# Patient Record
Sex: FEMALE | Race: WHITE
[De-identification: names, ages, dates, MRNs, and addresses within clinical notes are randomized per-mention and may not be internally consistent; named-entity substitution may affect disease eponyms.]

---

## 2017-12-27 ENCOUNTER — HOSPITAL ENCOUNTER (INPATIENT)
Dept: HOSPITAL 45 - C.2T | Age: 57
LOS: 10 days | Discharge: HOME | DRG: 312 | End: 2018-01-06
Attending: FAMILY MEDICINE | Admitting: HOSPITALIST
Payer: COMMERCIAL

## 2017-12-27 VITALS
WEIGHT: 209.22 LBS | WEIGHT: 209.22 LBS | BODY MASS INDEX: 35.72 KG/M2 | HEIGHT: 64 IN | BODY MASS INDEX: 35.72 KG/M2 | BODY MASS INDEX: 35.72 KG/M2 | HEIGHT: 64 IN

## 2017-12-27 VITALS
TEMPERATURE: 98.6 F | OXYGEN SATURATION: 100 % | HEART RATE: 91 BPM | SYSTOLIC BLOOD PRESSURE: 143 MMHG | DIASTOLIC BLOOD PRESSURE: 73 MMHG

## 2017-12-27 VITALS
OXYGEN SATURATION: 90 % | SYSTOLIC BLOOD PRESSURE: 94 MMHG | TEMPERATURE: 97.88 F | DIASTOLIC BLOOD PRESSURE: 64 MMHG | HEART RATE: 90 BPM

## 2017-12-27 VITALS — OXYGEN SATURATION: 91 % | HEART RATE: 88 BPM

## 2017-12-27 DIAGNOSIS — F32.9: ICD-10-CM

## 2017-12-27 DIAGNOSIS — J45.909: ICD-10-CM

## 2017-12-27 DIAGNOSIS — E11.9: ICD-10-CM

## 2017-12-27 DIAGNOSIS — I12.0: ICD-10-CM

## 2017-12-27 DIAGNOSIS — J45.901: ICD-10-CM

## 2017-12-27 DIAGNOSIS — N18.6: ICD-10-CM

## 2017-12-27 DIAGNOSIS — D63.1: ICD-10-CM

## 2017-12-27 DIAGNOSIS — N25.0: ICD-10-CM

## 2017-12-27 DIAGNOSIS — I05.0: ICD-10-CM

## 2017-12-27 DIAGNOSIS — E87.2: ICD-10-CM

## 2017-12-27 DIAGNOSIS — Z95.2: ICD-10-CM

## 2017-12-27 DIAGNOSIS — E78.5: ICD-10-CM

## 2017-12-27 DIAGNOSIS — J06.9: ICD-10-CM

## 2017-12-27 DIAGNOSIS — N39.0: ICD-10-CM

## 2017-12-27 DIAGNOSIS — Z79.82: ICD-10-CM

## 2017-12-27 DIAGNOSIS — E66.2: ICD-10-CM

## 2017-12-27 DIAGNOSIS — J96.22: ICD-10-CM

## 2017-12-27 DIAGNOSIS — Z99.2: ICD-10-CM

## 2017-12-27 DIAGNOSIS — R55: Primary | ICD-10-CM

## 2017-12-27 LAB
ALBUMIN SERPL-MCNC: 2.8 GM/DL (ref 3.4–5)
ALP SERPL-CCNC: 93 U/L (ref 45–117)
ALT SERPL-CCNC: 12 U/L (ref 12–78)
AST SERPL-CCNC: 12 U/L (ref 15–37)
BUN SERPL-MCNC: 73 MG/DL (ref 7–18)
CALCIUM SERPL-MCNC: 9.3 MG/DL (ref 8.5–10.1)
CO2 SERPL-SCNC: 20 MMOL/L (ref 21–32)
CREAT SERPL-MCNC: 6.12 MG/DL (ref 0.6–1.2)
EOSINOPHIL NFR BLD AUTO: 231 K/UL (ref 130–400)
GLUCOSE SERPL-MCNC: 302 MG/DL (ref 70–99)
HCT VFR BLD CALC: 35.6 % (ref 37–47)
HEP C IGG  13 YRS+OLDER_RFLX: (no result)
HGB BLD-MCNC: 10.9 G/DL (ref 12–16)
INR PPP: 1 (ref 0.9–1.1)
MCH RBC QN AUTO: 31.7 PG (ref 25–34)
MCHC RBC AUTO-ENTMCNC: 30.6 G/DL (ref 32–36)
MCV RBC AUTO: 103.5 FL (ref 80–100)
PHOSPHATE SERPL-MCNC: 7.1 MG/DL (ref 2.5–4.9)
PMV BLD AUTO: 9.7 FL (ref 7.4–10.4)
POTASSIUM SERPL-SCNC: 4.6 MMOL/L (ref 3.5–5.1)
PROT SERPL-MCNC: 7.5 GM/DL (ref 6.4–8.2)
PTT PATIENT: 28.5 SECONDS (ref 21–31)
RED CELL DISTRIBUTION WIDTH CV: 16.6 % (ref 11.5–14.5)
RED CELL DISTRIBUTION WIDTH SD: 60.7 FL (ref 36.4–46.3)
SODIUM SERPL-SCNC: 133 MMOL/L (ref 136–145)
WBC # BLD AUTO: 11.88 K/UL (ref 4.8–10.8)

## 2017-12-27 RX ADMIN — ALBUTEROL SULFATE SCH MG: 2.5 SOLUTION RESPIRATORY (INHALATION) at 18:01

## 2017-12-27 RX ADMIN — IPRATROPIUM BROMIDE AND ALBUTEROL SCH PUFFS: 20; 100 SPRAY, METERED RESPIRATORY (INHALATION) at 20:03

## 2017-12-27 RX ADMIN — DOCUSATE SODIUM SCH MG: 100 CAPSULE, LIQUID FILLED ORAL at 20:40

## 2017-12-27 RX ADMIN — METHYLPREDNISOLONE SODIUM SUCCINATE SCH MLS/MIN: 1 INJECTION, POWDER, FOR SOLUTION INTRAMUSCULAR; INTRAVENOUS at 20:40

## 2017-12-27 RX ADMIN — PRAMIPEXOLE DIHYDROCHLORIDE SCH MG: 0.25 TABLET ORAL at 20:42

## 2017-12-27 RX ADMIN — CEFTRIAXONE SCH MLS/HR: 1 INJECTION, POWDER, FOR SOLUTION INTRAMUSCULAR; INTRAVENOUS at 18:37

## 2017-12-27 RX ADMIN — HEPARIN SODIUM SCH UNIT: 10000 INJECTION, SOLUTION INTRAVENOUS; SUBCUTANEOUS at 21:28

## 2017-12-27 RX ADMIN — INSULIN ASPART SCH UNITS: 100 INJECTION, SOLUTION INTRAVENOUS; SUBCUTANEOUS at 20:48

## 2017-12-27 RX ADMIN — ALLOPURINOL SCH MG: 100 TABLET ORAL at 20:41

## 2017-12-27 NOTE — HISTORY & PHYSICAL EXAMINATION
DATE OF ADMISSION:  12/27/2017

 

PRIMARY CARE PHYSICIAN:  From [facility].  The name is not yet known.

 

NEPHROLOGIST:  From [facility] as well, we do not know the name yet.

 

CHIEF COMPLAINT:  The patient was transferred from [facility]

with acute respiratory failure, exacerbation of asthma and UTI.

 

HISTORY OF PRESENT COMPLAINT:  She is a 57-year-old female, obese with

significant past medical history of type 2 diabetes on diet controlled,

hypertension, chronic kidney disease on hemodialysis 3 times a week, asthma

and also history of probable aortic or mitral valve replacement and

apparently was in dialysis at [facility] today.  She missed her dialysis last

Sunday, so did not dialyze.  She was found to be collapsed.  She complained

to have some dizziness, some cough and shortness of breath.  Immediately

after that, she was noted to have blue lips and she was taken to the

Emergency Room at [facility].  At the ER, initially, she was unresponsive but

later on she was talking almost normally.  Her initial ABG did show a pH of

7.14, pCO2 of 60 and pO2 of 72 and her white count was 16.2 and her UA did

show possible infection.  Her lactic acid was 1.3 and chest x-ray reported as

seems to be fairly unremarkable, but given her history of respiratory failure

and requiring dialysis, she was transferred to [facility]

for continuation of care.

 

On asking questions, she admits to have a cough recently with some shortness

of breath and wheezing.  She did have chills and she felt dizzy during

dialysis.  She does have urinary frequency but no dysuria.  She did not have

any abdominal pain, any nausea and/or vomiting, and she did not have any

numbness or tingling involving any of the extremities and she was feeling

reasonably good in the room at [facility] and she is saturating

100% on room air.

 

PAST MEDICAL HISTORY:  Significant for diabetes, diet controlled;

hypertension, chronic kidney disease on hemodialysis 3 times a week, asthma

and history of mitral and/or aortic valve repair, not on any anticoagulation.

Also has Hyperlipidemia

 

PAST SURGICAL HISTORY:  Significant for heart valve replacement.

 

FAMILY HISTORY:  No significant family history of heart disease and/or

diabetes or chronic kidney disease.

 

SOCIAL HISTORY:  She is .  She has 2 children.  She does not smoke

and does not drink.  She lives alone and she has been reasonably ambulant.

 

ALLERGIES:  SHE IS ALLERGIC TO HYDROCODONE, MORPHINE.

 

MEDICATIONS:  The list she gave him includes oxygen 2 liters per minute,

ferric citrate 1 tablet daily, Renvela as directed, Colace 100 mg twice

daily, MiraLax as directed, vitamin B complex 1 tablet daily, Breo Ellipta

100/25 mcg 2 times a day; Aciphex, that is rabeprazole sodium, daily;

allopurinol 100 mg 2 times a day, Lipitor 40 mg daily, Zoloft 75 mg daily,

Plavix 75 mg daily, aspirin 81 mg daily, Lantus 5 units every day, Combivent

2 puffs 4 times daily as needed, Protonix 40 mg daily, ProAir 2 puffs q. 4

times daily as needed, Nephrocaps 1 tablet daily and tramadol 50 mg 1 tablet

q. 4 hourly as needed.

 

REVIEW OF SYSTEMS:  Other systems reviewed are unremarkable except those

mentioned in history of present complaint.

 

PHYSICAL EXAMINATION:

GENERAL:  On examination on the medical floor, she was not having any acute

distress.

VITAL SIGNS:  Temperature 37.0, pulse of 91, blood pressure 143/73,

saturation 100% on room air.

HEENT:  Unremarkable.

NECK:  Supple.  No JVD, no bruit.

CHEST:  Decreased breath sounds all over with wheezing anteriorly and minimal

crackles at the bases.

HEART:  S1, S2 with prosthetic valve sounds, no definite murmur appreciated.

ABDOMEN:  Soft, benign, mildly tender in the hypogastrium, renal angles were

not tender.  Bowel sounds present.

EXTREMITIES:  1+ edema bilaterally.

MUSCULOSKELETAL SYSTEM:  Did not show any acute arthritis involving any

joint.

CENTRAL NERVOUS SYSTEM:  She was alert, awake, oriented x3 and no focal

sensory and/or motor deficit appreciated.

 

LABORATORY AND IMAGING DATA:  Noted from [facility].  Blood gas -

pH 7.14, pCO2 of 60, pO2 72, bicarbonate 18, glucose was 207.  White count

16.2, H&H 11.8/38.1, and platelets 293.  PTT 31.  Lactic acid 1.3.  INR is

0.99.  UA - many bacterias, leukocyte esterase 2+, urine glucose 2+ and

protein 2+.  Glucose 202, BUN 68, creatinine 5.9, sodium 137, potassium 4.4,

chloride 104, carbon dioxide 21, total bilirubin 0.5.  LFTs normal.  Troponin

1.07.  Amylase 49, lipase 201.  TSH 2.52, magnesium 2.8.  Chest x-ray:  No

gross consolidation and/or pleural effusion.  Acetone negative.  EKG was in

sinus tachycardia, rate of 109 per minute, normal axis and nonspecific ST-T

wave changes.

 

IMPRESSION AND PLAN:

1.  Unresponsive episode during dialysis.  The cause of this could be

hypotensive episode during Dialysis.ABG in ER at [facility] showed Hypercarbic 
respiratory 

Acidosis.Responded well to BIPAP and on transfer she was feeling much better .

Patient remains hemodynamically stable and saturating 100% on RA.

2.  Respiratory failure that could be a combination of an attack of asthma

and also fluid overload secondary to missed dialysis.  We will check her

x-ray over here and we will consult nephrologist for possible dialysis

starting tomorrow.  She still produces urine, we may try IV Lasix while in

the hospital, depending on the x-ray findings.

3.  End-stage renal disease on hemodialysis.  Nephrologic consultation will

be taken and she will get dialysis.  She has a dialysis catheter in the left

upper chest.

4.  Exacerbation of asthma.  She will get her usual nebulized treatment and

usual bronchodilators and we will put him on Solu-Medrol as well.

5.  Diabetes.  She takes only a small amount of insulin, Lantus, will

continue with that and will put her on sliding scale coverage while in the

hospital.  Check hemoglobin A1c.

6.  Status post heart valve replacement.  She does not have any issues at

this time.  We will continue her medications right now.

7.UTI-started on Ceftriaxone

8.  Hyperlipidemia.  Continue with statin.

9.  Gastrointestinal prophylaxis with Protonix.

10.  Deep venous thrombosis prophylaxis with subcutaneous heparin.

11.  Code status that was discussed with the patient, she will be a full

code.

 

In my clinical judgment, the beneficiary meets criteria as per CMS for

2-midnight stay in the hospital.

Discussed with the Daughter-updated current medical condition.She does not know 
the name of the PCP and or Nephrologist

Phone # [phone].Name-Marie PITTS

## 2017-12-27 NOTE — DIAGNOSTIC IMAGING REPORT
SINGLE VIEW CHEST



CLINICAL HISTORY:  Congestive heart failure.



FINDINGS: An AP, portable, upright chest radiograph is obtained. No prior

studies are available for comparison at the time of dictation. The examination

is degraded by portable technique and patient rotation. The patient is status

post midline sternotomy. A left subclavian central venous catheter is in place.

The tip projects over the right atrium. The heart is enlarged and there is

atherosclerotic calcification of the thoracic aorta. There is pulmonary vascular

congestion with evidence of interstitial edema. Trace pleural effusions are

suspected. No pneumothorax is seen. The skeletal structures appear osteopenic.

The bony thorax is grossly intact.



IMPRESSION:



1. Cardiomegaly with evidence of congestive failure and interstitial edema.



2. Suspect trace pleural effusions. 







Electronically signed by:  Ramírez Hernandez M.D.

12/27/2017 7:21 PM



Dictated Date/Time:  12/27/2017 7:19 PM

## 2017-12-28 VITALS — HEART RATE: 96 BPM | DIASTOLIC BLOOD PRESSURE: 79 MMHG | SYSTOLIC BLOOD PRESSURE: 115 MMHG

## 2017-12-28 VITALS — DIASTOLIC BLOOD PRESSURE: 70 MMHG | TEMPERATURE: 97.88 F | SYSTOLIC BLOOD PRESSURE: 103 MMHG | HEART RATE: 97 BPM

## 2017-12-28 VITALS
HEART RATE: 99 BPM | SYSTOLIC BLOOD PRESSURE: 109 MMHG | DIASTOLIC BLOOD PRESSURE: 71 MMHG | TEMPERATURE: 97.7 F | OXYGEN SATURATION: 97 %

## 2017-12-28 VITALS — HEART RATE: 99 BPM | DIASTOLIC BLOOD PRESSURE: 69 MMHG | SYSTOLIC BLOOD PRESSURE: 94 MMHG

## 2017-12-28 VITALS — OXYGEN SATURATION: 97 % | SYSTOLIC BLOOD PRESSURE: 105 MMHG | HEART RATE: 85 BPM | DIASTOLIC BLOOD PRESSURE: 71 MMHG

## 2017-12-28 VITALS — HEART RATE: 85 BPM | OXYGEN SATURATION: 97 %

## 2017-12-28 VITALS
OXYGEN SATURATION: 99 % | HEART RATE: 96 BPM | TEMPERATURE: 97.52 F | SYSTOLIC BLOOD PRESSURE: 143 MMHG | DIASTOLIC BLOOD PRESSURE: 84 MMHG

## 2017-12-28 VITALS — HEART RATE: 62 BPM | DIASTOLIC BLOOD PRESSURE: 59 MMHG | SYSTOLIC BLOOD PRESSURE: 86 MMHG

## 2017-12-28 VITALS — DIASTOLIC BLOOD PRESSURE: 66 MMHG | HEART RATE: 100 BPM | SYSTOLIC BLOOD PRESSURE: 109 MMHG

## 2017-12-28 VITALS — HEART RATE: 87 BPM | OXYGEN SATURATION: 96 %

## 2017-12-28 VITALS
DIASTOLIC BLOOD PRESSURE: 73 MMHG | TEMPERATURE: 99.32 F | SYSTOLIC BLOOD PRESSURE: 111 MMHG | HEART RATE: 84 BPM | OXYGEN SATURATION: 100 %

## 2017-12-28 VITALS — TEMPERATURE: 97.88 F | DIASTOLIC BLOOD PRESSURE: 69 MMHG | SYSTOLIC BLOOD PRESSURE: 117 MMHG | HEART RATE: 94 BPM

## 2017-12-28 VITALS — HEART RATE: 65 BPM | DIASTOLIC BLOOD PRESSURE: 62 MMHG | SYSTOLIC BLOOD PRESSURE: 115 MMHG

## 2017-12-28 VITALS — HEART RATE: 96 BPM | SYSTOLIC BLOOD PRESSURE: 102 MMHG | DIASTOLIC BLOOD PRESSURE: 76 MMHG

## 2017-12-28 VITALS — OXYGEN SATURATION: 92 % | HEART RATE: 86 BPM

## 2017-12-28 VITALS — HEART RATE: 93 BPM | SYSTOLIC BLOOD PRESSURE: 120 MMHG | DIASTOLIC BLOOD PRESSURE: 70 MMHG

## 2017-12-28 VITALS — DIASTOLIC BLOOD PRESSURE: 59 MMHG | HEART RATE: 66 BPM | SYSTOLIC BLOOD PRESSURE: 101 MMHG

## 2017-12-28 VITALS
HEART RATE: 86 BPM | OXYGEN SATURATION: 94 % | DIASTOLIC BLOOD PRESSURE: 68 MMHG | TEMPERATURE: 98.06 F | SYSTOLIC BLOOD PRESSURE: 117 MMHG

## 2017-12-28 VITALS — OXYGEN SATURATION: 97 % | HEART RATE: 99 BPM

## 2017-12-28 VITALS — HEART RATE: 96 BPM | SYSTOLIC BLOOD PRESSURE: 124 MMHG | DIASTOLIC BLOOD PRESSURE: 96 MMHG

## 2017-12-28 VITALS
TEMPERATURE: 97.88 F | OXYGEN SATURATION: 97 % | SYSTOLIC BLOOD PRESSURE: 122 MMHG | DIASTOLIC BLOOD PRESSURE: 78 MMHG | HEART RATE: 97 BPM

## 2017-12-28 VITALS — HEART RATE: 100 BPM | SYSTOLIC BLOOD PRESSURE: 91 MMHG | DIASTOLIC BLOOD PRESSURE: 61 MMHG

## 2017-12-28 VITALS
TEMPERATURE: 97.88 F | DIASTOLIC BLOOD PRESSURE: 70 MMHG | OXYGEN SATURATION: 96 % | HEART RATE: 95 BPM | SYSTOLIC BLOOD PRESSURE: 107 MMHG

## 2017-12-28 VITALS — DIASTOLIC BLOOD PRESSURE: 74 MMHG | HEART RATE: 92 BPM | SYSTOLIC BLOOD PRESSURE: 128 MMHG

## 2017-12-28 VITALS
DIASTOLIC BLOOD PRESSURE: 62 MMHG | TEMPERATURE: 97.7 F | OXYGEN SATURATION: 94 % | HEART RATE: 94 BPM | SYSTOLIC BLOOD PRESSURE: 101 MMHG

## 2017-12-28 RX ADMIN — IPRATROPIUM BROMIDE AND ALBUTEROL SCH PUFFS: 20; 100 SPRAY, METERED RESPIRATORY (INHALATION) at 20:54

## 2017-12-28 RX ADMIN — Medication SCH MG: at 08:21

## 2017-12-28 RX ADMIN — ALUMINUM ZIRCONIUM TRICHLOROHYDREX GLY SCH EA: 0.2 STICK TOPICAL at 16:00

## 2017-12-28 RX ADMIN — IPRATROPIUM BROMIDE AND ALBUTEROL SCH PUFFS: 20; 100 SPRAY, METERED RESPIRATORY (INHALATION) at 08:20

## 2017-12-28 RX ADMIN — PRAMIPEXOLE DIHYDROCHLORIDE SCH MG: 0.25 TABLET ORAL at 20:55

## 2017-12-28 RX ADMIN — INSULIN ASPART SCH UNITS: 100 INJECTION, SOLUTION INTRAVENOUS; SUBCUTANEOUS at 11:52

## 2017-12-28 RX ADMIN — METHYLPREDNISOLONE SODIUM SUCCINATE SCH MLS/MIN: 1 INJECTION, POWDER, FOR SOLUTION INTRAMUSCULAR; INTRAVENOUS at 08:22

## 2017-12-28 RX ADMIN — ACETAMINOPHEN PRN MG: 325 TABLET ORAL at 16:58

## 2017-12-28 RX ADMIN — HEPARIN SODIUM SCH UNIT: 10000 INJECTION, SOLUTION INTRAVENOUS; SUBCUTANEOUS at 06:01

## 2017-12-28 RX ADMIN — ALLOPURINOL SCH MG: 100 TABLET ORAL at 08:21

## 2017-12-28 RX ADMIN — ALBUTEROL SULFATE SCH MG: 2.5 SOLUTION RESPIRATORY (INHALATION) at 01:31

## 2017-12-28 RX ADMIN — IPRATROPIUM BROMIDE AND ALBUTEROL SCH PUFFS: 20; 100 SPRAY, METERED RESPIRATORY (INHALATION) at 11:52

## 2017-12-28 RX ADMIN — PANTOPRAZOLE SCH MG: 40 TABLET, DELAYED RELEASE ORAL at 08:22

## 2017-12-28 RX ADMIN — DOCUSATE SODIUM SCH MG: 100 CAPSULE, LIQUID FILLED ORAL at 20:54

## 2017-12-28 RX ADMIN — INSULIN ASPART SCH UNITS: 100 INJECTION, SOLUTION INTRAVENOUS; SUBCUTANEOUS at 08:28

## 2017-12-28 RX ADMIN — ACETAMINOPHEN PRN MG: 325 TABLET ORAL at 08:24

## 2017-12-28 RX ADMIN — ALBUTEROL SULFATE SCH MG: 2.5 SOLUTION RESPIRATORY (INHALATION) at 14:02

## 2017-12-28 RX ADMIN — ALUMINUM ZIRCONIUM TRICHLOROHYDREX GLY SCH EA: 0.2 STICK TOPICAL at 00:10

## 2017-12-28 RX ADMIN — DOCUSATE SODIUM SCH MG: 100 CAPSULE, LIQUID FILLED ORAL at 08:21

## 2017-12-28 RX ADMIN — ALBUTEROL SULFATE SCH MG: 2.5 SOLUTION RESPIRATORY (INHALATION) at 06:59

## 2017-12-28 RX ADMIN — HEPARIN SODIUM SCH UNIT: 10000 INJECTION, SOLUTION INTRAVENOUS; SUBCUTANEOUS at 21:03

## 2017-12-28 RX ADMIN — ALUMINUM ZIRCONIUM TRICHLOROHYDREX GLY SCH EA: 0.2 STICK TOPICAL at 00:11

## 2017-12-28 RX ADMIN — ALBUTEROL SULFATE SCH MG: 2.5 SOLUTION RESPIRATORY (INHALATION) at 19:21

## 2017-12-28 RX ADMIN — CLOPIDOGREL BISULFATE SCH MG: 75 TABLET, FILM COATED ORAL at 08:21

## 2017-12-28 RX ADMIN — ASCORBIC ACID, THIAMINE MONONITRATE,RIBOFLAVIN, NIACINAMIDE, PYRIDOXINE HYDROCHLORIDE, FOLIC ACID, CYANOCOBALAMIN, BIOTIN, CALCIUM PANTOTHENATE, SCH CAP: 100; 1.5; 1.7; 20; 10; 1; 6000; 150000; 5 CAPSULE, LIQUID FILLED ORAL at 08:22

## 2017-12-28 RX ADMIN — ATORVASTATIN CALCIUM SCH MG: 40 TABLET, FILM COATED ORAL at 09:48

## 2017-12-28 RX ADMIN — HEPARIN SODIUM SCH UNIT: 10000 INJECTION, SOLUTION INTRAVENOUS; SUBCUTANEOUS at 14:48

## 2017-12-28 RX ADMIN — INSULIN ASPART SCH UNITS: 100 INJECTION, SOLUTION INTRAVENOUS; SUBCUTANEOUS at 17:05

## 2017-12-28 RX ADMIN — SERTRALINE HYDROCHLORIDE SCH MG: 50 TABLET, FILM COATED ORAL at 08:22

## 2017-12-28 RX ADMIN — IPRATROPIUM BROMIDE AND ALBUTEROL SCH PUFFS: 20; 100 SPRAY, METERED RESPIRATORY (INHALATION) at 16:58

## 2017-12-28 RX ADMIN — CEFTRIAXONE SCH MLS/HR: 1 INJECTION, POWDER, FOR SOLUTION INTRAMUSCULAR; INTRAVENOUS at 16:59

## 2017-12-28 RX ADMIN — INSULIN ASPART SCH UNITS: 100 INJECTION, SOLUTION INTRAVENOUS; SUBCUTANEOUS at 21:00

## 2017-12-28 RX ADMIN — ALLOPURINOL SCH MG: 100 TABLET ORAL at 20:56

## 2017-12-28 RX ADMIN — ALUMINUM ZIRCONIUM TRICHLOROHYDREX GLY SCH EA: 0.2 STICK TOPICAL at 08:00

## 2017-12-28 NOTE — PHARMACY PROGRESS NOTE
Glycemic Control Intl Consult


Date of Service


Dec 28, 2017.





Scope


Glycemic Pharmacist consulted by Dr Escobar on 12/28/17 for glycemic control and 

to write orders per [facility] inpatient glycemic control protocol





Objective


Weight (Kilograms):  100.900


Accuchecks BSG (last 24hrs):











Test


  12/27/17


17:07 12/27/17


19:53 12/27/17


20:16 12/28/17


07:11


 


Bedside Glucose


  249 mg/dl


(70-90) 301 mg/dl


(70-90) 


  281 mg/dl


(70-90)


 


Random Glucose


  


  


  302 mg/dl


(70-99) 


 


 


Test


  12/28/17


11:23 


  


  


 


 


Bedside Glucose


  366 mg/dl


(70-90) 


  


  


 








Laboratory Data (last 24hrs)











Test


  12/27/17


20:16


 


Anion Gap 12.0 mmol/L 


 


BUN/Creatinine Ratio 11.9 


 


Blood Urea Nitrogen 73 mg/dl 


 


Creatinine 6.12 mg/dl 


 


Potassium Level 4.6 mmol/L 


 


Sodium Level 133 mmol/L 


 


White Blood Count 11.88 K/uL 











Recent Pertinent Medications


Outpatient Anti-diabetic Regimen: 


* Lantus 5 units SQ daily


* A1c pending





The patient is currently receiving:


* Basal insulin:              Lantus 5 units every 24 hours





* Correctional Insulin:     Novolog Correction per scale ACHS


                            Goal Range: Low 110 mg/dL - High 140 mg/dL


                            Correction Factor: 25 mg/dL/unit





* Prandial insulin:           Per carb ratio of 1 unit per 15 grams CHO consumed





Risk Factors for Insulin Resistance:


* Steroids: Solu-medrol 60 mg IV q8h


* Infection: UTI -> empiric ceftriaxone 


* Diet: T2DM, low Na





Assessment & Plan


ASSESSMENT:


* Ms Bro is a 57 yr old T2DM female admitted for respiratory failure, asthma 

exacerbation, and UTI. Prior to transfer to [facility], she was taken to [place] 

ER after collapsing during hemodialysis. h/o ESRD on HD M/W/F, mitral and 

aortic valve repair, HTN and asthma.


* Patient is on low dose of Lantus as outpatient - glycemic control unknown. 

A1c ordered for 12/29 am.


* Initiate IV insulin infusion due to sustained severe hyperglycemia and 

patient ordered high dose IV steroids. 


 * SM 60 mg IV q8h --> SM 40 mg q12h starting at 2100


* Will overlap insulin infusion with Lantus to aid in early transition off IV 

infusion. Will utilize a fixed carb ratio since patient is eating and on IV 

steroids. 





PLAN FOR INPATIENT GLYCEMIC CONTROL:


* Starting IV insulin infusion per moderate stress protocol


 * Goal Range 110 - 180 mg/dl





* Basal insulin


 * Lantus 20 units SQ BID





* Bolus Insulin - will utilize a fixed carb ratio rather than carb ratio 

calculated by insulin infusion


 *  NOVOLOG per scale ACHS 


 * Goal Range:  Low 110 mg/dL - High 140 mg/dL


 * Correction Factor: -- mg/dL/unit (insulin drip will correct)


 * Nutritional / Prandial insulin per carb ratio of 1 unit per 5 grams CHO 

consumed








* Please note that the plan above was derived based on current level of insulin 

resistance and hospital stress. These recommendations are appropriate for 

inpatient admission only. Plan of care upon discharge will need to be 

reassessed to avoid potential outpatient hypo/hyperglycemia. 





Thank you.

## 2017-12-28 NOTE — PROGRESS NOTE
Medicine Progress Note


Date & Time of Visit:


Dec 28, 2017 at 12:23.


Subjective


58 yo F with ESRD on HD presented as a transfer from OSH after an episode of 

unresponsiveness preceeded by some lightheadedness during her dialysis session 

yesterday.  She is doing well today and is awake and alert.  She is wearing 

supplemental oxygen but denies any SOB, chest pain or other symptoms at this 

time.  Telemetry review was unremarkable overnight.  She reports coughing that 

is nonproductive and some shortness of breath.  She wore BIPAP overnight. She 

reports feeling better today.  She is tolerating PO.





Objective





Last 8 Hrs








  Date Time  Temp Pulse Resp B/P (MAP) Pulse Ox O2 Delivery O2 Flow Rate FiO2


 


12/28/17 12:00      Nasal Cannula 4.0 


 


12/28/17 11:48 36.4 96 20 143/84 (103) 99 Nasal Cannula 4.0 


 


12/28/17 08:00 36.6 97 22 122/78 (93) 97 BiPAP  


 


12/28/17 08:00      Nasal Cannula 4.0 


 


12/28/17 05:01  86   92   21








Physical Exam:


GEN: obese, in no acute distress, alert and appropriate, NC in place, no 

conversational dyspnea. 


HEENT: NC/AT, pupils are round and equal bilaterally, normal sclerae, MMM


CARDIO: reg rate, S1/2 heard without m/g/r


LUNGS: mild wheezing at bases, otherwise CTA bilaterally, without crackles or 

rales.  good diaphragmatic excursion


ABD: soft, non-tender, non-distended, no rebound or guarding +BS


EXTREMITY: RP and DP palpable 2+ bilat, no LE swelling or edema, extremities 

are warm and well-perfused


NEURO: CN 2-12 grossly intact


MUSC: moves all extremities equally


SKIN:  warm and dry


Laboratory Results:


12/27/17 20:16








12/27/17 20:16

















Test


  12/27/17


20:16 12/28/17


08:48 12/28/17


19:49


 


Red Blood Count


  3.44 M/uL


(4.2-5.4) 


  


 


 


Mean Corpuscular Volume


  103.5 fL


() 


  


 


 


Mean Corpuscular Hemoglobin


  31.7 pg


(25-34) 


  


 


 


Mean Corpuscular Hemoglobin


Concent 30.6 g/dl


(32-36) 


  


 


 


RDW Standard Deviation


  60.7 fL


(36.4-46.3) 


  


 


 


RDW Coefficient of Variation


  16.6 %


(11.5-14.5) 


  


 


 


Mean Platelet Volume


  9.7 fL


(7.4-10.4) 


  


 


 


Prothrombin Time


  10.0 SECONDS


(9.0-12.0) 


  


 


 


Prothromb Time International


Ratio 1.0 (0.9-1.1) 


  


  


 


 


Activated Partial


Thromboplast Time 28.5 SECONDS


(21.0-31.0) 


  


 


 


Partial Thromboplastin Ratio 1.1   


 


Anion Gap


  12.0 mmol/L


(3-11) 


  


 


 


Est Creatinine Clear Calc


Drug Dose 11.3 ml/min 


  


  


 


 


Estimated GFR (


American) 8.0 


  


  


 


 


Estimated GFR (Non-


American 6.9 


  


  


 


 


BUN/Creatinine Ratio 11.9 (10-20)   


 


Calcium Level


  9.3 mg/dl


(8.5-10.1) 


  


 


 


Phosphorus Level


  7.1 mg/dl


(2.5-4.9) 


  


 


 


Magnesium Level


  2.8 mg/dl


(1.8-2.4) 


  


 


 


Total Bilirubin


  0.3 mg/dl


(0.2-1) 


  


 


 


Aspartate Amino Transf


(AST/SGOT) 12 U/L (15-37) 


  


  


 


 


Alanine Aminotransferase


(ALT/SGPT) 12 U/L (12-78) 


  


  


 


 


Alkaline Phosphatase


  93 U/L


() 


  


 


 


Total Protein


  7.5 gm/dl


(6.4-8.2) 


  


 


 


Albumin


  2.8 gm/dl


(3.4-5.0) 


  


 


 


Globulin


  4.7 gm/dl


(2.5-4.0) 


  


 


 


Albumin/Globulin Ratio 0.6 (0.9-2)   


 


Beta-Hydroxybutyric Acid


  3.60 mg/dL


(0.2-2.81) 


  


 


 


Thyroid Stimulating Hormone


(TSH) 2.160 uIu/ml


(0.300-4.500) 


  


 


 


Hepatitis C Antibody Screen NEG (NEG)   


 


Hepatitis C Antibody NEG (NEG)   


 


Arterial Blood pH


  


  7.25


(7.35-7.45) 


 


 


Arterial Blood Partial


Pressure CO2 


  43 mmHg


(35-46) 


 


 


Arterial Blood Partial


Pressure O2 


  153 mm/Hg


(80-95) 


 


 


Arterial Blood HCO3


  


  18 mmol/L


(19-24) 


 


 


Arterial Blood Oxygen


Saturation 


  98.4 % (90-95) 


  


 


 


Arterial Blood Base Excess


  


  -8.7 mEq/L


(-9-1.8) 


 


 


Arterial Blood Gas Delivery  4 L  


 


Kiko Test  POS (POS)  


 


Bedside Glucose


  


  


  366 mg/dl


(70-90)








Last 24 Hours








Test


  12/27/17


17:07 12/27/17


19:53 12/27/17


20:16 12/27/17


23:55


 


Bedside Glucose 249 mg/dl  301 mg/dl   


 


White Blood Count   11.88 K/uL  


 


Red Blood Count   3.44 M/uL  


 


Hemoglobin   10.9 g/dL  


 


Hematocrit   35.6 %  


 


Mean Corpuscular Volume   103.5 fL  


 


Mean Corpuscular Hemoglobin   31.7 pg  


 


Mean Corpuscular Hemoglobin


Concent 


  


  30.6 g/dl 


  


 


 


RDW Standard Deviation   60.7 fL  


 


RDW Coefficient of Variation   16.6 %  


 


Platelet Count   231 K/uL  


 


Mean Platelet Volume   9.7 fL  


 


Prothrombin Time   10.0 SECONDS  


 


Prothromb Time International


Ratio 


  


  1.0 


  


 


 


Activated Partial


Thromboplast Time 


  


  28.5 SECONDS 


  


 


 


Partial Thromboplastin Ratio   1.1  


 


Sodium Level   133 mmol/L  


 


Potassium Level   4.6 mmol/L  


 


Chloride Level   101 mmol/L  


 


Carbon Dioxide Level   20 mmol/L  


 


Anion Gap   12.0 mmol/L  


 


Blood Urea Nitrogen   73 mg/dl  


 


Creatinine   6.12 mg/dl  


 


Est Creatinine Clear Calc


Drug Dose 


  


  11.3 ml/min 


  


 


 


Estimated GFR (


American) 


  


  8.0 


  


 


 


Estimated GFR (Non-


American 


  


  6.9 


  


 


 


BUN/Creatinine Ratio   11.9  


 


Random Glucose   302 mg/dl  


 


Calcium Level   9.3 mg/dl  


 


Phosphorus Level   7.1 mg/dl  


 


Magnesium Level   2.8 mg/dl  


 


Total Bilirubin   0.3 mg/dl  


 


Aspartate Amino Transf


(AST/SGOT) 


  


  12 U/L 


  


 


 


Alanine Aminotransferase


(ALT/SGPT) 


  


  12 U/L 


  


 


 


Alkaline Phosphatase   93 U/L  


 


Total Protein   7.5 gm/dl  


 


Albumin   2.8 gm/dl  


 


Globulin   4.7 gm/dl  


 


Albumin/Globulin Ratio   0.6  


 


Beta-Hydroxybutyric Acid   3.60 mg/dL  


 


Thyroid Stimulating Hormone


(TSH) 


  


  2.160 uIu/ml 


  


 


 


Hepatitis C Antibody Screen   NEG  


 


Hepatitis C Antibody   NEG  


 


Arterial Blood pH    7.22 


 


Arterial Blood Partial


Pressure CO2 


  


  


  50 mmHg 


 


 


Arterial Blood Partial


Pressure O2 


  


  


  97 mm/Hg 


 


 


Arterial Blood HCO3    20 mmol/L 


 


Arterial Blood Oxygen


Saturation 


  


  


  95.1 % 


 


 


Arterial Blood Base Excess    -7.8 mEq/L 


 


Arterial Blood Gas Delivery    2 L 


 


Kiko Test    POS 


 


Test


  12/28/17


02:30 12/28/17


07:11 12/28/17


08:48 12/28/17


11:23


 


Arterial Blood pH 7.22   7.25  


 


Arterial Blood Partial


Pressure CO2 50 mmHg 


  


  43 mmHg 


  


 


 


Arterial Blood Partial


Pressure O2 105 mm/Hg 


  


  153 mm/Hg 


  


 


 


Arterial Blood HCO3 20 mmol/L   18 mmol/L  


 


Arterial Blood Oxygen


Saturation 96.2 % 


  


  98.4 % 


  


 


 


Arterial Blood Base Excess -7.8 mEq/L   -8.7 mEq/L  


 


Arterial Blood Gas Delivery 30%   4 L  


 


Kiko Test POS   POS  


 


Bedside Glucose  281 mg/dl   366 mg/dl 











Assessment & Plan


58 yo F with ESRD on HD presented as a transfer from OSH after an episode of 

unresponsiveness preceeded by some lightheadedness during her dialysis session 

yesterday.  She is doing well today and is awake and alert.  She is wearing 

supplemental oxygen but denies any SOB, chest pain or other symptoms at this 

time.  Telemetry review was unremarkable overnight.  She reports coughing that 

is nonproductive and some shortness of breath.  She wore BIPAP overnight. She 

reports feeling better today.  She is tolerating PO. 





1.  Syncope-poss etiologies include but not limited to hypotension, 

hypoglycemia or other during dialysis.  No events on telemetry overnight.  She 

appears alert and oriented today.  Echo pending. 


2.  Hypoxia poss 2/2 asthma exacerbation vs hypercarbia from presumed GUILLERMO/

obesity hypoventilation syndrome vs fluid overloaded state from missed dialysis 

session.  HD today.  Will cont to monitor volume status daily.  Cont steroids 

and bronchodilators.  Apprec pulm input.  BIPAP qHS


3.  UTI-UA dirty at prior facility, no culture results available.  Cont 

ceftriaxone. 


4.  ESRD on HD-apprec Nephro recs. 


5.  DMII-ISS/Lantus and carb coverage.  Pharm consult. 


6.  Status post heart valve replacement.  She is unsure which valve but it is a 

bioprosthetic valve. No murmur on exam.  





DVT proph-heparin


Full Code


Dispo-cont telemetry





[facility]


Consultants:


Nephro, Pulm


Current Inpatient Medications:





Current Inpatient Medications








 Medications


  (Trade)  Dose


 Ordered  Sig/Daniel


 Route  Start Time


 Stop Time Status Last Admin


Dose Admin


 


 Heparin Sodium


  (Porcine)


  (Heparin Sq 5000


 Unit/0.5ml)  5,000 unit  Q8


 SQ  12/27/17 22:00


 1/26/18 21:59  12/28/17 06:01


5,000 UNIT


 


 Acetaminophen


  (Tylenol Tab)  650 mg  Q4H  PRN


 PO  12/27/17 17:45


 1/26/18 17:44  12/28/17 08:24


650 MG


 


 Ondansetron HCl


  (Zofran Inj)  4 mg  Q6H  PRN


 IV  12/27/17 17:45


 1/26/18 17:44   


 


 


 Albuterol Sulfate


  (Ventolin 0.083%


 2.5MG/3ML Neb)  2.5 mg  Q6R


 INH  12/27/17 21:00


 1/26/18 20:59  12/28/17 01:31


2.5 MG


 


 Methylprednisolone


 Sodium Succinate


 60 mg/Syringe  0.96 ml @ 


 1.5 mls/min  TID


 IV  12/27/17 21:00


 1/26/18 20:59  12/28/17 08:22


1.5 MLS/MIN


 


 Ceftriaxone


 Sodium 1000 mg/


 Dextrose  60 ml @ 


 100 mls/hr  DAILY@1800


 IV  12/27/17 18:00


 1/1/18 17:59  12/27/17 18:37


100 MLS/HR


 


 Allopurinol


  (Zyloprim Tab)  100 mg  BID


 PO  12/27/17 21:00


 1/26/18 20:59  12/28/17 08:21


100 MG


 


 Aspirin


  (Ecotrin Tab)  81 mg  DAILY


 PO  12/28/17 09:00


 1/27/18 08:59  12/28/17 08:21


81 MG


 


 Atorvastatin


 Calcium


  (Lipitor Tab)  40 mg  DAILY


 PO  12/28/17 09:00


 1/27/18 08:59  12/28/17 09:48


40 MG


 


 Clopidogrel


 Bisulfate


  (plAVix TAB)  75 mg  DAILY


 PO  12/28/17 09:00


 1/27/18 08:59  12/28/17 08:21


75 MG


 


 Docusate Sodium


  (coLACE CAP)  100 mg  BID


 PO  12/27/17 21:00


 1/26/18 20:59  12/28/17 08:21


100 MG


 


 Insulin Glargine


  (Lantus Solostar


 Pen)  5 units  QAM


 SC  12/28/17 09:00


 1/27/18 08:59  12/28/17 08:29


5 UNITS


 


 Albuterol/


 Ipratropium


  (Combivent


 Respimat Inh)  1 puffs  QIDR


 INH  12/27/17 20:00


 1/26/18 19:59  12/28/17 11:52


1 PUFFS


 


 Pantoprazole


 Sodium


  (Protonix Tab)  40 mg  DAILY


 PO  12/28/17 09:00


 1/27/18 08:59  12/28/17 08:22


40 MG


 


 Pramipexole


 Dihydrochloride


  (miraPEX TAB)  0.25 mg  HS


 PO  12/27/17 21:00


 1/26/18 20:59  12/27/17 20:42


0.25 MG


 


 Sertraline HCl


  (Zoloft Tab)  75 mg  DAILY


 PO  12/28/17 09:00


 1/27/18 08:59  12/28/17 08:22


75 MG


 


 Thiamine HCl


  (Vitamin B-1 Tab)  50 mg  DAILY


 PO  12/28/17 09:00


 1/27/18 08:59  12/28/17 08:21


50 MG


 


 Tramadol HCl


  (Ultram Tab)  50 mg  Q4H  PRN


 PO  12/27/17 19:15


 1/26/18 19:14   


 


 


 Vitamin B Complex/


 Vit C/Folic Acid


  (Nephrocaps)  1 cap  DAILY


 PO  12/28/17 09:00


 1/27/18 08:59  12/28/17 08:22


1 CAP


 


 Miscellaneous


 Information


  (Order Awaiting


 Action)  1 ea  QS


 N/A  12/28/17 00:00


 1/27/18 00:00   


 


 


 Miscellaneous


 Information


  (Order Awaiting


 Action)  1 ea  QS


 N/A  12/28/17 00:00


 1/27/18 00:00   


 


 


 Miscellaneous


 Information


  (Order Awaiting


 Action)  1 ea  QS


 N/A  12/28/17 00:00


 1/27/18 00:00   


 


 


 Miscellaneous


 Information


  (Order Awaiting


 Action)  1 ea  QS


 N/A  12/28/17 00:00


 1/27/18 00:00   


 


 


 Insulin Aspart


  (novoLOG ASPART)  **SLIDING


 SCALE**


 **G...  ACHS


 SC  12/27/17 21:00


 1/26/18 20:59  12/28/17 11:52


12 UNITS


 


 Glucose


  (Glucose 40% Gel)  15-30


 GRAMS 15


 GRAMS...  UD  PRN


 PO  12/27/17 19:30


 1/26/18 19:29   


 


 


 Glucose


  (Glucose Chew


 Tab)  4-8


 Tablets 4


 Tabl...  UD  PRN


 PO  12/27/17 19:30


 1/26/18 19:29   


 


 


 Dextrose


  (Dextrose 50%


 50ML Syringe)  25-50ML OF


 50% DW IV


 FOR...  UD  PRN


 IV  12/27/17 19:30


 1/26/18 19:29   


 


 


 Glucagon


  (Glucagon Inj)  1 mg  UD  PRN


 SQ  12/27/17 19:30


 1/26/18 19:29   


 


 


 Miscellaneous


 Information


  (Consult


 Glycemic


 Management


 Pharmacy)  1 ea  UD  PRN


 N/A  12/28/17 12:14


 1/27/18 12:13

## 2017-12-28 NOTE — NEPHROLOGY CONSULTATION
DATE OF CONSULTATION:  12/28/2017

 

ATTENDING OF RECORD:  Nisha Escobar DO

 

REASON FOR CONSULTATION:  ESRD.

 

HISTORY OF PRESENT ILLNESS:  This is a 57-year-old female who dialyzes in

[place] with a nephrologist in the [place] area who normally dialyzes

Mondays, Wednesdays, Fridays; however, secondary to the holidays was

dialyzing Friday, Sunday, Wednesday.  The patient missed Sunday's treatment. 

Patient went from Friday to Wednesday without dialysis.  The patient was short 
of

breath and fluid overloaded.  They attempted dialysis on her on Wednesday;

however, patient became unresponsive and notes that she had blue lips.  Her

ABG showed a pH of 7.14 with an elevated white count of 16,000 and a UA

suggestive of infection, transferred to [facility].  The patient overall

for the past week has not felt well with cough, shortness of breath,

wheezing.  The patient was admitted.  ABG showed a pH of 7.22.  BiPAP was

started and repeat ABG was not much better.  When trying to arouse her this

morning, it was very difficult to arouse her, was quite lethargic; however,

she finally woke up and was talking to me normally.

 

PAST MEDICAL HISTORY:  End-stage renal disease, mitral and aortic valve

repairs, hyperlipidemia, hypertension, diabetes, asthma.

 

PAST SURGICAL HISTORY:  Heart valve surgery, tunnel dialysis catheter

placement.

 

FAMILY HISTORY:  No history of end-stage renal disease in the family.

 

SOCIAL HISTORY:  No smoking, no alcohol, no drugs.  Lives alone.

 

REVIEW OF SYSTEMS:  Positive shortness of breath.  Positive fatigue.  Good

appetite.  No nausea or vomiting.  No chest pain.  Positive cough.  No

dysuria.  Positive chills.  No fevers.  All other review of systems otherwise

negative.

 

CURRENT MEDICATIONS:  Aspirin 81 mg daily, Lipitor 40 mg daily, Plavix 75 mg

daily, Lantus 5 units daily, Protonix 40 mg daily, Zoloft 75 mg daily,

thiamine 50 mg daily, Nephrocaps daily, heparin 5000 subQ q. 8, Solu-Medrol

60 mg IV t.i.d., allopurinol 100 mg p.o. b.i.d., Colace 100 mg p.o. b.i.d.,

Mirapex 0.25 mg p.o. at night, sliding scale insulin, ceftriaxone 1 gram IV

daily.

 

PHYSICAL EXAMINATION:

VITAL SIGNS:  Temperature is 37.4, pulse 80s, respiratory rate is 20, blood

pressure is 111/73, satting 92% on BiPAP.

GENERAL:  Awake, alert, oriented x3.  Difficult to arouse, but once aroused,

answers questions normally, obese.

EYES:  No scleral icterus.

ENT:  Moist mucous membranes.

NECK:  Supple.

PULMONARY:  Decreased air movement.

CARDIAC:  Regular rate and rhythm.

ABDOMEN:  Bowel sounds positive, soft, nontender.

EXTREMITIES:  No clubbing, cyanosis or edema.

NEUROLOGICALLY:  Nonfocal.

 

LABORATORIES:  White count is 11, H&H 10 and 35, platelet count is 231 on

admission last night, sodium level is 133, potassium 4.6, chloride is 101,

bicarb is 20, BUN 73, creatinine 6.12, glucose is 302, calcium is 9.3,

phosphorus 7.1, mag is 2.8.  TSH 2.16.  Albumin is 2.8.  INR is 1.  Hep C

negative.  Last ABG showed a pH of 7.22, pCO2 of 50, pO2 of 105, bicarbonate

of 20.  Chest x-ray shows cardiomegaly with evidence of congestive heart

failure and interstitial edema, suspect trace pleural effusions.

 

IMPRESSION AND PLAN:

1.  End-stage renal disease.  The patient appears to have a urinary tract

infection, but also with signs of fluid overload on chest x-ray.  We will

attempt dialysis again today in hopes of trying to remove fluid.  The patient

did become unresponsive on dialysis as an outpatient, so we will monitor 
closely. 

Hemoglobin levels are in the 10s, so we will hold on Procrit today.

2.  Renal osteodystrophy.  We will clarify phosphate binders, phosphorus

levels are elevated and does not appear to be on any phosphate binders.  We

will question if she is able to tolerate them or not.  We will plan on daily

dialysis while here to try to help optimize lungs while the patient continues

on antibiotics for urinary tract infection as well as steroids for her

underlying asthma.  The patient is requiring BiPAP and hopefully with

appropriate fluid removal and steroids, will be able to be weaned off of

them.

 

I appreciate consultation.

 

 

 

GISSELLE

## 2017-12-28 NOTE — PULMONARY CONSULTATION
DATE OF CONSULTATION:  12/28/2017

 

TIME:  2:00 p.m.

 

HISTORY OF PRESENT ILLNESS:  The patient was seen in room 220, bed 1.  She is

a 57-year-old female who was transferred here from [facility].  The history

is that she has renal failure for the past 1 year and is on dialysis.  She

was at dialysis as an outpatient yesterday when she had an episode of

unresponsiveness.  She cannot recall the events from that time.  She does not

remember feeling sick.  She did have symptoms of a cold for about 4 days. 

She had an increased cough and she was bringing up some green phlegm.  She

does not recall being told that her blood pressure was low during dialysis. 

It is unclear how long she was unresponsive for.  They moved her to the

Emergency Room at [facility].  She had a blood gas done there that showed a

pH of 7.14 with a pCO2 of 60 and a pO2 of 72.  This would imply respiratory

acidosis likely combined with coexistent metabolic acidosis.  She did have

some chills and dizziness apparently.  She was subsequently transferred here.

 She has had a reasonably good day thus far.  She is currently getting

dialyzed.  She has not had any episodes of passing out.  Apparently, BiPAP

was applied at [facility] and she did well with it.  The patient believes

that she can recall having this other mask on her face and it felt good.

 

The patient carries a history of asthma.  This diagnosis was made at about

age 23.  She has been on inhalers for a long time.  She states she does

wheeze.  She has been coughing more recently as noted.  The patient describes

being very short of breath every day.  She lives in a small house and she is

short of breath going from room to room.  The patient is not the best

historian.

 

She is not a good sleeper.  She states she goes to bed at about 9:00 p.m.  It

takes an hour or so to fall asleep.  She will sleep until about 2:00 a.m. and

then she gets up.  She feels like she cannot sleep any more.  She has been

doing this for quite some time.  She has never had a sleep test.  She denies

excessive sleepiness during the day, although she says she is tired.  She

lives alone.  She does not know if she snores.  There have been no observed

apneas.  She does have a history of hypertension, diabetes and obesity, which

would predispose her to sleep apnea.

 

PAST SURGICAL HISTORY:  Aortic valve replacement done in [place].  She

states she has a porcine valve.  She also has had a hysterectomy.

 

PAST MEDICAL HISTORY:

1.  Hypertension.

2.  Diabetes.

3.  Obesity.

4.  Chronic kidney disease.

5.  Asthma as noted.

6.  Valvular heart disease.

 

SOCIAL HISTORY:  Tobacco:  None except for a brief time at age 12.  ETOH --

denied.

 

ALLERGIES:  HYDROCODONE AND MORPHINE.

 

OCCUPATIONAL HISTORY:  The patient states she has been disabled for many

years.

 

REVIEW OF SYSTEMS:  Negative except for the above-mentioned complaints.

 

PHYSICAL EXAMINATION:

GENERAL:  The patient is a 57-year-old female who was cooperative, alert and

oriented.  She did not appear in distress.

VITAL SIGNS:  Weight is 100.9 kilograms.  BMI is 38.2.

HEENT:  Pupils were reactive to light and equal in size.  Nares were

unremarkable.  Mouth exam showed an absence of teeth.  There was moderate

crowding with a grade 2 Mallampati in the pharynx.

CHEST:  Inspection of the chest reveals a scar from prior surgery.  There is

a systolic murmur grade 2/6 heard.

HEART:  Heart rate was 100 per minute.  The rhythm was regular.  Blood

pressure 109/66.

LUNGS:  Auscultation of the lung fields revealed decreased breath sounds.  No

wheezes, rales, or rhonchi were heard.  The exam was limited as the patient

was receiving dialysis and could not sit up for me.  Her respiratory rate was

20.  Oxygen saturation was 99% on 4 liters.

ABDOMEN:  Obese.  She has a scar in the midline lower abdominal region from

prior surgery.  Bowel sounds were present.  They were normal.  There was no

tenderness to palpation.

EXTREMITIES:  Showed no cyanosis, clubbing or edema.

 

IMAGING:  The patient's chest x-ray suggested some increased interstitial

edema and cardiomegaly, which could suggest congestive heart failure.

 

LABORATORY DATA:  Blood gas done late last evening showed a pH of 7.22 with a

pCO2 of 50 and a pO2 of 97.  This was done with 2 liters.  Gas at 2:30 a.m.,

which may have been done on BiPAP showed a pH of 7.22 with a pCO2 of 50, and

a pO2 of 105.  At 8:48 a.m., the blood gas was 7.25, pCO2 of 43, pO2 153,

reportedly done on 4 liters of oxygen.  It is unknown if the BiPAP was on. 

Blood gas would show improvement in the pCO2 from a high of 60 when taken at

[facility] down to now 43.  She is still acidotic suggesting a metabolic

component.  Electrolytes show sodium 133, potassium 4.6, chloride 101, and

bicarbonate 20.  BUN was 73 with a creatinine of 6.12.  Blood sugars have

been as high as 366.  Phosphorus was 7.1.  Magnesium 2.8.  Liver functions

were acceptable.  TSH was 2.16.

 

IMPRESSION:

1.  Status post syncopal episode.

2.  Acute bronchitis.

3.  Asthma by history.

4.  Respiratory failure -- acute on chronic with hypercarbia.

5.  Obesity hypoventilation syndrome.

6.  Chronic renal failure requiring dialysis.

7.  Possible obstructive sleep apnea.

 

COMMENTS AND RECOMMENDATIONS:  The patient seems alert and in no distress at

present.  It is not clear what happened to her yesterday.  There was no

reported history of a seizure.  It could be she was having some degree of

CHF, which compromised her respiratory status.  I suspect she has underlying

sleep apnea.  I think it would be worth trying BiPAP at night here and seeing

how she does.  She ideally should have a sleep study.  We will decrease her

Solu-Medrol as she does not sound tight and her blood sugars are quite high. 

She is on antibiotics, which would cover her bronchitis with the green sputum

that she reported.

 

Thank you very much for asking me to assist in her care.

## 2017-12-29 VITALS — HEART RATE: 90 BPM | DIASTOLIC BLOOD PRESSURE: 73 MMHG | SYSTOLIC BLOOD PRESSURE: 123 MMHG

## 2017-12-29 VITALS
DIASTOLIC BLOOD PRESSURE: 77 MMHG | TEMPERATURE: 98.24 F | HEART RATE: 93 BPM | OXYGEN SATURATION: 96 % | SYSTOLIC BLOOD PRESSURE: 119 MMHG

## 2017-12-29 VITALS — HEART RATE: 90 BPM | OXYGEN SATURATION: 98 %

## 2017-12-29 VITALS — HEART RATE: 87 BPM | DIASTOLIC BLOOD PRESSURE: 70 MMHG | SYSTOLIC BLOOD PRESSURE: 121 MMHG

## 2017-12-29 VITALS — DIASTOLIC BLOOD PRESSURE: 81 MMHG | HEART RATE: 94 BPM | SYSTOLIC BLOOD PRESSURE: 141 MMHG

## 2017-12-29 VITALS — HEART RATE: 90 BPM | DIASTOLIC BLOOD PRESSURE: 67 MMHG | SYSTOLIC BLOOD PRESSURE: 110 MMHG

## 2017-12-29 VITALS — DIASTOLIC BLOOD PRESSURE: 72 MMHG | SYSTOLIC BLOOD PRESSURE: 117 MMHG | HEART RATE: 88 BPM

## 2017-12-29 VITALS — DIASTOLIC BLOOD PRESSURE: 72 MMHG | SYSTOLIC BLOOD PRESSURE: 123 MMHG | HEART RATE: 86 BPM

## 2017-12-29 VITALS — DIASTOLIC BLOOD PRESSURE: 70 MMHG | HEART RATE: 88 BPM | SYSTOLIC BLOOD PRESSURE: 131 MMHG

## 2017-12-29 VITALS — TEMPERATURE: 97.7 F | SYSTOLIC BLOOD PRESSURE: 134 MMHG | DIASTOLIC BLOOD PRESSURE: 83 MMHG | HEART RATE: 97 BPM

## 2017-12-29 VITALS — SYSTOLIC BLOOD PRESSURE: 121 MMHG | TEMPERATURE: 97.88 F | DIASTOLIC BLOOD PRESSURE: 68 MMHG | HEART RATE: 90 BPM

## 2017-12-29 VITALS — HEART RATE: 83 BPM | SYSTOLIC BLOOD PRESSURE: 121 MMHG | DIASTOLIC BLOOD PRESSURE: 99 MMHG

## 2017-12-29 VITALS — DIASTOLIC BLOOD PRESSURE: 77 MMHG | SYSTOLIC BLOOD PRESSURE: 117 MMHG | HEART RATE: 88 BPM

## 2017-12-29 VITALS — SYSTOLIC BLOOD PRESSURE: 118 MMHG | DIASTOLIC BLOOD PRESSURE: 74 MMHG | HEART RATE: 86 BPM

## 2017-12-29 VITALS
SYSTOLIC BLOOD PRESSURE: 107 MMHG | HEART RATE: 96 BPM | TEMPERATURE: 97.52 F | DIASTOLIC BLOOD PRESSURE: 67 MMHG | OXYGEN SATURATION: 94 %

## 2017-12-29 VITALS
TEMPERATURE: 97.7 F | HEART RATE: 96 BPM | SYSTOLIC BLOOD PRESSURE: 150 MMHG | DIASTOLIC BLOOD PRESSURE: 84 MMHG | OXYGEN SATURATION: 100 %

## 2017-12-29 VITALS
TEMPERATURE: 97.88 F | SYSTOLIC BLOOD PRESSURE: 121 MMHG | OXYGEN SATURATION: 100 % | HEART RATE: 87 BPM | DIASTOLIC BLOOD PRESSURE: 70 MMHG

## 2017-12-29 VITALS
SYSTOLIC BLOOD PRESSURE: 123 MMHG | OXYGEN SATURATION: 97 % | HEART RATE: 95 BPM | TEMPERATURE: 98.24 F | DIASTOLIC BLOOD PRESSURE: 69 MMHG

## 2017-12-29 VITALS — HEART RATE: 92 BPM | SYSTOLIC BLOOD PRESSURE: 129 MMHG | DIASTOLIC BLOOD PRESSURE: 87 MMHG

## 2017-12-29 VITALS — SYSTOLIC BLOOD PRESSURE: 121 MMHG | DIASTOLIC BLOOD PRESSURE: 73 MMHG | HEART RATE: 91 BPM

## 2017-12-29 VITALS — OXYGEN SATURATION: 98 % | HEART RATE: 90 BPM

## 2017-12-29 VITALS — HEART RATE: 93 BPM | OXYGEN SATURATION: 96 %

## 2017-12-29 LAB
BUN SERPL-MCNC: 84 MG/DL (ref 7–18)
CALCIUM SERPL-MCNC: 9.2 MG/DL (ref 8.5–10.1)
CO2 SERPL-SCNC: 24 MMOL/L (ref 21–32)
CREAT SERPL-MCNC: 5.46 MG/DL (ref 0.6–1.2)
GLUCOSE SERPL-MCNC: 142 MG/DL (ref 70–99)
HBA1C MFR BLD: 6.3 % (ref 4.5–5.6)
POTASSIUM SERPL-SCNC: 3.9 MMOL/L (ref 3.5–5.1)
SODIUM SERPL-SCNC: 136 MMOL/L (ref 136–145)

## 2017-12-29 RX ADMIN — CEFTRIAXONE SCH MLS/HR: 1 INJECTION, POWDER, FOR SOLUTION INTRAMUSCULAR; INTRAVENOUS at 17:17

## 2017-12-29 RX ADMIN — ALUMINUM ZIRCONIUM TRICHLOROHYDREX GLY SCH EA: 0.2 STICK TOPICAL at 23:43

## 2017-12-29 RX ADMIN — Medication SCH MG: at 09:29

## 2017-12-29 RX ADMIN — DOCUSATE SODIUM SCH MG: 100 CAPSULE, LIQUID FILLED ORAL at 09:29

## 2017-12-29 RX ADMIN — ALUMINUM ZIRCONIUM TRICHLOROHYDREX GLY SCH EA: 0.2 STICK TOPICAL at 23:44

## 2017-12-29 RX ADMIN — IPRATROPIUM BROMIDE AND ALBUTEROL SCH PUFFS: 20; 100 SPRAY, METERED RESPIRATORY (INHALATION) at 15:33

## 2017-12-29 RX ADMIN — INSULIN ASPART SCH UNITS: 100 INJECTION, SOLUTION INTRAVENOUS; SUBCUTANEOUS at 12:00

## 2017-12-29 RX ADMIN — HEPARIN SODIUM SCH UNIT: 10000 INJECTION, SOLUTION INTRAVENOUS; SUBCUTANEOUS at 13:15

## 2017-12-29 RX ADMIN — ASCORBIC ACID, THIAMINE MONONITRATE,RIBOFLAVIN, NIACINAMIDE, PYRIDOXINE HYDROCHLORIDE, FOLIC ACID, CYANOCOBALAMIN, BIOTIN, CALCIUM PANTOTHENATE, SCH CAP: 100; 1.5; 1.7; 20; 10; 1; 6000; 150000; 5 CAPSULE, LIQUID FILLED ORAL at 09:29

## 2017-12-29 RX ADMIN — Medication SCH MG: at 09:30

## 2017-12-29 RX ADMIN — ALUMINUM ZIRCONIUM TRICHLOROHYDREX GLY SCH EA: 0.2 STICK TOPICAL at 00:32

## 2017-12-29 RX ADMIN — ACETAMINOPHEN PRN MG: 325 TABLET ORAL at 19:17

## 2017-12-29 RX ADMIN — CLOPIDOGREL BISULFATE SCH MG: 75 TABLET, FILM COATED ORAL at 09:29

## 2017-12-29 RX ADMIN — ACETAMINOPHEN PRN MG: 325 TABLET ORAL at 09:37

## 2017-12-29 RX ADMIN — ATORVASTATIN CALCIUM SCH MG: 40 TABLET, FILM COATED ORAL at 09:29

## 2017-12-29 RX ADMIN — ALUMINUM ZIRCONIUM TRICHLOROHYDREX GLY SCH EA: 0.2 STICK TOPICAL at 23:42

## 2017-12-29 RX ADMIN — ALBUTEROL SULFATE SCH MG: 2.5 SOLUTION RESPIRATORY (INHALATION) at 02:28

## 2017-12-29 RX ADMIN — ALUMINUM ZIRCONIUM TRICHLOROHYDREX GLY SCH EA: 0.2 STICK TOPICAL at 00:33

## 2017-12-29 RX ADMIN — DOCUSATE SODIUM SCH MG: 100 CAPSULE, LIQUID FILLED ORAL at 20:27

## 2017-12-29 RX ADMIN — ALLOPURINOL SCH MG: 100 TABLET ORAL at 09:29

## 2017-12-29 RX ADMIN — ACETAMINOPHEN PRN MG: 325 TABLET ORAL at 00:34

## 2017-12-29 RX ADMIN — INSULIN ASPART SCH UNITS: 100 INJECTION, SOLUTION INTRAVENOUS; SUBCUTANEOUS at 09:16

## 2017-12-29 RX ADMIN — IPRATROPIUM BROMIDE AND ALBUTEROL SCH PUFFS: 20; 100 SPRAY, METERED RESPIRATORY (INHALATION) at 13:14

## 2017-12-29 RX ADMIN — ALBUTEROL SULFATE SCH MG: 2.5 SOLUTION RESPIRATORY (INHALATION) at 14:17

## 2017-12-29 RX ADMIN — ALLOPURINOL SCH MG: 100 TABLET ORAL at 20:28

## 2017-12-29 RX ADMIN — INSULIN ASPART SCH UNITS: 100 INJECTION, SOLUTION INTRAVENOUS; SUBCUTANEOUS at 14:19

## 2017-12-29 RX ADMIN — ALBUTEROL SULFATE SCH MG: 2.5 SOLUTION RESPIRATORY (INHALATION) at 19:26

## 2017-12-29 RX ADMIN — PRAMIPEXOLE DIHYDROCHLORIDE SCH MG: 0.25 TABLET ORAL at 20:28

## 2017-12-29 RX ADMIN — ALUMINUM ZIRCONIUM TRICHLOROHYDREX GLY SCH EA: 0.2 STICK TOPICAL at 08:00

## 2017-12-29 RX ADMIN — IPRATROPIUM BROMIDE AND ALBUTEROL SCH PUFFS: 20; 100 SPRAY, METERED RESPIRATORY (INHALATION) at 19:12

## 2017-12-29 RX ADMIN — PANTOPRAZOLE SCH MG: 40 TABLET, DELAYED RELEASE ORAL at 09:29

## 2017-12-29 RX ADMIN — INSULIN ASPART SCH UNITS: 100 INJECTION, SOLUTION INTRAVENOUS; SUBCUTANEOUS at 17:15

## 2017-12-29 RX ADMIN — IPRATROPIUM BROMIDE AND ALBUTEROL SCH PUFFS: 20; 100 SPRAY, METERED RESPIRATORY (INHALATION) at 09:28

## 2017-12-29 RX ADMIN — HEPARIN SODIUM SCH UNIT: 10000 INJECTION, SOLUTION INTRAVENOUS; SUBCUTANEOUS at 06:21

## 2017-12-29 RX ADMIN — INSULIN ASPART SCH UNITS: 100 INJECTION, SOLUTION INTRAVENOUS; SUBCUTANEOUS at 20:34

## 2017-12-29 RX ADMIN — SERTRALINE HYDROCHLORIDE SCH MG: 50 TABLET, FILM COATED ORAL at 09:30

## 2017-12-29 RX ADMIN — HEPARIN SODIUM SCH UNIT: 10000 INJECTION, SOLUTION INTRAVENOUS; SUBCUTANEOUS at 20:35

## 2017-12-29 RX ADMIN — ALBUTEROL SULFATE SCH MG: 2.5 SOLUTION RESPIRATORY (INHALATION) at 07:12

## 2017-12-29 NOTE — ECHOCARDIOGRAM REPORT
*NOTICE TO RECEIVING PARTY AGENCY**  This information is strictly Confidential and 
protected under [place] law.  [place] law prohibits you from making any further 
disclosure of this information unless further disclosure is expressly permitted by the 
written consent of the person to whom it pertains or is authorized by law.  A general 
authorization for the release of medical or other information is not sufficient for this 
purpose.  Hospital accepts no responsibility if the information is made available to any 
other person, INCLUDING THE PATIENT.



Interpretation Summary

  *  Name: JORDON RAMOS  Study Date: 2017 08:26 AM  BP: 150/84 mmHg

  *  MRN: [id number]  Patient Location: C.2T\S\E220\S\1  HR: 98

  *  : 1960 (M/d/yyyy)  Gender: Female  Height: 64 in

  *  Age: 57 yrs  Ethnicity: [place]  Weight: 222 lb

  *  Ordering Physician: Nisha Escobar

  *  Referring Physician: UNKNOWN

  *  Performed By: Miladys Duffy RCS

  *  Accession# [id number]  Account# [id number]

  *  Reason For Study: ESRD / history of cardiac valve surgery

  *  BSA: 2.0 m2

  *  -- Conclusions --

  *  Patient states she had a mitral valve repair surgery in [place] approximately 2 
years ago.

  *  There are no prior studies available for comparison.

  *  There is mild concentric left ventricular hypertrophy.

  *  The left ventricular wall motion is normal.

  *  The LV Ejection Fraction = 60-65%.

  *  The left atrium is moderately dilated.

  *  The aortic valve is not well visualized.

  *  There is no significant aortic regurgitation.

  *  Aortic stenosis is absent.

  *  There is evidence of a prior mitral valve repair.

  *  There is mild mitral regurgitation.

  *  There is severe mitral stenosis with a calculated mean diastolic gradient of 12 mm 
Hg.

Procedure Details

  *  A complete two-dimensional transthoracic echocardiogram was performed (2D, M-mode, 
Doppler and color flow Doppler).

  *  The study was technically difficult.

  *  The study was technically difficult, but visualization was adequate with the 
administration of Definity ultrasound contrast.

  *  There were technical limitations due to patient'sbody habitus

  *  A contrast injection of Definity was performed to improve assessment of LV function.

  *  Contrast was injected into an intravenous site in the right arm.

  *  One vial of Definity ultrasound contrast was diluted in normal saline to a total 
volume of 10 ml.  A total of '2' ml of solution was administered during imaging.

  *  Lot # [id number] of Definity utilized for procedure.

  *  Expiration date .

  *  The attending nurse who injected the contrast agent was JUSTIN ROSAS RN.

Left Ventricle

  *  The left ventricle is normal in size.

  *  There is mild concentric left ventricular hypertrophy.

  *  Left ventricular systolic function is normal.

  *  Ejection Fraction = 60-65%.

  *  The left ventricular wall motion is normal.

Right Ventricle

  *  The right ventricle is normal size.

  *  The right ventricular systolic function is normal as assessed by tricuspid annular 
plane systolic excursion (TAPSE) (normal >1.5 cm).

Atria

  *  The left atrium is moderately dilated.

  *  Right atrial size is normal.

  *  There is no evidence of atrial septal defect, but resolution does not allow 
assessment for a patent foramen ovale.

Mitral Valve

  *  There is evidence of a prior mitral valve repair.

  *  There is severe mitral stenosis.

  *  There is mild mitral regurgitation.

Tricuspid Valve

  *  The tricuspid valve is normal.

  *  There is no tricuspid stenosis.

  *  There is mild tricuspid regurgitation.

Aortic Valve

  *  The aortic valve is not well visualized.

  *  Aortic stenosis is absent.

  *  There is no significant aortic regurgitation.

Pulmonic Valve

  *  The pulmonary valve is not well seen, but the Doppler examination is normal without 
significant regurgitation or stenosis.

Great Vessels

  *  The aortic root and proximal ascending aorta are normal sized.

  *  The mitral valve leaflets are thickened with decreased diastolic excursion.

Pericardium/Pleural

  *  There is no pericardial effusion.

Great Vessels

  *  Normal inferior vena cava diameter and respiratory variation suggests normal central 
venous pressure.

  *  Normal inferior vena cava size and collapsability with sniff indicates a normal right 
atrial pressure of 3 mmHg



MMode 2D Measurements and Calculations

IVSd 1.6 cm

IVSs 1.5 cm



LVIDd 3.9 cm

LVIDs 2.7 cm

LVPWd 1.4 cm

LVPWs 1.3 cm



IVS/LVPW 1.1 

FS 29.6 %

EDV(Teich) 64.3 ml

ESV(Teich) 27.4 ml

EF(Teich) 57.4 %



EDV(cubed) 57.5 ml

ESV(cubed) 20.0 ml

EF(cubed) 65.2 %

% IVS thick -7.25 %

% LVPW thick -8.54 %





LV mass(C)d 232.1 grams

LV mass(C)dI 113.5 grams/m\S\2

LV mass(C)s 127.2 grams

LV mass(C)sI 62.2 grams/m\S\2



SV(Teich) 36.9 ml

SI(Teich) 18.0 ml/m\S\2

SV(cubed) 37.4 ml

SI(cubed) 18.3 ml/m\S\2



Ao root diam 2.5 cm

Ao root area 5.1 cm\S\2

LA dimension 4.8 cm



LA/Ao 1.9 

LVOT diam 2.0 cm

LVOT area 3.0 cm\S\2





LVAd ap4 33.9 cm\S\2

LVLd ap4 8.3 cm

EDV(MOD-sp4) 111.1 ml

EDV(sp4-el) 117.4 ml

LVAs ap4 21.5 cm\S\2

LVLs ap4 7.3 cm

ESV(MOD-sp4) 50.9 ml

ESV(sp4-el) 54.0 ml

EF(MOD-sp4) 54.1 %

EF(sp4-el) 54.0 %



LVAd ap2 32.7 cm\S\2

LVLd ap2 8.1 cm

EDV(MOD-sp2) 106.8 ml

EDV(sp2-el) 112.4 ml

LVAs ap2 20.8 cm\S\2

LVLs ap2 7.2 cm

ESV(MOD-sp2) 48.0 ml

ESV(sp2-el) 50.8 ml

EF(MOD-sp2) 55.1 %

EF(sp2-el) 54.8 %



LVLd %diff -3.02 %

EDV(MOD-bp) 111.1 ml

LVLs %diff -0.65 %

ESV(MOD-bp) 49.6 ml

EF(MOD-bp) 55.3 %



SV(MOD-sp4) 60.1 ml

SI(MOD-sp4) 29.4 ml/m\S\2





SV(MOD-sp2) 58.8 ml

SI(MOD-sp2) 28.8 ml/m\S\2



SV(MOD-bp) 61.5 ml

SI(MOD-bp) 30.1 ml/m\S\2



SV(sp4-el) 63.4 ml

SI(sp4-el) 31.0 ml/m\S\2



SV(sp2-el) 61.6 ml

SI(sp2-el) 30.1 ml/m\S\2







Doppler Measurements and Calculations

Ao V2 max 243.1 cm/sec

Ao max PG 23.6 mmHg



MR max clovis 512.5 cm/sec

MR max .1 mmHg



PA V2 max 126.3 cm/sec

PA max PG 6.4 mmHg



PI max clovis 206.5 cm/sec

PI max PG 17.1 mmHg

PI dec slope 248.1 cm/sec\S\2

PI P1/2t 243.8 msec





TR max clovis 323.6 cm/sec

## 2017-12-29 NOTE — PULMONARY PROGRESS NOTE
DATE: 12/29/2017

 

TIME:  8:25 a.m.

 

SUBJECTIVE:  The patient feels generally better.  She is not short of breath.

 She is still coughing and she states she is bringing up some green sputum. 

She did not sleep well last night.  She blamed this on the fact they were

checking her blood sugar so frequently.  She did wear BiPAP and did not find

it very uncomfortable.

 

OBJECTIVE:

GENERAL:  The patient appears comfortable at rest.  Temperature is 36.5.

EARS, NOSE, THROAT:  Exam is unremarkable.

HEART:  Heart rate is 96 per minute.

VITAL SIGNS:  Blood pressure is 150/84.

CARDIOVASCULAR:  The cardiac rhythm was regular.

LUNGS:  Lung fields revealed decreased breath sounds.  No active wheezing was

heard.  Her respiratory rate is 22.  Oxygen saturation was 100% on 2-liter

nasal cannula.

EXTREMITIES:  Showed no cyanosis, clubbing or edema.

 

LABORATORY DATA:  Electrolytes show sodium 136, potassium 3.9, chloride 98,

bicarbonate 24.  The BUN is 84 with a creatinine of 5.46.  Blood sugar this

morning 142.

 

IMPRESSIONS:

1.  Acute bronchitis -- improved.

2.  Asthma by history.

3.  Respiratory failure -- acute on chronic with hypercarbia.

4.  Obesity hypoventilation syndrome.

5.  Possible obstructive sleep apnea.

6.  Renal failure.

 

COMMENTS AND RECOMMENDATIONS:  The patient seems overall clinically stable

from a respiratory perspective.  I am going to discontinue the

methylprednisolone and place her on prednisone.  This should be able to be

tapered fairly quickly.  This should improve her blood sugar situation.  I

have no objection from my end to changing to oral antibiotics.  She has been

on ceftriaxone.  I would have no problem changing that to Ceftin.  We are

using the BiPAP at night just to see how she tolerates it and to help improve

her pCO2 levels, which had been elevated upon initial presentation.  We will

likely check another ABG tomorrow.

## 2017-12-29 NOTE — PROGRESS NOTE
Medicine Progress Note


Date & Time of Visit:


Dec 29, 2017 at 18:24.


Subjective


56 yo F with ESRD on HD presented as a transfer from OSH after an episode of 

unresponsiveness preceded by some lightheadedness during her dialysis session.  

She is doing well today and is awake and alert.  She is wearing supplemental 

oxygen but denies any SOB, chest pain or other symptoms at this time.  

Telemetry review was unremarkable overnight.  She reports coughing that is 

nonproductive and some shortness of breath and this is improved.  She wore 

BIPAP overnight and Pulm recommends outpatient sleep study. She reports feeling 

better today.  She is tolerating PO.  She just underwent HD.





Objective





Last 8 Hrs








  Date Time  Temp Pulse Resp B/P (MAP) Pulse Ox O2 Delivery O2 Flow Rate FiO2


 


12/29/17 16:00      Nasal Cannula 2.0 


 


12/29/17 15:20 36.4 96 26 107/67 (80) 94 Room Air  


 


12/29/17 12:50 36.6 90  121/68 (85)    


 


12/29/17 12:45  90  110/67    


 


12/29/17 12:30  88  117/72    


 


12/29/17 12:15  91  121/73    


 


12/29/17 12:00  86  123/72    


 


12/29/17 12:00      Nasal Cannula 2.0 


 


12/29/17 11:45  83  121/99    


 


12/29/17 11:30  86  118/74    


 


12/29/17 11:17 36.6 87 22 121/70 (87) 100 Nasal Cannula 2.0 


 


12/29/17 11:15  90  123/73    


 


12/29/17 11:00  87  121/70    


 


12/29/17 10:45  88  117/77    


 


12/29/17 10:30  88  131/70    








Physical Exam:


GEN: obese, in no acute distress, alert and appropriate, NC in place, no 

conversational dyspnea. 


HEENT: NC/AT, pupils are round and equal bilaterally, normal sclerae, MMM


CARDIO: reg rate, S1/2 heard without m/g/r


LUNGS: CTAB, no crackles, wheezes or rales.  good diaphragmatic excursion


ABD: soft, non-tender, non-distended, no rebound or guarding +BS


EXTREMITY: RP and DP palpable 2+ bilat, no LE swelling or edema, extremities 

are warm and well-perfused


NEURO: CN 2-12 grossly intact


MUSC: moves all extremities equally


SKIN:  warm and dry


Laboratory Results:


12/27/17 20:16








12/29/17 07:02

















Test


  12/27/17


20:16 12/28/17


08:48 12/29/17


07:02 12/29/17


15:51


 


Red Blood Count


  3.44 M/uL


(4.2-5.4) 


  


  


 


 


Mean Corpuscular Volume


  103.5 fL


() 


  


  


 


 


Mean Corpuscular Hemoglobin


  31.7 pg


(25-34) 


  


  


 


 


Mean Corpuscular Hemoglobin


Concent 30.6 g/dl


(32-36) 


  


  


 


 


RDW Standard Deviation


  60.7 fL


(36.4-46.3) 


  


  


 


 


RDW Coefficient of Variation


  16.6 %


(11.5-14.5) 


  


  


 


 


Mean Platelet Volume


  9.7 fL


(7.4-10.4) 


  


  


 


 


Prothrombin Time


  10.0 SECONDS


(9.0-12.0) 


  


  


 


 


Prothromb Time International


Ratio 1.0 (0.9-1.1) 


  


  


  


 


 


Activated Partial


Thromboplast Time 28.5 SECONDS


(21.0-31.0) 


  


  


 


 


Partial Thromboplastin Ratio 1.1    


 


Phosphorus Level


  7.1 mg/dl


(2.5-4.9) 


  


  


 


 


Magnesium Level


  2.8 mg/dl


(1.8-2.4) 


  


  


 


 


Total Bilirubin


  0.3 mg/dl


(0.2-1) 


  


  


 


 


Aspartate Amino Transf


(AST/SGOT) 12 U/L (15-37) 


  


  


  


 


 


Alanine Aminotransferase


(ALT/SGPT) 12 U/L (12-78) 


  


  


  


 


 


Alkaline Phosphatase


  93 U/L


() 


  


  


 


 


Total Protein


  7.5 gm/dl


(6.4-8.2) 


  


  


 


 


Albumin


  2.8 gm/dl


(3.4-5.0) 


  


  


 


 


Globulin


  4.7 gm/dl


(2.5-4.0) 


  


  


 


 


Albumin/Globulin Ratio 0.6 (0.9-2)    


 


Beta-Hydroxybutyric Acid


  3.60 mg/dL


(0.2-2.81) 


  


  


 


 


Thyroid Stimulating Hormone


(TSH) 2.160 uIu/ml


(0.300-4.500) 


  


  


 


 


Hepatitis C Antibody Screen NEG (NEG)    


 


Hepatitis C Antibody NEG (NEG)    


 


Arterial Blood pH


  


  7.25


(7.35-7.45) 


  


 


 


Arterial Blood Partial


Pressure CO2 


  43 mmHg


(35-46) 


  


 


 


Arterial Blood Partial


Pressure O2 


  153 mm/Hg


(80-95) 


  


 


 


Arterial Blood HCO3


  


  18 mmol/L


(19-24) 


  


 


 


Arterial Blood Oxygen


Saturation 


  98.4 % (90-95) 


  


  


 


 


Arterial Blood Base Excess


  


  -8.7 mEq/L


(-9-1.8) 


  


 


 


Arterial Blood Gas Delivery  4 L   


 


Kiko Test  POS (POS)   


 


Anion Gap


  


  


  14.0 mmol/L


(3-11) 


 


 


Est Creatinine Clear Calc


Drug Dose 


  


  12.6 ml/min 


  


 


 


Estimated GFR (


American) 


  


  9.2 


  


 


 


Estimated GFR (Non-


American 


  


  7.9 


  


 


 


BUN/Creatinine Ratio   15.3 (10-20)  


 


Estimated Average Glucose   134 mg/dl  


 


Hemoglobin A1c


  


  


  6.3 %


(4.5-5.6) 


 


 


Calcium Level


  


  


  9.2 mg/dl


(8.5-10.1) 


 


 


Bedside Glucose


  


  


  


  243 mg/dl


(70-90)








Last 24 Hours








Test


  12/28/17


19:49 12/28/17


22:16 12/28/17


23:01 12/29/17


00:01


 


Bedside Glucose 366 mg/dl  192 mg/dl  209 mg/dl  232 mg/dl 


 


Test


  12/29/17


00:55 12/29/17


01:59 12/29/17


03:04 12/29/17


04:18


 


Bedside Glucose 216 mg/dl  231 mg/dl  221 mg/dl  191 mg/dl 


 


Test


  12/29/17


04:58 12/29/17


05:57 12/29/17


06:57 12/29/17


07:02


 


Bedside Glucose 189 mg/dl  149 mg/dl  138 mg/dl  


 


Sodium Level    136 mmol/L 


 


Potassium Level    3.9 mmol/L 


 


Chloride Level    98 mmol/L 


 


Carbon Dioxide Level    24 mmol/L 


 


Anion Gap    14.0 mmol/L 


 


Blood Urea Nitrogen    84 mg/dl 


 


Creatinine    5.46 mg/dl 


 


Est Creatinine Clear Calc


Drug Dose 


  


  


  12.6 ml/min 


 


 


Estimated GFR (


American) 


  


  


  9.2 


 


 


Estimated GFR (Non-


American 


  


  


  7.9 


 


 


BUN/Creatinine Ratio    15.3 


 


Random Glucose    142 mg/dl 


 


Estimated Average Glucose    134 mg/dl 


 


Hemoglobin A1c    6.3 % 


 


Calcium Level    9.2 mg/dl 


 


Test


  12/29/17


08:32 12/29/17


09:28 12/29/17


10:28 12/29/17


11:21


 


Bedside Glucose 185 mg/dl  179 mg/dl  121 mg/dl  89 mg/dl 


 


Test


  12/29/17


11:52 12/29/17


13:55 12/29/17


15:51 


 


 


Bedside Glucose 88 mg/dl  124 mg/dl  243 mg/dl  











Assessment & Plan


56 yo F with ESRD on HD presented as a transfer from OSH after an episode of 

unresponsiveness preceded by some lightheadedness during her dialysis session.  

She is doing well today and is awake and alert.  She is wearing supplemental 

oxygen but denies any SOB, chest pain or other symptoms at this time.  

Telemetry review was unremarkable overnight.  She reports coughing that is 

nonproductive and some shortness of breath and this is improved.  She wore 

BIPAP overnight and Pulm recommends outpatient sleep study. She reports feeling 

better today.  She is tolerating PO.  She just underwent HD.  





1.  Syncope-poss etiologies include but not limited to hypotension, 

hypoglycemia or other during dialysis.  No events on telemetry overnight.  She 

appears alert and oriented today.  Echo also reveals new severe mitral 

stenosis.  She has a known aortic bioprosthetic valve which was placed at [facility] 

in March 2016 for severe aortic stenosis.  Cardiology consult to assess this 

further.  Prior TTE was faxed to floor and placed in her chart. 


2.  Hypoxia poss 2/2 asthma exacerbation vs hypercarbia from presumed GUILLERMO/

obesity hypoventilation syndrome vs fluid overloaded state from missed dialysis 

session.  HD today with another 2 L fluid removed. Will cont to monitor volume 

status daily.  Cont steroids and bronchodilators.  Apprec pulm input.  BIPAP qHS


3.  UTI-UA dirty at prior facility, no culture results available.  Cont 

ceftriaxone for now in setting of dysuria and UA results. 


4.  ESRD on HD-apprec Nephro recs. 


5.  DMII-controlled, cont ISS/Lantus and carb coverage.  Pharm consult. 


6.  Status post bioprosthetic aortic valve replacement in March 2016 for severe 

aortic stenosis.  Repeat echo this admission was unable to fully visualize the 

aortic valve. 





DVT proph-heparin


Full Code


Dispo-cont telemetry





DO Luis [facility]Le[facility] Hospitalist


Consultants:


Nephro Pulm


Current Inpatient Medications:





Current Inpatient Medications








 Medications


  (Trade)  Dose


 Ordered  Sig/Daniel


 Route  Start Time


 Stop Time Status Last Admin


Dose Admin


 


 Heparin Sodium


  (Porcine)


  (Heparin Sq 5000


 Unit/0.5ml)  5,000 unit  Q8


 SQ  12/27/17 22:00


 1/26/18 21:59  12/29/17 13:15


5,000 UNIT


 


 Acetaminophen


  (Tylenol Tab)  650 mg  Q4H  PRN


 PO  12/27/17 17:45


 1/26/18 17:44  12/29/17 09:37


650 MG


 


 Ondansetron HCl


  (Zofran Inj)  4 mg  Q6H  PRN


 IV  12/27/17 17:45


 1/26/18 17:44   


 


 


 Albuterol Sulfate


  (Ventolin 0.083%


 2.5MG/3ML Neb)  2.5 mg  Q6R


 INH  12/27/17 21:00


 1/26/18 20:59  12/29/17 02:28


2.5 MG


 


 Ceftriaxone


 Sodium 1000 mg/


 Dextrose  60 ml @ 


 100 mls/hr  DAILY@1800


 IV  12/27/17 18:00


 1/1/18 17:59  12/29/17 17:17


100 MLS/HR


 


 Allopurinol


  (Zyloprim Tab)  100 mg  BID


 PO  12/27/17 21:00


 1/26/18 20:59  12/29/17 09:29


100 MG


 


 Aspirin


  (Ecotrin Tab)  81 mg  DAILY


 PO  12/28/17 09:00


 1/27/18 08:59  12/29/17 09:29


81 MG


 


 Atorvastatin


 Calcium


  (Lipitor Tab)  40 mg  DAILY


 PO  12/28/17 09:00


 1/27/18 08:59  12/29/17 09:29


40 MG


 


 Clopidogrel


 Bisulfate


  (plAVix TAB)  75 mg  DAILY


 PO  12/28/17 09:00


 1/27/18 08:59  12/29/17 09:29


75 MG


 


 Docusate Sodium


  (coLACE CAP)  100 mg  BID


 PO  12/27/17 21:00


 1/26/18 20:59  12/29/17 09:29


100 MG


 


 Albuterol/


 Ipratropium


  (Combivent


 Respimat Inh)  1 puffs  QIDR


 INH  12/27/17 20:00


 1/26/18 19:59  12/29/17 15:33


1 PUFFS


 


 Pantoprazole


 Sodium


  (Protonix Tab)  40 mg  DAILY


 PO  12/28/17 09:00


 1/27/18 08:59  12/29/17 09:29


40 MG


 


 Pramipexole


 Dihydrochloride


  (miraPEX TAB)  0.25 mg  HS


 PO  12/27/17 21:00


 1/26/18 20:59  12/28/17 20:55


0.25 MG


 


 Sertraline HCl


  (Zoloft Tab)  75 mg  DAILY


 PO  12/28/17 09:00


 1/27/18 08:59  12/29/17 09:30


75 MG


 


 Thiamine HCl


  (Vitamin B-1 Tab)  50 mg  DAILY


 PO  12/28/17 09:00


 1/27/18 08:59  12/29/17 09:30


50 MG


 


 Tramadol HCl


  (Ultram Tab)  50 mg  Q4H  PRN


 PO  12/27/17 19:15


 1/26/18 19:14   


 


 


 Vitamin B Complex/


 Vit C/Folic Acid


  (Nephrocaps)  1 cap  DAILY


 PO  12/28/17 09:00


 1/27/18 08:59  12/29/17 09:29


1 CAP


 


 Miscellaneous


 Information


  (Order Awaiting


 Action)  1 ea  QS


 N/A  12/28/17 00:00


 1/27/18 00:00   


 


 


 Miscellaneous


 Information


  (Order Awaiting


 Action)  1 ea  QS


 N/A  12/28/17 00:00


 1/27/18 00:00   


 


 


 Miscellaneous


 Information


  (Order Awaiting


 Action)  1 ea  QS


 N/A  12/28/17 00:00


 1/27/18 00:00   


 


 


 Miscellaneous


 Information


  (Order Awaiting


 Action)  1 ea  QS


 N/A  12/28/17 00:00


 1/27/18 00:00   


 


 


 Glucose


  (Glucose 40% Gel)  15-30


 GRAMS 15


 GRAMS...  UD  PRN


 PO  12/27/17 19:30


 1/26/18 19:29   


 


 


 Glucose


  (Glucose Chew


 Tab)  4-8


 Tablets 4


 Tabl...  UD  PRN


 PO  12/27/17 19:30


 1/26/18 19:29   


 


 


 Dextrose


  (Dextrose 50%


 50ML Syringe)  25-50ML OF


 50% DW IV


 FOR...  UD  PRN


 IV  12/27/17 19:30


 1/26/18 19:29   


 


 


 Glucagon


  (Glucagon Inj)  1 mg  UD  PRN


 SQ  12/27/17 19:30


 1/26/18 19:29   


 


 


 Miscellaneous


 Information


  (Consult


 Glycemic


 Management


 Pharmacy)  1 ea  UD  PRN


 N/A  12/28/17 12:14


 1/27/18 12:13   


 


 


 Epoetin Geovany


  (Procrit Inj)  10,000 units  TODAY@0830


 IV.  12/29/17 08:30


 12/29/17 23:59  12/29/17 11:26


10,000 UNITS


 


 Prednisone


  (PredniSONE TAB)  40 mg  DAILY


 PO  12/29/17 09:00


 1/28/18 08:59  12/29/17 10:25


40 MG


 


 Insulin Aspart


  (novoLOG ASPART)  SLIDING


 SCALE  ACHS


 SC  12/29/17 14:00


 1/28/18 13:59  12/29/17 17:15


9 UNITS


 


 Insulin Aspart


  (novoLOG ASPART)  SLIDING


 SCALE  0000


 SC  12/30/17 00:00


 12/30/17 00:01

## 2017-12-29 NOTE — PHARMACY PROGRESS NOTE
Pharmacy Glycemic Short Note 2


Date of Service


Dec 29, 2017.





OUTPATIENT ANTIDIABETIC REGIMEN: 


* Lantus 5 u daily








ASSESSMENT:


* 57 yr old T2DM female admitted for respiratory failure, asthma exacerbation, 

and UTI


* Pt on low dose Lantus only at home - and BSGs on admission due to high dose 

steroids in the 300's


* Original plan yesterday was to initiate an insulin drip and overlap with SQ 

basal insulin to stabilize BSGs


* Nurse called @1630 to report drip on hold after less then an hour and BSG 

normal at 141 mg/dL


* At that point we decided to stop drip and trial SQ regimen post HD - this did 

not work and BSGs back to 366 mg/dL


* Insulin drip has remained steady at 2-3 units/hr in addition to 10 units of 

Lantus last night


* Steroids changed to PO starting this AM


* HD scheduled today which should further improve volume status and SQ 

absorption








PLAN FOR INPATIENT GLYCEMIC CONTROL:


* D/C insulin infusion





* Basal insulin


 * Lantus 10 units SQ X 1 this AM


 * Re-evaluate need for Lantus tomorrow AM


 


* Novolog ACHS


 * Correction factor 25


 * Carb ratio 8


 * Goal range 110-140








* Please note that the plan above was derived based on current level of insulin 

resistance and hospital stress. These recommendations are appropriate for 

inpatient admission only. Plan of care upon discharge will need to be 

reassessed to avoid potential outpatient hypo/hyperglycemia. 





Thank you.

## 2017-12-30 VITALS
SYSTOLIC BLOOD PRESSURE: 99 MMHG | TEMPERATURE: 97.52 F | DIASTOLIC BLOOD PRESSURE: 63 MMHG | HEART RATE: 84 BPM | OXYGEN SATURATION: 98 %

## 2017-12-30 VITALS
HEART RATE: 94 BPM | OXYGEN SATURATION: 98 % | TEMPERATURE: 97.7 F | SYSTOLIC BLOOD PRESSURE: 118 MMHG | DIASTOLIC BLOOD PRESSURE: 71 MMHG

## 2017-12-30 VITALS
SYSTOLIC BLOOD PRESSURE: 113 MMHG | OXYGEN SATURATION: 95 % | HEART RATE: 100 BPM | DIASTOLIC BLOOD PRESSURE: 71 MMHG | TEMPERATURE: 97.88 F

## 2017-12-30 VITALS
DIASTOLIC BLOOD PRESSURE: 70 MMHG | HEART RATE: 84 BPM | SYSTOLIC BLOOD PRESSURE: 112 MMHG | TEMPERATURE: 97.7 F | OXYGEN SATURATION: 99 %

## 2017-12-30 VITALS
TEMPERATURE: 97.34 F | DIASTOLIC BLOOD PRESSURE: 84 MMHG | SYSTOLIC BLOOD PRESSURE: 154 MMHG | HEART RATE: 98 BPM | OXYGEN SATURATION: 94 %

## 2017-12-30 VITALS
OXYGEN SATURATION: 95 % | SYSTOLIC BLOOD PRESSURE: 118 MMHG | TEMPERATURE: 97.88 F | HEART RATE: 107 BPM | DIASTOLIC BLOOD PRESSURE: 65 MMHG

## 2017-12-30 VITALS
OXYGEN SATURATION: 90 % | DIASTOLIC BLOOD PRESSURE: 73 MMHG | TEMPERATURE: 98.24 F | SYSTOLIC BLOOD PRESSURE: 117 MMHG | HEART RATE: 115 BPM

## 2017-12-30 VITALS — OXYGEN SATURATION: 98 %

## 2017-12-30 LAB
BUN SERPL-MCNC: 61 MG/DL (ref 7–18)
CALCIUM SERPL-MCNC: 9.2 MG/DL (ref 8.5–10.1)
CO2 SERPL-SCNC: 27 MMOL/L (ref 21–32)
CREAT SERPL-MCNC: 4.68 MG/DL (ref 0.6–1.2)
EOSINOPHIL NFR BLD AUTO: 270 K/UL (ref 130–400)
GLUCOSE SERPL-MCNC: 150 MG/DL (ref 70–99)
HCT VFR BLD CALC: 34.5 % (ref 37–47)
HGB BLD-MCNC: 10.8 G/DL (ref 12–16)
MCH RBC QN AUTO: 31.6 PG (ref 25–34)
MCHC RBC AUTO-ENTMCNC: 31.3 G/DL (ref 32–36)
MCV RBC AUTO: 100.9 FL (ref 80–100)
PHOSPHATE SERPL-MCNC: 7 MG/DL (ref 2.5–4.9)
PMV BLD AUTO: 9.2 FL (ref 7.4–10.4)
POTASSIUM SERPL-SCNC: 3.7 MMOL/L (ref 3.5–5.1)
RED CELL DISTRIBUTION WIDTH CV: 16.5 % (ref 11.5–14.5)
RED CELL DISTRIBUTION WIDTH SD: 60.5 FL (ref 36.4–46.3)
SODIUM SERPL-SCNC: 135 MMOL/L (ref 136–145)
WBC # BLD AUTO: 10.98 K/UL (ref 4.8–10.8)

## 2017-12-30 RX ADMIN — ALLOPURINOL SCH MG: 100 TABLET ORAL at 21:21

## 2017-12-30 RX ADMIN — ALUMINUM ZIRCONIUM TRICHLOROHYDREX GLY SCH EA: 0.2 STICK TOPICAL at 14:14

## 2017-12-30 RX ADMIN — ALBUTEROL SULFATE SCH MG: 2.5 SOLUTION RESPIRATORY (INHALATION) at 02:05

## 2017-12-30 RX ADMIN — CLOPIDOGREL BISULFATE SCH MG: 75 TABLET, FILM COATED ORAL at 08:38

## 2017-12-30 RX ADMIN — TRAMADOL HYDROCHLORIDE PRN MG: 50 TABLET, COATED ORAL at 13:07

## 2017-12-30 RX ADMIN — PANTOPRAZOLE SCH MG: 40 TABLET, DELAYED RELEASE ORAL at 08:37

## 2017-12-30 RX ADMIN — MICONAZOLE NITRATE PRN APPLN: 20 POWDER TOPICAL at 22:02

## 2017-12-30 RX ADMIN — CEFTRIAXONE SCH MLS/HR: 1 INJECTION, POWDER, FOR SOLUTION INTRAMUSCULAR; INTRAVENOUS at 17:21

## 2017-12-30 RX ADMIN — HEPARIN SODIUM SCH UNIT: 10000 INJECTION, SOLUTION INTRAVENOUS; SUBCUTANEOUS at 13:03

## 2017-12-30 RX ADMIN — INSULIN ASPART SCH UNITS: 100 INJECTION, SOLUTION INTRAVENOUS; SUBCUTANEOUS at 21:27

## 2017-12-30 RX ADMIN — ALUMINUM ZIRCONIUM TRICHLOROHYDREX GLY SCH EA: 0.2 STICK TOPICAL at 14:15

## 2017-12-30 RX ADMIN — Medication SCH MG: at 08:39

## 2017-12-30 RX ADMIN — ALUMINUM ZIRCONIUM TRICHLOROHYDREX GLY SCH EA: 0.2 STICK TOPICAL at 08:00

## 2017-12-30 RX ADMIN — Medication SCH MG: at 08:38

## 2017-12-30 RX ADMIN — ALUMINUM ZIRCONIUM TRICHLOROHYDREX GLY SCH EA: 0.2 STICK TOPICAL at 23:39

## 2017-12-30 RX ADMIN — INSULIN ASPART SCH UNITS: 100 INJECTION, SOLUTION INTRAVENOUS; SUBCUTANEOUS at 16:15

## 2017-12-30 RX ADMIN — ALBUTEROL SULFATE SCH MG: 2.5 SOLUTION RESPIRATORY (INHALATION) at 07:42

## 2017-12-30 RX ADMIN — INSULIN ASPART SCH UNITS: 100 INJECTION, SOLUTION INTRAVENOUS; SUBCUTANEOUS at 11:00

## 2017-12-30 RX ADMIN — SERTRALINE HYDROCHLORIDE SCH MG: 50 TABLET, FILM COATED ORAL at 08:39

## 2017-12-30 RX ADMIN — RENAGEL SCH MG: 800 TABLET ORAL at 17:21

## 2017-12-30 RX ADMIN — INSULIN ASPART SCH UNITS: 100 INJECTION, SOLUTION INTRAVENOUS; SUBCUTANEOUS at 18:16

## 2017-12-30 RX ADMIN — IPRATROPIUM BROMIDE AND ALBUTEROL SCH PUFFS: 20; 100 SPRAY, METERED RESPIRATORY (INHALATION) at 17:21

## 2017-12-30 RX ADMIN — ALLOPURINOL SCH MG: 100 TABLET ORAL at 08:38

## 2017-12-30 RX ADMIN — PRAMIPEXOLE DIHYDROCHLORIDE SCH MG: 0.25 TABLET ORAL at 21:21

## 2017-12-30 RX ADMIN — ASCORBIC ACID, THIAMINE MONONITRATE,RIBOFLAVIN, NIACINAMIDE, PYRIDOXINE HYDROCHLORIDE, FOLIC ACID, CYANOCOBALAMIN, BIOTIN, CALCIUM PANTOTHENATE, SCH CAP: 100; 1.5; 1.7; 20; 10; 1; 6000; 150000; 5 CAPSULE, LIQUID FILLED ORAL at 08:37

## 2017-12-30 RX ADMIN — IPRATROPIUM BROMIDE AND ALBUTEROL SCH PUFFS: 20; 100 SPRAY, METERED RESPIRATORY (INHALATION) at 13:04

## 2017-12-30 RX ADMIN — IPRATROPIUM BROMIDE AND ALBUTEROL SCH PUFFS: 20; 100 SPRAY, METERED RESPIRATORY (INHALATION) at 08:37

## 2017-12-30 RX ADMIN — HEPARIN SODIUM SCH UNIT: 10000 INJECTION, SOLUTION INTRAVENOUS; SUBCUTANEOUS at 21:28

## 2017-12-30 RX ADMIN — INSULIN GLARGINE SCH UNITS: 100 INJECTION, SOLUTION SUBCUTANEOUS at 08:44

## 2017-12-30 RX ADMIN — HEPARIN SODIUM SCH UNIT: 10000 INJECTION, SOLUTION INTRAVENOUS; SUBCUTANEOUS at 05:41

## 2017-12-30 RX ADMIN — INSULIN ASPART SCH UNITS: 100 INJECTION, SOLUTION INTRAVENOUS; SUBCUTANEOUS at 08:42

## 2017-12-30 RX ADMIN — ACETAMINOPHEN SCH MG: 500 TABLET, COATED ORAL at 21:21

## 2017-12-30 RX ADMIN — ATORVASTATIN CALCIUM SCH MG: 40 TABLET, FILM COATED ORAL at 08:38

## 2017-12-30 RX ADMIN — ACETAMINOPHEN PRN MG: 325 TABLET ORAL at 05:38

## 2017-12-30 RX ADMIN — DOCUSATE SODIUM SCH MG: 100 CAPSULE, LIQUID FILLED ORAL at 21:21

## 2017-12-30 RX ADMIN — DOCUSATE SODIUM SCH MG: 100 CAPSULE, LIQUID FILLED ORAL at 08:37

## 2017-12-30 RX ADMIN — IPRATROPIUM BROMIDE AND ALBUTEROL SCH PUFFS: 20; 100 SPRAY, METERED RESPIRATORY (INHALATION) at 21:21

## 2017-12-30 NOTE — PULMONARY PROGRESS NOTE
DATE: 12/30/2017

 

TIME:  08:10 a.m.

 

SUBJECTIVE:  The patient was sleeping when I came into the room.  She

awakened with some difficulty.  She stated she had a poor night sleep.  She

complains of cough and shortness of breath.  She states she just does not

feel good.

 

OBJECTIVE:

GENERAL:  She did not appear in any distress.  She did have a loose cough.

VITAL SIGNS:  Temperature is 36.5.  Heart rate was 94 per minute.  It was

noted that during the night time, her heart rates were up to 115.  She has a

gallop, which is heard.  I had not noticed this previously.

LUNGS:  Lung fields revealed diminished breath sounds.  There was faint end

expiratory wheezing heard on expiration.  Her respiratory rate was 20 breaths

per minute.  Oxygen saturation was 98% on 2 liters.

ABDOMEN:  Remains obese.

EXTREMITIES:  Showed no edema.

 

LABORATORY DATA:  Echocardiogram was done and revealed evidence of severe

mitral stenosis.  The patient previously had a mitral valve surgery.  The

echo showed the right ventricle to be normal size and function.

 

Electrolytes today show sodium 135, potassium 3.7, chloride 96, and

bicarbonate 27.  BUN was 61 with a creatinine of 4.68.

 

IMPRESSIONS:

1.  Acute bronchitis.

2.  Asthma by history.

3.  Respiratory failure -- acute on chronic with hypercarbia.

4.  Obesity hypoventilation syndrome.

5.  Renal failure.

6.  Possible obstructive sleep apnea.

 

COMMENTS AND RECOMMENDATIONS:  The patient has an arterial blood gas schedule

for 09:00 a.m. today.  This will better help us to determine if her blood

gases have returned more to normalcy.  She now tells me that she will soon be

moving to [place], [place] in the near future.  In light of that, I

would suggest that a sleep study be done through the [facility] sleep lab

rather than our sleep lab.  It will make much more sense for her for

convenience and for followup.  I would continue with her other respiratory

medicines.  I believe the ceftriaxone could be changed to an oral

cephalosporin.  Apparently, the patient refused the BiPAP last night.  If her

pCO2 levels are still elevated, we will strongly encourage that she wear it

nightly while she is here.

## 2017-12-30 NOTE — NEPHROLOGY PROGRESS NOTE
Nephrology Progress Note


Date of Service:


Dec 30, 2017.


Subjective


58 yo female with esrd who presented with volume overload and syncopal episode 

while on dialysis.  on oxygen. pt with productive cough. still with wheeze.  

woke up with muscle aches.  dialysis went well yesterday.





Objective











  Date Time  Temp Pulse Resp B/P (MAP) Pulse Ox O2 Delivery O2 Flow Rate FiO2


 


12/30/17 11:38 36.5 84 18 112/70 (84) 99 Nasal Cannula 2.0 


 


12/30/17 08:00     98 Nasal Cannula 2.0 


 


12/30/17 07:49 36.5 94 19 118/71 (87) 98 Nasal Cannula 2.0 


 


12/30/17 04:00      Nasal Cannula 2.0 


 


12/30/17 03:16 36.8 115 20 117/73 (88) 90 Room Air  


 


12/30/17 00:13 36.6 107 20 118/65 (82) 95 Room Air  


 


12/30/17 00:00      Room Air  


 


12/29/17 20:00      Room Air  


 


12/29/17 19:29  93 18  96 Room Air  


 


12/29/17 19:19 36.8 93 24 119/77 (91) 96 Room Air  


 


12/29/17 16:00      Nasal Cannula 2.0 


 


12/29/17 15:20 36.4 96 26 107/67 (80) 94 Room Air  


 


12/29/17 12:50 36.6 90  121/68 (85)    


 


12/29/17 12:45  90  110/67    








Physical Exam:


General-[id number]


Eyes-no scleral icterus


ENT-mmm


Neck-supple


Lungs-+wheeze


Heart-rrr


Abdomen-bs+ s/nt/nd


Extremities-no c/c/e


Neuro-nonfocal





Current Inpatient Medications








 Medications


  (Trade)  Dose


 Ordered  Sig/Daniel


 Route  Start Time


 Stop Time Status Last Admin


Dose Admin


 


 Heparin Sodium


  (Porcine)


  (Heparin Sq 5000


 Unit/0.5ml)  5,000 unit  Q8


 SQ  12/27/17 22:00


 1/26/18 21:59  12/30/17 05:41


5,000 UNIT


 


 Acetaminophen


  (Tylenol Tab)  650 mg  Q4H  PRN


 PO  12/27/17 17:45


 1/26/18 17:44  12/30/17 05:38


650 MG


 


 Ondansetron HCl


  (Zofran Inj)  4 mg  Q6H  PRN


 IV  12/27/17 17:45


 1/26/18 17:44   


 


 


 Ceftriaxone


 Sodium 1000 mg/


 Dextrose  60 ml @ 


 100 mls/hr  DAILY@1800


 IV  12/27/17 18:00


 1/1/18 17:59  12/29/17 17:17


100 MLS/HR


 


 Allopurinol


  (Zyloprim Tab)  100 mg  BID


 PO  12/27/17 21:00


 1/26/18 20:59  12/30/17 08:38


100 MG


 


 Aspirin


  (Ecotrin Tab)  81 mg  DAILY


 PO  12/28/17 09:00


 1/27/18 08:59  12/30/17 08:38


81 MG


 


 Atorvastatin


 Calcium


  (Lipitor Tab)  40 mg  DAILY


 PO  12/28/17 09:00


 1/27/18 08:59  12/30/17 08:38


40 MG


 


 Clopidogrel


 Bisulfate


  (plAVix TAB)  75 mg  DAILY


 PO  12/28/17 09:00


 1/27/18 08:59  12/30/17 08:38


75 MG


 


 Docusate Sodium


  (coLACE CAP)  100 mg  BID


 PO  12/27/17 21:00


 1/26/18 20:59  12/30/17 08:37


100 MG


 


 Albuterol/


 Ipratropium


  (Combivent


 Respimat Inh)  1 puffs  QIDR


 INH  12/27/17 20:00


 1/26/18 19:59  12/30/17 08:37


1 PUFFS


 


 Pantoprazole


 Sodium


  (Protonix Tab)  40 mg  DAILY


 PO  12/28/17 09:00


 1/27/18 08:59  12/30/17 08:37


40 MG


 


 Pramipexole


 Dihydrochloride


  (miraPEX TAB)  0.25 mg  HS


 PO  12/27/17 21:00


 1/26/18 20:59  12/29/17 20:28


0.25 MG


 


 Sertraline HCl


  (Zoloft Tab)  75 mg  DAILY


 PO  12/28/17 09:00


 1/27/18 08:59  12/30/17 08:39


75 MG


 


 Thiamine HCl


  (Vitamin B-1 Tab)  50 mg  DAILY


 PO  12/28/17 09:00


 1/27/18 08:59  12/30/17 08:39


50 MG


 


 Tramadol HCl


  (Ultram Tab)  50 mg  Q4H  PRN


 PO  12/27/17 19:15


 1/26/18 19:14   


 


 


 Vitamin B Complex/


 Vit C/Folic Acid


  (Nephrocaps)  1 cap  DAILY


 PO  12/28/17 09:00


 1/27/18 08:59  12/30/17 08:37


1 CAP


 


 Miscellaneous


 Information


  (Order Awaiting


 Action)  1 ea  QS


 N/A  12/28/17 00:00


 1/27/18 00:00   


 


 


 Miscellaneous


 Information


  (Order Awaiting


 Action)  1 ea  QS


 N/A  12/28/17 00:00


 1/27/18 00:00   


 


 


 Miscellaneous


 Information


  (Order Awaiting


 Action)  1 ea  QS


 N/A  12/28/17 00:00


 1/27/18 00:00   


 


 


 Glucose


  (Glucose 40% Gel)  15-30


 GRAMS 15


 GRAMS...  UD  PRN


 PO  12/27/17 19:30


 1/26/18 19:29   


 


 


 Glucose


  (Glucose Chew


 Tab)  4-8


 Tablets 4


 Tabl...  UD  PRN


 PO  12/27/17 19:30


 1/26/18 19:29   


 


 


 Dextrose


  (Dextrose 50%


 50ML Syringe)  25-50ML OF


 50% DW IV


 FOR...  UD  PRN


 IV  12/27/17 19:30


 1/26/18 19:29   


 


 


 Glucagon


  (Glucagon Inj)  1 mg  UD  PRN


 SQ  12/27/17 19:30


 1/26/18 19:29   


 


 


 Miscellaneous


 Information


  (Consult


 Glycemic


 Management


 Pharmacy)  1 ea  UD  PRN


 N/A  12/28/17 12:14


 1/27/18 12:13   


 


 


 Prednisone


  (PredniSONE TAB)  40 mg  DAILY


 PO  12/29/17 09:00


 1/28/18 08:59  12/30/17 08:39


40 MG


 


 Insulin Aspart


  (novoLOG ASPART)  SLIDING


 SCALE  ACHS


 SC  12/29/17 14:00


 1/28/18 13:59  12/30/17 08:42


9 UNITS


 


 Insulin Glargine


  (Lantus Solostar


 Pen)  5 units  DAILY


 SC  12/30/17 09:00


 1/29/18 08:59  12/30/17 08:44


5 UNITS


 


 Albuterol Sulfate


  (Ventolin 0.083%


 2.5MG/3ML Neb)  2.5 mg  Q6R  PRN


 INH  12/30/17 09:00


 1/26/18 20:59   


 


 


 Sevelamer HCl


  (Renagel Tab)  800 mg  TIDM


 PO  12/30/17 11:30


 1/29/18 11:29   


 











Last 24 Hours








Test


  12/29/17


13:55 12/29/17


15:51 12/29/17


20:08 12/29/17


23:41


 


Bedside Glucose 124 mg/dl  243 mg/dl  267 mg/dl  227 mg/dl 


 


Test


  12/30/17


06:20 12/30/17


06:37 12/30/17


09:27 12/30/17


11:14


 


White Blood Count 10.98 K/uL    


 


Red Blood Count 3.42 M/uL    


 


Hemoglobin 10.8 g/dL    


 


Hematocrit 34.5 %    


 


Mean Corpuscular Volume 100.9 fL    


 


Mean Corpuscular Hemoglobin 31.6 pg    


 


Mean Corpuscular Hemoglobin


Concent 31.3 g/dl 


  


  


  


 


 


RDW Standard Deviation 60.5 fL    


 


RDW Coefficient of Variation 16.5 %    


 


Platelet Count 270 K/uL    


 


Mean Platelet Volume 9.2 fL    


 


Sodium Level 135 mmol/L    


 


Potassium Level 3.7 mmol/L    


 


Chloride Level 96 mmol/L    


 


Carbon Dioxide Level 27 mmol/L    


 


Anion Gap 12.0 mmol/L    


 


Blood Urea Nitrogen 61 mg/dl    


 


Creatinine 4.68 mg/dl    


 


Est Creatinine Clear Calc


Drug Dose 14.7 ml/min 


  


  


  


 


 


Estimated GFR (


American) 11.1 


  


  


  


 


 


Estimated GFR (Non-


American 9.5 


  


  


  


 


 


BUN/Creatinine Ratio 13.0    


 


Random Glucose 150 mg/dl    


 


Calcium Level 9.2 mg/dl    


 


Phosphorus Level 7.0 mg/dl    


 


Magnesium Level 2.2 mg/dl    


 


Bedside Glucose  135 mg/dl   81 mg/dl 


 


Arterial Blood pH   7.33  


 


Arterial Blood Partial


Pressure CO2 


  


  48 mmHg 


  


 


 


Arterial Blood Partial


Pressure O2 


  


  71 mm/Hg 


  


 


 


Arterial Blood HCO3   24 mmol/L  


 


Arterial Blood Oxygen


Saturation 


  


  90.0 % 


  


 


 


Arterial Blood Base Excess   -1.9 mEq/L  


 


Arterial Blood Gas Delivery   2 LITERS  


 


Kiko Test   POS  











Assessment & Plan


ESRD-for dialysis tomorrow.  volume status is acceptable. tolerating dialysis 

well. 





Anemia of Renal Failure-hg of 10.8 which is at goal.  will continue procrit on 

dialysis. 





UBALDO-phos levels were elevated.  to restart phosphate binders. usually takes 2 

with meals. will increase to three with meals with elevated phos levels.

## 2017-12-30 NOTE — CARDIOLOGY CONSULTATION
Cardiology Consultation


Date of Consultation:  Dec 30, 2017


History of Present Illness


Ivy Bro is a 57 year old female seen in cardiology consultation per the 

request of Dr. Escobar regarding abnormal echocardiogram findings suggestive of 

severe mitral valve stenosis.  The patient has a history of end-stage renal 

disease and is treated with hemodialysis 3 days per week most recently in the 

[facility].  On the day of admission she had a debra unresponsive episode 

that occurred during dialysis.  She did initially been assessed in the 

emergency Department at [facility] she was transferred to the [facility] for 

ongoing care area in the interim time, she is been 100 on telemetry since her 

presentation on 12/27/70 without any significant arrhythmia.  She's been 

tolerating hemodialysis well.  She has a left-sided tunneled dialysis catheter 

that has been her dialysis access for the last year since her dialysis was 

apparently initially started.  She tolerated the dialysis treatment at our 

institution yesterday she is due for repeat treatment tomorrow.  Thus far 

during this hospital stay she has been followed by the pulmonary service and 

treated for an acute bronchitis.  She has acute on chronic hypercarbia and 

underlying obesity hypoventilation syndrome.





From a cardiac perspective she underwent transthoracic echocardiogram performed 

yesterday 12/29/17.  At the time that the echocardiogram was performed, her 

past surgical history was not known.  The patient specifically told me that she 

had a history of mitral valve surgery, however in the interim, Dr. Escobar was 

able to obtain records from the patient's primary care provider documenting 

that she had aortic valve replacement with a bioprosthetic aortic valve was 

performed at [facility] in March 2016 for treatment of moderate to severe 

aortic valve regurgitation and severe aortic valve stenosis.  Preoperative 

transesophageal echocardiogram performed there dated 3/14/16 describes mild 

mitral regurgitation and mitral annular calcification as well as mild tricuspid 

regurgitation.  Per the report, there were no concerns about mitral valve 

stenosis at that time.





The patient however did have a transthoracic echocardiogram performed at our 

institution on 12/29/17.  The gradient across the prosthetic aortic valve was 

within acceptable limits.  The mitral valve apparatus however was densely 

calcified concerns of decreased systolic and diastolic excursion and Doppler 

findings suggestive of severe mitral stenosis with a mean mitral valve gradient 

of 12 mmHg.





Past Medical/Surgical History


Problem List:


Medical Problems:


(1) CHF (congestive heart failure)


(2) ESRD on dialysis


(3) Respiratory failure





History


Past Medical History: 


1.  End-stage renal disease for which she is on hemodialysis


2.  History of aortic valve regurgitation, severe aortic stenosis for which she 

underwent bioprosthetic aortic valve replacement March 2016 [facility]


3.  Most recent postoperative echocardiogram report prior to this admission 

expected 7/22/60 with reported normal LVEF in the range of 6065% appropriately 

functioning aortic valve bioprosthesis, moderate tricuspid regurgitation, 

moderate mitral regurgitation, and moderate pulmonary hypertension.


3.  Obesity hypoventilation syndrome


4.  A 2 diabetes mellitus


5.  Presumed underlying hypertension


6.  Dyslipidemia


Past Surgical History: 


1.  Aortic valve replacement as noted above


2.  Presumed preoperative cardiac catheterization although I do not have access 

to the report of present


3.  Tunneled dialysis catheter





Social History:  


She is a nonsmoker  





Family History: 


Noncontributory





Review Of Systems


See above for pertinent positives & negatives. A total of 10 systems reviewed 

and were otherwise negative.





Allergies


Coded Allergies:  


     Hydrocodone (Verified  Allergy, Unknown, unspecified, 12/27/17)


     Morphine (Verified  Allergy, Unknown, unspecified , 12/27/17)





Medications





Reported Home Medications








 Medications  Dose


 Route/Sig


 Max Daily Dose Days Date Category


 


 Ultram (Tramadol


 HCl) 50 Mg Tab  50 Mg


 PO Q4H PRN


    12/27/17 Reported


 


 Nephrocaps


  (Vitamin B


 Complex/Vit


 C/Folic Acid)  Cap  1 Cap


 PO DAILY


    12/27/17 Reported


 


 Proair Respiclick


  (Albuterol


 Sulfate) 108


 Mcg/Act Aer  2 Puffs


 INH  PRN


    12/27/17 Reported


 


 Protonix


  (Pantoprazole


 Sodium) 40 Mg Tab  40 Mg


 PO DAILY


    12/27/17 Reported


 


 Combivent


 Respimat


  (Ipratropium-Albuterol)


 1 Aer Aer  1 Puffs


 INH QID


    12/27/17 Reported


 


 Lantus Solostar


  (Insulin


 Glargine) 100


 Unit/Ml Inj  5 Units


 SC QAM


    12/27/17 Reported


 


 Aspirin Chewable


  (Aspirin) 81 Mg


 Chew  81 Mg


 PO DAILY


    12/27/17 Reported


 


 Plavix


  (Clopidogrel


 Bisulfate) 75 Mg


 Tab  75 Mg


 PO DAILY


    12/27/17 Reported


 


 Zoloft


  (Sertraline HCl)


 50 Mg Tab  75 Mg


 PO DAILY


    12/27/17 Reported


 


 Lipitor


  (Atorvastatin


 Calcium) 40 Mg Tab  40 Mg


 PO DAILY


    12/27/17 Reported


 


 Zyloprim


  (Allopurinol) 100


 Mg Tab  100 Mg


 PO BID


    12/27/17 Reported


 


 Aciphex


  (Rabeprazole


 Sodium) 20 Mg Tab  20 Mg


 PO DAILY


    12/27/17 Reported


 


 Breo Ellipta


  (Fluticasone


 Furoate-Vilanterol)


 1 Inh Inh  1 Puff


 INH BID


    12/27/17 Reported


 


 Vitamin B-1


  (Thiamine HCl) 50


 Mg Tab  50 Mg


 PO DAILY


    12/27/17 Reported


 


 Mirapex


  (Pramipexole


 Dihydrochloride)


 0.125 Mg Tab  0.25 Mg


 PO HS


    12/27/17 Reported


 


 Colace (Docusate


 Sodium) 100 Mg Cap  1 Cap


 PO BID


    12/27/17 Reported


 


 Renvela


  (Sevelamer


 Carbonate) 800 Mg


 Tab  1 Tab


 PO TID


    12/27/17 Reported


 


 Auryxia (Ferric


 Citrate) 210 Mg


 Tab  1 Tab


 PO DAILY


    12/27/17 Reported


 


 Mircera (Methoxy


 Polyethylene


 Glycol-Ep) 150


 Mcg/0.3 Ml Sol  1 Tab


 PO TID


    12/27/17 Reported


 


 Venofer (Iron


 Sucrose) 100 Mg/5


 Ml Inj  100 Mg


 IV


    12/27/17 Reported


 


 Venofer (Iron


 Sucrose) 20 Mg/Ml


 Inj  50 Mg


 IV


    12/27/17 Reported











Physical Exam


Vital Signs (Last 8hrs):





Last 8 Hrs








  Date Time  Temp Pulse Resp B/P (MAP) Pulse Ox O2 Delivery O2 Flow Rate FiO2


 


12/30/17 12:00      Nasal Cannula 2.0 


 


12/30/17 11:38 36.5 84 18 112/70 (84) 99 Nasal Cannula 2.0 


 


12/30/17 08:00     98 Nasal Cannula 2.0 








General Appearance: Alert and Oriented x3. NAD. 


Head: Normocephalic Atraumatic. 


Eyes: PERRLA, EOMI, conjunctiva and sclera clear


Neck:  Supple. No carotid bruits noted. No JVD. No HJD. 


Respiratory: Breath sounds clear to auscultation bilaterally. No w/r/r. 


Cardiovascular: Reg rate and rhythm. S1 and S2 noted. No murmurs, rubs, 

gallops. PMI non displace. 


Abdomen: Normal bowel sounds, soft nontender. no abdominal bruits. 


Extremities: No edema, no clubbing or cyanosis. distal pulses 2/4 bilaterally. 


Neuro:  No focal deficits. 


Psychiatric: Normal affect.





Data


Last Resulted


12/30/17 06:20








Last Resulted


12/30/17 06:20








EKG performed this admission 12/27/17 reveals normal sinus rhythm at 88 bpm 

significant ST changes





Telemetry reveals sinus rhythm.





Assessment & Plan


Impression:


57-year-old female


1.  History of bioprosthetic aortic valve replacement, echocardiogram findings 

concerning for possible calcific mitral valve stenosis.


2.  Underlying ESRD on hemodialysis





Discussion/recommendations:


I'm not certain if the patient's echocardiogram findings relevant to her 

transient unresponsive episode during hemodialysis or not.


Based on her preoperative transesophageal echocardiogram report from March 2016 

and her postoperative transthoracic echocardiogram report from July 2016 we 

have discordant data as her current transthoracic echocardiogram here suggested 

possible mitral valve stenosis.  Her echocardiogram was however technically 

limited due to poor acoustic windows.





The patient's care has been somewhat fragmented.  She is not followed routinely 

with cardiology.  It sounds as though she is transitioning to living with her 

daughter and perhaps transitioning much for care.  This is her first 

hospitalization at this institution.





At present, I think it is reasonable to transfer her off of telemetry as she 

has had no arrhythmia.  Continue her current treatment for bronchitis.  I would 

recommend that we try to pursue resolving the discordant echo data [facility] 

proceeding with a repeat transfer esophageal echocardiogram during this 

hospital stay to assess her bioprosthetic aortic valve function as well as 

mitral valve function.





Agree with outpatient workup for suspected sleep apnea, obesity hypoventilation 

syndrome.  Pulmonary hypertension noted on past echocardiograms may be related 

to mitral stenosis with pulmonary venous and pulmonary arterial hypertension or 

perhaps related to obesity hypoventilation syndrome.

## 2017-12-30 NOTE — PROGRESS NOTE
Medicine Progress Note


Date & Time of Visit:


Dec 30, 2017 at 15:10.


Subjective


58 yo F with ESRD on HD presented as a transfer from OSH after an episode of 

unresponsiveness preceded by some lightheadedness during her dialysis session.  

She is doing well today and is awake and alert.  She is wearing supplemental 

oxygen but denies any SOB, chest pain or other symptoms at this time.  Does 

admit to some dyspnea on exertion when walking to and from the bathroom and 

feels she needs the oxygen for this reason.  Telemetry review was unremarkable 

overnight.





Objective





Last 8 Hrs








  Date Time  Temp Pulse Resp B/P (MAP) Pulse Ox O2 Delivery O2 Flow Rate FiO2


 


12/30/17 12:00      Nasal Cannula 2.0 


 


12/30/17 11:38 36.5 84 18 112/70 (84) 99 Nasal Cannula 2.0 


 


12/30/17 08:00     98 Nasal Cannula 2.0 


 


12/30/17 07:49 36.5 94 19 118/71 (87) 98 Nasal Cannula 2.0 








Physical Exam:


GEN: obese, in no acute distress, alert and appropriate, NC in place, no 

conversational dyspnea. 


HEENT: NC/AT, pupils are round and equal bilaterally, normal sclerae, MMM


CARDIO: reg rate, S1/2 heard without m/g/r


LUNGS: CTAB, no crackles, wheezes or rales.  good diaphragmatic excursion


ABD: soft, non-tender, non-distended, no rebound or guarding +BS


EXTREMITY: RP and DP palpable 2+ bilat, no LE swelling or edema, extremities 

are warm and well-perfused


NEURO: CN 2-12 grossly intact


MUSC: moves all extremities equally


SKIN:  warm and dry


Laboratory Results:


12/30/17 06:20








12/30/17 06:20

















Test


  12/27/17


20:16 12/29/17


07:02 12/30/17


06:20 12/30/17


09:27


 


Prothrombin Time


  10.0 SECONDS


(9.0-12.0) 


  


  


 


 


Prothromb Time International


Ratio 1.0 (0.9-1.1) 


  


  


  


 


 


Activated Partial


Thromboplast Time 28.5 SECONDS


(21.0-31.0) 


  


  


 


 


Partial Thromboplastin Ratio 1.1    


 


Total Bilirubin


  0.3 mg/dl


(0.2-1) 


  


  


 


 


Aspartate Amino Transf


(AST/SGOT) 12 U/L (15-37) 


  


  


  


 


 


Alanine Aminotransferase


(ALT/SGPT) 12 U/L (12-78) 


  


  


  


 


 


Alkaline Phosphatase


  93 U/L


() 


  


  


 


 


Total Protein


  7.5 gm/dl


(6.4-8.2) 


  


  


 


 


Albumin


  2.8 gm/dl


(3.4-5.0) 


  


  


 


 


Globulin


  4.7 gm/dl


(2.5-4.0) 


  


  


 


 


Albumin/Globulin Ratio 0.6 (0.9-2)    


 


Beta-Hydroxybutyric Acid


  3.60 mg/dL


(0.2-2.81) 


  


  


 


 


Thyroid Stimulating Hormone


(TSH) 2.160 uIu/ml


(0.300-4.500) 


  


  


 


 


Hepatitis C Antibody Screen NEG (NEG)    


 


Hepatitis C Antibody NEG (NEG)    


 


Estimated Average Glucose  134 mg/dl   


 


Hemoglobin A1c


  


  6.3 %


(4.5-5.6) 


  


 


 


Red Blood Count


  


  


  3.42 M/uL


(4.2-5.4) 


 


 


Mean Corpuscular Volume


  


  


  100.9 fL


() 


 


 


Mean Corpuscular Hemoglobin


  


  


  31.6 pg


(25-34) 


 


 


Mean Corpuscular Hemoglobin


Concent 


  


  31.3 g/dl


(32-36) 


 


 


RDW Standard Deviation


  


  


  60.5 fL


(36.4-46.3) 


 


 


RDW Coefficient of Variation


  


  


  16.5 %


(11.5-14.5) 


 


 


Mean Platelet Volume


  


  


  9.2 fL


(7.4-10.4) 


 


 


Anion Gap


  


  


  12.0 mmol/L


(3-11) 


 


 


Est Creatinine Clear Calc


Drug Dose 


  


  14.7 ml/min 


  


 


 


Estimated GFR (


American) 


  


  11.1 


  


 


 


Estimated GFR (Non-


American 


  


  9.5 


  


 


 


BUN/Creatinine Ratio   13.0 (10-20)  


 


Calcium Level


  


  


  9.2 mg/dl


(8.5-10.1) 


 


 


Phosphorus Level


  


  


  7.0 mg/dl


(2.5-4.9) 


 


 


Magnesium Level


  


  


  2.2 mg/dl


(1.8-2.4) 


 


 


Arterial Blood pH


  


  


  


  7.33


(7.35-7.45)


 


Arterial Blood Partial


Pressure CO2 


  


  


  48 mmHg


(35-46)


 


Arterial Blood Partial


Pressure O2 


  


  


  71 mm/Hg


(80-95)


 


Arterial Blood HCO3


  


  


  


  24 mmol/L


(19-24)


 


Arterial Blood Oxygen


Saturation 


  


  


  90.0 % (90-95) 


 


 


Arterial Blood Base Excess


  


  


  


  -1.9 mEq/L


(-9-1.8)


 


Arterial Blood Gas Delivery    2 LITERS 


 


Kiko Test    POS (POS) 


 


Test


  12/30/17


16:30 


  


  


 


 


Bedside Glucose


  308 mg/dl


(70-90) 


  


  


 








Last 24 Hours








Test


  12/29/17


15:51 12/29/17


20:08 12/29/17


23:41 12/30/17


06:20


 


Bedside Glucose 243 mg/dl  267 mg/dl  227 mg/dl  


 


White Blood Count    10.98 K/uL 


 


Red Blood Count    3.42 M/uL 


 


Hemoglobin    10.8 g/dL 


 


Hematocrit    34.5 % 


 


Mean Corpuscular Volume    100.9 fL 


 


Mean Corpuscular Hemoglobin    31.6 pg 


 


Mean Corpuscular Hemoglobin


Concent 


  


  


  31.3 g/dl 


 


 


RDW Standard Deviation    60.5 fL 


 


RDW Coefficient of Variation    16.5 % 


 


Platelet Count    270 K/uL 


 


Mean Platelet Volume    9.2 fL 


 


Sodium Level    135 mmol/L 


 


Potassium Level    3.7 mmol/L 


 


Chloride Level    96 mmol/L 


 


Carbon Dioxide Level    27 mmol/L 


 


Anion Gap    12.0 mmol/L 


 


Blood Urea Nitrogen    61 mg/dl 


 


Creatinine    4.68 mg/dl 


 


Est Creatinine Clear Calc


Drug Dose 


  


  


  14.7 ml/min 


 


 


Estimated GFR (


American) 


  


  


  11.1 


 


 


Estimated GFR (Non-


American 


  


  


  9.5 


 


 


BUN/Creatinine Ratio    13.0 


 


Random Glucose    150 mg/dl 


 


Calcium Level    9.2 mg/dl 


 


Phosphorus Level    7.0 mg/dl 


 


Magnesium Level    2.2 mg/dl 


 


Test


  12/30/17


06:37 12/30/17


09:27 12/30/17


11:14 


 


 


Bedside Glucose 135 mg/dl   81 mg/dl  


 


Arterial Blood pH  7.33   


 


Arterial Blood Partial


Pressure CO2 


  48 mmHg 


  


  


 


 


Arterial Blood Partial


Pressure O2 


  71 mm/Hg 


  


  


 


 


Arterial Blood HCO3  24 mmol/L   


 


Arterial Blood Oxygen


Saturation 


  90.0 % 


  


  


 


 


Arterial Blood Base Excess  -1.9 mEq/L   


 


Arterial Blood Gas Delivery  2 LITERS   


 


Kiko Test  POS   











Assessment & Plan


58 yo F with ESRD on HD presented as a transfer from OS after an episode of 

unresponsiveness preceded by some lightheadedness during her dialysis session.  

She is doing well today and is awake and alert.  She is wearing supplemental 

oxygen but denies any SOB, chest pain or other symptoms at this time.  Does 

admit to some dyspnea on exertion when walking to and from the bathroom and 

feels she needs the oxygen for this reason.  Telemetry review was unremarkable 

overnight.  





1.  Syncope-likely 2/2 URI in setting of hemodialysis with no food on her 

stomach.  No underlying arrhythmia noted.  She does have some apparent worsened 

mitral stenosis that will need further evaluation with a WANDY which will be 

performed on Tues during this hospitalization.  Transferred to med/surg.


2.  Hypoxia poss 2/2 asthma exacerbation vs hypercarbia from presumed GUILLERMO/

obesity hypoventilation syndrome vs fluid overloaded state from missed dialysis 

session.  Will cont to monitor volume status daily.  Cont steroids and 

bronchodilators.  Apprec pulm input.  BIPAP qHS although not documented last 

night.  Cont to wean oxygen as tolerated. 


3.  UTI-UA dirty at prior facility, no culture results available.  Cont 

ceftriaxone for now in setting of dysuria and UA results for 7 days.


4.  ESRD on HD-apprec Nephro recs and management.  Sevelamer was restarted 

today. 


5.  DMII-up and down in setting of steroids, cont ISS/Lantus and carb coverage.

  Pharm consult. 


6.  Status post bioprosthetic aortic valve replacement in March 2016 for severe 

aortic stenosis.  Repeat echo this admission was unable to fully visualize the 

aortic valve. Per Cards AWNDY on Tues.





DVT proph-heparin


Full Code


Dispo-cont telemetry





[facility]


Consultants:


Nephro, Pulm


Current Inpatient Medications:





Current Inpatient Medications








 Medications


  (Trade)  Dose


 Ordered  Sig/Daniel


 Route  Start Time


 Stop Time Status Last Admin


Dose Admin


 


 Heparin Sodium


  (Porcine)


  (Heparin Sq 5000


 Unit/0.5ml)  5,000 unit  Q8


 SQ  12/27/17 22:00


 1/26/18 21:59  12/30/17 05:41


5,000 UNIT


 


 Acetaminophen


  (Tylenol Tab)  650 mg  Q4H  PRN


 PO  12/27/17 17:45


 1/26/18 17:44  12/30/17 05:38


650 MG


 


 Ondansetron HCl


  (Zofran Inj)  4 mg  Q6H  PRN


 IV  12/27/17 17:45


 1/26/18 17:44   


 


 


 Ceftriaxone


 Sodium 1000 mg/


 Dextrose  60 ml @ 


 100 mls/hr  DAILY@1800


 IV  12/27/17 18:00


 1/1/18 17:59  12/29/17 17:17


100 MLS/HR


 


 Allopurinol


  (Zyloprim Tab)  100 mg  BID


 PO  12/27/17 21:00


 1/26/18 20:59  12/30/17 08:38


100 MG


 


 Aspirin


  (Ecotrin Tab)  81 mg  DAILY


 PO  12/28/17 09:00


 1/27/18 08:59  12/30/17 08:38


81 MG


 


 Atorvastatin


 Calcium


  (Lipitor Tab)  40 mg  DAILY


 PO  12/28/17 09:00


 1/27/18 08:59  12/30/17 08:38


40 MG


 


 Clopidogrel


 Bisulfate


  (plAVix TAB)  75 mg  DAILY


 PO  12/28/17 09:00


 1/27/18 08:59  12/30/17 08:38


75 MG


 


 Docusate Sodium


  (coLACE CAP)  100 mg  BID


 PO  12/27/17 21:00


 1/26/18 20:59  12/30/17 08:37


100 MG


 


 Albuterol/


 Ipratropium


  (Combivent


 Respimat Inh)  1 puffs  QIDR


 INH  12/27/17 20:00


 1/26/18 19:59  12/30/17 13:04


1 PUFFS


 


 Pantoprazole


 Sodium


  (Protonix Tab)  40 mg  DAILY


 PO  12/28/17 09:00


 1/27/18 08:59  12/30/17 08:37


40 MG


 


 Pramipexole


 Dihydrochloride


  (miraPEX TAB)  0.25 mg  HS


 PO  12/27/17 21:00


 1/26/18 20:59  12/29/17 20:28


0.25 MG


 


 Sertraline HCl


  (Zoloft Tab)  75 mg  DAILY


 PO  12/28/17 09:00


 1/27/18 08:59  12/30/17 08:39


75 MG


 


 Thiamine HCl


  (Vitamin B-1 Tab)  50 mg  DAILY


 PO  12/28/17 09:00


 1/27/18 08:59  12/30/17 08:39


50 MG


 


 Tramadol HCl


  (Ultram Tab)  50 mg  Q4H  PRN


 PO  12/27/17 19:15


 1/26/18 19:14  12/30/17 13:07


50 MG


 


 Vitamin B Complex/


 Vit C/Folic Acid


  (Nephrocaps)  1 cap  DAILY


 PO  12/28/17 09:00


 1/27/18 08:59  12/30/17 08:37


1 CAP


 


 Miscellaneous


 Information


  (Order Awaiting


 Action)  1 ea  QS


 N/A  12/28/17 00:00


 1/27/18 00:00   


 


 


 Miscellaneous


 Information


  (Order Awaiting


 Action)  1 ea  QS


 N/A  12/28/17 00:00


 1/27/18 00:00   


 


 


 Miscellaneous


 Information


  (Order Awaiting


 Action)  1 ea  QS


 N/A  12/28/17 00:00


 1/27/18 00:00   


 


 


 Glucose


  (Glucose 40% Gel)  15-30


 GRAMS 15


 GRAMS...  UD  PRN


 PO  12/27/17 19:30


 1/26/18 19:29   


 


 


 Glucose


  (Glucose Chew


 Tab)  4-8


 Tablets 4


 Tabl...  UD  PRN


 PO  12/27/17 19:30


 1/26/18 19:29   


 


 


 Dextrose


  (Dextrose 50%


 50ML Syringe)  25-50ML OF


 50% DW IV


 FOR...  UD  PRN


 IV  12/27/17 19:30


 1/26/18 19:29   


 


 


 Glucagon


  (Glucagon Inj)  1 mg  UD  PRN


 SQ  12/27/17 19:30


 1/26/18 19:29   


 


 


 Miscellaneous


 Information


  (Consult


 Glycemic


 Management


 Pharmacy)  1 ea  UD  PRN


 N/A  12/28/17 12:14


 1/27/18 12:13   


 


 


 Prednisone


  (PredniSONE TAB)  40 mg  DAILY


 PO  12/29/17 09:00


 1/28/18 08:59  12/30/17 08:39


40 MG


 


 Insulin Aspart


  (novoLOG ASPART)  SLIDING


 SCALE  ACHS


 SC  12/29/17 14:00


 1/28/18 13:59  12/30/17 08:42


9 UNITS


 


 Insulin Glargine


  (Lantus Solostar


 Pen)  5 units  DAILY


 SC  12/30/17 09:00


 1/29/18 08:59  12/30/17 08:44


5 UNITS


 


 Albuterol Sulfate


  (Ventolin 0.083%


 2.5MG/3ML Neb)  2.5 mg  Q6R  PRN


 INH  12/30/17 09:00


 1/26/18 20:59   


 


 


 Sevelamer HCl


  (Renagel Tab)  2,400 mg  TIDM


 PO  12/30/17 16:45


 1/29/18 11:29

## 2017-12-30 NOTE — PHARMACY PROGRESS NOTE
Pharmacy Glycemic Short Note 2


Date of Service


Dec 30, 2017.





OUTPATIENT ANTIDIABETIC REGIMEN: 


* Lantus 5 u daily








ASSESSMENT:


12/29/17


* 57 yr old T2DM female admitted for respiratory failure, asthma exacerbation, 

and UTI


* Pt on low dose Lantus only at home - and BSGs on admission due to high dose 

steroids in the 300's


* Original plan yesterday was to initiate an insulin drip and overlap with SQ 

basal insulin to stabilize BSGs


* Nurse called @1630 to report drip on hold after less then an hour and BSG 

normal at 141 mg/dL


* At that point we decided to stop drip and trial SQ regimen post HD - this did 

not work and BSGs back to 366 mg/dL


* Insulin drip has remained steady at 2-3 units/hr in addition to 10 units of 

Lantus last night


* Steroids changed to PO starting this AM


* HD scheduled today which should further improve volume status and SQ 

absorption





12/30/17


* BSGs climbed overnight as prednisone peaked


* To combat prednisone- give NPH X 1 in the AM and see how patient tolerates


* Loosen carb coverage this evening incase NPH dose was too aggressive


* Per ADA recommendations: 0.4 u/kg for 40 mg of prednisone --> I used 0.3 u/kg 

to be on the safe side








PLAN FOR INPATIENT GLYCEMIC CONTROL:





* Basal insulin


 * Lantus 5 units SQ AM


 * NPH 30 units X 1 with Prednisone 40 mg (NPH mimics peak of steroid)


 


* Novolog ACHS


 * Correction factor 20


 * Carb ratio 15


 * Goal range 110-140








* Please note that the plan above was derived based on current level of insulin 

resistance and hospital stress. These recommendations are appropriate for 

inpatient admission only. Plan of care upon discharge will need to be 

reassessed to avoid potential outpatient hypo/hyperglycemia. 





Thank you.

## 2017-12-31 VITALS — HEART RATE: 91 BPM | DIASTOLIC BLOOD PRESSURE: 60 MMHG | SYSTOLIC BLOOD PRESSURE: 109 MMHG

## 2017-12-31 VITALS — HEART RATE: 94 BPM | SYSTOLIC BLOOD PRESSURE: 111 MMHG | DIASTOLIC BLOOD PRESSURE: 67 MMHG

## 2017-12-31 VITALS — HEART RATE: 93 BPM | SYSTOLIC BLOOD PRESSURE: 115 MMHG | DIASTOLIC BLOOD PRESSURE: 62 MMHG

## 2017-12-31 VITALS
SYSTOLIC BLOOD PRESSURE: 105 MMHG | TEMPERATURE: 97.52 F | OXYGEN SATURATION: 94 % | HEART RATE: 101 BPM | DIASTOLIC BLOOD PRESSURE: 69 MMHG

## 2017-12-31 VITALS — HEART RATE: 93 BPM | SYSTOLIC BLOOD PRESSURE: 127 MMHG | DIASTOLIC BLOOD PRESSURE: 62 MMHG

## 2017-12-31 VITALS
OXYGEN SATURATION: 97 % | TEMPERATURE: 97.7 F | SYSTOLIC BLOOD PRESSURE: 109 MMHG | DIASTOLIC BLOOD PRESSURE: 72 MMHG | HEART RATE: 94 BPM

## 2017-12-31 VITALS — HEART RATE: 91 BPM | SYSTOLIC BLOOD PRESSURE: 118 MMHG | TEMPERATURE: 97.88 F | DIASTOLIC BLOOD PRESSURE: 65 MMHG

## 2017-12-31 VITALS — SYSTOLIC BLOOD PRESSURE: 110 MMHG | HEART RATE: 93 BPM | DIASTOLIC BLOOD PRESSURE: 59 MMHG

## 2017-12-31 VITALS
SYSTOLIC BLOOD PRESSURE: 97 MMHG | OXYGEN SATURATION: 95 % | TEMPERATURE: 97.88 F | DIASTOLIC BLOOD PRESSURE: 54 MMHG | HEART RATE: 96 BPM

## 2017-12-31 VITALS — HEART RATE: 94 BPM | SYSTOLIC BLOOD PRESSURE: 102 MMHG | DIASTOLIC BLOOD PRESSURE: 53 MMHG

## 2017-12-31 VITALS — SYSTOLIC BLOOD PRESSURE: 117 MMHG | HEART RATE: 93 BPM | DIASTOLIC BLOOD PRESSURE: 64 MMHG

## 2017-12-31 VITALS — DIASTOLIC BLOOD PRESSURE: 69 MMHG | SYSTOLIC BLOOD PRESSURE: 121 MMHG | HEART RATE: 93 BPM

## 2017-12-31 VITALS — OXYGEN SATURATION: 93 %

## 2017-12-31 VITALS
HEART RATE: 79 BPM | SYSTOLIC BLOOD PRESSURE: 128 MMHG | DIASTOLIC BLOOD PRESSURE: 79 MMHG | TEMPERATURE: 97.88 F | OXYGEN SATURATION: 90 %

## 2017-12-31 VITALS — HEART RATE: 95 BPM | DIASTOLIC BLOOD PRESSURE: 69 MMHG | SYSTOLIC BLOOD PRESSURE: 118 MMHG

## 2017-12-31 VITALS — DIASTOLIC BLOOD PRESSURE: 74 MMHG | SYSTOLIC BLOOD PRESSURE: 121 MMHG | HEART RATE: 97 BPM

## 2017-12-31 VITALS — HEART RATE: 94 BPM | SYSTOLIC BLOOD PRESSURE: 118 MMHG | DIASTOLIC BLOOD PRESSURE: 65 MMHG | TEMPERATURE: 98.24 F

## 2017-12-31 VITALS — HEART RATE: 86 BPM | SYSTOLIC BLOOD PRESSURE: 124 MMHG | DIASTOLIC BLOOD PRESSURE: 72 MMHG

## 2017-12-31 VITALS — DIASTOLIC BLOOD PRESSURE: 67 MMHG | HEART RATE: 93 BPM | SYSTOLIC BLOOD PRESSURE: 117 MMHG

## 2017-12-31 RX ADMIN — ALLOPURINOL SCH MG: 100 TABLET ORAL at 21:16

## 2017-12-31 RX ADMIN — RENAGEL SCH MG: 800 TABLET ORAL at 17:44

## 2017-12-31 RX ADMIN — Medication SCH MG: at 11:05

## 2017-12-31 RX ADMIN — DOCUSATE SODIUM SCH MG: 100 CAPSULE, LIQUID FILLED ORAL at 09:00

## 2017-12-31 RX ADMIN — ASCORBIC ACID, THIAMINE MONONITRATE,RIBOFLAVIN, NIACINAMIDE, PYRIDOXINE HYDROCHLORIDE, FOLIC ACID, CYANOCOBALAMIN, BIOTIN, CALCIUM PANTOTHENATE, SCH CAP: 100; 1.5; 1.7; 20; 10; 1; 6000; 150000; 5 CAPSULE, LIQUID FILLED ORAL at 11:01

## 2017-12-31 RX ADMIN — INSULIN ASPART SCH UNITS: 100 INJECTION, SOLUTION INTRAVENOUS; SUBCUTANEOUS at 06:30

## 2017-12-31 RX ADMIN — PRAMIPEXOLE DIHYDROCHLORIDE SCH MG: 0.25 TABLET ORAL at 21:16

## 2017-12-31 RX ADMIN — ALLOPURINOL SCH MG: 100 TABLET ORAL at 11:02

## 2017-12-31 RX ADMIN — HEPARIN SODIUM SCH UNIT: 10000 INJECTION, SOLUTION INTRAVENOUS; SUBCUTANEOUS at 14:16

## 2017-12-31 RX ADMIN — SERTRALINE HYDROCHLORIDE SCH MG: 50 TABLET, FILM COATED ORAL at 11:06

## 2017-12-31 RX ADMIN — RENAGEL SCH MG: 800 TABLET ORAL at 08:00

## 2017-12-31 RX ADMIN — ALUMINUM ZIRCONIUM TRICHLOROHYDREX GLY SCH EA: 0.2 STICK TOPICAL at 23:07

## 2017-12-31 RX ADMIN — RENAGEL SCH MG: 800 TABLET ORAL at 11:03

## 2017-12-31 RX ADMIN — PANTOPRAZOLE SCH MG: 40 TABLET, DELAYED RELEASE ORAL at 11:01

## 2017-12-31 RX ADMIN — INSULIN ASPART SCH UNITS: 100 INJECTION, SOLUTION INTRAVENOUS; SUBCUTANEOUS at 00:00

## 2017-12-31 RX ADMIN — TRAMADOL HYDROCHLORIDE PRN MG: 50 TABLET, COATED ORAL at 11:01

## 2017-12-31 RX ADMIN — ALUMINUM ZIRCONIUM TRICHLOROHYDREX GLY SCH EA: 0.2 STICK TOPICAL at 15:26

## 2017-12-31 RX ADMIN — ATORVASTATIN CALCIUM SCH MG: 40 TABLET, FILM COATED ORAL at 11:05

## 2017-12-31 RX ADMIN — INSULIN ASPART SCH UNITS: 100 INJECTION, SOLUTION INTRAVENOUS; SUBCUTANEOUS at 04:00

## 2017-12-31 RX ADMIN — INSULIN ASPART SCH UNITS: 100 INJECTION, SOLUTION INTRAVENOUS; SUBCUTANEOUS at 17:47

## 2017-12-31 RX ADMIN — IPRATROPIUM BROMIDE AND ALBUTEROL SCH PUFFS: 20; 100 SPRAY, METERED RESPIRATORY (INHALATION) at 11:05

## 2017-12-31 RX ADMIN — IPRATROPIUM BROMIDE AND ALBUTEROL SCH PUFFS: 20; 100 SPRAY, METERED RESPIRATORY (INHALATION) at 11:08

## 2017-12-31 RX ADMIN — ACETAMINOPHEN SCH MG: 500 TABLET, COATED ORAL at 11:05

## 2017-12-31 RX ADMIN — HEPARIN SODIUM SCH UNIT: 10000 INJECTION, SOLUTION INTRAVENOUS; SUBCUTANEOUS at 21:20

## 2017-12-31 RX ADMIN — Medication SCH MG: at 11:04

## 2017-12-31 RX ADMIN — CEFTRIAXONE SCH MLS/HR: 1 INJECTION, POWDER, FOR SOLUTION INTRAMUSCULAR; INTRAVENOUS at 17:43

## 2017-12-31 RX ADMIN — ACETAMINOPHEN SCH MG: 500 TABLET, COATED ORAL at 21:17

## 2017-12-31 RX ADMIN — DOCUSATE SODIUM SCH MG: 100 CAPSULE, LIQUID FILLED ORAL at 21:15

## 2017-12-31 RX ADMIN — IPRATROPIUM BROMIDE AND ALBUTEROL SCH PUFFS: 20; 100 SPRAY, METERED RESPIRATORY (INHALATION) at 17:44

## 2017-12-31 RX ADMIN — INSULIN ASPART SCH UNITS: 100 INJECTION, SOLUTION INTRAVENOUS; SUBCUTANEOUS at 11:00

## 2017-12-31 RX ADMIN — ALUMINUM ZIRCONIUM TRICHLOROHYDREX GLY SCH EA: 0.2 STICK TOPICAL at 08:00

## 2017-12-31 RX ADMIN — CLOPIDOGREL BISULFATE SCH MG: 75 TABLET, FILM COATED ORAL at 11:04

## 2017-12-31 RX ADMIN — HEPARIN SODIUM SCH UNIT: 10000 INJECTION, SOLUTION INTRAVENOUS; SUBCUTANEOUS at 05:32

## 2017-12-31 RX ADMIN — INSULIN ASPART SCH UNITS: 100 INJECTION, SOLUTION INTRAVENOUS; SUBCUTANEOUS at 21:19

## 2017-12-31 RX ADMIN — INSULIN GLARGINE SCH UNITS: 100 INJECTION, SOLUTION SUBCUTANEOUS at 08:19

## 2017-12-31 RX ADMIN — ACETAMINOPHEN SCH MG: 500 TABLET, COATED ORAL at 14:00

## 2017-12-31 RX ADMIN — IPRATROPIUM BROMIDE AND ALBUTEROL SCH PUFFS: 20; 100 SPRAY, METERED RESPIRATORY (INHALATION) at 21:14

## 2017-12-31 NOTE — PHARMACY PROGRESS NOTE
Pharmacy Glycemic Short Note 2


Date of Service


Dec 31, 2017.





OUTPATIENT ANTIDIABETIC REGIMEN: 


* Dr. Escobar spoke with patient - she does not take anything as an outpatient








ASSESSMENT:





* 57 yr old T2DM female admitted for respiratory failure, asthma exacerbation, 

and UTI


* Pt with steroid induced hyperglycemia since admission


* An insulin drip was initiated and then stopped on 12/29 


* Plan from that point was going to be NPH to combat spike of Prednisone in 

addition to Novolog


* Last night BSGs climbed into the 300's then dropped dramatically into the 40's


* Had a good conversation with Dr. Escobar today - plan moving forward is to 

stick with the NPH with a higher dose as we think this was not the cause of the 

low - but instead the overly aggressive Novolog at bedtime


* Loosen Novolog to remove carb coverage all together - increase goal range - 

and hopefully this increase in NPH is appropriate and keeps BSGs minimally less 

than 250 without causing a low


* Will re-evaluate tomorrow AM








PLAN FOR INPATIENT GLYCEMIC CONTROL:





* Basal insulin


 * Lantus 5 units SQ AM given this AM


 * D/C Lantus moving forward as patient notes she takes nothing at home??


 * Continue with NPH but increase to 40 units with prednisone 40 mg today (

doses given late together)


 


* Novolog ACHS


 * Correction factor 30


 * REMOVE carb ratio


 * Goal range 140 -180








* Please note that the plan above was derived based on current level of insulin 

resistance and hospital stress. These recommendations are appropriate for 

inpatient admission only. Plan of care upon discharge will need to be 

reassessed to avoid potential outpatient hypo/hyperglycemia. 





Thank you.

## 2017-12-31 NOTE — DIALYSIS PROGRESS NOTE
Nephrology Dialysis Note


Date of Service:


Dec 31, 2017.


Subjective


56 yo female with esrd seen on dialysis.  pt had a low blood sugar event last 

night.  continues to have a productive cough.





Objective











  Date Time  Temp Pulse Resp B/P (MAP) Pulse Ox O2 Delivery O2 Flow Rate FiO2


 


12/31/17 00:00      Nasal Cannula 2.0 


 


12/30/17 23:50 36.4 84 18 99/63 (75) 98 Nasal Cannula 2.0 


 


12/30/17 21:47     98  2.0 


 


12/30/17 16:30      Nasal Cannula 2.0 


 


12/30/17 16:25 36.3 98 18 154/84 (107) 94 Nasal Cannula 3.0 


 


12/30/17 16:03 36.6 100 20 113/71 (85) 95  3.0 


 


12/30/17 12:00      Nasal Cannula 2.0 


 


12/30/17 11:38 36.5 84 18 112/70 (84) 99 Nasal Cannula 2.0 








Physical Exam:


General-aaox3


Eyes-no scleral icterus


ENT-mmm


Neck-supple


Lungs-+wheeze, decreased breath sounds at bases


Heart-regular


Abdomen-bs+ s/nt/nd


Extremities-no c/c/e


Neuro-nonfocal





Current Inpatient Medications








 Medications


  (Trade)  Dose


 Ordered  Sig/Daniel


 Route  Start Time


 Stop Time Status Last Admin


Dose Admin


 


 Heparin Sodium


  (Porcine)


  (Heparin Sq 5000


 Unit/0.5ml)  5,000 unit  Q8


 SQ  12/27/17 22:00


 1/26/18 21:59  12/31/17 05:32


5,000 UNIT


 


 Ondansetron HCl


  (Zofran Inj)  4 mg  Q6H  PRN


 IV  12/27/17 17:45


 1/26/18 17:44   


 


 


 Ceftriaxone


 Sodium 1000 mg/


 Dextrose  60 ml @ 


 100 mls/hr  DAILY@1800


 IV  12/27/17 18:00


 1/1/18 17:59  12/30/17 17:21


100 MLS/HR


 


 Allopurinol


  (Zyloprim Tab)  100 mg  BID


 PO  12/27/17 21:00


 1/26/18 20:59  12/30/17 21:21


100 MG


 


 Aspirin


  (Ecotrin Tab)  81 mg  DAILY


 PO  12/28/17 09:00


 1/27/18 08:59  12/30/17 08:38


81 MG


 


 Atorvastatin


 Calcium


  (Lipitor Tab)  40 mg  DAILY


 PO  12/28/17 09:00


 1/27/18 08:59  12/30/17 08:38


40 MG


 


 Clopidogrel


 Bisulfate


  (plAVix TAB)  75 mg  DAILY


 PO  12/28/17 09:00


 1/27/18 08:59  12/30/17 08:38


75 MG


 


 Docusate Sodium


  (coLACE CAP)  100 mg  BID


 PO  12/27/17 21:00


 1/26/18 20:59  12/30/17 21:21


100 MG


 


 Albuterol/


 Ipratropium


  (Combivent


 Respimat Inh)  1 puffs  QIDR


 INH  12/27/17 20:00


 1/26/18 19:59  12/30/17 21:21


1 PUFFS


 


 Pantoprazole


 Sodium


  (Protonix Tab)  40 mg  DAILY


 PO  12/28/17 09:00


 1/27/18 08:59  12/30/17 08:37


40 MG


 


 Pramipexole


 Dihydrochloride


  (miraPEX TAB)  0.25 mg  HS


 PO  12/27/17 21:00


 1/26/18 20:59  12/30/17 21:21


0.25 MG


 


 Sertraline HCl


  (Zoloft Tab)  75 mg  DAILY


 PO  12/28/17 09:00


 1/27/18 08:59  12/30/17 08:39


75 MG


 


 Thiamine HCl


  (Vitamin B-1 Tab)  50 mg  DAILY


 PO  12/28/17 09:00


 1/27/18 08:59  12/30/17 08:39


50 MG


 


 Tramadol HCl


  (Ultram Tab)  50 mg  Q4H  PRN


 PO  12/27/17 19:15


 1/26/18 19:14  12/30/17 13:07


50 MG


 


 Vitamin B Complex/


 Vit C/Folic Acid


  (Nephrocaps)  1 cap  DAILY


 PO  12/28/17 09:00


 1/27/18 08:59  12/30/17 08:37


1 CAP


 


 Miscellaneous


 Information


  (Order Awaiting


 Action)  1 ea  QS


 N/A  12/28/17 00:00


 1/27/18 00:00   


 


 


 Miscellaneous


 Information


  (Order Awaiting


 Action)  1 ea  QS


 N/A  12/28/17 00:00


 1/27/18 00:00   


 


 


 Miscellaneous


 Information


  (Order Awaiting


 Action)  1 ea  QS


 N/A  12/28/17 00:00


 1/27/18 00:00   


 


 


 Glucose


  (Glucose 40% Gel)  15-30


 GRAMS 15


 GRAMS...  UD  PRN


 PO  12/27/17 19:30


 1/26/18 19:29   


 


 


 Glucose


  (Glucose Chew


 Tab)  4-8


 Tablets 4


 Tabl...  UD  PRN


 PO  12/27/17 19:30


 1/26/18 19:29   


 


 


 Dextrose


  (Dextrose 50%


 50ML Syringe)  25-50ML OF


 50% DW IV


 FOR...  UD  PRN


 IV  12/27/17 19:30


 1/26/18 19:29   


 


 


 Glucagon


  (Glucagon Inj)  1 mg  UD  PRN


 SQ  12/27/17 19:30


 1/26/18 19:29   


 


 


 Miscellaneous


 Information


  (Consult


 Glycemic


 Management


 Pharmacy)  1 ea  UD  PRN


 N/A  12/28/17 12:14


 1/27/18 12:13   


 


 


 Prednisone


  (PredniSONE TAB)  40 mg  DAILY


 PO  12/29/17 09:00


 1/28/18 08:59  12/30/17 08:39


40 MG


 


 Insulin Aspart


  (novoLOG ASPART)  SLIDING


 SCALE  ACHS


 SC  12/29/17 14:00


 1/28/18 13:59  12/30/17 21:27


14 UNITS


 


 Insulin Glargine


  (Lantus Solostar


 Pen)  5 units  DAILY


 SC  12/30/17 09:00


 1/29/18 08:59  12/31/17 08:19


5 UNITS


 


 Albuterol Sulfate


  (Ventolin 0.083%


 2.5MG/3ML Neb)  2.5 mg  Q6R  PRN


 INH  12/30/17 09:00


 1/26/18 20:59   


 


 


 Sevelamer HCl


  (Renagel Tab)  2,400 mg  TIDM


 PO  12/30/17 16:45


 1/29/18 11:29  12/30/17 17:21


2,400 MG


 


 Acetaminophen


  (Tylenol Tab)  1,000 mg  TID


 PO  12/30/17 21:00


 1/29/18 20:59  12/30/17 21:21


1,000 MG


 


 Miconazole Nitrate


  (Desenex Powder)  1 appln  PRN  PRN


 EXT  12/30/17 21:15


 1/29/18 21:14  12/30/17 22:02


1 APPLN


 


 Epoetin Geovany


  (Procrit Inj)  10,000 units  1100


 IV.  12/31/17 11:00


 12/31/17 11:01   


 


 


 Insulin Human NPH


  (novoLIN-N U-100


 NPH PER UNIT)  40 units  TODAY@0900


 SQ  12/31/17 09:00


 12/31/17 09:01   


 











Last 24 Hours








Test


  12/30/17


09:27 12/30/17


11:14 12/30/17


15:51 12/30/17


16:30


 


Arterial Blood pH 7.33    


 


Arterial Blood Partial


Pressure CO2 48 mmHg 


  


  


  


 


 


Arterial Blood Partial


Pressure O2 71 mm/Hg 


  


  


  


 


 


Arterial Blood HCO3 24 mmol/L    


 


Arterial Blood Oxygen


Saturation 90.0 % 


  


  


  


 


 


Arterial Blood Base Excess -1.9 mEq/L    


 


Arterial Blood Gas Delivery 2 LITERS    


 


Kiko Test POS    


 


Bedside Glucose  81 mg/dl  287 mg/dl  308 mg/dl 


 


Test


  12/30/17


20:19 12/31/17


00:09 12/31/17


00:35 12/31/17


00:58


 


Bedside Glucose 306 mg/dl  44 mg/dl  49 mg/dl  67 mg/dl 


 


Test


  12/31/17


01:58 12/31/17


03:53 12/31/17


07:32 


 


 


Bedside Glucose 93 mg/dl  109 mg/dl  93 mg/dl  











Assessment & Plan


ESRD-seen on dialysis today. 3k bath. one liter off as tolerated. bp was low 

prior to dialysis however on dialysis, bp has improved. 





Anemia of Renal Failure-will continue procrit on dialysis. goal of 10 to 11. 





UBALDO-on 3 binders with meals to try to control phos levels better.

## 2017-12-31 NOTE — PULMONARY PROGRESS NOTE
DATE: 12/31/2017

 

TIME:  03:40 p.m.

 

SUBJECTIVE:  The patient states she feels about the same.  She at first told

me she was still coughing up green mucus.  With multiple questioning, I

ultimately found out that she actually never has looked at it.  Thus, she has

no idea if it is really green or not.  I did ask her to pay attention to see

if it was getting lighter with the therapy we are giving her.  The patient is

not the best historian overall.  She tried BiPAP last night, but only wore it

for about 45 minutes.  She actually was not trying to sleep.  She was

watching TV at that time.

 

OBJECTIVE:

GENERAL:  The patient was sleeping when I came in.  She awakened readily.

VITAL SIGNS:  Temperature is 36.5.  Heart rate is 94 per minute.  Blood

pressure 109/72.

LUNGS:  She is wheezing bilaterally.  This has persisted.  It would be mild

to moderate.  Saturation was good at 97%.

 

The patient did have an ABG repeated yesterday.  This showed that the pH was

improved up to 7.33, but the pCO2 was now 48.  In the prior blood gas 2 days

ago, it had been 43, although prior to that had been 50.  Her pO2 was 71 on 2

liters.

 

The patient had an echo done that showed severe mitral stenosis.  In light of

this, they are planning for a transesophageal echo on Tuesday, which is 2

days from now.

 

IMPRESSIONS:

1.  Acute bronchitis.

2.  Asthma by history.

3.  Respiratory failure -- acute on chronic with hypercarbia.

4.  Obesity hypoventilation.

5.  Possible sleep apnea.

6.  Renal failure, on dialysis,

 

COMMENTS:  The patient is still wheezing more than I would have expected. 

She is still on prednisone at 40 mg daily.  She is still on ceftriaxone.  She

is getting Combivent Respimat.  She does have albuterol by nebulizer ordered

p.r.n.  She has not received any treatment, I do not believe.  If she is

still wheezing tomorrow, would consider changing to neb treatments with

albuterol/ipratropium and holding off on the Combivent Respimat.  Perhaps the

nebulizer will work better for her.  Dr. Meyer will be on for pulmonary this

coming week.

## 2017-12-31 NOTE — ANESTHESIOLOGY PROGRESS NOTE
Anesthesia Progress Note


Date of Service


Dec 31, 2017.





Progress Notes


The patient is a 56 y/o female scheduled for a WANDY on 1/2/18.  She was admitted 

on 12/27/17 after for acute respiratory failure that was noted during dialysis 

at an outside hospital.  The patient has a history of aortic valve replacement 

from two years ago and was found to have severe mitral stenosis on a 12/29/17 

TTE.  PMH includes asthma, COPD, acute bronchitis, likely sleep apnea from 

obesity hypoventilation syndrome, HTN, CHF, aortic valve repair, severe mitral 

stenosis, mild mitral regurg, pulmonary HTN, GERD, restless legs, type 2 DM, 

ESRD on dialysis, anemia, obesity, current UTI, and depression.  The patient 

has been placed on ceftriaxone for a UTI and steroids for her lungs. She was 

dialyzed today. Her pulmonary function has improved over the past several days.

  Her ABG from 12/30/17 showed ph 7.33 pCO2 48, PO2 71, bicarb 24, and sat 90.  

CXR showed cardiomegaly with evidence of CHF.  EKG showed NSR with L atrial 

enlargement.  Her echo showed EF 60-65% with L atrium dilation, severe MS, mild 

MR, and mod pulm HTN.  Labs are significant for WBC 11, hgb 10.8, BUN 61, Cr 

4.7.  Her BSGs have been in the 100s to 200s.  On exam the patient was 

pleasantly lying in bed.  She has a MP 2 airway and is edentulous.  She had 

upper airway wheezes but lower lungs were clear.  Heart was RRR with 2/6 

systolic murmur.  Carotids were negative for bruits.  The patient is an ASA 4.  

She was consented to MAC sedation with GA as backup.  The patient was counseled 

to remain NPO except for sips of water after 2300 on 1/1/18.

## 2018-01-01 VITALS
SYSTOLIC BLOOD PRESSURE: 130 MMHG | DIASTOLIC BLOOD PRESSURE: 72 MMHG | TEMPERATURE: 97.88 F | HEART RATE: 107 BPM | OXYGEN SATURATION: 94 %

## 2018-01-01 VITALS — HEART RATE: 100 BPM | OXYGEN SATURATION: 98 %

## 2018-01-01 VITALS
HEART RATE: 94 BPM | OXYGEN SATURATION: 93 % | TEMPERATURE: 98.06 F | DIASTOLIC BLOOD PRESSURE: 59 MMHG | SYSTOLIC BLOOD PRESSURE: 98 MMHG

## 2018-01-01 VITALS
HEART RATE: 96 BPM | DIASTOLIC BLOOD PRESSURE: 67 MMHG | SYSTOLIC BLOOD PRESSURE: 110 MMHG | TEMPERATURE: 98.42 F | OXYGEN SATURATION: 93 %

## 2018-01-01 VITALS
SYSTOLIC BLOOD PRESSURE: 120 MMHG | HEART RATE: 103 BPM | OXYGEN SATURATION: 95 % | TEMPERATURE: 97.52 F | DIASTOLIC BLOOD PRESSURE: 76 MMHG

## 2018-01-01 VITALS — OXYGEN SATURATION: 97 % | HEART RATE: 102 BPM

## 2018-01-01 VITALS — OXYGEN SATURATION: 96 % | HEART RATE: 92 BPM

## 2018-01-01 VITALS — OXYGEN SATURATION: 93 %

## 2018-01-01 RX ADMIN — RENAGEL SCH MG: 800 TABLET ORAL at 08:15

## 2018-01-01 RX ADMIN — IPRATROPIUM BROMIDE AND ALBUTEROL SCH PUFFS: 20; 100 SPRAY, METERED RESPIRATORY (INHALATION) at 08:13

## 2018-01-01 RX ADMIN — HEPARIN SODIUM SCH UNIT: 10000 INJECTION, SOLUTION INTRAVENOUS; SUBCUTANEOUS at 06:06

## 2018-01-01 RX ADMIN — ALUMINUM ZIRCONIUM TRICHLOROHYDREX GLY SCH EA: 0.2 STICK TOPICAL at 08:00

## 2018-01-01 RX ADMIN — PRAMIPEXOLE DIHYDROCHLORIDE SCH MG: 0.25 TABLET ORAL at 21:09

## 2018-01-01 RX ADMIN — INSULIN ASPART SCH UNITS: 100 INJECTION, SOLUTION INTRAVENOUS; SUBCUTANEOUS at 13:10

## 2018-01-01 RX ADMIN — Medication SCH MG: at 08:16

## 2018-01-01 RX ADMIN — HEPARIN SODIUM SCH UNIT: 10000 INJECTION, SOLUTION INTRAVENOUS; SUBCUTANEOUS at 14:30

## 2018-01-01 RX ADMIN — SERTRALINE HYDROCHLORIDE SCH MG: 50 TABLET, FILM COATED ORAL at 08:15

## 2018-01-01 RX ADMIN — ACETAMINOPHEN SCH MG: 500 TABLET, COATED ORAL at 14:31

## 2018-01-01 RX ADMIN — ACETAMINOPHEN SCH MG: 500 TABLET, COATED ORAL at 08:17

## 2018-01-01 RX ADMIN — ALLOPURINOL SCH MG: 100 TABLET ORAL at 20:46

## 2018-01-01 RX ADMIN — INSULIN ASPART SCH UNITS: 100 INJECTION, SOLUTION INTRAVENOUS; SUBCUTANEOUS at 21:15

## 2018-01-01 RX ADMIN — ATORVASTATIN CALCIUM SCH MG: 40 TABLET, FILM COATED ORAL at 08:16

## 2018-01-01 RX ADMIN — INSULIN ASPART SCH UNITS: 100 INJECTION, SOLUTION INTRAVENOUS; SUBCUTANEOUS at 17:40

## 2018-01-01 RX ADMIN — CLOPIDOGREL BISULFATE SCH MG: 75 TABLET, FILM COATED ORAL at 08:14

## 2018-01-01 RX ADMIN — IPRATROPIUM BROMIDE AND ALBUTEROL SULFATE SCH ML: .5; 3 SOLUTION RESPIRATORY (INHALATION) at 20:00

## 2018-01-01 RX ADMIN — TRAMADOL HYDROCHLORIDE PRN MG: 50 TABLET, COATED ORAL at 13:13

## 2018-01-01 RX ADMIN — HEPARIN SODIUM SCH UNIT: 10000 INJECTION, SOLUTION INTRAVENOUS; SUBCUTANEOUS at 21:16

## 2018-01-01 RX ADMIN — ALUMINUM ZIRCONIUM TRICHLOROHYDREX GLY SCH EA: 0.2 STICK TOPICAL at 15:48

## 2018-01-01 RX ADMIN — IPRATROPIUM BROMIDE AND ALBUTEROL SULFATE SCH ML: .5; 3 SOLUTION RESPIRATORY (INHALATION) at 15:15

## 2018-01-01 RX ADMIN — RENAGEL SCH MG: 800 TABLET ORAL at 17:35

## 2018-01-01 RX ADMIN — ALUMINUM ZIRCONIUM TRICHLOROHYDREX GLY SCH EA: 0.2 STICK TOPICAL at 15:49

## 2018-01-01 RX ADMIN — ASCORBIC ACID, THIAMINE MONONITRATE,RIBOFLAVIN, NIACINAMIDE, PYRIDOXINE HYDROCHLORIDE, FOLIC ACID, CYANOCOBALAMIN, BIOTIN, CALCIUM PANTOTHENATE, SCH CAP: 100; 1.5; 1.7; 20; 10; 1; 6000; 150000; 5 CAPSULE, LIQUID FILLED ORAL at 08:15

## 2018-01-01 RX ADMIN — INSULIN ASPART SCH UNITS: 100 INJECTION, SOLUTION INTRAVENOUS; SUBCUTANEOUS at 08:18

## 2018-01-01 RX ADMIN — Medication SCH MG: at 08:14

## 2018-01-01 RX ADMIN — RENAGEL SCH MG: 800 TABLET ORAL at 13:08

## 2018-01-01 RX ADMIN — IPRATROPIUM BROMIDE AND ALBUTEROL SULFATE SCH ML: .5; 3 SOLUTION RESPIRATORY (INHALATION) at 11:50

## 2018-01-01 RX ADMIN — DOCUSATE SODIUM SCH MG: 100 CAPSULE, LIQUID FILLED ORAL at 20:46

## 2018-01-01 RX ADMIN — PANTOPRAZOLE SCH MG: 40 TABLET, DELAYED RELEASE ORAL at 08:15

## 2018-01-01 RX ADMIN — DOCUSATE SODIUM SCH MG: 100 CAPSULE, LIQUID FILLED ORAL at 08:14

## 2018-01-01 RX ADMIN — ALLOPURINOL SCH MG: 100 TABLET ORAL at 08:14

## 2018-01-01 RX ADMIN — ACETAMINOPHEN SCH MG: 500 TABLET, COATED ORAL at 20:47

## 2018-01-01 RX ADMIN — MICONAZOLE NITRATE PRN APPLN: 20 POWDER TOPICAL at 09:29

## 2018-01-01 NOTE — PROGRESS NOTE
Medicine Progress Note


Date & Time of Visit:


Dec 31, 2017 at 1130.


Subjective


still admits to some coughing and SOB


otherwise no symptoms





Objective





Last 8 Hrs








  Date Time  Temp Pulse Resp B/P (MAP) Pulse Ox O2 Delivery O2 Flow Rate FiO2


 


12/31/17 20:00      Room Air  


 


12/31/17 18:56 36.4 101 18 105/69 (81) 94   


 


12/31/17 16:00      Room Air  


 


12/31/17 14:47 36.5 94 18 109/72 (84) 97 Room Air  








Physical Exam:


GEN: obese, in no acute distress, alert and appropriate, NC in place, no 

conversational dyspnea. 


HEENT: NC/AT, pupils are round and equal bilaterally, normal sclerae, MMM


CARDIO: reg rate, S1/2 heard without m/g/r


LUNGS: CTAB, no crackles, wheezes or rales.  good diaphragmatic excursion


ABD: soft, non-tender, non-distended, no rebound or guarding +BS


EXTREMITY: RP and DP palpable 2+ bilat, no LE swelling or edema, extremities 

are warm and well-perfused


NEURO: CN 2-12 grossly intact


MUSC: moves all extremities equally


SKIN:  warm and dry


Laboratory Results:


12/30/17 06:20








12/30/17 06:20

















Test


  12/27/17


20:16 12/29/17


07:02 12/30/17


06:20 12/30/17


09:27


 


Prothrombin Time


  10.0 SECONDS


(9.0-12.0) 


  


  


 


 


Prothromb Time International


Ratio 1.0 (0.9-1.1) 


  


  


  


 


 


Activated Partial


Thromboplast Time 28.5 SECONDS


(21.0-31.0) 


  


  


 


 


Partial Thromboplastin Ratio 1.1    


 


Total Bilirubin


  0.3 mg/dl


(0.2-1) 


  


  


 


 


Aspartate Amino Transf


(AST/SGOT) 12 U/L (15-37) 


  


  


  


 


 


Alanine Aminotransferase


(ALT/SGPT) 12 U/L (12-78) 


  


  


  


 


 


Alkaline Phosphatase


  93 U/L


() 


  


  


 


 


Total Protein


  7.5 gm/dl


(6.4-8.2) 


  


  


 


 


Albumin


  2.8 gm/dl


(3.4-5.0) 


  


  


 


 


Globulin


  4.7 gm/dl


(2.5-4.0) 


  


  


 


 


Albumin/Globulin Ratio 0.6 (0.9-2)    


 


Beta-Hydroxybutyric Acid


  3.60 mg/dL


(0.2-2.81) 


  


  


 


 


Thyroid Stimulating Hormone


(TSH) 2.160 uIu/ml


(0.300-4.500) 


  


  


 


 


Hepatitis C Antibody Screen NEG (NEG)    


 


Hepatitis C Antibody NEG (NEG)    


 


Estimated Average Glucose  134 mg/dl   


 


Hemoglobin A1c


  


  6.3 %


(4.5-5.6) 


  


 


 


Red Blood Count


  


  


  3.42 M/uL


(4.2-5.4) 


 


 


Mean Corpuscular Volume


  


  


  100.9 fL


() 


 


 


Mean Corpuscular Hemoglobin


  


  


  31.6 pg


(25-34) 


 


 


Mean Corpuscular Hemoglobin


Concent 


  


  31.3 g/dl


(32-36) 


 


 


RDW Standard Deviation


  


  


  60.5 fL


(36.4-46.3) 


 


 


RDW Coefficient of Variation


  


  


  16.5 %


(11.5-14.5) 


 


 


Mean Platelet Volume


  


  


  9.2 fL


(7.4-10.4) 


 


 


Anion Gap


  


  


  12.0 mmol/L


(3-11) 


 


 


Est Creatinine Clear Calc


Drug Dose 


  


  14.7 ml/min 


  


 


 


Estimated GFR (


American) 


  


  11.1 


  


 


 


Estimated GFR (Non-


American 


  


  9.5 


  


 


 


BUN/Creatinine Ratio   13.0 (10-20)  


 


Calcium Level


  


  


  9.2 mg/dl


(8.5-10.1) 


 


 


Phosphorus Level


  


  


  7.0 mg/dl


(2.5-4.9) 


 


 


Magnesium Level


  


  


  2.2 mg/dl


(1.8-2.4) 


 


 


Arterial Blood pH


  


  


  


  7.33


(7.35-7.45)


 


Arterial Blood Partial


Pressure CO2 


  


  


  48 mmHg


(35-46)


 


Arterial Blood Partial


Pressure O2 


  


  


  71 mm/Hg


(80-95)


 


Arterial Blood HCO3


  


  


  


  24 mmol/L


(19-24)


 


Arterial Blood Oxygen


Saturation 


  


  


  90.0 % (90-95) 


 


 


Arterial Blood Base Excess


  


  


  


  -1.9 mEq/L


(-9-1.8)


 


Arterial Blood Gas Delivery    2 LITERS 


 


Kiko Test    POS (POS) 


 


Test


  12/31/17


20:13 


  


  


 


 


Bedside Glucose


  358 mg/dl


(70-90) 


  


  


 








Last 24 Hours








Test


  12/31/17


00:09 12/31/17


00:35 12/31/17


00:58 12/31/17


01:58


 


Bedside Glucose 44 mg/dl  49 mg/dl  67 mg/dl  93 mg/dl 


 


Test


  12/31/17


03:53 12/31/17


07:32 12/31/17


10:51 12/31/17


11:57


 


Bedside Glucose 109 mg/dl  93 mg/dl  117 mg/dl  112 mg/dl 


 


Test


  12/31/17


16:34 12/31/17


20:13 


  


 


 


Bedside Glucose 212 mg/dl  358 mg/dl   











Assessment & Plan





1.  Syncope-likely 2/2 URI in setting of hemodialysis with no food on her 

stomach.  No underlying arrhythmia noted.  She does have some apparent worsened 

mitral stenosis that will need further evaluation with a WANDY which will be 

performed on Tues during this hospitalization.  Transferred to med/surg.


2.  Hypoxia-resolving, poss 2/2 asthma exacerbation vs hypercarbia from 

presumed GUILLERMO/obesity hypoventilation syndrome vs fluid overloaded state from 

missed dialysis session.  Will cont to monitor volume status daily.  Cont 

steroids and bronchodilators.  Apprec pulm input.  BIPAP qHS although not 

documented last night.  Cont to wean oxygen as tolerated. 


3.  UTI-UA dirty at prior facility, no culture results available.  Cont 

ceftriaxone for now in setting of dysuria and UA results for 7 days.


4.  ESRD on HD-apprec Nephro recs and management.  Sevelamer was restarted 

today. 


5.  DMII-up and down in setting of steroids, cont ISS/Lantus and carb coverage.

  Pharm consult. 


6.  Status post bioprosthetic aortic valve replacement in March 2016 for severe 

aortic stenosis.  Repeat echo this admission was unable to fully visualize the 

aortic valve. Per Cards WANDY on Tues.





DVT proph-heparin


Full Code


Dispo-WANDY on tues





[facility]


Consultants:


Nephro, Pulm


Current Inpatient Medications:





Current Inpatient Medications








 Medications


  (Trade)  Dose


 Ordered  Sig/Daniel


 Route  Start Time


 Stop Time Status Last Admin


Dose Admin


 


 Heparin Sodium


  (Porcine)


  (Heparin Sq 5000


 Unit/0.5ml)  5,000 unit  Q8


 SQ  12/27/17 22:00


 1/26/18 21:59  12/31/17 21:20


5,000 UNIT


 


 Ondansetron HCl


  (Zofran Inj)  4 mg  Q6H  PRN


 IV  12/27/17 17:45


 1/26/18 17:44   


 


 


 Ceftriaxone


 Sodium 1000 mg/


 Dextrose  60 ml @ 


 100 mls/hr  DAILY@1800


 IV  12/27/17 18:00


 1/1/18 17:59  12/31/17 17:43


100 MLS/HR


 


 Allopurinol


  (Zyloprim Tab)  100 mg  BID


 PO  12/27/17 21:00


 1/26/18 20:59  12/31/17 21:16


100 MG


 


 Aspirin


  (Ecotrin Tab)  81 mg  DAILY


 PO  12/28/17 09:00


 1/27/18 08:59  12/31/17 11:05


81 MG


 


 Atorvastatin


 Calcium


  (Lipitor Tab)  40 mg  DAILY


 PO  12/28/17 09:00


 1/27/18 08:59  12/31/17 11:05


40 MG


 


 Clopidogrel


 Bisulfate


  (plAVix TAB)  75 mg  DAILY


 PO  12/28/17 09:00


 1/27/18 08:59  12/31/17 11:04


75 MG


 


 Docusate Sodium


  (coLACE CAP)  100 mg  BID


 PO  12/27/17 21:00


 1/26/18 20:59  12/31/17 21:15


100 MG


 


 Albuterol/


 Ipratropium


  (Combivent


 Respimat Inh)  1 puffs  QIDR


 INH  12/27/17 20:00


 1/26/18 19:59  12/31/17 21:14


1 PUFFS


 


 Pantoprazole


 Sodium


  (Protonix Tab)  40 mg  DAILY


 PO  12/28/17 09:00


 1/27/18 08:59  12/31/17 11:01


40 MG


 


 Pramipexole


 Dihydrochloride


  (miraPEX TAB)  0.25 mg  HS


 PO  12/27/17 21:00


 1/26/18 20:59  12/31/17 21:16


0.25 MG


 


 Sertraline HCl


  (Zoloft Tab)  75 mg  DAILY


 PO  12/28/17 09:00


 1/27/18 08:59  12/31/17 11:06


75 MG


 


 Thiamine HCl


  (Vitamin B-1 Tab)  50 mg  DAILY


 PO  12/28/17 09:00


 1/27/18 08:59  12/31/17 11:04


50 MG


 


 Tramadol HCl


  (Ultram Tab)  50 mg  Q4H  PRN


 PO  12/27/17 19:15


 1/26/18 19:14  12/31/17 11:01


50 MG


 


 Vitamin B Complex/


 Vit C/Folic Acid


  (Nephrocaps)  1 cap  DAILY


 PO  12/28/17 09:00


 1/27/18 08:59  12/31/17 11:01


1 CAP


 


 Miscellaneous


 Information


  (Order Awaiting


 Action)  1 ea  QS


 N/A  12/28/17 00:00


 1/27/18 00:00   


 


 


 Miscellaneous


 Information


  (Order Awaiting


 Action)  1 ea  QS


 N/A  12/28/17 00:00


 1/27/18 00:00   


 


 


 Miscellaneous


 Information


  (Order Awaiting


 Action)  1 ea  QS


 N/A  12/28/17 00:00


 1/27/18 00:00   


 


 


 Glucose


  (Glucose 40% Gel)  15-30


 GRAMS 15


 GRAMS...  UD  PRN


 PO  12/27/17 19:30


 1/26/18 19:29   


 


 


 Glucose


  (Glucose Chew


 Tab)  4-8


 Tablets 4


 Tabl...  UD  PRN


 PO  12/27/17 19:30


 1/26/18 19:29   


 


 


 Dextrose


  (Dextrose 50%


 50ML Syringe)  25-50ML OF


 50% DW IV


 FOR...  UD  PRN


 IV  12/27/17 19:30


 1/26/18 19:29   


 


 


 Glucagon


  (Glucagon Inj)  1 mg  UD  PRN


 SQ  12/27/17 19:30


 1/26/18 19:29   


 


 


 Miscellaneous


 Information


  (Consult


 Glycemic


 Management


 Pharmacy)  1 ea  UD  PRN


 N/A  12/28/17 12:14


 1/27/18 12:13   


 


 


 Prednisone


  (PredniSONE TAB)  40 mg  DAILY


 PO  12/29/17 09:00


 1/28/18 08:59  12/31/17 11:04


40 MG


 


 Insulin Aspart


  (novoLOG ASPART)  SLIDING


 SCALE  ACHS


 SC  12/29/17 14:00


 1/28/18 13:59  12/31/17 21:19


6 UNITS


 


 Albuterol Sulfate


  (Ventolin 0.083%


 2.5MG/3ML Neb)  2.5 mg  Q6R  PRN


 INH  12/30/17 09:00


 1/26/18 20:59   


 


 


 Sevelamer HCl


  (Renagel Tab)  2,400 mg  TIDM


 PO  12/30/17 16:45


 1/29/18 11:29  12/31/17 17:44


2,400 MG


 


 Acetaminophen


  (Tylenol Tab)  1,000 mg  TID


 PO  12/30/17 21:00


 1/29/18 20:59  12/31/17 21:17


1,000 MG


 


 Miconazole Nitrate


  (Desenex Powder)  1 appln  PRN  PRN


 EXT  12/30/17 21:15


 1/29/18 21:14  12/30/17 22:02


1 APPLN


 


 Insulin Aspart


  (novoLOG ASPART)  SLIDING


 SCALE  0000


 SC  1/1/18 00:00


 1/1/18 00:01

## 2018-01-01 NOTE — PHARMACY PROGRESS NOTE
Glycemic Control Progress Note


Date of Service


Jan 1, 2018.





Scope


Glycemic Pharmacist consulted for glycemic control to write orders per [facility] 

inpatient glycemic control protocol.





Objective


Accuchecks BSG (last 24hrs):











Test


  12/31/17


10:51 12/31/17


11:57 12/31/17


16:34 12/31/17


20:13


 


Bedside Glucose


  117 mg/dl


(70-90) 112 mg/dl


(70-90) 212 mg/dl


(70-90) 358 mg/dl


(70-90)


 


Test


  1/1/18


00:11 1/1/18


05:56 1/1/18


07:40 


 


 


Bedside Glucose


  128 mg/dl


(70-90) 84 mg/dl


(70-90) 90 mg/dl


(70-90) 


 








HbA1c:











Test


  12/29/17


07:02


 


Hemoglobin A1c


  6.3 %


(4.5-5.6)  H











Recent Pertinent Medications








The patient is currently receiving:


* Basal insulin:             NPH 40 units SQ in the morning with prednisone





* Correctional Insulin:   Novolog Correction per scale ACHS


                          Goal Range: Low 140 mg/dL - High 180 mg/dL


                          Correction Factor: 30 mg/dL/unit





* Prandial insulin:         Per carb ratio of 1 unit per -- grams CHO consumed





Outpatient Anti-Diabetic Meds


no outpatient regimen





Assessment & Plan


ASSESSMENT:


* See progress note from 12/31/17 for more background info, in short:


 


* Pt receiving SQ basal bolus insulin regimen for hyperglycemia secondary to 

infection (UTI) and prednisone 40 mg PO daily 





* Patient is currently receiving an average of ~50 units of insulin per day


 * 45 units of basal insulin (40 units of NPH and 5 units of Lantus)


 


 * 8 units of prandial/correctional insulin


 


 * BSGs ranging 93 -  358 mg/dl over the past 24hrs





* Changes needed to insulin regimen: 


 * AM Fasting BSG = 84 mg/dl. Again, as similar to the previous night, the 

patient self-corrected overnight significantly (from 328 mg/dL to 84 mg/dL ... 

only 6 units of correctional given overnight). Continue 40 units of NPH today - 

did not schedule as uncertain what dose of prednisone patient would received 

tomorrow.


 


 * Post-prandial BSGs are elevated and BSGs rise throughout the day. Yesterday, 

the patient remained low throughout breakfast and lunch HOWEVER, significant 

hyperglycemia was seen at dinner and bedtime (212 and 358 mg/dL leading to much 

correctional insulin). In attempts to avoid this, added back carbohydrate ratio 

for meals. NO carbohydrate coverage at bedtime. Hopefully this will reduce 

correctional given at bedtime. 


 


 * Total daily dose = ~50 units.  











PLAN FOR INPATIENT GLYCEMIC CONTROL:


* Continuing NPH to 40 units SQ in the morning


* Continuing correction factor of 30 mg/dl/unit


* Starting carb ratio of 1 unit per 10 grams CHO consumed with meals, no 

carbohydrate ratio at bedtime


* Continuing goal range to Low 110 mg/dL - High 140 mg/dL








RECOMMENDATIONS FOR DISCHARGE:


* Patient has slightly elevated HbA1C but undergoes hemodialysis so uncertain 

how reliable this is. Consider checking blood sugars at home to determine if 

lifestyle modifications or drug-therapy warranted.











Thank you.

## 2018-01-01 NOTE — PULMONOLOGY PROGRESS NOTE
Pulmonary Progress Note


Date of Service


Jan 1, 2018.





Attending


Dr. Meyer





Subjective


Patient seen and examined.  She states she's feeling better.  


She still has intermittent cough with yellowish productive sputum.  She still 

has occasional chest tightness and wheezing.


She denies any chest pain, palpitations.





Objective


Vital signs reviewed.  MAXIMUM TEMPERATURE 36.9, blood pressure 98//67, 

pulse 92-96, respiratory rate 17-20, pulse oximetry 93-96% on room air.  


Gen.: Awake alert oriented 3, no acute respiratory distress, speaking in full 

sentences without use of accessory muscles of respiration.


CVS: S1-S2, regular rate and rhythm


Lungs: Good air entry bilaterally, still with sporadic wheezes


Abdomen: Obese, nontender, nondistended


Extremities: No cyanosis, no clubbing, no bilateral lower extremity edema








Laboratory data reviewed.  Images reviewed.  Medications reviewed


Chest x-ray on 01/20/2018-consistent with cardiomegaly, evidence of congestive 

heart failure and interstitial edema; trace pleural effusions.





Assessment & Plan


1.  Acute bronchitis.


2.  Asthma by history.


3.  Respiratory failure -- acute on chronic with hypercarbia.


4.  Obesity hypoventilation.


5.  Possible sleep apnea.


6.  Renal failure, on dialysis





Ms. Bro feels better from a respiratory standpoint.  She still has 

intermittent wheezing and chest tightness.  Otherwise feeling better.  

Transthoracic echocardiogram showed mitral stenosis.  She is scheduled for WANDY 

tomorrow for further evaluation.





Recommendations


Continue with prednisone daily


Continue with antibiotics


Continue with nebulizers every 4-6 hours standing and when necessary if needed


Recommend BiPAP as needed


She should have polysomnography done as an outpatient


Continue to  with hemodialysis per nephrology.


I will signoff case today.  Please contact me if you've any further questions 

or concerns.





Data


Medications:





Current Inpatient Medications








 Medications


  (Trade)  Dose


 Ordered  Sig/Daniel


 Route  Start Time


 Stop Time Status Last Admin


Dose Admin


 


 Heparin Sodium


  (Porcine)


  (Heparin Sq 5000


 Unit/0.5ml)  5,000 unit  Q8


 SQ  12/27/17 22:00


 1/26/18 21:59  1/1/18 06:06


5,000 UNIT


 


 Ondansetron HCl


  (Zofran Inj)  4 mg  Q6H  PRN


 IV  12/27/17 17:45


 1/26/18 17:44   


 


 


 Ceftriaxone


 Sodium 1000 mg/


 Dextrose  60 ml @ 


 100 mls/hr  DAILY@1800


 IV  12/27/17 18:00


 1/1/18 17:59  12/31/17 17:43


100 MLS/HR


 


 Allopurinol


  (Zyloprim Tab)  100 mg  BID


 PO  12/27/17 21:00


 1/26/18 20:59  1/1/18 08:14


100 MG


 


 Aspirin


  (Ecotrin Tab)  81 mg  DAILY


 PO  12/28/17 09:00


 1/27/18 08:59  1/1/18 08:14


81 MG


 


 Atorvastatin


 Calcium


  (Lipitor Tab)  40 mg  DAILY


 PO  12/28/17 09:00


 1/27/18 08:59  1/1/18 08:16


40 MG


 


 Clopidogrel


 Bisulfate


  (plAVix TAB)  75 mg  DAILY


 PO  12/28/17 09:00


 1/27/18 08:59  1/1/18 08:14


75 MG


 


 Docusate Sodium


  (coLACE CAP)  100 mg  BID


 PO  12/27/17 21:00


 1/26/18 20:59  12/31/17 21:15


100 MG


 


 Pantoprazole


 Sodium


  (Protonix Tab)  40 mg  DAILY


 PO  12/28/17 09:00


 1/27/18 08:59  1/1/18 08:15


40 MG


 


 Pramipexole


 Dihydrochloride


  (miraPEX TAB)  0.25 mg  HS


 PO  12/27/17 21:00


 1/26/18 20:59  12/31/17 21:16


0.25 MG


 


 Sertraline HCl


  (Zoloft Tab)  75 mg  DAILY


 PO  12/28/17 09:00


 1/27/18 08:59  1/1/18 08:15


75 MG


 


 Thiamine HCl


  (Vitamin B-1 Tab)  50 mg  DAILY


 PO  12/28/17 09:00


 1/27/18 08:59  1/1/18 08:16


50 MG


 


 Tramadol HCl


  (Ultram Tab)  50 mg  Q4H  PRN


 PO  12/27/17 19:15


 1/26/18 19:14  12/31/17 11:01


50 MG


 


 Vitamin B Complex/


 Vit C/Folic Acid


  (Nephrocaps)  1 cap  DAILY


 PO  12/28/17 09:00


 1/27/18 08:59  1/1/18 08:15


1 CAP


 


 Miscellaneous


 Information


  (Order Awaiting


 Action)  1 ea  QS


 N/A  12/28/17 00:00


 1/27/18 00:00   


 


 


 Miscellaneous


 Information


  (Order Awaiting


 Action)  1 ea  QS


 N/A  12/28/17 00:00


 1/27/18 00:00   


 


 


 Miscellaneous


 Information


  (Order Awaiting


 Action)  1 ea  QS


 N/A  12/28/17 00:00


 1/27/18 00:00   


 


 


 Glucose


  (Glucose 40% Gel)  15-30


 GRAMS 15


 GRAMS...  UD  PRN


 PO  12/27/17 19:30


 1/26/18 19:29   


 


 


 Glucose


  (Glucose Chew


 Tab)  4-8


 Tablets 4


 Tabl...  UD  PRN


 PO  12/27/17 19:30


 1/26/18 19:29   


 


 


 Dextrose


  (Dextrose 50%


 50ML Syringe)  25-50ML OF


 50% DW IV


 FOR...  UD  PRN


 IV  12/27/17 19:30


 1/26/18 19:29   


 


 


 Glucagon


  (Glucagon Inj)  1 mg  UD  PRN


 SQ  12/27/17 19:30


 1/26/18 19:29   


 


 


 Miscellaneous


 Information


  (Consult


 Glycemic


 Management


 Pharmacy)  1 ea  UD  PRN


 N/A  12/28/17 12:14


 1/27/18 12:13   


 


 


 Prednisone


  (PredniSONE TAB)  40 mg  DAILY


 PO  12/29/17 09:00


 1/28/18 08:59  1/1/18 08:16


40 MG


 


 Sevelamer HCl


  (Renagel Tab)  2,400 mg  TIDM


 PO  12/30/17 16:45


 1/29/18 11:29  1/1/18 08:15


2,400 MG


 


 Acetaminophen


  (Tylenol Tab)  1,000 mg  TID


 PO  12/30/17 21:00


 1/29/18 20:59  1/1/18 08:17


1,000 MG


 


 Miconazole Nitrate


  (Desenex Powder)  1 appln  PRN  PRN


 EXT  12/30/17 21:15


 1/29/18 21:14  1/1/18 09:29


1 APPLN


 


 Insulin Aspart


  (novoLOG ASPART)  SLIDING


 SCALE  AC


 SC  1/1/18 11:00


 1/31/18 10:59   


 


 


 Insulin Aspart


  (novoLOG ASPART)  SLIDING


 SCALE  HS


 SC  1/1/18 21:00


 1/31/18 20:59   


 


 


 Albuterol/


 Ipratropium


  (Duoneb)  3 ml  QIDR


 INH  1/1/18 12:00


 1/31/18 11:59  1/1/18 11:50


3 ML








Vital Signs:











  Date Time  Temp Pulse Resp B/P (MAP) Pulse Ox O2 Delivery O2 Flow Rate FiO2


 


1/1/18 11:50  92 18  96 Room Air  


 


1/1/18 08:00     93 Room Air  


 


1/1/18 07:38 36.9 96 20 110/67 (81) 93 Room Air  


 


1/1/18 04:58 36.7 94 17 98/59 (72) 93 Room Air  


 


1/1/18 00:00      Room Air  


 


12/31/17 23:22 36.6 96 20 97/54 (68) 95 Room Air  


 


12/31/17 20:00      Room Air  


 


12/31/17 18:56 36.4 101 18 105/69 (81) 94   


 


12/31/17 16:00      Room Air  


 


12/31/17 14:47 36.5 94 18 109/72 (84) 97 Room Air  


 


12/31/17 13:00     93 Room Air  








Laboratory Results:





Last 24 Hours








Test


  12/31/17


16:34 12/31/17


20:13 1/1/18


00:11 1/1/18


05:56


 


Bedside Glucose 212 mg/dl  358 mg/dl  128 mg/dl  84 mg/dl 


 


Test


  1/1/18


07:40 1/1/18


11:43 


  


 


 


Bedside Glucose 90 mg/dl  143 mg/dl

## 2018-01-01 NOTE — DIAGNOSTIC IMAGING REPORT
SINGLE VIEW CHEST



CLINICAL HISTORY:  Respiratory failure.



FINDINGS: An AP, portable, upright chest radiograph is compared to study dated

12/27/2017. The examination is degraded by portable technique and patient

rotation. The patient is status post midline sternotomy. A left subclavian

central venous catheter is unchanged in position. The heart is enlarged and

there is atherosclerotic calcification of the thoracic aorta. There is pulmonary

vascular congestion with evidence of interstitial edema. Airspace opacities are

present in the lower lobes. Trace pleural effusions are suspected. No

pneumothorax is seen. The skeletal structures appear osteopenic. The bony thorax

is grossly intact.



IMPRESSION:



1. Cardiomegaly with evidence of congestive failure and interstitial edema.



2. Suspect trace pleural effusions.



3. More focal airspace opacities in the lower lobes are likely related to

interstitial edema. Correlate clinically for evidence of superimposed pneumonia.







Electronically signed by:  Ramírez Hernandez M.D.

1/1/2018 11:46 AM



Dictated Date/Time:  1/1/2018 11:45 AM

## 2018-01-02 VITALS
SYSTOLIC BLOOD PRESSURE: 123 MMHG | HEART RATE: 88 BPM | OXYGEN SATURATION: 96 % | TEMPERATURE: 97.7 F | DIASTOLIC BLOOD PRESSURE: 75 MMHG

## 2018-01-02 VITALS
SYSTOLIC BLOOD PRESSURE: 120 MMHG | TEMPERATURE: 97.7 F | HEART RATE: 89 BPM | DIASTOLIC BLOOD PRESSURE: 77 MMHG | OXYGEN SATURATION: 93 %

## 2018-01-02 VITALS
TEMPERATURE: 97.52 F | DIASTOLIC BLOOD PRESSURE: 74 MMHG | HEART RATE: 92 BPM | SYSTOLIC BLOOD PRESSURE: 119 MMHG | OXYGEN SATURATION: 94 %

## 2018-01-02 VITALS
SYSTOLIC BLOOD PRESSURE: 122 MMHG | HEART RATE: 86 BPM | DIASTOLIC BLOOD PRESSURE: 78 MMHG | TEMPERATURE: 97.7 F | OXYGEN SATURATION: 96 %

## 2018-01-02 VITALS
DIASTOLIC BLOOD PRESSURE: 75 MMHG | TEMPERATURE: 97.88 F | HEART RATE: 86 BPM | SYSTOLIC BLOOD PRESSURE: 121 MMHG | OXYGEN SATURATION: 95 %

## 2018-01-02 VITALS — HEART RATE: 90 BPM | OXYGEN SATURATION: 95 %

## 2018-01-02 VITALS
TEMPERATURE: 97.52 F | SYSTOLIC BLOOD PRESSURE: 111 MMHG | HEART RATE: 89 BPM | DIASTOLIC BLOOD PRESSURE: 61 MMHG | OXYGEN SATURATION: 93 %

## 2018-01-02 VITALS
SYSTOLIC BLOOD PRESSURE: 110 MMHG | TEMPERATURE: 97.52 F | HEART RATE: 92 BPM | OXYGEN SATURATION: 94 % | DIASTOLIC BLOOD PRESSURE: 72 MMHG

## 2018-01-02 VITALS — HEART RATE: 82 BPM | DIASTOLIC BLOOD PRESSURE: 53 MMHG | SYSTOLIC BLOOD PRESSURE: 85 MMHG | OXYGEN SATURATION: 94 %

## 2018-01-02 VITALS — SYSTOLIC BLOOD PRESSURE: 128 MMHG | DIASTOLIC BLOOD PRESSURE: 80 MMHG | HEART RATE: 90 BPM | OXYGEN SATURATION: 94 %

## 2018-01-02 VITALS
TEMPERATURE: 97.7 F | SYSTOLIC BLOOD PRESSURE: 102 MMHG | OXYGEN SATURATION: 100 % | DIASTOLIC BLOOD PRESSURE: 68 MMHG | HEART RATE: 90 BPM

## 2018-01-02 VITALS — DIASTOLIC BLOOD PRESSURE: 58 MMHG | HEART RATE: 78 BPM | OXYGEN SATURATION: 97 % | SYSTOLIC BLOOD PRESSURE: 107 MMHG

## 2018-01-02 VITALS
SYSTOLIC BLOOD PRESSURE: 100 MMHG | OXYGEN SATURATION: 94 % | TEMPERATURE: 97.52 F | HEART RATE: 89 BPM | DIASTOLIC BLOOD PRESSURE: 63 MMHG

## 2018-01-02 VITALS — SYSTOLIC BLOOD PRESSURE: 142 MMHG | HEART RATE: 95 BPM | OXYGEN SATURATION: 97 % | DIASTOLIC BLOOD PRESSURE: 97 MMHG

## 2018-01-02 VITALS
HEART RATE: 88 BPM | DIASTOLIC BLOOD PRESSURE: 61 MMHG | SYSTOLIC BLOOD PRESSURE: 109 MMHG | TEMPERATURE: 97.88 F | OXYGEN SATURATION: 95 %

## 2018-01-02 VITALS — OXYGEN SATURATION: 96 % | HEART RATE: 93 BPM

## 2018-01-02 VITALS
TEMPERATURE: 97.52 F | SYSTOLIC BLOOD PRESSURE: 112 MMHG | OXYGEN SATURATION: 94 % | DIASTOLIC BLOOD PRESSURE: 69 MMHG | HEART RATE: 91 BPM

## 2018-01-02 VITALS — OXYGEN SATURATION: 95 %

## 2018-01-02 VITALS
SYSTOLIC BLOOD PRESSURE: 152 MMHG | TEMPERATURE: 97.52 F | HEART RATE: 95 BPM | OXYGEN SATURATION: 93 % | DIASTOLIC BLOOD PRESSURE: 88 MMHG

## 2018-01-02 VITALS — OXYGEN SATURATION: 95 % | HEART RATE: 98 BPM

## 2018-01-02 VITALS — HEART RATE: 92 BPM | OXYGEN SATURATION: 94 %

## 2018-01-02 VITALS — HEART RATE: 80 BPM | OXYGEN SATURATION: 97 % | SYSTOLIC BLOOD PRESSURE: 99 MMHG | DIASTOLIC BLOOD PRESSURE: 66 MMHG

## 2018-01-02 VITALS — OXYGEN SATURATION: 93 %

## 2018-01-02 LAB
BUN SERPL-MCNC: 65 MG/DL (ref 7–18)
CALCIUM SERPL-MCNC: 9.4 MG/DL (ref 8.5–10.1)
CO2 SERPL-SCNC: 25 MMOL/L (ref 21–32)
CREAT SERPL-MCNC: 5.45 MG/DL (ref 0.6–1.2)
EOSINOPHIL NFR BLD AUTO: 206 K/UL (ref 130–400)
GLUCOSE SERPL-MCNC: 104 MG/DL (ref 70–99)
HCT VFR BLD CALC: 37.1 % (ref 37–47)
HGB BLD-MCNC: 11.4 G/DL (ref 12–16)
MCH RBC QN AUTO: 32.2 PG (ref 25–34)
MCHC RBC AUTO-ENTMCNC: 30.7 G/DL (ref 32–36)
MCV RBC AUTO: 104.8 FL (ref 80–100)
PHOSPHATE SERPL-MCNC: 6.7 MG/DL (ref 2.5–4.9)
PMV BLD AUTO: 9.4 FL (ref 7.4–10.4)
POTASSIUM SERPL-SCNC: 4.1 MMOL/L (ref 3.5–5.1)
RED CELL DISTRIBUTION WIDTH CV: 16.4 % (ref 11.5–14.5)
RED CELL DISTRIBUTION WIDTH SD: 61.8 FL (ref 36.4–46.3)
SODIUM SERPL-SCNC: 142 MMOL/L (ref 136–145)
WBC # BLD AUTO: 8.44 K/UL (ref 4.8–10.8)

## 2018-01-02 RX ADMIN — ALUMINUM ZIRCONIUM TRICHLOROHYDREX GLY SCH EA: 0.2 STICK TOPICAL at 16:00

## 2018-01-02 RX ADMIN — PANTOPRAZOLE SCH MG: 40 TABLET, DELAYED RELEASE ORAL at 10:25

## 2018-01-02 RX ADMIN — IPRATROPIUM BROMIDE AND ALBUTEROL SULFATE SCH ML: .5; 3 SOLUTION RESPIRATORY (INHALATION) at 19:39

## 2018-01-02 RX ADMIN — IPRATROPIUM BROMIDE AND ALBUTEROL SULFATE SCH ML: .5; 3 SOLUTION RESPIRATORY (INHALATION) at 15:36

## 2018-01-02 RX ADMIN — ALLOPURINOL SCH MG: 100 TABLET ORAL at 20:26

## 2018-01-02 RX ADMIN — ALLOPURINOL SCH MG: 100 TABLET ORAL at 10:27

## 2018-01-02 RX ADMIN — ALUMINUM ZIRCONIUM TRICHLOROHYDREX GLY SCH EA: 0.2 STICK TOPICAL at 00:00

## 2018-01-02 RX ADMIN — SERTRALINE HYDROCHLORIDE SCH MG: 50 TABLET, FILM COATED ORAL at 10:28

## 2018-01-02 RX ADMIN — INSULIN ASPART SCH UNITS: 100 INJECTION, SOLUTION INTRAVENOUS; SUBCUTANEOUS at 11:00

## 2018-01-02 RX ADMIN — CLOPIDOGREL BISULFATE SCH MG: 75 TABLET, FILM COATED ORAL at 10:26

## 2018-01-02 RX ADMIN — ACETAMINOPHEN SCH MG: 500 TABLET, COATED ORAL at 14:00

## 2018-01-02 RX ADMIN — Medication SCH MG: at 10:27

## 2018-01-02 RX ADMIN — ACETAMINOPHEN SCH MG: 500 TABLET, COATED ORAL at 10:28

## 2018-01-02 RX ADMIN — INSULIN ASPART SCH UNITS: 100 INJECTION, SOLUTION INTRAVENOUS; SUBCUTANEOUS at 20:23

## 2018-01-02 RX ADMIN — TRAMADOL HYDROCHLORIDE PRN MG: 50 TABLET, COATED ORAL at 20:27

## 2018-01-02 RX ADMIN — Medication SCH MG: at 10:24

## 2018-01-02 RX ADMIN — DOCUSATE SODIUM SCH MG: 100 CAPSULE, LIQUID FILLED ORAL at 10:29

## 2018-01-02 RX ADMIN — INSULIN ASPART SCH UNITS: 100 INJECTION, SOLUTION INTRAVENOUS; SUBCUTANEOUS at 07:15

## 2018-01-02 RX ADMIN — PRAMIPEXOLE DIHYDROCHLORIDE SCH MG: 0.25 TABLET ORAL at 20:26

## 2018-01-02 RX ADMIN — ASCORBIC ACID, THIAMINE MONONITRATE,RIBOFLAVIN, NIACINAMIDE, PYRIDOXINE HYDROCHLORIDE, FOLIC ACID, CYANOCOBALAMIN, BIOTIN, CALCIUM PANTOTHENATE, SCH CAP: 100; 1.5; 1.7; 20; 10; 1; 6000; 150000; 5 CAPSULE, LIQUID FILLED ORAL at 10:26

## 2018-01-02 RX ADMIN — RENAGEL SCH MG: 800 TABLET ORAL at 07:44

## 2018-01-02 RX ADMIN — IPRATROPIUM BROMIDE AND ALBUTEROL SULFATE SCH ML: .5; 3 SOLUTION RESPIRATORY (INHALATION) at 07:37

## 2018-01-02 RX ADMIN — ALUMINUM ZIRCONIUM TRICHLOROHYDREX GLY SCH EA: 0.2 STICK TOPICAL at 07:44

## 2018-01-02 RX ADMIN — DOCUSATE SODIUM SCH MG: 100 CAPSULE, LIQUID FILLED ORAL at 20:23

## 2018-01-02 RX ADMIN — INSULIN ASPART SCH UNITS: 100 INJECTION, SOLUTION INTRAVENOUS; SUBCUTANEOUS at 17:37

## 2018-01-02 RX ADMIN — RENAGEL SCH MG: 800 TABLET ORAL at 11:03

## 2018-01-02 RX ADMIN — IPRATROPIUM BROMIDE AND ALBUTEROL SULFATE SCH ML: .5; 3 SOLUTION RESPIRATORY (INHALATION) at 11:29

## 2018-01-02 RX ADMIN — ACETAMINOPHEN SCH MG: 500 TABLET, COATED ORAL at 20:27

## 2018-01-02 RX ADMIN — RENAGEL SCH MG: 800 TABLET ORAL at 17:33

## 2018-01-02 RX ADMIN — MICONAZOLE NITRATE PRN APPLN: 20 POWDER TOPICAL at 10:28

## 2018-01-02 RX ADMIN — ATORVASTATIN CALCIUM SCH MG: 40 TABLET, FILM COATED ORAL at 10:26

## 2018-01-02 NOTE — CARDIOLOGY PROCEDURE BRIEF NT
Preliminary Cardiology Note


Procedure Date


Jan 2, 2018.





Pre-Procedure Diagnosis


Mitral stenosis





Post-Procedure Diagnosis


moderate  MS





Procedure(s) Performed





WANDY





Cardiologist


WILLARD Holbrook DO





Assistant(s)


TULIO Chung





Estimated Blood Loss


none





Medication(s)





Propofol 110 mg IV, Fentanyl 100 mcg IV, lidocaine 40 mg IV, Phenylephrine


200 mcg IV





Preliminary Findings


MAC, restriction of posterior MV leaflet. 


Moderate mitral stenosis





Recommendations


Ongoing medical therapy.





Specimens





none





Anesthesia


Dr WILLARD Guido, Anesthesia Department





Complication(s)


None





Disposition


transfer to 2nd floor medical post procedure.

## 2018-01-02 NOTE — PHARMACY PROGRESS NOTE
Pharmacy Glycemic Short Note 2


Date of Service


Jan 2, 2018.





OUTPATIENT ANTIDIABETIC REGIMEN: 


* Dr. Escobar spoke with patient - she does not take anything as an outpatient (

compared to Lantus 5 units on med rec)








ASSESSMENT:





* Ms. Bro received 60 units of insulin yesterday with BSGs ranging from 89-

301 mg/dL in the past 24 hours


* She was NPO this AM for a WANDY but I spoke with the nurse ~1030 and gag reflex 

has returned so diet will be resumed


* She continues to be very sensitive to the prednisone, with BSGs as high as 

300 by HS last night


* Since she received 60 units of insulin yesterday, I feel comfortable 

increasing her NPH to 50 units.  This will be given with the prednisone again 

today, to match the pharmacokinetics of the steroid.


* I feel she could continue with the current CR but since I'm increasing the NPH

, will loosen the CR slightly


* Current HS Novolog is appropriate and has not caused any further lows since 

adjusted a few days ago








PLAN FOR INPATIENT GLYCEMIC CONTROL:





* NPH 50 units today w/ prednisone dose


* Novolog ACHS


 * Goal 110-140 (140-180 at HS)


 * CF 30 mg/dL/unit


 * LOOSEN CR to 15 AC (none at HS)








* Please note that the plan above was derived based on current level of insulin 

resistance and hospital stress. These recommendations are appropriate for 

inpatient admission only. Plan of care upon discharge will need to be 

reassessed to avoid potential outpatient hypo/hyperglycemia. 





Thank you.

## 2018-01-02 NOTE — PROGRESS NOTE
Subjective


Date of Service:


Jan 2, 2018.


Subjective


Pt evaluation today including:  conversation w/ patient, physical exam, lab 

review, review of studies, review of inpatient medication list


Saw/examined the patient in room 276


She states she's feeling better


Denies chest pain or palpitations


+SOB, worse with exertion


+dry cough





Review of Systems


Constitutional:  No fever, No chills, No weakness


Respiratory:  + cough, + wheezing, + shortness of breath, No sputum, No dyspnea 

on exertion, No dyspnea at rest, No hemoptysis


Cardiac:  No chest pain, No edema, No palpitations





Medications





Current Inpatient Medications








 Medications


  (Trade)  Dose


 Ordered  Sig/Daniel


 Route  Start Time


 Stop Time Status Last Admin


Dose Admin


 


 Heparin Sodium


  (Porcine)


  (Heparin Sq 5000


 Unit/0.5ml)  5,000 unit  Q8


 SQ  12/27/17 22:00


 1/26/18 21:59 Future Hold 1/1/18 21:16


5,000 UNIT


 


 Ondansetron HCl


  (Zofran Inj)  4 mg  Q6H  PRN


 IV  12/27/17 17:45


 1/26/18 17:44   


 


 


 Allopurinol


  (Zyloprim Tab)  100 mg  BID


 PO  12/27/17 21:00


 1/26/18 20:59  1/2/18 10:27


100 MG


 


 Aspirin


  (Ecotrin Tab)  81 mg  DAILY


 PO  12/28/17 09:00


 1/27/18 08:59  1/2/18 10:24


81 MG


 


 Atorvastatin


 Calcium


  (Lipitor Tab)  40 mg  DAILY


 PO  12/28/17 09:00


 1/27/18 08:59  1/2/18 10:26


40 MG


 


 Clopidogrel


 Bisulfate


  (plAVix TAB)  75 mg  DAILY


 PO  12/28/17 09:00


 1/27/18 08:59  1/2/18 10:26


75 MG


 


 Docusate Sodium


  (coLACE CAP)  100 mg  BID


 PO  12/27/17 21:00


 1/26/18 20:59  1/2/18 10:29


100 MG


 


 Pantoprazole


 Sodium


  (Protonix Tab)  40 mg  DAILY


 PO  12/28/17 09:00


 1/27/18 08:59  1/2/18 10:25


40 MG


 


 Pramipexole


 Dihydrochloride


  (miraPEX TAB)  0.25 mg  HS


 PO  12/27/17 21:00


 1/26/18 20:59  1/1/18 21:09


0.25 MG


 


 Sertraline HCl


  (Zoloft Tab)  75 mg  DAILY


 PO  12/28/17 09:00


 1/27/18 08:59  1/2/18 10:28


75 MG


 


 Thiamine HCl


  (Vitamin B-1 Tab)  50 mg  DAILY


 PO  12/28/17 09:00


 1/27/18 08:59  1/2/18 10:27


50 MG


 


 Tramadol HCl


  (Ultram Tab)  50 mg  Q4H  PRN


 PO  12/27/17 19:15


 1/26/18 19:14  1/1/18 13:13


50 MG


 


 Vitamin B Complex/


 Vit C/Folic Acid


  (Nephrocaps)  1 cap  DAILY


 PO  12/28/17 09:00


 1/27/18 08:59  1/2/18 10:26


1 CAP


 


 Miscellaneous


 Information


  (Order Awaiting


 Action)  1 ea  QS


 N/A  12/28/17 00:00


 1/27/18 00:00   


 


 


 Miscellaneous


 Information


  (Order Awaiting


 Action)  1 ea  QS


 N/A  12/28/17 00:00


 1/27/18 00:00   


 


 


 Miscellaneous


 Information


  (Order Awaiting


 Action)  1 ea  QS


 N/A  12/28/17 00:00


 1/27/18 00:00   


 


 


 Glucose


  (Glucose 40% Gel)  15-30


 GRAMS 15


 GRAMS...  UD  PRN


 PO  12/27/17 19:30


 1/26/18 19:29   


 


 


 Glucose


  (Glucose Chew


 Tab)  4-8


 Tablets 4


 Tabl...  UD  PRN


 PO  12/27/17 19:30


 1/26/18 19:29   


 


 


 Dextrose


  (Dextrose 50%


 50ML Syringe)  25-50ML OF


 50% DW IV


 FOR...  UD  PRN


 IV  12/27/17 19:30


 1/26/18 19:29   


 


 


 Glucagon


  (Glucagon Inj)  1 mg  UD  PRN


 SQ  12/27/17 19:30


 1/26/18 19:29   


 


 


 Miscellaneous


 Information


  (Consult


 Glycemic


 Management


 Pharmacy)  1 ea  UD  PRN


 N/A  12/28/17 12:14


 1/27/18 12:13   


 


 


 Prednisone


  (PredniSONE TAB)  40 mg  DAILY


 PO  12/29/17 09:00


 1/28/18 08:59  1/2/18 10:25


40 MG


 


 Sevelamer HCl


  (Renagel Tab)  2,400 mg  TIDM


 PO  12/30/17 16:45


 1/29/18 11:29  1/2/18 11:03


2,400 MG


 


 Acetaminophen


  (Tylenol Tab)  1,000 mg  TID


 PO  12/30/17 21:00


 1/29/18 20:59  1/2/18 10:28


1,000 MG


 


 Miconazole Nitrate


  (Desenex Powder)  1 appln  PRN  PRN


 EXT  12/30/17 21:15


 1/29/18 21:14  1/2/18 10:28


1 APPLN


 


 Insulin Aspart


  (novoLOG ASPART)  SLIDING


 SCALE  AC


 SC  1/1/18 11:00


 1/31/18 10:59  1/1/18 17:40


11 UNITS


 


 Insulin Aspart


  (novoLOG ASPART)  SLIDING


 SCALE  HS


 SC  1/1/18 21:00


 1/31/18 20:59  1/1/18 21:15


5 UNITS


 


 Albuterol/


 Ipratropium


  (Duoneb)  3 ml  QIDR


 INH  1/1/18 12:00


 1/31/18 11:59  1/2/18 11:29


3 ML











Objective


Vital Signs











  Date Time  Temp Pulse Resp B/P (MAP) Pulse Ox O2 Delivery O2 Flow Rate FiO2


 


1/2/18 13:30 36.4 91 18 112/69 (83) 94 Room Air  


 


1/2/18 11:29  92 18  94 Room Air  


 


1/2/18 11:05 36.4 92 18 110/72 (85) 94 Room Air 6.0 21


 


1/2/18 10:40 36.4 92 18 119/74 (89) 94 Room Air  


 


1/2/18 10:10 36.4 89 16 100/63 (75) 94 Room Air  


 


1/2/18 09:40 36.4 89 16 111/61 (78) 93 Room Air  


 


1/2/18 09:10 36.5 90 16 102/68 (79) 100 Room Air  


 


1/2/18 08:55 36.5 89 18 120/77 (91) 93 Room Air  


 


1/2/18 08:40 36.5 86 16 122/78 (93) 96 Room Air  


 


1/2/18 08:25 36.5 88 16 123/75 (91) 96 Room Air  


 


1/2/18 08:20  88 18 124/68 (86) 94 Room Air  


 


1/2/18 08:10  88 18 131/68 (89) 94 Room Air  


 


1/2/18 08:00  89 18 128/88 (101) 94 Room Air  


 


1/2/18 08:00     93 Room Air  


 


1/2/18 07:55  80 18 99/66 97 Room Air 6 


 


1/2/18 07:50  78 18 107/58 97 Room Air 6 


 


1/2/18 07:45  82 18 85/53 94 Room Air 6 


 


1/2/18 07:40  90 20 128/80 94 Room Air 6 


 


1/2/18 07:37  90 18  95 Room Air  


 


1/2/18 07:35  95 20 142/97 97 Room Air 6 


 


1/2/18 04:00 36.4 95 18 152/88 (109) 93 Room Air  


 


1/2/18 00:00 36.6 88 20 109/61 (77) 95 Room Air  


 


1/2/18 00:00      Room Air  


 


1/1/18 20:37  100 18  98 Room Air  


 


1/1/18 20:00      Room Air  


 


1/1/18 19:52 36.6 107 20 130/72 (91) 94 Room Air  


 


1/1/18 16:00      Room Air  


 


1/1/18 15:37 36.4 103 18 120/76 (91) 95 Room Air  


 


1/1/18 15:15  102 18  97 Room Air  











Physical Exam


General Appearance:  no apparent distress


Respiratory/Chest:  no respiratory distress, no accessory muscle use, + wheezing

 (mild end expiratory wheezing)


Cardiovascular:  regular rate, rhythm, no edema, no murmur


Abdomen:  normal bowel sounds, non tender, soft


Extremities:  normal inspection, no pedal edema


Neurologic/Psychiatric:  no motor/sensory deficits, alert, normal mood/affect





Laboratory Results





Last 24 Hours








Test


  1/1/18


16:26 1/1/18


20:08 1/2/18


03:22 1/2/18


05:38


 


Bedside Glucose 263 mg/dl  301 mg/dl  122 mg/dl  


 


White Blood Count    8.44 K/uL 


 


Red Blood Count    3.54 M/uL 


 


Hemoglobin    11.4 g/dL 


 


Hematocrit    37.1 % 


 


Mean Corpuscular Volume    104.8 fL 


 


Mean Corpuscular Hemoglobin    32.2 pg 


 


Mean Corpuscular Hemoglobin


Concent 


  


  


  30.7 g/dl 


 


 


RDW Standard Deviation    61.8 fL 


 


RDW Coefficient of Variation    16.4 % 


 


Platelet Count    206 K/uL 


 


Mean Platelet Volume    9.4 fL 


 


Sodium Level    142 mmol/L 


 


Potassium Level    4.1 mmol/L 


 


Chloride Level    108 mmol/L 


 


Carbon Dioxide Level    25 mmol/L 


 


Anion Gap    9.0 mmol/L 


 


Blood Urea Nitrogen    65 mg/dl 


 


Creatinine    5.45 mg/dl 


 


Est Creatinine Clear Calc


Drug Dose 


  


  


  12.6 ml/min 


 


 


Estimated GFR (


American) 


  


  


  9.2 


 


 


Estimated GFR (Non-


American 


  


  


  7.9 


 


 


BUN/Creatinine Ratio    12.0 


 


Random Glucose    104 mg/dl 


 


Calcium Level    9.4 mg/dl 


 


Phosphorus Level    6.7 mg/dl 


 


Magnesium Level    2.4 mg/dl 


 


Chemistry Specimen Hemolysis     


 


Test


  1/2/18


06:06 1/2/18


10:21 


  


 


 


Bedside Glucose 101 mg/dl  89 mg/dl   











Assessment and Plan


This is a 57 year old female with a PMH of ESRD on HD, DM2, HTN, asthma, hx. of 

aortic valve replacement - presents with syncopal episode





Syncope


secondary to missed dialysis and URI


no arrhythmias noted on tele monitoring


No other apparent cause of syncopal episode - likely due to missed dialysis


monitor BP, monitor labs and sugars


TTE noted to have mitral stenosis





Hx. of Aortic Valve Replacement


r/o Mitral Valve Stenosis


WANDY performed today (1/2)


noted to have some stenosis, but likely due to high output as per cardiology


for now, no change in medications; ideally b-blocker dose should be titrated up 

- will monitor breathing status


cont. ASA and Plavix - outpatient cardiology f/u





Acute Asthma Exacerbation


secondary to URI vs. Viral Bronchitis


patient presented with wheezing/congestion


was initially on IV solu-medrol, but due to uncontrolled BSGs, now on oral 

prednisone


will likely taper in 1-2 days





ESRD on HD


dialysis on M-W-F


consulted nephrology for further management


continue current renal medications





Uncontrolled BSGs


in the setting of DM2


Ha1c = 6.3%


diet controlled at baseline


due to IV and PO steroids - BSGs elevated


glycemic control consult for further input


will try to taper steroids in AM





Depression


continue current medications





DVT ppx


subq heparin





FULL CODE

## 2018-01-02 NOTE — TEE
*NOTICE TO RECEIVING PARTY AGENCY**  This information is strictly Confidential and 
protected under [place] law.  [place] law prohibits you from making any further 
disclosure of this information unless further disclosure is expressly permitted by the 
written consent of the person to whom it pertains or is authorized by law.  A general 
authorization for the release of medical or other information is not sufficient for this 
purpose.  Hospital accepts no responsibility if the information is made available to any 
other person, INCLUDING THE PATIENT.



Interpretation Summary

  *  Name: JORDON RAMOS  Study Date: 2018 06:57 AM  BP: 142/97 mmHg

  *  MRN: [id number]  Patient Location: [facility]\S\N276\S\2  HR: 88

  *  : 1960 (M/d/yyyy)  Gender: Female  Height: 64 in

  *  Age: 57 yrs  Ethnicity: [place]  Weight: 212 lb

  *  Ordering Physician: Long Holbrook

  *  Referring Physician: UNKNOWN

  *  Performed By: Angelica Thayer RDCS

  *  Accession# [id number]  Account# [id number]

  *  Reason For Study: ASSESS MITRAL VALVE, MITRAL STENOSIS

  *  BSA: 2.0 m2

  *  START TIME 7:40AM

  *  END TIME 7:57AM

  *  -- Conclusions --

  *  Moderate mitral annular calcification was noted posteriorly with restriction of the 
posterior mitral valve leaflet.

  *  By Doppler assessment , there is a significant Diastolic transmitral valve gradient 
of 13 mm Hg.

  *  Only mild to moderate mitral stenosis however, is present by 2D evaluation.

  *  The high gradient is likely due to high cardiac output state , and relatively high 
heart rates of 80-90 bpm.

  *  There is mild to moderate mitral regurgitation.

  *  There is a bioprosthetic aortic valve.

  *  There is no significant prosthetic stenosis.

  *  The leaflets were not well visualized, but appeared to have normal mobility on 
limited visual assessment.

  *  The aortic valve gradients were normal as assess by recent transthoracic 
echocardiogram.

  *  There is mild concentric left ventricular hypertrophy.

  *  The LV Ejection Fraction = 65-70%.

Procedure Details

  *  WANDY Probe #1 utilized for procedure.

  *  The study was performed in Cardiac Catheterization Lab.

  *  Time out was conducted by the physician, nurse, and echo tech with positive 
identification of patient and procedure.

  *  Informed consent for Transesophageal Echocardiogram was obtained prior to the 
procedure.

  *  An intravenous line was placed. A topical anesthetic agent was used for oropharangeal 
anesthesia. A bite block was inserted.

  *  Sedation performed by the anesthesia department.

  *  The patient's vital signs, including blood pressure, heart rate, pulse oximetry and 
cardiac rhythm were monitored throughout the procedure .

  *  The posterior oropharynx was anesthetized using a topical anesthetic spray. A bite 
guard was inserted.

  *  A multifrequency, multiplane transesopheageal echocardiographic endoscope was 
inserted and manipulated in the standard fashion to achieve multiplane views.

  *  The transesophageal probe was passed without difficulty.

  *  The usual views were obtained; basal, mid-esophageal, transgastric and aortic views.

  *  The patient tolerated the procedure well without evidence of orophangeal or 
esophageal trauma.

  *  A 2D transesophageal echocardiogram with spectral and color flow Doppler was 
performed.

  *  Contrast injection with agitated saline was performed.

  *  Propofol 110 mg IV, Fentanyl 100 mcg IV, lidocaine 40 mg IV, Phenylephrine 200 mcg IV 
administered by anesthesia provider.

  *  A 2D transesophageal echocardiogram with Doppler and color flow Doppler was 
performed.

Left Ventricle

  *  The left ventricle is normal in size.

  *  There is mild concentric left ventricular hypertrophy.

  *  Left ventricular systolic function is normal.

  *  Ejection Fraction = 65-70%.

  *  The left ventricular wall motion is normal.

Right Ventricle

  *  The right ventricle is normal in size and function.

Atria

  *  The left atrial size is normal.

  *  The left atrial appendage was not assessed.

  *  Right atrial size is normal.

  *  The interatrial septum is intact with no evidence for an atrial septal defect.

Mitral Valve

  *  Moderate mitral annular calcification was noted posteriorly with restriction of the 
posterior mitral valve leaflet. By Doppler assessment, there is a significant Diastolic 
transmitral valve gradient of 13 mm Hg. Only mild to moderate mitral stenosis however, is 
present by 2D evaluation.  The high gradient is likely due to high cardiac output state , 
and relatively high heart rates of 80-90 bpm.

  *  There is mild to moderate mitral regurgitation.

Tricuspid Valve

  *  The tricuspid valve is normal.

  *  There is no tricuspid stenosis.

  *  There is mild tricuspid regurgitation.

Aortic Valve

  *  There is a bioprosthetic aortic valve.

  *  There is no significant prosthetic stenosis. The leaflets were not well visualized, 
but appeared to have normal mobility on limited visual assessment. The aortic valve 
gradients were normal as assess by recent transthoracic echocardiogram.

Pulmonic Valve

  *  The pulmonic valve is not well seen, but is grossly normal.

Great Vessels

  *  The aortic root is normal size.

  *  Moderate nonmobile atheromatous plaque is present in the visualized portion of the 
descending thoracic aorta.

Pericardium

  *  There is no pericardial effusion.



MMode 2D Measurements and Calculations

asc Aorta Diam 2.8 cm

## 2018-01-02 NOTE — PROGRESS NOTE
DATE: 01/02/2018

 

DATE:  01/02/2018  

 

SUBJECTIVE:  The patient is a 57-year-old female with end-stage renal disease

on chronic hemodialysis Monday, Wednesday, Friday, who was admitted with COPD

flareup.  She had dialysis on Sunday.  At this time she feels her breathing

is better by about 50%.  Denies any chest pain or any new complaints.  Mucous

membrane is moist.

 

OBJECTIVE:  

NECK:  Supple.  No jugular venous distention.

VITAL SIGNS: 94% on room air, pulse rate 92 per minute, respiratory rate 18

per minute, blood pressure 119/74.

CHEST:  Bilateral diminished breath sound with a lot of wheezing.

CARDIOVASCULAR:  S1 and S2 are regular, distant heart sounds.

ABDOMEN:  Soft, nontender, obese.

EXTREMITIES:  Shows trace edema.

 

ASSESSMENT AND PLAN:  

1.  End-stage renal disease.  She is scheduled for dialysis tomorrow.  Will

try to take about 2-3 kilo of fluid as tolerated.

2.  Anemia of renal failure.  We will continue to do Procrit on dialysis.  

3.  Respiratory distress status.  Her breathing status seems to be better,

although not quite back to baseline.  Continue treatment as is being done.

 

 

 

 

MTDD

## 2018-01-02 NOTE — ANESTHESIOLOGY PROGRESS NOTE
Anesthesia Post Op Note


Date & Time


Jan 2, 2018 at 08:23





Vital Signs


Pain Intensity:  0





Vital Signs Past 12 Hours








  Date Time  Temp Pulse Resp B/P (MAP) Pulse Ox O2 Delivery O2 Flow Rate FiO2


 


1/2/18 08:10  88 18 131/68 (89) 94 Room Air  


 


1/2/18 08:00  89 18 128/88 (101) 94 Room Air  


 


1/2/18 07:55  80 18 99/66 97 Room Air 6 1/2/18 07:50  78 18 107/58 97 Room Air 6 


 


1/2/18 07:45  82 18 85/53 94 Room Air 6 


 


1/2/18 07:40  90 20 128/80 94 Room Air 6 


 


1/2/18 07:37  90 18  95 Room Air  


 


1/2/18 07:35  95 20 142/97 97 Room Air 6 1/2/18 04:00 36.4 95 18 152/88 (109) 93 Room Air  


 


1/2/18 00:00 36.6 88 20 109/61 (77) 95 Room Air  


 


1/2/18 00:00      Room Air  


 


1/1/18 20:37  100 18  98 Room Air  











Notes


Mental Status:  alert / awake / arousable, participated in evaluation


Pt Amnestic to Procedure:  Yes


Nausea / Vomiting:  adequately controlled


Pain:  adequately controlled


Airway Patency, RR, SpO2:  stable & adequate


BP & HR:  stable & adequate


Hydration State:  stable & adequate


Anesthetic Complications:  no major complications apparent

## 2018-01-03 VITALS
DIASTOLIC BLOOD PRESSURE: 69 MMHG | OXYGEN SATURATION: 95 % | SYSTOLIC BLOOD PRESSURE: 135 MMHG | HEART RATE: 93 BPM | TEMPERATURE: 97.34 F

## 2018-01-03 VITALS — HEART RATE: 86 BPM | SYSTOLIC BLOOD PRESSURE: 115 MMHG | DIASTOLIC BLOOD PRESSURE: 67 MMHG

## 2018-01-03 VITALS — SYSTOLIC BLOOD PRESSURE: 116 MMHG | HEART RATE: 91 BPM | DIASTOLIC BLOOD PRESSURE: 69 MMHG

## 2018-01-03 VITALS — HEART RATE: 90 BPM | OXYGEN SATURATION: 96 %

## 2018-01-03 VITALS — SYSTOLIC BLOOD PRESSURE: 116 MMHG | HEART RATE: 75 BPM | DIASTOLIC BLOOD PRESSURE: 64 MMHG

## 2018-01-03 VITALS — SYSTOLIC BLOOD PRESSURE: 118 MMHG | DIASTOLIC BLOOD PRESSURE: 76 MMHG | HEART RATE: 92 BPM

## 2018-01-03 VITALS — DIASTOLIC BLOOD PRESSURE: 59 MMHG | HEART RATE: 91 BPM | SYSTOLIC BLOOD PRESSURE: 123 MMHG

## 2018-01-03 VITALS — HEART RATE: 86 BPM | DIASTOLIC BLOOD PRESSURE: 68 MMHG | SYSTOLIC BLOOD PRESSURE: 120 MMHG

## 2018-01-03 VITALS
SYSTOLIC BLOOD PRESSURE: 121 MMHG | DIASTOLIC BLOOD PRESSURE: 72 MMHG | TEMPERATURE: 97.7 F | HEART RATE: 94 BPM | OXYGEN SATURATION: 98 %

## 2018-01-03 VITALS — DIASTOLIC BLOOD PRESSURE: 65 MMHG | HEART RATE: 87 BPM | SYSTOLIC BLOOD PRESSURE: 128 MMHG | TEMPERATURE: 98.06 F

## 2018-01-03 VITALS — SYSTOLIC BLOOD PRESSURE: 125 MMHG | DIASTOLIC BLOOD PRESSURE: 72 MMHG | HEART RATE: 80 BPM

## 2018-01-03 VITALS — HEART RATE: 77 BPM | TEMPERATURE: 96.98 F | DIASTOLIC BLOOD PRESSURE: 68 MMHG | SYSTOLIC BLOOD PRESSURE: 125 MMHG

## 2018-01-03 VITALS — SYSTOLIC BLOOD PRESSURE: 116 MMHG | DIASTOLIC BLOOD PRESSURE: 64 MMHG | HEART RATE: 92 BPM

## 2018-01-03 VITALS — DIASTOLIC BLOOD PRESSURE: 73 MMHG | HEART RATE: 93 BPM | SYSTOLIC BLOOD PRESSURE: 130 MMHG

## 2018-01-03 VITALS — DIASTOLIC BLOOD PRESSURE: 65 MMHG | HEART RATE: 84 BPM | SYSTOLIC BLOOD PRESSURE: 121 MMHG

## 2018-01-03 VITALS
OXYGEN SATURATION: 94 % | DIASTOLIC BLOOD PRESSURE: 73 MMHG | HEART RATE: 94 BPM | SYSTOLIC BLOOD PRESSURE: 115 MMHG | TEMPERATURE: 97.7 F

## 2018-01-03 VITALS — HEART RATE: 88 BPM | DIASTOLIC BLOOD PRESSURE: 71 MMHG | SYSTOLIC BLOOD PRESSURE: 121 MMHG

## 2018-01-03 VITALS — HEART RATE: 87 BPM | SYSTOLIC BLOOD PRESSURE: 124 MMHG | DIASTOLIC BLOOD PRESSURE: 63 MMHG

## 2018-01-03 VITALS
HEART RATE: 95 BPM | TEMPERATURE: 97.88 F | OXYGEN SATURATION: 95 % | SYSTOLIC BLOOD PRESSURE: 89 MMHG | DIASTOLIC BLOOD PRESSURE: 50 MMHG

## 2018-01-03 VITALS — HEART RATE: 88 BPM | DIASTOLIC BLOOD PRESSURE: 59 MMHG | SYSTOLIC BLOOD PRESSURE: 120 MMHG

## 2018-01-03 VITALS — OXYGEN SATURATION: 94 %

## 2018-01-03 VITALS — OXYGEN SATURATION: 97 % | HEART RATE: 92 BPM

## 2018-01-03 LAB
BUN SERPL-MCNC: 72 MG/DL (ref 7–18)
CALCIUM SERPL-MCNC: 9.3 MG/DL (ref 8.5–10.1)
CO2 SERPL-SCNC: 24 MMOL/L (ref 21–32)
CREAT SERPL-MCNC: 6.21 MG/DL (ref 0.6–1.2)
EOSINOPHIL NFR BLD AUTO: 211 K/UL (ref 130–400)
GLUCOSE SERPL-MCNC: 61 MG/DL (ref 70–99)
HCT VFR BLD CALC: 37.6 % (ref 37–47)
HGB BLD-MCNC: 11.5 G/DL (ref 12–16)
MCH RBC QN AUTO: 31.9 PG (ref 25–34)
MCHC RBC AUTO-ENTMCNC: 30.6 G/DL (ref 32–36)
MCV RBC AUTO: 104.4 FL (ref 80–100)
PMV BLD AUTO: 9.6 FL (ref 7.4–10.4)
POTASSIUM SERPL-SCNC: 4.2 MMOL/L (ref 3.5–5.1)
RED CELL DISTRIBUTION WIDTH CV: 16.3 % (ref 11.5–14.5)
RED CELL DISTRIBUTION WIDTH SD: 61.6 FL (ref 36.4–46.3)
SODIUM SERPL-SCNC: 140 MMOL/L (ref 136–145)
WBC # BLD AUTO: 9.22 K/UL (ref 4.8–10.8)

## 2018-01-03 RX ADMIN — HUMAN INSULIN SCH UNITS: 100 INJECTION, SUSPENSION SUBCUTANEOUS at 08:38

## 2018-01-03 RX ADMIN — ACETAMINOPHEN SCH MG: 500 TABLET, COATED ORAL at 20:47

## 2018-01-03 RX ADMIN — Medication SCH MG: at 07:38

## 2018-01-03 RX ADMIN — IPRATROPIUM BROMIDE AND ALBUTEROL SULFATE SCH ML: .5; 3 SOLUTION RESPIRATORY (INHALATION) at 07:50

## 2018-01-03 RX ADMIN — PRAMIPEXOLE DIHYDROCHLORIDE SCH MG: 0.25 TABLET ORAL at 20:47

## 2018-01-03 RX ADMIN — ALLOPURINOL SCH MG: 100 TABLET ORAL at 07:39

## 2018-01-03 RX ADMIN — SERTRALINE HYDROCHLORIDE SCH MG: 50 TABLET, FILM COATED ORAL at 07:38

## 2018-01-03 RX ADMIN — RENAGEL SCH MG: 800 TABLET ORAL at 07:35

## 2018-01-03 RX ADMIN — ATORVASTATIN CALCIUM SCH MG: 40 TABLET, FILM COATED ORAL at 07:36

## 2018-01-03 RX ADMIN — RENAGEL SCH MG: 800 TABLET ORAL at 18:12

## 2018-01-03 RX ADMIN — Medication SCH MG: at 07:35

## 2018-01-03 RX ADMIN — INSULIN ASPART SCH UNITS: 100 INJECTION, SOLUTION INTRAVENOUS; SUBCUTANEOUS at 20:49

## 2018-01-03 RX ADMIN — ALLOPURINOL SCH MG: 100 TABLET ORAL at 20:46

## 2018-01-03 RX ADMIN — INSULIN ASPART SCH UNITS: 100 INJECTION, SOLUTION INTRAVENOUS; SUBCUTANEOUS at 06:30

## 2018-01-03 RX ADMIN — DOCUSATE SODIUM SCH MG: 100 CAPSULE, LIQUID FILLED ORAL at 07:35

## 2018-01-03 RX ADMIN — IPRATROPIUM BROMIDE AND ALBUTEROL SULFATE SCH ML: .5; 3 SOLUTION RESPIRATORY (INHALATION) at 11:14

## 2018-01-03 RX ADMIN — TRAMADOL HYDROCHLORIDE PRN MG: 50 TABLET, COATED ORAL at 06:15

## 2018-01-03 RX ADMIN — INSULIN ASPART SCH UNITS: 100 INJECTION, SOLUTION INTRAVENOUS; SUBCUTANEOUS at 12:31

## 2018-01-03 RX ADMIN — RENAGEL SCH MG: 800 TABLET ORAL at 12:41

## 2018-01-03 RX ADMIN — CLOPIDOGREL BISULFATE SCH MG: 75 TABLET, FILM COATED ORAL at 07:36

## 2018-01-03 RX ADMIN — ACETAMINOPHEN SCH MG: 500 TABLET, COATED ORAL at 14:00

## 2018-01-03 RX ADMIN — ALUMINUM ZIRCONIUM TRICHLOROHYDREX GLY SCH EA: 0.2 STICK TOPICAL at 23:15

## 2018-01-03 RX ADMIN — ALUMINUM ZIRCONIUM TRICHLOROHYDREX GLY SCH EA: 0.2 STICK TOPICAL at 16:00

## 2018-01-03 RX ADMIN — DOCUSATE SODIUM SCH MG: 100 CAPSULE, LIQUID FILLED ORAL at 20:46

## 2018-01-03 RX ADMIN — ALUMINUM ZIRCONIUM TRICHLOROHYDREX GLY SCH EA: 0.2 STICK TOPICAL at 00:00

## 2018-01-03 RX ADMIN — IPRATROPIUM BROMIDE AND ALBUTEROL SULFATE SCH ML: .5; 3 SOLUTION RESPIRATORY (INHALATION) at 14:59

## 2018-01-03 RX ADMIN — ACETAMINOPHEN SCH MG: 500 TABLET, COATED ORAL at 07:38

## 2018-01-03 RX ADMIN — ASCORBIC ACID, THIAMINE MONONITRATE,RIBOFLAVIN, NIACINAMIDE, PYRIDOXINE HYDROCHLORIDE, FOLIC ACID, CYANOCOBALAMIN, BIOTIN, CALCIUM PANTOTHENATE, SCH CAP: 100; 1.5; 1.7; 20; 10; 1; 6000; 150000; 5 CAPSULE, LIQUID FILLED ORAL at 07:36

## 2018-01-03 RX ADMIN — IPRATROPIUM BROMIDE AND ALBUTEROL SULFATE SCH ML: .5; 3 SOLUTION RESPIRATORY (INHALATION) at 19:23

## 2018-01-03 RX ADMIN — PANTOPRAZOLE SCH MG: 40 TABLET, DELAYED RELEASE ORAL at 07:36

## 2018-01-03 RX ADMIN — ALUMINUM ZIRCONIUM TRICHLOROHYDREX GLY SCH EA: 0.2 STICK TOPICAL at 07:16

## 2018-01-03 RX ADMIN — INSULIN ASPART SCH UNITS: 100 INJECTION, SOLUTION INTRAVENOUS; SUBCUTANEOUS at 18:15

## 2018-01-03 NOTE — PROGRESS NOTE
Subjective


Date of Service:


Hunter 3, 2018.


Subjective


Pt evaluation today including:  conversation w/ patient, physical exam, lab 

review, review of studies, review of inpatient medication list


Saw/examined the patient in room 253


She is doing okay


Denies shortness of breath/chest pain/palpitations


No other issues to note





Review of Systems


Constitutional:  No fever, No chills, No weakness


Respiratory:  No cough, No sputum, No shortness of breath


Cardiac:  No chest pain, No edema, No palpitations


Abdomen:  No pain, No nausea, No vomiting, No diarrhea





Medications





Current Inpatient Medications








 Medications


  (Trade)  Dose


 Ordered  Sig/Daniel


 Route  Start Time


 Stop Time Status Last Admin


Dose Admin


 


 Heparin Sodium


  (Porcine)


  (Heparin Sq 5000


 Unit/0.5ml)  5,000 unit  Q8


 SQ  12/27/17 22:00


 1/26/18 21:59 Future Hold 1/1/18 21:16


5,000 UNIT


 


 Ondansetron HCl


  (Zofran Inj)  4 mg  Q6H  PRN


 IV  12/27/17 17:45


 1/26/18 17:44   


 


 


 Allopurinol


  (Zyloprim Tab)  100 mg  BID


 PO  12/27/17 21:00


 1/26/18 20:59  1/3/18 07:39


100 MG


 


 Aspirin


  (Ecotrin Tab)  81 mg  DAILY


 PO  12/28/17 09:00


 1/27/18 08:59  1/3/18 07:35


81 MG


 


 Atorvastatin


 Calcium


  (Lipitor Tab)  40 mg  DAILY


 PO  12/28/17 09:00


 1/27/18 08:59  1/3/18 07:36


40 MG


 


 Clopidogrel


 Bisulfate


  (plAVix TAB)  75 mg  DAILY


 PO  12/28/17 09:00


 1/27/18 08:59  1/3/18 07:36


75 MG


 


 Docusate Sodium


  (coLACE CAP)  100 mg  BID


 PO  12/27/17 21:00


 1/26/18 20:59  1/3/18 07:35


100 MG


 


 Pantoprazole


 Sodium


  (Protonix Tab)  40 mg  DAILY


 PO  12/28/17 09:00


 1/27/18 08:59  1/3/18 07:36


40 MG


 


 Pramipexole


 Dihydrochloride


  (miraPEX TAB)  0.25 mg  HS


 PO  12/27/17 21:00


 1/26/18 20:59  1/2/18 20:26


0.25 MG


 


 Sertraline HCl


  (Zoloft Tab)  75 mg  DAILY


 PO  12/28/17 09:00


 1/27/18 08:59  1/3/18 07:38


75 MG


 


 Thiamine HCl


  (Vitamin B-1 Tab)  50 mg  DAILY


 PO  12/28/17 09:00


 1/27/18 08:59  1/3/18 07:38


50 MG


 


 Tramadol HCl


  (Ultram Tab)  50 mg  Q4H  PRN


 PO  12/27/17 19:15


 1/26/18 19:14  1/3/18 06:15


50 MG


 


 Vitamin B Complex/


 Vit C/Folic Acid


  (Nephrocaps)  1 cap  DAILY


 PO  12/28/17 09:00


 1/27/18 08:59  1/3/18 07:36


1 CAP


 


 Miscellaneous


 Information


  (Order Awaiting


 Action)  1 ea  QS


 N/A  12/28/17 00:00


 1/27/18 00:00   


 


 


 Miscellaneous


 Information


  (Order Awaiting


 Action)  1 ea  QS


 N/A  12/28/17 00:00


 1/27/18 00:00   


 


 


 Miscellaneous


 Information


  (Order Awaiting


 Action)  1 ea  QS


 N/A  12/28/17 00:00


 1/27/18 00:00   


 


 


 Glucose


  (Glucose 40% Gel)  15-30


 GRAMS 15


 GRAMS...  UD  PRN


 PO  12/27/17 19:30


 1/26/18 19:29   


 


 


 Glucose


  (Glucose Chew


 Tab)  4-8


 Tablets 4


 Tabl...  UD  PRN


 PO  12/27/17 19:30


 1/26/18 19:29   


 


 


 Dextrose


  (Dextrose 50%


 50ML Syringe)  25-50ML OF


 50% DW IV


 FOR...  UD  PRN


 IV  12/27/17 19:30


 1/26/18 19:29   


 


 


 Glucagon


  (Glucagon Inj)  1 mg  UD  PRN


 SQ  12/27/17 19:30


 1/26/18 19:29   


 


 


 Miscellaneous


 Information


  (Consult


 Glycemic


 Management


 Pharmacy)  1 ea  UD  PRN


 N/A  12/28/17 12:14


 1/27/18 12:13   


 


 


 Prednisone


  (PredniSONE TAB)  40 mg  DAILY


 PO  12/29/17 09:00


 1/28/18 08:59  1/3/18 07:36


40 MG


 


 Sevelamer HCl


  (Renagel Tab)  2,400 mg  TIDM


 PO  12/30/17 16:45


 1/29/18 11:29  1/3/18 12:41


2,400 MG


 


 Acetaminophen


  (Tylenol Tab)  1,000 mg  TID


 PO  12/30/17 21:00


 1/29/18 20:59  1/3/18 07:38


1,000 MG


 


 Miconazole Nitrate


  (Desenex Powder)  1 appln  PRN  PRN


 EXT  12/30/17 21:15


 1/29/18 21:14  1/2/18 10:28


1 APPLN


 


 Insulin Aspart


  (novoLOG ASPART)  SLIDING


 SCALE  AC


 SC  1/1/18 11:00


 1/31/18 10:59  1/2/18 17:37


2 UNITS


 


 Insulin Aspart


  (novoLOG ASPART)  SLIDING


 SCALE  HS


 SC  1/1/18 21:00


 1/31/18 20:59  1/1/18 21:15


5 UNITS


 


 Albuterol/


 Ipratropium


  (Duoneb)  3 ml  QIDR


 INH  1/1/18 12:00


 1/31/18 11:59  1/3/18 07:50


3 ML


 


 Insulin Human NPH


  (novoLIN-N NPH)  see


 protocol


 text  QAM


 SC  1/3/18 09:00


 2/2/18 08:59  1/3/18 08:38


45 UNITS











Objective


Vital Signs











  Date Time  Temp Pulse Resp B/P (MAP) Pulse Ox O2 Delivery O2 Flow Rate FiO2


 


1/3/18 08:24 36.5 94 16 121/72 (88) 98 Room Air  


 


1/3/18 08:00      Room Air  


 


1/3/18 07:50  90 18  96 Room Air  


 


1/3/18 00:00      Room Air  


 


1/3/18 00:00 36.3 93 16 135/69 (91) 95 Room Air  


 


1/2/18 19:39  93 18  96 Room Air  


 


1/2/18 16:00     95 Room Air  


 


1/2/18 15:36  98 18  95 Room Air  


 


1/2/18 15:01 36.6 86 18 121/75 (90) 95 Room Air  











Physical Exam


General Appearance:  no apparent distress


Respiratory/Chest:  no respiratory distress, no accessory muscle use, + 

decreased breath sounds, + wheezing (diminished breath sounds and mild end 

expiratory wheezing)


Cardiovascular:  regular rate, rhythm, no edema, no murmur


Extremities:  normal inspection, no pedal edema





Laboratory Results





Last 24 Hours








Test


  1/2/18


16:29 1/2/18


19:34 1/3/18


05:36 1/3/18


07:16


 


Bedside Glucose 137 mg/dl  178 mg/dl   67 mg/dl 


 


White Blood Count   9.22 K/uL  


 


Red Blood Count   3.60 M/uL  


 


Hemoglobin   11.5 g/dL  


 


Hematocrit   37.6 %  


 


Mean Corpuscular Volume   104.4 fL  


 


Mean Corpuscular Hemoglobin   31.9 pg  


 


Mean Corpuscular Hemoglobin


Concent 


  


  30.6 g/dl 


  


 


 


RDW Standard Deviation   61.6 fL  


 


RDW Coefficient of Variation   16.3 %  


 


Platelet Count   211 K/uL  


 


Mean Platelet Volume   9.6 fL  


 


Sodium Level   140 mmol/L  


 


Potassium Level   4.2 mmol/L  


 


Chloride Level   108 mmol/L  


 


Carbon Dioxide Level   24 mmol/L  


 


Anion Gap   8.0 mmol/L  


 


Blood Urea Nitrogen   72 mg/dl  


 


Creatinine   6.21 mg/dl  


 


Est Creatinine Clear Calc


Drug Dose 


  


  11.1 ml/min 


  


 


 


Estimated GFR (


American) 


  


  7.9 


  


 


 


Estimated GFR (Non-


American 


  


  6.8 


  


 


 


BUN/Creatinine Ratio   11.6  


 


Random Glucose   61 mg/dl  


 


Calcium Level   9.3 mg/dl  


 


Test


  1/3/18


07:55 1/3/18


11:10 


  


 


 


Bedside Glucose 96 mg/dl  90 mg/dl   











Assessment and Plan


This is a 57 year old female with a PMH of ESRD on HD, DM2, HTN, asthma, hx. of 

aortic valve replacement - presents with syncopal episode





Syncope


secondary to missed dialysis and URI


1/3/18


no arrhythmias noted, no further syncope/dizziness noted





1/2/18


no arrhythmias noted on tele monitoring


No other apparent cause of syncopal episode - likely due to missed dialysis


monitor BP, monitor labs and sugars


TTE noted to have mitral stenosis





Hx. of Aortic Valve Replacement


r/o Mitral Valve Stenosis


WANDY performed today (1/2)


noted to have some stenosis, but likely due to high output as per cardiology


for now, no change in medications; ideally b-blocker dose should be titrated up 

- will monitor breathing status


cont. ASA and Plavix - outpatient cardiology f/u





Acute Asthma Exacerbation


secondary to URI vs. Viral Bronchitis


patient presented with wheezing/congestion


was initially on IV solu-medrol, but due to uncontrolled BSGs, now on oral 

prednisone


will likely taper in 1-2 days





ESRD on HD


dialysis on M-W-F


consulted nephrology for further management


continue current renal medications





Uncontrolled BSGs


in the setting of DM2


Ha1c = 6.3%


diet controlled at baseline


due to IV and PO steroids - BSGs elevated


glycemic control consult for further input


will try to taper steroids in AM





Depression


continue current medications





DVT ppx


subq heparin





FULL CODE

## 2018-01-03 NOTE — PHARMACY PROGRESS NOTE
Pharmacy Glycemic Short Note 2


Date of Service


Hunter 3, 2018.





OUTPATIENT ANTIDIABETIC REGIMEN: 


* Dr. Escobar spoke with patient - she does not take anything as an outpatient (

compared to Lantus 5 units on med rec)








ASSESSMENT:





1/3/18


* Ms. Bro received 52 units of insulin yesterday with BSGs ranging from 61-

178 mg/dL


* For the first time in days, she did not have a BSG above 300 in the 

evening..however, her fasting was slightly low this AM


* Her PO intake was fairly low yesterday because of being NPO for most of the AM


* The 50 units of NPH may have been a little too much with less PO intake


* For today, will plan to reduce the NPH to 45 units to be given with the 

prednisone.  She refused AM Novolog carb coverage and remains at 90 mg/dL prior 

to lunch, so I will remove the carb ratio altogether.








1/2/18


* Ms. Bro received 60 units of insulin yesterday with BSGs ranging from 89-

301 mg/dL in the past 24 hours


* She was NPO this AM for a WANDY but I spoke with the nurse ~1030 and gag reflex 

has returned so diet will be resumed


* She continues to be very sensitive to the prednisone, with BSGs as high as 

300 by HS last night


* Since she received 60 units of insulin yesterday, I feel comfortable 

increasing her NPH to 50 units.  This will be given with the prednisone again 

today, to match the pharmacokinetics of the steroid.


* I feel she could continue with the current CR but since I'm increasing the NPH

, will loosen the CR slightly


* Current HS Novolog is appropriate and has not caused any further lows since 

adjusted a few days ago








PLAN FOR INPATIENT GLYCEMIC CONTROL:





* Reduce NPH 45 units today w/ prednisone dose


 * I have added a scale for the NPH depending on the prednisone dose as notes 

indicated this would be tapered in the near future


* Novolog ACHS


 * Goal 110-140 (140-180 at HS)


 * CF 30 mg/dL/unit


 * REMOVE CR








* Please note that the plan above was derived based on current level of insulin 

resistance and hospital stress. These recommendations are appropriate for 

inpatient admission only. Plan of care upon discharge will need to be 

reassessed to avoid potential outpatient hypo/hyperglycemia. 





Thank you.

## 2018-01-04 VITALS
SYSTOLIC BLOOD PRESSURE: 110 MMHG | OXYGEN SATURATION: 94 % | DIASTOLIC BLOOD PRESSURE: 65 MMHG | TEMPERATURE: 98.24 F | HEART RATE: 105 BPM

## 2018-01-04 VITALS — HEART RATE: 96 BPM | OXYGEN SATURATION: 95 %

## 2018-01-04 VITALS
TEMPERATURE: 98.06 F | DIASTOLIC BLOOD PRESSURE: 69 MMHG | OXYGEN SATURATION: 96 % | HEART RATE: 95 BPM | SYSTOLIC BLOOD PRESSURE: 113 MMHG

## 2018-01-04 VITALS
DIASTOLIC BLOOD PRESSURE: 74 MMHG | TEMPERATURE: 98.78 F | SYSTOLIC BLOOD PRESSURE: 122 MMHG | OXYGEN SATURATION: 96 % | HEART RATE: 92 BPM

## 2018-01-04 VITALS — HEART RATE: 87 BPM | OXYGEN SATURATION: 98 %

## 2018-01-04 VITALS — OXYGEN SATURATION: 94 %

## 2018-01-04 LAB
BUN SERPL-MCNC: 47 MG/DL (ref 7–18)
CALCIUM SERPL-MCNC: 9.1 MG/DL (ref 8.5–10.1)
CO2 SERPL-SCNC: 24 MMOL/L (ref 21–32)
CREAT SERPL-MCNC: 4.75 MG/DL (ref 0.6–1.2)
EOSINOPHIL NFR BLD AUTO: 187 K/UL (ref 130–400)
GLUCOSE SERPL-MCNC: 79 MG/DL (ref 70–99)
HCT VFR BLD CALC: 40 % (ref 37–47)
HGB BLD-MCNC: 12.1 G/DL (ref 12–16)
MCH RBC QN AUTO: 31.9 PG (ref 25–34)
MCHC RBC AUTO-ENTMCNC: 30.3 G/DL (ref 32–36)
MCV RBC AUTO: 105.5 FL (ref 80–100)
PMV BLD AUTO: 9.4 FL (ref 7.4–10.4)
POTASSIUM SERPL-SCNC: 3.9 MMOL/L (ref 3.5–5.1)
RED CELL DISTRIBUTION WIDTH CV: 16.3 % (ref 11.5–14.5)
RED CELL DISTRIBUTION WIDTH SD: 63 FL (ref 36.4–46.3)
SODIUM SERPL-SCNC: 141 MMOL/L (ref 136–145)
WBC # BLD AUTO: 9.57 K/UL (ref 4.8–10.8)

## 2018-01-04 RX ADMIN — ACETAMINOPHEN SCH MG: 500 TABLET, COATED ORAL at 08:38

## 2018-01-04 RX ADMIN — DOCUSATE SODIUM SCH MG: 100 CAPSULE, LIQUID FILLED ORAL at 08:37

## 2018-01-04 RX ADMIN — RENAGEL SCH MG: 800 TABLET ORAL at 12:38

## 2018-01-04 RX ADMIN — ALLOPURINOL SCH MG: 100 TABLET ORAL at 20:42

## 2018-01-04 RX ADMIN — IPRATROPIUM BROMIDE AND ALBUTEROL SULFATE SCH ML: .5; 3 SOLUTION RESPIRATORY (INHALATION) at 07:10

## 2018-01-04 RX ADMIN — ALUMINUM ZIRCONIUM TRICHLOROHYDREX GLY SCH EA: 0.2 STICK TOPICAL at 15:17

## 2018-01-04 RX ADMIN — Medication SCH MG: at 08:39

## 2018-01-04 RX ADMIN — ACETAMINOPHEN SCH MG: 500 TABLET, COATED ORAL at 14:00

## 2018-01-04 RX ADMIN — PANTOPRAZOLE SCH MG: 40 TABLET, DELAYED RELEASE ORAL at 08:37

## 2018-01-04 RX ADMIN — ASCORBIC ACID, THIAMINE MONONITRATE,RIBOFLAVIN, NIACINAMIDE, PYRIDOXINE HYDROCHLORIDE, FOLIC ACID, CYANOCOBALAMIN, BIOTIN, CALCIUM PANTOTHENATE, SCH CAP: 100; 1.5; 1.7; 20; 10; 1; 6000; 150000; 5 CAPSULE, LIQUID FILLED ORAL at 08:37

## 2018-01-04 RX ADMIN — ATORVASTATIN CALCIUM SCH MG: 40 TABLET, FILM COATED ORAL at 08:37

## 2018-01-04 RX ADMIN — IPRATROPIUM BROMIDE AND ALBUTEROL SULFATE SCH ML: .5; 3 SOLUTION RESPIRATORY (INHALATION) at 20:11

## 2018-01-04 RX ADMIN — RENAGEL SCH MG: 800 TABLET ORAL at 17:48

## 2018-01-04 RX ADMIN — RENAGEL SCH MG: 800 TABLET ORAL at 08:37

## 2018-01-04 RX ADMIN — INSULIN ASPART SCH UNITS: 100 INJECTION, SOLUTION INTRAVENOUS; SUBCUTANEOUS at 08:40

## 2018-01-04 RX ADMIN — ALLOPURINOL SCH MG: 100 TABLET ORAL at 08:39

## 2018-01-04 RX ADMIN — IPRATROPIUM BROMIDE AND ALBUTEROL SULFATE SCH ML: .5; 3 SOLUTION RESPIRATORY (INHALATION) at 11:15

## 2018-01-04 RX ADMIN — DOCUSATE SODIUM SCH MG: 100 CAPSULE, LIQUID FILLED ORAL at 20:42

## 2018-01-04 RX ADMIN — INSULIN ASPART SCH UNITS: 100 INJECTION, SOLUTION INTRAVENOUS; SUBCUTANEOUS at 20:47

## 2018-01-04 RX ADMIN — SERTRALINE HYDROCHLORIDE SCH MG: 50 TABLET, FILM COATED ORAL at 08:39

## 2018-01-04 RX ADMIN — TRAMADOL HYDROCHLORIDE PRN MG: 50 TABLET, COATED ORAL at 10:20

## 2018-01-04 RX ADMIN — HUMAN INSULIN SCH UNITS: 100 INJECTION, SUSPENSION SUBCUTANEOUS at 08:35

## 2018-01-04 RX ADMIN — Medication SCH MG: at 08:37

## 2018-01-04 RX ADMIN — ALUMINUM ZIRCONIUM TRICHLOROHYDREX GLY SCH EA: 0.2 STICK TOPICAL at 23:18

## 2018-01-04 RX ADMIN — IPRATROPIUM BROMIDE AND ALBUTEROL SULFATE SCH ML: .5; 3 SOLUTION RESPIRATORY (INHALATION) at 18:02

## 2018-01-04 RX ADMIN — ACETAMINOPHEN SCH MG: 500 TABLET, COATED ORAL at 20:42

## 2018-01-04 RX ADMIN — CLOPIDOGREL BISULFATE SCH MG: 75 TABLET, FILM COATED ORAL at 08:37

## 2018-01-04 RX ADMIN — ALUMINUM ZIRCONIUM TRICHLOROHYDREX GLY SCH EA: 0.2 STICK TOPICAL at 08:00

## 2018-01-04 RX ADMIN — INSULIN ASPART SCH UNITS: 100 INJECTION, SOLUTION INTRAVENOUS; SUBCUTANEOUS at 12:30

## 2018-01-04 RX ADMIN — ALUMINUM ZIRCONIUM TRICHLOROHYDREX GLY SCH EA: 0.2 STICK TOPICAL at 05:51

## 2018-01-04 RX ADMIN — PRAMIPEXOLE DIHYDROCHLORIDE SCH MG: 0.25 TABLET ORAL at 20:43

## 2018-01-04 NOTE — PROGRESS NOTE
DATE: 01/04/2018

 

SUBJECTIVE:  The patient is a 57-year-old female with end-stage renal disease

on chronic hemodialysis Monday, Wednesday, Friday, who was admitted with COPD

flare up.  She had dialysis on Wednesday.  At this time she feels her

breathing is better by about 75%.  Denies any chest pain or any new

complaints.

 

PHYSICAL EXAMINATION:

VITAL SIGNS:  Blood pressure is 122/74, 96% on room air, temperature 37.1,

pulse rate 92 per minute.

HEENT:  Mucous membrane is moist.

NECK:  Supple.  No jugular venous distention.

CHEST:  Bilateral diminished breath sound with lot of expiratory wheezing

bilaterally.

CARDIOVASCULAR:  S1 and S2 are regular, distant heart sounds.

ABDOMEN:  Soft, nontender, obese.

EXTREMITIES:  Shows trace edema.

 

ASSESSMENT AND PLAN:

1.  End-stage renal disease.  She is scheduled for dialysis tomorrow.  We

will try to take about 2-3 kilo of fluid as tolerated.  She will be done as

per her prescriptions otherwise.

2.  Anemia of renal failure.  We will continue to do Procrit on dialysis.

3.  Respiratory distress status.  Her breathing seems to be lot better,

although on exam she still has a very extensive wheezing this might actually

be chronic.

## 2018-01-04 NOTE — PROGRESS NOTE
Subjective


Date of Service:


Jan 4, 2018.


Subjective


Pt evaluation today including:  conversation w/ patient, physical exam, lab 

review, review of studies, review of inpatient medication list


Saw/examined the patient in room 253


No problems/issues, states her breathing is much better


Tells me she is having trouble finding someone to pick her up


States she would like to have dialysis in the morning (Friday) and then d/c 

after that


Denies any other symptoms at this time





Review of Systems


Constitutional:  + weakness, No fever, No chills


Respiratory:  + cough, + sputum, No wheezing, No shortness of breath, No 

dyspnea on exertion


Cardiac:  No chest pain


Abdomen:  No pain, No nausea, No vomiting, No diarrhea





Medications





Current Inpatient Medications








 Medications


  (Trade)  Dose


 Ordered  Sig/Daniel


 Route  Start Time


 Stop Time Status Last Admin


Dose Admin


 


 Heparin Sodium


  (Porcine)


  (Heparin Sq 5000


 Unit/0.5ml)  5,000 unit  Q8


 SQ  12/27/17 22:00


 1/26/18 21:59 Future Hold 1/1/18 21:16


5,000 UNIT


 


 Ondansetron HCl


  (Zofran Inj)  4 mg  Q6H  PRN


 IV  12/27/17 17:45


 1/26/18 17:44   


 


 


 Allopurinol


  (Zyloprim Tab)  100 mg  BID


 PO  12/27/17 21:00


 1/26/18 20:59  1/4/18 08:39


100 MG


 


 Aspirin


  (Ecotrin Tab)  81 mg  DAILY


 PO  12/28/17 09:00


 1/27/18 08:59  1/4/18 08:37


81 MG


 


 Atorvastatin


 Calcium


  (Lipitor Tab)  40 mg  DAILY


 PO  12/28/17 09:00


 1/27/18 08:59  1/4/18 08:37


40 MG


 


 Clopidogrel


 Bisulfate


  (plAVix TAB)  75 mg  DAILY


 PO  12/28/17 09:00


 1/27/18 08:59  1/4/18 08:37


75 MG


 


 Docusate Sodium


  (coLACE CAP)  100 mg  BID


 PO  12/27/17 21:00


 1/26/18 20:59  1/4/18 08:37


100 MG


 


 Pantoprazole


 Sodium


  (Protonix Tab)  40 mg  DAILY


 PO  12/28/17 09:00


 1/27/18 08:59  1/4/18 08:37


40 MG


 


 Pramipexole


 Dihydrochloride


  (miraPEX TAB)  0.25 mg  HS


 PO  12/27/17 21:00


 1/26/18 20:59  1/3/18 20:47


0.25 MG


 


 Sertraline HCl


  (Zoloft Tab)  75 mg  DAILY


 PO  12/28/17 09:00


 1/27/18 08:59  1/4/18 08:39


75 MG


 


 Thiamine HCl


  (Vitamin B-1 Tab)  50 mg  DAILY


 PO  12/28/17 09:00


 1/27/18 08:59  1/4/18 08:39


50 MG


 


 Tramadol HCl


  (Ultram Tab)  50 mg  Q4H  PRN


 PO  12/27/17 19:15


 1/26/18 19:14  1/4/18 10:20


50 MG


 


 Vitamin B Complex/


 Vit C/Folic Acid


  (Nephrocaps)  1 cap  DAILY


 PO  12/28/17 09:00


 1/27/18 08:59  1/4/18 08:37


1 CAP


 


 Miscellaneous


 Information


  (Order Awaiting


 Action)  1 ea  QS


 N/A  12/28/17 00:00


 1/27/18 00:00   


 


 


 Miscellaneous


 Information


  (Order Awaiting


 Action)  1 ea  QS


 N/A  12/28/17 00:00


 1/27/18 00:00   


 


 


 Miscellaneous


 Information


  (Order Awaiting


 Action)  1 ea  QS


 N/A  12/28/17 00:00


 1/27/18 00:00   


 


 


 Glucose


  (Glucose 40% Gel)  15-30


 GRAMS 15


 GRAMS...  UD  PRN


 PO  12/27/17 19:30


 1/26/18 19:29   


 


 


 Glucose


  (Glucose Chew


 Tab)  4-8


 Tablets 4


 Tabl...  UD  PRN


 PO  12/27/17 19:30


 1/26/18 19:29   


 


 


 Dextrose


  (Dextrose 50%


 50ML Syringe)  25-50ML OF


 50% DW IV


 FOR...  UD  PRN


 IV  12/27/17 19:30


 1/26/18 19:29   


 


 


 Glucagon


  (Glucagon Inj)  1 mg  UD  PRN


 SQ  12/27/17 19:30


 1/26/18 19:29   


 


 


 Miscellaneous


 Information


  (Consult


 Glycemic


 Management


 Pharmacy)  1 ea  UD  PRN


 N/A  12/28/17 12:14


 1/27/18 12:13   


 


 


 Prednisone


  (PredniSONE TAB)  40 mg  DAILY


 PO  12/29/17 09:00


 1/28/18 08:59  1/4/18 08:37


40 MG


 


 Sevelamer HCl


  (Renagel Tab)  2,400 mg  TIDM


 PO  12/30/17 16:45


 1/29/18 11:29  1/4/18 17:48


2,400 MG


 


 Acetaminophen


  (Tylenol Tab)  1,000 mg  TID


 PO  12/30/17 21:00


 1/29/18 20:59  1/4/18 08:38


1,000 MG


 


 Miconazole Nitrate


  (Desenex Powder)  1 appln  PRN  PRN


 EXT  12/30/17 21:15


 1/29/18 21:14  1/2/18 10:28


1 APPLN


 


 Insulin Aspart


  (novoLOG ASPART)  SLIDING


 SCALE  HS


 SC  1/1/18 21:00


 1/31/18 20:59  1/3/18 20:49


2 UNITS


 


 Albuterol/


 Ipratropium


  (Duoneb)  3 ml  QIDR


 INH  1/1/18 12:00


 1/31/18 11:59  1/4/18 07:10


3 ML


 


 Insulin Human NPH


  (novoLIN-N NPH)  see


 protocol


 text  QAM


 SC  1/3/18 09:00


 2/2/18 08:59  1/4/18 08:35


40 UNITS


 


 Insulin Aspart


  (novoLOG ASPART)  SLIDING


 SCALE  0630,1100


 SC  1/4/18 11:00


 2/3/18 10:59   


 


 


 Insulin Aspart


  (novoLOG ASPART)  SLIDING


 SCALE  1630


 SC  1/4/18 16:30


 2/3/18 16:29  1/4/18 17:48


9 UNITS


 


 Albuterol/


 Ipratropium


  (Duoneb)  3 ml  QID  PRN


 INH  1/4/18 16:00


 2/3/18 15:59   


 











Objective


Vital Signs











  Date Time  Temp Pulse Resp B/P (MAP) Pulse Ox O2 Delivery O2 Flow Rate FiO2


 


1/4/18 15:50     94 Room Air  


 


1/4/18 15:40 36.8 105 18 110/65 (80) 94 Room Air  


 


1/4/18 08:00      Room Air  


 


1/4/18 07:49 37.1 92 16 122/74 (90) 96 Room Air  


 


1/4/18 07:10  96 18  95 Room Air  


 


1/4/18 00:00      Room Air  


 


1/3/18 23:46 36.6 95 18 89/50 (63) 95 Room Air  


 


1/3/18 19:24  92 16  97 Room Air  











Physical Exam


General Appearance:  no apparent distress


Respiratory/Chest:  no respiratory distress, no accessory muscle use, + 

decreased breath sounds, + wheezing


Cardiovascular:  regular rate, rhythm, no edema, no murmur


Extremities:  normal inspection, no pedal edema


Neurologic/Psychiatric:  no motor/sensory deficits, alert, normal mood/affect





Laboratory Results





Last 24 Hours








Test


  1/3/18


20:33 1/4/18


07:35 1/4/18


07:50 1/4/18


08:01


 


Bedside Glucose 236 mg/dl  64 mg/dl   98 mg/dl 


 


White Blood Count   9.57 K/uL  


 


Red Blood Count   3.79 M/uL  


 


Hemoglobin   12.1 g/dL  


 


Hematocrit   40.0 %  


 


Mean Corpuscular Volume   105.5 fL  


 


Mean Corpuscular Hemoglobin   31.9 pg  


 


Mean Corpuscular Hemoglobin


Concent 


  


  30.3 g/dl 


  


 


 


RDW Standard Deviation   63.0 fL  


 


RDW Coefficient of Variation   16.3 %  


 


Platelet Count   187 K/uL  


 


Mean Platelet Volume   9.4 fL  


 


Sodium Level   141 mmol/L  


 


Potassium Level   3.9 mmol/L  


 


Chloride Level   108 mmol/L  


 


Carbon Dioxide Level   24 mmol/L  


 


Anion Gap   9.0 mmol/L  


 


Blood Urea Nitrogen   47 mg/dl  


 


Creatinine   4.75 mg/dl  


 


Est Creatinine Clear Calc


Drug Dose 


  


  14.5 ml/min 


  


 


 


Estimated GFR (


American) 


  


  10.9 


  


 


 


Estimated GFR (Non-


American 


  


  9.4 


  


 


 


BUN/Creatinine Ratio   9.9  


 


Random Glucose   79 mg/dl  


 


Calcium Level   9.1 mg/dl  


 


Test


  1/4/18


11:03 1/4/18


16:25 


  


 


 


Bedside Glucose 118 mg/dl  233 mg/dl   











Assessment and Plan


This is a 57 year old female with a PMH of ESRD on HD, DM2, HTN, asthma, hx. of 

aortic valve replacement - presents with syncopal episode





Syncope


secondary to missed dialysis and URI


1/4


clinically doing well


will d/c in AM after dialysis


will d/c with prednisone taper





1/3/18


no arrhythmias noted, no further syncope/dizziness noted





1/2/18


no arrhythmias noted on tele monitoring


No other apparent cause of syncopal episode - likely due to missed dialysis


monitor BP, monitor labs and sugars


TTE noted to have mitral stenosis





Hx. of Aortic Valve Replacement


r/o Mitral Valve Stenosis


WANDY performed today (1/2)


noted to have some stenosis, but likely due to high output as per cardiology


for now, no change in medications; ideally b-blocker dose should be titrated up 

- will monitor breathing status


cont. ASA and Plavix - outpatient cardiology f/u





Acute Asthma Exacerbation


secondary to URI vs. Viral Bronchitis


patient presented with wheezing/congestion


was initially on IV solu-medrol, but due to uncontrolled BSGs, now on oral 

prednisone


will likely taper in 1-2 days





ESRD on HD


dialysis on M-W-F


consulted nephrology for further management


continue current renal medications





Uncontrolled BSGs


in the setting of DM2


Ha1c = 6.3%


diet controlled at baseline


due to IV and PO steroids - BSGs elevated


glycemic control consult for further input


will try to taper steroids in AM





Depression


continue current medications





DVT ppx


subq heparin





FULL CODE

## 2018-01-04 NOTE — PHARMACY PROGRESS NOTE
Pharmacy Glycemic Short Note 2


Date of Service


Jan 4, 2018.





OUTPATIENT ANTIDIABETIC REGIMEN: 


* Dr. Escobar spoke with patient - she does not take anything as an outpatient (

compared to Lantus 5 units on med rec)








ASSESSMENT:





1/4/18


* Ms. Bro received 48 units of insulin yesterday with BSGs ranging from 64-

236 mg/dL in the past 24 hours


* No changes to causes of insulin resistance


* Her fasting continues to be low again today.  She did receive some 

correctional insulin last night but I don't think it caused the low fasting.


* She did have some postprandial elevation yesterday but not as high as it had 

previously been, especially without carb coverage.  Of note, it was documented 

that she consumed 100% of breakfast and lunch yesterday and did not receive any 

additional Novolog coverage.


* At this point, I don't have a choice except to reduce the NPH back to 40 

units.  This provides less postprandial coverage but should prevent AM 

hypoglycemia.


* Since her HS BSG tends to be the highest, I will add back a conservative carb 

ratio with dinner ONLY








1/3/18


* Ms. Bro received 52 units of insulin yesterday with BSGs ranging from 61-

178 mg/dL


* For the first time in days, she did not have a BSG above 300 in the 

evening..however, her fasting was slightly low this AM


* Her PO intake was fairly low yesterday because of being NPO for most of the AM


* The 50 units of NPH may have been a little too much with less PO intake


* For today, will plan to reduce the NPH to 45 units to be given with the 

prednisone.  She refused AM Novolog carb coverage and remains at 90 mg/dL prior 

to lunch, so I will remove the carb ratio altogether.








1/2/18


* Ms. Bro received 60 units of insulin yesterday with BSGs ranging from 89-

301 mg/dL in the past 24 hours


* She was NPO this AM for a WANDY but I spoke with the nurse ~1030 and gag reflex 

has returned so diet will be resumed


* She continues to be very sensitive to the prednisone, with BSGs as high as 

300 by HS last night


* Since she received 60 units of insulin yesterday, I feel comfortable 

increasing her NPH to 50 units.  This will be given with the prednisone again 

today, to match the pharmacokinetics of the steroid.


* I feel she could continue with the current CR but since I'm increasing the NPH

, will loosen the CR slightly


* Current HS Novolog is appropriate and has not caused any further lows since 

adjusted a few days ago








PLAN FOR INPATIENT GLYCEMIC CONTROL:





* Reduce NPH to 40 units today w/ prednisone dose


 * 30 units for prednisone 30 mg, 20 units for prednisone 20 mg, 10 units for 

prednisone 10 mg


* Novolog ACHS


 * Goal 110-140 (140-180 at HS)


 * CF 30 mg/dL/unit


 * Add carb ratio of 15 for dinner only








* Please note that the plan above was derived based on current level of insulin 

resistance and hospital stress. These recommendations are appropriate for 

inpatient admission only. Plan of care upon discharge will need to be 

reassessed to avoid potential outpatient hypo/hyperglycemia. 





Thank you.

## 2018-01-05 VITALS — HEART RATE: 100 BPM | DIASTOLIC BLOOD PRESSURE: 65 MMHG | SYSTOLIC BLOOD PRESSURE: 119 MMHG

## 2018-01-05 VITALS — HEART RATE: 100 BPM | DIASTOLIC BLOOD PRESSURE: 64 MMHG | SYSTOLIC BLOOD PRESSURE: 116 MMHG

## 2018-01-05 VITALS
TEMPERATURE: 98.06 F | SYSTOLIC BLOOD PRESSURE: 108 MMHG | OXYGEN SATURATION: 96 % | DIASTOLIC BLOOD PRESSURE: 63 MMHG | HEART RATE: 95 BPM

## 2018-01-05 VITALS — DIASTOLIC BLOOD PRESSURE: 65 MMHG | HEART RATE: 99 BPM | SYSTOLIC BLOOD PRESSURE: 120 MMHG

## 2018-01-05 VITALS — DIASTOLIC BLOOD PRESSURE: 61 MMHG | SYSTOLIC BLOOD PRESSURE: 118 MMHG | HEART RATE: 100 BPM

## 2018-01-05 VITALS — HEART RATE: 87 BPM | SYSTOLIC BLOOD PRESSURE: 109 MMHG | TEMPERATURE: 97.52 F | DIASTOLIC BLOOD PRESSURE: 59 MMHG

## 2018-01-05 VITALS — DIASTOLIC BLOOD PRESSURE: 71 MMHG | HEART RATE: 77 BPM | SYSTOLIC BLOOD PRESSURE: 115 MMHG | TEMPERATURE: 97.52 F

## 2018-01-05 VITALS — DIASTOLIC BLOOD PRESSURE: 63 MMHG | HEART RATE: 98 BPM | SYSTOLIC BLOOD PRESSURE: 109 MMHG

## 2018-01-05 VITALS — OXYGEN SATURATION: 98 %

## 2018-01-05 VITALS — HEART RATE: 99 BPM | SYSTOLIC BLOOD PRESSURE: 119 MMHG | DIASTOLIC BLOOD PRESSURE: 71 MMHG

## 2018-01-05 VITALS — HEART RATE: 106 BPM | OXYGEN SATURATION: 96 %

## 2018-01-05 VITALS — SYSTOLIC BLOOD PRESSURE: 132 MMHG | HEART RATE: 98 BPM | DIASTOLIC BLOOD PRESSURE: 72 MMHG

## 2018-01-05 VITALS — HEART RATE: 100 BPM | SYSTOLIC BLOOD PRESSURE: 107 MMHG | DIASTOLIC BLOOD PRESSURE: 52 MMHG

## 2018-01-05 VITALS — HEART RATE: 100 BPM | DIASTOLIC BLOOD PRESSURE: 66 MMHG | SYSTOLIC BLOOD PRESSURE: 115 MMHG

## 2018-01-05 VITALS — DIASTOLIC BLOOD PRESSURE: 69 MMHG | HEART RATE: 102 BPM | SYSTOLIC BLOOD PRESSURE: 124 MMHG | OXYGEN SATURATION: 96 %

## 2018-01-05 VITALS — HEART RATE: 102 BPM | OXYGEN SATURATION: 95 %

## 2018-01-05 VITALS — DIASTOLIC BLOOD PRESSURE: 71 MMHG | SYSTOLIC BLOOD PRESSURE: 124 MMHG | HEART RATE: 105 BPM

## 2018-01-05 VITALS — SYSTOLIC BLOOD PRESSURE: 108 MMHG | DIASTOLIC BLOOD PRESSURE: 69 MMHG | HEART RATE: 103 BPM

## 2018-01-05 VITALS — OXYGEN SATURATION: 96 % | HEART RATE: 100 BPM

## 2018-01-05 VITALS — OXYGEN SATURATION: 96 % | HEART RATE: 81 BPM

## 2018-01-05 LAB
BUN SERPL-MCNC: 59 MG/DL (ref 7–18)
CALCIUM SERPL-MCNC: 8.6 MG/DL (ref 8.5–10.1)
CO2 SERPL-SCNC: 23 MMOL/L (ref 21–32)
CREAT SERPL-MCNC: 5.31 MG/DL (ref 0.6–1.2)
EOSINOPHIL NFR BLD AUTO: 173 K/UL (ref 130–400)
GLUCOSE SERPL-MCNC: 63 MG/DL (ref 70–99)
HCT VFR BLD CALC: 38.2 % (ref 37–47)
HGB BLD-MCNC: 11.6 G/DL (ref 12–16)
MCH RBC QN AUTO: 32 PG (ref 25–34)
MCHC RBC AUTO-ENTMCNC: 30.4 G/DL (ref 32–36)
MCV RBC AUTO: 105.5 FL (ref 80–100)
PMV BLD AUTO: 9.7 FL (ref 7.4–10.4)
POTASSIUM SERPL-SCNC: 4.1 MMOL/L (ref 3.5–5.1)
RED CELL DISTRIBUTION WIDTH CV: 15.9 % (ref 11.5–14.5)
RED CELL DISTRIBUTION WIDTH SD: 61 FL (ref 36.4–46.3)
SODIUM SERPL-SCNC: 142 MMOL/L (ref 136–145)
WBC # BLD AUTO: 8.96 K/UL (ref 4.8–10.8)

## 2018-01-05 RX ADMIN — ALUMINUM ZIRCONIUM TRICHLOROHYDREX GLY SCH EA: 0.2 STICK TOPICAL at 16:00

## 2018-01-05 RX ADMIN — HUMAN INSULIN SCH UNITS: 100 INJECTION, SUSPENSION SUBCUTANEOUS at 08:31

## 2018-01-05 RX ADMIN — ALUMINUM ZIRCONIUM TRICHLOROHYDREX GLY SCH EA: 0.2 STICK TOPICAL at 23:06

## 2018-01-05 RX ADMIN — PRAMIPEXOLE DIHYDROCHLORIDE SCH MG: 0.25 TABLET ORAL at 21:05

## 2018-01-05 RX ADMIN — IPRATROPIUM BROMIDE AND ALBUTEROL SULFATE SCH ML: .5; 3 SOLUTION RESPIRATORY (INHALATION) at 07:02

## 2018-01-05 RX ADMIN — ASCORBIC ACID, THIAMINE MONONITRATE,RIBOFLAVIN, NIACINAMIDE, PYRIDOXINE HYDROCHLORIDE, FOLIC ACID, CYANOCOBALAMIN, BIOTIN, CALCIUM PANTOTHENATE, SCH CAP: 100; 1.5; 1.7; 20; 10; 1; 6000; 150000; 5 CAPSULE, LIQUID FILLED ORAL at 08:36

## 2018-01-05 RX ADMIN — SERTRALINE HYDROCHLORIDE SCH MG: 50 TABLET, FILM COATED ORAL at 08:37

## 2018-01-05 RX ADMIN — IPRATROPIUM BROMIDE AND ALBUTEROL SULFATE SCH ML: .5; 3 SOLUTION RESPIRATORY (INHALATION) at 15:25

## 2018-01-05 RX ADMIN — ATORVASTATIN CALCIUM SCH MG: 40 TABLET, FILM COATED ORAL at 08:36

## 2018-01-05 RX ADMIN — INSULIN ASPART SCH UNITS: 100 INJECTION, SOLUTION INTRAVENOUS; SUBCUTANEOUS at 12:44

## 2018-01-05 RX ADMIN — TRAMADOL HYDROCHLORIDE PRN MG: 50 TABLET, COATED ORAL at 08:49

## 2018-01-05 RX ADMIN — DOCUSATE SODIUM SCH MG: 100 CAPSULE, LIQUID FILLED ORAL at 08:36

## 2018-01-05 RX ADMIN — RENAGEL SCH MG: 800 TABLET ORAL at 12:38

## 2018-01-05 RX ADMIN — ACETAMINOPHEN SCH MG: 500 TABLET, COATED ORAL at 21:05

## 2018-01-05 RX ADMIN — Medication SCH MG: at 08:36

## 2018-01-05 RX ADMIN — ACETAMINOPHEN SCH MG: 500 TABLET, COATED ORAL at 14:00

## 2018-01-05 RX ADMIN — RENAGEL SCH MG: 800 TABLET ORAL at 08:36

## 2018-01-05 RX ADMIN — DOCUSATE SODIUM SCH MG: 100 CAPSULE, LIQUID FILLED ORAL at 21:04

## 2018-01-05 RX ADMIN — INSULIN ASPART SCH UNITS: 100 INJECTION, SOLUTION INTRAVENOUS; SUBCUTANEOUS at 18:50

## 2018-01-05 RX ADMIN — RENAGEL SCH MG: 800 TABLET ORAL at 18:46

## 2018-01-05 RX ADMIN — ALLOPURINOL SCH MG: 100 TABLET ORAL at 21:06

## 2018-01-05 RX ADMIN — IPRATROPIUM BROMIDE AND ALBUTEROL SULFATE SCH ML: .5; 3 SOLUTION RESPIRATORY (INHALATION) at 19:37

## 2018-01-05 RX ADMIN — INSULIN ASPART SCH UNITS: 100 INJECTION, SOLUTION INTRAVENOUS; SUBCUTANEOUS at 06:30

## 2018-01-05 RX ADMIN — ALLOPURINOL SCH MG: 100 TABLET ORAL at 08:37

## 2018-01-05 RX ADMIN — ACETAMINOPHEN SCH MG: 500 TABLET, COATED ORAL at 08:37

## 2018-01-05 RX ADMIN — ALUMINUM ZIRCONIUM TRICHLOROHYDREX GLY SCH EA: 0.2 STICK TOPICAL at 23:07

## 2018-01-05 RX ADMIN — CLOPIDOGREL BISULFATE SCH MG: 75 TABLET, FILM COATED ORAL at 08:38

## 2018-01-05 RX ADMIN — ALUMINUM ZIRCONIUM TRICHLOROHYDREX GLY SCH EA: 0.2 STICK TOPICAL at 08:00

## 2018-01-05 RX ADMIN — PANTOPRAZOLE SCH MG: 40 TABLET, DELAYED RELEASE ORAL at 08:36

## 2018-01-05 RX ADMIN — IPRATROPIUM BROMIDE AND ALBUTEROL SULFATE SCH ML: .5; 3 SOLUTION RESPIRATORY (INHALATION) at 11:13

## 2018-01-05 NOTE — DISCHARGE SUMMARY
Discharge Summary


Date of Service


Jan 5, 2018.





Discharge Summary


Admission Date:


Dec 27, 2017 at 16:53


Discharge Date:  Jan 5, 2018


Discharge Disposition:  Home


Principal Diagnosis:


Syncope


ESRD on HD


Acute Asthma Exacerbation


Consultations:


Nephro, Pulm





Medication Reconciliation


New Medications:  


Insulin Human NPH (Humulin N) 100 Units/Ml Susp


40 UNITS SC UD for 8 Days, #1 VIAL





Prednisone Tab (Prednisone) 10 Mg Tab


10 MG PO UD for 8 Days, #20 TAB





 


Continued Medications:  


Albuterol Sulfate (Proair Respiclick) 108 Mcg/Act Aer


2 PUFFS INH PRN for Shortness of Breath





Allopurinol (Zyloprim) 100 Mg Tab


100 MG PO BID





Aspirin (Aspirin Chewable) 81 Mg Chew


81 MG PO DAILY





Atorvastatin (Lipitor) 40 Mg Tab


40 MG PO DAILY





Clopidogrel Bisulfate (Plavix) 75 Mg Tab


75 MG PO DAILY





Docusate Sodium (Colace) 100 Mg Cap


1 CAP PO BID





Ferric Citrate (Auryxia) 210 Mg Tab


1 TAB PO DAILY





Fluticasone Furoate-Vilanterol (Breo Ellipta) 1 Inh Inh


1 PUFF INH BID





Ipratropium-Albuterol (Combivent Respimat) 1 Aer Aer


1 PUFFS INH QID





Iron Sucrose (Venofer) 20 Mg/Ml Inj


50 MG IV





Iron Sucrose (Venofer) 100 Mg/5 Ml Inj


100 MG IV





Methoxy Polyethylene Glycol-Ep (Mircera) 150 Mcg/0.3 Ml Sol


1 TAB PO TID





Pantoprazole (Protonix) 40 Mg Tab


40 MG PO DAILY





Pramipexole (Mirapex) 0.125 Mg Tab


0.25 MG PO HS, TAB





Rabeprazole Sodium (Aciphex) 20 Mg Tab


20 MG PO DAILY





Sertraline (Zoloft) 50 Mg Tab


75 MG PO DAILY





Sevelamer Carbonate (Renvela) 800 Mg Tab


1 TAB PO TID





Thiamine Hcl (Vitamin B-1) 50 Mg Tab


50 MG PO DAILY





Tramadol (Ultram) 50 Mg Tab


50 MG PO Q4H PRN for Pain





Vitamin B Cmplx/Vitc/Folic Ac (Nephrocaps)  Cap


1 CAP PO DAILY





 


Discontinued Medications:  


Insulin Glargine (Lantus Solostar) 100 Unit/Ml Inj


5 UNITS SC QAM











Admission Information


HPI (per Admitting provider):


DATE OF ADMISSION:  12/27/2017


 


PRIMARY CARE PHYSICIAN:  From [facility].  The name is not yet known.


 


NEPHROLOGIST:  From [facility] as well, we do not know the name yet.


 


CHIEF COMPLAINT:  The patient was transferred from [facility]


with acute respiratory failure, exacerbation of asthma and UTI.


 


HISTORY OF PRESENT COMPLAINT:  She is a 57-year-old female, obese with


significant past medical history of type 2 diabetes on diet controlled,


hypertension, chronic kidney disease on hemodialysis 3 times a week, asthma


and also history of probable aortic or mitral valve replacement and


apparently was in dialysis at [facility] today.  She missed her dialysis last


Sunday, so did not dialyze.  She was found to be collapsed.  She complained


to have some dizziness, some cough and shortness of breath.  Immediately


after that, she was noted to have blue lips and she was taken to the


Emergency Room at [place].  At the ER, initially, she was unresponsive but


later on she was talking almost normally.  Her initial ABG did show a pH of


7.14, pCO2 of 60 and pO2 of 72 and her white count was 16.2 and her UA did


show possible infection.  Her lactic acid was 1.3 and chest x-ray reported as


seems to be fairly unremarkable, but given her history of respiratory failure


and requiring dialysis, she was transferred to [facility]


for continuation of care.


 


On asking questions, she admits to have a cough recently with some shortness


of breath and wheezing.  She did have chills and she felt dizzy during


dialysis.  She does have urinary frequency but no dysuria.  She did not have


any abdominal pain, any nausea and/or vomiting, and she did not have any


numbness or tingling involving any of the extremities and she was feeling


reasonably good in the room at [facility] and she is saturating


100% on room air.


 


PAST MEDICAL HISTORY:  Significant for diabetes, diet controlled;


hypertension, chronic kidney disease on hemodialysis 3 times a week, asthma


and history of mitral and/or aortic valve repair, not on any anticoagulation.


Also has Hyperlipidemia


 


PAST SURGICAL HISTORY:  Significant for heart valve replacement.


 


FAMILY HISTORY:  No significant family history of heart disease and/or


diabetes or chronic kidney disease.


 


SOCIAL HISTORY:  She is .  She has 2 children.  She does not smoke


and does not drink.  She lives alone and she has been reasonably ambulant.


 


ALLERGIES:  SHE IS ALLERGIC TO HYDROCODONE, MORPHINE.


 


MEDICATIONS:  The list she gave him includes oxygen 2 liters per minute,


ferric citrate 1 tablet daily, Renvela as directed, Colace 100 mg twice


daily, MiraLax as directed, vitamin B complex 1 tablet daily, Breo Ellipta


100/25 mcg 2 times a day; Aciphex, that is rabeprazole sodium, daily;


allopurinol 100 mg 2 times a day, Lipitor 40 mg daily, Zoloft 75 mg daily,


Plavix 75 mg daily, aspirin 81 mg daily, Lantus 5 units every day, Combivent


2 puffs 4 times daily as needed, Protonix 40 mg daily, ProAir 2 puffs q. 4


times daily as needed, Nephrocaps 1 tablet daily and tramadol 50 mg 1 tablet


q. 4 hourly as needed.


 


REVIEW OF SYSTEMS:  Other systems reviewed are unremarkable except those


mentioned in history of present complaint.


 


PHYSICAL EXAMINATION:


GENERAL:  On examination on the medical floor, she was not having any acute


distress.


VITAL SIGNS:  Temperature 37.0, pulse of 91, blood pressure 143/73,


saturation 100% on room air.


HEENT:  Unremarkable.


NECK:  Supple.  No JVD, no bruit.


CHEST:  Decreased breath sounds all over with wheezing anteriorly and minimal


crackles at the bases.


HEART:  S1, S2 with prosthetic valve sounds, no definite murmur appreciated.


ABDOMEN:  Soft, benign, mildly tender in the hypogastrium, renal angles were


not tender.  Bowel sounds present.


EXTREMITIES:  1+ edema bilaterally.


MUSCULOSKELETAL SYSTEM:  Did not show any acute arthritis involving any


joint.


CENTRAL NERVOUS SYSTEM:  She was alert, awake, oriented x3 and no focal


sensory and/or motor deficit appreciated.


 


LABORATORY AND IMAGING DATA:  Noted from [facility].  Blood gas -


pH 7.14, pCO2 of 60, pO2 72, bicarbonate 18, glucose was 207.  White count


16.2, H&H 11.8/38.1, and platelets 293.  PTT 31.  Lactic acid 1.3.  INR is


0.99.  UA - many bacterias, leukocyte esterase 2+, urine glucose 2+ and


protein 2+.  Glucose 202, BUN 68, creatinine 5.9, sodium 137, potassium 4.4,


chloride 104, carbon dioxide 21, total bilirubin 0.5.  LFTs normal.  Troponin


1.07.  Amylase 49, lipase 201.  TSH 2.52, magnesium 2.8.  Chest x-ray:  No


gross consolidation and/or pleural effusion.  Acetone negative.  EKG was in


sinus tachycardia, rate of 109 per minute, normal axis and nonspecific ST-T


wave changes.


 


IMPRESSION AND PLAN:


1.  Unresponsive episode during dialysis.  The cause of this could be


hypotensive episode during Dialysis.ABG in ER at [place] showed Hypercarbic 

respiratory 


Acidosis.Responded well to BIPAP and on transfer she was feeling much better .


Patient remains hemodynamically stable and saturating 100% on RA.


2.  Respiratory failure that could be a combination of an attack of asthma


and also fluid overload secondary to missed dialysis.  We will check her


x-ray over here and we will consult nephrologist for possible dialysis


starting tomorrow.  She still produces urine, we may try IV Lasix while in


the hospital, depending on the x-ray findings.


3.  End-stage renal disease on hemodialysis.  Nephrologic consultation will


be taken and she will get dialysis.  She has a dialysis catheter in the left


upper chest.


4.  Exacerbation of asthma.  She will get her usual nebulized treatment and


usual bronchodilators and we will put him on Solu-Medrol as well.


5.  Diabetes.  She takes only a small amount of insulin, Lantus, will


continue with that and will put her on sliding scale coverage while in the


hospital.  Check hemoglobin A1c.


6.  Status post heart valve replacement.  She does not have any issues at


this time.  We will continue her medications right now.


7.UTI-started on Ceftriaxone


8.  Hyperlipidemia.  Continue with statin.


9.  Gastrointestinal prophylaxis with Protonix.


10.  Deep venous thrombosis prophylaxis with subcutaneous heparin.


11.  Code status that was discussed with the patient, she will be a full


code.


 


In my clinical judgment, the beneficiary meets criteria as per CMS for


2-midnight stay in the hospital.


Discussed with the Daughter-updated current medical condition.She does not know 

the name of the PCP and or Nephrologist


Phone # [phone].Name-[facility]


This is a 57 year old female with a PMH of ESRD on HD, DM2, HTN, asthma, hx. of 

aortic valve replacement - presents with syncopal episode





Syncope


secondary to missed dialysis and URI


1/5


she is doing well, stable for d/c today


will receive HD today around 3 hours


will d/c after that with a prednisone taper


NPH while on prednisone taper





1/4


clinically doing well


will d/c in AM after dialysis


will d/c with prednisone taper





1/3/18


no arrhythmias noted, no further syncope/dizziness noted





1/2/18


no arrhythmias noted on tele monitoring


No other apparent cause of syncopal episode - likely due to missed dialysis


monitor BP, monitor labs and sugars


TTE noted to have mitral stenosis





Hx. of Aortic Valve Replacement


r/o Mitral Valve Stenosis


WANDY performed today (1/2)


noted to have some stenosis, but likely due to high output as per cardiology


for now, no change in medications; ideally b-blocker dose should be titrated up 

- will monitor breathing status


cont. ASA and Plavix - outpatient cardiology f/u





Acute Asthma Exacerbation


secondary to URI vs. Viral Bronchitis


patient presented with wheezing/congestion


was initially on IV solu-medrol, but due to uncontrolled BSGs, now on oral 

prednisone


will likely taper in 1-2 days





ESRD on HD


dialysis on M-W-F


consulted nephrology for further management


continue current renal medications





Uncontrolled BSGs


in the setting of DM2


Ha1c = 6.3%


diet controlled at baseline


due to IV and PO steroids - BSGs elevated


glycemic control consult for further input


will try to taper steroids in AM





Depression


continue current medications





DVT ppx


subq heparin





FULL CODE


Total time spent on discharge = 45 minutes


This includes examination of the patient, discharge planning, medication 

reconciliation, and communication with other providers.





Discharge Instructions


Please follow-up with your primary care physician


   You will be discharged with prednisone


 * Take 4 tablets (40mg) on 1/6 and 1/7


 * Take 3 tablets (30mg) on 1/8 and 1/9


 * Take 2 tablets (20mg) on 1/10 and 1/11


 * Take 1 tablet (10mg) on 1/12 and 1/13


   While you are on prednisone, you will be on NPH (insulin)


 * Inject 40 units of NPH on 1/6 and 1/7


 * Inject 30 units on 1/8 and 1/9


 * Inject 20 units on 1/10 and 1/11


 * Inject 10 units on 1/12 and 1/13 and then stop

## 2018-01-05 NOTE — PROGRESS NOTE
Subjective


Date of Service:


Jan 5, 2018.


Subjective


Pt evaluation today including:  conversation w/ patient, physical exam, lab 

review, review of studies, review of inpatient medication list


Saw/examined the patient in room 253


She's doing well, no shortness of breath, cough improved, no other issues at 

this time


She tells me she can find a ride after dialysis





Review of Systems


Constitutional:  No fever, No chills


Respiratory:  No cough, No sputum, No wheezing, No shortness of breath


Cardiac:  No chest pain, No edema, No palpitations





Medications





Current Inpatient Medications








 Medications


  (Trade)  Dose


 Ordered  Sig/Daniel


 Route  Start Time


 Stop Time Status Last Admin


Dose Admin


 


 Heparin Sodium


  (Porcine)


  (Heparin Sq 5000


 Unit/0.5ml)  5,000 unit  Q8


 SQ  12/27/17 22:00


 1/26/18 21:59 Future Hold 1/1/18 21:16


5,000 UNIT


 


 Ondansetron HCl


  (Zofran Inj)  4 mg  Q6H  PRN


 IV  12/27/17 17:45


 1/26/18 17:44   


 


 


 Allopurinol


  (Zyloprim Tab)  100 mg  BID


 PO  12/27/17 21:00


 1/26/18 20:59  1/5/18 08:37


100 MG


 


 Aspirin


  (Ecotrin Tab)  81 mg  DAILY


 PO  12/28/17 09:00


 1/27/18 08:59  1/5/18 08:36


81 MG


 


 Atorvastatin


 Calcium


  (Lipitor Tab)  40 mg  DAILY


 PO  12/28/17 09:00


 1/27/18 08:59  1/5/18 08:36


40 MG


 


 Clopidogrel


 Bisulfate


  (plAVix TAB)  75 mg  DAILY


 PO  12/28/17 09:00


 1/27/18 08:59  1/5/18 08:38


75 MG


 


 Docusate Sodium


  (coLACE CAP)  100 mg  BID


 PO  12/27/17 21:00


 1/26/18 20:59  1/5/18 08:36


100 MG


 


 Pantoprazole


 Sodium


  (Protonix Tab)  40 mg  DAILY


 PO  12/28/17 09:00


 1/27/18 08:59  1/5/18 08:36


40 MG


 


 Pramipexole


 Dihydrochloride


  (miraPEX TAB)  0.25 mg  HS


 PO  12/27/17 21:00


 1/26/18 20:59  1/4/18 20:43


0.25 MG


 


 Sertraline HCl


  (Zoloft Tab)  75 mg  DAILY


 PO  12/28/17 09:00


 1/27/18 08:59  1/5/18 08:37


75 MG


 


 Thiamine HCl


  (Vitamin B-1 Tab)  50 mg  DAILY


 PO  12/28/17 09:00


 1/27/18 08:59  1/5/18 08:36


50 MG


 


 Tramadol HCl


  (Ultram Tab)  50 mg  Q4H  PRN


 PO  12/27/17 19:15


 1/26/18 19:14  1/5/18 08:49


50 MG


 


 Vitamin B Complex/


 Vit C/Folic Acid


  (Nephrocaps)  1 cap  DAILY


 PO  12/28/17 09:00


 1/27/18 08:59  1/5/18 08:36


1 CAP


 


 Miscellaneous


 Information


  (Order Awaiting


 Action)  1 ea  QS


 N/A  12/28/17 00:00


 1/27/18 00:00   


 


 


 Miscellaneous


 Information


  (Order Awaiting


 Action)  1 ea  QS


 N/A  12/28/17 00:00


 1/27/18 00:00   


 


 


 Miscellaneous


 Information


  (Order Awaiting


 Action)  1 ea  QS


 N/A  12/28/17 00:00


 1/27/18 00:00   


 


 


 Glucose


  (Glucose 40% Gel)  15-30


 GRAMS 15


 GRAMS...  UD  PRN


 PO  12/27/17 19:30


 1/26/18 19:29   


 


 


 Glucose


  (Glucose Chew


 Tab)  4-8


 Tablets 4


 Tabl...  UD  PRN


 PO  12/27/17 19:30


 1/26/18 19:29   


 


 


 Dextrose


  (Dextrose 50%


 50ML Syringe)  25-50ML OF


 50% DW IV


 FOR...  UD  PRN


 IV  12/27/17 19:30


 1/26/18 19:29   


 


 


 Glucagon


  (Glucagon Inj)  1 mg  UD  PRN


 SQ  12/27/17 19:30


 1/26/18 19:29   


 


 


 Miscellaneous


 Information


  (Consult


 Glycemic


 Management


 Pharmacy)  1 ea  UD  PRN


 N/A  12/28/17 12:14


 1/27/18 12:13   


 


 


 Prednisone


  (PredniSONE TAB)  40 mg  DAILY


 PO  12/29/17 09:00


 1/28/18 08:59  1/5/18 08:36


40 MG


 


 Sevelamer HCl


  (Renagel Tab)  2,400 mg  TIDM


 PO  12/30/17 16:45


 1/29/18 11:29  1/5/18 12:38


2,400 MG


 


 Acetaminophen


  (Tylenol Tab)  1,000 mg  TID


 PO  12/30/17 21:00


 1/29/18 20:59  1/4/18 20:42


1,000 MG


 


 Miconazole Nitrate


  (Desenex Powder)  1 appln  PRN  PRN


 EXT  12/30/17 21:15


 1/29/18 21:14  1/2/18 10:28


1 APPLN


 


 Albuterol/


 Ipratropium


  (Duoneb)  3 ml  QIDR


 INH  1/1/18 12:00


 1/31/18 11:59  1/5/18 11:13


3 ML


 


 Insulin Human NPH


  (novoLIN-N NPH)  see


 protocol


 text  QAM


 SC  1/3/18 09:00


 2/2/18 08:59  1/5/18 08:31


40 UNITS


 


 Albuterol/


 Ipratropium


  (Duoneb)  3 ml  QID  PRN


 INH  1/4/18 16:00


 2/3/18 15:59   


 


 


 Diphenhydramine


 HCl


  (Benadryl Cap)  25 mg  Q6H  PRN


 PO  1/5/18 01:30


 2/4/18 01:29   


 


 


 Insulin Aspart


  (novoLOG ASPART)  SLIDING


 SCALE  0630


 SC  1/6/18 06:30


 2/5/18 06:29   


 


 


 Insulin Aspart


  (novoLOG ASPART)  SLIDING


 SCALE  1100,1630


 SC  1/5/18 11:00


 2/4/18 10:59  1/5/18 12:44


11 UNITS











Objective


Vital Signs











  Date Time  Temp Pulse Resp B/P (MAP) Pulse Ox O2 Delivery O2 Flow Rate FiO2


 


1/5/18 11:14  81 16  96 Room Air  


 


1/5/18 08:13 36.7 95 18 108/63 (78) 96 Room Air  


 


1/5/18 08:00      Room Air  


 


1/5/18 07:03  106 18  96 Room Air  


 


1/5/18 00:00     98 Room Air  


 


1/4/18 23:18 36.7 95 20 113/69 (84) 96 Room Air  


 


1/4/18 20:17  87 18  98 Room Air  


 


1/4/18 15:50     94 Room Air  


 


1/4/18 15:40 36.8 105 18 110/65 (80) 94 Room Air  











Physical Exam


Respiratory/Chest:  no respiratory distress, no accessory muscle use, + 

decreased breath sounds


Cardiovascular:  regular rate, rhythm, no murmur


Extremities:  normal inspection, no pedal edema, + pertinent finding (trace 

edema)





Laboratory Results





Last 24 Hours








Test


  1/4/18


16:25 1/4/18


20:19 1/5/18


05:27 1/5/18


07:31


 


Bedside Glucose 233 mg/dl  223 mg/dl   68 mg/dl 


 


White Blood Count   8.96 K/uL  


 


Red Blood Count   3.62 M/uL  


 


Hemoglobin   11.6 g/dL  


 


Hematocrit   38.2 %  


 


Mean Corpuscular Volume   105.5 fL  


 


Mean Corpuscular Hemoglobin   32.0 pg  


 


Mean Corpuscular Hemoglobin


Concent 


  


  30.4 g/dl 


  


 


 


RDW Standard Deviation   61.0 fL  


 


RDW Coefficient of Variation   15.9 %  


 


Platelet Count   173 K/uL  


 


Mean Platelet Volume   9.7 fL  


 


Sodium Level   142 mmol/L  


 


Potassium Level   4.1 mmol/L  


 


Chloride Level   111 mmol/L  


 


Carbon Dioxide Level   23 mmol/L  


 


Anion Gap   8.0 mmol/L  


 


Blood Urea Nitrogen   59 mg/dl  


 


Creatinine   5.31 mg/dl  


 


Est Creatinine Clear Calc


Drug Dose 


  


  12.9 ml/min 


  


 


 


Estimated GFR (


American) 


  


  9.5 


  


 


 


Estimated GFR (Non-


American 


  


  8.2 


  


 


 


BUN/Creatinine Ratio   10.9  


 


Random Glucose   63 mg/dl  


 


Calcium Level   8.6 mg/dl  


 


Test


  1/5/18


07:48 1/5/18


11:22 


  


 


 


Bedside Glucose 90 mg/dl  194 mg/dl   











Assessment and Plan


This is a 57 year old female with a PMH of ESRD on HD, DM2, HTN, asthma, hx. of 

aortic valve replacement - presents with syncopal episode





Syncope


secondary to missed dialysis and URI


1/5


she is doing well, stable for d/c today


will receive HD today around 3 hours


will d/c after that with a prednisone taper


NPH while on prednisone taper





1/4


clinically doing well


will d/c in AM after dialysis


will d/c with prednisone taper





1/3/18


no arrhythmias noted, no further syncope/dizziness noted





1/2/18


no arrhythmias noted on tele monitoring


No other apparent cause of syncopal episode - likely due to missed dialysis


monitor BP, monitor labs and sugars


TTE noted to have mitral stenosis





Hx. of Aortic Valve Replacement


r/o Mitral Valve Stenosis


WANDY performed today (1/2)


noted to have some stenosis, but likely due to high output as per cardiology


for now, no change in medications; ideally b-blocker dose should be titrated up 

- will monitor breathing status


cont. ASA and Plavix - outpatient cardiology f/u





Acute Asthma Exacerbation


secondary to URI vs. Viral Bronchitis


patient presented with wheezing/congestion


was initially on IV solu-medrol, but due to uncontrolled BSGs, now on oral 

prednisone


will likely taper in 1-2 days





ESRD on HD


dialysis on M-W-F


consulted nephrology for further management


continue current renal medications





Uncontrolled BSGs


in the setting of DM2


Ha1c = 6.3%


diet controlled at baseline


due to IV and PO steroids - BSGs elevated


glycemic control consult for further input


will try to taper steroids in AM





Depression


continue current medications





DVT ppx


subq heparin





FULL CODE

## 2018-01-05 NOTE — PHARMACY PROGRESS NOTE
Pharmacy Glycemic Short Note 2


Date of Service


Jan 5, 2018.





OUTPATIENT ANTIDIABETIC REGIMEN: 


* Dr. Escobar spoke with patient - she does not take anything as an outpatient (

compared to Lantus 5 units on med rec)








ASSESSMENT:





1/5/18


* Ms Bro received 51 units of insulin yesterday (40 units of NPH) with blood 

sugars ranging from 63 mg/dL - 223 mg/dL in the past 24 hours. She is receiving 

dialysis today.


* Her fasting continues to be low today but I believe that this is typical for 

the patient. Continue same dose of NPH.


* She did have postprandial elevation for dinner blood sugar and HS blood 

sugar. Add back carbohydrate ratio for lunch and tighten for both lunch and 

dinner blood sugars. Remove coverage at bedtime as the patient does not need 

this and it clouds the picture. 





1/4/18


* Ms. Bro received 48 units of insulin yesterday with BSGs ranging from 64-

236 mg/dL in the past 24 hours


* No changes to causes of insulin resistance


* Her fasting continues to be low again today.  She did receive some 

correctional insulin last night but I don't think it caused the low fasting.


* She did have some postprandial elevation yesterday but not as high as it had 

previously been, especially without carb coverage.  Of note, it was documented 

that she consumed 100% of breakfast and lunch yesterday and did not receive any 

additional Novolog coverage.


* At this point, I don't have a choice except to reduce the NPH back to 40 

units.  This provides less postprandial coverage but should prevent AM 

hypoglycemia.


* Since her HS BSG tends to be the highest, I will add back a conservative carb 

ratio with dinner ONLY











PLAN FOR INPATIENT GLYCEMIC CONTROL:





* Continue NPH 40 units today w/ prednisone dose


 * 30 units for prednisone 30 mg, 20 units for prednisone 20 mg, 10 units for 

prednisone 10 mg


* Novolog ACHS


 * Goal 110-140


 * CF 25 mg/dL/unit


 * Add carb ratio of 8 for lunch and dinner only








DISCHARGE RECOMMENDATIONS


* While patient is on prednisone taper, it is appropriate to continue NPH with 

the following doses:


 * 30 units for prednisone 30 mg, 20 units for prednisone 20 mg, 10 units for 

prednisone 10 mg


* Novolog currently not needed at discharge





Thank you.

## 2018-01-05 NOTE — DISCHARGE INSTRUCTIONS
Discharge Instructions


Date of Service


Jan 5, 2018.





Admission


Reason for Admission:  Syncope, Respiratory Failure





Discharge


Discharge Diagnosis / Problem:  Syncope, Asthma Exacerbation





Discharge Goals


Goal(s):  Decrease discomfort, Improve function, Diagnostic testing, 

Therapeutic intervention





Activity Recommendations


Activity Limitations:  resume your previous activity





.





Instructions / Follow-Up


Instructions / Follow-Up


Please follow-up with your primary care physician


   You will be discharged with prednisone


 * Take 4 tablets (40mg) on 1/6 and 1/7


 * Take 3 tablets (30mg) on 1/8 and 1/9


 * Take 2 tablets (20mg) on 1/10 and 1/11


 * Take 1 tablet (10mg) on 1/12 and 1/13


   While you are on prednisone, you will be on NPH (insulin)


 * Inject 40 units of NPH on 1/6 and 1/7


 * Inject 30 units on 1/8 and 1/9


 * Inject 20 units on 1/10 and 1/11


 * Inject 10 units on 1/12 and 1/13 and then stop





Current Hospital Diet


Patient's current hospital diet: Diabetes Type 2 Diet, Low Sodium Diet (2gm Na)





Discharge Diet


Recommended Diet:  Diabetes Type 2 Diet, Renal Diet





Pending Studies


Studies pending at discharge:  no





Laboratory Results





Hemoglobin A1c








Test


  12/29/17


07:02 Range/Units


 


 


Estimated Average Glucose 134   mg/dl


 


Hemoglobin A1c 6.3 H 4.5-5.6  %











Medical Emergencies








.


Who to Call and When:





Medical Emergencies:  If at any time you feel your situation is an emergency, 

please call 911 immediately.





.





Non-Emergent Contact


Non-Emergency issues call your:  Primary Care Provider





.


.








"Provider Documentation" section prepared by Eben Hebert.








.





VTE Core Measure


Inpt VTE Proph given/why not?:  SCD's

## 2018-01-06 VITALS
SYSTOLIC BLOOD PRESSURE: 102 MMHG | TEMPERATURE: 98.24 F | OXYGEN SATURATION: 96 % | DIASTOLIC BLOOD PRESSURE: 51 MMHG | HEART RATE: 103 BPM

## 2018-01-06 VITALS
DIASTOLIC BLOOD PRESSURE: 76 MMHG | HEART RATE: 96 BPM | OXYGEN SATURATION: 98 % | TEMPERATURE: 98.06 F | SYSTOLIC BLOOD PRESSURE: 119 MMHG

## 2018-01-06 VITALS
TEMPERATURE: 98.06 F | OXYGEN SATURATION: 98 % | HEART RATE: 92 BPM | DIASTOLIC BLOOD PRESSURE: 76 MMHG | SYSTOLIC BLOOD PRESSURE: 119 MMHG

## 2018-01-06 VITALS — HEART RATE: 92 BPM | OXYGEN SATURATION: 98 %

## 2018-01-06 VITALS — OXYGEN SATURATION: 98 % | HEART RATE: 92 BPM

## 2018-01-06 RX ADMIN — Medication SCH MG: at 08:08

## 2018-01-06 RX ADMIN — CLOPIDOGREL BISULFATE SCH MG: 75 TABLET, FILM COATED ORAL at 08:07

## 2018-01-06 RX ADMIN — ALLOPURINOL SCH MG: 100 TABLET ORAL at 08:06

## 2018-01-06 RX ADMIN — Medication SCH MG: at 08:06

## 2018-01-06 RX ADMIN — HUMAN INSULIN SCH UNITS: 100 INJECTION, SUSPENSION SUBCUTANEOUS at 07:45

## 2018-01-06 RX ADMIN — DOCUSATE SODIUM SCH MG: 100 CAPSULE, LIQUID FILLED ORAL at 08:07

## 2018-01-06 RX ADMIN — ACETAMINOPHEN SCH MG: 500 TABLET, COATED ORAL at 08:08

## 2018-01-06 RX ADMIN — ASCORBIC ACID, THIAMINE MONONITRATE,RIBOFLAVIN, NIACINAMIDE, PYRIDOXINE HYDROCHLORIDE, FOLIC ACID, CYANOCOBALAMIN, BIOTIN, CALCIUM PANTOTHENATE, SCH CAP: 100; 1.5; 1.7; 20; 10; 1; 6000; 150000; 5 CAPSULE, LIQUID FILLED ORAL at 08:07

## 2018-01-06 RX ADMIN — IPRATROPIUM BROMIDE AND ALBUTEROL SULFATE SCH ML: .5; 3 SOLUTION RESPIRATORY (INHALATION) at 07:41

## 2018-01-06 RX ADMIN — SERTRALINE HYDROCHLORIDE SCH MG: 50 TABLET, FILM COATED ORAL at 08:07

## 2018-01-06 RX ADMIN — PANTOPRAZOLE SCH MG: 40 TABLET, DELAYED RELEASE ORAL at 08:07

## 2018-01-06 RX ADMIN — RENAGEL SCH MG: 800 TABLET ORAL at 08:06

## 2018-01-06 RX ADMIN — ATORVASTATIN CALCIUM SCH MG: 40 TABLET, FILM COATED ORAL at 08:08

## 2018-01-06 RX ADMIN — RENAGEL SCH MG: 800 TABLET ORAL at 11:27

## 2018-01-06 RX ADMIN — ALUMINUM ZIRCONIUM TRICHLOROHYDREX GLY SCH EA: 0.2 STICK TOPICAL at 07:39

## 2018-01-06 RX ADMIN — INSULIN ASPART SCH UNITS: 100 INJECTION, SOLUTION INTRAVENOUS; SUBCUTANEOUS at 11:48

## 2018-01-06 RX ADMIN — IPRATROPIUM BROMIDE AND ALBUTEROL SULFATE SCH ML: .5; 3 SOLUTION RESPIRATORY (INHALATION) at 11:20

## 2018-01-06 NOTE — PROGRESS NOTE
Subjective


Date of Service:


Jan 6, 2018.


Subjective


Pt evaluation today including:  conversation w/ patient, physical exam, lab 

review, review of studies, review of inpatient medication list


Saw/examined the patient in room 253


No problems/issues to note today


Breathing fine, Eager to go home, trying to find a ride home





Review of Systems


Respiratory:  No cough, No sputum, No wheezing, No shortness of breath, No 

dyspnea on exertion


Cardiac:  No chest pain, No edema, No palpitations





Medications





Current Inpatient Medications








 Medications


  (Trade)  Dose


 Ordered  Sig/Daniel


 Route  Start Time


 Stop Time Status Last Admin


Dose Admin


 


 Heparin Sodium


  (Porcine)


  (Heparin Sq 5000


 Unit/0.5ml)  5,000 unit  Q8


 SQ  12/27/17 22:00


 1/26/18 21:59 Future Hold 1/1/18 21:16


5,000 UNIT


 


 Ondansetron HCl


  (Zofran Inj)  4 mg  Q6H  PRN


 IV  12/27/17 17:45


 1/26/18 17:44   


 


 


 Allopurinol


  (Zyloprim Tab)  100 mg  BID


 PO  12/27/17 21:00


 1/26/18 20:59  1/6/18 08:06


100 MG


 


 Aspirin


  (Ecotrin Tab)  81 mg  DAILY


 PO  12/28/17 09:00


 1/27/18 08:59  1/6/18 08:08


81 MG


 


 Atorvastatin


 Calcium


  (Lipitor Tab)  40 mg  DAILY


 PO  12/28/17 09:00


 1/27/18 08:59  1/6/18 08:08


40 MG


 


 Clopidogrel


 Bisulfate


  (plAVix TAB)  75 mg  DAILY


 PO  12/28/17 09:00


 1/27/18 08:59  1/6/18 08:07


75 MG


 


 Docusate Sodium


  (coLACE CAP)  100 mg  BID


 PO  12/27/17 21:00


 1/26/18 20:59  1/6/18 08:07


100 MG


 


 Pantoprazole


 Sodium


  (Protonix Tab)  40 mg  DAILY


 PO  12/28/17 09:00


 1/27/18 08:59  1/6/18 08:07


40 MG


 


 Pramipexole


 Dihydrochloride


  (miraPEX TAB)  0.25 mg  HS


 PO  12/27/17 21:00


 1/26/18 20:59  1/5/18 21:05


0.25 MG


 


 Sertraline HCl


  (Zoloft Tab)  75 mg  DAILY


 PO  12/28/17 09:00


 1/27/18 08:59  1/6/18 08:07


75 MG


 


 Thiamine HCl


  (Vitamin B-1 Tab)  50 mg  DAILY


 PO  12/28/17 09:00


 1/27/18 08:59  1/6/18 08:06


50 MG


 


 Tramadol HCl


  (Ultram Tab)  50 mg  Q4H  PRN


 PO  12/27/17 19:15


 1/26/18 19:14  1/5/18 08:49


50 MG


 


 Vitamin B Complex/


 Vit C/Folic Acid


  (Nephrocaps)  1 cap  DAILY


 PO  12/28/17 09:00


 1/27/18 08:59  1/6/18 08:07


1 CAP


 


 Miscellaneous


 Information


  (Order Awaiting


 Action)  1 ea  QS


 N/A  12/28/17 00:00


 1/27/18 00:00   


 


 


 Miscellaneous


 Information


  (Order Awaiting


 Action)  1 ea  QS


 N/A  12/28/17 00:00


 1/27/18 00:00   


 


 


 Miscellaneous


 Information


  (Order Awaiting


 Action)  1 ea  QS


 N/A  12/28/17 00:00


 1/27/18 00:00   


 


 


 Glucose


  (Glucose 40% Gel)  15-30


 GRAMS 15


 GRAMS...  UD  PRN


 PO  12/27/17 19:30


 1/26/18 19:29   


 


 


 Glucose


  (Glucose Chew


 Tab)  4-8


 Tablets 4


 Tabl...  UD  PRN


 PO  12/27/17 19:30


 1/26/18 19:29   


 


 


 Dextrose


  (Dextrose 50%


 50ML Syringe)  25-50ML OF


 50% DW IV


 FOR...  UD  PRN


 IV  12/27/17 19:30


 1/26/18 19:29   


 


 


 Glucagon


  (Glucagon Inj)  1 mg  UD  PRN


 SQ  12/27/17 19:30


 1/26/18 19:29   


 


 


 Miscellaneous


 Information


  (Consult


 Glycemic


 Management


 Pharmacy)  1 ea  UD  PRN


 N/A  12/28/17 12:14


 1/27/18 12:13   


 


 


 Prednisone


  (PredniSONE TAB)  40 mg  DAILY


 PO  12/29/17 09:00


 1/28/18 08:59  1/6/18 08:07


40 MG


 


 Sevelamer HCl


  (Renagel Tab)  2,400 mg  TIDM


 PO  12/30/17 16:45


 1/29/18 11:29  1/6/18 08:06


2,400 MG


 


 Acetaminophen


  (Tylenol Tab)  1,000 mg  TID


 PO  12/30/17 21:00


 1/29/18 20:59  1/6/18 08:08


1,000 MG


 


 Miconazole Nitrate


  (Desenex Powder)  1 appln  PRN  PRN


 EXT  12/30/17 21:15


 1/29/18 21:14  1/2/18 10:28


1 APPLN


 


 Albuterol/


 Ipratropium


  (Duoneb)  3 ml  QIDR


 INH  1/1/18 12:00


 1/31/18 11:59  1/6/18 07:41


3 ML


 


 Insulin Human NPH


  (novoLIN-N NPH)  see


 protocol


 text  QAM


 SC  1/3/18 09:00


 2/2/18 08:59  1/6/18 07:45


40 UNITS


 


 Albuterol/


 Ipratropium


  (Duoneb)  3 ml  QID  PRN


 INH  1/4/18 16:00


 2/3/18 15:59   


 


 


 Diphenhydramine


 HCl


  (Benadryl Cap)  25 mg  Q6H  PRN


 PO  1/5/18 01:30


 2/4/18 01:29  1/5/18 21:09


25 MG


 


 Insulin Aspart


  (novoLOG ASPART)  SLIDING


 SCALE  0630


 SC  1/6/18 06:30


 2/5/18 06:29   


 


 


 Insulin Aspart


  (novoLOG ASPART)  SLIDING


 SCALE  1100,1630


 SC  1/5/18 11:00


 2/4/18 10:59  1/5/18 18:50


8 UNITS











Objective


Vital Signs











  Date Time  Temp Pulse Resp B/P (MAP) Pulse Ox O2 Delivery O2 Flow Rate FiO2


 


1/6/18 08:00      Room Air  


 


1/6/18 07:41  92 16  98 Room Air  


 


1/6/18 07:30 36.7 96 18 119/76 (90) 98 Room Air  


 


1/6/18 00:09 36.8 103 20 102/51 (68) 96 Room Air  


 


1/6/18 00:00      Room Air  


 


1/5/18 19:37  102 16  95 Room Air  


 


1/5/18 17:55 36.4 77  115/71 (86)    


 


1/5/18 17:45  100  118/61    


 


1/5/18 17:30  98  109/63    


 


1/5/18 17:15  103  108/69    


 


1/5/18 17:00  99  119/71    


 


1/5/18 16:45  100  115/66    


 


1/5/18 16:30  100  116/64    


 


1/5/18 16:15  100  107/52    


 


1/5/18 16:00     96 Room Air  


 


1/5/18 16:00  102  124/69    


 


1/5/18 15:45  105  124/71    


 


1/5/18 15:30  98  132/72    


 


1/5/18 15:26  100 16  96 Room Air  


 


1/5/18 15:15  100  119/65    


 


1/5/18 15:00  99  120/65    


 


1/5/18 14:45 36.4 87  109/59 (76)    


 


1/5/18 11:14  81 16  96 Room Air  











Physical Exam


General Appearance:  no apparent distress


Respiratory/Chest:  no respiratory distress, no accessory muscle use, + wheezing

 (mild end expiratory wheezing)


Cardiovascular:  regular rate, rhythm, no edema, no murmur


Extremities:  normal inspection, no pedal edema





Laboratory Results





Last 24 Hours








Test


  1/5/18


11:22 1/5/18


16:28 1/5/18


20:09 1/6/18


01:20


 


Bedside Glucose 194 mg/dl  137 mg/dl  318 mg/dl  106 mg/dl 


 


Test


  1/6/18


07:36 


  


  


 


 


Bedside Glucose 83 mg/dl    











Assessment and Plan


This is a 57 year old female with a PMH of ESRD on HD, DM2, HTN, asthma, hx. of 

aortic valve replacement - presents with syncopal episode





Syncope


secondary to missed dialysis and URI


1/6


plan is to d/c home with prednisone taper





1/5


she is doing well, stable for d/c today


will receive HD today around 3 hours


will d/c after that with a prednisone taper


NPH while on prednisone taper





1/4


clinically doing well


will d/c in AM after dialysis


will d/c with prednisone taper





1/3/18


no arrhythmias noted, no further syncope/dizziness noted





1/2/18


no arrhythmias noted on tele monitoring


No other apparent cause of syncopal episode - likely due to missed dialysis


monitor BP, monitor labs and sugars


TTE noted to have mitral stenosis





Hx. of Aortic Valve Replacement


r/o Mitral Valve Stenosis


WANDY performed today (1/2)


noted to have some stenosis, but likely due to high output as per cardiology


for now, no change in medications; ideally b-blocker dose should be titrated up 

- will monitor breathing status


cont. ASA and Plavix - outpatient cardiology f/u





Acute Asthma Exacerbation


secondary to URI vs. Viral Bronchitis


patient presented with wheezing/congestion


was initially on IV solu-medrol, but due to uncontrolled BSGs, now on oral 

prednisone


will likely taper in 1-2 days





ESRD on HD


dialysis on M-W-F


consulted nephrology for further management


continue current renal medications





Uncontrolled BSGs


in the setting of DM2


Ha1c = 6.3%


diet controlled at baseline


due to IV and PO steroids - BSGs elevated


glycemic control consult for further input


will try to taper steroids in AM





Depression


continue current medications





DVT ppx


subq heparin





FULL CODE

## 2018-01-15 ENCOUNTER — HOSPITAL ENCOUNTER (INPATIENT)
Dept: HOSPITAL 45 - C.MSICU | Age: 58
LOS: 29 days | Discharge: NURSING, REHAB OR LONG-TERM CARE | DRG: 871 | End: 2018-02-13
Attending: HOSPITALIST | Admitting: ANESTHESIOLOGY
Payer: COMMERCIAL

## 2018-01-15 VITALS
BODY MASS INDEX: 37.07 KG/M2 | WEIGHT: 217.16 LBS | BODY MASS INDEX: 37.07 KG/M2 | HEIGHT: 64 IN | WEIGHT: 217.16 LBS | HEIGHT: 64 IN | BODY MASS INDEX: 37.07 KG/M2

## 2018-01-15 DIAGNOSIS — I95.9: ICD-10-CM

## 2018-01-15 DIAGNOSIS — Z79.82: ICD-10-CM

## 2018-01-15 DIAGNOSIS — K74.60: ICD-10-CM

## 2018-01-15 DIAGNOSIS — Z95.2: ICD-10-CM

## 2018-01-15 DIAGNOSIS — Z79.899: ICD-10-CM

## 2018-01-15 DIAGNOSIS — N18.6: ICD-10-CM

## 2018-01-15 DIAGNOSIS — I05.0: ICD-10-CM

## 2018-01-15 DIAGNOSIS — I13.2: ICD-10-CM

## 2018-01-15 DIAGNOSIS — Z90.49: ICD-10-CM

## 2018-01-15 DIAGNOSIS — Z79.4: ICD-10-CM

## 2018-01-15 DIAGNOSIS — J45.909: ICD-10-CM

## 2018-01-15 DIAGNOSIS — D63.1: ICD-10-CM

## 2018-01-15 DIAGNOSIS — E11.65: ICD-10-CM

## 2018-01-15 DIAGNOSIS — Z99.2: ICD-10-CM

## 2018-01-15 DIAGNOSIS — E78.5: ICD-10-CM

## 2018-01-15 DIAGNOSIS — I48.92: ICD-10-CM

## 2018-01-15 DIAGNOSIS — K56.600: ICD-10-CM

## 2018-01-15 DIAGNOSIS — I27.20: ICD-10-CM

## 2018-01-15 DIAGNOSIS — F32.9: ICD-10-CM

## 2018-01-15 DIAGNOSIS — N39.0: ICD-10-CM

## 2018-01-15 DIAGNOSIS — G25.81: ICD-10-CM

## 2018-01-15 DIAGNOSIS — R23.4: ICD-10-CM

## 2018-01-15 DIAGNOSIS — Z88.5: ICD-10-CM
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
## 2018-01-15 DIAGNOSIS — E66.2: ICD-10-CM

## 2018-01-15 DIAGNOSIS — E11.42: ICD-10-CM

## 2018-01-15 DIAGNOSIS — Z82.49: ICD-10-CM

## 2018-01-15 DIAGNOSIS — R09.02: ICD-10-CM

## 2018-01-15 DIAGNOSIS — B96.20: ICD-10-CM

## 2018-01-15 DIAGNOSIS — N25.0: ICD-10-CM

## 2018-01-15 DIAGNOSIS — Z87.81: ICD-10-CM

## 2018-01-15 DIAGNOSIS — R10.9: ICD-10-CM

## 2018-01-15 DIAGNOSIS — S93.401A: ICD-10-CM

## 2018-01-15 DIAGNOSIS — E11.22: ICD-10-CM

## 2018-01-15 DIAGNOSIS — R19.7: ICD-10-CM

## 2018-01-15 DIAGNOSIS — A04.72: ICD-10-CM

## 2018-01-15 DIAGNOSIS — I50.9: ICD-10-CM

## 2018-01-15 DIAGNOSIS — J44.1: ICD-10-CM

## 2018-01-15 DIAGNOSIS — J40: ICD-10-CM

## 2018-01-15 DIAGNOSIS — Z79.02: ICD-10-CM

## 2018-01-15 DIAGNOSIS — Z87.440: ICD-10-CM

## 2018-01-15 DIAGNOSIS — R79.89: ICD-10-CM

## 2018-01-15 DIAGNOSIS — K92.1: ICD-10-CM

## 2018-01-15 DIAGNOSIS — A41.9: Primary | ICD-10-CM

## 2018-01-15 DIAGNOSIS — Z79.51: ICD-10-CM

## 2018-01-16 VITALS — OXYGEN SATURATION: 95 % | HEART RATE: 99 BPM | SYSTOLIC BLOOD PRESSURE: 117 MMHG | DIASTOLIC BLOOD PRESSURE: 63 MMHG

## 2018-01-16 VITALS — SYSTOLIC BLOOD PRESSURE: 103 MMHG | DIASTOLIC BLOOD PRESSURE: 62 MMHG | HEART RATE: 98 BPM | OXYGEN SATURATION: 95 %

## 2018-01-16 VITALS — HEART RATE: 109 BPM | OXYGEN SATURATION: 95 % | DIASTOLIC BLOOD PRESSURE: 41 MMHG | SYSTOLIC BLOOD PRESSURE: 64 MMHG

## 2018-01-16 VITALS — DIASTOLIC BLOOD PRESSURE: 51 MMHG | HEART RATE: 102 BPM | OXYGEN SATURATION: 90 % | SYSTOLIC BLOOD PRESSURE: 84 MMHG

## 2018-01-16 VITALS — OXYGEN SATURATION: 90 % | SYSTOLIC BLOOD PRESSURE: 79 MMHG | HEART RATE: 103 BPM | DIASTOLIC BLOOD PRESSURE: 61 MMHG

## 2018-01-16 VITALS — DIASTOLIC BLOOD PRESSURE: 46 MMHG | HEART RATE: 111 BPM | OXYGEN SATURATION: 92 % | SYSTOLIC BLOOD PRESSURE: 66 MMHG

## 2018-01-16 VITALS — DIASTOLIC BLOOD PRESSURE: 63 MMHG | OXYGEN SATURATION: 95 % | HEART RATE: 98 BPM | SYSTOLIC BLOOD PRESSURE: 107 MMHG

## 2018-01-16 VITALS — DIASTOLIC BLOOD PRESSURE: 74 MMHG | HEART RATE: 105 BPM | SYSTOLIC BLOOD PRESSURE: 112 MMHG | OXYGEN SATURATION: 96 %

## 2018-01-16 VITALS
OXYGEN SATURATION: 93 % | SYSTOLIC BLOOD PRESSURE: 86 MMHG | DIASTOLIC BLOOD PRESSURE: 45 MMHG | HEART RATE: 101 BPM | TEMPERATURE: 97.52 F

## 2018-01-16 VITALS — SYSTOLIC BLOOD PRESSURE: 114 MMHG | DIASTOLIC BLOOD PRESSURE: 65 MMHG | HEART RATE: 100 BPM | OXYGEN SATURATION: 96 %

## 2018-01-16 VITALS
TEMPERATURE: 97.88 F | DIASTOLIC BLOOD PRESSURE: 59 MMHG | HEART RATE: 99 BPM | OXYGEN SATURATION: 93 % | SYSTOLIC BLOOD PRESSURE: 80 MMHG

## 2018-01-16 VITALS
TEMPERATURE: 98.42 F | HEART RATE: 112 BPM | DIASTOLIC BLOOD PRESSURE: 63 MMHG | OXYGEN SATURATION: 94 % | SYSTOLIC BLOOD PRESSURE: 114 MMHG

## 2018-01-16 VITALS — OXYGEN SATURATION: 96 % | DIASTOLIC BLOOD PRESSURE: 53 MMHG | HEART RATE: 100 BPM | SYSTOLIC BLOOD PRESSURE: 84 MMHG

## 2018-01-16 VITALS — SYSTOLIC BLOOD PRESSURE: 116 MMHG | DIASTOLIC BLOOD PRESSURE: 65 MMHG | OXYGEN SATURATION: 96 % | HEART RATE: 100 BPM

## 2018-01-16 VITALS — HEART RATE: 102 BPM | DIASTOLIC BLOOD PRESSURE: 47 MMHG | SYSTOLIC BLOOD PRESSURE: 83 MMHG | OXYGEN SATURATION: 91 %

## 2018-01-16 VITALS — HEART RATE: 103 BPM | DIASTOLIC BLOOD PRESSURE: 29 MMHG | OXYGEN SATURATION: 93 % | SYSTOLIC BLOOD PRESSURE: 67 MMHG

## 2018-01-16 VITALS — SYSTOLIC BLOOD PRESSURE: 58 MMHG | HEART RATE: 105 BPM | DIASTOLIC BLOOD PRESSURE: 33 MMHG | OXYGEN SATURATION: 93 %

## 2018-01-16 VITALS — DIASTOLIC BLOOD PRESSURE: 43 MMHG | HEART RATE: 113 BPM | SYSTOLIC BLOOD PRESSURE: 67 MMHG | OXYGEN SATURATION: 93 %

## 2018-01-16 VITALS — OXYGEN SATURATION: 91 % | DIASTOLIC BLOOD PRESSURE: 49 MMHG | HEART RATE: 99 BPM | SYSTOLIC BLOOD PRESSURE: 94 MMHG

## 2018-01-16 VITALS
TEMPERATURE: 97.88 F | OXYGEN SATURATION: 92 % | SYSTOLIC BLOOD PRESSURE: 85 MMHG | DIASTOLIC BLOOD PRESSURE: 49 MMHG | HEART RATE: 102 BPM

## 2018-01-16 VITALS — SYSTOLIC BLOOD PRESSURE: 72 MMHG | HEART RATE: 107 BPM | DIASTOLIC BLOOD PRESSURE: 41 MMHG | OXYGEN SATURATION: 95 %

## 2018-01-16 VITALS — HEART RATE: 101 BPM | OXYGEN SATURATION: 92 % | DIASTOLIC BLOOD PRESSURE: 49 MMHG | SYSTOLIC BLOOD PRESSURE: 91 MMHG

## 2018-01-16 VITALS — HEART RATE: 103 BPM | DIASTOLIC BLOOD PRESSURE: 50 MMHG | OXYGEN SATURATION: 91 % | SYSTOLIC BLOOD PRESSURE: 89 MMHG

## 2018-01-16 VITALS — DIASTOLIC BLOOD PRESSURE: 43 MMHG | OXYGEN SATURATION: 93 % | SYSTOLIC BLOOD PRESSURE: 70 MMHG | HEART RATE: 105 BPM

## 2018-01-16 VITALS — OXYGEN SATURATION: 97 % | SYSTOLIC BLOOD PRESSURE: 102 MMHG | HEART RATE: 98 BPM | DIASTOLIC BLOOD PRESSURE: 45 MMHG

## 2018-01-16 VITALS — HEART RATE: 101 BPM | DIASTOLIC BLOOD PRESSURE: 48 MMHG | SYSTOLIC BLOOD PRESSURE: 95 MMHG | OXYGEN SATURATION: 95 %

## 2018-01-16 VITALS — SYSTOLIC BLOOD PRESSURE: 111 MMHG | DIASTOLIC BLOOD PRESSURE: 70 MMHG | HEART RATE: 103 BPM | OXYGEN SATURATION: 96 %

## 2018-01-16 VITALS — SYSTOLIC BLOOD PRESSURE: 97 MMHG | DIASTOLIC BLOOD PRESSURE: 61 MMHG | HEART RATE: 96 BPM | OXYGEN SATURATION: 96 %

## 2018-01-16 VITALS — SYSTOLIC BLOOD PRESSURE: 70 MMHG | OXYGEN SATURATION: 94 % | DIASTOLIC BLOOD PRESSURE: 40 MMHG | HEART RATE: 105 BPM

## 2018-01-16 VITALS — OXYGEN SATURATION: 96 %

## 2018-01-16 VITALS — OXYGEN SATURATION: 95 % | HEART RATE: 99 BPM | SYSTOLIC BLOOD PRESSURE: 96 MMHG | DIASTOLIC BLOOD PRESSURE: 53 MMHG

## 2018-01-16 VITALS
DIASTOLIC BLOOD PRESSURE: 43 MMHG | SYSTOLIC BLOOD PRESSURE: 67 MMHG | OXYGEN SATURATION: 93 % | HEART RATE: 113 BPM | TEMPERATURE: 97.7 F

## 2018-01-16 VITALS — DIASTOLIC BLOOD PRESSURE: 53 MMHG | SYSTOLIC BLOOD PRESSURE: 96 MMHG | HEART RATE: 99 BPM | OXYGEN SATURATION: 95 %

## 2018-01-16 LAB
ALBUMIN SERPL-MCNC: 2.3 GM/DL (ref 3.4–5)
ALP SERPL-CCNC: 115 U/L (ref 45–117)
ALT SERPL-CCNC: 11 U/L (ref 12–78)
AST SERPL-CCNC: 11 U/L (ref 15–37)
BASOPHILS # BLD: 0.01 K/UL (ref 0–0.2)
BASOPHILS NFR BLD: 0.1 %
BUN SERPL-MCNC: 53 MG/DL (ref 7–18)
CALCIUM SERPL-MCNC: 8.1 MG/DL (ref 8.5–10.1)
CK MB SERPL-MCNC: 1.4 NG/ML (ref 0.5–3.6)
CO2 SERPL-SCNC: 20 MMOL/L (ref 21–32)
CREAT SERPL-MCNC: 6.04 MG/DL (ref 0.6–1.2)
EOS ABS #: 0.08 K/UL (ref 0–0.5)
EOSINOPHIL NFR BLD AUTO: 132 K/UL (ref 130–400)
FLUAV RNA SPEC QL NAA+PROBE: (no result)
FLUBV RNA SPEC QL NAA+PROBE: (no result)
GLUCOSE SERPL-MCNC: 249 MG/DL (ref 70–99)
HCT VFR BLD CALC: 27.7 % (ref 37–47)
HGB BLD-MCNC: 8.6 G/DL (ref 12–16)
IG#: 0.05 K/UL (ref 0–0.02)
IMM GRANULOCYTES NFR BLD AUTO: 7.2 %
INR PPP: 1 (ref 0.9–1.1)
LYMPHOCYTES # BLD: 0.53 K/UL (ref 1.2–3.4)
MCH RBC QN AUTO: 30.7 PG (ref 25–34)
MCHC RBC AUTO-ENTMCNC: 31 G/DL (ref 32–36)
MCV RBC AUTO: 98.9 FL (ref 80–100)
MONO ABS #: 0.47 K/UL (ref 0.11–0.59)
MONOCYTES NFR BLD: 6.4 %
NEUT ABS #: 6.22 K/UL (ref 1.4–6.5)
NEUTROPHILS # BLD AUTO: 1.1 %
NEUTROPHILS NFR BLD AUTO: 84.5 %
PHOSPHATE SERPL-MCNC: 5.1 MG/DL (ref 2.5–4.9)
PMV BLD AUTO: 10.1 FL (ref 7.4–10.4)
POTASSIUM SERPL-SCNC: 4.8 MMOL/L (ref 3.5–5.1)
PROT SERPL-MCNC: 5.9 GM/DL (ref 6.4–8.2)
PTT PATIENT: 27.1 SECONDS (ref 21–31)
RED CELL DISTRIBUTION WIDTH CV: 15.3 % (ref 11.5–14.5)
RED CELL DISTRIBUTION WIDTH SD: 55.2 FL (ref 36.4–46.3)
SODIUM SERPL-SCNC: 135 MMOL/L (ref 136–145)
WBC # BLD AUTO: 7.36 K/UL (ref 4.8–10.8)

## 2018-01-16 RX ADMIN — PANTOPRAZOLE SCH MG: 40 TABLET, DELAYED RELEASE ORAL at 08:27

## 2018-01-16 RX ADMIN — IPRATROPIUM BROMIDE AND ALBUTEROL SCH PUFFS: 20; 100 SPRAY, METERED RESPIRATORY (INHALATION) at 20:42

## 2018-01-16 RX ADMIN — INSULIN ASPART SCH UNITS: 100 INJECTION, SOLUTION INTRAVENOUS; SUBCUTANEOUS at 12:33

## 2018-01-16 RX ADMIN — Medication SCH MG: at 08:22

## 2018-01-16 RX ADMIN — ALLOPURINOL SCH MG: 100 TABLET ORAL at 20:43

## 2018-01-16 RX ADMIN — IPRATROPIUM BROMIDE AND ALBUTEROL SCH PUFFS: 20; 100 SPRAY, METERED RESPIRATORY (INHALATION) at 17:29

## 2018-01-16 RX ADMIN — IPRATROPIUM BROMIDE AND ALBUTEROL SCH PUFFS: 20; 100 SPRAY, METERED RESPIRATORY (INHALATION) at 14:31

## 2018-01-16 RX ADMIN — HYDROCORTISONE SODIUM SUCCINATE SCH MLS/MIN: 100 INJECTION, POWDER, FOR SOLUTION INTRAMUSCULAR; INTRAVENOUS at 17:30

## 2018-01-16 RX ADMIN — TRAMADOL HYDROCHLORIDE PRN MG: 50 TABLET, COATED ORAL at 11:37

## 2018-01-16 RX ADMIN — GABAPENTIN SCH MG: 100 CAPSULE ORAL at 20:43

## 2018-01-16 RX ADMIN — HYDROCORTISONE SODIUM SUCCINATE SCH MLS/MIN: 100 INJECTION, POWDER, FOR SOLUTION INTRAMUSCULAR; INTRAVENOUS at 11:10

## 2018-01-16 RX ADMIN — ALUMINUM ZIRCONIUM TRICHLOROHYDREX GLY SCH EA: 0.2 STICK TOPICAL at 08:21

## 2018-01-16 RX ADMIN — PRAMIPEXOLE DIHYDROCHLORIDE SCH MG: 0.25 TABLET ORAL at 20:42

## 2018-01-16 RX ADMIN — ALLOPURINOL SCH MG: 100 TABLET ORAL at 08:27

## 2018-01-16 RX ADMIN — ACETAMINOPHEN PRN MG: 325 TABLET ORAL at 04:33

## 2018-01-16 RX ADMIN — RENAGEL SCH MG: 800 TABLET ORAL at 17:29

## 2018-01-16 RX ADMIN — HEPARIN SODIUM SCH UNIT: 10000 INJECTION, SOLUTION INTRAVENOUS; SUBCUTANEOUS at 20:51

## 2018-01-16 RX ADMIN — RENAGEL SCH MG: 800 TABLET ORAL at 08:28

## 2018-01-16 RX ADMIN — INSULIN ASPART SCH UNITS: 100 INJECTION, SOLUTION INTRAVENOUS; SUBCUTANEOUS at 17:36

## 2018-01-16 RX ADMIN — INSULIN ASPART SCH UNITS: 100 INJECTION, SOLUTION INTRAVENOUS; SUBCUTANEOUS at 20:51

## 2018-01-16 RX ADMIN — ATORVASTATIN CALCIUM SCH MG: 40 TABLET, FILM COATED ORAL at 08:28

## 2018-01-16 RX ADMIN — TRAMADOL HYDROCHLORIDE PRN MG: 50 TABLET, COATED ORAL at 21:00

## 2018-01-16 RX ADMIN — RENAGEL SCH MG: 800 TABLET ORAL at 11:11

## 2018-01-16 RX ADMIN — SERTRALINE HYDROCHLORIDE SCH MG: 50 TABLET, FILM COATED ORAL at 08:27

## 2018-01-16 RX ADMIN — DOCUSATE SODIUM SCH MG: 100 CAPSULE, LIQUID FILLED ORAL at 20:42

## 2018-01-16 RX ADMIN — ALBUMIN HUMAN SCH GM: 250 SOLUTION INTRAVENOUS at 05:14

## 2018-01-16 RX ADMIN — ALUMINUM ZIRCONIUM TRICHLOROHYDREX GLY SCH EA: 0.2 STICK TOPICAL at 23:33

## 2018-01-16 RX ADMIN — IPRATROPIUM BROMIDE AND ALBUTEROL SCH PUFFS: 20; 100 SPRAY, METERED RESPIRATORY (INHALATION) at 08:21

## 2018-01-16 RX ADMIN — ASCORBIC ACID, THIAMINE MONONITRATE,RIBOFLAVIN, NIACINAMIDE, PYRIDOXINE HYDROCHLORIDE, FOLIC ACID, CYANOCOBALAMIN, BIOTIN, CALCIUM PANTOTHENATE, SCH CAP: 100; 1.5; 1.7; 20; 10; 1; 6000; 150000; 5 CAPSULE, LIQUID FILLED ORAL at 08:28

## 2018-01-16 RX ADMIN — ALUMINUM ZIRCONIUM TRICHLOROHYDREX GLY SCH EA: 0.2 STICK TOPICAL at 16:00

## 2018-01-16 RX ADMIN — DOCUSATE SODIUM SCH MG: 100 CAPSULE, LIQUID FILLED ORAL at 08:22

## 2018-01-16 RX ADMIN — ALBUMIN HUMAN SCH GM: 250 SOLUTION INTRAVENOUS at 04:31

## 2018-01-16 RX ADMIN — HEPARIN SODIUM SCH UNIT: 10000 INJECTION, SOLUTION INTRAVENOUS; SUBCUTANEOUS at 08:31

## 2018-01-16 NOTE — CRITICAL CARE CONSULTATION
Critical Care Consultation


Date of Consultation:


2018.


Attending Physician:


Valentin Francois M.D.


Reason for Consultation:


Hypotension in ESRD


History of Present Illness


Ivy Bro is a 56 yo  female who states she was sent from dialysis 

today via EMS after vomiting 1 time.  She denied blood or cough ground looks.  

She states she was 90 minutes into her dialysis treatment when the stopped due 

to low pressures.  She arrived at [facility]; where she "laid all day." When 

asked if she was in her usual state of health going into dialysis she stated 

yes.  However, upon reviewing pts records from [facility] she told them she has 

been experiencing a cough with green phlegm for 1.5 wks.  She was started on 

levaquin, outpt and told she needed a CXR.  Pt had a caregiver with her that 

told [facility] she has not been taking her medication properly and has been 

eating junk food, such as hot dogs and oodles of noodles.  Pt reports to me 

that she is still experiencing some upset stomach but no continued nausea.  She 

denies chest pain or palpitations.  She states she has intermittent shortness 

of breath.  Per records, pt is short of breath when traveling from one room to 

another in her home.  She admits to following today and hit her shin denies LOC 

or head trauma.  Per transferring hospital, there was no fracture noted on 

xray.  





[facility] ED documentation notes that they were in contact with pts 

nephrologists who recommended levophed drip, broader spectrum abx coverage with 

Gentamicin, transfer to his service at alternate location, and possibly 

Albumin.  He recommended no additional fluid bolus above what pt had received.  

I spoke with Dr. Hensley regarding this pt this morning.  He directed to 

continue the Rocephin, d/c the levophed, gentamicin and begin midodrine. 





Pt past medical history includes aortic valve replacement, htn, ESRD, asthma (

dx age 23) w/o smoking hx.  Pt denies malaise, muscle aches, chest pain, 

palpitations, dyspnea at rest, continued nausea, change in bowel habits. Pt 

states she doesn't urinate frequently anymore.  She denies rigors or feeling 

feverish.  She denies numbness, tingling, dizziness, or lightheadedness.





Past Medical/Surgical History


Medical Problems:


CHF (congestive heart failure)


Pulmonary HTN


Mitral Valve Stenosis


ESRD on dialysis


Hypotension


Respiratory failure


Hypertension


DM 2


Depression


Asthma


Dyslipidemia


Recurrent UTI


CKD





Surgical History:


Cholecystectomy


Aortic Valve Replacement


WANDY





Family History


No significant family history of heart disease and/or diabetes or chronic 

kidney disease.





Social History


Smoking Status:  Never Smoker


Smokeless Tobacco Use:  No


Alcohol Use:  none


Drug Use:  none


Marital Status:  





Allergies


Coded Allergies:  


     Hydrocodone (Verified  Allergy, Unknown, unspecified, 17)


     Morphine (Verified  Allergy, Unknown, unspecified , 17)





Home Medications


Scheduled


Allopurinol (Zyloprim), 100 MG PO BID


Aspirin (Aspirin Chewable), 81 MG PO DAILY


Atorvastatin (Lipitor), 40 MG PO DAILY


Clopidogrel Bisulfate (Plavix), 75 MG PO DAILY


Docusate Sodium (Colace), 1 CAP PO BID


Ferric Citrate (Auryxia), 1 TAB PO DAILY


Fluticasone Furoate-Vilanterol (Breo Ellipta), 1 PUFF INH BID


Gabapentin (Neurontin), 2 CAP PO HS


Insulin Human NPH (Humulin N), 40 UNITS SC UD


Ipratropium-Albuterol (Combivent Respimat), 1 PUFFS INH QID


Levofloxacin (Levaquin), 500 MG PO DAILY


Methoxy Polyethylene Glycol-Ep (Mircera), 1 TAB PO TID


Pantoprazole (Protonix), 40 MG PO DAILY


Pramipexole (Mirapex), 0.25 MG PO HS


Rabeprazole Sodium (Aciphex), 20 MG PO DAILY


Sertraline (Zoloft), 75 MG PO DAILY


Sevelamer Carbonate (Renvela), 1 TAB PO TID


Thiamine Hcl (Vitamin B-1), 50 MG PO DAILY


Vitamin B Cmplx/Vitc/Folic Ac (Nephrocaps), 1 CAP PO DAILY





Scheduled PRN


Albuterol Sulfate (Proair Respiclick), 2 PUFFS INH for Shortness of Breath


Tramadol (Ultram), 50 MG PO Q4H PRN for Pain





Miscellaneous Medications


Iron Sucrose (Venofer), 50 MG IV


Iron Sucrose (Venofer), 100 MG IV





Current Inpatient Medications





Current Inpatient Medications








 Medications


  (Trade)  Dose


 Ordered  Sig/Daniel


 Route  Start Time


 Stop Time Status Last Admin


Dose Admin


 


 Heparin Sodium


  (Porcine)


  (Heparin Sq 5000


 Unit/0.5ml)  5,000 unit  Q12H


 SQ  18 02:30


 2/15/18 02:29 UNV  


 


 


 Acetaminophen


  (Tylenol Tab)  650 mg  Q4H  PRN


 PO  18 02:30


 2/15/18 02:29 UNV  


 


 


 Pantoprazole


 Sodium


  (Protonix Tab)  40 mg  DAILY


 PO  18 09:00


 2/15/18 08:59 UNV  


 


 


 Miscellaneous


 Information


  ([facility] Protocol


 For Hyperglycemia)  1 ea  PRN  PRN


 N/A  18 02:30


 18 02:29 UNV  


 


 


 Allopurinol


  (Zyloprim Tab)  100 mg  BID


 PO  18 09:00


 2/15/18 08:59 UNV  


 


 


 Aspirin


  (Aspirin Chew)  81 mg  DAILY


 PO  18 09:00


 2/15/18 08:59 UNV  


 


 


 Atorvastatin


 Calcium


  (Lipitor Tab)  40 mg  DAILY


 PO  18 09:00


 2/15/18 08:59 UNV  


 


 


 Docusate Sodium


  (coLACE CAP)  100 mg  BID


 PO  18 09:00


 2/15/18 08:59 UNV  


 


 


 Insulin Human NPH


  (novoLIN-N NPH)  40 units  UD


 SC  18 02:45


 2/15/18 02:44 UNV  


 


 


 Albuterol/


 Ipratropium


  (Combivent


 Respimat Inh)  1 puffs  QID


 INH  18 09:00


 2/15/18 08:59 UNV  


 


 


 Pantoprazole


 Sodium


  (Protonix Tab)  40 mg  DAILY


 PO  18 09:00


 2/15/18 08:59 UNV  


 


 


 Pramipexole


 Dihydrochloride


  (miraPEX TAB)  0.25 mg  HS


 PO  18 21:00


 2/15/18 20:59 UNV  


 


 


 Sertraline HCl


  (Zoloft Tab)  75 mg  DAILY


 PO  18 09:00


 2/15/18 08:59 UNV  


 


 


 Thiamine HCl


  (Vitamin B-1 Tab)  50 mg  DAILY


 PO  18 09:00


 2/15/18 08:59 UNV  


 


 


 Tramadol HCl


  (Ultram Tab)  50 mg  Q4H  PRN


 PO  18 02:45


 2/15/18 02:44 UNV  


 


 


 Vitamin B Complex/


 Vit C/Folic Acid


  (Nephrocaps)  1 cap  DAILY


 PO  18 09:00


 2/15/18 08:59 UNV  


 


 


 Non-Formulary


 Medication


  (Ferric Citrate


  (Auryxia))  1 tab  DAILY


 PO  18 09:00


 2/15/18 08:59 UNV  


 


 


 Non-Formulary


 Medication


  (Fluticasone


 Furoate-Vilanterol


  (Breo Ellipta))  1 puff  BID


 INH  18 09:00


 2/15/18 08:59 UNV  


 


 


 Non-Formulary


 Medication


  (Sevelamer


 Carbonate


  (Renvela))  1 tab  TID


 PO  18 09:00


 2/15/18 08:59 UNV  


 


 


 Gabapentin


  (Neurontin Cap)  200 mg  HS


 PO  18 21:00


 2/15/18 20:59 UNV  


 


 


 Insulin Aspart


  (novoLOG ASPART)  **SLIDING


 SCALE**


 **G...  ACHS


 SC  18 07:00


 2/15/18 06:59 UNV  


 











Review of Systems


12 systems reviewed and negative other than previously mentioned in the HPI.





Physical Exam











  Date Time  Temp Pulse Resp B/P (MAP) Pulse Ox O2 Delivery O2 Flow Rate FiO2


 


18 02:06 36.5 113 20 67/43 93 Room Air  








Vital Signs - as noted





Laboratory Data - as noted





Physical Exam:


General - NAD, resting comfortably in bed


Eyes - PERRL, EOMI No icterus, gaze conjugate


ENT - Mucosa dry, no lesions or candidiasis


Neck - Supple, trachea midline, no masses or lymphadenopathy, no JVD or bruits


Lungs - No paradoxical chest wall movement, coarse to auscultation bilaterally 

diminished at the bases, no wheezes, rales, or rhonchi


Heart - Sinus tachycardia with rate around 100 on telemetry, No murmur, rubs, 

clicks, or gallops appreciated


Abdomen - BS present, no bruits noted, tympanic to percussion, soft, epigastric 

tenderness noted, obese abdomen, no organomegaly 


Extremities - No edema, pedal pulses intact 


Neuro - A&OX3 


Strength extremities equal and appropriate bilaterally 


Reflexes: Bicep, brachioradialis, patellar, and plantar normal and equal 


CN:PERRL, EOMI, no facial asymmetry, uvula/tongue midline





Laboratory Results





Last 24 Hours








Test


  18


02:23 18


02:55


 


Creatine Kinase MB Ratio   











Diagnostic Results


KUB





CLINICAL HISTORY: Abdominal pain.    





COMPARISON STUDY:  None. 





FINDINGS: Cholecystectomy clips are noted. A mildly dilated loop of small bowel


within  the left mid abdomen is noted. There is gas within the colon and rectum.


There is no convincing evidence for a small bowel obstruction.





IMPRESSION:  Prominent loop of small bowel within the left mid abdomen with no


convincing evidence for small bowel obstruction. 





Electronically signed by:  Angel Dominguez M.D.


2018 6:52 AM





Dictated Date/Time:  2018 6:51 AM





________________________________________________________________________________

______





CHEST ONE VIEW PORTABLE





CLINICAL HISTORY: Intermittent shortness of breath.    





COMPARISON STUDY:  Chest radiograph 2018.





FINDINGS: A dual lumen left internal jugular catheter remains in place. There


are median sternotomy wires. There is no pneumothorax. There may be trace


bilateral pleural effusions. Mild bibasilar opacities are noted. A prosthetic


cardiac valve is noted, likely aortic in location. Moderate cardiomegaly is


noted. There is pulmonary vascular congestion with suspected mild pulmonary


edema. This has improved since exam of 2018. A 1.3 cm linear


radiodensity projects over the upper mediastinum. 





IMPRESSION:  





1. Mild pulmonary edema which has improved since exam of 2018.





2. Mild bibasilar opacities which favor atelectasis.





3. Possible trace bilateral pleural effusions. 





4. Indeterminate 1.3 cm linear radiodensity which projects over the upper


mediastinum.





Electronically signed by:  Angel Dominguez M.D.


2018 6:50 AM





Dictated Date/Time:  2018 6:47 AM





Assessment & Plan


: 


Recurrent UTI


Treated here at [facility] for UTI on  with Rocephin IV -





Nephro:


Nephro Consult placed: Appreciate [] input


* Follows with Dr. Geiger for nephrology


* Left Tunneled Dialysis Catheter x approx. 1 yr


* HD 3x/wk, HD 1/15 ended early for hypotension


Continue Nephro Caps, Renvela, and Auryxia


Mircera, and Venofer per Nephro





Neuro:


A&O x 3


Neuro  checks  per protocol


Hx of depression; cont Zoloft


Continue home Ultram 50mg PO q4H PRN


Continue Mirapex 0.25Mg PO HS (restless Leg Synd)





Resp:


AB.//19 95% ORA


CXR: No acute process noted, CXR looks improved from prior admission 18


Adequate Saturations on room air


Hx of Asthma, acute on chronic hypercarbia and underlying obesity 

hypoventilation syndrome


Continue home medications: Albuterol Sulfate 2 puffs PRN SOB, Fluticasone 

Furoate-Vilanterol 1 puff BID, Combivent Respimat 1 puff QID


Check Procalcitonin





CV:


Hx of HTN, Dyslipidemia, aortic valve replacement with a bioprosthetic aortic 

[facility] 3/16


ECHO 18 per HPI by Dr. Holbrook 


Continue home medications: ASA 81mg Chew, Atorvastatin 40mg PO daily, 


Hold Plavix, start heparin in pt





GI:


Colace 100mg Cap, Po BID


Cont Protonix 40mg PO Daily


Hold Rabeprazole Sodium in pt





Endo: 


Hx of DM2 with ESRD requiring HD 3 times weekly (Ha1c = 6.3%)


Pt is confused on about order of insulin.  States daugther told her she didn't 

need it. 


* Glycemic consult placed


* NPH 40u SC daily


* ISS; Cover initial glucose of 240





I.D:


U/A: at  [facility] Nitrate negative with bacturia and pyuria


Repeated at [facility] and Culture pending


Blood cultures x 2 ordered, Influenza PCR, MRSA Screen pending


Afebrile, WBC WNL no leukocytosis


Trend fever curve





HEME:


H&H: 8.6& 27.7; Plts 132


Prophylaxis: Heparin 5,000u q8h





Access: 


Tunneled HD cath, maintain 2 18g IVs peripherally, no current indication for 

central access





CCT: 60 Minutes; This time is exclusive of all separately billable procedures.


Thank you for involving us in the care of this patient.  Please refer to Dr. Carlos's addendum for further recommendations.





Assessment and Plan


: 


Recurrent UTI


Treated here at [facility] for UTI on  with Rocephin IV -





Nephro:


Nephro Consult placed: Appreciate [] input


* Follows with Dr. Geiger for nephrology


* Left Tunneled Dialysis Catheter x approx. 1 yr


* HD 3x/wk, HD 1/15 ended early for hypotension


Continue Nephro Caps, Renvela, and Auryxia


Mircera, and Venofer per Nephro





Neuro:


A&O x 3


Neuro  checks  per protocol


Hx of depression; cont Zoloft


Continue home Ultram 50mg PO q4H PRN


Continue Mirapex 0.25Mg PO HS (restless Leg Synd)





Resp:


AB.1/70/19 95% ORA


CXR: No acute process noted, CXR looks improved from prior admission 18


Adequate Saturations on room air


Hx of Asthma, acute on chronic hypercarbia and underlying obesity 

hypoventilation syndrome


Continue home medications: Albuterol Sulfate 2 puffs PRN SOB, Fluticasone 

Furoate-Vilanterol 1 puff BID, Combivent Respimat 1 puff QID


Check Procalcitonin





CV:


Hx of HTN, Dyslipidemia, aortic valve replacement with a bioprosthetic aortic 

[facility] 3/16


ECHO 18 per HPI by Dr. Holbrook 


Continue home medications: ASA 81mg Chew, Atorvastatin 40mg PO daily, 


Hold Plavix, start heparin in pt





GI:


Colace 100mg Cap, Po BID


Cont Protonix 40mg PO Daily


Hold Rabeprazole Sodium in pt





Endo: 


Hx of DM2 with ESRD requiring HD 3 times weekly (Ha1c = 6.3%)


Pt is confused on about order of insulin.  States daugther told her she didn't 

need it. 


* Glycemic consult placed


* NPH 40u SC daily


* ISS; Cover initial glucose of 240





I.D:


U/A: at  [facility] Nitrate negative with bacturia and pyuria


Repeated at [facility] and Culture pending


Blood cultures x 2 ordered, Influenza PCR, MRSA Screen pending


Afebrile, WBC WNL no leukocytosis


Trend fever curve





HEME:


H&H: 8.6& 27.7; Plts 132


Prophylaxis: Heparin 5,000u q8h





Access: 


Tunneled HD cath, maintain 2 18g IVs peripherally, no current indication for 

central access





CCT: 60 Minutes; This time is exclusive of all separately billable procedures.


Thank you for involving us in the care of this patient.  Please refer to Dr. Carlos's addendum for further recommendations.








ADDENDUM:


Agree with current plan.   I have discussed this with nephrology.   Decision 

made to add Vanco dose to antibiotic coverage.  Will stress dose with steroids.

   Low dose of pressor for now.   Continue ICU evaluation and care.

## 2018-01-16 NOTE — PHARMACY PROGRESS NOTE
Glycemic Control Intl Consult


Date of Service


Jan 16, 2018.





Scope


Glycemic Pharmacist consulted by Brie Montano on 1/16/18 for glycemic control 

and to write orders per [facility] inpatient glycemic control protocol





Objective


Weight (Kilograms):  99.400


Accuchecks BSG (last 24hrs):











Test


  1/16/18


02:52 1/16/18


02:55 1/16/18


05:40


 


Bedside Glucose


  240 mg/dl


(70-90) 


  218 mg/dl


(70-90)


 


Random Glucose


  


  249 mg/dl


(70-99) 


 








Laboratory Data (last 24hrs)











Test


  1/16/18


02:55


 


Anion Gap 12.0 mmol/L 


 


BUN/Creatinine Ratio 8.8 


 


Blood Urea Nitrogen 53 mg/dl 


 


Creatinine 6.04 mg/dl 


 


Potassium Level 4.8 mmol/L 


 


Sodium Level 135 mmol/L 


 


White Blood Count 7.36 K/uL 


 


Red Blood Count 2.80 M/uL 


 


Hemoglobin 8.6 g/dL 


 


Hematocrit 27.7 % 


 


Mean Corpuscular Volume 98.9 fL 


 


Mean Corpuscular Hemoglobin 30.7 pg 


 


Mean Corpuscular Hemoglobin


Concent 31.0 g/dl 


 


 


Platelet Count 132 K/uL 


 


Mean Platelet Volume 10.1 fL 


 


Neutrophils (%) (Auto) 84.5 % 


 


Lymphocytes (%) (Auto) 7.2 % 


 


Monocytes (%) (Auto) 6.4 % 


 


Eosinophils (%) (Auto) 1.1 % 


 


Basophils (%) (Auto) 0.1 % 


 


Neutrophils # (Auto) 6.22 K/uL 


 


Lymphocytes # (Auto) 0.53 K/uL 


 


Monocytes # (Auto) 0.47 K/uL 


 


Eosinophils # (Auto) 0.08 K/uL 


 


Basophils # (Auto) 0.01 K/uL 











Recent Pertinent Medications


Outpatient Anti-diabetic Regimen: 


* NPH 40 units SC qAM (*only* while on prednisone)


* A1c = 6.3% on 12/29/17








Risk Factors for Insulin Resistance:


* Steroids: Hydrocortisone 100 mg IV q6h


* Infection: ?Sepsis 2nd UTI vs. other


* Pressors: Norepinephrine


* Diet: T2DM





Assessment & Plan


ASSESSMENT:


* 58 yo F admitted to ICU with hypotension.  Etiologies include sepsis vs. 

adrenal insufficiency.  


* Followed by glycemic service on previous admission - trend of overnight/AM 

hypoglycemia with significant post-prandial elevations 2nd steroids.  


 * Therefore best to be less aggressive with basal insulin and more aggressive 

with CHO ratio


* OK to use Lantus for now as steroids are being administered around the clock


 * Per Dr. Carlos - plan to continue hydrocortisone at current dose and will re

-evaluate 1/17 AM


 * If/when steroids transitioned to prednisone once daily, plan to transition 

to NPH for better pharmacokinetic parameters and to help prevent AM hypoglycemia


* Significant stressors noted - will add two overnight BSG checks





PLAN FOR INPATIENT GLYCEMIC CONTROL:





* Basal insulin with LANTUS 15 units SQ x1 this AM





* Correctional Insulin with NOVOLOG per scale ACHS with additional checks at 

0000,0400


 * Goal Range:  Low 140 mg/dL - High 180 mg/dL


 * Correction Factor: 20 mg/dL/unit


 * Nutritional / Prandial insulin per carb ratio of 1 unit per 7 grams CHO 

consumed








* Please note that the plan above was derived based on current level of insulin 

resistance and hospital stress. These recommendations are appropriate for 

inpatient admission only. Plan of care upon discharge will need to be 

reassessed to avoid potential outpatient hypo/hyperglycemia. 





Thank you.

## 2018-01-16 NOTE — DIAGNOSTIC IMAGING REPORT
KUB



CLINICAL HISTORY: Abdominal pain.    



COMPARISON STUDY:  None. 



FINDINGS: Cholecystectomy clips are noted. A mildly dilated loop of small bowel

within  the left mid abdomen is noted. There is gas within the colon and rectum.

There is no convincing evidence for a small bowel obstruction.



IMPRESSION:  Prominent loop of small bowel within the left mid abdomen with no

convincing evidence for small bowel obstruction. 







Electronically signed by:  Angel Dominguez M.D.

1/16/2018 6:52 AM



Dictated Date/Time:  1/16/2018 6:51 AM

## 2018-01-16 NOTE — PROGRESS NOTE
Medicine Progress Note


Date & Time of Visit:


Jan 16, 2018 at 11:09.


Subjective


Pt was seen and examined


Lying in bed, complaint of abdominal discomfort


Pt said that she does have mid abdominal pain


She said that her last BM was yesterday that was normal


She denies any nausea, vomiting and fever





Objective





Last 8 Hrs








  Date Time  Temp Pulse Resp B/P (MAP) Pulse Ox O2 Delivery O2 Flow Rate FiO2


 


1/16/18 10:00  103 26 67/29 (42) 93 Room Air  


 


1/16/18 08:00      Room Air  


 


1/16/18 08:00 36.4 101 23 86/45 (59) 93 Room Air  


 


1/16/18 06:00  105 24 70/43 (52) 93 Room Air  


 


1/16/18 05:27  105 25 70/40 (50) 94 Room Air  


 


1/16/18 04:26  109 28 64/41 (49) 95 Room Air  


 


1/16/18 04:00      Room Air  


 


1/16/18 04:00  105 20 58/33 (41) 93 Room Air  








Physical Exam:


General-complaint of abdominal pain


Head-  atraumatic


Eyes- PERRL, EOMI


ENT- oropharynx clear


Neck- supple, no JVD


Lungs- Coarse BS


Heart- regular rhythm


Abdomen- normal bowel sounds, +tenderness


Extremities-No calf tenderness


Neuro- alert, oriented x 3; PERRL, EOMI;


Skin- warm & dry


Laboratory Results:





Last 24 Hours








Test


  1/16/18


02:23 1/16/18


02:52 1/16/18


02:55 1/16/18


03:37


 


Creatine Kinase MB Ratio      


 


Bedside Glucose  240 mg/dl   


 


White Blood Count   7.36 K/uL  


 


Red Blood Count   2.80 M/uL  


 


Hemoglobin   8.6 g/dL  


 


Hematocrit   27.7 %  


 


Mean Corpuscular Volume   98.9 fL  


 


Mean Corpuscular Hemoglobin   30.7 pg  


 


Mean Corpuscular Hemoglobin


Concent 


  


  31.0 g/dl 


  


 


 


Platelet Count   132 K/uL  


 


Mean Platelet Volume   10.1 fL  


 


Neutrophils (%) (Auto)   84.5 %  


 


Lymphocytes (%) (Auto)   7.2 %  


 


Monocytes (%) (Auto)   6.4 %  


 


Eosinophils (%) (Auto)   1.1 %  


 


Basophils (%) (Auto)   0.1 %  


 


Neutrophils # (Auto)   6.22 K/uL  


 


Lymphocytes # (Auto)   0.53 K/uL  


 


Monocytes # (Auto)   0.47 K/uL  


 


Eosinophils # (Auto)   0.08 K/uL  


 


Basophils # (Auto)   0.01 K/uL  


 


RDW Standard Deviation   55.2 fL  


 


RDW Coefficient of Variation   15.3 %  


 


Immature Granulocyte % (Auto)   0.7 %  


 


Immature Granulocyte # (Auto)   0.05 K/uL  


 


Toxic Vacuolation   2+  


 


Prothrombin Time   10.8 SECONDS  


 


Prothromb Time International


Ratio 


  


  1.0 


  


 


 


Activated Partial


Thromboplast Time 


  


  27.1 SECONDS 


  


 


 


Partial Thromboplastin Ratio   1.0  


 


Sodium Level   135 mmol/L  


 


Potassium Level   4.8 mmol/L  


 


Chloride Level   103 mmol/L  


 


Carbon Dioxide Level   20 mmol/L  


 


Anion Gap   12.0 mmol/L  


 


Blood Urea Nitrogen   53 mg/dl  


 


Creatinine   6.04 mg/dl  


 


Est Creatinine Clear Calc


Drug Dose 


  


  11.5 ml/min 


  


 


 


Estimated GFR (


American) 


  


  8.1 


  


 


 


Estimated GFR (Non-


American 


  


  7.0 


  


 


 


BUN/Creatinine Ratio   8.8  


 


Random Glucose   249 mg/dl  


 


Lactic Acid Level   2.5 mmol/L  


 


Calcium Level   8.1 mg/dl  


 


Phosphorus Level   5.1 mg/dl  


 


Magnesium Level   1.9 mg/dl  


 


Total Bilirubin   0.9 mg/dl  


 


Direct Bilirubin   0.2 mg/dl  


 


Aspartate Amino Transf


(AST/SGOT) 


  


  11 U/L 


  


 


 


Alanine Aminotransferase


(ALT/SGPT) 


  


  11 U/L 


  


 


 


Alkaline Phosphatase   115 U/L  


 


Creatine Kinase MB   1.4 ng/ml  


 


Troponin I   0.329 ng/ml  


 


Total Protein   5.9 gm/dl  


 


Albumin   2.3 gm/dl  


 


Globulin   3.6 gm/dl  


 


Albumin/Globulin Ratio   0.6  


 


Procalcitonin   4.56 ng/ml  


 


Blood Gas Sample Site    L Brachial 


 


Bedside Blood Gas pH (LAB)    7.38 


 


Bedside Blood Gas pCO2 (LAB)    33 mmHg 


 


Bedside Blood Gas pO2 (LAB)    76 mmHg 


 


Bedside Blood Gas HCO3 (LAB)    19 meq/L 


 


Bedside Blood Gas Total CO2    20 mEq/l 


 


Bedside Blood Gas Base Excess


(LAB) 


  


  


  -6.0 meq/L 


 


 


Bedside Blood Gas O2


Saturation 


  


  


  95.0 % 


 


 


Kiko Test    Pass 


 


Oxygen Delivery Device    Room Air 


 


Test


  1/16/18


03:50 1/16/18


05:40 1/16/18


05:53 


 


 


Influenza Type A (RT-PCR)


  Neg for Influ


A 


  


  


 


 


Influenza Type B (RT-PCR)


  Neg for Influ


B 


  


  


 


 


Bedside Glucose  218 mg/dl   














 Date/Time


Source Procedure


Growth Status


 


 


 1/16/18 04:23


Blood Blood Culture


Pending Received


 


 1/16/18 04:09


Blood Blood Culture


Pending Received


 


 1/16/18 00:00


Nasal MRSA DNA Surveillance Screen - Final


Specimen Positive for MRSA by DNA Probe Complete


 


 1/16/18 05:22


Urine , Clean Catch Urine Culture


Pending Received











Assessment & Plan


Hypotension


Possible related to sepsis


Was transferred from [facility] to [facility] ICU


Elevated lactic acid and procalcitonin


CXR showed mild pulmonary edema which has improved since exam of January 1, 2018.Mild bibasilar opacities which favor atelectasis.


Influenza negative


BP remains low on Mildodrine


Starting on levophed


On empirical abx with Vanco and Zosyn


Blood cx and urine cx pending


Repeat lactic acid


Continue monitor in ICU 





ESRD on HD


Only had HD for a few hours yesterday due to low BP


Case discussed with nephrology


No HD today as per nephro





Abdominal Pain


KUB showed prominent loop of small bowel within the left mid abdomen with no 

convincing evidence for small bowel obstruction. 


If pain worsening, consider CT abd


continue monitor





Obesity


Counseling on diet and exercise





DM type II


Elevated BS


Check Hba1c in am


On insulin coverage and Lantus





Chronic Anemia 


Hgb 8.6


No sign of bleeding


Continue monitor CBC





CHF


CXR showed mild pulmonary edema which has improved since exam of January 1, 2018.


Saturated well on RA


Will check ECHO


Will monitor for signs of CHF





DVT px on heparin subq


Consultants:


Intensivist


Nephro


Current Inpatient Medications:





Current Inpatient Medications








 Medications


  (Trade)  Dose


 Ordered  Sig/Daniel


 Route  Start Time


 Stop Time Status Last Admin


Dose Admin


 


 Heparin Sodium


  (Porcine)


  (Heparin Sq 5000


 Unit/0.5ml)  5,000 unit  Q12H


 SQ  1/16/18 09:00


 2/15/18 08:59  1/16/18 08:31


5,000 UNIT


 


 Acetaminophen


  (Tylenol Tab)  650 mg  Q4H  PRN


 PO  1/16/18 02:30


 2/15/18 02:29  1/16/18 04:33


650 MG


 


 Allopurinol


  (Zyloprim Tab)  100 mg  BID


 PO  1/16/18 09:00


 2/15/18 08:59  1/16/18 08:27


100 MG


 


 Aspirin


  (Ecotrin Tab)  81 mg  QAM


 PO  1/16/18 09:00


 2/15/18 08:59  1/16/18 08:22


81 MG


 


 Atorvastatin


 Calcium


  (Lipitor Tab)  40 mg  QAM


 PO  1/16/18 09:00


 2/15/18 08:59  1/16/18 08:28


40 MG


 


 Docusate Sodium


  (coLACE CAP)  100 mg  BID


 PO  1/16/18 09:00


 2/15/18 08:59  1/16/18 08:22


100 MG


 


 Albuterol/


 Ipratropium


  (Combivent


 Respimat Inh)  1 puffs  QID


 INH  1/16/18 09:00


 2/15/18 08:59  1/16/18 08:21


1 PUFFS


 


 Pantoprazole


 Sodium


  (Protonix Tab)  40 mg  DAILY


 PO  1/16/18 09:00


 2/15/18 08:59  1/16/18 08:27


40 MG


 


 Pramipexole


 Dihydrochloride


  (miraPEX TAB)  0.25 mg  HS


 PO  1/16/18 21:00


 2/15/18 20:59   


 


 


 Sertraline HCl


  (Zoloft Tab)  75 mg  DAILY


 PO  1/16/18 09:00


 2/15/18 08:59  1/16/18 08:27


75 MG


 


 Thiamine HCl


  (Vitamin B-1 Tab)  50 mg  DAILY


 PO  1/16/18 09:00


 2/15/18 08:59  1/16/18 08:22


50 MG


 


 Tramadol HCl


  (Ultram Tab)  50 mg  Q4H  PRN


 PO  1/16/18 02:45


 2/15/18 02:44   


 


 


 Vitamin B Complex/


 Vit C/Folic Acid


  (Nephrocaps)  1 cap  DAILY


 PO  1/16/18 09:00


 2/15/18 08:59  1/16/18 08:28


1 CAP


 


 Miscellaneous


 Information


  (Order Awaiting


 Action)  1 ea  QS


 N/A  1/16/18 08:00


 2/15/18 07:59  1/16/18 08:21


1 EA


 


 Miscellaneous


 Information


  (Order Awaiting


 Action)  1 ea  QS


 N/A  1/16/18 08:00


 2/15/18 07:59  1/16/18 08:21


1 EA


 


 Sevelamer HCl


  (Renagel Tab)  800 mg  TIDM


 PO  1/16/18 07:15


 2/15/18 07:14  1/16/18 08:28


800 MG


 


 Gabapentin


  (Neurontin Cap)  200 mg  HS


 PO  1/16/18 21:00


 2/15/18 20:59   


 


 


 Insulin Aspart


  (novoLOG ASPART)  **SLIDING


 SCALE**


 **G...  Q6


 SC  1/16/18 06:00


 2/15/18 05:59  1/16/18 05:44


2 UNITS


 


 Miscellaneous


 Information


  (Consult


 Glycemic


 Management


 Pharmacy)  1 ea  UD  PRN


 N/A  1/16/18 04:15


 2/15/18 04:14   


 


 


 Glucose


  (Glucose 40% Gel)  15-30


 GRAMS 15


 GRAMS...  UD  PRN


 PO  1/16/18 04:30


 2/15/18 04:29   


 


 


 Glucose


  (Glucose Chew


 Tab)  4-8


 Tablets 4


 Tabl...  UD  PRN


 PO  1/16/18 04:30


 2/15/18 04:29   


 


 


 Dextrose


  (Dextrose 50%


 50ML Syringe)  25-50ML OF


 50% DW IV


 FOR...  UD  PRN


 IV  1/16/18 04:30


 2/15/18 04:29   


 


 


 Glucagon


  (Glucagon Inj)  1 mg  UD  PRN


 SQ  1/16/18 04:30


 2/15/18 04:29   


 


 


 Ceftriaxone


 Sodium 1 gm/


 Dextrose  50 ml @ 


 100 mls/hr  Q24H


 IV  1/16/18 16:00


 1/26/18 15:59   


 


 


 Midodrine


  (Proamatine Tab)  5 mg  TID@08,12,17


 PO  1/16/18 08:00


 2/15/18 07:59  1/16/18 08:21


5 MG


 


 Norepinephrine


 Bitartrate 8 mg/


 Dextrose  508 ml @ 0


 mls/hr  Q0M PRN


 IV  1/16/18 10:45


 2/15/18 10:44   


 


 


 Hydrocortisone


 Sodium Succinate


 100 mg/Syringe  2 ml @ 4


 mls/min  Q6H


 IV  1/16/18 11:00


 2/15/18 10:59   


 


 


 Vancomycin HCl


 1000 mg/Sodium


 Chloride  270 ml @ 


 125 mls/hr  1100  ONCE


 IV  1/16/18 11:00


 1/16/18 13:09   


 


 


 Miscellaneous


 Information


  (Consult)  1 ea  UD  PRN


 N/A  1/16/18 10:45


 2/15/18 10:44

## 2018-01-16 NOTE — NEPHROLOGY CONSULTATION
DATE OF CONSULTATION:  01/16/2018

 

ATTENDING OF RECORD:  Dr. Francois.

 

REASON FOR CONSULTATION:  End-stage renal disease.

 

HISTORY OF PRESENT ILLNESS:  This is a 57-year-old female who was recently in

the hospital from 12/27/2017 to 01/06/2018 with a urinary tract infection as

well as URI with wheezing and cough who dialyzes in [place] Mondays,

Wednesdays, and Fridays.  The patient has been coughing more, having some

abdominal pain, not urinating as much and had low blood pressures.  The

patient became symptomatic while on dialysis yesterday.  Does have signs of

congestive failure on x-ray.  The patient started on ceftriaxone for presumed

urinary tract infection.  Appetite improving this morning. 

The patient resting comfortably, watching TV.

 

PAST MEDICAL HISTORY:  End-stage renal disease, mitral and aortic valve

repairs, hyperlipidemia, hypertension, diabetes, asthma.

 

PAST SURGICAL HISTORY:  Bowel surgeries and tunneled dialysis catheter.

 

FAMILY HISTORY:  No end-stage renal disease in family.

 

SOCIAL HISTORY:  Lives alone.  No smoking, no alcohol, no drugs.

 

REVIEW OF SYSTEMS:  Positive shortness of breath.  Positive cough.  Positive

fatigue.  Good appetite.  No chest pain, no nausea or vomiting.  No rash or

itching.  Decreasing urination.  All other review of systems otherwise

negative.

 

CURRENT MEDICATIONS:  Mirapex 0.25 mg at night, Neurontin 200 mg at night,

ceftriaxone 1 gram IV daily, heparin 5000 units subQ q. 12, allopurinol 100

mg p.o. b.i.d., aspirin 81 mg a day, Lipitor 40 mg a day, Colace 100 mg p.o.

b.i.d., inhalers 4 times a day, Protonix 40 mg daily, Zoloft 75 mg a day,

thiamine 50 mg daily, Nephrocaps daily, midodrine 5 mg p.o. t.i.d., Renagel

800 mg p.o. t.i.d. with meals, sliding scale insulin.

 

PHYSICAL EXAMINATION:

VITAL SIGNS:  Temperature 36.5, pulse in the low 100s, respiratory rate 24,

blood pressure 70/43, satting 93% on room air.

GENERAL:  Awake, alert, oriented x3.

EYES:  No scleral icterus.

ENT:  Moist mucous membranes.

NECK:  Supple.

PULMONARY:  Diffuse wheeze.

CARDIAC:  Tachy.

ABDOMEN:  Bowel sounds positive, soft, nontender.

EXTREMITIES:  Mild edema, no clubbing or cyanosis.

NEUROLOGICALLY:  Nonfocal.

DERMATOLOGIC:  No rash or ulcers noted.

 

LABORATORY DATA:  White count 7.3, H&H 8.6 and 27.7, platelet count is 132. 

Sodium was 135, potassium 4.8, chloride is 103, bicarb is 20, BUN is 53,

creatinine 6, glucose 249.  Lactic acid of 2.5, calcium is 8.1, phos 5.1. 

Troponin 0.329.  Procalcitonin is 4.56.  ABG from this morning shows pH 7.38,

pCO2 33, pO2 76 and a bicarb 19.  Urine and blood cultures are pending.

 

Chest x-ray shows mild pulmonary edema, mild bibasilar opacities which favor

atelectasis, trace bilateral pleural effusions.

 

ASSESSMENT AND PLAN:

1.  End-stage renal disease, blood pressures during previous admissions were

in the low 100s.  Currently now in the 70s in the setting of infection.  We

will hold on dialysis for now and reevaluate tomorrow.  There are some signs

of pulmonary vascular congestion on x-ray with wheezing, although not

requiring any oxygen at this time.  Given low blood pressures, unable to 

remove fluid on dialysis. 

2.  Anemia of renal failure.  Hemoglobin levels in the 8 and will dose

Procrit for a goal of 10-11.

3.  Renal osteodystrophy.  We will continue patient's phosphate binders and

check phosphorus levels intermittently through hospitalization.  



Overall, the patient presents with low blood pressure, elevation of 
procalcitonin as well

as lactic acid in the setting of presumed infection

although mentating well and relatively comfortable despite the abnormalities

and labs and vitals.  We will discuss the case further with the primary

hospitalist as well as critical care and consider initiation of pressors and 
steroids and 

broadening antibiotic coverage.

 

I appreciate the consultation.

 

 

 

GISSELLE

## 2018-01-16 NOTE — DIAGNOSTIC IMAGING REPORT
CHEST ONE VIEW PORTABLE



CLINICAL HISTORY: Intermittent shortness of breath.    



COMPARISON STUDY:  Chest radiograph January 1, 2018.



FINDINGS: A dual lumen left internal jugular catheter remains in place. There

are median sternotomy wires. There is no pneumothorax. There may be trace

bilateral pleural effusions. Mild bibasilar opacities are noted. A prosthetic

cardiac valve is noted, likely aortic in location. Moderate cardiomegaly is

noted. There is pulmonary vascular congestion with suspected mild pulmonary

edema. This has improved since exam of January 1, 2018. A 1.3 cm linear

radiodensity projects over the upper mediastinum. 



IMPRESSION:  



1. Mild pulmonary edema which has improved since exam of January 1, 2018.



2. Mild bibasilar opacities which favor atelectasis.



3. Possible trace bilateral pleural effusions. 



4. Indeterminate 1.3 cm linear radiodensity which projects over the upper

mediastinum.









Electronically signed by:  Angel Dominguez M.D.

1/16/2018 6:50 AM



Dictated Date/Time:  1/16/2018 6:47 AM

## 2018-01-16 NOTE — HISTORY AND PHYSICAL
History & Physical


Date & Time of Service:


Jan 16, 2018 at 04:38


Chief Complaint:


Sepsis Secondary To Uti


Primary Care Physician:


No Doctor, Assigned


History of Present Illness


57 year old F transferred from [facility] to [facility] ICU. As per discussion with ICU staff and the patient, patient has been 

on dialysis MWF was at her dialysis center on Monday 1/15/18 and may have had 

about 90 minutes out of usual 3 hour dialysis session when experiencing 

symptoms. As per the HPI from [facility], patient's blood pressure was too low to 

continue dialysis therapy. When arrive to [facility], had recorded blood pressure 

68/38 and afebrile with mild tachypnea. X ray performed at [facility] described 

bilateral pulmonary vascular congestion but no acute process. Patient was given 

nebulizer treatment at [facility] and 250 cc bolus of normal saline. Initially [facility] was to transfer patient to her nephrologist Dr. Geiger in [facility]. 

Patient also found to have elevated lactic acid and to be receiving gentamicin 

and Levophed as per documentation from [facility]





Patient instead was accepted by Intensivist Dr. Kyler Hensley transferred to 

[facility] from [facility] for further care. Admission vitals 

afebrile,  and blood pressure 67/43. Hospitalist evaluated the patient at 

bedside. Her heart rate 105 bpm, saturating 93% on room air, and arm blood 

pressure 64/41. Patient is coherent and in no acute distress. She explains that 

during dialysis session she was feeling sick and like having pain in her 

stomach. Physical Exam remarkable for lung congestion on both lung fields on 

auscultation.





Family History





FH: heart disease


  MOTHER





Social History


Smoking Status:  Never Smoker


Smokeless Tobacco Use:  No


Alcohol Use:  none


Drug Use:  none


Marital Status:  





Allergies


Coded Allergies:  


     Hydrocodone (Verified  Allergy, Unknown, unspecified, 12/27/17)


     Morphine (Verified  Allergy, Unknown, unspecified , 12/27/17)





Home Medications


Scheduled


Allopurinol (Zyloprim), 100 MG PO BID


Aspirin (Aspirin Chewable), 81 MG PO DAILY


Atorvastatin (Lipitor), 40 MG PO DAILY


Clopidogrel Bisulfate (Plavix), 75 MG PO DAILY


Docusate Sodium (Colace), 1 CAP PO BID


Ferric Citrate (Auryxia), 1 TAB PO DAILY


Fluticasone Furoate-Vilanterol (Breo Ellipta), 1 PUFF INH BID


Gabapentin (Neurontin), 2 CAP PO HS


Insulin Human NPH (Humulin N), 40 UNITS SC UD


Ipratropium-Albuterol (Combivent Respimat), 1 PUFFS INH QID


Levofloxacin (Levaquin), 500 MG PO DAILY


Methoxy Polyethylene Glycol-Ep (Mircera), 1 TAB PO TID


Pantoprazole (Protonix), 40 MG PO DAILY


Pramipexole (Mirapex), 0.25 MG PO HS


Rabeprazole Sodium (Aciphex), 20 MG PO DAILY


Sertraline (Zoloft), 75 MG PO DAILY


Sevelamer Carbonate (Renvela), 1 TAB PO TID


Thiamine Hcl (Vitamin B-1), 50 MG PO DAILY


Vitamin B Cmplx/Vitc/Folic Ac (Nephrocaps), 1 CAP PO DAILY





Scheduled PRN


Albuterol Sulfate (Proair Respiclick), 2 PUFFS INH for Shortness of Breath


Tramadol (Ultram), 50 MG PO Q4H PRN for Pain





Miscellaneous Medications


Iron Sucrose (Venofer), 50 MG IV


Iron Sucrose (Venofer), 100 MG IV





Review of Systems


Constitutional:  No fever


Eyes:  No eye pain


ENT:  No hearing loss


Respiratory:  No cough, No shortness of breath


Cardiovascular:  + edema, No chest pain, No palpitations


Abdomen:  + pain, No nausea, No vomiting


Musculoskeletal:  + swelling, No joint pain


Genitourinary - Female:  + problem reported (reports that she makes urine), No 

dysuria


Neurologic:  No numbness/tingling





Physical Exam


Vital Signs











  Date Time  Temp Pulse Resp B/P (MAP) Pulse Ox O2 Delivery O2 Flow Rate FiO2


 


1/16/18 04:00      Room Air  


 


1/16/18 03:07  107 21 72/41 (51) 95 Room Air  


 


1/16/18 02:24  111 24 66/46 (53) 92 Room Air  


 


1/16/18 02:17  113 18 67/43 (51) 93 Room Air  


 


1/16/18 02:06 36.5 113 20 67/43 93 Room Air  








General Appearance:  no apparent distress


Head:  normocephalic, atraumatic


Eyes:  normal inspection, EOMI, sclerae normal


ENT:  normal ENT inspection, hearing grossly normal, pharynx normal


Neck:  supple, no JVD, trachea midline


Respiratory/Chest:  chest non-tender, lungs clear, no respiratory distress, no 

accessory muscle use, + pertinent finding (diminished air entry suggestive of 

bilateral pulmonary congestion/edema)


Cardiovascular:  + tachycardia, + pertinent finding (edema of lower extremities 

bilaterally)


Abdomen/GI:  normal bowel sounds, soft, no organomegaly, no pulsatile mass, + 

pertinent finding (minimal tenderness on palpation of abdomen)


Back:  normal inspection, no muscle spasm


Extremities/Musculoskelatal:  no calf tenderness, non-tender, + pertinent 

finding (bilateral lower extremity edema)


Neurologic/Psych:  alert, normal mood/affect, oriented x 3


Skin:  normal color, warm/dry, no rash





Diagnostics


Laboratory Results





Results Past 24 Hours








Test


  1/16/18


02:23 1/16/18


02:52 1/16/18


02:55 1/16/18


03:07 Range/Units


 


 


Creatine Kinase MB Ratio     0-3.0  


 


Bedside Glucose  240   70-90  mg/dl


 


White Blood Count   7.36  4.8-10.8  K/uL


 


Red Blood Count   2.80  4.2-5.4  M/uL


 


Hemoglobin   8.6  12.0-16.0  g/dL


 


Hematocrit   27.7  37-47  %


 


Mean Corpuscular Volume   98.9    fL


 


Mean Corpuscular Hemoglobin   30.7  25-34  pg


 


Mean Corpuscular Hemoglobin


Concent 


  


  31.0


  


  32-36  g/dl


 


 


Platelet Count   132  130-400  K/uL


 


Mean Platelet Volume   10.1  7.4-10.4  fL


 


Neutrophils (%) (Auto)   84.5   %


 


Lymphocytes (%) (Auto)   7.2   %


 


Monocytes (%) (Auto)   6.4   %


 


Eosinophils (%) (Auto)   1.1   %


 


Basophils (%) (Auto)   0.1   %


 


Neutrophils # (Auto)   6.22  1.4-6.5  K/uL


 


Lymphocytes # (Auto)   0.53  1.2-3.4  K/uL


 


Monocytes # (Auto)   0.47  0.11-0.59  K/uL


 


Eosinophils # (Auto)   0.08  0-0.5  K/uL


 


Basophils # (Auto)   0.01  0-0.2  K/uL


 


RDW Standard Deviation   55.2  36.4-46.3  fL


 


RDW Coefficient of Variation   15.3  11.5-14.5  %


 


Immature Granulocyte % (Auto)   0.7   %


 


Immature Granulocyte # (Auto)   0.05  0.00-0.02  K/uL


 


Toxic Vacuolation   2+   


 


Prothrombin Time


  


  


  10.8


  


  9.0-12.0


SECONDS


 


Prothromb Time International


Ratio 


  


  1.0


  


  0.9-1.1  


 


 


Activated Partial


Thromboplast Time 


  


  27.1


  


  21.0-31.0


SECONDS


 


Partial Thromboplastin Ratio   1.0   


 


Sodium Level   135  136-145  mmol/L


 


Potassium Level   4.8  3.5-5.1  mmol/L


 


Chloride Level   103    mmol/L


 


Carbon Dioxide Level   20  21-32  mmol/L


 


Anion Gap   12.0  3-11  mmol/L


 


Blood Urea Nitrogen   53  7-18  mg/dl


 


Creatinine


  


  


  6.04


  


  0.60-1.20


mg/dl


 


Est Creatinine Clear Calc


Drug Dose 


  


  11.5


  


   ml/min


 


 


Estimated GFR (


American) 


  


  8.1


  


   


 


 


Estimated GFR (Non-


American 


  


  7.0


  


   


 


 


BUN/Creatinine Ratio   8.8  10-20  


 


Random Glucose   249  70-99  mg/dl


 


Lactic Acid Level   2.5  0.4-2.0  mmol/L


 


Calcium Level   8.1  8.5-10.1  mg/dl


 


Phosphorus Level   5.1  2.5-4.9  mg/dl


 


Magnesium Level   1.9  1.8-2.4  mg/dl


 


Total Bilirubin   0.9  0.2-1  mg/dl


 


Direct Bilirubin   0.2  0-0.2  mg/dl


 


Aspartate Amino Transf


(AST/SGOT) 


  


  11


  


  15-37  U/L


 


 


Alanine Aminotransferase


(ALT/SGPT) 


  


  11


  


  12-78  U/L


 


 


Alkaline Phosphatase   115    U/L


 


Creatine Kinase MB   1.4  0.5-3.6  ng/ml


 


Troponin I   0.329  0-0.045  ng/ml


 


Total Protein   5.9  6.4-8.2  gm/dl


 


Albumin   2.3  3.4-5.0  gm/dl


 


Globulin   3.6  2.5-4.0  gm/dl


 


Albumin/Globulin Ratio   0.6  0.9-2  


 


Procalcitonin   4.56  0-0.5  ng/ml


 


Test


  1/16/18


03:37 


  


  


  Range/Units


 


 


Blood Gas Sample Site L Brachial     


 


Bedside Blood Gas pH (LAB) 7.38    7.35-7.45  


 


Bedside Blood Gas pCO2 (LAB) 33    35-46  mmHg


 


Bedside Blood Gas pO2 (LAB) 76    80-95  mmHg


 


Bedside Blood Gas HCO3 (LAB) 19    19-24  meq/L


 


Bedside Blood Gas Total CO2 20    24-31  mEq/l


 


Bedside Blood Gas Base Excess


(LAB) -6.0


  


  


  


  -9-1.8  meq/L


 


 


Bedside Blood Gas O2


Saturation 95.0


  


  


  


  90-95  %


 


 


Kiko Test Pass     


 


Oxygen Delivery Device Room Air     








Microbiology Results


1/16/18 Blood Culture, Received


          Pending


1/16/18 Blood Culture, Received


          Pending


1/16/18 MRSA DNA Surveillance Screen, Received


          Pending





Impression


Assessment and Plan


57 year old Female with hypotension after dialysis. 





-Patient continue to have low blood pressure with arm blood pressure cuff


-Patient monitored in ICU


-No gross erythema and swelling of the central line on left chest for dialysis 

access


-Blood cultures pending


-patient may have received a dose of gentamicin from [facility], would likely 

benefit from empiric antibiotics for sepsis, in the ICU will be on gentamicin 

and ceftriaxone


-patient will need Intensivist for arterial line placement to accurately 

measure blood pressure


-patient has creatinine of 6 and given history of incomplete dialysis, will 

need nephrology service consultation on planning dialysis while maintaining 

adequate blood pressure


-continue home phosphate binders in a dialysis patient


-at this time patient is not in distress and appears to be at her mental 

baseline and not in confusion


-will depend on Intensivist physician on evaluating whether pressors are needed


-patient has imaging and symptoms of pulmonary congestion from incomplete 

dialysis


-patient breathing on room air and does not need immediate need for intubation, 

and saturating well on room air, patient may benefit from BIPAP if she develops 

symptoms of respiratory distress but if this occurs may need intubation


-patient reports FULL Code status in the event of emergency


-continue asthma nebulizer treatments as needed and home inhalers


-troponemia may be from ESRD on dialysis, patient has no chest pain, trend 

troponins, EKG is normal sinus rhythm 100 bpm and appears to be nonischemic, is 

at home on aspirin and plavix, continue these dual antiplatelets unless 

contraindicated, continue home statin


-patient reports history of valvular repair, needs echocardiogram


-follow up KUB results


-patient denies history of diabetes although home medication from [facility] 

lists 5 units of Lantus qdaily, continue on sliding scale insulin as needed and 

add further insulin as fingerstick glucose performed in ICU if needed


-appreciate further ICU recommendations


-patient to be followed by my colleague hospitalist Dr. Lopez starting at 7 

AM on 1/16/18





Patient reports that her PCP is Dr. York in [facility]


Critical Care





Advanced Directives


Existing Living Will:  No


Existing Power of :  No





Resuscitation Status


FULL RESUSCITATION





VTE Prophylaxis


VTE Risk Assessment Done? Y/N:  Yes


Risk Level:  Moderate

## 2018-01-16 NOTE — PHARMACY PROGRESS NOTE
Pharmacy Antibiotic Consult


Date of Service:


Jan 16, 2018.


Pharmacy Dosing Scope


Pharmacy is consulted to initiate vancomycin IV dosing therapy, order 

appropriate labs and adjust drug dose/frequency.





Subjective


The patient is a 57 year old female admitted on Jan 16, 2018 at 02:25.





Objective


Height (Feet):  5


Height (Inches):  4.00


Weight (Kilograms):  99.400


Lab Results (24hrs):











Test


  1/16/18


02:23 1/16/18


02:52 1/16/18


02:55 1/16/18


03:37


 


Creatine Kinase MB Ratio  (0-3.0)    (0-3.0)  


 


Bedside Glucose


  


  240 mg/dl


(70-90) 


  


 


 


White Blood Count


  


  


  7.36 K/uL


(4.8-10.8) 


 


 


Red Blood Count


  


  


  2.80 M/uL


(4.2-5.4) 


 


 


Hemoglobin


  


  


  8.6 g/dL


(12.0-16.0) 


 


 


Hematocrit   27.7 % (37-47)  


 


Mean Corpuscular Volume


  


  


  98.9 fL


() 


 


 


Mean Corpuscular Hemoglobin


  


  


  30.7 pg


(25-34) 


 


 


Mean Corpuscular Hemoglobin


Concent 


  


  31.0 g/dl


(32-36) 


 


 


Platelet Count


  


  


  132 K/uL


(130-400) 


 


 


Mean Platelet Volume


  


  


  10.1 fL


(7.4-10.4) 


 


 


Neutrophils (%) (Auto)   84.5 %  


 


Lymphocytes (%) (Auto)   7.2 %  


 


Monocytes (%) (Auto)   6.4 %  


 


Eosinophils (%) (Auto)   1.1 %  


 


Basophils (%) (Auto)   0.1 %  


 


Neutrophils # (Auto)


  


  


  6.22 K/uL


(1.4-6.5) 


 


 


Lymphocytes # (Auto)


  


  


  0.53 K/uL


(1.2-3.4) 


 


 


Monocytes # (Auto)


  


  


  0.47 K/uL


(0.11-0.59) 


 


 


Eosinophils # (Auto)


  


  


  0.08 K/uL


(0-0.5) 


 


 


Basophils # (Auto)


  


  


  0.01 K/uL


(0-0.2) 


 


 


RDW Standard Deviation


  


  


  55.2 fL


(36.4-46.3) 


 


 


RDW Coefficient of Variation


  


  


  15.3 %


(11.5-14.5) 


 


 


Immature Granulocyte % (Auto)   0.7 %  


 


Immature Granulocyte # (Auto)


  


  


  0.05 K/uL


(0.00-0.02) 


 


 


Toxic Vacuolation   2+  


 


Prothrombin Time


  


  


  10.8 SECONDS


(9.0-12.0) 


 


 


Prothromb Time International


Ratio 


  


  1.0 (0.9-1.1) 


  


 


 


Activated Partial


Thromboplast Time 


  


  27.1 SECONDS


(21.0-31.0) 


 


 


Partial Thromboplastin Ratio   1.0  


 


Sodium Level


  


  


  135 mmol/L


(136-145) 


 


 


Potassium Level


  


  


  4.8 mmol/L


(3.5-5.1) 


 


 


Chloride Level


  


  


  103 mmol/L


() 


 


 


Carbon Dioxide Level


  


  


  20 mmol/L


(21-32) 


 


 


Anion Gap


  


  


  12.0 mmol/L


(3-11) 


 


 


Blood Urea Nitrogen


  


  


  53 mg/dl


(7-18) 


 


 


Creatinine


  


  


  6.04 mg/dl


(0.60-1.20) 


 


 


Est Creatinine Clear Calc


Drug Dose 


  


  11.5 ml/min 


  


 


 


Estimated GFR (


American) 


  


  8.1 


  


 


 


Estimated GFR (Non-


American 


  


  7.0 


  


 


 


BUN/Creatinine Ratio   8.8 (10-20)  


 


Random Glucose


  


  


  249 mg/dl


(70-99) 


 


 


Lactic Acid Level


  


  


  2.5 mmol/L


(0.4-2.0) 


 


 


Calcium Level


  


  


  8.1 mg/dl


(8.5-10.1) 


 


 


Phosphorus Level


  


  


  5.1 mg/dl


(2.5-4.9) 


 


 


Magnesium Level


  


  


  1.9 mg/dl


(1.8-2.4) 


 


 


Total Bilirubin


  


  


  0.9 mg/dl


(0.2-1) 


 


 


Direct Bilirubin


  


  


  0.2 mg/dl


(0-0.2) 


 


 


Aspartate Amino Transf


(AST/SGOT) 


  


  11 U/L (15-37) 


  


 


 


Alanine Aminotransferase


(ALT/SGPT) 


  


  11 U/L (12-78) 


  


 


 


Alkaline Phosphatase


  


  


  115 U/L


() 


 


 


Creatine Kinase MB


  


  


  1.4 ng/ml


(0.5-3.6) 


 


 


Troponin I


  


  


  0.329 ng/ml


(0-0.045) 


 


 


Total Protein


  


  


  5.9 gm/dl


(6.4-8.2) 


 


 


Albumin


  


  


  2.3 gm/dl


(3.4-5.0) 


 


 


Globulin


  


  


  3.6 gm/dl


(2.5-4.0) 


 


 


Albumin/Globulin Ratio   0.6 (0.9-2)  


 


Procalcitonin


  


  


  4.56 ng/ml


(0-0.5) 


 


 


Blood Gas Sample Site    L Brachial 


 


Bedside Blood Gas pH (LAB)


  


  


  


  7.38


(7.35-7.45)


 


Bedside Blood Gas pCO2 (LAB)


  


  


  


  33 mmHg


(35-46)


 


Bedside Blood Gas pO2 (LAB)


  


  


  


  76 mmHg


(80-95)


 


Bedside Blood Gas HCO3 (LAB)


  


  


  


  19 meq/L


(19-24)


 


Bedside Blood Gas Total CO2


  


  


  


  20 mEq/l


(24-31)


 


Bedside Blood Gas Base Excess


(LAB) 


  


  


  -6.0 meq/L


(-9-1.8)


 


Bedside Blood Gas O2


Saturation 


  


  


  95.0 % (90-95) 


 


 


Kiko Test    Pass 


 


Oxygen Delivery Device    Room Air 


 


Test


  1/16/18


03:50 1/16/18


05:40 1/16/18


05:53 1/16/18


11:57


 


Influenza Type A (RT-PCR)


  Neg for Influ


A (NEG) 


  


  


 


 


Influenza Type B (RT-PCR)


  Neg for Influ


B (NEG) 


  


  


 


 


Bedside Glucose


  


  218 mg/dl


(70-90) 


  


 


 


Lactic Acid Level


  


  


  


  1.7 mmol/L


(0.4-2.0)











Assessment & Plan


Assessment


* 56 yo F admitted to ICU w hypotension 2nd sepsis and/or adrenal insufficiency 

(recent steroid use/taper).  On norepinephrine.  Possible infectious sources 

include urine and central access.  


* PMH: HD MWF.  Last was 1/15 as outpatient - only completed 90 minutes 2nd 

hypotenstion


* Renal


 * Chronic HD


 * No dialysis planned for today per nephrology


* Antibiotics


 * Ceftriaxone 1 g IV q24h (1st dose 1/15 @ [facility])


 * Vancomycin, dosed via level


* Cultures


 * Nasal MRSA positive





Vancomycin


* Goal pre-HD level 15-20 mcg/mL


* Will load with 18 mg/kg





Plan


* Vancomycin 1750 mg (1000 + 750 mg) x1


* Random level 1/17 w AM labs








Pharmacy will continue to follow and will adjust dose/frequency as necessary.  

Thank you

## 2018-01-17 VITALS
DIASTOLIC BLOOD PRESSURE: 62 MMHG | OXYGEN SATURATION: 99 % | HEART RATE: 86 BPM | SYSTOLIC BLOOD PRESSURE: 132 MMHG | TEMPERATURE: 98.24 F

## 2018-01-17 VITALS — HEART RATE: 78 BPM | OXYGEN SATURATION: 97 % | SYSTOLIC BLOOD PRESSURE: 83 MMHG | DIASTOLIC BLOOD PRESSURE: 53 MMHG

## 2018-01-17 VITALS
HEART RATE: 95 BPM | SYSTOLIC BLOOD PRESSURE: 109 MMHG | OXYGEN SATURATION: 98 % | DIASTOLIC BLOOD PRESSURE: 59 MMHG | TEMPERATURE: 97.7 F

## 2018-01-17 VITALS
TEMPERATURE: 97.52 F | SYSTOLIC BLOOD PRESSURE: 97 MMHG | OXYGEN SATURATION: 95 % | HEART RATE: 91 BPM | DIASTOLIC BLOOD PRESSURE: 55 MMHG

## 2018-01-17 VITALS
TEMPERATURE: 97.88 F | SYSTOLIC BLOOD PRESSURE: 103 MMHG | OXYGEN SATURATION: 96 % | HEART RATE: 94 BPM | DIASTOLIC BLOOD PRESSURE: 59 MMHG

## 2018-01-17 VITALS — OXYGEN SATURATION: 95 % | DIASTOLIC BLOOD PRESSURE: 72 MMHG | SYSTOLIC BLOOD PRESSURE: 117 MMHG | HEART RATE: 98 BPM

## 2018-01-17 VITALS — OXYGEN SATURATION: 95 % | HEART RATE: 92 BPM | SYSTOLIC BLOOD PRESSURE: 111 MMHG | DIASTOLIC BLOOD PRESSURE: 69 MMHG

## 2018-01-17 VITALS — HEART RATE: 95 BPM | OXYGEN SATURATION: 91 %

## 2018-01-17 VITALS — OXYGEN SATURATION: 96 %

## 2018-01-17 VITALS — HEART RATE: 87 BPM | SYSTOLIC BLOOD PRESSURE: 105 MMHG | OXYGEN SATURATION: 97 % | DIASTOLIC BLOOD PRESSURE: 53 MMHG

## 2018-01-17 VITALS — SYSTOLIC BLOOD PRESSURE: 124 MMHG | HEART RATE: 100 BPM | DIASTOLIC BLOOD PRESSURE: 69 MMHG

## 2018-01-17 VITALS
OXYGEN SATURATION: 96 % | HEART RATE: 96 BPM | DIASTOLIC BLOOD PRESSURE: 62 MMHG | TEMPERATURE: 98.24 F | SYSTOLIC BLOOD PRESSURE: 109 MMHG

## 2018-01-17 VITALS — DIASTOLIC BLOOD PRESSURE: 62 MMHG | HEART RATE: 92 BPM | SYSTOLIC BLOOD PRESSURE: 99 MMHG | OXYGEN SATURATION: 96 %

## 2018-01-17 VITALS — DIASTOLIC BLOOD PRESSURE: 74 MMHG | SYSTOLIC BLOOD PRESSURE: 113 MMHG | HEART RATE: 106 BPM | TEMPERATURE: 97.52 F

## 2018-01-17 VITALS — HEART RATE: 106 BPM | SYSTOLIC BLOOD PRESSURE: 113 MMHG | DIASTOLIC BLOOD PRESSURE: 74 MMHG

## 2018-01-17 VITALS — HEART RATE: 94 BPM | SYSTOLIC BLOOD PRESSURE: 85 MMHG | DIASTOLIC BLOOD PRESSURE: 57 MMHG | OXYGEN SATURATION: 96 %

## 2018-01-17 VITALS — SYSTOLIC BLOOD PRESSURE: 109 MMHG | OXYGEN SATURATION: 97 % | HEART RATE: 95 BPM | DIASTOLIC BLOOD PRESSURE: 59 MMHG

## 2018-01-17 VITALS — HEART RATE: 81 BPM | OXYGEN SATURATION: 96 % | DIASTOLIC BLOOD PRESSURE: 54 MMHG | SYSTOLIC BLOOD PRESSURE: 91 MMHG

## 2018-01-17 VITALS — DIASTOLIC BLOOD PRESSURE: 65 MMHG | OXYGEN SATURATION: 97 % | SYSTOLIC BLOOD PRESSURE: 119 MMHG | HEART RATE: 95 BPM

## 2018-01-17 VITALS — SYSTOLIC BLOOD PRESSURE: 120 MMHG | DIASTOLIC BLOOD PRESSURE: 62 MMHG | HEART RATE: 95 BPM | OXYGEN SATURATION: 94 %

## 2018-01-17 VITALS — OXYGEN SATURATION: 94 % | DIASTOLIC BLOOD PRESSURE: 78 MMHG | SYSTOLIC BLOOD PRESSURE: 127 MMHG | HEART RATE: 101 BPM

## 2018-01-17 VITALS — SYSTOLIC BLOOD PRESSURE: 119 MMHG | HEART RATE: 104 BPM | DIASTOLIC BLOOD PRESSURE: 79 MMHG

## 2018-01-17 VITALS — OXYGEN SATURATION: 97 % | HEART RATE: 91 BPM | SYSTOLIC BLOOD PRESSURE: 84 MMHG | DIASTOLIC BLOOD PRESSURE: 61 MMHG

## 2018-01-17 VITALS — SYSTOLIC BLOOD PRESSURE: 130 MMHG | OXYGEN SATURATION: 93 % | HEART RATE: 98 BPM | DIASTOLIC BLOOD PRESSURE: 81 MMHG

## 2018-01-17 VITALS — SYSTOLIC BLOOD PRESSURE: 113 MMHG | DIASTOLIC BLOOD PRESSURE: 61 MMHG | HEART RATE: 100 BPM | OXYGEN SATURATION: 94 %

## 2018-01-17 VITALS
DIASTOLIC BLOOD PRESSURE: 72 MMHG | HEART RATE: 99 BPM | OXYGEN SATURATION: 96 % | TEMPERATURE: 98.24 F | SYSTOLIC BLOOD PRESSURE: 117 MMHG

## 2018-01-17 VITALS — SYSTOLIC BLOOD PRESSURE: 108 MMHG | OXYGEN SATURATION: 95 % | HEART RATE: 94 BPM | DIASTOLIC BLOOD PRESSURE: 62 MMHG

## 2018-01-17 VITALS — HEART RATE: 95 BPM | SYSTOLIC BLOOD PRESSURE: 117 MMHG | DIASTOLIC BLOOD PRESSURE: 72 MMHG

## 2018-01-17 VITALS — DIASTOLIC BLOOD PRESSURE: 72 MMHG | OXYGEN SATURATION: 94 % | SYSTOLIC BLOOD PRESSURE: 118 MMHG | HEART RATE: 100 BPM

## 2018-01-17 VITALS — DIASTOLIC BLOOD PRESSURE: 70 MMHG | SYSTOLIC BLOOD PRESSURE: 116 MMHG | OXYGEN SATURATION: 97 % | HEART RATE: 95 BPM

## 2018-01-17 VITALS — HEART RATE: 92 BPM | SYSTOLIC BLOOD PRESSURE: 116 MMHG | DIASTOLIC BLOOD PRESSURE: 70 MMHG

## 2018-01-17 VITALS — DIASTOLIC BLOOD PRESSURE: 61 MMHG | OXYGEN SATURATION: 92 % | SYSTOLIC BLOOD PRESSURE: 113 MMHG | HEART RATE: 101 BPM

## 2018-01-17 VITALS
TEMPERATURE: 98.06 F | HEART RATE: 92 BPM | OXYGEN SATURATION: 97 % | SYSTOLIC BLOOD PRESSURE: 112 MMHG | DIASTOLIC BLOOD PRESSURE: 65 MMHG

## 2018-01-17 VITALS — OXYGEN SATURATION: 93 % | SYSTOLIC BLOOD PRESSURE: 129 MMHG | HEART RATE: 77 BPM | DIASTOLIC BLOOD PRESSURE: 87 MMHG

## 2018-01-17 VITALS — DIASTOLIC BLOOD PRESSURE: 69 MMHG | SYSTOLIC BLOOD PRESSURE: 105 MMHG | HEART RATE: 100 BPM

## 2018-01-17 VITALS — OXYGEN SATURATION: 94 % | DIASTOLIC BLOOD PRESSURE: 72 MMHG | HEART RATE: 98 BPM | SYSTOLIC BLOOD PRESSURE: 118 MMHG

## 2018-01-17 VITALS — OXYGEN SATURATION: 94 %

## 2018-01-17 LAB
ALBUMIN SERPL-MCNC: 2.2 GM/DL (ref 3.4–5)
ALP SERPL-CCNC: 115 U/L (ref 45–117)
ALT SERPL-CCNC: 11 U/L (ref 12–78)
AST SERPL-CCNC: 11 U/L (ref 15–37)
BUN SERPL-MCNC: 83 MG/DL (ref 7–18)
CALCIUM SERPL-MCNC: 9 MG/DL (ref 8.5–10.1)
CO2 SERPL-SCNC: 19 MMOL/L (ref 21–32)
CREAT SERPL-MCNC: 7.67 MG/DL (ref 0.6–1.2)
EOSINOPHIL NFR BLD AUTO: 159 K/UL (ref 130–400)
GLUCOSE SERPL-MCNC: 237 MG/DL (ref 70–99)
HBA1C MFR BLD: 6.5 % (ref 4.5–5.6)
HCT VFR BLD CALC: 28.7 % (ref 37–47)
HGB BLD-MCNC: 9.1 G/DL (ref 12–16)
LIPASE: 63 U/L (ref 73–393)
MCH RBC QN AUTO: 31.5 PG (ref 25–34)
MCHC RBC AUTO-ENTMCNC: 31.7 G/DL (ref 32–36)
MCV RBC AUTO: 99.3 FL (ref 80–100)
PHOSPHATE SERPL-MCNC: 7.3 MG/DL (ref 2.5–4.9)
PMV BLD AUTO: 9.9 FL (ref 7.4–10.4)
POTASSIUM SERPL-SCNC: 5.7 MMOL/L (ref 3.5–5.1)
PROT SERPL-MCNC: 6.2 GM/DL (ref 6.4–8.2)
RED CELL DISTRIBUTION WIDTH CV: 15.7 % (ref 11.5–14.5)
RED CELL DISTRIBUTION WIDTH SD: 57.2 FL (ref 36.4–46.3)
SODIUM SERPL-SCNC: 135 MMOL/L (ref 136–145)
WBC # BLD AUTO: 6.45 K/UL (ref 4.8–10.8)

## 2018-01-17 RX ADMIN — TRAMADOL HYDROCHLORIDE PRN MG: 50 TABLET, COATED ORAL at 18:00

## 2018-01-17 RX ADMIN — INSULIN ASPART SCH UNITS: 100 INJECTION, SOLUTION INTRAVENOUS; SUBCUTANEOUS at 22:32

## 2018-01-17 RX ADMIN — HEPARIN SODIUM SCH UNIT: 10000 INJECTION, SOLUTION INTRAVENOUS; SUBCUTANEOUS at 22:21

## 2018-01-17 RX ADMIN — INSULIN ASPART SCH UNITS: 100 INJECTION, SOLUTION INTRAVENOUS; SUBCUTANEOUS at 17:20

## 2018-01-17 RX ADMIN — HYDROCORTISONE SODIUM SUCCINATE SCH MLS/MIN: 100 INJECTION, POWDER, FOR SOLUTION INTRAMUSCULAR; INTRAVENOUS at 09:52

## 2018-01-17 RX ADMIN — GABAPENTIN SCH MG: 100 CAPSULE ORAL at 22:14

## 2018-01-17 RX ADMIN — PANTOPRAZOLE SCH MG: 40 TABLET, DELAYED RELEASE ORAL at 07:41

## 2018-01-17 RX ADMIN — IMIPENEM AND CILASTATIN SODIUM SCH MLS/HR: 500; 500 INJECTION, POWDER, FOR SOLUTION INTRAVENOUS at 22:13

## 2018-01-17 RX ADMIN — INSULIN ASPART SCH UNITS: 100 INJECTION, SOLUTION INTRAVENOUS; SUBCUTANEOUS at 12:52

## 2018-01-17 RX ADMIN — RENAGEL SCH MG: 800 TABLET ORAL at 15:53

## 2018-01-17 RX ADMIN — RENAGEL SCH MG: 800 TABLET ORAL at 07:39

## 2018-01-17 RX ADMIN — DOCUSATE SODIUM SCH MG: 100 CAPSULE, LIQUID FILLED ORAL at 22:14

## 2018-01-17 RX ADMIN — ALUMINUM ZIRCONIUM TRICHLOROHYDREX GLY SCH EA: 0.2 STICK TOPICAL at 15:51

## 2018-01-17 RX ADMIN — HEPARIN SODIUM SCH UNIT: 10000 INJECTION, SOLUTION INTRAVENOUS; SUBCUTANEOUS at 15:54

## 2018-01-17 RX ADMIN — DOCUSATE SODIUM SCH MG: 100 CAPSULE, LIQUID FILLED ORAL at 07:39

## 2018-01-17 RX ADMIN — ALUMINUM ZIRCONIUM TRICHLOROHYDREX GLY SCH EA: 0.2 STICK TOPICAL at 23:24

## 2018-01-17 RX ADMIN — PRAMIPEXOLE DIHYDROCHLORIDE SCH MG: 0.25 TABLET ORAL at 22:15

## 2018-01-17 RX ADMIN — SERTRALINE HYDROCHLORIDE SCH MG: 50 TABLET, FILM COATED ORAL at 07:39

## 2018-01-17 RX ADMIN — Medication SCH MG: at 07:38

## 2018-01-17 RX ADMIN — INSULIN ASPART SCH UNITS: 100 INJECTION, SOLUTION INTRAVENOUS; SUBCUTANEOUS at 23:27

## 2018-01-17 RX ADMIN — TRAMADOL HYDROCHLORIDE PRN MG: 50 TABLET, COATED ORAL at 22:19

## 2018-01-17 RX ADMIN — ALLOPURINOL SCH MG: 100 TABLET ORAL at 07:38

## 2018-01-17 RX ADMIN — ATORVASTATIN CALCIUM SCH MG: 40 TABLET, FILM COATED ORAL at 07:38

## 2018-01-17 RX ADMIN — ALUMINUM ZIRCONIUM TRICHLOROHYDREX GLY SCH EA: 0.2 STICK TOPICAL at 23:23

## 2018-01-17 RX ADMIN — HYDROCORTISONE SODIUM SUCCINATE SCH MLS/MIN: 100 INJECTION, POWDER, FOR SOLUTION INTRAMUSCULAR; INTRAVENOUS at 17:27

## 2018-01-17 RX ADMIN — CLOPIDOGREL BISULFATE SCH MG: 75 TABLET, FILM COATED ORAL at 07:40

## 2018-01-17 RX ADMIN — Medication SCH MG: at 07:41

## 2018-01-17 RX ADMIN — HEPARIN SODIUM SCH UNIT: 10000 INJECTION, SOLUTION INTRAVENOUS; SUBCUTANEOUS at 07:49

## 2018-01-17 RX ADMIN — ASCORBIC ACID, THIAMINE MONONITRATE,RIBOFLAVIN, NIACINAMIDE, PYRIDOXINE HYDROCHLORIDE, FOLIC ACID, CYANOCOBALAMIN, BIOTIN, CALCIUM PANTOTHENATE, SCH CAP: 100; 1.5; 1.7; 20; 10; 1; 6000; 150000; 5 CAPSULE, LIQUID FILLED ORAL at 07:51

## 2018-01-17 RX ADMIN — IPRATROPIUM BROMIDE AND ALBUTEROL SCH PUFFS: 20; 100 SPRAY, METERED RESPIRATORY (INHALATION) at 12:48

## 2018-01-17 RX ADMIN — IPRATROPIUM BROMIDE AND ALBUTEROL SCH PUFFS: 20; 100 SPRAY, METERED RESPIRATORY (INHALATION) at 15:53

## 2018-01-17 RX ADMIN — IPRATROPIUM BROMIDE AND ALBUTEROL SCH PUFFS: 20; 100 SPRAY, METERED RESPIRATORY (INHALATION) at 07:39

## 2018-01-17 RX ADMIN — RENAGEL SCH MG: 800 TABLET ORAL at 10:49

## 2018-01-17 RX ADMIN — ALLOPURINOL SCH MG: 100 TABLET ORAL at 22:16

## 2018-01-17 RX ADMIN — ACETAMINOPHEN PRN MG: 325 TABLET ORAL at 22:19

## 2018-01-17 RX ADMIN — IPRATROPIUM BROMIDE AND ALBUTEROL SCH PUFFS: 20; 100 SPRAY, METERED RESPIRATORY (INHALATION) at 22:13

## 2018-01-17 RX ADMIN — ALUMINUM ZIRCONIUM TRICHLOROHYDREX GLY SCH EA: 0.2 STICK TOPICAL at 07:39

## 2018-01-17 NOTE — CRITICAL CARE PROGRESS NOTE
Critical Care Progress Note


Date of Service


Jan 17, 2018.





ICU Day


ICU Day Number:  2





Attending


Dr. Hensley





Subjective


Patient continues to complain of abdominal tenderness. 


Feels better overall compared to yesterday.


Denies CP, Dizziness, Nausea





Objective


General-A&O,complains of abdominal pain


Head-  AT/NC


Eyes- PERRL, EOMI


ENT- oropharynx clear


Neck- supple, no JVD


Lungs- +crackles bibasilar


Heart- regular rhythm


Abdomen- normal bowel sounds, +tenderness


Extremities-No calf tenderness


Neuro- alert, oriented x 3; PERRL, EOMI;


Skin- warm & dry





Assessment & Plan


: 


Recurrent UTI


Recent ESBL UTI, MDR: started on imapenam 500 q12 x day 1





Nephro:


Nephro Consult placed: Appreciate Dr. Wallace's input


* Follows with Dr. Geiger for nephrology


* Left Tunneled Dialysis Catheter x approx. 1 yr


* HD 3x/wk, HD scheduled for today with temporary pressor support prn


Continue Nephro Caps, Renvela, and Auryxia


Mircera and Venofer per Nephro





Neuro:


A&O x 3


Neuro  checks  per protocol


Hx of depression; continue Zoloft


Continue home Ultram 50mg PO q4H PRN


Continue Mirapex 0.25Mg PO HS (restless Leg Synd)





Resp:


CXR: No acute process noted, CXR looks improved from prior admission 1/6/18; no 

change from yesterday's CXR to today's.


O2 sat: 97% on 2L NC


Hx of Asthma, acute on chronic hypercarbia and underlying obesity 

hypoventilation syndrome


Continue home medications: Albuterol Sulfate 2 puffs PRN SOB, Fluticasone 

Furoate-Vilanterol 1 puff BID, Combivent Respimat 1 puff QID


Procalcitonin 4.56





CV:


Hx of HTN, Dyslipidemia, aortic valve replacement with a bioprosthetic aortic 

[facility] 3/16


ECHO 1/2/18 per HPI by Dr. Holbrook: High cardiac output state


Continue home medications: ASA 81mg Chew, Atorvastatin 40mg PO daily, 


Hold Plavix, start heparin TID





GI:


Colace 100mg Cap, Po BID


Cont Protonix 40mg PO Daily


Hold Rabeprazole Sodium in pt





Endo: 


Hx of DM2 with ESRD requiring HD 3 times weekly (Ha1c = 6.3%)


Pt is confused on about order of insulin.  States daughter told her she didn't 

need it. 


* Inulin drip DCd today.


Concern for Adrenal insufficiency; hydrocortisone decreased today to 50 mg q8, 

DC steroids tomorrow





I.D:


U/A: at [facility] Nitrate negative with bacteruria and pyuria; previous cultures 

and sensitivities obtained from [facility]; MDR ESBL; started on imipenam


Repeated at [facility] and Culture growing Gram neg bacilli


Blood cultures x 2 ordered--NGTD, Influenza PCR--negative, MRSA Screen--positive

; contact precautions


Afebrile, WBC WNL no leukocytosis


Trend fever curve





HEME:


H&H: 9.1 & 28.7; Plts 159


Prophylaxis: Heparin 5,000u q8h





Access: 


Tunneled HD cath, maintain 2 18g IVs peripherally, no current indication for 

central access





Resident Physician Supervision Note:


Dr. Newberry was resident physician during care of patient.  I separately 

evaluated patient and did history and exam. I discussed the case with the 

resident and generally agree with the findings and plan.





Patient has a complicated urinary tract infection with likely an ESBL producing 

Escherichia coli, she has had ESBL formers based on prior culture results 

obtained from her referring facility, [facility].  Patient remains critically ill 

she requires vasoactive medications, Levophed, during dialysis.


I have personally spent 35 minutes of critical care time in the direct 

management of this patient.  This is a life/limb threatening event.  This 

includes time spent evaluating patient, direct bedside care, chart review, 

placing orders, interpretation of diagnostic studies, discussion with 

consultants, patient, and family members, as well as other required patient 

management activities.


This time is exclusive of all separately billable procedures, and teaching time 

and separate from and in addition to any other critical care service time.








Documented By:  Kyler Hensley DO





Consults & Procedures


Consultants:


Nephrology, Dr. Wallace





Data


Medications:





Current Inpatient Medications








 Medications


  (Trade)  Dose


 Ordered  Sig/Daniel


 Route  Start Time


 Stop Time Status Last Admin


Dose Admin


 


 Acetaminophen


  (Tylenol Tab)  650 mg  Q4H  PRN


 PO  1/16/18 02:30


 2/15/18 02:29  1/16/18 04:33


650 MG


 


 Allopurinol


  (Zyloprim Tab)  100 mg  BID


 PO  1/16/18 09:00


 2/15/18 08:59  1/17/18 07:38


100 MG


 


 Aspirin


  (Ecotrin Tab)  81 mg  QAM


 PO  1/16/18 09:00


 2/15/18 08:59  1/17/18 07:38


81 MG


 


 Atorvastatin


 Calcium


  (Lipitor Tab)  40 mg  QAM


 PO  1/16/18 09:00


 2/15/18 08:59  1/17/18 07:38


40 MG


 


 Docusate Sodium


  (coLACE CAP)  100 mg  BID


 PO  1/16/18 09:00


 2/15/18 08:59  1/17/18 07:39


100 MG


 


 Albuterol/


 Ipratropium


  (Combivent


 Respimat Inh)  1 puffs  QID


 INH  1/16/18 09:00


 2/15/18 08:59  1/17/18 15:53


1 PUFFS


 


 Pantoprazole


 Sodium


  (Protonix Tab)  40 mg  DAILY


 PO  1/16/18 09:00


 2/15/18 08:59  1/17/18 07:41


40 MG


 


 Pramipexole


 Dihydrochloride


  (miraPEX TAB)  0.25 mg  HS


 PO  1/16/18 21:00


 2/15/18 20:59  1/16/18 20:42


0.25 MG


 


 Sertraline HCl


  (Zoloft Tab)  75 mg  DAILY


 PO  1/16/18 09:00


 2/15/18 08:59  1/17/18 07:39


75 MG


 


 Thiamine HCl


  (Vitamin B-1 Tab)  50 mg  DAILY


 PO  1/16/18 09:00


 2/15/18 08:59  1/17/18 07:41


50 MG


 


 Tramadol HCl


  (Ultram Tab)  50 mg  Q4H  PRN


 PO  1/16/18 02:45


 2/15/18 02:44  1/17/18 18:00


50 MG


 


 Vitamin B Complex/


 Vit C/Folic Acid


  (Nephrocaps)  1 cap  DAILY


 PO  1/16/18 09:00


 2/15/18 08:59  1/17/18 07:51


1 CAP


 


 Miscellaneous


 Information


  (Order Awaiting


 Action)  1 ea  QS


 N/A  1/16/18 08:00


 2/15/18 07:59  1/16/18 08:21


1 EA


 


 Miscellaneous


 Information


  (Order Awaiting


 Action)  1 ea  QS


 N/A  1/16/18 08:00


 2/15/18 07:59  1/16/18 08:21


1 EA


 


 Gabapentin


  (Neurontin Cap)  200 mg  HS


 PO  1/16/18 21:00


 2/15/18 20:59  1/16/18 20:43


200 MG


 


 Miscellaneous


 Information


  (Consult


 Glycemic


 Management


 Pharmacy)  1 ea  UD  PRN


 N/A  1/16/18 04:15


 2/15/18 04:14   


 


 


 Glucose


  (Glucose 40% Gel)  15-30


 GRAMS 15


 GRAMS...  UD  PRN


 PO  1/16/18 04:30


 2/15/18 04:29   


 


 


 Glucose


  (Glucose Chew


 Tab)  4-8


 Tablets 4


 Tabl...  UD  PRN


 PO  1/16/18 04:30


 2/15/18 04:29   


 


 


 Dextrose


  (Dextrose 50%


 50ML Syringe)  25-50ML OF


 50% DW IV


 FOR...  UD  PRN


 IV  1/16/18 04:30


 2/15/18 04:29  1/17/18 09:52


25 ML


 


 Glucagon


  (Glucagon Inj)  1 mg  UD  PRN


 SQ  1/16/18 04:30


 2/15/18 04:29   


 


 


 Norepinephrine


 Bitartrate 8 mg/


 Dextrose  508 ml @ 0


 mls/hr  Q0M PRN


 IV  1/16/18 10:45


 2/15/18 10:44  1/16/18 11:10


7.5 MLS/HR


 


 Miscellaneous


 Information


  (Consult)  1 ea  UD  PRN


 N/A  1/16/18 10:45


 2/15/18 10:44   


 


 


 Ondansetron HCl


  (Zofran Inj)  4 mg  Q4H  PRN


 IV  1/16/18 18:15


 2/15/18 18:14   


 


 


 Clopidogrel


 Bisulfate


  (plAVix TAB)  75 mg  DAILY


 PO  1/17/18 09:00


 2/16/18 08:59  1/17/18 07:40


75 MG


 


 Hydrocortisone


 Sodium Succinate


 50 mg/Syringe  1 ml @ 4


 mls/min  Q8H


 IV  1/17/18 10:00


 1/18/18 02:01  1/17/18 17:27


4 MLS/MIN


 


 Imipenem/


 Cilastatin Sodium


 500 mg/Dextrose  110 ml @ 


 110 mls/hr  Q12H


 IV  1/17/18 20:00


 1/27/18 19:59   


 


 


 Heparin Sodium


  (Porcine)


  (Heparin Sq 5000


 Unit/0.5ml)  5,000 unit  Q8


 SQ  1/17/18 15:00


 2/16/18 14:59  1/17/18 15:54


5,000 UNIT


 


 Insulin Aspart


  (novoLOG ASPART)  **SLIDING


 SCALE**


 **G...  ACHS


 SC  1/17/18 12:00


 2/16/18 11:59  1/17/18 17:20


5 UNITS


 


 Insulin Aspart


  (novoLOG ASPART)  **SLIDING


 SCALE**


 **G...  TODAY@0000,0400


 SC  1/18/18 00:00


 1/18/18 04:01   


 


 


 Sevelamer HCl


  (Renagel Tab)  1,600 mg  TIDM


 PO  1/17/18 16:30


 2/16/18 16:29  1/17/18 15:53


1,600 MG








I & O:











24-Hour Column 


 


 1/18/18





 08:00


 


Intake Total 766 ml


 


Balance 766 ml








Vital Signs:











  Date Time  Temp Pulse Resp B/P (MAP) Pulse Ox O2 Delivery O2 Flow Rate FiO2


 


1/17/18 16:00 36.7 92 26 112/65 (81) 97 Nasal Cannula 2.0 


 


1/17/18 16:00     96 Nasal Cannula 2.0 


 


1/17/18 15:30  78 15 83/53 (63) 97   


 


1/17/18 15:00  81 19 91/54 (66) 96   


 


1/17/18 14:00  92 23 99/62 (74) 96 Nasal Cannula 2.0 


 


1/17/18 12:00 36.8 86 22 132/62 (85) 99 Nasal Cannula 2.0 


 


1/17/18 12:00      Nasal Cannula 2.0 


 


1/17/18 10:00  91 24 84/61 (69) 97 Nasal Cannula 2.0 


 


1/17/18 08:00      Nasal Cannula 2.0 


 


1/17/18 08:00      Room Air  


 


1/17/18 08:00 36.6 94 23 103/59 (74) 96 Nasal Cannula 2.0 


 


1/17/18 06:39  94 25 85/57 (66) 96 Nasal Cannula 2.0 


 


1/17/18 06:00  95 21 120/62 (81) 94 Nasal Cannula 2.0 


 


1/17/18 04:00     96 Nasal Cannula  


 


1/17/18 04:00 36.8 96 19 109/62 (78) 97 Nasal Cannula 2.0 


 


1/17/18 02:00  94 21 108/62 (77) 95 Nasal Cannula 2.0 


 


1/17/18 00:01 36.4 91 18 97/55 (69) 95 Nasal Cannula 2.0 


 


1/16/18 23:59     96 Nasal Cannula  


 


1/16/18 23:15  96 26 97/61 (73) 96 Nasal Cannula 2.0 


 


1/16/18 23:00  98 23 102/45 (64) 97 Nasal Cannula 2.0 


 


1/16/18 22:56  103 21 111/70 (84) 96 Nasal Cannula 2.0 


 


1/16/18 22:15  100 21 84/53 (63) 96 Nasal Cannula 2.0 


 


1/16/18 22:01  99 21 96/53 (67) 95 Nasal Cannula 2.0 


 


1/16/18 22:00  99 21 96/53 (67) 95 Nasal Cannula 2.0 


 


1/16/18 21:45  99 23 117/63 (81) 95 Nasal Cannula 2.0 


 


1/16/18 21:30  98 21 103/62 (76) 95 Nasal Cannula 2.0 


 


1/16/18 21:15  98 17 107/63 (78) 95 Room Air  


 


1/16/18 21:00  100 24 114/65 (81) 96 Room Air  


 


1/16/18 20:45  101 22 95/48 (64) 95 Room Air  


 


1/16/18 20:30  100 23 116/65 (82) 96 Room Air  


 


1/16/18 20:15  105 22 112/74 (87) 96 Room Air  


 


1/16/18 20:00     93 Nasal Cannula  


 


1/16/18 20:00 36.9 112 23 114/63 (80) 94 Room Air  








Laboratory Results:





Last 24 Hours








Test


  1/16/18


20:37 1/17/18


00:04 1/17/18


03:32 1/17/18


04:32


 


Bedside Glucose 267 mg/dl  200 mg/dl  219 mg/dl  228 mg/dl 


 


Test


  1/17/18


05:34 1/17/18


05:46 1/17/18


06:37 1/17/18


07:27


 


White Blood Count 6.45 K/uL    


 


Red Blood Count 2.89 M/uL    


 


Hemoglobin 9.1 g/dL    


 


Hematocrit 28.7 %    


 


Mean Corpuscular Volume 99.3 fL    


 


Mean Corpuscular Hemoglobin 31.5 pg    


 


Mean Corpuscular Hemoglobin


Concent 31.7 g/dl 


  


  


  


 


 


RDW Standard Deviation 57.2 fL    


 


RDW Coefficient of Variation 15.7 %    


 


Platelet Count 159 K/uL    


 


Mean Platelet Volume 9.9 fL    


 


Sodium Level 135 mmol/L    


 


Potassium Level 5.7 mmol/L    


 


Chloride Level 102 mmol/L    


 


Carbon Dioxide Level 19 mmol/L    


 


Anion Gap 14.0 mmol/L    


 


Blood Urea Nitrogen 83 mg/dl    


 


Creatinine 7.67 mg/dl    


 


Est Creatinine Clear Calc


Drug Dose 9.1 ml/min 


  


  


  


 


 


Estimated GFR (


American) 6.1 


  


  


  


 


 


Estimated GFR (Non-


American 5.2 


  


  


  


 


 


BUN/Creatinine Ratio 10.8    


 


Random Glucose 237 mg/dl    


 


Estimated Average Glucose 140 mg/dl    


 


Hemoglobin A1c 6.5 %    


 


Calcium Level 9.0 mg/dl    


 


Phosphorus Level 7.3 mg/dl    


 


Magnesium Level 2.3 mg/dl    


 


Random Vancomycin Level 20.3 mcg/ml    


 


Bedside Glucose  240 mg/dl  217 mg/dl  164 mg/dl 


 


Test


  1/17/18


08:37 1/17/18


09:42 1/17/18


09:43 1/17/18


10:07


 


Bedside Glucose 107 mg/dl  70 mg/dl   96 mg/dl 


 


Lactic Acid Level   0.9 mmol/L  


 


Total Bilirubin   0.8 mg/dl  


 


Direct Bilirubin   0.1 mg/dl  


 


Aspartate Amino Transf


(AST/SGOT) 


  


  11 U/L 


  


 


 


Alanine Aminotransferase


(ALT/SGPT) 


  


  11 U/L 


  


 


 


Alkaline Phosphatase   115 U/L  


 


Total Protein   6.2 gm/dl  


 


Albumin   2.2 gm/dl  


 


Amylase Level   22 U/L  


 


Lipase   63 U/L  


 


Test


  1/17/18


10:23 1/17/18


11:28 1/17/18


16:01 


 


 


Bedside Glucose 103 mg/dl  92 mg/dl  203 mg/dl  











Resident Tracking


Resident Involvement:  Resident Care Provided


Care Provided:  Adult Hospital Medicine

## 2018-01-17 NOTE — PROGRESS NOTE
Medicine Progress Note


Date & Time of Visit:


Jan 17, 2018 at 16:14.


Subjective


Pt was seen and examined


Lying in bed complain of abdominal tenderness


Pt said that she had a normal bowel movement today


Continue to have abdominal pain


Feels slightly better


Denies any chest pain, palpitation, dizziness and SOB





Objective





Last 8 Hrs








  Date Time  Temp Pulse Resp B/P (MAP) Pulse Ox O2 Delivery O2 Flow Rate FiO2


 


1/17/18 14:00  92 23 99/62 (74) 96 Nasal Cannula 2.0 


 


1/17/18 12:00 36.8 86 22 132/62 (85) 99 Nasal Cannula 2.0 


 


1/17/18 12:00      Nasal Cannula 2.0 


 


1/17/18 10:00  91 24 84/61 (69) 97 Nasal Cannula 2.0 








Physical Exam:


General-complaint of abdominal pain


Head-  atraumatic


Eyes- PERRL, EOMI


ENT- oropharynx clear


Neck- supple, no JVD


Lungs- +crackles


Heart- regular rhythm


Abdomen- normal bowel sounds, +tenderness


Extremities-No calf tenderness


Neuro- alert, oriented x 3; PERRL, EOMI;


Skin- warm & dry


Laboratory Results:





Last 24 Hours








Test


  1/16/18


16:43 1/16/18


20:37 1/17/18


00:04 1/17/18


03:32


 


Bedside Glucose 205 mg/dl  267 mg/dl  200 mg/dl  219 mg/dl 


 


Test


  1/17/18


04:32 1/17/18


05:34 1/17/18


05:46 1/17/18


06:37


 


Bedside Glucose 228 mg/dl   240 mg/dl  217 mg/dl 


 


White Blood Count  6.45 K/uL   


 


Red Blood Count  2.89 M/uL   


 


Hemoglobin  9.1 g/dL   


 


Hematocrit  28.7 %   


 


Mean Corpuscular Volume  99.3 fL   


 


Mean Corpuscular Hemoglobin  31.5 pg   


 


Mean Corpuscular Hemoglobin


Concent 


  31.7 g/dl 


  


  


 


 


RDW Standard Deviation  57.2 fL   


 


RDW Coefficient of Variation  15.7 %   


 


Platelet Count  159 K/uL   


 


Mean Platelet Volume  9.9 fL   


 


Sodium Level  135 mmol/L   


 


Potassium Level  5.7 mmol/L   


 


Chloride Level  102 mmol/L   


 


Carbon Dioxide Level  19 mmol/L   


 


Anion Gap  14.0 mmol/L   


 


Blood Urea Nitrogen  83 mg/dl   


 


Creatinine  7.67 mg/dl   


 


Est Creatinine Clear Calc


Drug Dose 


  9.1 ml/min 


  


  


 


 


Estimated GFR (


American) 


  6.1 


  


  


 


 


Estimated GFR (Non-


American 


  5.2 


  


  


 


 


BUN/Creatinine Ratio  10.8   


 


Random Glucose  237 mg/dl   


 


Estimated Average Glucose  140 mg/dl   


 


Hemoglobin A1c  6.5 %   


 


Calcium Level  9.0 mg/dl   


 


Phosphorus Level  7.3 mg/dl   


 


Magnesium Level  2.3 mg/dl   


 


Random Vancomycin Level  20.3 mcg/ml   


 


Test


  1/17/18


07:27 1/17/18


08:37 1/17/18


09:42 1/17/18


09:43


 


Bedside Glucose 164 mg/dl  107 mg/dl  70 mg/dl  


 


Lactic Acid Level    0.9 mmol/L 


 


Total Bilirubin    0.8 mg/dl 


 


Direct Bilirubin    0.1 mg/dl 


 


Aspartate Amino Transf


(AST/SGOT) 


  


  


  11 U/L 


 


 


Alanine Aminotransferase


(ALT/SGPT) 


  


  


  11 U/L 


 


 


Alkaline Phosphatase    115 U/L 


 


Total Protein    6.2 gm/dl 


 


Albumin    2.2 gm/dl 


 


Amylase Level    22 U/L 


 


Lipase    63 U/L 


 


Test


  1/17/18


10:07 1/17/18


10:23 1/17/18


11:28 


 


 


Bedside Glucose 96 mg/dl  103 mg/dl  92 mg/dl  











Assessment & Plan


Hypotension


Possible related to sepsis


Was transferred from [facility] to [facility] ICU


Elevated lactic acid and procalcitonin


CXR showed mild pulmonary edema which has improved since exam of January 1, 2018.Mild bibasilar opacities which favor atelectasis.


Influenza negative


BP remains low on Mildodrine


On empirical abx with Vanco and Zosyn


Blood cx and urine cx pending


Repeat lactic acid


Continue monitor in ICU 


1/17


BP slightly improved with SBP in the 80


BP will drop during HD, consider to start on pressor during HD


Urine cx growth gram negative bacilli


Blood cx no growth


Abx changed to Primaxin


Continue monitor in the ICU








ESRD on HD


Will plan to HD today


Case discussed with nephrology





Abdominal Pain


KUB showed prominent loop of small bowel within the left mid abdomen with no 

convincing evidence for small bowel obstruction. 


If pain worsening, consider CT abd


Ok with nephrology to get CT abd with contrast


Consider to get CT today since pt plan to get HD later


continue monitor





Obesity


Counseling on diet and exercise





DM type II


Elevated BS


Check Hba1c in am


On insulin coverage and Lantus





Chronic Anemia 


Hgb 9.1


No sign of bleeding


Continue monitor CBC





CHF


CXR showed mild pulmonary edema which has improved since exam of January 1, 2018.


Saturated well on RA


Will monitor for signs of CHF





DVT px on heparin subq


Consultants:


Intensivist


Nephro


Current Inpatient Medications:





Current Inpatient Medications








 Medications


  (Trade)  Dose


 Ordered  Sig/Daniel


 Route  Start Time


 Stop Time Status Last Admin


Dose Admin


 


 Acetaminophen


  (Tylenol Tab)  650 mg  Q4H  PRN


 PO  1/16/18 02:30


 2/15/18 02:29  1/16/18 04:33


650 MG


 


 Allopurinol


  (Zyloprim Tab)  100 mg  BID


 PO  1/16/18 09:00


 2/15/18 08:59  1/17/18 07:38


100 MG


 


 Aspirin


  (Ecotrin Tab)  81 mg  QAM


 PO  1/16/18 09:00


 2/15/18 08:59  1/17/18 07:38


81 MG


 


 Atorvastatin


 Calcium


  (Lipitor Tab)  40 mg  QAM


 PO  1/16/18 09:00


 2/15/18 08:59  1/17/18 07:38


40 MG


 


 Docusate Sodium


  (coLACE CAP)  100 mg  BID


 PO  1/16/18 09:00


 2/15/18 08:59  1/17/18 07:39


100 MG


 


 Albuterol/


 Ipratropium


  (Combivent


 Respimat Inh)  1 puffs  QID


 INH  1/16/18 09:00


 2/15/18 08:59  1/17/18 15:53


1 PUFFS


 


 Pantoprazole


 Sodium


  (Protonix Tab)  40 mg  DAILY


 PO  1/16/18 09:00


 2/15/18 08:59  1/17/18 07:41


40 MG


 


 Pramipexole


 Dihydrochloride


  (miraPEX TAB)  0.25 mg  HS


 PO  1/16/18 21:00


 2/15/18 20:59  1/16/18 20:42


0.25 MG


 


 Sertraline HCl


  (Zoloft Tab)  75 mg  DAILY


 PO  1/16/18 09:00


 2/15/18 08:59  1/17/18 07:39


75 MG


 


 Thiamine HCl


  (Vitamin B-1 Tab)  50 mg  DAILY


 PO  1/16/18 09:00


 2/15/18 08:59  1/17/18 07:41


50 MG


 


 Tramadol HCl


  (Ultram Tab)  50 mg  Q4H  PRN


 PO  1/16/18 02:45


 2/15/18 02:44  1/16/18 21:00


50 MG


 


 Vitamin B Complex/


 Vit C/Folic Acid


  (Nephrocaps)  1 cap  DAILY


 PO  1/16/18 09:00


 2/15/18 08:59  1/17/18 07:51


1 CAP


 


 Miscellaneous


 Information


  (Order Awaiting


 Action)  1 ea  QS


 N/A  1/16/18 08:00


 2/15/18 07:59  1/16/18 08:21


1 EA


 


 Miscellaneous


 Information


  (Order Awaiting


 Action)  1 ea  QS


 N/A  1/16/18 08:00


 2/15/18 07:59  1/16/18 08:21


1 EA


 


 Gabapentin


  (Neurontin Cap)  200 mg  HS


 PO  1/16/18 21:00


 2/15/18 20:59  1/16/18 20:43


200 MG


 


 Miscellaneous


 Information


  (Consult


 Glycemic


 Management


 Pharmacy)  1 ea  UD  PRN


 N/A  1/16/18 04:15


 2/15/18 04:14   


 


 


 Glucose


  (Glucose 40% Gel)  15-30


 GRAMS 15


 GRAMS...  UD  PRN


 PO  1/16/18 04:30


 2/15/18 04:29   


 


 


 Glucose


  (Glucose Chew


 Tab)  4-8


 Tablets 4


 Tabl...  UD  PRN


 PO  1/16/18 04:30


 2/15/18 04:29   


 


 


 Dextrose


  (Dextrose 50%


 50ML Syringe)  25-50ML OF


 50% DW IV


 FOR...  UD  PRN


 IV  1/16/18 04:30


 2/15/18 04:29  1/17/18 09:52


25 ML


 


 Glucagon


  (Glucagon Inj)  1 mg  UD  PRN


 SQ  1/16/18 04:30


 2/15/18 04:29   


 


 


 Norepinephrine


 Bitartrate 8 mg/


 Dextrose  508 ml @ 0


 mls/hr  Q0M PRN


 IV  1/16/18 10:45


 2/15/18 10:44  1/16/18 11:10


7.5 MLS/HR


 


 Miscellaneous


 Information


  (Consult)  1 ea  UD  PRN


 N/A  1/16/18 10:45


 2/15/18 10:44   


 


 


 Ondansetron HCl


  (Zofran Inj)  4 mg  Q4H  PRN


 IV  1/16/18 18:15


 2/15/18 18:14   


 


 


 Clopidogrel


 Bisulfate


  (plAVix TAB)  75 mg  DAILY


 PO  1/17/18 09:00


 2/16/18 08:59  1/17/18 07:40


75 MG


 


 Hydrocortisone


 Sodium Succinate


 50 mg/Syringe  1 ml @ 4


 mls/min  Q8H


 IV  1/17/18 10:00


 1/18/18 02:01  1/17/18 09:52


4 MLS/MIN


 


 Imipenem/


 Cilastatin Sodium


 500 mg/Dextrose  110 ml @ 


 110 mls/hr  Q12H


 IV  1/17/18 20:00


 1/27/18 19:59   


 


 


 Heparin Sodium


  (Porcine)


  (Heparin Sq 5000


 Unit/0.5ml)  5,000 unit  Q8


 SQ  1/17/18 15:00


 2/16/18 14:59  1/17/18 15:54


5,000 UNIT


 


 Insulin Aspart


  (novoLOG ASPART)  **SLIDING


 SCALE**


 **G...  ACHS


 SC  1/17/18 12:00


 2/16/18 11:59  1/17/18 12:52


1 UNITS


 


 Insulin Aspart


  (novoLOG ASPART)  **SLIDING


 SCALE**


 **G...  TODAY@0000,0400


 SC  1/18/18 00:00


 1/18/18 04:01   


 


 


 Sevelamer HCl


  (Renagel Tab)  1,600 mg  TIDM


 PO  1/17/18 16:30


 2/16/18 16:29  1/17/18 15:53


1,600 MG

## 2018-01-17 NOTE — PHARMACY PROGRESS NOTE
Glycemic Control Progress Note


Date of Service


Jan 17, 2018.





Scope


Glycemic Pharmacist consulted for glycemic control to write orders per [facility] 

inpatient glycemic control protocol.





Objective


Accuchecks BSG (last 24hrs):











Test


  1/16/18


16:43 1/16/18


20:37 1/17/18


00:04 1/17/18


03:32


 


Bedside Glucose


  205 mg/dl


(70-90) 267 mg/dl


(70-90) 200 mg/dl


(70-90) 219 mg/dl


(70-90)


 


Test


  1/17/18


04:32 1/17/18


05:34 1/17/18


05:46 1/17/18


06:37


 


Bedside Glucose


  228 mg/dl


(70-90) 


  240 mg/dl


(70-90) 217 mg/dl


(70-90)


 


Random Glucose


  


  237 mg/dl


(70-99) 


  


 


 


Test


  1/17/18


07:27 1/17/18


08:37 1/17/18


09:42 1/17/18


10:07


 


Bedside Glucose


  164 mg/dl


(70-90) 107 mg/dl


(70-90) 70 mg/dl


(70-90) 96 mg/dl


(70-90)


 


Test


  1/17/18


10:23 1/17/18


11:28 


  


 


 


Bedside Glucose


  103 mg/dl


(70-90) 92 mg/dl


(70-90) 


  


 








HbA1c:











Test


  1/17/18


05:34


 


Hemoglobin A1c


  6.5 %


(4.5-5.6)  H











Recent Pertinent Medications


Outpatient Anti-diabetic Regimen: 


* NPH 40 units SC qAM (*only* while on prednisone)


* A1c = 6.3% on 12/29/17








Risk Factors for Insulin Resistance:


* Steroids: Hydrocortisone 100 mg IV q6h tapered to 50 mg IV q8h. Scheduled to 

stop tomorrow.


* Infection: ?Sepsis 2nd UTI vs. other. 


* Pressors: Norepinephrine


* Diet: T2DM





Assessment & Plan


ASSESSMENT:





1/16/18


* 58 yo F admitted to ICU with hypotension.  Etiologies include sepsis vs. 

adrenal insufficiency.  


* Followed by glycemic service on previous admission - trend of overnight/AM 

hypoglycemia with significant post-prandial elevations 2nd steroids.  


 * Therefore best to be less aggressive with basal insulin and more aggressive 

with CHO ratio


* OK to use Lantus for now as steroids are being administered around the clock


 * Per Dr. Carlos - plan to continue hydrocortisone at current dose and will re

-evaluate 1/17 AM


 * If/when steroids transitioned to prednisone once daily, plan to transition 

to NPH for better pharmacokinetic parameters and to help prevent AM hypoglycemia


* Significant stressors noted - will add two overnight BSG checks





1/17/18


* Patient started on insulin drip overnight last night 2nd BSG's persistently >

200 mg/dL - steroid-induced.


 * OK to stop insulin gtt at this time as BSG's now below goal at 92 mg/dL, 

insulin drip was running at low rates (less than 1-2 units/hr) and steroids are 

being tapered


* Will resume previous Novolog order, but loosen CHO slightly 2nd reduction in 

steroid dose.  


 * Anticipate will loosen CF and CR further if/when steroids discontinued


* Ordered Lantus x1 this AM.  Maintain same dose as yesterday (despite ordering 

drip which was running at ~ 5 units/hr this AM) as patient is at risk for 

overnight/AM hypoglycemia and steroids are being tapered.


* Anticipate that basal insulin can be discontinued if/when steroids 

discontinued








PLAN FOR INPATIENT GLYCEMIC CONTROL:





* Stop insulin drip 





* Continue basal insulin with LANTUS 15 units SQ x1 this AM





* Correctional Insulin with NOVOLOG per scale ACHS with additional checks at 

0000,0400


 * Goal Range:  Low 140 mg/dL - High 180 mg/dL


 * Correction Factor: 20 mg/dL/unit


 * Loosen Nutritional / Prandial insulin per carb ratio of 1 unit per 8 grams 

CHO consumed








* Please note that the plan above was derived based on current level of insulin 

resistance and hospital stress. These recommendations are appropriate for 

inpatient admission only. Plan of care upon discharge will need to be 

reassessed to avoid potential outpatient hypo/hyperglycemia. 





Thank you.

## 2018-01-17 NOTE — PHARMACY PROGRESS NOTE
Pharmacy Antibiotic Prog Note


Date of Service


Jan 17, 2018.





Subjective


The patient is currently receiving vancomycin prn levels





The patient is currently on day # 2 of vancomycin IV therapy.





Objective


Height (Feet):  5


Height (Inches):  4.00


Weight (Kilograms):  101.000


Lab Results (24hrs):











Test


  1/16/18


15:00 1/17/18


05:34 1/17/18


09:43 1/17/18


10:23


 


Urine Color DK YELLOW    


 


Urine Appearance TURBID (CLEAR)    


 


Urine pH 5.5 (4.5-7.5)    


 


Urine Specific Gravity


  1.021


(1.000-1.030) 


  


  


 


 


Urine Protein 3+ (NEG)    


 


Urine Glucose (UA) TRACE (NEG)    


 


Urine Ketones TRACE (NEG)    


 


Urine Occult Blood 3+ (NEG)    


 


Urine Nitrite POS (NEG)    


 


Urine Bilirubin NEG (NEG)    


 


Urine Urobilinogen NEG (NEG)    


 


Urine Leukocyte Esterase LARGE (NEG)    


 


Urine WBC (Auto) >30 /hpf (0-5)    


 


Urine RBC (Auto)


  5-10 /hpf


(0-4) 


  


  


 


 


Urine Hyaline Casts (Auto) 1-5 /lpf (0-5)    


 


Urine Epithelial Cells (Auto) >30 /lpf (0-5)    


 


Urine Bacteria (Auto) 1+ (NEG)    


 


Urine Pathogenic Casts  /lpf (0)    


 


Urine Yeast (Auto)  (NONE PRSENT)    


 


White Blood Count


  


  6.45 K/uL


(4.8-10.8) 


  


 


 


Red Blood Count


  


  2.89 M/uL


(4.2-5.4) 


  


 


 


Hemoglobin


  


  9.1 g/dL


(12.0-16.0) 


  


 


 


Hematocrit  28.7 % (37-47)   


 


Mean Corpuscular Volume


  


  99.3 fL


() 


  


 


 


Mean Corpuscular Hemoglobin


  


  31.5 pg


(25-34) 


  


 


 


Mean Corpuscular Hemoglobin


Concent 


  31.7 g/dl


(32-36) 


  


 


 


RDW Standard Deviation


  


  57.2 fL


(36.4-46.3) 


  


 


 


RDW Coefficient of Variation


  


  15.7 %


(11.5-14.5) 


  


 


 


Platelet Count


  


  159 K/uL


(130-400) 


  


 


 


Mean Platelet Volume


  


  9.9 fL


(7.4-10.4) 


  


 


 


Sodium Level


  


  135 mmol/L


(136-145) 


  


 


 


Potassium Level


  


  5.7 mmol/L


(3.5-5.1) 


  


 


 


Chloride Level


  


  102 mmol/L


() 


  


 


 


Carbon Dioxide Level


  


  19 mmol/L


(21-32) 


  


 


 


Anion Gap


  


  14.0 mmol/L


(3-11) 


  


 


 


Blood Urea Nitrogen


  


  83 mg/dl


(7-18) 


  


 


 


Creatinine


  


  7.67 mg/dl


(0.60-1.20) 


  


 


 


Est Creatinine Clear Calc


Drug Dose 


  9.1 ml/min 


  


  


 


 


Estimated GFR (


American) 


  6.1 


  


  


 


 


Estimated GFR (Non-


American 


  5.2 


  


  


 


 


BUN/Creatinine Ratio  10.8 (10-20)   


 


Random Glucose


  


  237 mg/dl


(70-99) 


  


 


 


Estimated Average Glucose  140 mg/dl   


 


Hemoglobin A1c


  


  6.5 %


(4.5-5.6) 


  


 


 


Calcium Level


  


  9.0 mg/dl


(8.5-10.1) 


  


 


 


Phosphorus Level


  


  7.3 mg/dl


(2.5-4.9) 


  


 


 


Magnesium Level


  


  2.3 mg/dl


(1.8-2.4) 


  


 


 


Random Vancomycin Level  20.3 mcg/ml   


 


Lactic Acid Level


  


  


  0.9 mmol/L


(0.4-2.0) 


 


 


Total Bilirubin


  


  


  0.8 mg/dl


(0.2-1) 


 


 


Direct Bilirubin


  


  


  0.1 mg/dl


(0-0.2) 


 


 


Aspartate Amino Transf


(AST/SGOT) 


  


  11 U/L (15-37) 


  


 


 


Alanine Aminotransferase


(ALT/SGPT) 


  


  11 U/L (12-78) 


  


 


 


Alkaline Phosphatase


  


  


  115 U/L


() 


 


 


Total Protein


  


  


  6.2 gm/dl


(6.4-8.2) 


 


 


Albumin


  


  


  2.2 gm/dl


(3.4-5.0) 


 


 


Amylase Level


  


  


  22 U/L


() 


 


 


Lipase


  


  


  63 U/L


() 


 


 


Bedside Glucose


  


  


  


  103 mg/dl


(70-90)


 


Test


  1/17/18


11:28 


  


  


 


 


Bedside Glucose


  92 mg/dl


(70-90) 


  


  


 











Assessment & Plan


Assessment


* 58 yo F admitted to ICU w hypotension 2nd sepsis and/or adrenal insufficiency 

(recent steroid use/taper).  On norepinephrine.  Possible infectious sources 

include urine and central access.  


* PMH: HD MWF.  Last was 1/15 as outpatient - only completed 90 minutes 2nd 

hypotenstion


* Renal


 * Chronic HD


 * Dialysis planned for today, not yet started - unsure if patient's BP will 

tolerate full session.  Plan to resume norepinephrine at that time to aid in 

pressure support.


* Antibiotics


 * Ceftriaxone switched to imipenem today


 * Vancomycin, dosed via level


* Cultures


 * Nasal MRSA positive


 * Urine culture w GNR (note: hx ESBL E. coli October 2017 per [facility] records)





Vancomycin


* Goal pre-HD level 15-20 mcg/mL


* Level of 20.3 mcg/mL this AM very slightly above goal


* Will plan to give supplemental dose HD, but will dose based on the length of 

HD the patient completes





Plan


* Vancomycin to be entered by PM pharmacist based on amount of time patient was 

dialyzed as follows:


 * HD < 1 hr: no vancomycin


 * HD 1-3 hr: vancomycin 500 mg IV x1


 * HD >= 3 hr: vancomycin 750 mg IV x1


* Random level 1/18 w AM labs











Pharmacy will continue to follow and will adjust dose/frequency as necessary.  

Thank you

## 2018-01-17 NOTE — NEPHROLOGY PROGRESS NOTE
Nephrology Progress Note


Date of Service:


Jan 17, 2018.


Subjective


58 yo female seen for ESRD follow up who has a uti, abdominal pain, decreased 

urination, hypotension.  did require pressor which was eventually weaned off 

this morning.





Objective











  Date Time  Temp Pulse Resp B/P (MAP) Pulse Ox O2 Delivery O2 Flow Rate FiO2


 


1/17/18 08:00      Nasal Cannula 2.0 


 


1/17/18 08:00 36.6 94 23 103/59 (74) 96 Nasal Cannula 2.0 


 


1/17/18 06:39  94 25 85/57 (66) 96 Nasal Cannula 2.0 


 


1/17/18 06:00  95 21 120/62 (81) 94 Nasal Cannula 2.0 


 


1/17/18 04:00     96 Nasal Cannula  


 


1/17/18 04:00 36.8 96 19 109/62 (78) 97 Nasal Cannula 2.0 


 


1/17/18 02:00  94 21 108/62 (77) 95 Nasal Cannula 2.0 


 


1/17/18 00:01 36.4 91 18 97/55 (69) 95 Nasal Cannula 2.0 


 


1/16/18 23:59     96 Nasal Cannula  


 


1/16/18 23:15  96 26 97/61 (73) 96 Nasal Cannula 2.0 


 


1/16/18 23:00  98 23 102/45 (64) 97 Nasal Cannula 2.0 


 


1/16/18 22:56  103 21 111/70 (84) 96 Nasal Cannula 2.0 


 


1/16/18 22:15  100 21 84/53 (63) 96 Nasal Cannula 2.0 


 


1/16/18 22:01  99 21 96/53 (67) 95 Nasal Cannula 2.0 


 


1/16/18 22:00  99 21 96/53 (67) 95 Nasal Cannula 2.0 


 


1/16/18 21:45  99 23 117/63 (81) 95 Nasal Cannula 2.0 


 


1/16/18 21:30  98 21 103/62 (76) 95 Nasal Cannula 2.0 


 


1/16/18 21:15  98 17 107/63 (78) 95 Room Air  


 


1/16/18 21:00  100 24 114/65 (81) 96 Room Air  


 


1/16/18 20:45  101 22 95/48 (64) 95 Room Air  


 


1/16/18 20:30  100 23 116/65 (82) 96 Room Air  


 


1/16/18 20:15  105 22 112/74 (87) 96 Room Air  


 


1/16/18 20:00     93 Nasal Cannula  


 


1/16/18 20:00 36.9 112 23 114/63 (80) 94 Room Air  


 


1/16/18 18:00  103 26 79/61 (67) 90 Room Air  


 


1/16/18 16:00      Room Air  


 


1/16/18 16:00 36.6 99 25 80/59 (66) 93 Room Air  


 


1/16/18 14:00  99 24 94/49 (64) 91 Room Air  


 


1/16/18 13:00  103 26 89/50 (63) 91 Room Air  


 


1/16/18 12:45  102 28 84/51 (62) 90 Room Air  


 


1/16/18 12:30  101 24 91/49 (63) 92 Room Air  


 


1/16/18 12:15  102 26 83/47 (59) 91 Room Air  


 


1/16/18 12:00      Room Air  


 


1/16/18 12:00  102 24 85/49 (61) 92 Room Air  


 


1/16/18 12:00 36.6 102 24 85/49 (61) 92 Room Air  


 


1/16/18 10:00  103 26 67/29 (42) 93 Room Air  








Physical Exam:


General-aaox3, obese


Eyes-no scleral icterus


ENT-mmm


Neck-supple


Lungs-basilar rales/rhonchi


Heart-rrr


Abdomen-+tenderness lower quadrants


Extremities-no c/c/e


Neuro-nonfocal





Current Inpatient Medications








 Medications


  (Trade)  Dose


 Ordered  Sig/Daniel


 Route  Start Time


 Stop Time Status Last Admin


Dose Admin


 


 Heparin Sodium


  (Porcine)


  (Heparin Sq 5000


 Unit/0.5ml)  5,000 unit  Q12H


 SQ  1/16/18 09:00


 2/15/18 08:59  1/17/18 07:49


5,000 UNIT


 


 Acetaminophen


  (Tylenol Tab)  650 mg  Q4H  PRN


 PO  1/16/18 02:30


 2/15/18 02:29  1/16/18 04:33


650 MG


 


 Allopurinol


  (Zyloprim Tab)  100 mg  BID


 PO  1/16/18 09:00


 2/15/18 08:59  1/17/18 07:38


100 MG


 


 Aspirin


  (Ecotrin Tab)  81 mg  QAM


 PO  1/16/18 09:00


 2/15/18 08:59  1/17/18 07:38


81 MG


 


 Atorvastatin


 Calcium


  (Lipitor Tab)  40 mg  QAM


 PO  1/16/18 09:00


 2/15/18 08:59  1/17/18 07:38


40 MG


 


 Docusate Sodium


  (coLACE CAP)  100 mg  BID


 PO  1/16/18 09:00


 2/15/18 08:59  1/17/18 07:39


100 MG


 


 Albuterol/


 Ipratropium


  (Combivent


 Respimat Inh)  1 puffs  QID


 INH  1/16/18 09:00


 2/15/18 08:59  1/17/18 07:39


1 PUFFS


 


 Pantoprazole


 Sodium


  (Protonix Tab)  40 mg  DAILY


 PO  1/16/18 09:00


 2/15/18 08:59  1/17/18 07:41


40 MG


 


 Pramipexole


 Dihydrochloride


  (miraPEX TAB)  0.25 mg  HS


 PO  1/16/18 21:00


 2/15/18 20:59  1/16/18 20:42


0.25 MG


 


 Sertraline HCl


  (Zoloft Tab)  75 mg  DAILY


 PO  1/16/18 09:00


 2/15/18 08:59  1/17/18 07:39


75 MG


 


 Thiamine HCl


  (Vitamin B-1 Tab)  50 mg  DAILY


 PO  1/16/18 09:00


 2/15/18 08:59  1/17/18 07:41


50 MG


 


 Tramadol HCl


  (Ultram Tab)  50 mg  Q4H  PRN


 PO  1/16/18 02:45


 2/15/18 02:44  1/16/18 21:00


50 MG


 


 Vitamin B Complex/


 Vit C/Folic Acid


  (Nephrocaps)  1 cap  DAILY


 PO  1/16/18 09:00


 2/15/18 08:59  1/17/18 07:51


1 CAP


 


 Miscellaneous


 Information


  (Order Awaiting


 Action)  1 ea  QS


 N/A  1/16/18 08:00


 2/15/18 07:59  1/16/18 08:21


1 EA


 


 Miscellaneous


 Information


  (Order Awaiting


 Action)  1 ea  QS


 N/A  1/16/18 08:00


 2/15/18 07:59  1/16/18 08:21


1 EA


 


 Sevelamer HCl


  (Renagel Tab)  800 mg  TIDM


 PO  1/16/18 07:15


 2/15/18 07:14  1/17/18 07:39


800 MG


 


 Gabapentin


  (Neurontin Cap)  200 mg  HS


 PO  1/16/18 21:00


 2/15/18 20:59  1/16/18 20:43


200 MG


 


 Miscellaneous


 Information


  (Consult


 Glycemic


 Management


 Pharmacy)  1 ea  UD  PRN


 N/A  1/16/18 04:15


 2/15/18 04:14   


 


 


 Glucose


  (Glucose 40% Gel)  15-30


 GRAMS 15


 GRAMS...  UD  PRN


 PO  1/16/18 04:30


 2/15/18 04:29   


 


 


 Glucose


  (Glucose Chew


 Tab)  4-8


 Tablets 4


 Tabl...  UD  PRN


 PO  1/16/18 04:30


 2/15/18 04:29   


 


 


 Dextrose


  (Dextrose 50%


 50ML Syringe)  25-50ML OF


 50% DW IV


 FOR...  UD  PRN


 IV  1/16/18 04:30


 2/15/18 04:29   


 


 


 Glucagon


  (Glucagon Inj)  1 mg  UD  PRN


 SQ  1/16/18 04:30


 2/15/18 04:29   


 


 


 Ceftriaxone


 Sodium 1 gm/


 Dextrose  50 ml @ 


 100 mls/hr  Q24H


 IV  1/16/18 16:00


 1/26/18 15:59  1/16/18 17:30


100 MLS/HR


 


 Norepinephrine


 Bitartrate 8 mg/


 Dextrose  508 ml @ 0


 mls/hr  Q0M PRN


 IV  1/16/18 10:45


 2/15/18 10:44  1/16/18 11:10


7.5 MLS/HR


 


 Miscellaneous


 Information


  (Consult)  1 ea  UD  PRN


 N/A  1/16/18 10:45


 2/15/18 10:44   


 


 


 Ondansetron HCl


  (Zofran Inj)  4 mg  Q4H  PRN


 IV  1/16/18 18:15


 2/15/18 18:14   


 


 


 Clopidogrel


 Bisulfate


  (plAVix TAB)  75 mg  DAILY


 PO  1/17/18 09:00


 2/16/18 08:59  1/17/18 07:40


75 MG


 


 Insulin Aspart


  (novoLOG ASPART)  SLIDING


 SCALE  [facility]  1/17/18 08:00


 2/16/18 07:59  1/17/18 07:51


6 UNITS


 


 Insulin Human


 Regular 250 units/


 Sodium Chloride  252.5 ml @ 


 0 mls/hr  Q24H


 IV  1/17/18 02:30


 2/16/18 02:29  1/17/18 02:28


3.8 MLS/HR


 


 Hydrocortisone


 Sodium Succinate


 50 mg/Syringe  1 ml @ 4


 mls/min  Q8H


 IV  1/17/18 10:00


 1/18/18 02:01   


 











Last 24 Hours








Test


  1/16/18


11:24 1/16/18


11:57 1/16/18


15:00 1/16/18


16:43


 


Bedside Glucose 151 mg/dl    205 mg/dl 


 


Lactic Acid Level  1.7 mmol/L   


 


Urine Color   DK YELLOW  


 


Urine Appearance   TURBID  


 


Urine pH   5.5  


 


Urine Specific Gravity   1.021  


 


Urine Protein   3+  


 


Urine Glucose (UA)   TRACE  


 


Urine Ketones   TRACE  


 


Urine Occult Blood   3+  


 


Urine Nitrite   POS  


 


Urine Bilirubin   NEG  


 


Urine Urobilinogen   NEG  


 


Urine Leukocyte Esterase   LARGE  


 


Urine WBC (Auto)   >30 /hpf  


 


Urine RBC (Auto)   5-10 /hpf  


 


Urine Hyaline Casts (Auto)   1-5 /lpf  


 


Urine Epithelial Cells (Auto)   >30 /lpf  


 


Urine Bacteria (Auto)   1+  


 


Urine Pathogenic Casts    /lpf  


 


Urine Yeast (Auto)     


 


Test


  1/16/18


20:37 1/17/18


00:04 1/17/18


03:32 1/17/18


04:32


 


Bedside Glucose 267 mg/dl  200 mg/dl  219 mg/dl  228 mg/dl 


 


Test


  1/17/18


05:34 1/17/18


05:46 1/17/18


06:37 1/17/18


09:43


 


White Blood Count 6.45 K/uL    


 


Red Blood Count 2.89 M/uL    


 


Hemoglobin 9.1 g/dL    


 


Hematocrit 28.7 %    


 


Mean Corpuscular Volume 99.3 fL    


 


Mean Corpuscular Hemoglobin 31.5 pg    


 


Mean Corpuscular Hemoglobin


Concent 31.7 g/dl 


  


  


  


 


 


RDW Standard Deviation 57.2 fL    


 


RDW Coefficient of Variation 15.7 %    


 


Platelet Count 159 K/uL    


 


Mean Platelet Volume 9.9 fL    


 


Sodium Level 135 mmol/L    


 


Potassium Level 5.7 mmol/L    


 


Chloride Level 102 mmol/L    


 


Carbon Dioxide Level 19 mmol/L    


 


Anion Gap 14.0 mmol/L    


 


Blood Urea Nitrogen 83 mg/dl    


 


Creatinine 7.67 mg/dl    


 


Est Creatinine Clear Calc


Drug Dose 9.1 ml/min 


  


  


  


 


 


Estimated GFR (


American) 6.1 


  


  


  


 


 


Estimated GFR (Non-


American 5.2 


  


  


  


 


 


BUN/Creatinine Ratio 10.8    


 


Random Glucose 237 mg/dl    


 


Estimated Average Glucose 140 mg/dl    


 


Hemoglobin A1c 6.5 %    


 


Calcium Level 9.0 mg/dl    


 


Phosphorus Level 7.3 mg/dl    


 


Magnesium Level 2.3 mg/dl    


 


Random Vancomycin Level 20.3 mcg/ml    


 


Bedside Glucose  240 mg/dl  217 mg/dl  











Assessment & Plan


ESRD-will do dialysis today to help clear toxins but will hold off on fluid 

removal today and re-evaluate tomorrow for fluid removal.  hoping bp continues 

to improve. will use pressor while on dialysis to help bp.  





Hypotension-question sepsis with uti vs pancreatitis vs tunneled line 

infection.  catheter looks good. awaiting blood culture results.  to check 

amylase/lipase to be thorough.  ok from renal perspective to proceed with ct 

scan of abdomen and pelvis with contrast since benefits outweigh the risks.

## 2018-01-17 NOTE — DIAGNOSTIC IMAGING REPORT
CHEST ONE VIEW PORTABLE



CLINICAL HISTORY: fever    



COMPARISON STUDY:  1/16/2018



FINDINGS: There are postsurgical changes of a midline sternotomy. A left

subclavian central venous catheter remains unchanged in position. There is no

interval change in the linear 13 mm metallic density projected over the superior

mediastinum. There is persistent mild pulmonary vascular congestion. There is a

persistent right basilar opacity, likely representing focal edema or

atelectasis. Trace pleural effusions are suspected.



IMPRESSION: 

1. No significant change from the preceding study

2. Persistent mild pulmonary vascular congestion/edema.







Electronically signed by:  Yrn Hernandez M.D.

1/17/2018 9:02 AM



Dictated Date/Time:  1/17/2018 9:00 AM

## 2018-01-18 VITALS — HEART RATE: 106 BPM | OXYGEN SATURATION: 92 %

## 2018-01-18 VITALS
SYSTOLIC BLOOD PRESSURE: 90 MMHG | OXYGEN SATURATION: 95 % | DIASTOLIC BLOOD PRESSURE: 65 MMHG | HEART RATE: 93 BPM | TEMPERATURE: 98.96 F

## 2018-01-18 VITALS
DIASTOLIC BLOOD PRESSURE: 59 MMHG | TEMPERATURE: 98.24 F | OXYGEN SATURATION: 94 % | HEART RATE: 97 BPM | SYSTOLIC BLOOD PRESSURE: 94 MMHG

## 2018-01-18 VITALS
OXYGEN SATURATION: 97 % | HEART RATE: 99 BPM | DIASTOLIC BLOOD PRESSURE: 65 MMHG | TEMPERATURE: 97.7 F | SYSTOLIC BLOOD PRESSURE: 96 MMHG

## 2018-01-18 VITALS — HEART RATE: 103 BPM | SYSTOLIC BLOOD PRESSURE: 94 MMHG | OXYGEN SATURATION: 97 % | DIASTOLIC BLOOD PRESSURE: 80 MMHG

## 2018-01-18 VITALS — DIASTOLIC BLOOD PRESSURE: 63 MMHG | SYSTOLIC BLOOD PRESSURE: 97 MMHG | HEART RATE: 95 BPM | OXYGEN SATURATION: 96 %

## 2018-01-18 VITALS — HEART RATE: 93 BPM | DIASTOLIC BLOOD PRESSURE: 71 MMHG | SYSTOLIC BLOOD PRESSURE: 117 MMHG

## 2018-01-18 VITALS — SYSTOLIC BLOOD PRESSURE: 90 MMHG | HEART RATE: 100 BPM | OXYGEN SATURATION: 95 % | DIASTOLIC BLOOD PRESSURE: 61 MMHG

## 2018-01-18 VITALS — DIASTOLIC BLOOD PRESSURE: 58 MMHG | SYSTOLIC BLOOD PRESSURE: 88 MMHG | HEART RATE: 95 BPM

## 2018-01-18 VITALS
TEMPERATURE: 98.96 F | SYSTOLIC BLOOD PRESSURE: 88 MMHG | DIASTOLIC BLOOD PRESSURE: 58 MMHG | OXYGEN SATURATION: 95 % | HEART RATE: 96 BPM

## 2018-01-18 VITALS — SYSTOLIC BLOOD PRESSURE: 90 MMHG | HEART RATE: 103 BPM | OXYGEN SATURATION: 85 % | DIASTOLIC BLOOD PRESSURE: 65 MMHG

## 2018-01-18 VITALS
TEMPERATURE: 98.24 F | OXYGEN SATURATION: 93 % | SYSTOLIC BLOOD PRESSURE: 92 MMHG | HEART RATE: 106 BPM | DIASTOLIC BLOOD PRESSURE: 56 MMHG

## 2018-01-18 VITALS
OXYGEN SATURATION: 97 % | TEMPERATURE: 97.34 F | DIASTOLIC BLOOD PRESSURE: 66 MMHG | SYSTOLIC BLOOD PRESSURE: 99 MMHG | HEART RATE: 96 BPM

## 2018-01-18 VITALS — SYSTOLIC BLOOD PRESSURE: 103 MMHG | HEART RATE: 93 BPM | OXYGEN SATURATION: 97 % | DIASTOLIC BLOOD PRESSURE: 72 MMHG

## 2018-01-18 VITALS
HEART RATE: 98 BPM | TEMPERATURE: 97.52 F | OXYGEN SATURATION: 95 % | SYSTOLIC BLOOD PRESSURE: 102 MMHG | DIASTOLIC BLOOD PRESSURE: 63 MMHG

## 2018-01-18 VITALS — OXYGEN SATURATION: 95 %

## 2018-01-18 VITALS — HEART RATE: 95 BPM | SYSTOLIC BLOOD PRESSURE: 91 MMHG | OXYGEN SATURATION: 96 % | DIASTOLIC BLOOD PRESSURE: 56 MMHG

## 2018-01-18 VITALS
HEART RATE: 92 BPM | TEMPERATURE: 97.52 F | SYSTOLIC BLOOD PRESSURE: 103 MMHG | OXYGEN SATURATION: 97 % | DIASTOLIC BLOOD PRESSURE: 58 MMHG

## 2018-01-18 VITALS — SYSTOLIC BLOOD PRESSURE: 94 MMHG | DIASTOLIC BLOOD PRESSURE: 59 MMHG | OXYGEN SATURATION: 94 % | HEART RATE: 104 BPM

## 2018-01-18 VITALS — OXYGEN SATURATION: 94 % | HEART RATE: 106 BPM | SYSTOLIC BLOOD PRESSURE: 95 MMHG | DIASTOLIC BLOOD PRESSURE: 54 MMHG

## 2018-01-18 VITALS — HEART RATE: 93 BPM | SYSTOLIC BLOOD PRESSURE: 93 MMHG | OXYGEN SATURATION: 95 % | DIASTOLIC BLOOD PRESSURE: 59 MMHG

## 2018-01-18 VITALS — SYSTOLIC BLOOD PRESSURE: 106 MMHG | HEART RATE: 91 BPM | DIASTOLIC BLOOD PRESSURE: 64 MMHG

## 2018-01-18 VITALS — OXYGEN SATURATION: 95 % | HEART RATE: 98 BPM | DIASTOLIC BLOOD PRESSURE: 72 MMHG | SYSTOLIC BLOOD PRESSURE: 113 MMHG

## 2018-01-18 LAB
BASOPHILS # BLD: 0.01 K/UL (ref 0–0.2)
BASOPHILS NFR BLD: 0.3 %
BUN SERPL-MCNC: 54 MG/DL (ref 7–18)
CALCIUM SERPL-MCNC: 8.1 MG/DL (ref 8.5–10.1)
CO2 SERPL-SCNC: 24 MMOL/L (ref 21–32)
CREAT SERPL-MCNC: 5.7 MG/DL (ref 0.6–1.2)
EOS ABS #: 0.02 K/UL (ref 0–0.5)
EOSINOPHIL NFR BLD AUTO: 121 K/UL (ref 130–400)
GLUCOSE SERPL-MCNC: 142 MG/DL (ref 70–99)
HCT VFR BLD CALC: 25.8 % (ref 37–47)
HGB BLD-MCNC: 8.3 G/DL (ref 12–16)
IG#: 0.05 K/UL (ref 0–0.02)
IMM GRANULOCYTES NFR BLD AUTO: 9.7 %
LYMPHOCYTES # BLD: 0.35 K/UL (ref 1.2–3.4)
MCH RBC QN AUTO: 31.9 PG (ref 25–34)
MCHC RBC AUTO-ENTMCNC: 32.2 G/DL (ref 32–36)
MCV RBC AUTO: 99.2 FL (ref 80–100)
MONO ABS #: 0.54 K/UL (ref 0.11–0.59)
MONOCYTES NFR BLD: 15 %
NEUT ABS #: 2.63 K/UL (ref 1.4–6.5)
NEUTROPHILS # BLD AUTO: 0.6 %
NEUTROPHILS NFR BLD AUTO: 73 %
PHOSPHATE SERPL-MCNC: 5.5 MG/DL (ref 2.5–4.9)
PMV BLD AUTO: 10.1 FL (ref 7.4–10.4)
POTASSIUM SERPL-SCNC: 4.6 MMOL/L (ref 3.5–5.1)
RED CELL DISTRIBUTION WIDTH CV: 15.4 % (ref 11.5–14.5)
RED CELL DISTRIBUTION WIDTH SD: 55.6 FL (ref 36.4–46.3)
SODIUM SERPL-SCNC: 134 MMOL/L (ref 136–145)
WBC # BLD AUTO: 3.6 K/UL (ref 4.8–10.8)

## 2018-01-18 RX ADMIN — DOCUSATE SODIUM SCH MG: 100 CAPSULE, LIQUID FILLED ORAL at 08:34

## 2018-01-18 RX ADMIN — HEPARIN SODIUM SCH UNIT: 10000 INJECTION, SOLUTION INTRAVENOUS; SUBCUTANEOUS at 21:22

## 2018-01-18 RX ADMIN — Medication SCH MG: at 08:24

## 2018-01-18 RX ADMIN — CLOPIDOGREL BISULFATE SCH MG: 75 TABLET, FILM COATED ORAL at 08:34

## 2018-01-18 RX ADMIN — HEPARIN SODIUM SCH UNIT: 10000 INJECTION, SOLUTION INTRAVENOUS; SUBCUTANEOUS at 06:05

## 2018-01-18 RX ADMIN — INSULIN ASPART SCH UNITS: 100 INJECTION, SOLUTION INTRAVENOUS; SUBCUTANEOUS at 03:44

## 2018-01-18 RX ADMIN — TRAMADOL HYDROCHLORIDE PRN MG: 50 TABLET, COATED ORAL at 23:09

## 2018-01-18 RX ADMIN — INSULIN ASPART SCH UNITS: 100 INJECTION, SOLUTION INTRAVENOUS; SUBCUTANEOUS at 08:15

## 2018-01-18 RX ADMIN — RENAGEL SCH MG: 800 TABLET ORAL at 08:13

## 2018-01-18 RX ADMIN — PANTOPRAZOLE SCH MG: 40 TABLET, DELAYED RELEASE ORAL at 08:34

## 2018-01-18 RX ADMIN — RENAGEL SCH MG: 800 TABLET ORAL at 16:49

## 2018-01-18 RX ADMIN — INSULIN ASPART SCH UNITS: 100 INJECTION, SOLUTION INTRAVENOUS; SUBCUTANEOUS at 21:00

## 2018-01-18 RX ADMIN — GABAPENTIN SCH MG: 100 CAPSULE ORAL at 21:24

## 2018-01-18 RX ADMIN — ASCORBIC ACID, THIAMINE MONONITRATE,RIBOFLAVIN, NIACINAMIDE, PYRIDOXINE HYDROCHLORIDE, FOLIC ACID, CYANOCOBALAMIN, BIOTIN, CALCIUM PANTOTHENATE, SCH CAP: 100; 1.5; 1.7; 20; 10; 1; 6000; 150000; 5 CAPSULE, LIQUID FILLED ORAL at 08:34

## 2018-01-18 RX ADMIN — ALUMINUM ZIRCONIUM TRICHLOROHYDREX GLY SCH EA: 0.2 STICK TOPICAL at 11:47

## 2018-01-18 RX ADMIN — IPRATROPIUM BROMIDE AND ALBUTEROL SCH PUFFS: 20; 100 SPRAY, METERED RESPIRATORY (INHALATION) at 13:06

## 2018-01-18 RX ADMIN — INSULIN ASPART SCH UNITS: 100 INJECTION, SOLUTION INTRAVENOUS; SUBCUTANEOUS at 18:00

## 2018-01-18 RX ADMIN — ALLOPURINOL SCH MG: 100 TABLET ORAL at 08:35

## 2018-01-18 RX ADMIN — ONDANSETRON PRN MG: 2 INJECTION INTRAMUSCULAR; INTRAVENOUS at 13:06

## 2018-01-18 RX ADMIN — ACETAMINOPHEN PRN MG: 325 TABLET ORAL at 14:57

## 2018-01-18 RX ADMIN — HYDROCORTISONE SODIUM SUCCINATE SCH MLS/MIN: 100 INJECTION, POWDER, FOR SOLUTION INTRAMUSCULAR; INTRAVENOUS at 02:00

## 2018-01-18 RX ADMIN — ALLOPURINOL SCH MG: 100 TABLET ORAL at 21:22

## 2018-01-18 RX ADMIN — IPRATROPIUM BROMIDE AND ALBUTEROL SCH PUFFS: 20; 100 SPRAY, METERED RESPIRATORY (INHALATION) at 16:50

## 2018-01-18 RX ADMIN — SERTRALINE HYDROCHLORIDE SCH MG: 50 TABLET, FILM COATED ORAL at 08:33

## 2018-01-18 RX ADMIN — IPRATROPIUM BROMIDE AND ALBUTEROL SCH PUFFS: 20; 100 SPRAY, METERED RESPIRATORY (INHALATION) at 21:20

## 2018-01-18 RX ADMIN — ALUMINUM ZIRCONIUM TRICHLOROHYDREX GLY SCH EA: 0.2 STICK TOPICAL at 16:00

## 2018-01-18 RX ADMIN — IMIPENEM AND CILASTATIN SODIUM SCH MLS/HR: 500; 500 INJECTION, POWDER, FOR SOLUTION INTRAVENOUS at 08:24

## 2018-01-18 RX ADMIN — IPRATROPIUM BROMIDE AND ALBUTEROL SCH PUFFS: 20; 100 SPRAY, METERED RESPIRATORY (INHALATION) at 08:34

## 2018-01-18 RX ADMIN — Medication SCH MG: at 08:34

## 2018-01-18 RX ADMIN — INSULIN ASPART SCH UNITS: 100 INJECTION, SOLUTION INTRAVENOUS; SUBCUTANEOUS at 13:05

## 2018-01-18 RX ADMIN — ERTAPENEM SODIUM SCH MLS/HR: 1 INJECTION, POWDER, LYOPHILIZED, FOR SOLUTION INTRAMUSCULAR; INTRAVENOUS at 18:39

## 2018-01-18 RX ADMIN — ONDANSETRON PRN MG: 2 INJECTION INTRAMUSCULAR; INTRAVENOUS at 00:56

## 2018-01-18 RX ADMIN — ATORVASTATIN CALCIUM SCH MG: 40 TABLET, FILM COATED ORAL at 08:34

## 2018-01-18 RX ADMIN — RENAGEL SCH MG: 800 TABLET ORAL at 11:47

## 2018-01-18 RX ADMIN — DOCUSATE SODIUM SCH MG: 100 CAPSULE, LIQUID FILLED ORAL at 21:20

## 2018-01-18 RX ADMIN — HEPARIN SODIUM SCH UNIT: 10000 INJECTION, SOLUTION INTRAVENOUS; SUBCUTANEOUS at 13:06

## 2018-01-18 RX ADMIN — ALUMINUM ZIRCONIUM TRICHLOROHYDREX GLY SCH EA: 0.2 STICK TOPICAL at 11:46

## 2018-01-18 RX ADMIN — TRAMADOL HYDROCHLORIDE PRN MG: 50 TABLET, COATED ORAL at 18:39

## 2018-01-18 RX ADMIN — PRAMIPEXOLE DIHYDROCHLORIDE SCH MG: 0.25 TABLET ORAL at 21:22

## 2018-01-18 NOTE — NEPHROLOGY PROGRESS NOTE
Nephrology Progress Note


Date of Service:


Jan 18, 2018.


Subjective


56 yo female seen for ESRD follow up who has a uti, abdominal pain, decreased 

urination, hypotension. pt had dialysis yesterday with no fluid removal.  bp 

doing better. off pressors.





Objective











  Date Time  Temp Pulse Resp B/P (MAP) Pulse Ox O2 Delivery O2 Flow Rate FiO2


 


1/18/18 12:00     95 Nasal Cannula 2.0 


 


1/18/18 11:45 36.4 98 16 102/63 (76) 95 Nasal Cannula 2.0 


 


1/18/18 11:00  93 22 117/71 (86)    


 


1/18/18 10:30  91 24 106/64 (78)    


 


1/18/18 09:30  93 25 103/72 (82) 97   


 


1/18/18 09:16 37.2 96 18 88/58 (68) 95 Nasal Cannula 2.0 


 


1/18/18 09:00  95 21 88/58 (68)    


 


1/18/18 08:30  100 25 90/61 (71) 95   


 


1/18/18 08:01  103 28 94/80 (85) 97   


 


1/18/18 08:00  106 17  92   


 


1/18/18 08:00      Room Air  


 


1/18/18 07:43  98 22 113/72 (86) 95   


 


1/18/18 07:31  103  90/65 (73) 85   


 


1/18/18 07:30      Nasal Cannula  


 


1/18/18 07:30 37.2 93 18 90/65 (73) 95 Nasal Cannula 2.0 


 


1/18/18 07:30      Nasal Cannula 2.0 


 


1/18/18 07:15  95 17 91/56 (68) 96   


 


1/18/18 07:00  95 17 97/63 (74) 96   


 


1/18/18 06:00  93 17 93/59 (70) 95 Nasal Cannula 2.0 


 


1/18/18 04:00 36.8 97 20 94/59 (71) 95 Nasal Cannula 2.0 


 


1/18/18 04:00     94 Nasal Cannula 2.0 


 


1/18/18 02:00  104 23 94/59 (71) 94 Nasal Cannula 2.0 


 


1/18/18 00:01 36.8 106 24 92/56 (68) 93 Nasal Cannula 2.0 


 


1/18/18 00:00  106 22 95/54 (68) 94   


 


1/17/18 23:59     94 Nasal Cannula 2.0 


 


1/17/18 22:58 36.4 106  113/74 (87)    


 


1/17/18 22:00  106  113/74    


 


1/17/18 21:45  104  119/79    


 


1/17/18 21:31  101 23 113/61 (78) 92 Nasal Cannula 2.0 


 


1/17/18 21:30  100 19  94   


 


1/17/18 21:30  100  113/61    


 


1/17/18 21:15  101 24 127/78 (94) 94   


 


1/17/18 21:15  101  127/78    


 


1/17/18 21:00  77  129/87    


 


1/17/18 21:00  100 22 129/87 (101) 93   


 


1/17/18 20:45  99 21 130/81 (97) 93   


 


1/17/18 20:45  98  130/81    


 


1/17/18 20:31  100 22 118/72 (87) 94   


 


1/17/18 20:30  99 20  94   


 


1/17/18 20:30  98  118/72    


 


1/17/18 20:15  97 21 111/69 (83) 95   


 


1/17/18 20:15  98  117/72    


 


1/17/18 20:01 36.8 99 25 117/72 (87) 96 Nasal Cannula 2.0 


 


1/17/18 20:00  92 19  95   


 


1/17/18 20:00  99  111/69    


 


1/17/18 19:45  95  117/72    


 


1/17/18 19:30  100  124/69    


 


1/17/18 19:22     96 Nasal Cannula 2.0 


 


1/17/18 19:19  87 27 105/53 (70) 97   


 


1/17/18 19:15  98  105/53    


 


1/17/18 19:15  100  124/69    


 


1/17/18 19:08  92  116/70    


 


1/17/18 19:01  95 20 116/70 (85) 97   


 


1/17/18 19:00 36.5 95  109/59 (76)    


 


1/17/18 19:00  94 27  98   


 


1/17/18 18:01  95 28 109/59 (76) 97   


 


1/17/18 18:00  95 18  91   


 


1/17/18 17:00  95 24 119/65 (83) 97   


 


1/17/18 16:00 36.7 92 26 112/65 (81) 97 Nasal Cannula 2.0 


 


1/17/18 16:00     96 Nasal Cannula 2.0 


 


1/17/18 15:30  78 15 83/53 (63) 97   


 


1/17/18 15:00  81 19 91/54 (66) 96   








Physical Exam:


General-aaox3, obese


Eyes-no scleral icterus


ENT-mmm


Neck-supple


Lungs-+ rhonchi


Heart-regular


Abdomen-+tenderness lower quadrants


Extremities-no c/c/e


Neuro-nonfocal





Current Inpatient Medications








 Medications


  (Trade)  Dose


 Ordered  Sig/Daniel


 Route  Start Time


 Stop Time Status Last Admin


Dose Admin


 


 Acetaminophen


  (Tylenol Tab)  650 mg  Q4H  PRN


 PO  1/16/18 02:30


 2/15/18 02:29  1/17/18 22:19


650 MG


 


 Allopurinol


  (Zyloprim Tab)  100 mg  BID


 PO  1/16/18 09:00


 2/15/18 08:59  1/18/18 08:35


100 MG


 


 Aspirin


  (Ecotrin Tab)  81 mg  QAM


 PO  1/16/18 09:00


 2/15/18 08:59  1/18/18 08:34


81 MG


 


 Atorvastatin


 Calcium


  (Lipitor Tab)  40 mg  QAM


 PO  1/16/18 09:00


 2/15/18 08:59  1/18/18 08:34


40 MG


 


 Docusate Sodium


  (coLACE CAP)  100 mg  BID


 PO  1/16/18 09:00


 2/15/18 08:59  1/18/18 08:34


100 MG


 


 Albuterol/


 Ipratropium


  (Combivent


 Respimat Inh)  1 puffs  QID


 INH  1/16/18 09:00


 2/15/18 08:59  1/18/18 13:06


1 PUFFS


 


 Pantoprazole


 Sodium


  (Protonix Tab)  40 mg  DAILY


 PO  1/16/18 09:00


 2/15/18 08:59  1/18/18 08:34


40 MG


 


 Pramipexole


 Dihydrochloride


  (miraPEX TAB)  0.25 mg  HS


 PO  1/16/18 21:00


 2/15/18 20:59  1/17/18 22:15


0.25 MG


 


 Sertraline HCl


  (Zoloft Tab)  75 mg  DAILY


 PO  1/16/18 09:00


 2/15/18 08:59  1/18/18 08:33


75 MG


 


 Thiamine HCl


  (Vitamin B-1 Tab)  50 mg  DAILY


 PO  1/16/18 09:00


 2/15/18 08:59  1/18/18 08:24


50 MG


 


 Tramadol HCl


  (Ultram Tab)  50 mg  Q4H  PRN


 PO  1/16/18 02:45


 2/15/18 02:44  1/17/18 22:19


50 MG


 


 Vitamin B Complex/


 Vit C/Folic Acid


  (Nephrocaps)  1 cap  DAILY


 PO  1/16/18 09:00


 2/15/18 08:59  1/18/18 08:34


1 CAP


 


 Miscellaneous


 Information


  (Order Awaiting


 Action)  1 ea  QS


 N/A  1/16/18 08:00


 2/15/18 07:59  1/16/18 08:21


1 EA


 


 Miscellaneous


 Information


  (Order Awaiting


 Action)  1 ea  QS


 N/A  1/16/18 08:00


 2/15/18 07:59  1/16/18 08:21


1 EA


 


 Gabapentin


  (Neurontin Cap)  200 mg  HS


 PO  1/16/18 21:00


 2/15/18 20:59  1/17/18 22:14


200 MG


 


 Miscellaneous


 Information


  (Consult


 Glycemic


 Management


 Pharmacy)  1 ea  UD  PRN


 N/A  1/16/18 04:15


 2/15/18 04:14   


 


 


 Glucose


  (Glucose 40% Gel)  15-30


 GRAMS 15


 GRAMS...  UD  PRN


 PO  1/16/18 04:30


 2/15/18 04:29   


 


 


 Glucose


  (Glucose Chew


 Tab)  4-8


 Tablets 4


 Tabl...  UD  PRN


 PO  1/16/18 04:30


 2/15/18 04:29   


 


 


 Dextrose


  (Dextrose 50%


 50ML Syringe)  25-50ML OF


 50% DW IV


 FOR...  UD  PRN


 IV  1/16/18 04:30


 2/15/18 04:29  1/17/18 09:52


25 ML


 


 Glucagon


  (Glucagon Inj)  1 mg  UD  PRN


 SQ  1/16/18 04:30


 2/15/18 04:29   


 


 


 Norepinephrine


 Bitartrate 8 mg/


 Dextrose  508 ml @ 0


 mls/hr  Q0M PRN


 IV  1/16/18 10:45


 2/15/18 10:44  1/16/18 11:10


7.5 MLS/HR


 


 Ondansetron HCl


  (Zofran Inj)  4 mg  Q4H  PRN


 IV  1/16/18 18:15


 2/15/18 18:14  1/18/18 13:06


4 MG


 


 Clopidogrel


 Bisulfate


  (plAVix TAB)  75 mg  DAILY


 PO  1/17/18 09:00


 2/16/18 08:59  1/18/18 08:34


75 MG


 


 Heparin Sodium


  (Porcine)


  (Heparin Sq 5000


 Unit/0.5ml)  5,000 unit  Q8


 SQ  1/17/18 15:00


 2/16/18 14:59  1/18/18 13:06


5,000 UNIT


 


 Insulin Aspart


  (novoLOG ASPART)  **SLIDING


 SCALE**


 **G...  ACHS


 SC  1/17/18 12:00


 2/16/18 11:59  1/18/18 13:05


2 UNITS


 


 Sevelamer HCl


  (Renagel Tab)  1,600 mg  TIDM


 PO  1/17/18 16:30


 2/16/18 16:29  1/18/18 11:47


1,600 MG


 


 Ertapenem 500 mg/


 Sodium Chloride  55 ml @ 


 110 mls/hr  DAILY@1800


 IV  1/18/18 18:00


 1/28/18 17:59   


 











Last 24 Hours








Test


  1/17/18


16:01 1/17/18


22:29 1/17/18


23:20 1/18/18


03:34


 


Bedside Glucose 203 mg/dl  196 mg/dl  243 mg/dl  169 mg/dl 


 


Test


  1/18/18


05:20 1/18/18


06:10 1/18/18


11:40 


 


 


White Blood Count 3.60 K/uL    


 


Red Blood Count 2.60 M/uL    


 


Hemoglobin 8.3 g/dL    


 


Hematocrit 25.8 %    


 


Mean Corpuscular Volume 99.2 fL    


 


Mean Corpuscular Hemoglobin 31.9 pg    


 


Mean Corpuscular Hemoglobin


Concent 32.2 g/dl 


  


  


  


 


 


Platelet Count 121 K/uL    


 


Mean Platelet Volume 10.1 fL    


 


Neutrophils (%) (Auto) 73.0 %    


 


Lymphocytes (%) (Auto) 9.7 %    


 


Monocytes (%) (Auto) 15.0 %    


 


Eosinophils (%) (Auto) 0.6 %    


 


Basophils (%) (Auto) 0.3 %    


 


Neutrophils # (Auto) 2.63 K/uL    


 


Lymphocytes # (Auto) 0.35 K/uL    


 


Monocytes # (Auto) 0.54 K/uL    


 


Eosinophils # (Auto) 0.02 K/uL    


 


Basophils # (Auto) 0.01 K/uL    


 


RDW Standard Deviation 55.6 fL    


 


RDW Coefficient of Variation 15.4 %    


 


Immature Granulocyte % (Auto) 1.4 %    


 


Immature Granulocyte # (Auto) 0.05 K/uL    


 


Red Blood Cell Morphology Unremarkable    


 


Sodium Level 134 mmol/L    


 


Potassium Level 4.6 mmol/L    


 


Chloride Level 101 mmol/L    


 


Carbon Dioxide Level 24 mmol/L    


 


Anion Gap 9.0 mmol/L    


 


Blood Urea Nitrogen 54 mg/dl    


 


Creatinine 5.70 mg/dl    


 


Est Creatinine Clear Calc


Drug Dose 14.3 ml/min 


  


  


  


 


 


Estimated GFR (


American) 8.7 


  


  


  


 


 


Estimated GFR (Non-


American 7.5 


  


  


  


 


 


BUN/Creatinine Ratio 9.5    


 


Random Glucose 142 mg/dl    


 


Calcium Level 8.1 mg/dl    


 


Phosphorus Level 5.5 mg/dl    


 


Magnesium Level 2.0 mg/dl    


 


Random Vancomycin Level 21.9 mcg/ml    


 


Bedside Glucose  152 mg/dl  170 mg/dl  











Assessment & Plan


ESRD-bp is good. no more pressors. will plan on dialysis tomorrow with fluid 

removal.  





Abdominal pain-likely no kidney stone with negative kub. normal amylase/lipase.

  likely resolving uti.

## 2018-01-18 NOTE — CRITICAL CARE PROGRESS NOTE
Critical Care Progress Note


Date of Service


Jan 18, 2018.





ICU Day


ICU Day Number:  3





Attending


Dr. Hensley





Subjective


Patient complains of diffuse abdominal pain





Objective


General-A&O,complains of abdominal pain


Head-  AT/NC


Eyes- PERRL, EOMI


ENT- oropharynx clear


Neck- supple, no JVD


Lungs- +crackles bibasilar


Heart- regular rhythm


Abdomen- normal bowel sounds, +tenderness


Extremities-No calf tenderness


Neuro- alert, oriented x 3; PERRL, EOMI;


Skin- warm & dry





Assessment & Plan


: 


Recurrent UTI


Recent ESBL UTI, MDR: started on imipenam, but switched to ertapenam x 7d  

after sensitivities returned





Nephro:


Nephro Consult placed: Appreciate Dr. Wallace's input


* Follows with Dr. Geiger for nephrology


* Left Tunneled Dialysis Catheter x approx. 1 yr


* HD 3x/wk, HD tolerated well last night


Continue Nephro Caps, Renvela, and Auryxia


Mircera and Venofer per Nephro





Neuro:


A&O x 3


Neuro  checks  per protocol


Hx of depression; continue Zoloft


Continue home Ultram 50mg PO q4H PRN


Continue Mirapex 0.25Mg PO HS (restless Leg Synd)





Resp:


CXR: No acute process noted, CXR looks improved from prior admission 1/6/18; no 

change from yesterday's CXR to today's.


O2 sat: 97% on 2L NC


Hx of Asthma, acute on chronic hypercarbia and underlying obesity 

hypoventilation syndrome


Continue home medications: Albuterol Sulfate 2 puffs PRN SOB, Fluticasone 

Furoate-Vilanterol 1 puff BID, Combivent Respimat 1 puff QID


Procalcitonin 4.56





CV:


Hx of HTN, Dyslipidemia, aortic valve replacement with a bioprosthetic aortic 

[facility] 3/16


ECHO 1/2/18 per HPI by Dr. Holbrook: High cardiac output state


Continue home medications: ASA 81mg Chew, Atorvastatin 40mg PO daily, 


Hold Plavix, start heparin TID





GI:


Colace 100mg Cap, Po BID


Cont Protonix 40mg PO Daily


Hold Rabeprazole Sodium in pt





Endo: 


Hx of DM2 with ESRD requiring HD 3 times weekly (Ha1c = 6.3%)


Pt is confused on about order of insulin.  States daughter told her she didn't 

need it. 


* Inulin drip DCd


Concern for Adrenal insufficiency; Steroids DCd





I.D:


U/A: at  [facility] Nitrate negative with bacteruria and pyuria; previous cultures 

and sensitivities obtained from [facility]; MDR ESBL; 


Repeated at [facility] and Culture growing Gram neg bacilli, sensitivities returned 

and abx switched to ertapenam x 7 days


Blood cultures x 2 ordered--NGTD, Influenza PCR--negative, MRSA Screen--positive

; contact precautions


Afebrile, WBC WNL no leukocytosis


Trend fever curve





HEME:


H&H: 8.3 & 25.8; Plts 121


Prophylaxis: Heparin 5,000u q8h





Access: 


Tunneled HD cath, maintain 2 18g IVs peripherally, no current indication for 

central access


Patient no longer requiring pressor support, transferred to telemetry





Resident Physician Supervision Note:


Dr. Blake was resident physician during care of patient.  I separately 

evaluated patient and did history and exam. I discussed the case with the 

resident and generally agree with the findings and plan.





Patient required no more vasoactive medication overnight, they will for 

downgraded out of the ICU today.





Documented By:  Kyler Hensley DO





Consults & Procedures


Consultants:


Nephrology, Dr. Wallace





Data


Medications:





Current Inpatient Medications








 Medications


  (Trade)  Dose


 Ordered  Sig/Daniel


 Route  Start Time


 Stop Time Status Last Admin


Dose Admin


 


 Acetaminophen


  (Tylenol Tab)  650 mg  Q4H  PRN


 PO  1/16/18 02:30


 2/15/18 02:29  1/17/18 22:19


650 MG


 


 Allopurinol


  (Zyloprim Tab)  100 mg  BID


 PO  1/16/18 09:00


 2/15/18 08:59  1/18/18 08:35


100 MG


 


 Aspirin


  (Ecotrin Tab)  81 mg  QAM


 PO  1/16/18 09:00


 2/15/18 08:59  1/18/18 08:34


81 MG


 


 Atorvastatin


 Calcium


  (Lipitor Tab)  40 mg  QAM


 PO  1/16/18 09:00


 2/15/18 08:59  1/18/18 08:34


40 MG


 


 Docusate Sodium


  (coLACE CAP)  100 mg  BID


 PO  1/16/18 09:00


 2/15/18 08:59  1/18/18 08:34


100 MG


 


 Albuterol/


 Ipratropium


  (Combivent


 Respimat Inh)  1 puffs  QID


 INH  1/16/18 09:00


 2/15/18 08:59  1/18/18 08:34


1 PUFFS


 


 Pantoprazole


 Sodium


  (Protonix Tab)  40 mg  DAILY


 PO  1/16/18 09:00


 2/15/18 08:59  1/18/18 08:34


40 MG


 


 Pramipexole


 Dihydrochloride


  (miraPEX TAB)  0.25 mg  HS


 PO  1/16/18 21:00


 2/15/18 20:59  1/17/18 22:15


0.25 MG


 


 Sertraline HCl


  (Zoloft Tab)  75 mg  DAILY


 PO  1/16/18 09:00


 2/15/18 08:59  1/18/18 08:33


75 MG


 


 Thiamine HCl


  (Vitamin B-1 Tab)  50 mg  DAILY


 PO  1/16/18 09:00


 2/15/18 08:59  1/18/18 08:24


50 MG


 


 Tramadol HCl


  (Ultram Tab)  50 mg  Q4H  PRN


 PO  1/16/18 02:45


 2/15/18 02:44  1/17/18 22:19


50 MG


 


 Vitamin B Complex/


 Vit C/Folic Acid


  (Nephrocaps)  1 cap  DAILY


 PO  1/16/18 09:00


 2/15/18 08:59  1/18/18 08:34


1 CAP


 


 Miscellaneous


 Information


  (Order Awaiting


 Action)  1 ea  QS


 N/A  1/16/18 08:00


 2/15/18 07:59  1/16/18 08:21


1 EA


 


 Miscellaneous


 Information


  (Order Awaiting


 Action)  1 ea  QS


 N/A  1/16/18 08:00


 2/15/18 07:59  1/16/18 08:21


1 EA


 


 Gabapentin


  (Neurontin Cap)  200 mg  HS


 PO  1/16/18 21:00


 2/15/18 20:59  1/17/18 22:14


200 MG


 


 Miscellaneous


 Information


  (Consult


 Glycemic


 Management


 Pharmacy)  1 ea  UD  PRN


 N/A  1/16/18 04:15


 2/15/18 04:14   


 


 


 Glucose


  (Glucose 40% Gel)  15-30


 GRAMS 15


 GRAMS...  UD  PRN


 PO  1/16/18 04:30


 2/15/18 04:29   


 


 


 Glucose


  (Glucose Chew


 Tab)  4-8


 Tablets 4


 Tabl...  UD  PRN


 PO  1/16/18 04:30


 2/15/18 04:29   


 


 


 Dextrose


  (Dextrose 50%


 50ML Syringe)  25-50ML OF


 50% DW IV


 FOR...  UD  PRN


 IV  1/16/18 04:30


 2/15/18 04:29  1/17/18 09:52


25 ML


 


 Glucagon


  (Glucagon Inj)  1 mg  UD  PRN


 SQ  1/16/18 04:30


 2/15/18 04:29   


 


 


 Norepinephrine


 Bitartrate 8 mg/


 Dextrose  508 ml @ 0


 mls/hr  Q0M PRN


 IV  1/16/18 10:45


 2/15/18 10:44  1/16/18 11:10


7.5 MLS/HR


 


 Ondansetron HCl


  (Zofran Inj)  4 mg  Q4H  PRN


 IV  1/16/18 18:15


 2/15/18 18:14  1/18/18 00:56


4 MG


 


 Clopidogrel


 Bisulfate


  (plAVix TAB)  75 mg  DAILY


 PO  1/17/18 09:00


 2/16/18 08:59  1/18/18 08:34


75 MG


 


 Heparin Sodium


  (Porcine)


  (Heparin Sq 5000


 Unit/0.5ml)  5,000 unit  Q8


 SQ  1/17/18 15:00


 2/16/18 14:59  1/18/18 06:05


5,000 UNIT


 


 Insulin Aspart


  (novoLOG ASPART)  **SLIDING


 SCALE**


 **G...  ACHS


 SC  1/17/18 12:00


 2/16/18 11:59  1/18/18 08:15


6 UNITS


 


 Sevelamer HCl


  (Renagel Tab)  1,600 mg  TIDM


 PO  1/17/18 16:30


 2/16/18 16:29  1/18/18 08:13


1,600 MG


 


 Ertapenem 500 mg/


 Sodium Chloride  55 ml @ 


 110 mls/hr  DAILY@1800


 IV  1/18/18 18:00


 1/28/18 17:59   


 








I & O:











 1/17/18 1/18/18 1/19/18





 08:00 08:00 08:00


 


Intake Total 1615 ml 1806 ml 


 


Output Total 20 ml 0 ml 


 


Balance 1595 ml 1806 ml 








Vital Signs:











  Date Time  Temp Pulse Resp B/P (MAP) Pulse Ox O2 Delivery O2 Flow Rate FiO2


 


1/18/18 11:00  93 22 117/71 (86)    


 


1/18/18 10:30  91 24 106/64 (78)    


 


1/18/18 09:30  93 25 103/72 (82) 97   


 


1/18/18 09:16 37.2 96 18 88/58 (68) 95 Nasal Cannula 2.0 


 


1/18/18 09:00  95 21 88/58 (68)    


 


1/18/18 08:30  100 25 90/61 (71) 95   


 


1/18/18 08:01  103 28 94/80 (85) 97   


 


1/18/18 08:00  106 17  92   


 


1/18/18 08:00      Room Air  


 


1/18/18 07:43  98 22 113/72 (86) 95   


 


1/18/18 07:31  103  90/65 (73) 85   


 


1/18/18 07:30      Nasal Cannula  


 


1/18/18 07:30 37.2 93 18 90/65 (73) 95 Nasal Cannula 2.0 


 


1/18/18 07:30      Nasal Cannula 2.0 


 


1/18/18 07:15  95 17 91/56 (68) 96   


 


1/18/18 07:00  95 17 97/63 (74) 96   


 


1/18/18 06:00  93 17 93/59 (70) 95 Nasal Cannula 2.0 


 


1/18/18 04:00 36.8 97 20 94/59 (71) 95 Nasal Cannula 2.0 


 


1/18/18 04:00     94 Nasal Cannula 2.0 


 


1/18/18 02:00  104 23 94/59 (71) 94 Nasal Cannula 2.0 


 


1/18/18 00:01 36.8 106 24 92/56 (68) 93 Nasal Cannula 2.0 


 


1/18/18 00:00  106 22 95/54 (68) 94   


 


1/17/18 23:59     94 Nasal Cannula 2.0 


 


1/17/18 22:58 36.4 106  113/74 (87)    


 


1/17/18 22:00  106  113/74    


 


1/17/18 21:45  104  119/79    


 


1/17/18 21:31  101 23 113/61 (78) 92 Nasal Cannula 2.0 


 


1/17/18 21:30  100 19  94   


 


1/17/18 21:30  100  113/61    


 


1/17/18 21:15  101 24 127/78 (94) 94   


 


1/17/18 21:15  101  127/78    


 


1/17/18 21:00  77  129/87    


 


1/17/18 21:00  100 22 129/87 (101) 93   


 


1/17/18 20:45  99 21 130/81 (97) 93   


 


1/17/18 20:45  98  130/81    


 


1/17/18 20:31  100 22 118/72 (87) 94   


 


1/17/18 20:30  99 20  94   


 


1/17/18 20:30  98  118/72    


 


1/17/18 20:15  97 21 111/69 (83) 95   


 


1/17/18 20:15  98  117/72    


 


1/17/18 20:01 36.8 99 25 117/72 (87) 96 Nasal Cannula 2.0 


 


1/17/18 20:00  92 19  95   


 


1/17/18 20:00  99  111/69    


 


1/17/18 19:45  95  117/72    


 


1/17/18 19:30  100  124/69    


 


1/17/18 19:22     96 Nasal Cannula 2.0 


 


1/17/18 19:19  87 27 105/53 (70) 97   


 


1/17/18 19:15  98  105/53    


 


1/17/18 19:15  100  124/69    


 


1/17/18 19:08  92  116/70    


 


1/17/18 19:01  95 20 116/70 (85) 97   


 


1/17/18 19:00 36.5 95  109/59 (76)    


 


1/17/18 19:00  94 27  98   


 


1/17/18 18:01  95 28 109/59 (76) 97   


 


1/17/18 18:00  95 18  91   


 


1/17/18 17:00  95 24 119/65 (83) 97   


 


1/17/18 16:00 36.7 92 26 112/65 (81) 97 Nasal Cannula 2.0 


 


1/17/18 16:00     96 Nasal Cannula 2.0 


 


1/17/18 15:30  78 15 83/53 (63) 97   


 


1/17/18 15:00  81 19 91/54 (66) 96   


 


1/17/18 14:00  92 23 99/62 (74) 96 Nasal Cannula 2.0 


 


1/17/18 12:00 36.8 86 22 132/62 (85) 99 Nasal Cannula 2.0 


 


1/17/18 12:00      Nasal Cannula 2.0 








Laboratory Results:





Last 24 Hours








Test


  1/17/18


16:01 1/17/18


22:29 1/17/18


23:20 1/18/18


03:34


 


Bedside Glucose 203 mg/dl  196 mg/dl  243 mg/dl  169 mg/dl 


 


Test


  1/18/18


05:20 1/18/18


06:10 


  


 


 


White Blood Count 3.60 K/uL    


 


Red Blood Count 2.60 M/uL    


 


Hemoglobin 8.3 g/dL    


 


Hematocrit 25.8 %    


 


Mean Corpuscular Volume 99.2 fL    


 


Mean Corpuscular Hemoglobin 31.9 pg    


 


Mean Corpuscular Hemoglobin


Concent 32.2 g/dl 


  


  


  


 


 


Platelet Count 121 K/uL    


 


Mean Platelet Volume 10.1 fL    


 


Neutrophils (%) (Auto) 73.0 %    


 


Lymphocytes (%) (Auto) 9.7 %    


 


Monocytes (%) (Auto) 15.0 %    


 


Eosinophils (%) (Auto) 0.6 %    


 


Basophils (%) (Auto) 0.3 %    


 


Neutrophils # (Auto) 2.63 K/uL    


 


Lymphocytes # (Auto) 0.35 K/uL    


 


Monocytes # (Auto) 0.54 K/uL    


 


Eosinophils # (Auto) 0.02 K/uL    


 


Basophils # (Auto) 0.01 K/uL    


 


RDW Standard Deviation 55.6 fL    


 


RDW Coefficient of Variation 15.4 %    


 


Immature Granulocyte % (Auto) 1.4 %    


 


Immature Granulocyte # (Auto) 0.05 K/uL    


 


Red Blood Cell Morphology Unremarkable    


 


Sodium Level 134 mmol/L    


 


Potassium Level 4.6 mmol/L    


 


Chloride Level 101 mmol/L    


 


Carbon Dioxide Level 24 mmol/L    


 


Anion Gap 9.0 mmol/L    


 


Blood Urea Nitrogen 54 mg/dl    


 


Creatinine 5.70 mg/dl    


 


Est Creatinine Clear Calc


Drug Dose 14.3 ml/min 


  


  


  


 


 


Estimated GFR (


American) 8.7 


  


  


  


 


 


Estimated GFR (Non-


American 7.5 


  


  


  


 


 


BUN/Creatinine Ratio 9.5    


 


Random Glucose 142 mg/dl    


 


Calcium Level 8.1 mg/dl    


 


Phosphorus Level 5.5 mg/dl    


 


Magnesium Level 2.0 mg/dl    


 


Random Vancomycin Level 21.9 mcg/ml    


 


Bedside Glucose  152 mg/dl   











Resident Tracking


Resident Involvement:  Resident Care Provided


Care Provided:  Adult Hospital Medicine

## 2018-01-18 NOTE — PROGRESS NOTE
Medicine Progress Note


Date & Time of Visit:


Jan 18, 2018 at 10:50.


Subjective


Pt was seen and examined


Sitting in chair with no distress


Pt said that she feels better


Continue to have abdominal tenderness


Pt said that her abdominal pain starting after she had a fall on Monday


she said that she slipped on the ice


she said that she had image done of her right ankle with no fracture


She said that when she fell she hit her abdomen


denies any chest pain, palpitation and SOB





Objective





Last 8 Hrs








  Date Time  Temp Pulse Resp B/P (MAP) Pulse Ox O2 Delivery O2 Flow Rate FiO2


 


1/18/18 16:00     95 Nasal Cannula 2.0 


 


1/18/18 15:59 36.3 96 18 99/66 (77) 97 Nasal Cannula  


 


1/18/18 12:00     95 Nasal Cannula 2.0 








Physical Exam:


General-complaint of abdominal pain


Head-  atraumatic


Eyes- PERRL, EOMI


ENT- oropharynx clear


Neck- supple, no JVD


Lungs- +crackles


Heart- regular rhythm


Abdomen- normal bowel sounds, +tenderness


Extremities-No calf tenderness


Neuro- alert, oriented x 3; PERRL, EOMI;


Skin- warm & dry


Laboratory Results:





Last 24 Hours








Test


  1/17/18


22:29 1/17/18


23:20 1/18/18


03:34 1/18/18


05:20


 


Bedside Glucose 196 mg/dl  243 mg/dl  169 mg/dl  


 


White Blood Count    3.60 K/uL 


 


Red Blood Count    2.60 M/uL 


 


Hemoglobin    8.3 g/dL 


 


Hematocrit    25.8 % 


 


Mean Corpuscular Volume    99.2 fL 


 


Mean Corpuscular Hemoglobin    31.9 pg 


 


Mean Corpuscular Hemoglobin


Concent 


  


  


  32.2 g/dl 


 


 


Platelet Count    121 K/uL 


 


Mean Platelet Volume    10.1 fL 


 


Neutrophils (%) (Auto)    73.0 % 


 


Lymphocytes (%) (Auto)    9.7 % 


 


Monocytes (%) (Auto)    15.0 % 


 


Eosinophils (%) (Auto)    0.6 % 


 


Basophils (%) (Auto)    0.3 % 


 


Neutrophils # (Auto)    2.63 K/uL 


 


Lymphocytes # (Auto)    0.35 K/uL 


 


Monocytes # (Auto)    0.54 K/uL 


 


Eosinophils # (Auto)    0.02 K/uL 


 


Basophils # (Auto)    0.01 K/uL 


 


RDW Standard Deviation    55.6 fL 


 


RDW Coefficient of Variation    15.4 % 


 


Immature Granulocyte % (Auto)    1.4 % 


 


Immature Granulocyte # (Auto)    0.05 K/uL 


 


Red Blood Cell Morphology    Unremarkable 


 


Sodium Level    134 mmol/L 


 


Potassium Level    4.6 mmol/L 


 


Chloride Level    101 mmol/L 


 


Carbon Dioxide Level    24 mmol/L 


 


Anion Gap    9.0 mmol/L 


 


Blood Urea Nitrogen    54 mg/dl 


 


Creatinine    5.70 mg/dl 


 


Est Creatinine Clear Calc


Drug Dose 


  


  


  14.3 ml/min 


 


 


Estimated GFR (


American) 


  


  


  8.7 


 


 


Estimated GFR (Non-


American 


  


  


  7.5 


 


 


BUN/Creatinine Ratio    9.5 


 


Random Glucose    142 mg/dl 


 


Calcium Level    8.1 mg/dl 


 


Phosphorus Level    5.5 mg/dl 


 


Magnesium Level    2.0 mg/dl 


 


Random Vancomycin Level    21.9 mcg/ml 


 


Test


  1/18/18


06:10 1/18/18


11:40 1/18/18


16:15 


 


 


Bedside Glucose 152 mg/dl  170 mg/dl  151 mg/dl  











Assessment & Plan


Hypotension


Possible related to sepsis


Was transferred from [facility] to [facility] ICU


Elevated lactic acid and procalcitonin


CXR showed mild pulmonary edema which has improved since exam of January 1, 2018.Mild bibasilar opacities which favor atelectasis.


Influenza negative


BP remains low on Mildodrine


On empirical abx with Vanco and Zosyn


Blood cx and urine cx pending


Repeat lactic acid


Continue monitor in ICU 


1/18


BP slightly improved 


Had HD done yesterday


Urine cx growth Ecoli 


Blood cx no growth


Abx changed to Ertapenem


Off pressor


Will send to tele








ESRD on HD


had HD done yesterday with no fluid removal


Will get HD tomorrow





Abdominal Pain


Mostly related from the fall she had on Monday


KUB showed prominent loop of small bowel within the left mid abdomen with no 

convincing evidence for small bowel obstruction. 


Ok with nephrology to get CT abd with contrast if pain worsening


Pain improved with tramadol


continue monitor





Obesity


Counseling on diet and exercise





DM type II


Elevated BS


Check Hba1c in am


On insulin coverage and Lantus





Chronic Anemia 


Hgb 8.3


No sign of bleeding


Continue monitor CBC





CHF


CXR showed mild pulmonary edema which has improved since exam of January 1, 2018.


Saturated well on RA


Will monitor for signs of CHF





DVT px on heparin subq


Consultants:


Intensivist


Nephro


Current Inpatient Medications:





Current Inpatient Medications








 Medications


  (Trade)  Dose


 Ordered  Sig/Daniel


 Route  Start Time


 Stop Time Status Last Admin


Dose Admin


 


 Acetaminophen


  (Tylenol Tab)  650 mg  Q4H  PRN


 PO  1/16/18 02:30


 2/15/18 02:29  1/18/18 14:57


650 MG


 


 Allopurinol


  (Zyloprim Tab)  100 mg  BID


 PO  1/16/18 09:00


 2/15/18 08:59  1/18/18 08:35


100 MG


 


 Aspirin


  (Ecotrin Tab)  81 mg  QAM


 PO  1/16/18 09:00


 2/15/18 08:59  1/18/18 08:34


81 MG


 


 Atorvastatin


 Calcium


  (Lipitor Tab)  40 mg  QAM


 PO  1/16/18 09:00


 2/15/18 08:59  1/18/18 08:34


40 MG


 


 Docusate Sodium


  (coLACE CAP)  100 mg  BID


 PO  1/16/18 09:00


 2/15/18 08:59  1/18/18 08:34


100 MG


 


 Albuterol/


 Ipratropium


  (Combivent


 Respimat Inh)  1 puffs  QID


 INH  1/16/18 09:00


 2/15/18 08:59  1/18/18 16:50


1 PUFFS


 


 Pantoprazole


 Sodium


  (Protonix Tab)  40 mg  DAILY


 PO  1/16/18 09:00


 2/15/18 08:59  1/18/18 08:34


40 MG


 


 Pramipexole


 Dihydrochloride


  (miraPEX TAB)  0.25 mg  HS


 PO  1/16/18 21:00


 2/15/18 20:59  1/17/18 22:15


0.25 MG


 


 Sertraline HCl


  (Zoloft Tab)  75 mg  DAILY


 PO  1/16/18 09:00


 2/15/18 08:59  1/18/18 08:33


75 MG


 


 Thiamine HCl


  (Vitamin B-1 Tab)  50 mg  DAILY


 PO  1/16/18 09:00


 2/15/18 08:59  1/18/18 08:24


50 MG


 


 Tramadol HCl


  (Ultram Tab)  50 mg  Q4H  PRN


 PO  1/16/18 02:45


 2/15/18 02:44  1/18/18 18:39


50 MG


 


 Vitamin B Complex/


 Vit C/Folic Acid


  (Nephrocaps)  1 cap  DAILY


 PO  1/16/18 09:00


 2/15/18 08:59  1/18/18 08:34


1 CAP


 


 Miscellaneous


 Information


  (Order Awaiting


 Action)  1 ea  QS


 N/A  1/16/18 08:00


 2/15/18 07:59  1/16/18 08:21


1 EA


 


 Miscellaneous


 Information


  (Order Awaiting


 Action)  1 ea  QS


 N/A  1/16/18 08:00


 2/15/18 07:59  1/16/18 08:21


1 EA


 


 Gabapentin


  (Neurontin Cap)  200 mg  HS


 PO  1/16/18 21:00


 2/15/18 20:59  1/17/18 22:14


200 MG


 


 Miscellaneous


 Information


  (Consult


 Glycemic


 Management


 Pharmacy)  1 ea  UD  PRN


 N/A  1/16/18 04:15


 2/15/18 04:14   


 


 


 Glucose


  (Glucose 40% Gel)  15-30


 GRAMS 15


 GRAMS...  UD  PRN


 PO  1/16/18 04:30


 2/15/18 04:29   


 


 


 Glucose


  (Glucose Chew


 Tab)  4-8


 Tablets 4


 Tabl...  UD  PRN


 PO  1/16/18 04:30


 2/15/18 04:29   


 


 


 Dextrose


  (Dextrose 50%


 50ML Syringe)  25-50ML OF


 50% DW IV


 FOR...  UD  PRN


 IV  1/16/18 04:30


 2/15/18 04:29  1/17/18 09:52


25 ML


 


 Glucagon


  (Glucagon Inj)  1 mg  UD  PRN


 SQ  1/16/18 04:30


 2/15/18 04:29   


 


 


 Norepinephrine


 Bitartrate 8 mg/


 Dextrose  508 ml @ 0


 mls/hr  Q0M PRN


 IV  1/16/18 10:45


 2/15/18 10:44  1/16/18 11:10


7.5 MLS/HR


 


 Ondansetron HCl


  (Zofran Inj)  4 mg  Q4H  PRN


 IV  1/16/18 18:15


 2/15/18 18:14  1/18/18 13:06


4 MG


 


 Clopidogrel


 Bisulfate


  (plAVix TAB)  75 mg  DAILY


 PO  1/17/18 09:00


 2/16/18 08:59  1/18/18 08:34


75 MG


 


 Heparin Sodium


  (Porcine)


  (Heparin Sq 5000


 Unit/0.5ml)  5,000 unit  Q8


 SQ  1/17/18 15:00


 2/16/18 14:59  1/18/18 13:06


5,000 UNIT


 


 Insulin Aspart


  (novoLOG ASPART)  **SLIDING


 SCALE**


 **G...  ACHS


 SC  1/17/18 12:00


 2/16/18 11:59  1/18/18 18:00


5 UNITS


 


 Sevelamer HCl


  (Renagel Tab)  1,600 mg  TIDM


 PO  1/17/18 16:30


 2/16/18 16:29  1/18/18 16:49


1,600 MG


 


 Ertapenem 500 mg/


 Sodium Chloride  55 ml @ 


 110 mls/hr  DAILY@1800


 IV  1/18/18 18:00


 1/28/18 17:59  1/18/18 18:39


110 MLS/HR


 


 Epoetin Geovany


  (Procrit Inj)  10,000 units  TODAY@1000


 IV.  1/19/18 10:00


 1/19/18 16:00   


 


 


 Albumin Human


  (Albumin 25%)  25 gm  ONE  ONCE


 IV  1/19/18 10:00


 1/19/18 10:01

## 2018-01-19 VITALS
SYSTOLIC BLOOD PRESSURE: 90 MMHG | DIASTOLIC BLOOD PRESSURE: 56 MMHG | HEART RATE: 97 BPM | TEMPERATURE: 97.52 F | OXYGEN SATURATION: 97 %

## 2018-01-19 VITALS — DIASTOLIC BLOOD PRESSURE: 39 MMHG | HEART RATE: 97 BPM | SYSTOLIC BLOOD PRESSURE: 82 MMHG

## 2018-01-19 VITALS
OXYGEN SATURATION: 95 % | TEMPERATURE: 97.52 F | SYSTOLIC BLOOD PRESSURE: 93 MMHG | HEART RATE: 95 BPM | DIASTOLIC BLOOD PRESSURE: 47 MMHG

## 2018-01-19 VITALS — DIASTOLIC BLOOD PRESSURE: 46 MMHG | HEART RATE: 105 BPM | SYSTOLIC BLOOD PRESSURE: 88 MMHG

## 2018-01-19 VITALS — SYSTOLIC BLOOD PRESSURE: 100 MMHG | DIASTOLIC BLOOD PRESSURE: 53 MMHG | OXYGEN SATURATION: 95 % | HEART RATE: 96 BPM

## 2018-01-19 VITALS
TEMPERATURE: 97.34 F | DIASTOLIC BLOOD PRESSURE: 58 MMHG | HEART RATE: 102 BPM | OXYGEN SATURATION: 97 % | SYSTOLIC BLOOD PRESSURE: 91 MMHG

## 2018-01-19 VITALS
SYSTOLIC BLOOD PRESSURE: 81 MMHG | OXYGEN SATURATION: 93 % | DIASTOLIC BLOOD PRESSURE: 51 MMHG | HEART RATE: 105 BPM | TEMPERATURE: 97.88 F

## 2018-01-19 VITALS — DIASTOLIC BLOOD PRESSURE: 47 MMHG | HEART RATE: 98 BPM | SYSTOLIC BLOOD PRESSURE: 84 MMHG

## 2018-01-19 VITALS — DIASTOLIC BLOOD PRESSURE: 42 MMHG | HEART RATE: 97 BPM | SYSTOLIC BLOOD PRESSURE: 80 MMHG

## 2018-01-19 VITALS — SYSTOLIC BLOOD PRESSURE: 78 MMHG | HEART RATE: 98 BPM | DIASTOLIC BLOOD PRESSURE: 36 MMHG

## 2018-01-19 VITALS — SYSTOLIC BLOOD PRESSURE: 88 MMHG | DIASTOLIC BLOOD PRESSURE: 45 MMHG | TEMPERATURE: 97.52 F | HEART RATE: 100 BPM

## 2018-01-19 VITALS — OXYGEN SATURATION: 95 %

## 2018-01-19 VITALS — DIASTOLIC BLOOD PRESSURE: 40 MMHG | SYSTOLIC BLOOD PRESSURE: 80 MMHG | HEART RATE: 98 BPM

## 2018-01-19 VITALS — SYSTOLIC BLOOD PRESSURE: 78 MMHG | DIASTOLIC BLOOD PRESSURE: 43 MMHG | HEART RATE: 110 BPM

## 2018-01-19 VITALS — HEART RATE: 96 BPM | DIASTOLIC BLOOD PRESSURE: 52 MMHG | SYSTOLIC BLOOD PRESSURE: 87 MMHG

## 2018-01-19 VITALS — DIASTOLIC BLOOD PRESSURE: 55 MMHG | SYSTOLIC BLOOD PRESSURE: 100 MMHG | HEART RATE: 97 BPM

## 2018-01-19 VITALS
DIASTOLIC BLOOD PRESSURE: 53 MMHG | TEMPERATURE: 98.24 F | SYSTOLIC BLOOD PRESSURE: 83 MMHG | HEART RATE: 93 BPM | OXYGEN SATURATION: 90 %

## 2018-01-19 VITALS — DIASTOLIC BLOOD PRESSURE: 37 MMHG | HEART RATE: 100 BPM | SYSTOLIC BLOOD PRESSURE: 88 MMHG

## 2018-01-19 VITALS — SYSTOLIC BLOOD PRESSURE: 85 MMHG | DIASTOLIC BLOOD PRESSURE: 47 MMHG | HEART RATE: 94 BPM

## 2018-01-19 VITALS — HEART RATE: 100 BPM | SYSTOLIC BLOOD PRESSURE: 76 MMHG | DIASTOLIC BLOOD PRESSURE: 37 MMHG

## 2018-01-19 VITALS — HEART RATE: 100 BPM | DIASTOLIC BLOOD PRESSURE: 45 MMHG | SYSTOLIC BLOOD PRESSURE: 88 MMHG

## 2018-01-19 VITALS — SYSTOLIC BLOOD PRESSURE: 82 MMHG | HEART RATE: 97 BPM | DIASTOLIC BLOOD PRESSURE: 38 MMHG

## 2018-01-19 LAB
BASOPHILS # BLD: 0.02 K/UL (ref 0–0.2)
BASOPHILS NFR BLD: 0.3 %
BUN SERPL-MCNC: 73 MG/DL (ref 7–18)
CALCIUM SERPL-MCNC: 8.7 MG/DL (ref 8.5–10.1)
CO2 SERPL-SCNC: 22 MMOL/L (ref 21–32)
CREAT SERPL-MCNC: 7.31 MG/DL (ref 0.6–1.2)
EOS ABS #: 0.13 K/UL (ref 0–0.5)
EOSINOPHIL NFR BLD AUTO: 137 K/UL (ref 130–400)
GLUCOSE SERPL-MCNC: 138 MG/DL (ref 70–99)
HCT VFR BLD CALC: 29.3 % (ref 37–47)
HGB BLD-MCNC: 9 G/DL (ref 12–16)
IG#: 0.15 K/UL (ref 0–0.02)
IMM GRANULOCYTES NFR BLD AUTO: 9.3 %
LYMPHOCYTES # BLD: 0.7 K/UL (ref 1.2–3.4)
MCH RBC QN AUTO: 31.1 PG (ref 25–34)
MCHC RBC AUTO-ENTMCNC: 30.7 G/DL (ref 32–36)
MCV RBC AUTO: 101.4 FL (ref 80–100)
MONO ABS #: 0.79 K/UL (ref 0.11–0.59)
MONOCYTES NFR BLD: 10.5 %
NEUT ABS #: 5.76 K/UL (ref 1.4–6.5)
NEUTROPHILS # BLD AUTO: 1.7 %
NEUTROPHILS NFR BLD AUTO: 76.2 %
PHOSPHATE SERPL-MCNC: 7.8 MG/DL (ref 2.5–4.9)
PMV BLD AUTO: 10.1 FL (ref 7.4–10.4)
POTASSIUM SERPL-SCNC: 4.6 MMOL/L (ref 3.5–5.1)
RED CELL DISTRIBUTION WIDTH CV: 15.1 % (ref 11.5–14.5)
RED CELL DISTRIBUTION WIDTH SD: 55.9 FL (ref 36.4–46.3)
SODIUM SERPL-SCNC: 135 MMOL/L (ref 136–145)
WBC # BLD AUTO: 7.55 K/UL (ref 4.8–10.8)

## 2018-01-19 RX ADMIN — ALLOPURINOL SCH MG: 100 TABLET ORAL at 20:37

## 2018-01-19 RX ADMIN — RENAGEL SCH MG: 800 TABLET ORAL at 17:43

## 2018-01-19 RX ADMIN — DOCUSATE SODIUM SCH MG: 100 CAPSULE, LIQUID FILLED ORAL at 20:31

## 2018-01-19 RX ADMIN — SERTRALINE HYDROCHLORIDE SCH MG: 50 TABLET, FILM COATED ORAL at 11:16

## 2018-01-19 RX ADMIN — IPRATROPIUM BROMIDE AND ALBUTEROL SCH PUFFS: 20; 100 SPRAY, METERED RESPIRATORY (INHALATION) at 11:18

## 2018-01-19 RX ADMIN — PANTOPRAZOLE SCH MG: 40 TABLET, DELAYED RELEASE ORAL at 11:17

## 2018-01-19 RX ADMIN — ATORVASTATIN CALCIUM SCH MG: 40 TABLET, FILM COATED ORAL at 11:15

## 2018-01-19 RX ADMIN — ERTAPENEM SODIUM SCH MLS/HR: 1 INJECTION, POWDER, LYOPHILIZED, FOR SOLUTION INTRAMUSCULAR; INTRAVENOUS at 17:46

## 2018-01-19 RX ADMIN — ALLOPURINOL SCH MG: 100 TABLET ORAL at 11:15

## 2018-01-19 RX ADMIN — ASCORBIC ACID, THIAMINE MONONITRATE,RIBOFLAVIN, NIACINAMIDE, PYRIDOXINE HYDROCHLORIDE, FOLIC ACID, CYANOCOBALAMIN, BIOTIN, CALCIUM PANTOTHENATE, SCH CAP: 100; 1.5; 1.7; 20; 10; 1; 6000; 150000; 5 CAPSULE, LIQUID FILLED ORAL at 11:13

## 2018-01-19 RX ADMIN — ALUMINUM ZIRCONIUM TRICHLOROHYDREX GLY SCH EA: 0.2 STICK TOPICAL at 09:56

## 2018-01-19 RX ADMIN — Medication SCH MG: at 11:17

## 2018-01-19 RX ADMIN — ALUMINUM ZIRCONIUM TRICHLOROHYDREX GLY SCH EA: 0.2 STICK TOPICAL at 22:21

## 2018-01-19 RX ADMIN — INSULIN ASPART SCH UNITS: 100 INJECTION, SOLUTION INTRAVENOUS; SUBCUTANEOUS at 17:43

## 2018-01-19 RX ADMIN — IPRATROPIUM BROMIDE AND ALBUTEROL SCH PUFFS: 20; 100 SPRAY, METERED RESPIRATORY (INHALATION) at 17:43

## 2018-01-19 RX ADMIN — CLOPIDOGREL BISULFATE SCH MG: 75 TABLET, FILM COATED ORAL at 11:15

## 2018-01-19 RX ADMIN — ALBUMIN HUMAN SCH GM: 250 SOLUTION INTRAVENOUS at 08:03

## 2018-01-19 RX ADMIN — IPRATROPIUM BROMIDE AND ALBUTEROL SCH PUFFS: 20; 100 SPRAY, METERED RESPIRATORY (INHALATION) at 20:36

## 2018-01-19 RX ADMIN — ALBUMIN HUMAN SCH GM: 250 SOLUTION INTRAVENOUS at 09:00

## 2018-01-19 RX ADMIN — Medication SCH MG: at 11:15

## 2018-01-19 RX ADMIN — RENAGEL SCH MG: 800 TABLET ORAL at 07:30

## 2018-01-19 RX ADMIN — RENAGEL SCH MG: 800 TABLET ORAL at 11:16

## 2018-01-19 RX ADMIN — INSULIN ASPART SCH UNITS: 100 INJECTION, SOLUTION INTRAVENOUS; SUBCUTANEOUS at 07:00

## 2018-01-19 RX ADMIN — GABAPENTIN SCH MG: 100 CAPSULE ORAL at 20:38

## 2018-01-19 RX ADMIN — HEPARIN SODIUM SCH UNIT: 10000 INJECTION, SOLUTION INTRAVENOUS; SUBCUTANEOUS at 20:39

## 2018-01-19 RX ADMIN — ALUMINUM ZIRCONIUM TRICHLOROHYDREX GLY SCH EA: 0.2 STICK TOPICAL at 09:57

## 2018-01-19 RX ADMIN — TRAMADOL HYDROCHLORIDE PRN MG: 50 TABLET, COATED ORAL at 20:40

## 2018-01-19 RX ADMIN — ALUMINUM ZIRCONIUM TRICHLOROHYDREX GLY SCH EA: 0.2 STICK TOPICAL at 00:00

## 2018-01-19 RX ADMIN — HEPARIN SODIUM SCH UNIT: 10000 INJECTION, SOLUTION INTRAVENOUS; SUBCUTANEOUS at 06:24

## 2018-01-19 RX ADMIN — ONDANSETRON PRN MG: 2 INJECTION INTRAMUSCULAR; INTRAVENOUS at 12:15

## 2018-01-19 RX ADMIN — INSULIN ASPART SCH UNITS: 100 INJECTION, SOLUTION INTRAVENOUS; SUBCUTANEOUS at 12:41

## 2018-01-19 RX ADMIN — INSULIN ASPART SCH UNITS: 100 INJECTION, SOLUTION INTRAVENOUS; SUBCUTANEOUS at 20:32

## 2018-01-19 RX ADMIN — DOCUSATE SODIUM SCH MG: 100 CAPSULE, LIQUID FILLED ORAL at 09:00

## 2018-01-19 RX ADMIN — ALUMINUM ZIRCONIUM TRICHLOROHYDREX GLY SCH EA: 0.2 STICK TOPICAL at 14:44

## 2018-01-19 RX ADMIN — HEPARIN SODIUM SCH UNIT: 10000 INJECTION, SOLUTION INTRAVENOUS; SUBCUTANEOUS at 14:43

## 2018-01-19 RX ADMIN — PRAMIPEXOLE DIHYDROCHLORIDE SCH MG: 0.25 TABLET ORAL at 20:37

## 2018-01-19 RX ADMIN — IPRATROPIUM BROMIDE AND ALBUTEROL SCH PUFFS: 20; 100 SPRAY, METERED RESPIRATORY (INHALATION) at 12:42

## 2018-01-19 NOTE — NEPHROLOGY PROGRESS NOTE
Nephrology Progress Note


Date of Service:


Jan 19, 2018.


Subjective


56 yo female seen for ESRD follow up who has a uti, abdominal pain, decreased 

urination, hypotension. pt doing better. abdominal pain has improved. bp is 

better. however, no longer urinating.





Objective











  Date Time  Temp Pulse Resp B/P (MAP) Pulse Ox O2 Delivery O2 Flow Rate FiO2


 


1/19/18 04:00      Nasal Cannula 2.0 


 


1/19/18 02:56 36.4 97 22 90/56 (67) 97 Nasal Cannula 2.0 


 


1/18/18 23:59      Nasal Cannula 2.0 


 


1/18/18 22:55 36.5 99 20 96/65 (75) 97 Nasal Cannula 2.0 


 


1/18/18 20:24 36.4 92 18 103/58 (73) 97 Nasal Cannula  


 


1/18/18 20:00      Nasal Cannula 2.0 


 


1/18/18 16:00     95 Nasal Cannula 2.0 


 


1/18/18 15:59 36.3 96 18 99/66 (77) 97 Nasal Cannula  


 


1/18/18 12:00     95 Nasal Cannula 2.0 


 


1/18/18 11:45 36.4 98 16 102/63 (76) 95 Nasal Cannula 2.0 


 


1/18/18 11:00  93 22 117/71 (86)    


 


1/18/18 10:30  91 24 106/64 (78)    


 


1/18/18 09:30  93 25 103/72 (82) 97   


 


1/18/18 09:16 37.2 96 18 88/58 (68) 95 Nasal Cannula 2.0 


 


1/18/18 09:00  95 21 88/58 (68)    


 


1/18/18 08:30  100 25 90/61 (71) 95   


 


1/18/18 08:01  103 28 94/80 (85) 97   


 


1/18/18 08:00  106 17  92   


 


1/18/18 08:00      Room Air  


 


1/18/18 07:43  98 22 113/72 (86) 95   


 


1/18/18 07:31  103  90/65 (73) 85   


 


1/18/18 07:30      Nasal Cannula  


 


1/18/18 07:30 37.2 93 18 90/65 (73) 95 Nasal Cannula 2.0 


 


1/18/18 07:30      Nasal Cannula 2.0 








Physical Exam:


General-aaox3, obese


Eyes-no scleral icterus


ENT-mmm


Neck-supple


Lungs-+ rhonchi/wheeze


Heart-regular


Abdomen-no abdominal pain


Extremities-no c/c/e


Neuro-nonfocal





Current Inpatient Medications








 Medications


  (Trade)  Dose


 Ordered  Sig/Daniel


 Route  Start Time


 Stop Time Status Last Admin


Dose Admin


 


 Acetaminophen


  (Tylenol Tab)  650 mg  Q4H  PRN


 PO  1/16/18 02:30


 2/15/18 02:29  1/18/18 14:57


650 MG


 


 Allopurinol


  (Zyloprim Tab)  100 mg  BID


 PO  1/16/18 09:00


 2/15/18 08:59  1/18/18 21:22


100 MG


 


 Aspirin


  (Ecotrin Tab)  81 mg  QAM


 PO  1/16/18 09:00


 2/15/18 08:59  1/18/18 08:34


81 MG


 


 Atorvastatin


 Calcium


  (Lipitor Tab)  40 mg  QAM


 PO  1/16/18 09:00


 2/15/18 08:59  1/18/18 08:34


40 MG


 


 Docusate Sodium


  (coLACE CAP)  100 mg  BID


 PO  1/16/18 09:00


 2/15/18 08:59  1/18/18 21:20


100 MG


 


 Albuterol/


 Ipratropium


  (Combivent


 Respimat Inh)  1 puffs  QID


 INH  1/16/18 09:00


 2/15/18 08:59  1/18/18 21:20


1 PUFFS


 


 Pantoprazole


 Sodium


  (Protonix Tab)  40 mg  DAILY


 PO  1/16/18 09:00


 2/15/18 08:59  1/18/18 08:34


40 MG


 


 Pramipexole


 Dihydrochloride


  (miraPEX TAB)  0.25 mg  HS


 PO  1/16/18 21:00


 2/15/18 20:59  1/18/18 21:22


0.25 MG


 


 Sertraline HCl


  (Zoloft Tab)  75 mg  DAILY


 PO  1/16/18 09:00


 2/15/18 08:59  1/18/18 08:33


75 MG


 


 Thiamine HCl


  (Vitamin B-1 Tab)  50 mg  DAILY


 PO  1/16/18 09:00


 2/15/18 08:59  1/18/18 08:24


50 MG


 


 Tramadol HCl


  (Ultram Tab)  50 mg  Q4H  PRN


 PO  1/16/18 02:45


 2/15/18 02:44  1/18/18 23:09


50 MG


 


 Vitamin B Complex/


 Vit C/Folic Acid


  (Nephrocaps)  1 cap  DAILY


 PO  1/16/18 09:00


 2/15/18 08:59  1/18/18 08:34


1 CAP


 


 Miscellaneous


 Information


  (Order Awaiting


 Action)  1 ea  QS


 N/A  1/16/18 08:00


 2/15/18 07:59  1/16/18 08:21


1 EA


 


 Miscellaneous


 Information


  (Order Awaiting


 Action)  1 ea  QS


 N/A  1/16/18 08:00


 2/15/18 07:59  1/16/18 08:21


1 EA


 


 Gabapentin


  (Neurontin Cap)  200 mg  HS


 PO  1/16/18 21:00


 2/15/18 20:59  1/18/18 21:24


200 MG


 


 Miscellaneous


 Information


  (Consult


 Glycemic


 Management


 Pharmacy)  1 ea  UD  PRN


 N/A  1/16/18 04:15


 2/15/18 04:14   


 


 


 Glucose


  (Glucose 40% Gel)  15-30


 GRAMS 15


 GRAMS...  UD  PRN


 PO  1/16/18 04:30


 2/15/18 04:29   


 


 


 Glucose


  (Glucose Chew


 Tab)  4-8


 Tablets 4


 Tabl...  UD  PRN


 PO  1/16/18 04:30


 2/15/18 04:29   


 


 


 Dextrose


  (Dextrose 50%


 50ML Syringe)  25-50ML OF


 50% DW IV


 FOR...  UD  PRN


 IV  1/16/18 04:30


 2/15/18 04:29  1/17/18 09:52


25 ML


 


 Glucagon


  (Glucagon Inj)  1 mg  UD  PRN


 SQ  1/16/18 04:30


 2/15/18 04:29   


 


 


 Norepinephrine


 Bitartrate 8 mg/


 Dextrose  508 ml @ 0


 mls/hr  Q0M PRN


 IV  1/16/18 10:45


 2/15/18 10:44  1/16/18 11:10


7.5 MLS/HR


 


 Ondansetron HCl


  (Zofran Inj)  4 mg  Q4H  PRN


 IV  1/16/18 18:15


 2/15/18 18:14  1/18/18 13:06


4 MG


 


 Clopidogrel


 Bisulfate


  (plAVix TAB)  75 mg  DAILY


 PO  1/17/18 09:00


 2/16/18 08:59  1/18/18 08:34


75 MG


 


 Heparin Sodium


  (Porcine)


  (Heparin Sq 5000


 Unit/0.5ml)  5,000 unit  Q8


 SQ  1/17/18 15:00


 2/16/18 14:59  1/19/18 06:24


5,000 UNIT


 


 Insulin Aspart


  (novoLOG ASPART)  **SLIDING


 SCALE**


 **G...  ACHS


 SC  1/17/18 12:00


 2/16/18 11:59  1/18/18 18:00


5 UNITS


 


 Sevelamer HCl


  (Renagel Tab)  1,600 mg  TIDM


 PO  1/17/18 16:30


 2/16/18 16:29  1/18/18 16:49


1,600 MG


 


 Ertapenem 500 mg/


 Sodium Chloride  55 ml @ 


 110 mls/hr  DAILY@1800


 IV  1/18/18 18:00


 1/28/18 17:59  1/18/18 18:39


110 MLS/HR


 


 Epoetin Geovany


  (Procrit Inj)  10,000 units  TODAY@1000


 IV.  1/19/18 10:00


 1/19/18 16:00   


 


 


 Albumin Human


  (Albumin 25%)  25 gm  ONE  ONCE


 IV  1/19/18 10:00


 1/19/18 10:01   


 











Last 24 Hours








Test


  1/18/18


11:40 1/18/18


16:15 1/18/18


20:42 1/19/18


05:07


 


Bedside Glucose 170 mg/dl  151 mg/dl  92 mg/dl  


 


White Blood Count    7.55 K/uL 


 


Red Blood Count    2.89 M/uL 


 


Hemoglobin    9.0 g/dL 


 


Hematocrit    29.3 % 


 


Mean Corpuscular Volume    101.4 fL 


 


Mean Corpuscular Hemoglobin    31.1 pg 


 


Mean Corpuscular Hemoglobin


Concent 


  


  


  30.7 g/dl 


 


 


Platelet Count    137 K/uL 


 


Mean Platelet Volume    10.1 fL 


 


Neutrophils (%) (Auto)    76.2 % 


 


Lymphocytes (%) (Auto)    9.3 % 


 


Monocytes (%) (Auto)    10.5 % 


 


Eosinophils (%) (Auto)    1.7 % 


 


Basophils (%) (Auto)    0.3 % 


 


Neutrophils # (Auto)    5.76 K/uL 


 


Lymphocytes # (Auto)    0.70 K/uL 


 


Monocytes # (Auto)    0.79 K/uL 


 


Eosinophils # (Auto)    0.13 K/uL 


 


Basophils # (Auto)    0.02 K/uL 


 


RDW Standard Deviation    55.9 fL 


 


RDW Coefficient of Variation    15.1 % 


 


Immature Granulocyte % (Auto)    2.0 % 


 


Immature Granulocyte # (Auto)    0.15 K/uL 


 


Sodium Level    135 mmol/L 


 


Potassium Level    4.6 mmol/L 


 


Chloride Level    100 mmol/L 


 


Carbon Dioxide Level    22 mmol/L 


 


Anion Gap    14.0 mmol/L 


 


Blood Urea Nitrogen    73 mg/dl 


 


Creatinine    7.31 mg/dl 


 


Est Creatinine Clear Calc


Drug Dose 


  


  


  11.1 ml/min 


 


 


Estimated GFR (


American) 


  


  


  6.4 


 


 


Estimated GFR (Non-


American 


  


  


  5.6 


 


 


BUN/Creatinine Ratio    9.9 


 


Random Glucose    138 mg/dl 


 


Calcium Level    8.7 mg/dl 


 


Phosphorus Level    7.8 mg/dl 


 


Magnesium Level    2.1 mg/dl 


 


Test


  1/19/18


06:51 


  


  


 


 


Bedside Glucose 118 mg/dl    











Assessment & Plan


ESRD-bp is better. about to go on dialysis this morning. plan on 2k bath with 2 

liter uf.

## 2018-01-19 NOTE — PHARMACY PROGRESS NOTE
Glycemic Control Progress Note


Date of Service


Jan 19, 2018.





Scope


Glycemic Pharmacist consulted for glycemic control to write orders per [facility] 

inpatient glycemic control protocol.





Objective


Accuchecks BSG (last 24hrs):











Test


  1/18/18


11:40 1/18/18


16:15 1/18/18


20:42 1/19/18


05:07


 


Bedside Glucose


  170 mg/dl


(70-90) 151 mg/dl


(70-90) 92 mg/dl


(70-90) 


 


 


Random Glucose


  


  


  


  138 mg/dl


(70-99)


 


Test


  1/19/18


06:51 


  


  


 


 


Bedside Glucose


  118 mg/dl


(70-90) 


  


  


 








HbA1c:











Test


  1/17/18


05:34


 


Hemoglobin A1c


  6.5 %


(4.5-5.6)  H











Recent Pertinent Medications








The patient is currently receiving:


* Basal insulin:             None currently





* Correctional Insulin:   Novolog Correction per scale ACHS


                          Goal Range: Low 120 mg/dL - High 150 mg/dL


                          Correction Factor: 20 mg/dL/unit





* Prandial insulin:         Per carb ratio of 1 unit per 8 grams CHO consumed








* Oral Agents:             None currently





Outpatient Anti-Diabetic Meds


NPH 40 units SQ daily when taking prednisone (otherwise no insulin taken)





Assessment & Plan


ASSESSMENT:





1/19/18


* All BSGs at goal over the last 24 hours


* Fasting  this AM with no basal insulin on board.  Of note, this 

patient usually requires little to no basal insulin when not receiving steroids.


* Post-prandial BSGs well controlled with current CR and CF


* HD is planned for today - insulin sensitivity may improve as a result








PLAN FOR INPATIENT GLYCEMIC CONTROL:


* No basal insulin at this time


* Continuing correction factor of 20 mg/dl/unit


* Continuing carb ratio of 1 unit per 8 grams CHO consumed


* Continuing goal range of Low 120 mg/dL - High 150 mg/dL








* Please note that the plan above was derived based on current level of insulin 

resistance and hospital stress. These recommendations are appropriate for 

inpatient admission only. Plan of care upon discharge will need to be 

reassessed to avoid potential outpatient hypo/hyperglycemia. 








Thank you.

## 2018-01-19 NOTE — PROGRESS NOTE
Medicine Progress Note


Date & Time of Visit:


Jan 19, 2018 at 14:27.


Subjective


Pt was seen and examined


Sitting in chair with no distress


Pt said that her abdominal pain slightly improves


She said that strength seems to improve


Denies any chest pain, palpitation, dizziness





Objective





Last 8 Hrs








  Date Time  Temp Pulse Resp B/P (MAP) Pulse Ox O2 Delivery O2 Flow Rate FiO2


 


1/19/18 15:38 36.6 105 20 81/51 (61) 93 Room Air  


 


1/19/18 11:54 36.3 102 18 91/58 (69) 97 Nasal Cannula  


 


1/19/18 10:45 36.4 100  88/45 (59)    


 


1/19/18 10:44  100  88/45    


 


1/19/18 10:32  105  88/46    








Physical Exam:


General-complaint of abdominal pain


Head-  atraumatic


Eyes- PERRL, EOMI


ENT- oropharynx clear


Neck- supple, no JVD


Lungs- +crackles


Heart- regular rhythm


Abdomen- normal bowel sounds, +tenderness


Extremities-No calf tenderness


Neuro- alert, oriented x 3; PERRL, EOMI;


Skin- warm & dry


Laboratory Results:





Last 24 Hours








Test


  1/18/18


20:42 1/19/18


05:07 1/19/18


06:51 1/19/18


11:09


 


Bedside Glucose 92 mg/dl   118 mg/dl  94 mg/dl 


 


White Blood Count  7.55 K/uL   


 


Red Blood Count  2.89 M/uL   


 


Hemoglobin  9.0 g/dL   


 


Hematocrit  29.3 %   


 


Mean Corpuscular Volume  101.4 fL   


 


Mean Corpuscular Hemoglobin  31.1 pg   


 


Mean Corpuscular Hemoglobin


Concent 


  30.7 g/dl 


  


  


 


 


Platelet Count  137 K/uL   


 


Mean Platelet Volume  10.1 fL   


 


Neutrophils (%) (Auto)  76.2 %   


 


Lymphocytes (%) (Auto)  9.3 %   


 


Monocytes (%) (Auto)  10.5 %   


 


Eosinophils (%) (Auto)  1.7 %   


 


Basophils (%) (Auto)  0.3 %   


 


Neutrophils # (Auto)  5.76 K/uL   


 


Lymphocytes # (Auto)  0.70 K/uL   


 


Monocytes # (Auto)  0.79 K/uL   


 


Eosinophils # (Auto)  0.13 K/uL   


 


Basophils # (Auto)  0.02 K/uL   


 


RDW Standard Deviation  55.9 fL   


 


RDW Coefficient of Variation  15.1 %   


 


Immature Granulocyte % (Auto)  2.0 %   


 


Immature Granulocyte # (Auto)  0.15 K/uL   


 


Sodium Level  135 mmol/L   


 


Potassium Level  4.6 mmol/L   


 


Chloride Level  100 mmol/L   


 


Carbon Dioxide Level  22 mmol/L   


 


Anion Gap  14.0 mmol/L   


 


Blood Urea Nitrogen  73 mg/dl   


 


Creatinine  7.31 mg/dl   


 


Est Creatinine Clear Calc


Drug Dose 


  11.1 ml/min 


  


  


 


 


Estimated GFR (


American) 


  6.4 


  


  


 


 


Estimated GFR (Non-


American 


  5.6 


  


  


 


 


BUN/Creatinine Ratio  9.9   


 


Random Glucose  138 mg/dl   


 


Calcium Level  8.7 mg/dl   


 


Phosphorus Level  7.8 mg/dl   


 


Magnesium Level  2.1 mg/dl   


 


Test


  1/19/18


16:28 


  


  


 


 


Bedside Glucose 151 mg/dl    











Assessment & Plan


Hypotension


Possible related to sepsis


Was transferred from [facility] to [facility] ICU


Elevated lactic acid and procalcitonin


CXR showed mild pulmonary edema which has improved since exam of January 1, 2018.Mild bibasilar opacities which favor atelectasis.


Influenza negative


BP remains low on Mildodrine


On empirical abx with Vanco and Zosyn


Blood cx and urine cx pending


Repeat lactic acid


Continue monitor in ICU 


1/19


BP on the low side 


had HD this morning


Urine cx positive for ESBL


Blood cx no growth


Abx changed to Ertapenem


will consult ID for abx duration on discharge


Continue monitor in tele








ESRD on HD


had HD done today and less than 2 L fluid removed due to hypotension


Continue monitor BMP


nephrology on board





Abdominal Pain


Mostly related from the fall she had on Monday


KUB showed prominent loop of small bowel within the left mid abdomen with no 

convincing evidence for small bowel obstruction. 


Ok with nephrology to get CT abd with contrast if pain worsening


Pain improved with tramadol


Clinically improved





Obesity


Counseling on diet and exercise





DM type II


Elevated BS


Check Hba1c in am


On insulin coverage and Lantus





Chronic Anemia 


Hgb 9 today


No sign of bleeding


Continue monitor CBC





CHF


CXR showed mild pulmonary edema which has improved since exam of January 1, 2018.


Saturated well on RA


Will monitor for signs of CHF





DVT px on heparin subq


Consultants:


Intensivist


Nephro


Current Inpatient Medications:





Current Inpatient Medications








 Medications


  (Trade)  Dose


 Ordered  Sig/Daniel


 Route  Start Time


 Stop Time Status Last Admin


Dose Admin


 


 Acetaminophen


  (Tylenol Tab)  650 mg  Q4H  PRN


 PO  1/16/18 02:30


 2/15/18 02:29  1/18/18 14:57


650 MG


 


 Allopurinol


  (Zyloprim Tab)  100 mg  BID


 PO  1/16/18 09:00


 2/15/18 08:59  1/19/18 11:15


100 MG


 


 Aspirin


  (Ecotrin Tab)  81 mg  QAM


 PO  1/16/18 09:00


 2/15/18 08:59  1/19/18 11:15


81 MG


 


 Atorvastatin


 Calcium


  (Lipitor Tab)  40 mg  QAM


 PO  1/16/18 09:00


 2/15/18 08:59  1/19/18 11:15


40 MG


 


 Docusate Sodium


  (coLACE CAP)  100 mg  BID


 PO  1/16/18 09:00


 2/15/18 08:59  1/18/18 21:20


100 MG


 


 Albuterol/


 Ipratropium


  (Combivent


 Respimat Inh)  1 puffs  QID


 INH  1/16/18 09:00


 2/15/18 08:59  1/19/18 17:43


1 PUFFS


 


 Pantoprazole


 Sodium


  (Protonix Tab)  40 mg  DAILY


 PO  1/16/18 09:00


 2/15/18 08:59  1/19/18 11:17


40 MG


 


 Pramipexole


 Dihydrochloride


  (miraPEX TAB)  0.25 mg  HS


 PO  1/16/18 21:00


 2/15/18 20:59  1/18/18 21:22


0.25 MG


 


 Sertraline HCl


  (Zoloft Tab)  75 mg  DAILY


 PO  1/16/18 09:00


 2/15/18 08:59  1/19/18 11:16


75 MG


 


 Thiamine HCl


  (Vitamin B-1 Tab)  50 mg  DAILY


 PO  1/16/18 09:00


 2/15/18 08:59  1/19/18 11:17


50 MG


 


 Tramadol HCl


  (Ultram Tab)  50 mg  Q4H  PRN


 PO  1/16/18 02:45


 2/15/18 02:44  1/18/18 23:09


50 MG


 


 Vitamin B Complex/


 Vit C/Folic Acid


  (Nephrocaps)  1 cap  DAILY


 PO  1/16/18 09:00


 2/15/18 08:59  1/19/18 11:13


1 CAP


 


 Miscellaneous


 Information


  (Order Awaiting


 Action)  1 ea  QS


 N/A  1/16/18 08:00


 2/15/18 07:59  1/16/18 08:21


1 EA


 


 Miscellaneous


 Information


  (Order Awaiting


 Action)  1 ea  QS


 N/A  1/16/18 08:00


 2/15/18 07:59  1/16/18 08:21


1 EA


 


 Gabapentin


  (Neurontin Cap)  200 mg  HS


 PO  1/16/18 21:00


 2/15/18 20:59  1/18/18 21:24


200 MG


 


 Miscellaneous


 Information


  (Consult


 Glycemic


 Management


 Pharmacy)  1 ea  UD  PRN


 N/A  1/16/18 04:15


 2/15/18 04:14   


 


 


 Glucose


  (Glucose 40% Gel)  15-30


 GRAMS 15


 GRAMS...  UD  PRN


 PO  1/16/18 04:30


 2/15/18 04:29   


 


 


 Glucose


  (Glucose Chew


 Tab)  4-8


 Tablets 4


 Tabl...  UD  PRN


 PO  1/16/18 04:30


 2/15/18 04:29   


 


 


 Dextrose


  (Dextrose 50%


 50ML Syringe)  25-50ML OF


 50% DW IV


 FOR...  UD  PRN


 IV  1/16/18 04:30


 2/15/18 04:29  1/17/18 09:52


25 ML


 


 Glucagon


  (Glucagon Inj)  1 mg  UD  PRN


 SQ  1/16/18 04:30


 2/15/18 04:29   


 


 


 Norepinephrine


 Bitartrate 8 mg/


 Dextrose  508 ml @ 0


 mls/hr  Q0M PRN


 IV  1/16/18 10:45


 2/15/18 10:44  1/16/18 11:10


7.5 MLS/HR


 


 Ondansetron HCl


  (Zofran Inj)  4 mg  Q4H  PRN


 IV  1/16/18 18:15


 2/15/18 18:14  1/19/18 12:15


4 MG


 


 Clopidogrel


 Bisulfate


  (plAVix TAB)  75 mg  DAILY


 PO  1/17/18 09:00


 2/16/18 08:59  1/19/18 11:15


75 MG


 


 Heparin Sodium


  (Porcine)


  (Heparin Sq 5000


 Unit/0.5ml)  5,000 unit  Q8


 SQ  1/17/18 15:00


 2/16/18 14:59  1/19/18 14:43


5,000 UNIT


 


 Insulin Aspart


  (novoLOG ASPART)  **SLIDING


 SCALE**


 **G...  ACHS


 SC  1/17/18 12:00


 2/16/18 11:59  1/19/18 17:43


6 UNITS


 


 Sevelamer HCl


  (Renagel Tab)  1,600 mg  TIDM


 PO  1/17/18 16:30


 2/16/18 16:29  1/19/18 17:43


1,600 MG


 


 Ertapenem 500 mg/


 Sodium Chloride  55 ml @ 


 110 mls/hr  DAILY@1800


 IV  1/18/18 18:00


 1/28/18 17:59  1/19/18 17:46


110 MLS/HR

## 2018-01-20 VITALS
DIASTOLIC BLOOD PRESSURE: 61 MMHG | OXYGEN SATURATION: 93 % | HEART RATE: 103 BPM | TEMPERATURE: 97.7 F | SYSTOLIC BLOOD PRESSURE: 93 MMHG

## 2018-01-20 VITALS
DIASTOLIC BLOOD PRESSURE: 66 MMHG | HEART RATE: 75 BPM | OXYGEN SATURATION: 100 % | SYSTOLIC BLOOD PRESSURE: 100 MMHG | TEMPERATURE: 98.6 F

## 2018-01-20 VITALS — OXYGEN SATURATION: 93 % | TEMPERATURE: 98.42 F | HEART RATE: 99 BPM

## 2018-01-20 VITALS
DIASTOLIC BLOOD PRESSURE: 66 MMHG | TEMPERATURE: 97.52 F | HEART RATE: 99 BPM | OXYGEN SATURATION: 100 % | SYSTOLIC BLOOD PRESSURE: 98 MMHG

## 2018-01-20 VITALS
SYSTOLIC BLOOD PRESSURE: 93 MMHG | OXYGEN SATURATION: 99 % | HEART RATE: 92 BPM | TEMPERATURE: 97.34 F | DIASTOLIC BLOOD PRESSURE: 59 MMHG

## 2018-01-20 VITALS
OXYGEN SATURATION: 96 % | SYSTOLIC BLOOD PRESSURE: 71 MMHG | DIASTOLIC BLOOD PRESSURE: 46 MMHG | HEART RATE: 117 BPM | TEMPERATURE: 97.7 F

## 2018-01-20 VITALS
OXYGEN SATURATION: 96 % | SYSTOLIC BLOOD PRESSURE: 78 MMHG | HEART RATE: 95 BPM | TEMPERATURE: 98.06 F | DIASTOLIC BLOOD PRESSURE: 47 MMHG

## 2018-01-20 VITALS — DIASTOLIC BLOOD PRESSURE: 46 MMHG | SYSTOLIC BLOOD PRESSURE: 70 MMHG

## 2018-01-20 VITALS — DIASTOLIC BLOOD PRESSURE: 59 MMHG | HEART RATE: 77 BPM | SYSTOLIC BLOOD PRESSURE: 93 MMHG

## 2018-01-20 LAB
BASOPHILS # BLD: 0.01 K/UL (ref 0–0.2)
BASOPHILS NFR BLD: 0.1 %
BUN SERPL-MCNC: 45 MG/DL (ref 7–18)
CALCIUM SERPL-MCNC: 7.7 MG/DL (ref 8.5–10.1)
CO2 SERPL-SCNC: 25 MMOL/L (ref 21–32)
CREAT SERPL-MCNC: 5.7 MG/DL (ref 0.6–1.2)
EOS ABS #: 0.16 K/UL (ref 0–0.5)
EOSINOPHIL NFR BLD AUTO: 119 K/UL (ref 130–400)
GLUCOSE SERPL-MCNC: 120 MG/DL (ref 70–99)
HCT VFR BLD CALC: 25.7 % (ref 37–47)
HCT VFR BLD CALC: 27.5 % (ref 37–47)
HGB BLD-MCNC: 7.9 G/DL (ref 12–16)
HGB BLD-MCNC: 8.4 G/DL (ref 12–16)
IG#: 0.3 K/UL (ref 0–0.02)
IMM GRANULOCYTES NFR BLD AUTO: 10.6 %
LYMPHOCYTES # BLD: 0.83 K/UL (ref 1.2–3.4)
MCH RBC QN AUTO: 31.3 PG (ref 25–34)
MCHC RBC AUTO-ENTMCNC: 30.7 G/DL (ref 32–36)
MCV RBC AUTO: 102 FL (ref 80–100)
MONO ABS #: 0.6 K/UL (ref 0.11–0.59)
MONOCYTES NFR BLD: 7.6 %
NEUT ABS #: 5.95 K/UL (ref 1.4–6.5)
NEUTROPHILS # BLD AUTO: 2 %
NEUTROPHILS NFR BLD AUTO: 75.9 %
NRBC BLD AUTO-RTO: 0.2 %
NUCLEATED RED BLOOD CELL ABS: 0.02 K/UL (ref 0–0)
PHOSPHATE SERPL-MCNC: 4.9 MG/DL (ref 2.5–4.9)
PMV BLD AUTO: 9.7 FL (ref 7.4–10.4)
POTASSIUM SERPL-SCNC: 3.5 MMOL/L (ref 3.5–5.1)
RED CELL DISTRIBUTION WIDTH CV: 15.2 % (ref 11.5–14.5)
RED CELL DISTRIBUTION WIDTH SD: 56.8 FL (ref 36.4–46.3)
SODIUM SERPL-SCNC: 134 MMOL/L (ref 136–145)
WBC # BLD AUTO: 7.85 K/UL (ref 4.8–10.8)

## 2018-01-20 RX ADMIN — IPRATROPIUM BROMIDE AND ALBUTEROL SCH PUFFS: 20; 100 SPRAY, METERED RESPIRATORY (INHALATION) at 17:00

## 2018-01-20 RX ADMIN — VANCOMYCIN HYDROCHLORIDE SCH MG: 1 INJECTION, POWDER, LYOPHILIZED, FOR SOLUTION INTRAVENOUS at 21:44

## 2018-01-20 RX ADMIN — GABAPENTIN SCH MG: 100 CAPSULE ORAL at 21:45

## 2018-01-20 RX ADMIN — DOCUSATE SODIUM SCH MG: 100 CAPSULE, LIQUID FILLED ORAL at 21:00

## 2018-01-20 RX ADMIN — ERTAPENEM SODIUM SCH MLS/HR: 1 INJECTION, POWDER, LYOPHILIZED, FOR SOLUTION INTRAMUSCULAR; INTRAVENOUS at 18:34

## 2018-01-20 RX ADMIN — RENAGEL SCH MG: 800 TABLET ORAL at 12:31

## 2018-01-20 RX ADMIN — Medication SCH MG: at 10:13

## 2018-01-20 RX ADMIN — CLOPIDOGREL BISULFATE SCH MG: 75 TABLET, FILM COATED ORAL at 10:14

## 2018-01-20 RX ADMIN — PRAMIPEXOLE DIHYDROCHLORIDE SCH MG: 0.25 TABLET ORAL at 21:44

## 2018-01-20 RX ADMIN — ALLOPURINOL SCH MG: 100 TABLET ORAL at 10:13

## 2018-01-20 RX ADMIN — ALUMINUM ZIRCONIUM TRICHLOROHYDREX GLY SCH EA: 0.2 STICK TOPICAL at 08:00

## 2018-01-20 RX ADMIN — ALBUMIN HUMAN SCH GM: 250 SOLUTION INTRAVENOUS at 01:56

## 2018-01-20 RX ADMIN — ALUMINUM ZIRCONIUM TRICHLOROHYDREX GLY SCH EA: 0.2 STICK TOPICAL at 16:00

## 2018-01-20 RX ADMIN — INSULIN ASPART SCH UNITS: 100 INJECTION, SOLUTION INTRAVENOUS; SUBCUTANEOUS at 21:49

## 2018-01-20 RX ADMIN — ALBUMIN HUMAN SCH GM: 250 SOLUTION INTRAVENOUS at 01:46

## 2018-01-20 RX ADMIN — ALLOPURINOL SCH MG: 100 TABLET ORAL at 21:44

## 2018-01-20 RX ADMIN — Medication SCH MG: at 10:14

## 2018-01-20 RX ADMIN — DOCUSATE SODIUM SCH MG: 100 CAPSULE, LIQUID FILLED ORAL at 09:00

## 2018-01-20 RX ADMIN — RENAGEL SCH MG: 800 TABLET ORAL at 16:45

## 2018-01-20 RX ADMIN — Medication SCH ML: at 21:44

## 2018-01-20 RX ADMIN — HEPARIN SODIUM SCH UNIT: 10000 INJECTION, SOLUTION INTRAVENOUS; SUBCUTANEOUS at 14:49

## 2018-01-20 RX ADMIN — SERTRALINE HYDROCHLORIDE SCH MG: 50 TABLET, FILM COATED ORAL at 10:14

## 2018-01-20 RX ADMIN — ASCORBIC ACID, THIAMINE MONONITRATE,RIBOFLAVIN, NIACINAMIDE, PYRIDOXINE HYDROCHLORIDE, FOLIC ACID, CYANOCOBALAMIN, BIOTIN, CALCIUM PANTOTHENATE, SCH CAP: 100; 1.5; 1.7; 20; 10; 1; 6000; 150000; 5 CAPSULE, LIQUID FILLED ORAL at 10:14

## 2018-01-20 RX ADMIN — IPRATROPIUM BROMIDE AND ALBUTEROL SCH PUFFS: 20; 100 SPRAY, METERED RESPIRATORY (INHALATION) at 12:31

## 2018-01-20 RX ADMIN — INSULIN ASPART SCH UNITS: 100 INJECTION, SOLUTION INTRAVENOUS; SUBCUTANEOUS at 12:33

## 2018-01-20 RX ADMIN — RENAGEL SCH MG: 800 TABLET ORAL at 07:30

## 2018-01-20 RX ADMIN — INSULIN ASPART SCH UNITS: 100 INJECTION, SOLUTION INTRAVENOUS; SUBCUTANEOUS at 10:17

## 2018-01-20 RX ADMIN — IPRATROPIUM BROMIDE AND ALBUTEROL SCH PUFFS: 20; 100 SPRAY, METERED RESPIRATORY (INHALATION) at 10:13

## 2018-01-20 RX ADMIN — INSULIN ASPART SCH UNITS: 100 INJECTION, SOLUTION INTRAVENOUS; SUBCUTANEOUS at 18:30

## 2018-01-20 RX ADMIN — HEPARIN SODIUM SCH UNIT: 10000 INJECTION, SOLUTION INTRAVENOUS; SUBCUTANEOUS at 06:39

## 2018-01-20 RX ADMIN — PANTOPRAZOLE SCH MG: 40 TABLET, DELAYED RELEASE ORAL at 10:13

## 2018-01-20 RX ADMIN — HEPARIN SODIUM SCH UNIT: 10000 INJECTION, SOLUTION INTRAVENOUS; SUBCUTANEOUS at 21:50

## 2018-01-20 RX ADMIN — ATORVASTATIN CALCIUM SCH MG: 40 TABLET, FILM COATED ORAL at 10:14

## 2018-01-20 RX ADMIN — IPRATROPIUM BROMIDE AND ALBUTEROL SCH PUFFS: 20; 100 SPRAY, METERED RESPIRATORY (INHALATION) at 21:44

## 2018-01-20 NOTE — PROGRESS NOTE
Medicine Progress Note


Date & Time of Visit:


Jan 20, 2018 at 19:10.


Subjective


Pt was seen and examined


Lying in bed with no distress


Pt said that she feels a little tired today


Continue to have abdominal pain, but slightly improved


denies any chest pain, palpitation and sob





Objective





Last 8 Hrs








  Date Time  Temp Pulse Resp B/P (MAP) Pulse Ox O2 Delivery O2 Flow Rate FiO2


 


1/20/18 15:36 36.5 103 20 93/61 (72) 93 Nasal Cannula 2.0 


 


1/20/18 12:32 37.0 75 95 100/66 (77) 100   


 


1/20/18 12:00      Nasal Cannula 2.0 








Physical Exam:


General-complaint of abdominal pain


Head-  atraumatic


Eyes- PERRL, EOMI


ENT- oropharynx clear


Neck- supple, no JVD


Lungs- +crackles


Heart- regular rhythm


Abdomen- normal bowel sounds, +tenderness


Extremities-No calf tenderness


Neuro- alert, oriented x 3; PERRL, EOMI;


Skin- warm & dry


Laboratory Results:





Last 24 Hours








Test


  1/19/18


20:15 1/20/18


01:39 1/20/18


06:38 1/20/18


07:55


 


Bedside Glucose 129 mg/dl   106 mg/dl  


 


White Blood Count  7.85 K/uL   


 


Red Blood Count  2.52 M/uL   


 


Hemoglobin  7.9 g/dL   8.4 g/dL 


 


Hematocrit  25.7 %   27.5 % 


 


Mean Corpuscular Volume  102.0 fL   


 


Mean Corpuscular Hemoglobin  31.3 pg   


 


Mean Corpuscular Hemoglobin


Concent 


  30.7 g/dl 


  


  


 


 


Platelet Count  119 K/uL   


 


Mean Platelet Volume  9.7 fL   


 


Neutrophils (%) (Auto)  75.9 %   


 


Lymphocytes (%) (Auto)  10.6 %   


 


Monocytes (%) (Auto)  7.6 %   


 


Eosinophils (%) (Auto)  2.0 %   


 


Basophils (%) (Auto)  0.1 %   


 


Neutrophils # (Auto)  5.95 K/uL   


 


Lymphocytes # (Auto)  0.83 K/uL   


 


Monocytes # (Auto)  0.60 K/uL   


 


Eosinophils # (Auto)  0.16 K/uL   


 


Basophils # (Auto)  0.01 K/uL   


 


RDW Standard Deviation  56.8 fL   


 


RDW Coefficient of Variation  15.2 %   


 


Immature Granulocyte % (Auto)  3.8 %   


 


Immature Granulocyte # (Auto)  0.30 K/uL   


 


Nucleated RBC Absolute Count


(auto) 


  0.02 K/uL 


  


  


 


 


Nucleated Red Blood Cells %  0.2 %   


 


Red Blood Cell Morphology  Unremarkable   


 


Sodium Level  134 mmol/L   


 


Potassium Level  3.5 mmol/L   


 


Chloride Level  100 mmol/L   


 


Carbon Dioxide Level  25 mmol/L   


 


Anion Gap  9.0 mmol/L   


 


Blood Urea Nitrogen  45 mg/dl   


 


Creatinine  5.70 mg/dl   


 


Est Creatinine Clear Calc


Drug Dose 


  12.5 ml/min 


  


  


 


 


Estimated GFR (


American) 


  8.7 


  


  


 


 


Estimated GFR (Non-


American 


  7.5 


  


  


 


 


BUN/Creatinine Ratio  7.8   


 


Random Glucose  120 mg/dl   


 


Lactic Acid Level  0.8 mmol/L   


 


Calcium Level  7.7 mg/dl   


 


Phosphorus Level  4.9 mg/dl   


 


Magnesium Level  1.9 mg/dl   


 


Test


  1/20/18


11:09 1/20/18


16:01 


  


 


 


Bedside Glucose 255 mg/dl  191 mg/dl   














 Date/Time


Source Procedure


Growth Status


 


 


 1/20/18 15:30


Stool C.difficile Toxin B Gene (PCR) - Final


Positive for C. difficile toxin B gene Complete











Assessment & Plan


Hypotension


Possible related to sepsis


Was transferred from [facility] to [facility] ICU


Elevated lactic acid and procalcitonin


CXR showed mild pulmonary edema which has improved since exam of January 1, 2018.Mild bibasilar opacities which favor atelectasis.


Influenza negative


BP remains low on Mildodrine


On empirical abx with Vanco and Zosyn


Blood cx and urine cx pending


Repeat lactic acid


Continue monitor in ICU 


1/20


BP on the low side 


had HD this morning


Urine cx positive for ESBL


Blood cx no growth


Abx changed to Ertapenem


 ID on board  for abx duration on discharge


Continue monitor in tele





Diarrhea/CDIFF


Stool positive for Ciff


Will start on PO vanco








ESRD on HD


had HD done yesterday and less than 2 L fluid removed due to hypotension


Continue monitor BMP


nephrology on board





Abdominal Pain


Mostly related from the fall she had on Monday


KUB showed prominent loop of small bowel within the left mid abdomen with no 

convincing evidence for small bowel obstruction. 


Ok with nephrology to get CT abd with contrast if pain worsening


Pain improved with tramadol


Clinically improved





Obesity


Counseling on diet and exercise





DM type II


Elevated BS


Hba1c 6.5


On insulin coverage and Lantus





Chronic Anemia 


Hgb 8.4


No sign of bleeding


Continue monitor CBC





CHF


CXR showed mild pulmonary edema which has improved since exam of January 1, 2018.


Saturated well on RA


Will monitor for signs of CHF





DVT px on heparin subq


Consultants:


Intensivist


Nephro


Current Inpatient Medications:





Current Inpatient Medications








 Medications


  (Trade)  Dose


 Ordered  Sig/Daniel


 Route  Start Time


 Stop Time Status Last Admin


Dose Admin


 


 Acetaminophen


  (Tylenol Tab)  650 mg  Q4H  PRN


 PO  1/16/18 02:30


 2/15/18 02:29  1/18/18 14:57


650 MG


 


 Allopurinol


  (Zyloprim Tab)  100 mg  BID


 PO  1/16/18 09:00


 2/15/18 08:59  1/20/18 10:13


100 MG


 


 Aspirin


  (Ecotrin Tab)  81 mg  QAM


 PO  1/16/18 09:00


 2/15/18 08:59  1/20/18 10:14


81 MG


 


 Atorvastatin


 Calcium


  (Lipitor Tab)  40 mg  QAM


 PO  1/16/18 09:00


 2/15/18 08:59  1/20/18 10:14


40 MG


 


 Docusate Sodium


  (coLACE CAP)  100 mg  BID


 PO  1/16/18 09:00


 2/15/18 08:59  1/18/18 21:20


100 MG


 


 Albuterol/


 Ipratropium


  (Combivent


 Respimat Inh)  1 puffs  QID


 INH  1/16/18 09:00


 2/15/18 08:59  1/20/18 17:00


1 PUFFS


 


 Pantoprazole


 Sodium


  (Protonix Tab)  40 mg  DAILY


 PO  1/16/18 09:00


 2/15/18 08:59  1/20/18 10:13


40 MG


 


 Pramipexole


 Dihydrochloride


  (miraPEX TAB)  0.25 mg  HS


 PO  1/16/18 21:00


 2/15/18 20:59  1/19/18 20:37


0.25 MG


 


 Sertraline HCl


  (Zoloft Tab)  75 mg  DAILY


 PO  1/16/18 09:00


 2/15/18 08:59  1/20/18 10:14


75 MG


 


 Thiamine HCl


  (Vitamin B-1 Tab)  50 mg  DAILY


 PO  1/16/18 09:00


 2/15/18 08:59  1/20/18 10:13


50 MG


 


 Tramadol HCl


  (Ultram Tab)  50 mg  Q4H  PRN


 PO  1/16/18 02:45


 2/15/18 02:44  1/19/18 20:40


50 MG


 


 Vitamin B Complex/


 Vit C/Folic Acid


  (Nephrocaps)  1 cap  DAILY


 PO  1/16/18 09:00


 2/15/18 08:59  1/20/18 10:14


1 CAP


 


 Miscellaneous


 Information


  (Order Awaiting


 Action)  1 ea  QS


 N/A  1/16/18 08:00


 2/15/18 07:59  1/16/18 08:21


1 EA


 


 Miscellaneous


 Information


  (Order Awaiting


 Action)  1 ea  QS


 N/A  1/16/18 08:00


 2/15/18 07:59  1/16/18 08:21


1 EA


 


 Gabapentin


  (Neurontin Cap)  200 mg  HS


 PO  1/16/18 21:00


 2/15/18 20:59  1/19/18 20:38


200 MG


 


 Miscellaneous


 Information


  (Consult


 Glycemic


 Management


 Pharmacy)  1 ea  UD  PRN


 N/A  1/16/18 04:15


 2/15/18 04:14   


 


 


 Glucose


  (Glucose 40% Gel)  15-30


 GRAMS 15


 GRAMS...  UD  PRN


 PO  1/16/18 04:30


 2/15/18 04:29   


 


 


 Glucose


  (Glucose Chew


 Tab)  4-8


 Tablets 4


 Tabl...  UD  PRN


 PO  1/16/18 04:30


 2/15/18 04:29   


 


 


 Dextrose


  (Dextrose 50%


 50ML Syringe)  25-50ML OF


 50% DW IV


 FOR...  UD  PRN


 IV  1/16/18 04:30


 2/15/18 04:29  1/17/18 09:52


25 ML


 


 Glucagon


  (Glucagon Inj)  1 mg  UD  PRN


 SQ  1/16/18 04:30


 2/15/18 04:29   


 


 


 Norepinephrine


 Bitartrate 8 mg/


 Dextrose  508 ml @ 0


 mls/hr  Q0M PRN


 IV  1/16/18 10:45


 2/15/18 10:44  1/16/18 11:10


7.5 MLS/HR


 


 Ondansetron HCl


  (Zofran Inj)  4 mg  Q4H  PRN


 IV  1/16/18 18:15


 2/15/18 18:14  1/19/18 12:15


4 MG


 


 Clopidogrel


 Bisulfate


  (plAVix TAB)  75 mg  DAILY


 PO  1/17/18 09:00


 2/16/18 08:59  1/20/18 10:14


75 MG


 


 Heparin Sodium


  (Porcine)


  (Heparin Sq 5000


 Unit/0.5ml)  5,000 unit  Q8


 SQ  1/17/18 15:00


 2/16/18 14:59  1/20/18 14:49


5,000 UNIT


 


 Insulin Aspart


  (novoLOG ASPART)  **SLIDING


 SCALE**


 **G...  ACHS


 SC  1/17/18 12:00


 2/16/18 11:59  1/20/18 18:30


10 UNITS


 


 Sevelamer HCl


  (Renagel Tab)  1,600 mg  TIDM


 PO  1/17/18 16:30


 2/16/18 16:29  1/20/18 16:45


1,600 MG


 


 Ertapenem 500 mg/


 Sodium Chloride  55 ml @ 


 110 mls/hr  DAILY@1800


 IV  1/18/18 18:00


 1/28/18 17:59  1/20/18 18:34


110 MLS/HR


 


 Guaifenesin


  (Organidin Nr


 Tab)  200 mg  Q8  PRN


 PO  1/20/18 09:30


 2/19/18 09:29

## 2018-01-20 NOTE — MEDICAL CONSULT
Consultation


Date of Consultation:


2018.


Attending Physician:


Florida Lopez M.D.


Reason for Consultation:


ESBL UTI


History of Present Illness


57-year-old female with end-stage renal disease on dialysis, was admitted to 

the hospital on  after dialysis when during dialysis she developed 

hypotension, tachycardia, and fever, was given dose of gentamicin and Levophed 

and transferred for further management.  Apparently she had had some degree of 

cough for approximately 1-1/2 weeks prior to this.  She had evidence of sepsis 

with elevated lactic acid, and was started on broad-spectrum antibiotics.  

Chest x-ray, read by me, showed evidence of fluid overload but no obvious 

infiltrate.  Blood cultures have been negative, but urine culture now growing a 

eating ESBL producing E coli.  Patient now changed to IV ertapenem.  Currently 

denies any new specific complaints.  Temperature currently normal and 

hemodynamically stable.  Denies any abdominal or flank pain.





Past Medical/Surgical History


Medical Problems:


(1) CHF (congestive heart failure)


(2) ESRD on dialysis


(3) Hypotension


(4) Respiratory failure





Family History





FH: heart disease


  MOTHER





Social History


Smoking Status:  Never Smoker


Smokeless Tobacco Use:  No


Alcohol Use:  none


Drug Use:  none


Marital Status:  





Allergies


Coded Allergies:  


     Hydrocodone (Verified  Allergy, Unknown, unspecified, 17)


     Morphine (Verified  Allergy, Unknown, unspecified , 17)





Current Inpatient Medications





Current Inpatient Medications








 Medications


  (Trade)  Dose


 Ordered  Sig/Daniel


 Route  Start Time


 Stop Time Status Last Admin


Dose Admin


 


 Acetaminophen


  (Tylenol Tab)  650 mg  Q4H  PRN


 PO  18 02:30


 2/15/18 02:29  18 14:57


650 MG


 


 Allopurinol


  (Zyloprim Tab)  100 mg  BID


 PO  18 09:00


 2/15/18 08:59  18 10:13


100 MG


 


 Aspirin


  (Ecotrin Tab)  81 mg  QAM


 PO  18 09:00


 2/15/18 08:59  18 10:14


81 MG


 


 Atorvastatin


 Calcium


  (Lipitor Tab)  40 mg  QAM


 PO  18 09:00


 2/15/18 08:59  18 10:14


40 MG


 


 Docusate Sodium


  (coLACE CAP)  100 mg  BID


 PO  18 09:00


 2/15/18 08:59  18 21:20


100 MG


 


 Albuterol/


 Ipratropium


  (Combivent


 Respimat Inh)  1 puffs  QID


 INH  18 09:00


 2/15/18 08:59  18 17:00


1 PUFFS


 


 Pantoprazole


 Sodium


  (Protonix Tab)  40 mg  DAILY


 PO  18 09:00


 2/15/18 08:59  18 10:13


40 MG


 


 Pramipexole


 Dihydrochloride


  (miraPEX TAB)  0.25 mg  HS


 PO  18 21:00


 2/15/18 20:59  18 20:37


0.25 MG


 


 Sertraline HCl


  (Zoloft Tab)  75 mg  DAILY


 PO  18 09:00


 2/15/18 08:59  18 10:14


75 MG


 


 Thiamine HCl


  (Vitamin B-1 Tab)  50 mg  DAILY


 PO  18 09:00


 2/15/18 08:59  18 10:13


50 MG


 


 Tramadol HCl


  (Ultram Tab)  50 mg  Q4H  PRN


 PO  18 02:45


 2/15/18 02:44  18 20:40


50 MG


 


 Vitamin B Complex/


 Vit C/Folic Acid


  (Nephrocaps)  1 cap  DAILY


 PO  18 09:00


 2/15/18 08:59  18 10:14


1 CAP


 


 Miscellaneous


 Information


  (Order Awaiting


 Action)  1 ea  QS


 N/A  18 08:00


 2/15/18 07:59  18 08:21


1 EA


 


 Miscellaneous


 Information


  (Order Awaiting


 Action)  1 ea  QS


 N/A  18 08:00


 2/15/18 07:59  18 08:21


1 EA


 


 Gabapentin


  (Neurontin Cap)  200 mg  HS


 PO  18 21:00


 2/15/18 20:59  18 20:38


200 MG


 


 Miscellaneous


 Information


  (Consult


 Glycemic


 Management


 Pharmacy)  1 ea  UD  PRN


 N/A  18 04:15


 2/15/18 04:14   


 


 


 Glucose


  (Glucose 40% Gel)  15-30


 GRAMS 15


 GRAMS...  UD  PRN


 PO  18 04:30


 2/15/18 04:29   


 


 


 Glucose


  (Glucose Chew


 Tab)  4-8


 Tablets 4


 Tabl...  UD  PRN


 PO  18 04:30


 2/15/18 04:29   


 


 


 Dextrose


  (Dextrose 50%


 50ML Syringe)  25-50ML OF


 50% DW IV


 FOR...  UD  PRN


 IV  18 04:30


 2/15/18 04:29  18 09:52


25 ML


 


 Glucagon


  (Glucagon Inj)  1 mg  UD  PRN


 SQ  18 04:30


 2/15/18 04:29   


 


 


 Norepinephrine


 Bitartrate 8 mg/


 Dextrose  508 ml @ 0


 mls/hr  Q0M PRN


 IV  18 10:45


 2/15/18 10:44  18 11:10


7.5 MLS/HR


 


 Ondansetron HCl


  (Zofran Inj)  4 mg  Q4H  PRN


 IV  18 18:15


 2/15/18 18:14  18 12:15


4 MG


 


 Clopidogrel


 Bisulfate


  (plAVix TAB)  75 mg  DAILY


 PO  18 09:00


 18 08:59  18 10:14


75 MG


 


 Heparin Sodium


  (Porcine)


  (Heparin Sq 5000


 Unit/0.5ml)  5,000 unit  Q8


 SQ  18 15:00


 18 14:59  18 14:49


5,000 UNIT


 


 Insulin Aspart


  (novoLOG ASPART)  **SLIDING


 SCALE**


 **G...  ACHS


 SC  18 12:00


 18 11:59  18 18:30


10 UNITS


 


 Sevelamer HCl


  (Renagel Tab)  1,600 mg  TIDM


 PO  18 16:30


 18 16:29  18 16:45


1,600 MG


 


 Ertapenem 500 mg/


 Sodium Chloride  55 ml @ 


 110 mls/hr  DAILY@1800


 IV  18 18:00


 18 17:59  18 18:34


110 MLS/HR


 


 Guaifenesin


  (Organidin Nr


 Tab)  200 mg  Q8  PRN


 PO  18 09:30


 18 09:29   


 


 


 Vancomycin HCl


  (Vancomycin Oral


 Soln)  125 mg  Q6H


 PO  18 20:00


 2/3/18 19:59   


 


 


 Raspberry


  (Raspberry Syrup


 5ml Cup)  5 ml  Q6H


 PO  18 20:00


 2/3/18 19:59   


 











Review of Systems


All systems were reviewed and are negative except as per HPI





Physical Exam











  Date Time  Temp Pulse Resp B/P (MAP) Pulse Ox O2 Delivery O2 Flow Rate FiO2


 


18 19:59 36.4 99 22 98/66 (77) 100 Nasal Cannula 2.0 


 


18 16:00      Nasal Cannula 2.0 


 


18 15:36 36.5 103 20 93/61 (72) 93 Nasal Cannula 2.0 


 


18 12:32 37.0 75 95 100/66 (77) 100   


 


18 12:00      Nasal Cannula 2.0 


 


18 08:53  77  93/59 (70)    


 


18 08:00      Nasal Cannula 2.0 


 


18 07:41 36.5 117 20 71/42 (52) 96 Nasal Cannula 2.0 





    61/46 (51)    


 


18 04:00      Nasal Cannula 2.0 


 


18 03:29 36.7 95 16 78/47 (57) 96   


 


18 00:34    70/46 (54)    





    72/46 (55)    


 


18 00:31 36.9 99 18  93   


 


18 00:00      Nasal Cannula 2.0 








General Appearance:  WD/WN, no apparent distress


Head:  normocephalic, atraumatic


Eyes:  normal inspection, EOMI, sclerae normal


ENT:  normal ENT inspection, pharynx normal


Neck:  supple, no adenopathy, thyroid normal, trachea midline


Respiratory/Chest:  chest non-tender, no respiratory distress, no accessory 

muscle use, + rales


Cardiovascular:  regular rate, rhythm, no gallop, no murmur


Abdomen/GI:  normal bowel sounds, soft, no organomegaly, + tenderness


Back:  normal inspection, no CVA tenderness


Extremities/Musculoskelatal:  no calf tenderness, non-tender


Neurologic/Psych:  alert, oriented x 3


Skin:  normal color, warm/dry, no rash


Lymphatic:  no adenopathy





Laboratory Results


-





RUN DATE: 18                [facility] LAB             

    PAGE 1   


RUN TIME: 1054                            Specimen Inquiry                    


--------------------------------------------------------------------------------

------------





PATIENT: JORDON RAMOS                 ACCT #: [id number] LOC:  VALENTIN     #

: [id number]


                                       AGE/SX: 57/F         ROOM: E106       REG

: 18


REG DR:  Valentin Francois M.D.           :    1960   BED:  1          DIS

:         


                                       STATUS: ADM IN       TLOC:           


--------------------------------------------------------------------------------

------------








SPEC #: [id number]        OSMIN:      STATUS:  COMP           REQ 

#: [id number]


                            RECD:      SUBM DR: Brie Montano PA -C         


SOURCE: DOMINIQUE, CC              ENTR:      [facility] DR: No Doctor, 

Assigned           


[facility]                                                      Kim Wallace, Kyler Blanco D.O. Wong, Gary K., M.D.


ORDERED:  CULTURE ROSANA                                                      

   


COMMENTS: Has Specimen Been Obtained/Collected? Y                     


--------------------------------------------------------------------------------

------------





  Procedure                         Result                         Verified    

       Site


--------------------------------------------------------------------------------

------------





  URINE CULTURE  Final                                             


     Organism 1                     ESCHERICHIA COLI


        COLONY COUNT                >100,000 CFU/ml


        SENS                        SENSITIVITY TO FOLLOW





        SENSITIVITY RESULT INDICATES AN ORGANISM WITH AN EXTENDED


        SPECTRUM BETA LACTAMASE.THIS IS CONSIDERED A MULTIDRUG


        RESISTANT ORGANISM.PHONED TO VALENTIN ANDREW RN ICU, [facility] ON 18 AT 0818 BY Vipul Tillman. Results were


        verbalized back to [place].


        





     1. ESCHERICHIA COLI


                       Target Route Dose               RX     AB   Cost M.I.C. 

   IQ    


                       ------ ----- ------------------ ------ -- ------ --------

- ------


       TRIMET/SULFA                                    R                >2/38  

         


       AMPICILLIN                                      R                >16    

         


       AMPICILLIN/SUL                                  S                <=8/4  

         


       CEFAZOLIN                                       R                >16    

         


       CEFOXITIN                                       S                <=8    

         


       CEFOTAXIME                                      R                >32    

         


       CEFTRIAXONE                                     R                >32    

         


       CEFEPIME                                        R                >16    

         


       CEFUROXIME                                      R                >16    

         


       IMIPENEM                                        S                <=1    

         


       GENTAMICIN                                      S                <=4    

         


       TOBRAMYCIN                                      S                <=4    

         


       AMIKACIN                                        S                <=16   

         


       CIPROFLOXACIN                                   R                >2     

         


       LEVOFLOXACIN                                    R                >4     

         


       ERTAPENEM                                       S                <=1    

         


       NITROFURANTOIN                                  S                <=32   

         


       PIP/TAZO                                        S                <=16   

         





         S = SENSITIVE  I = INTERMEDIATE  R = RESISTANT





--------------------------------------------------------------------------------

------------

















                                   ** END OF REPORT **     


Last 24 Hours








Test


  18


01:39 18


06:38 18


07:55 18


11:09


 


White Blood Count 7.85 K/uL    


 


Red Blood Count 2.52 M/uL    


 


Hemoglobin 7.9 g/dL   8.4 g/dL  


 


Hematocrit 25.7 %   27.5 %  


 


Mean Corpuscular Volume 102.0 fL    


 


Mean Corpuscular Hemoglobin 31.3 pg    


 


Mean Corpuscular Hemoglobin


Concent 30.7 g/dl 


  


  


  


 


 


Platelet Count 119 K/uL    


 


Mean Platelet Volume 9.7 fL    


 


Neutrophils (%) (Auto) 75.9 %    


 


Lymphocytes (%) (Auto) 10.6 %    


 


Monocytes (%) (Auto) 7.6 %    


 


Eosinophils (%) (Auto) 2.0 %    


 


Basophils (%) (Auto) 0.1 %    


 


Neutrophils # (Auto) 5.95 K/uL    


 


Lymphocytes # (Auto) 0.83 K/uL    


 


Monocytes # (Auto) 0.60 K/uL    


 


Eosinophils # (Auto) 0.16 K/uL    


 


Basophils # (Auto) 0.01 K/uL    


 


RDW Standard Deviation 56.8 fL    


 


RDW Coefficient of Variation 15.2 %    


 


Immature Granulocyte % (Auto) 3.8 %    


 


Immature Granulocyte # (Auto) 0.30 K/uL    


 


Nucleated RBC Absolute Count


(auto) 0.02 K/uL 


  


  


  


 


 


Nucleated Red Blood Cells % 0.2 %    


 


Red Blood Cell Morphology Unremarkable    


 


Sodium Level 134 mmol/L    


 


Potassium Level 3.5 mmol/L    


 


Chloride Level 100 mmol/L    


 


Carbon Dioxide Level 25 mmol/L    


 


Anion Gap 9.0 mmol/L    


 


Blood Urea Nitrogen 45 mg/dl    


 


Creatinine 5.70 mg/dl    


 


Est Creatinine Clear Calc


Drug Dose 12.5 ml/min 


  


  


  


 


 


Estimated GFR (


American) 8.7 


  


  


  


 


 


Estimated GFR (Non-


American 7.5 


  


  


  


 


 


BUN/Creatinine Ratio 7.8    


 


Random Glucose 120 mg/dl    


 


Lactic Acid Level 0.8 mmol/L    


 


Calcium Level 7.7 mg/dl    


 


Phosphorus Level 4.9 mg/dl    


 


Magnesium Level 1.9 mg/dl    


 


Bedside Glucose  106 mg/dl   255 mg/dl 


 


Test


  18


16:01 18


19:58 


  


 


 


Bedside Glucose 191 mg/dl  332 mg/dl   








                                                                               

                                                                 


Patient Name: JORDON RAMOS                               Unit Number:  

[id number]                  


 








 











Dictated: 18


 


Transcribed: 18


 


ARG


 


Printed Date/Time: [~ rep prt dt]/[~ rep prt tm]








 [~ rep ct labl] - [~ rep ct ivnm]


 





   [facility]


 Radiology Department


 [place] [address]


 [phone]





 











Dictated: 18


 


Transcribed: 18


 


ARG


 


Printed Date/Time: [~ rep prt dt]/[~ rep prt tm]








 [~ rep ct labl] - [~ rep ct ivnm]


 








CLINICAL HISTORY: fever    





COMPARISON STUDY:  2018





FINDINGS: There are postsurgical changes of a midline sternotomy. A left


subclavian central venous catheter remains unchanged in position. There is no


interval change in the linear 13 mm metallic density projected over the superior


mediastinum. There is persistent mild pulmonary vascular congestion. There is a


persistent right basilar opacity, likely representing focal edema or


atelectasis. Trace pleural effusions are suspected.





IMPRESSION: 


1. No significant change from the preceding study


2. Persistent mild pulmonary vascular congestion/edema.











Electronically signed by:  Yrn Hernandez M.D.


2018 9:02 AM





Dictated Date/Time:  2018 9:00 AM





 The status of this report is Signed.   


 Draft = Not yet reviewed or approved by Radiologist.  


 Signed = Reviewed and approved by Radiologist.


<AttendingPhy>Valentin Francois M.D.</AttendingPhy> <FamilyPhy>No Doctor, Assigned<

/FamilyPhy> <PrimaryPhy>No Doctor, Assigned</PrimaryPhy> <UnitNumber>[id number]<

/UnitNumber> <VisitNumber>[id number]</VisitNumber> <PatientName>JORDON RAMOS<

/PatientName> <DateOfBirth>1960</DateOfBirth> <Location>C[facility]MS[facility]</Location

> <ServiceDate></ServiceDate> <MNE>ESINDI</MNE> <OrderingPhy>Kyler Hensley D.O.</OrderingPhy> <OrderingPhyMNE>f rep ord dr arcos</OrderingPhyMNE> <

DictatingPhyMNE>f rep dict dr arcos</DictatingPhyMNE> <CCListMNE>f rep ct mne</

CCListMNE> <AdmittingPhyMNE>f pt admit dr arcos</AdmittingPhyMNE> <AttendingPhyMNE

>f pt attend dr arcos</AttendingPhyMNE>


<ConsultingPhyMNE>f pt consult dr arcos</ConsultingPhyMNE> <FamilyPhyMNE>f pt fam 

dr arcos</FamilyPhyMNE> <OtherPhyMNE>f pt other dr arcos</OtherPhyMNE> <

PrimaryPhyMNE>f pt prim care dr arcos</PrimaryPhyMNE> <ReferringPhyMNE>f pt 

referring dr arcos</ReferringPhyMNE>





Assessment & Plan


Urinary tract infection with ESBL producing E coli, as well as now C difficile 

colitis.  Patient should be treated with combination of ertapenem IV and oral 

vancomycin.  Will require 2 weeks for vancomycin, and likely in the range of 10 

days for ertapenem.  Will follow.

## 2018-01-21 VITALS
OXYGEN SATURATION: 99 % | TEMPERATURE: 97.52 F | SYSTOLIC BLOOD PRESSURE: 84 MMHG | DIASTOLIC BLOOD PRESSURE: 51 MMHG | HEART RATE: 92 BPM

## 2018-01-21 VITALS
SYSTOLIC BLOOD PRESSURE: 91 MMHG | DIASTOLIC BLOOD PRESSURE: 57 MMHG | HEART RATE: 91 BPM | TEMPERATURE: 97.52 F | OXYGEN SATURATION: 98 %

## 2018-01-21 VITALS
OXYGEN SATURATION: 98 % | DIASTOLIC BLOOD PRESSURE: 52 MMHG | SYSTOLIC BLOOD PRESSURE: 80 MMHG | TEMPERATURE: 97.52 F | HEART RATE: 91 BPM

## 2018-01-21 VITALS
OXYGEN SATURATION: 98 % | DIASTOLIC BLOOD PRESSURE: 53 MMHG | HEART RATE: 89 BPM | TEMPERATURE: 97.7 F | SYSTOLIC BLOOD PRESSURE: 83 MMHG

## 2018-01-21 VITALS
SYSTOLIC BLOOD PRESSURE: 85 MMHG | DIASTOLIC BLOOD PRESSURE: 55 MMHG | TEMPERATURE: 97.7 F | HEART RATE: 87 BPM | OXYGEN SATURATION: 99 %

## 2018-01-21 LAB
BASOPHILS # BLD: 0.03 K/UL (ref 0–0.2)
BASOPHILS NFR BLD: 0.2 %
BUN SERPL-MCNC: 69 MG/DL (ref 7–18)
CALCIUM SERPL-MCNC: 8.8 MG/DL (ref 8.5–10.1)
CO2 SERPL-SCNC: 21 MMOL/L (ref 21–32)
CREAT SERPL-MCNC: 8.16 MG/DL (ref 0.6–1.2)
EOS ABS #: 0.01 K/UL (ref 0–0.5)
EOSINOPHIL NFR BLD AUTO: 137 K/UL (ref 130–400)
GLUCOSE SERPL-MCNC: 178 MG/DL (ref 70–99)
HCT VFR BLD CALC: 26.1 % (ref 37–47)
HGB BLD-MCNC: 8 G/DL (ref 12–16)
IG#: 1.23 K/UL (ref 0–0.02)
IMM GRANULOCYTES NFR BLD AUTO: 7.3 %
LYMPHOCYTES # BLD: 0.95 K/UL (ref 1.2–3.4)
MCH RBC QN AUTO: 31.1 PG (ref 25–34)
MCHC RBC AUTO-ENTMCNC: 30.7 G/DL (ref 32–36)
MCV RBC AUTO: 101.6 FL (ref 80–100)
MONO ABS #: 0.52 K/UL (ref 0.11–0.59)
MONOCYTES NFR BLD: 4 %
NEUT ABS #: 10.24 K/UL (ref 1.4–6.5)
NEUTROPHILS # BLD AUTO: 0.1 %
NEUTROPHILS NFR BLD AUTO: 78.9 %
NRBC BLD AUTO-RTO: 0.3 %
NUCLEATED RED BLOOD CELL ABS: 0.04 K/UL (ref 0–0)
PHOSPHATE SERPL-MCNC: 7.3 MG/DL (ref 2.5–4.9)
PMV BLD AUTO: 10 FL (ref 7.4–10.4)
POTASSIUM SERPL-SCNC: 4.9 MMOL/L (ref 3.5–5.1)
RED CELL DISTRIBUTION WIDTH CV: 14.9 % (ref 11.5–14.5)
RED CELL DISTRIBUTION WIDTH SD: 55.1 FL (ref 36.4–46.3)
SODIUM SERPL-SCNC: 134 MMOL/L (ref 136–145)
WBC # BLD AUTO: 12.98 K/UL (ref 4.8–10.8)

## 2018-01-21 RX ADMIN — CLOPIDOGREL BISULFATE SCH MG: 75 TABLET, FILM COATED ORAL at 09:00

## 2018-01-21 RX ADMIN — ALUMINUM ZIRCONIUM TRICHLOROHYDREX GLY SCH EA: 0.2 STICK TOPICAL at 16:00

## 2018-01-21 RX ADMIN — HEPARIN SODIUM SCH UNIT: 10000 INJECTION, SOLUTION INTRAVENOUS; SUBCUTANEOUS at 06:04

## 2018-01-21 RX ADMIN — Medication SCH MG: at 09:00

## 2018-01-21 RX ADMIN — ALLOPURINOL SCH MG: 100 TABLET ORAL at 09:00

## 2018-01-21 RX ADMIN — IPRATROPIUM BROMIDE AND ALBUTEROL SCH PUFFS: 20; 100 SPRAY, METERED RESPIRATORY (INHALATION) at 17:59

## 2018-01-21 RX ADMIN — INSULIN ASPART SCH UNITS: 100 INJECTION, SOLUTION INTRAVENOUS; SUBCUTANEOUS at 18:04

## 2018-01-21 RX ADMIN — INSULIN ASPART SCH UNITS: 100 INJECTION, SOLUTION INTRAVENOUS; SUBCUTANEOUS at 20:59

## 2018-01-21 RX ADMIN — Medication SCH ML: at 20:57

## 2018-01-21 RX ADMIN — ALUMINUM ZIRCONIUM TRICHLOROHYDREX GLY SCH EA: 0.2 STICK TOPICAL at 09:00

## 2018-01-21 RX ADMIN — INSULIN ASPART SCH UNITS: 100 INJECTION, SOLUTION INTRAVENOUS; SUBCUTANEOUS at 11:00

## 2018-01-21 RX ADMIN — Medication SCH ML: at 02:15

## 2018-01-21 RX ADMIN — HEPARIN SODIUM SCH UNIT: 10000 INJECTION, SOLUTION INTRAVENOUS; SUBCUTANEOUS at 21:00

## 2018-01-21 RX ADMIN — IPRATROPIUM BROMIDE AND ALBUTEROL SCH PUFFS: 20; 100 SPRAY, METERED RESPIRATORY (INHALATION) at 20:57

## 2018-01-21 RX ADMIN — VANCOMYCIN HYDROCHLORIDE SCH MG: 1 INJECTION, POWDER, LYOPHILIZED, FOR SOLUTION INTRAVENOUS at 20:57

## 2018-01-21 RX ADMIN — ERTAPENEM SODIUM SCH MLS/HR: 1 INJECTION, POWDER, LYOPHILIZED, FOR SOLUTION INTRAMUSCULAR; INTRAVENOUS at 18:09

## 2018-01-21 RX ADMIN — PRAMIPEXOLE DIHYDROCHLORIDE SCH MG: 0.25 TABLET ORAL at 20:58

## 2018-01-21 RX ADMIN — ASCORBIC ACID, THIAMINE MONONITRATE,RIBOFLAVIN, NIACINAMIDE, PYRIDOXINE HYDROCHLORIDE, FOLIC ACID, CYANOCOBALAMIN, BIOTIN, CALCIUM PANTOTHENATE, SCH CAP: 100; 1.5; 1.7; 20; 10; 1; 6000; 150000; 5 CAPSULE, LIQUID FILLED ORAL at 09:00

## 2018-01-21 RX ADMIN — ALUMINUM ZIRCONIUM TRICHLOROHYDREX GLY SCH EA: 0.2 STICK TOPICAL at 00:00

## 2018-01-21 RX ADMIN — Medication SCH ML: at 14:30

## 2018-01-21 RX ADMIN — ALUMINUM ZIRCONIUM TRICHLOROHYDREX GLY SCH EA: 0.2 STICK TOPICAL at 12:07

## 2018-01-21 RX ADMIN — RENAGEL SCH MG: 800 TABLET ORAL at 17:59

## 2018-01-21 RX ADMIN — RENAGEL SCH MG: 800 TABLET ORAL at 12:06

## 2018-01-21 RX ADMIN — IPRATROPIUM BROMIDE AND ALBUTEROL SCH PUFFS: 20; 100 SPRAY, METERED RESPIRATORY (INHALATION) at 09:00

## 2018-01-21 RX ADMIN — HEPARIN SODIUM SCH UNIT: 10000 INJECTION, SOLUTION INTRAVENOUS; SUBCUTANEOUS at 14:30

## 2018-01-21 RX ADMIN — DOCUSATE SODIUM SCH MG: 100 CAPSULE, LIQUID FILLED ORAL at 09:00

## 2018-01-21 RX ADMIN — ALLOPURINOL SCH MG: 100 TABLET ORAL at 20:58

## 2018-01-21 RX ADMIN — VANCOMYCIN HYDROCHLORIDE SCH MG: 1 INJECTION, POWDER, LYOPHILIZED, FOR SOLUTION INTRAVENOUS at 14:30

## 2018-01-21 RX ADMIN — VANCOMYCIN HYDROCHLORIDE SCH MG: 1 INJECTION, POWDER, LYOPHILIZED, FOR SOLUTION INTRAVENOUS at 08:00

## 2018-01-21 RX ADMIN — Medication SCH ML: at 08:00

## 2018-01-21 RX ADMIN — PANTOPRAZOLE SCH MG: 40 TABLET, DELAYED RELEASE ORAL at 09:00

## 2018-01-21 RX ADMIN — DOCUSATE SODIUM SCH MG: 100 CAPSULE, LIQUID FILLED ORAL at 20:57

## 2018-01-21 RX ADMIN — VANCOMYCIN HYDROCHLORIDE SCH MG: 1 INJECTION, POWDER, LYOPHILIZED, FOR SOLUTION INTRAVENOUS at 02:15

## 2018-01-21 RX ADMIN — GABAPENTIN SCH MG: 100 CAPSULE ORAL at 20:58

## 2018-01-21 RX ADMIN — ATORVASTATIN CALCIUM SCH MG: 40 TABLET, FILM COATED ORAL at 09:00

## 2018-01-21 RX ADMIN — SERTRALINE HYDROCHLORIDE SCH MG: 50 TABLET, FILM COATED ORAL at 09:00

## 2018-01-21 RX ADMIN — RENAGEL SCH MG: 800 TABLET ORAL at 11:30

## 2018-01-21 RX ADMIN — IPRATROPIUM BROMIDE AND ALBUTEROL SCH PUFFS: 20; 100 SPRAY, METERED RESPIRATORY (INHALATION) at 12:13

## 2018-01-21 NOTE — PROGRESS NOTE
Medicine Progress Note


Date & Time of Visit:


Jan 21, 2018 at 11:19.


Subjective


Pt was seen and examined


Lying in bed with no distress


Pt said that her abdominal pain improved


Continue to have diarrhea


Denies any chest pain, palpitation, dizziness and SOB





Objective





Last 8 Hrs








  Date Time  Temp Pulse Resp B/P (MAP) Pulse Ox O2 Delivery O2 Flow Rate FiO2


 


1/21/18 16:33 36.5 87 18 85/55 (65) 99 Nasal Cannula 2.0 


 


1/21/18 16:00      Nasal Cannula 2.0 


 


1/21/18 12:00      Nasal Cannula 2.0 








Physical Exam:


General-complaint of abdominal pain


Head-  atraumatic


Eyes- PERRL, EOMI


ENT- oropharynx clear


Neck- supple, no JVD


Lungs- +crackles


Heart- regular rhythm


Abdomen- normal bowel sounds, +tenderness


Extremities-No calf tenderness


Neuro- alert, oriented x 3; PERRL, EOMI;


Skin- warm & dry


Laboratory Results:





Last 24 Hours








Test


  1/20/18


19:58 1/21/18


06:33 1/21/18


06:38 1/21/18


11:13


 


Bedside Glucose 332 mg/dl   187 mg/dl  149 mg/dl 


 


White Blood Count  12.98 K/uL   


 


Red Blood Count  2.57 M/uL   


 


Hemoglobin  8.0 g/dL   


 


Hematocrit  26.1 %   


 


Mean Corpuscular Volume  101.6 fL   


 


Mean Corpuscular Hemoglobin  31.1 pg   


 


Mean Corpuscular Hemoglobin


Concent 


  30.7 g/dl 


  


  


 


 


Platelet Count  137 K/uL   


 


Mean Platelet Volume  10.0 fL   


 


Neutrophils (%) (Auto)  78.9 %   


 


Lymphocytes (%) (Auto)  7.3 %   


 


Monocytes (%) (Auto)  4.0 %   


 


Eosinophils (%) (Auto)  0.1 %   


 


Basophils (%) (Auto)  0.2 %   


 


Neutrophils # (Auto)  10.24 K/uL   


 


Lymphocytes # (Auto)  0.95 K/uL   


 


Monocytes # (Auto)  0.52 K/uL   


 


Eosinophils # (Auto)  0.01 K/uL   


 


Basophils # (Auto)  0.03 K/uL   


 


RDW Standard Deviation  55.1 fL   


 


RDW Coefficient of Variation  14.9 %   


 


Immature Granulocyte % (Auto)  9.5 %   


 


Immature Granulocyte # (Auto)  1.23 K/uL   


 


Nucleated RBC Absolute Count


(auto) 


  0.04 K/uL 


  


  


 


 


Nucleated Red Blood Cells %  0.3 %   


 


Sodium Level  134 mmol/L   


 


Potassium Level  4.9 mmol/L   


 


Chloride Level  103 mmol/L   


 


Carbon Dioxide Level  21 mmol/L   


 


Anion Gap  10.0 mmol/L   


 


Blood Urea Nitrogen  69 mg/dl   


 


Creatinine  8.16 mg/dl   


 


Est Creatinine Clear Calc


Drug Dose 


  8.6 ml/min 


  


  


 


 


Estimated GFR (


American) 


  5.6 


  


  


 


 


Estimated GFR (Non-


American 


  4.9 


  


  


 


 


BUN/Creatinine Ratio  8.5   


 


Random Glucose  178 mg/dl   


 


Calcium Level  8.8 mg/dl   


 


Phosphorus Level  7.3 mg/dl   


 


Magnesium Level  2.6 mg/dl   


 


Test


  1/21/18


16:03 


  


  


 


 


Bedside Glucose 185 mg/dl    











Assessment & Plan


Hypotension


Possible related to sepsis


Was transferred from [facility] to [facility] ICU


Elevated lactic acid and procalcitonin


CXR showed mild pulmonary edema which has improved since exam of January 1, 2018.Mild bibasilar opacities which favor atelectasis.


Influenza negative


BP remains low on Mildodrine


On empirical abx with Vanco and Zosyn


Blood cx and urine cx pending


Repeat lactic acid


Continue monitor in ICU 


1/21


BP on the low side 


had HD this morning


Urine cx positive for ESBL


Blood cx no growth


ID on board  recommended to continue Ertapenem for 10 days 


Continue monitor in tele





Diarrhea/CDIFF


Stool positive for Ciff


Continue PO vanco will need it for 2 weeks








ESRD on HD


Will get HD tomorrow


Continue monitor BMP


nephrology on board





Abdominal Pain


Related to Cdiff


KUB showed prominent loop of small bowel within the left mid abdomen with no 

convincing evidence for small bowel obstruction. 


Pain improved with tramadol


Clinically improved





Obesity


Counseling on diet and exercise





DM type II


Elevated BS


Hba1c 6.5


On insulin coverage and Lantus





Chronic Anemia 


Hgb 8


No sign of bleeding


Continue monitor CBC





CHF


CXR showed mild pulmonary edema which has improved since exam of January 1, 2018.


Saturated well on RA


Will monitor for signs of CHF





DVT px on heparin subq


Consultants:


Intensivist


Nephro


Current Inpatient Medications:





Current Inpatient Medications








 Medications


  (Trade)  Dose


 Ordered  Sig/Daniel


 Route  Start Time


 Stop Time Status Last Admin


Dose Admin


 


 Acetaminophen


  (Tylenol Tab)  650 mg  Q4H  PRN


 PO  1/16/18 02:30


 2/15/18 02:29  1/18/18 14:57


650 MG


 


 Allopurinol


  (Zyloprim Tab)  100 mg  BID


 PO  1/16/18 09:00


 2/15/18 08:59  1/21/18 09:00


100 MG


 


 Aspirin


  (Ecotrin Tab)  81 mg  QAM


 PO  1/16/18 09:00


 2/15/18 08:59  1/21/18 09:00


81 MG


 


 Atorvastatin


 Calcium


  (Lipitor Tab)  40 mg  QAM


 PO  1/16/18 09:00


 2/15/18 08:59  1/21/18 09:00


40 MG


 


 Docusate Sodium


  (coLACE CAP)  100 mg  BID


 PO  1/16/18 09:00


 2/15/18 08:59  1/18/18 21:20


100 MG


 


 Albuterol/


 Ipratropium


  (Combivent


 Respimat Inh)  1 puffs  QID


 INH  1/16/18 09:00


 2/15/18 08:59  1/21/18 17:59


1 PUFFS


 


 Pantoprazole


 Sodium


  (Protonix Tab)  40 mg  DAILY


 PO  1/16/18 09:00


 2/15/18 08:59  1/21/18 09:00


40 MG


 


 Pramipexole


 Dihydrochloride


  (miraPEX TAB)  0.25 mg  HS


 PO  1/16/18 21:00


 2/15/18 20:59  1/20/18 21:44


0.25 MG


 


 Sertraline HCl


  (Zoloft Tab)  75 mg  DAILY


 PO  1/16/18 09:00


 2/15/18 08:59  1/21/18 09:00


75 MG


 


 Thiamine HCl


  (Vitamin B-1 Tab)  50 mg  DAILY


 PO  1/16/18 09:00


 2/15/18 08:59  1/21/18 09:00


50 MG


 


 Tramadol HCl


  (Ultram Tab)  50 mg  Q4H  PRN


 PO  1/16/18 02:45


 2/15/18 02:44  1/19/18 20:40


50 MG


 


 Vitamin B Complex/


 Vit C/Folic Acid


  (Nephrocaps)  1 cap  DAILY


 PO  1/16/18 09:00


 2/15/18 08:59  1/21/18 09:00


1 CAP


 


 Miscellaneous


 Information


  (Order Awaiting


 Action)  1 ea  QS


 N/A  1/16/18 08:00


 2/15/18 07:59  1/16/18 08:21


1 EA


 


 Miscellaneous


 Information


  (Order Awaiting


 Action)  1 ea  QS


 N/A  1/16/18 08:00


 2/15/18 07:59  1/16/18 08:21


1 EA


 


 Gabapentin


  (Neurontin Cap)  200 mg  HS


 PO  1/16/18 21:00


 2/15/18 20:59  1/20/18 21:45


200 MG


 


 Miscellaneous


 Information


  (Consult


 Glycemic


 Management


 Pharmacy)  1 ea  UD  PRN


 N/A  1/16/18 04:15


 2/15/18 04:14   


 


 


 Glucose


  (Glucose 40% Gel)  15-30


 GRAMS 15


 GRAMS...  UD  PRN


 PO  1/16/18 04:30


 2/15/18 04:29   


 


 


 Glucose


  (Glucose Chew


 Tab)  4-8


 Tablets 4


 Tabl...  UD  PRN


 PO  1/16/18 04:30


 2/15/18 04:29   


 


 


 Dextrose


  (Dextrose 50%


 50ML Syringe)  25-50ML OF


 50% DW IV


 FOR...  UD  PRN


 IV  1/16/18 04:30


 2/15/18 04:29  1/17/18 09:52


25 ML


 


 Glucagon


  (Glucagon Inj)  1 mg  UD  PRN


 SQ  1/16/18 04:30


 2/15/18 04:29   


 


 


 Ondansetron HCl


  (Zofran Inj)  4 mg  Q4H  PRN


 IV  1/16/18 18:15


 2/15/18 18:14  1/19/18 12:15


4 MG


 


 Clopidogrel


 Bisulfate


  (plAVix TAB)  75 mg  DAILY


 PO  1/17/18 09:00


 2/16/18 08:59  1/21/18 09:00


75 MG


 


 Heparin Sodium


  (Porcine)


  (Heparin Sq 5000


 Unit/0.5ml)  5,000 unit  Q8


 SQ  1/17/18 15:00


 2/16/18 14:59  1/21/18 14:30


5,000 UNIT


 


 Insulin Aspart


  (novoLOG ASPART)  **SLIDING


 SCALE**


 **G...  ACHS


 SC  1/17/18 12:00


 1/21/18 23:59  1/21/18 18:04


7 UNITS


 


 Sevelamer HCl


  (Renagel Tab)  1,600 mg  TIDM


 PO  1/17/18 16:30


 2/16/18 16:29  1/21/18 17:59


1,600 MG


 


 Ertapenem 500 mg/


 Sodium Chloride  55 ml @ 


 110 mls/hr  DAILY@1800


 IV  1/18/18 18:00


 1/28/18 17:59  1/21/18 18:09


110 MLS/HR


 


 Guaifenesin


  (Organidin Nr


 Tab)  200 mg  Q8  PRN


 PO  1/20/18 09:30


 2/19/18 09:29   


 


 


 Vancomycin HCl


  (Vancomycin Oral


 Soln)  125 mg  Q6H


 PO  1/20/18 20:00


 2/3/18 19:59  1/21/18 14:30


125 MG


 


 Raspberry


  (Raspberry Syrup


 5ml Cup)  5 ml  Q6H


 PO  1/20/18 20:00


 2/3/18 19:59  1/21/18 14:30


5 ML


 


 Heparin Sodium


  (Porcine)


  (No Heparin In


 Dialysis)  1 ea  ONE  ONCE


 N/A  1/22/18 08:00


 1/22/18 08:01   


 


 


 Albumin Human


  (Albumin 25%)  12.5 gm  0800,0930


 IV  1/22/18 08:00


 1/22/18 16:00   


 


 


 Insulin Aspart


  (novoLOG ASPART)  **SLIDING


 SCALE**


 **G...  ACHS


 SC  1/22/18 07:00


 2/21/18 06:59

## 2018-01-21 NOTE — PHARMACY PROGRESS NOTE
Glycemic Control Progress Note


Date of Service


Jan 21, 2018.





Scope


Glycemic Pharmacist consulted for glycemic control to write orders per [facility] 

inpatient glycemic control protocol.





Objective


Accuchecks BSG (last 24hrs):











Test


  1/20/18


16:01 1/20/18


19:58 1/21/18


06:33 1/21/18


06:38


 


Bedside Glucose


  191 mg/dl


(70-90) 332 mg/dl


(70-90) 


  187 mg/dl


(70-90)


 


Random Glucose


  


  


  178 mg/dl


(70-99) 


 


 


Test


  1/21/18


11:13 


  


  


 


 


Bedside Glucose


  149 mg/dl


(70-90) 


  


  


 








HbA1c:











Test


  1/17/18


05:34


 


Hemoglobin A1c


  6.5 %


(4.5-5.6)  H











Recent Pertinent Medications








The patient is currently receiving:


* Basal insulin:             None currently





* Correctional Insulin:   Novolog Correction per scale ACHS


                          Goal Range: Low 120 mg/dL - High 150 mg/dL


                          Correction Factor: 20 mg/dL/unit





* Prandial insulin:         Per carb ratio of 1 unit per 8 grams CHO consumed








* Oral Agents:             None currently





Outpatient Anti-Diabetic Meds


NPH 40 units SQ daily when taking prednisone (otherwise no insulin taken)





Assessment & Plan


ASSESSMENT:





1/19/18


* All BSGs at goal over the last 24 hours


* Fasting  this AM with no basal insulin on board.  Of note, this 

patient usually requires little to no basal insulin when not receiving steroids.


* Post-prandial BSGs well controlled with current CR and CF


* HD is planned for today - insulin sensitivity may improve as a result





1/21/18


* Glycemic control deteriorated yesterday after receiving prednisone 40mg PO x1 

in the late morning


* Fasting AM BSG elevated this AM, however this is likely secondary to BSGs 

running high throughout the day yesterday w/ no basal insulin on board


* We have seen this patient does require some basal insulin while receiving 

steroids - will resume some NPH at this time


* Ordered 10 units of NPH this AM due to fasting hyperglycemia at the start of 

the day, ordered an additional 5 units x 1 as she was also ordered an 

additional 20mg Prednisone today


* Will adjust the CR to allow for more prandial insulin while receiving 

steroids. This CR may need reduced tomorrow if no further steroids ordered.








PLAN FOR INPATIENT GLYCEMIC CONTROL:


* NPH 10 units + 5 units today due to receiving Prednisone 20mg x 1


* Continuing correction factor of 20 mg/dl/unit


* Changing carb ratio to 1 unit per 7 grams CHO consumed for the remainder of 

today.  Resume 1 : 8 tomorrow AM assuming no further steroids to be given.


* Continuing goal range of Low 120 mg/dL - High 150 mg/dL








* Please note that the plan above was derived based on current level of insulin 

resistance and hospital stress. These recommendations are appropriate for 

inpatient admission only. Plan of care upon discharge will need to be 

reassessed to avoid potential outpatient hypo/hyperglycemia. 








Thank you.

## 2018-01-22 VITALS
TEMPERATURE: 98.06 F | OXYGEN SATURATION: 97 % | DIASTOLIC BLOOD PRESSURE: 47 MMHG | SYSTOLIC BLOOD PRESSURE: 79 MMHG | HEART RATE: 94 BPM

## 2018-01-22 VITALS — HEART RATE: 95 BPM | SYSTOLIC BLOOD PRESSURE: 85 MMHG | DIASTOLIC BLOOD PRESSURE: 48 MMHG

## 2018-01-22 VITALS — SYSTOLIC BLOOD PRESSURE: 91 MMHG | DIASTOLIC BLOOD PRESSURE: 47 MMHG | HEART RATE: 93 BPM

## 2018-01-22 VITALS
HEART RATE: 91 BPM | TEMPERATURE: 98.6 F | DIASTOLIC BLOOD PRESSURE: 53 MMHG | SYSTOLIC BLOOD PRESSURE: 88 MMHG | OXYGEN SATURATION: 94 %

## 2018-01-22 VITALS
OXYGEN SATURATION: 97 % | TEMPERATURE: 97.7 F | DIASTOLIC BLOOD PRESSURE: 42 MMHG | SYSTOLIC BLOOD PRESSURE: 70 MMHG | HEART RATE: 97 BPM

## 2018-01-22 VITALS — SYSTOLIC BLOOD PRESSURE: 98 MMHG | HEART RATE: 94 BPM | DIASTOLIC BLOOD PRESSURE: 53 MMHG

## 2018-01-22 VITALS
OXYGEN SATURATION: 97 % | TEMPERATURE: 97.88 F | DIASTOLIC BLOOD PRESSURE: 47 MMHG | HEART RATE: 93 BPM | SYSTOLIC BLOOD PRESSURE: 81 MMHG

## 2018-01-22 VITALS — TEMPERATURE: 98.24 F | DIASTOLIC BLOOD PRESSURE: 50 MMHG | HEART RATE: 100 BPM | SYSTOLIC BLOOD PRESSURE: 85 MMHG

## 2018-01-22 VITALS — HEART RATE: 93 BPM | DIASTOLIC BLOOD PRESSURE: 40 MMHG | SYSTOLIC BLOOD PRESSURE: 72 MMHG | OXYGEN SATURATION: 97 %

## 2018-01-22 VITALS
OXYGEN SATURATION: 95 % | DIASTOLIC BLOOD PRESSURE: 63 MMHG | TEMPERATURE: 97.52 F | HEART RATE: 94 BPM | SYSTOLIC BLOOD PRESSURE: 96 MMHG

## 2018-01-22 VITALS
OXYGEN SATURATION: 95 % | HEART RATE: 92 BPM | DIASTOLIC BLOOD PRESSURE: 39 MMHG | TEMPERATURE: 98.24 F | SYSTOLIC BLOOD PRESSURE: 76 MMHG

## 2018-01-22 VITALS — DIASTOLIC BLOOD PRESSURE: 46 MMHG | HEART RATE: 93 BPM | SYSTOLIC BLOOD PRESSURE: 75 MMHG

## 2018-01-22 VITALS — SYSTOLIC BLOOD PRESSURE: 77 MMHG | DIASTOLIC BLOOD PRESSURE: 43 MMHG | HEART RATE: 91 BPM

## 2018-01-22 VITALS — HEART RATE: 92 BPM | DIASTOLIC BLOOD PRESSURE: 50 MMHG | SYSTOLIC BLOOD PRESSURE: 84 MMHG

## 2018-01-22 VITALS — HEART RATE: 92 BPM | SYSTOLIC BLOOD PRESSURE: 80 MMHG | DIASTOLIC BLOOD PRESSURE: 51 MMHG

## 2018-01-22 VITALS — DIASTOLIC BLOOD PRESSURE: 51 MMHG | SYSTOLIC BLOOD PRESSURE: 83 MMHG

## 2018-01-22 VITALS — HEART RATE: 92 BPM | DIASTOLIC BLOOD PRESSURE: 56 MMHG | SYSTOLIC BLOOD PRESSURE: 95 MMHG

## 2018-01-22 VITALS
OXYGEN SATURATION: 93 % | SYSTOLIC BLOOD PRESSURE: 78 MMHG | DIASTOLIC BLOOD PRESSURE: 53 MMHG | HEART RATE: 89 BPM | TEMPERATURE: 98.06 F

## 2018-01-22 VITALS — TEMPERATURE: 97.88 F | DIASTOLIC BLOOD PRESSURE: 49 MMHG | HEART RATE: 94 BPM | SYSTOLIC BLOOD PRESSURE: 90 MMHG

## 2018-01-22 VITALS
DIASTOLIC BLOOD PRESSURE: 47 MMHG | TEMPERATURE: 98.06 F | SYSTOLIC BLOOD PRESSURE: 80 MMHG | OXYGEN SATURATION: 97 % | HEART RATE: 89 BPM

## 2018-01-22 VITALS — HEART RATE: 95 BPM | DIASTOLIC BLOOD PRESSURE: 49 MMHG | SYSTOLIC BLOOD PRESSURE: 83 MMHG

## 2018-01-22 VITALS — DIASTOLIC BLOOD PRESSURE: 49 MMHG | SYSTOLIC BLOOD PRESSURE: 86 MMHG | HEART RATE: 96 BPM

## 2018-01-22 VITALS — DIASTOLIC BLOOD PRESSURE: 47 MMHG | SYSTOLIC BLOOD PRESSURE: 80 MMHG | HEART RATE: 93 BPM

## 2018-01-22 VITALS — HEART RATE: 96 BPM | DIASTOLIC BLOOD PRESSURE: 47 MMHG | SYSTOLIC BLOOD PRESSURE: 85 MMHG

## 2018-01-22 VITALS — SYSTOLIC BLOOD PRESSURE: 95 MMHG | HEART RATE: 96 BPM | OXYGEN SATURATION: 95 % | DIASTOLIC BLOOD PRESSURE: 44 MMHG

## 2018-01-22 VITALS — SYSTOLIC BLOOD PRESSURE: 86 MMHG | HEART RATE: 94 BPM | DIASTOLIC BLOOD PRESSURE: 50 MMHG

## 2018-01-22 VITALS
DIASTOLIC BLOOD PRESSURE: 46 MMHG | SYSTOLIC BLOOD PRESSURE: 73 MMHG | HEART RATE: 84 BPM | OXYGEN SATURATION: 95 % | TEMPERATURE: 97.7 F

## 2018-01-22 VITALS — SYSTOLIC BLOOD PRESSURE: 81 MMHG | HEART RATE: 94 BPM | DIASTOLIC BLOOD PRESSURE: 51 MMHG

## 2018-01-22 VITALS — HEART RATE: 150 BPM | OXYGEN SATURATION: 95 %

## 2018-01-22 VITALS — OXYGEN SATURATION: 95 %

## 2018-01-22 LAB
BUN SERPL-MCNC: 41 MG/DL (ref 7–18)
BUN SERPL-MCNC: 55 MG/DL (ref 7–18)
CALCIUM SERPL-MCNC: 7.8 MG/DL (ref 8.5–10.1)
CALCIUM SERPL-MCNC: 8.3 MG/DL (ref 8.5–10.1)
CO2 SERPL-SCNC: 25 MMOL/L (ref 21–32)
CO2 SERPL-SCNC: 25 MMOL/L (ref 21–32)
CREAT SERPL-MCNC: 4.52 MG/DL (ref 0.6–1.2)
CREAT SERPL-MCNC: 5.93 MG/DL (ref 0.6–1.2)
EOSINOPHIL NFR BLD AUTO: 150 K/UL (ref 130–400)
GLUCOSE SERPL-MCNC: 119 MG/DL (ref 70–99)
GLUCOSE SERPL-MCNC: 142 MG/DL (ref 70–99)
HCT VFR BLD CALC: 22.9 % (ref 37–47)
HCT VFR BLD CALC: 23.4 % (ref 37–47)
HGB BLD-MCNC: 7.2 G/DL (ref 12–16)
HGB BLD-MCNC: 7.4 G/DL (ref 12–16)
MCH RBC QN AUTO: 32.3 PG (ref 25–34)
MCHC RBC AUTO-ENTMCNC: 32.3 G/DL (ref 32–36)
MCV RBC AUTO: 100 FL (ref 80–100)
NRBC BLD AUTO-RTO: 0.5 %
NUCLEATED RED BLOOD CELL ABS: 0.07 K/UL (ref 0–0)
PMV BLD AUTO: 9.6 FL (ref 7.4–10.4)
POTASSIUM SERPL-SCNC: 3.8 MMOL/L (ref 3.5–5.1)
POTASSIUM SERPL-SCNC: 4.1 MMOL/L (ref 3.5–5.1)
RED CELL DISTRIBUTION WIDTH CV: 15 % (ref 11.5–14.5)
RED CELL DISTRIBUTION WIDTH SD: 53.9 FL (ref 36.4–46.3)
SODIUM SERPL-SCNC: 135 MMOL/L (ref 136–145)
SODIUM SERPL-SCNC: 136 MMOL/L (ref 136–145)
WBC # BLD AUTO: 15.72 K/UL (ref 4.8–10.8)

## 2018-01-22 RX ADMIN — ALLOPURINOL SCH MG: 100 TABLET ORAL at 08:39

## 2018-01-22 RX ADMIN — DOCUSATE SODIUM SCH MG: 100 CAPSULE, LIQUID FILLED ORAL at 08:40

## 2018-01-22 RX ADMIN — ALUMINUM ZIRCONIUM TRICHLOROHYDREX GLY SCH EA: 0.2 STICK TOPICAL at 08:00

## 2018-01-22 RX ADMIN — VANCOMYCIN HYDROCHLORIDE SCH MG: 1 INJECTION, POWDER, LYOPHILIZED, FOR SOLUTION INTRAVENOUS at 08:45

## 2018-01-22 RX ADMIN — IPRATROPIUM BROMIDE AND ALBUTEROL SCH PUFFS: 20; 100 SPRAY, METERED RESPIRATORY (INHALATION) at 17:12

## 2018-01-22 RX ADMIN — IPRATROPIUM BROMIDE AND ALBUTEROL SCH PUFFS: 20; 100 SPRAY, METERED RESPIRATORY (INHALATION) at 08:41

## 2018-01-22 RX ADMIN — ALUMINUM ZIRCONIUM TRICHLOROHYDREX GLY SCH EA: 0.2 STICK TOPICAL at 16:00

## 2018-01-22 RX ADMIN — RENAGEL SCH MG: 800 TABLET ORAL at 08:39

## 2018-01-22 RX ADMIN — VANCOMYCIN HYDROCHLORIDE SCH MG: 1 INJECTION, POWDER, LYOPHILIZED, FOR SOLUTION INTRAVENOUS at 13:26

## 2018-01-22 RX ADMIN — RENAGEL SCH MG: 800 TABLET ORAL at 13:27

## 2018-01-22 RX ADMIN — ERTAPENEM SODIUM SCH MLS/HR: 1 INJECTION, POWDER, LYOPHILIZED, FOR SOLUTION INTRAMUSCULAR; INTRAVENOUS at 18:10

## 2018-01-22 RX ADMIN — Medication SCH MG: at 08:39

## 2018-01-22 RX ADMIN — Medication SCH ML: at 01:42

## 2018-01-22 RX ADMIN — INSULIN ASPART SCH UNITS: 100 INJECTION, SOLUTION INTRAVENOUS; SUBCUTANEOUS at 21:00

## 2018-01-22 RX ADMIN — ALBUMIN HUMAN SCH GM: 250 SOLUTION INTRAVENOUS at 09:26

## 2018-01-22 RX ADMIN — INSULIN ASPART SCH UNITS: 100 INJECTION, SOLUTION INTRAVENOUS; SUBCUTANEOUS at 08:38

## 2018-01-22 RX ADMIN — Medication SCH ML: at 08:40

## 2018-01-22 RX ADMIN — ATORVASTATIN CALCIUM SCH MG: 40 TABLET, FILM COATED ORAL at 08:39

## 2018-01-22 RX ADMIN — ALUMINUM ZIRCONIUM TRICHLOROHYDREX GLY SCH EA: 0.2 STICK TOPICAL at 00:00

## 2018-01-22 RX ADMIN — VANCOMYCIN HYDROCHLORIDE SCH MG: 1 INJECTION, POWDER, LYOPHILIZED, FOR SOLUTION INTRAVENOUS at 01:42

## 2018-01-22 RX ADMIN — SERTRALINE HYDROCHLORIDE SCH MG: 50 TABLET, FILM COATED ORAL at 08:39

## 2018-01-22 RX ADMIN — PANTOPRAZOLE SCH MG: 40 TABLET, DELAYED RELEASE ORAL at 08:41

## 2018-01-22 RX ADMIN — INSULIN ASPART SCH UNITS: 100 INJECTION, SOLUTION INTRAVENOUS; SUBCUTANEOUS at 13:25

## 2018-01-22 RX ADMIN — ALBUMIN HUMAN SCH GM: 250 SOLUTION INTRAVENOUS at 10:48

## 2018-01-22 RX ADMIN — METRONIDAZOLE SCH MLS/HR: 500 INJECTION, SOLUTION INTRAVENOUS at 18:10

## 2018-01-22 RX ADMIN — IPRATROPIUM BROMIDE AND ALBUTEROL SCH PUFFS: 20; 100 SPRAY, METERED RESPIRATORY (INHALATION) at 20:25

## 2018-01-22 RX ADMIN — INSULIN ASPART SCH UNITS: 100 INJECTION, SOLUTION INTRAVENOUS; SUBCUTANEOUS at 17:12

## 2018-01-22 RX ADMIN — Medication SCH ML: at 20:23

## 2018-01-22 RX ADMIN — Medication SCH MG: at 08:41

## 2018-01-22 RX ADMIN — HEPARIN SODIUM SCH UNIT: 10000 INJECTION, SOLUTION INTRAVENOUS; SUBCUTANEOUS at 05:59

## 2018-01-22 RX ADMIN — IPRATROPIUM BROMIDE AND ALBUTEROL SCH PUFFS: 20; 100 SPRAY, METERED RESPIRATORY (INHALATION) at 13:26

## 2018-01-22 RX ADMIN — VANCOMYCIN HYDROCHLORIDE SCH MG: 1 INJECTION, POWDER, LYOPHILIZED, FOR SOLUTION INTRAVENOUS at 20:23

## 2018-01-22 RX ADMIN — Medication SCH ML: at 13:26

## 2018-01-22 RX ADMIN — CLOPIDOGREL BISULFATE SCH MG: 75 TABLET, FILM COATED ORAL at 08:39

## 2018-01-22 RX ADMIN — ASCORBIC ACID, THIAMINE MONONITRATE,RIBOFLAVIN, NIACINAMIDE, PYRIDOXINE HYDROCHLORIDE, FOLIC ACID, CYANOCOBALAMIN, BIOTIN, CALCIUM PANTOTHENATE, SCH CAP: 100; 1.5; 1.7; 20; 10; 1; 6000; 150000; 5 CAPSULE, LIQUID FILLED ORAL at 08:40

## 2018-01-22 NOTE — DIAGNOSTIC IMAGING REPORT
CT SCAN OF THE ABDOMEN AND PELVIS WITH IV CONTRAST



CLINICAL HISTORY:   GI bleeding.



COMPARISON STUDY:  Abdominal CT dated 1/15/2018.



TECHNIQUE: Following the IV administration of  88 cc of Optiray 320, CT scan of

the abdomen and pelvis is performed from the lung bases to the proximal femora.

Images are reviewed in the axial, sagittal, and coronal planes. IV contrast was

administered without complication. A dose lowering technique was utilized

adhering to the principles of ALARA. 



CT DOSE: 1382.74 mGy.cm



FINDINGS:



Lung bases: The heart is enlarged and without pericardial effusion. The patient

is status post midline sternotomy and aortic valve surgery. The coronary

arteries are densely calcified. There are small pleural effusions, right larger

than left with associated bibasilar consolidation emphysematous change is

identified. The tip of the catheter terminates in the right atrium. Calcified

pleural plaque is present the right lung base.



Liver: The contrast-enhanced liver is cirrhotic in morphology and heterogeneous

in attenuation. There is hypertrophy of the left lobe and caudate and nodularity

of the surface contour. There is mild central intrahepatic biliary ductal

dilatation. The hepatic veins and portal veins are patent. There is a

splenorenal shunt.



Gallbladder: Surgically absent noting clips in the gallbladder fossa.



Spleen: The spleen is mildly enlarged measuring 13.5 cm in length.



Pancreas: Moderately atrophic and grossly unremarkable.



Adrenal glands: Unremarkable.



Kidneys: The contrast enhanced kidneys are atrophic and without hydronephrosis.

The kidneys enhance symmetrically. There are numerous bilateral nonobstructing

kidney stones. The largest is seen on the left and measures up to 11 mm. Renal

cysts and indeterminant cortical hypodensities measure up to 2.0 cm. No

enhancing mass lesion is suggested.



Abdominal vasculature: The abdominal aorta is normal in course and caliber

noting moderate to advanced atherosclerotic calcification.



Bowel: There are distended and fluid-filled loops of small bowel which measure

up to 4.7 cm in diameter. A transition point is suggested in the ventral pelvis

on image #316, and the appearance is consistent with a small bowel obstruction.

This is likely on the basis of adhesions. There is no pneumatosis intestinalis

or portal venous gas. No focally thick walled bowel loops are identified. The

appendix is  normal as visualized.



Peritoneum: There is no intraperitoneal free air or abdominal ascites. A

fat-containing hernia is present within the right ventral abdominal wall on

image #175. This may be related to a previous laparoscopy port.



Lymphadenopathy: None.



Pelvic viscera: The bladder is decompressed and grossly unremarkable. The uterus

is surgically absent. No adnexal lesion is seen.



Skeletal structures: The skeletal structures are osteopenic. There is moderate

lumbosacral spondylosis. No lytic or blastic lesions are seen. A compression

deformity is noted in T12.



Soft tissues: There is mild body wall edema.





IMPRESSION:



1. Findings are consistent with a small bowel obstruction. A transition point is

seen in the right pelvis and this is likely on the basis of adhesions.



2. There is no pneumatosis intestinalis, portal venous gas, or intraperitoneal

free air.



3. Cirrhotic liver morphology.



4. Splenomegaly.



5. There are small pleural effusions with bibasilar consolidation. This could

present atelectasis versus pneumonia. Clinical correlation will be required.



6. Cardiomegaly and emphysema.



7. Bilateral nephrolithiasis.



8. Additional findings as above.







Electronically signed by:  Ramírez Hernandez M.D.

1/22/2018 3:54 PM



Dictated Date/Time:  1/22/2018 3:45 PM

## 2018-01-22 NOTE — MEDICAL CONSULT
Consultation


Date of Consultation:


Jan 22, 2018.


Attending Physician:


Florida Lopez M.D.


Reason for Consultation:


sbo, c diff


History of Present Illness


pt adm from [facility] to ICU 1/16/18- hypotensive, septic from likely UTI.


     h/o EDRD, on HD. E coli UTI- transferred to PCU today. Has had some


     nonspecific abd pain- loose stools- now positive for C diff. Also CT shows


     dilated small bowel toward pelvis- partial sbo. No N/V. No prior surgery


     on abd.


currently in no distress- min abd pain





Family History





FH: heart disease


  MOTHER





Social History


Smoking Status:  Never Smoker


Smokeless Tobacco Use:  No


Alcohol Use:  none


Drug Use:  none


Marital Status:  





Allergies


Coded Allergies:  


     Hydrocodone (Verified  Allergy, Unknown, unspecified, 12/27/17)


     Morphine (Verified  Allergy, Unknown, unspecified , 12/27/17)





Current Inpatient Medications





Current Inpatient Medications








 Medications


  (Trade)  Dose


 Ordered  Sig/Daniel


 Route  Start Time


 Stop Time Status Last Admin


Dose Admin


 


 Acetaminophen


  (Tylenol Tab)  650 mg  Q4H  PRN


 PO  1/16/18 02:30


 2/15/18 02:29  1/18/18 14:57


650 MG


 


 Allopurinol


  (Zyloprim Tab)  100 mg  BID


 PO  1/16/18 09:00


 2/15/18 08:59 Future Hold 1/22/18 08:39


100 MG


 


 Aspirin


  (Ecotrin Tab)  81 mg  QAM


 PO  1/16/18 09:00


 2/15/18 08:59 Future Hold 1/22/18 08:39


81 MG


 


 Atorvastatin


 Calcium


  (Lipitor Tab)  40 mg  QAM


 PO  1/16/18 09:00


 2/15/18 08:59 Future Hold 1/22/18 08:39


40 MG


 


 Docusate Sodium


  (coLACE CAP)  100 mg  BID


 PO  1/16/18 09:00


 2/15/18 08:59 Future Hold 1/22/18 08:40


100 MG


 


 Albuterol/


 Ipratropium


  (Combivent


 Respimat Inh)  1 puffs  QID


 INH  1/16/18 09:00


 2/15/18 08:59  1/22/18 13:26


1 PUFFS


 


 Pantoprazole


 Sodium


  (Protonix Tab)  40 mg  DAILY


 PO  1/16/18 09:00


 2/15/18 08:59 Future Hold 1/22/18 08:41


40 MG


 


 Pramipexole


 Dihydrochloride


  (miraPEX TAB)  0.25 mg  HS


 PO  1/16/18 21:00


 2/15/18 20:59 Future Hold 1/21/18 20:58


0.25 MG


 


 Sertraline HCl


  (Zoloft Tab)  75 mg  DAILY


 PO  1/16/18 09:00


 2/15/18 08:59 Future Hold 1/22/18 08:39


75 MG


 


 Thiamine HCl


  (Vitamin B-1 Tab)  50 mg  DAILY


 PO  1/16/18 09:00


 2/15/18 08:59 Future Hold 1/22/18 08:41


50 MG


 


 Tramadol HCl


  (Ultram Tab)  50 mg  Q4H  PRN


 PO  1/16/18 02:45


 2/15/18 02:44 Future Hold 1/19/18 20:40


50 MG


 


 Vitamin B Complex/


 Vit C/Folic Acid


  (Nephrocaps)  1 cap  DAILY


 PO  1/16/18 09:00


 2/15/18 08:59 Future Hold 1/22/18 08:40


1 CAP


 


 Miscellaneous


 Information


  (Order Awaiting


 Action)  1 ea  QS


 N/A  1/16/18 08:00


 2/15/18 07:59  1/16/18 08:21


1 EA


 


 Miscellaneous


 Information


  (Order Awaiting


 Action)  1 ea  QS


 N/A  1/16/18 08:00


 2/15/18 07:59  1/16/18 08:21


1 EA


 


 Gabapentin


  (Neurontin Cap)  200 mg  HS


 PO  1/16/18 21:00


 2/15/18 20:59 Future Hold 1/21/18 20:58


200 MG


 


 Miscellaneous


 Information


  (Consult


 Glycemic


 Management


 Pharmacy)  1 ea  UD  PRN


 N/A  1/16/18 04:15


 2/15/18 04:14   


 


 


 Glucose


  (Glucose 40% Gel)  15-30


 GRAMS 15


 GRAMS...  UD  PRN


 PO  1/16/18 04:30


 2/15/18 04:29   


 


 


 Glucose


  (Glucose Chew


 Tab)  4-8


 Tablets 4


 Tabl...  UD  PRN


 PO  1/16/18 04:30


 2/15/18 04:29   


 


 


 Dextrose


  (Dextrose 50%


 50ML Syringe)  25-50ML OF


 50% DW IV


 FOR...  UD  PRN


 IV  1/16/18 04:30


 2/15/18 04:29  1/17/18 09:52


25 ML


 


 Glucagon


  (Glucagon Inj)  1 mg  UD  PRN


 SQ  1/16/18 04:30


 2/15/18 04:29   


 


 


 Ondansetron HCl


  (Zofran Inj)  4 mg  Q4H  PRN


 IV  1/16/18 18:15


 2/15/18 18:14  1/19/18 12:15


4 MG


 


 Clopidogrel


 Bisulfate


  (plAVix TAB)  75 mg  DAILY


 PO  1/17/18 09:00


 2/16/18 08:59 Future Hold 1/22/18 08:39


75 MG


 


 Heparin Sodium


  (Porcine)


  (Heparin Sq 5000


 Unit/0.5ml)  5,000 unit  Q8


 SQ  1/17/18 15:00


 2/16/18 14:59 Future Hold 1/22/18 05:59


5,000 UNIT


 


 Sevelamer HCl


  (Renagel Tab)  1,600 mg  TIDM


 PO  1/17/18 16:30


 2/16/18 16:29 Future Hold 1/22/18 13:27


1,600 MG


 


 Ertapenem 500 mg/


 Sodium Chloride  55 ml @ 


 110 mls/hr  DAILY@1800


 IV  1/18/18 18:00


 1/28/18 17:59  1/21/18 18:09


110 MLS/HR


 


 Guaifenesin


  (Organidin Nr


 Tab)  200 mg  Q8  PRN


 PO  1/20/18 09:30


 2/19/18 09:29 Future Hold  


 


 


 Vancomycin HCl


  (Vancomycin Oral


 Soln)  125 mg  Q6H


 PO  1/20/18 20:00


 2/3/18 19:59  1/22/18 13:26


125 MG


 


 Raspberry


  (Raspberry Syrup


 5ml Cup)  5 ml  Q6H


 PO  1/20/18 20:00


 2/3/18 19:59  1/22/18 13:26


5 ML


 


 Insulin Aspart


  (novoLOG ASPART)  **SLIDING


 SCALE**


 **G...  ACHS


 SC  1/22/18 07:00


 2/21/18 06:59  1/22/18 13:25


8 UNITS


 


 Epoetin Geovany


  (Procrit Inj)  10,000 units  TODAY@0800


 IV  1/22/18 08:00


 1/22/18 23:59  1/22/18 11:25


10,000 UNITS


 


 Ioversol


  (Optiray 320)  100 ml  UD  PRN


 IV  1/22/18 13:45


 1/26/18 13:44   


 


 


 Metronidazole 500


 mg/Prmx  100 ml @ 


 100 mls/hr  Q8H


 IV  1/22/18 18:00


 2/1/18 17:59   


 


 


 Pantoprazole


 Sodium 40 mg/


 Syringe  10 ml @ 5


 mls/min  DAILY


 IV  1/23/18 09:00


 2/22/18 08:59 UNV  


 











Review of Systems


Constitutional:  No fever, No chills


Respiratory:  No cough, No shortness of breath


Cardiovascular:  No chest pain


Abdomen:  + pain, + diarrhea, No nausea, No vomiting


Endocrine:  + fatigue


Integumentary:  No rash





Physical Exam











  Date Time  Temp Pulse Resp B/P (MAP) Pulse Ox O2 Delivery O2 Flow Rate FiO2


 


1/22/18 16:30      Nasal Cannula 2.0 


 


1/22/18 12:40 36.8 100  85/50 (62)    


 


1/22/18 12:15  96  86/49    


 


1/22/18 12:00     95 Nasal Cannula 2.0 


 


1/22/18 12:00  96  95/44    


 


1/22/18 11:45  96  85/47    


 


1/22/18 11:30  95  85/48    


 


1/22/18 11:15  95  83/49    


 


1/22/18 11:04 36.6 93 18 81/47 (58) 97   


 


1/22/18 11:00  93  80/47    


 


1/22/18 10:45  94  81/51    


 


1/22/18 10:30  93  91/47    


 


1/22/18 10:15  94  86/50    


 


1/22/18 10:00  93  75/46    


 


1/22/18 09:45  92  84/50    


 


1/22/18 09:30  92  95/56    


 


1/22/18 09:21  94  98/53    


 


1/22/18 09:13 36.6 94  90/49 (63)    


 


1/22/18 08:20     95 Nasal Cannula 2.0 


 


1/22/18 07:35 36.4 94 20 96/63 (74) 95 Nasal Cannula 2.0 


 


1/22/18 04:00      Nasal Cannula 2.0 


 


1/22/18 03:48 37.0 91 18 88/53 (65) 94   


 


1/22/18 00:24 36.7 89 20 78/53 (61) 93   


 


1/22/18 00:00      Nasal Cannula 2.0 


 


1/21/18 20:21 36.4 91 18 80/52 (61) 98 Nasal Cannula 2.0 


 


1/21/18 20:00      Nasal Cannula 2.0 








dry mucous mb


General Appearance:  no apparent distress


Head:  atraumatic


Eyes:  sclerae normal


Neck:  supple


Respiratory/Chest:  + rales, + rhonchi


Cardiovascular:  regular rate, rhythm


Abdomen/GI:  normal bowel sounds (no peritoneal irritation, mild pain to deep 

palpation), soft, + pertinent finding


Neurologic/Psych:  alert


Skin:  no rash





Laboratory Results





Last 24 Hours








Test


  1/21/18


19:59 1/22/18


06:31 1/22/18


09:00 1/22/18


11:40


 


Bedside Glucose 211 mg/dl  179 mg/dl   143 mg/dl 


 


Stool Occult Blood   POSITIVE  


 


Test


  1/22/18


13:05 1/22/18


16:34 


  


 


 


White Blood Count 15.72 K/uL    


 


Red Blood Count 2.29 M/uL    


 


Hemoglobin 7.4 g/dL    


 


Hematocrit 22.9 %    


 


Mean Corpuscular Volume 100.0 fL    


 


Mean Corpuscular Hemoglobin 32.3 pg    


 


Mean Corpuscular Hemoglobin


Concent 32.3 g/dl 


  


  


  


 


 


RDW Standard Deviation 53.9 fL    


 


RDW Coefficient of Variation 15.0 %    


 


Platelet Count 150 K/uL    


 


Mean Platelet Volume 9.6 fL    


 


Nucleated RBC Absolute Count


(auto) 0.07 K/uL 


  


  


  


 


 


Nucleated Red Blood Cells % 0.5 %    


 


Sodium Level 136 mmol/L    


 


Potassium Level 3.8 mmol/L    


 


Chloride Level 100 mmol/L    


 


Carbon Dioxide Level 25 mmol/L    


 


Anion Gap 11.0 mmol/L    


 


Blood Urea Nitrogen 41 mg/dl    


 


Creatinine 4.52 mg/dl    


 


Est Creatinine Clear Calc


Drug Dose 15.8 ml/min 


  


  


  


 


 


Estimated GFR (


American) 11.5 


  


  


  


 


 


Estimated GFR (Non-


American 9.9 


  


  


  


 


 


BUN/Creatinine Ratio 9.0    


 


Random Glucose 142 mg/dl    


 


Calcium Level 7.8 mg/dl    











Assessment & Plan


1/22/18- dilated small bowel, no N/V- being treated for c diff, recent sepsis 

from UTI


     h/o ESRD on hemodialysis. Try to avoid NG unless vomiting so we can try to


     continue po Vanco, on IV flagyl. Monitor progress- if worsens , may need 


     laparotomy.

## 2018-01-22 NOTE — INFECTIOUS DISEASE PROGRESS NT
Progress Note


Date of Service


2018.





Subjective


Pt evaluation today including:  conversation w/ patient, physical exam, chart 

review, lab review, review of studies, conversation w/ consultant, review of 

inpatient medication list


Still with some abdominal pain and diarrhea.  Otherwise offers no new 

complaints.  Remains afebrile.





   All Other Systems:  Reviewed and Negative





Medications





Current Inpatient Medications








 Medications


  (Trade)  Dose


 Ordered  Sig/Daniel


 Route  Start Time


 Stop Time Status Last Admin


Dose Admin


 


 Acetaminophen


  (Tylenol Tab)  650 mg  Q4H  PRN


 PO  18 02:30


 2/15/18 02:29  18 14:57


650 MG


 


 Allopurinol


  (Zyloprim Tab)  100 mg  BID


 PO  18 09:00


 2/15/18 08:59 Future Hold 18 08:39


100 MG


 


 Aspirin


  (Ecotrin Tab)  81 mg  QAM


 PO  18 09:00


 2/15/18 08:59 Future Hold 18 08:39


81 MG


 


 Atorvastatin


 Calcium


  (Lipitor Tab)  40 mg  QAM


 PO  18 09:00


 2/15/18 08:59 Future Hold 18 08:39


40 MG


 


 Docusate Sodium


  (coLACE CAP)  100 mg  BID


 PO  18 09:00


 2/15/18 08:59 Future Hold 18 08:40


100 MG


 


 Albuterol/


 Ipratropium


  (Combivent


 Respimat Inh)  1 puffs  QID


 INH  18 09:00


 2/15/18 08:59  18 20:25


1 PUFFS


 


 Pantoprazole


 Sodium


  (Protonix Tab)  40 mg  DAILY


 PO  18 09:00


 2/15/18 08:59 Future Hold 18 08:41


40 MG


 


 Pramipexole


 Dihydrochloride


  (miraPEX TAB)  0.25 mg  HS


 PO  18 21:00


 2/15/18 20:59 Future Hold 18 20:58


0.25 MG


 


 Sertraline HCl


  (Zoloft Tab)  75 mg  DAILY


 PO  18 09:00


 2/15/18 08:59 Future Hold 18 08:39


75 MG


 


 Thiamine HCl


  (Vitamin B-1 Tab)  50 mg  DAILY


 PO  18 09:00


 2/15/18 08:59 Future Hold 18 08:41


50 MG


 


 Tramadol HCl


  (Ultram Tab)  50 mg  Q4H  PRN


 PO  18 02:45


 2/15/18 02:44 Future Hold 18 20:40


50 MG


 


 Vitamin B Complex/


 Vit C/Folic Acid


  (Nephrocaps)  1 cap  DAILY


 PO  18 09:00


 2/15/18 08:59 Future Hold 18 08:40


1 CAP


 


 Miscellaneous


 Information


  (Order Awaiting


 Action)  1 ea  QS


 N/A  18 08:00


 2/15/18 07:59  18 08:21


1 EA


 


 Miscellaneous


 Information


  (Order Awaiting


 Action)  1 ea  QS


 N/A  18 08:00


 2/15/18 07:59  18 08:21


1 EA


 


 Gabapentin


  (Neurontin Cap)  200 mg  HS


 PO  18 21:00


 2/15/18 20:59 Future Hold 18 20:58


200 MG


 


 Miscellaneous


 Information


  (Consult


 Glycemic


 Management


 Pharmacy)  1 ea  UD  PRN


 N/A  18 04:15


 2/15/18 04:14   


 


 


 Glucose


  (Glucose 40% Gel)  15-30


 GRAMS 15


 GRAMS...  UD  PRN


 PO  18 04:30


 2/15/18 04:29   


 


 


 Glucose


  (Glucose Chew


 Tab)  4-8


 Tablets 4


 Tabl...  UD  PRN


 PO  18 04:30


 2/15/18 04:29   


 


 


 Dextrose


  (Dextrose 50%


 50ML Syringe)  25-50ML OF


 50% DW IV


 FOR...  UD  PRN


 IV  18 04:30


 2/15/18 04:29  18 09:52


25 ML


 


 Glucagon


  (Glucagon Inj)  1 mg  UD  PRN


 SQ  18 04:30


 2/15/18 04:29   


 


 


 Ondansetron HCl


  (Zofran Inj)  4 mg  Q4H  PRN


 IV  18 18:15


 2/15/18 18:14  18 12:15


4 MG


 


 Clopidogrel


 Bisulfate


  (plAVix TAB)  75 mg  DAILY


 PO  18 09:00


 18 08:59 Future Hold 18 08:39


75 MG


 


 Heparin Sodium


  (Porcine)


  (Heparin Sq 5000


 Unit/0.5ml)  5,000 unit  Q8


 SQ  18 15:00


 18 14:59 Future Hold 18 05:59


5,000 UNIT


 


 Sevelamer HCl


  (Renagel Tab)  1,600 mg  TIDM


 PO  18 16:30


 18 16:29 Future Hold 18 13:27


1,600 MG


 


 Ertapenem 500 mg/


 Sodium Chloride  55 ml @ 


 110 mls/hr  DAILY@1800


 IV  18 18:00


 18 17:59  18 18:10


110 MLS/HR


 


 Guaifenesin


  (Organidin Nr


 Tab)  200 mg  Q8  PRN


 PO  18 09:30


 18 09:29 Future Hold  


 


 


 Vancomycin HCl


  (Vancomycin Oral


 Soln)  125 mg  Q6H


 PO  18 20:00


 2/3/18 19:59  18 20:23


125 MG


 


 Raspberry


  (Raspberry Syrup


 5ml Cup)  5 ml  Q6H


 PO  18 20:00


 2/3/18 19:59  18 20:23


5 ML


 


 Insulin Aspart


  (novoLOG ASPART)  **SLIDING


 SCALE**


 **G...  ACHS


 SC  18 07:00


 18 06:59  18 17:12


2 UNITS


 


 Epoetin Geovany


  (Procrit Inj)  10,000 units  TODAY@0800


 IV  18 08:00


 18 23:59  18 11:25


10,000 UNITS


 


 Ioversol


  (Optiray 320)  100 ml  UD  PRN


 IV  18 13:45


 18 13:44   


 


 


 Metronidazole 500


 mg/Prmx  100 ml @ 


 100 mls/hr  Q8H


 IV  18 18:00


 18 17:59  18 18:10


100 MLS/HR


 


 Pantoprazole


 Sodium 40 mg/


 Syringe  10 ml @ 5


 mls/min  DAILY@1100


 IV  18 11:00


 18 10:59   


 











Objective


Vital Signs











  Date Time  Temp Pulse Resp B/P (MAP) Pulse Ox O2 Delivery O2 Flow Rate FiO2


 


18 18:20    83/51 (62)    


 


18 17:15 36.5 97 20 70/42 (51) 97 Nasal Cannula 2.0 


 


18 16:59  150   95   


 


18 16:30      Nasal Cannula 2.0 


 


18 12:40 36.8 100  85/50 (62)    


 


18 12:15  96  86/49    


 


18 12:00     95 Nasal Cannula 2.0 


 


18 12:00  96  95/44    


 


18 11:45  96  85/47    


 


18 11:30  95  85/48    


 


18 11:15  95  83/49    


 


18 11:04 36.6 93 18 81/47 (58) 97   


 


18 11:00  93  80/47    


 


18 10:45  94  81/51    


 


18 10:30  93  91/47    


 


18 10:15  94  86/50    


 


18 10:00  93  75/46    


 


18 09:45  92  84/50    


 


18 09:30  92  95/56    


 


18 09:21  94  98/53    


 


18 09:13 36.6 94  90/49 (63)    


 


18 08:20     95 Nasal Cannula 2.0 


 


18 07:35 36.4 94 20 96/63 (74) 95 Nasal Cannula 2.0 


 


18 04:00      Nasal Cannula 2.0 


 


18 03:48 37.0 91 18 88/53 (65) 94   


 


18 00:24 36.7 89 20 78/53 (61) 93   


 


18 00:00      Nasal Cannula 2.0 











Physical Exam


General Appearance:  WD/WN, no apparent distress


Eyes:  normal inspection, EOMI, sclerae normal


ENT:  normal ENT inspection, pharynx normal


Neck:  supple, no adenopathy, trachea midline


Respiratory/Chest:  chest non-tender, lungs clear, normal breath sounds, no 

respiratory distress


Cardiovascular:  regular rate, rhythm, no gallop, no murmur


Abdomen:  normal bowel sounds, soft, no organomegaly, + tenderness


Extremities:  non-tender, no calf tenderness


Neurologic/Psychiatric:  alert, oriented x 3


Skin:  normal color, warm/dry, no rash


Lymphatic:  no adenopathy





Laboratory Results





--------------------------------------------------------------------------------

------------





RUN DATE: 18                [facility]             

    PAGE 1   


RUN TIME:                             Specimen Inquiry                    


--------------------------------------------------------------------------------

------------





PATIENT: JORDON RAMOS                 ACCT #: [id number] LOC:  NAMT       U #

: [id number]


                                       AGE/SX: 57/F         ROOM: [facility]       REG

: 18


REG DR:  Florida Lopez M.D.       :    1960   BED:  1          DIS

:         


                                       STATUS: ADM IN       TLOC:           


--------------------------------------------------------------------------------

------------








SPEC #: [id number]       OSMIN: 18-     STATUS:  COMP           REQ 

#: [id number]


                            RECD: 18-     SUBM DR: Giovanny Del Valle M.D.       


SOURCE: STOOL               ENTR:      OT DR: Adrian Jackson MD    

            


SPDC:                                                      Florida Lopez M.D.       


                                                             No Doctor, Assigned


                                                             Kim Wallace, Kyler Blanco

, IVETH


ORDERED:  CDIFF TOXIN B                                                        

   


COMMENTS: Has Specimen Been Obtained/Collected? Y                     


          I have reviewed the C. diff order recommendations Y


--------------------------------------------------------------------------------

------------





  Procedure                         Result                         Verified    

       Site


--------------------------------------------------------------------------------

------------





  CDIFF TOXIN B GENE*(2 YR OR >)  Final                            18-


        Positive for C. difficile toxin B gene                 





        RESULTS WERE ALSO CALLED TO [facility]


        INFECTION CONTROL ANSWERING MACHINE ON 18 BY SARAVANAN.





--------------------------------------------------------------------------------

------------




















Last 24 Hours








Test


  18


06:31 18


09:00 18


11:40 18


13:05


 


Bedside Glucose 179 mg/dl   143 mg/dl  


 


Stool Occult Blood  POSITIVE   


 


White Blood Count    15.72 K/uL 


 


Red Blood Count    2.29 M/uL 


 


Hemoglobin    7.4 g/dL 


 


Hematocrit    22.9 % 


 


Mean Corpuscular Volume    100.0 fL 


 


Mean Corpuscular Hemoglobin    32.3 pg 


 


Mean Corpuscular Hemoglobin


Concent 


  


  


  32.3 g/dl 


 


 


RDW Standard Deviation    53.9 fL 


 


RDW Coefficient of Variation    15.0 % 


 


Platelet Count    150 K/uL 


 


Mean Platelet Volume    9.6 fL 


 


Nucleated RBC Absolute Count


(auto) 


  


  


  0.07 K/uL 


 


 


Nucleated Red Blood Cells %    0.5 % 


 


Sodium Level    136 mmol/L 


 


Potassium Level    3.8 mmol/L 


 


Chloride Level    100 mmol/L 


 


Carbon Dioxide Level    25 mmol/L 


 


Anion Gap    11.0 mmol/L 


 


Blood Urea Nitrogen    41 mg/dl 


 


Creatinine    4.52 mg/dl 


 


Est Creatinine Clear Calc


Drug Dose 


  


  


  15.8 ml/min 


 


 


Estimated GFR (


American) 


  


  


  11.5 


 


 


Estimated GFR (Non-


American 


  


  


  9.9 


 


 


BUN/Creatinine Ratio    9.0 


 


Random Glucose    142 mg/dl 


 


Calcium Level    7.8 mg/dl 


 


Test


  18


16:34 18


20:05 


  


 


 


Bedside Glucose 180 mg/dl    


 


Hemoglobin  7.2 g/dL   


 


Hematocrit  23.4 %   











Assessment and Plan


Urinary tract infection with ESBL producing E coli, as well as now C difficile 

colitis.  Patient should be treated with combination of ertapenem IV and oral 

vancomycin.  Will require 2 weeks for vancomycin, and likely in the range of 10 

days for ertapenem.  Will follow.

## 2018-01-22 NOTE — DIAGNOSTIC IMAGING REPORT
KUB



CLINICAL HISTORY: Generalized abdominal pain. GI bleeding.



FINDINGS: An AP, portable, supine abdominal radiograph is compared to study

dated 1/16/2018 and correlated with abdominal CT dated 1/15/2018. There is

moderate gaseous distention of the small bowel loops. These measure up to 4 cm

in diameter. No evidence of intracranial free air seen on this supine

examination. There is no significant colonic distention. Cholecystectomy clips

are present in the right upper quadrant. The skeletal structures are osteopenic.

Degenerative change is noted in the spine.



IMPRESSION: There is nonspecific gaseous distention of the small bowel.

Correlate clinically for evidence of obstruction.







Electronically signed by:  Ramírez Hernandez M.D.

1/22/2018 4:07 PM



Dictated Date/Time:  1/22/2018 4:05 PM

## 2018-01-22 NOTE — PROGRESS NOTE
Medicine Progress Note


Date & Time of Visit:


Jan 22, 2018 at 13:39.


Subjective


Pt was seen and examined


Lying in bed with no distress


She said that she continues to have abdominal pain


Pt said that diarrhea does not improve


Pt had a large bowel movement today that was positive for blood


Blood pressure remains to be in the low side


She had HD today


Denies any chest pain, palpitation, dizziness





Objective





Last 8 Hrs








  Date Time  Temp Pulse Resp B/P (MAP) Pulse Ox O2 Delivery O2 Flow Rate FiO2


 


1/22/18 12:00     95 Nasal Cannula 2.0 


 


1/22/18 11:45  96  85/47    


 


1/22/18 11:30  95  85/48    


 


1/22/18 11:15  95  83/49    


 


1/22/18 11:04 36.6 93 18 81/47 (58) 97   


 


1/22/18 11:00  93  80/47    


 


1/22/18 10:45  94  81/51    


 


1/22/18 10:30  93  91/47    


 


1/22/18 10:15  94  86/50    


 


1/22/18 10:00  93  75/46    


 


1/22/18 09:45  92  84/50    


 


1/22/18 09:30  92  95/56    


 


1/22/18 09:21  94  98/53    


 


1/22/18 09:13 36.6 94  90/49 (63)    


 


1/22/18 08:20     95 Nasal Cannula 2.0 


 


1/22/18 07:35 36.4 94 20 96/63 (74) 95 Nasal Cannula 2.0 








Physical Exam:


General-complaint of abdominal pain


Head-  atraumatic


Eyes- PERRL, EOMI


ENT- oropharynx clear


Neck- supple, no JVD


Lungs- +crackles


Heart- regular rhythm


Abdomen- normal bowel sounds, +tenderness


Extremities-No calf tenderness


Neuro- alert, oriented, PERRL, EOMI;


Skin- warm & dry


Laboratory Results:





Last 24 Hours








Test


  1/21/18


16:03 1/21/18


19:59 1/22/18


06:31 1/22/18


09:00


 


Bedside Glucose 185 mg/dl  211 mg/dl  179 mg/dl  


 


Stool Occult Blood    POSITIVE 


 


Test


  1/22/18


11:40 1/22/18


13:05 


  


 


 


Bedside Glucose 143 mg/dl    


 


White Blood Count  15.72 K/uL   


 


Red Blood Count  2.29 M/uL   


 


Hemoglobin  7.4 g/dL   


 


Hematocrit  22.9 %   


 


Mean Corpuscular Volume  100.0 fL   


 


Mean Corpuscular Hemoglobin  32.3 pg   


 


Mean Corpuscular Hemoglobin


Concent 


  32.3 g/dl 


  


  


 


 


RDW Standard Deviation  53.9 fL   


 


RDW Coefficient of Variation  15.0 %   


 


Platelet Count  150 K/uL   


 


Mean Platelet Volume  9.6 fL   


 


Nucleated RBC Absolute Count


(auto) 


  0.07 K/uL 


  


  


 


 


Nucleated Red Blood Cells %  0.5 %   











Assessment & Plan


Hypotension


Possible related to sepsis


Was transferred from [facility] to [facility] ICU


Elevated lactic acid and procalcitonin


CXR showed mild pulmonary edema which has improved since exam of January 1, 2018.Mild bibasilar opacities which favor atelectasis.


Influenza negative


BP remains low on Mildodrine


On empirical abx with Vanco and Zosyn


Blood cx and urine cx pending


Repeat lactic acid


Continue monitor in ICU 


1/22


BP on the low side 


had HD this morning


Urine cx positive for ESBL


Blood cx no growth


ID on board  recommended to continue Ertapenem for 10 days 


Continue monitor in tele


Consider cortisol stim test to see to r/o adrenal insufficiency





UTI


ESBL on Ertapenem


ID on board  recommended to continue Ertapenem for 10 days 


Monitor CBC





Diarrhea/CDIFF


Stool positive for Ciff


Continue PO vanco will need it for 2 weeks


Monitor electrolytes





ESRD on HD


Had HD today


Continue monitor BMP


nephrology on board and OK with CT with contrast 


Might need to get HD within 24 hrs due to the contrast





Abdominal Pain


GI bleed


Stool positive for Cdiff


Positive FOBT


KUB showed prominent loop of small bowel within the left mid abdomen with no 

convincing evidence for small bowel obstruction. 


Pain improved with tramadol


Will get a CT abdomen with contrast


Hemoglobin dropped


will monitor h/h


type and cross


GI consult





Obesity


Counseling on diet and exercise





DM type II


Elevated BS


Hba1c 6.5


On insulin coverage and Lantus





Chronic Anemia 


Hgb 7.4 today


Positive FOBT


D/C subq heparin


Continue monitor CBC





CHF


CXR showed mild pulmonary edema which has improved since exam of January 1, 2018.


HD done today and only removed 1L of fluid due to Low BP


Continue  monitor for signs of CHF





DVT px D/C heparin subq due to low hemoglobin and positive FOBT





CODE STATUS FULL CODE


Consultants:


Intensivist


Nephro


Current Inpatient Medications:





Current Inpatient Medications








 Medications


  (Trade)  Dose


 Ordered  Sig/Daniel


 Route  Start Time


 Stop Time Status Last Admin


Dose Admin


 


 Acetaminophen


  (Tylenol Tab)  650 mg  Q4H  PRN


 PO  1/16/18 02:30


 2/15/18 02:29  1/18/18 14:57


650 MG


 


 Allopurinol


  (Zyloprim Tab)  100 mg  BID


 PO  1/16/18 09:00


 2/15/18 08:59  1/22/18 08:39


100 MG


 


 Aspirin


  (Ecotrin Tab)  81 mg  QAM


 PO  1/16/18 09:00


 2/15/18 08:59  1/22/18 08:39


81 MG


 


 Atorvastatin


 Calcium


  (Lipitor Tab)  40 mg  QAM


 PO  1/16/18 09:00


 2/15/18 08:59  1/22/18 08:39


40 MG


 


 Docusate Sodium


  (coLACE CAP)  100 mg  BID


 PO  1/16/18 09:00


 2/15/18 08:59  1/22/18 08:40


100 MG


 


 Albuterol/


 Ipratropium


  (Combivent


 Respimat Inh)  1 puffs  QID


 INH  1/16/18 09:00


 2/15/18 08:59  1/22/18 13:26


1 PUFFS


 


 Pantoprazole


 Sodium


  (Protonix Tab)  40 mg  DAILY


 PO  1/16/18 09:00


 2/15/18 08:59  1/22/18 08:41


40 MG


 


 Pramipexole


 Dihydrochloride


  (miraPEX TAB)  0.25 mg  HS


 PO  1/16/18 21:00


 2/15/18 20:59  1/21/18 20:58


0.25 MG


 


 Sertraline HCl


  (Zoloft Tab)  75 mg  DAILY


 PO  1/16/18 09:00


 2/15/18 08:59  1/22/18 08:39


75 MG


 


 Thiamine HCl


  (Vitamin B-1 Tab)  50 mg  DAILY


 PO  1/16/18 09:00


 2/15/18 08:59  1/22/18 08:41


50 MG


 


 Tramadol HCl


  (Ultram Tab)  50 mg  Q4H  PRN


 PO  1/16/18 02:45


 2/15/18 02:44  1/19/18 20:40


50 MG


 


 Vitamin B Complex/


 Vit C/Folic Acid


  (Nephrocaps)  1 cap  DAILY


 PO  1/16/18 09:00


 2/15/18 08:59  1/22/18 08:40


1 CAP


 


 Miscellaneous


 Information


  (Order Awaiting


 Action)  1 ea  QS


 N/A  1/16/18 08:00


 2/15/18 07:59  1/16/18 08:21


1 EA


 


 Miscellaneous


 Information


  (Order Awaiting


 Action)  1 ea  QS


 N/A  1/16/18 08:00


 2/15/18 07:59  1/16/18 08:21


1 EA


 


 Gabapentin


  (Neurontin Cap)  200 mg  HS


 PO  1/16/18 21:00


 2/15/18 20:59  1/21/18 20:58


200 MG


 


 Miscellaneous


 Information


  (Consult


 Glycemic


 Management


 Pharmacy)  1 ea  UD  PRN


 N/A  1/16/18 04:15


 2/15/18 04:14   


 


 


 Glucose


  (Glucose 40% Gel)  15-30


 GRAMS 15


 GRAMS...  UD  PRN


 PO  1/16/18 04:30


 2/15/18 04:29   


 


 


 Glucose


  (Glucose Chew


 Tab)  4-8


 Tablets 4


 Tabl...  UD  PRN


 PO  1/16/18 04:30


 2/15/18 04:29   


 


 


 Dextrose


  (Dextrose 50%


 50ML Syringe)  25-50ML OF


 50% DW IV


 FOR...  UD  PRN


 IV  1/16/18 04:30


 2/15/18 04:29  1/17/18 09:52


25 ML


 


 Glucagon


  (Glucagon Inj)  1 mg  UD  PRN


 SQ  1/16/18 04:30


 2/15/18 04:29   


 


 


 Ondansetron HCl


  (Zofran Inj)  4 mg  Q4H  PRN


 IV  1/16/18 18:15


 2/15/18 18:14  1/19/18 12:15


4 MG


 


 Clopidogrel


 Bisulfate


  (plAVix TAB)  75 mg  DAILY


 PO  1/17/18 09:00


 2/16/18 08:59  1/22/18 08:39


75 MG


 


 Heparin Sodium


  (Porcine)


  (Heparin Sq 5000


 Unit/0.5ml)  5,000 unit  Q8


 SQ  1/17/18 15:00


 2/16/18 14:59 Future Hold 1/22/18 05:59


5,000 UNIT


 


 Sevelamer HCl


  (Renagel Tab)  1,600 mg  TIDM


 PO  1/17/18 16:30


 2/16/18 16:29  1/22/18 13:27


1,600 MG


 


 Ertapenem 500 mg/


 Sodium Chloride  55 ml @ 


 110 mls/hr  DAILY@1800


 IV  1/18/18 18:00


 1/28/18 17:59  1/21/18 18:09


110 MLS/HR


 


 Guaifenesin


  (Organidin Nr


 Tab)  200 mg  Q8  PRN


 PO  1/20/18 09:30


 2/19/18 09:29   


 


 


 Vancomycin HCl


  (Vancomycin Oral


 Soln)  125 mg  Q6H


 PO  1/20/18 20:00


 2/3/18 19:59  1/22/18 13:26


125 MG


 


 Raspberry


  (Raspberry Syrup


 5ml Cup)  5 ml  Q6H


 PO  1/20/18 20:00


 2/3/18 19:59  1/22/18 13:26


5 ML


 


 Albumin Human


  (Albumin 25%)  12.5 gm  0800,0930


 IV  1/22/18 08:00


 1/22/18 16:00  1/22/18 10:48


12.5 GM


 


 Insulin Aspart


  (novoLOG ASPART)  **SLIDING


 SCALE**


 **G...  ACHS


 SC  1/22/18 07:00


 2/21/18 06:59  1/22/18 13:25


8 UNITS


 


 Epoetin Geovany


  (Procrit Inj)  10,000 units  TODAY@0800


 IV  1/22/18 08:00


 1/22/18 23:59  1/22/18 11:25


10,000 UNITS

## 2018-01-22 NOTE — NEPHROLOGY PROGRESS NOTE
Nephrology Progress Note


Date of Service:


Jan 22, 2018.


Subjective


56 yo female with ecoli uti and now with cdiff with systolics in the 80s.  pt 

mentating well. complaining of diarrhea and abdominal pain.





Objective











  Date Time  Temp Pulse Resp B/P (MAP) Pulse Ox O2 Delivery O2 Flow Rate FiO2


 


1/22/18 04:00      Nasal Cannula 2.0 


 


1/22/18 03:48 37.0 91 18 88/53 (65) 94   


 


1/22/18 00:24 36.7 89 20 78/53 (61) 93   


 


1/22/18 00:00      Nasal Cannula 2.0 


 


1/21/18 20:21 36.4 91 18 80/52 (61) 98 Nasal Cannula 2.0 


 


1/21/18 20:00      Nasal Cannula 2.0 


 


1/21/18 16:33 36.5 87 18 85/55 (65) 99 Nasal Cannula 2.0 


 


1/21/18 16:00      Nasal Cannula 2.0 


 


1/21/18 12:00      Nasal Cannula 2.0 


 


1/21/18 11:10 36.5 89 20 83/53 (63) 98 Nasal Cannula 2.0 


 


1/21/18 08:00      Nasal Cannula 2.0 








Physical Exam:


General-aaox3, obese


Eyes-no scleral icterus


ENT-mmm


Neck-supple


Lungs-+ rhonchi


Heart-regular


Abdomen-+lower quadrant abdominal pain


Extremities-no c/c, +bruising of right ankle and has swelling in legs and left 

arm


Neuro-nonfocal





Current Inpatient Medications








 Medications


  (Trade)  Dose


 Ordered  Sig/Daniel


 Route  Start Time


 Stop Time Status Last Admin


Dose Admin


 


 Acetaminophen


  (Tylenol Tab)  650 mg  Q4H  PRN


 PO  1/16/18 02:30


 2/15/18 02:29  1/18/18 14:57


650 MG


 


 Allopurinol


  (Zyloprim Tab)  100 mg  BID


 PO  1/16/18 09:00


 2/15/18 08:59  1/21/18 20:58


100 MG


 


 Aspirin


  (Ecotrin Tab)  81 mg  QAM


 PO  1/16/18 09:00


 2/15/18 08:59  1/21/18 09:00


81 MG


 


 Atorvastatin


 Calcium


  (Lipitor Tab)  40 mg  QAM


 PO  1/16/18 09:00


 2/15/18 08:59  1/21/18 09:00


40 MG


 


 Docusate Sodium


  (coLACE CAP)  100 mg  BID


 PO  1/16/18 09:00


 2/15/18 08:59  1/18/18 21:20


100 MG


 


 Albuterol/


 Ipratropium


  (Combivent


 Respimat Inh)  1 puffs  QID


 INH  1/16/18 09:00


 2/15/18 08:59  1/21/18 20:57


1 PUFFS


 


 Pantoprazole


 Sodium


  (Protonix Tab)  40 mg  DAILY


 PO  1/16/18 09:00


 2/15/18 08:59  1/21/18 09:00


40 MG


 


 Pramipexole


 Dihydrochloride


  (miraPEX TAB)  0.25 mg  HS


 PO  1/16/18 21:00


 2/15/18 20:59  1/21/18 20:58


0.25 MG


 


 Sertraline HCl


  (Zoloft Tab)  75 mg  DAILY


 PO  1/16/18 09:00


 2/15/18 08:59  1/21/18 09:00


75 MG


 


 Thiamine HCl


  (Vitamin B-1 Tab)  50 mg  DAILY


 PO  1/16/18 09:00


 2/15/18 08:59  1/21/18 09:00


50 MG


 


 Tramadol HCl


  (Ultram Tab)  50 mg  Q4H  PRN


 PO  1/16/18 02:45


 2/15/18 02:44  1/19/18 20:40


50 MG


 


 Vitamin B Complex/


 Vit C/Folic Acid


  (Nephrocaps)  1 cap  DAILY


 PO  1/16/18 09:00


 2/15/18 08:59  1/21/18 09:00


1 CAP


 


 Miscellaneous


 Information


  (Order Awaiting


 Action)  1 ea  QS


 N/A  1/16/18 08:00


 2/15/18 07:59  1/16/18 08:21


1 EA


 


 Miscellaneous


 Information


  (Order Awaiting


 Action)  1 ea  QS


 N/A  1/16/18 08:00


 2/15/18 07:59  1/16/18 08:21


1 EA


 


 Gabapentin


  (Neurontin Cap)  200 mg  HS


 PO  1/16/18 21:00


 2/15/18 20:59  1/21/18 20:58


200 MG


 


 Miscellaneous


 Information


  (Consult


 Glycemic


 Management


 Pharmacy)  1 ea  UD  PRN


 N/A  1/16/18 04:15


 2/15/18 04:14   


 


 


 Glucose


  (Glucose 40% Gel)  15-30


 GRAMS 15


 GRAMS...  UD  PRN


 PO  1/16/18 04:30


 2/15/18 04:29   


 


 


 Glucose


  (Glucose Chew


 Tab)  4-8


 Tablets 4


 Tabl...  UD  PRN


 PO  1/16/18 04:30


 2/15/18 04:29   


 


 


 Dextrose


  (Dextrose 50%


 50ML Syringe)  25-50ML OF


 50% DW IV


 FOR...  UD  PRN


 IV  1/16/18 04:30


 2/15/18 04:29  1/17/18 09:52


25 ML


 


 Glucagon


  (Glucagon Inj)  1 mg  UD  PRN


 SQ  1/16/18 04:30


 2/15/18 04:29   


 


 


 Ondansetron HCl


  (Zofran Inj)  4 mg  Q4H  PRN


 IV  1/16/18 18:15


 2/15/18 18:14  1/19/18 12:15


4 MG


 


 Clopidogrel


 Bisulfate


  (plAVix TAB)  75 mg  DAILY


 PO  1/17/18 09:00


 2/16/18 08:59  1/21/18 09:00


75 MG


 


 Heparin Sodium


  (Porcine)


  (Heparin Sq 5000


 Unit/0.5ml)  5,000 unit  Q8


 SQ  1/17/18 15:00


 2/16/18 14:59  1/22/18 05:59


5,000 UNIT


 


 Sevelamer HCl


  (Renagel Tab)  1,600 mg  TIDM


 PO  1/17/18 16:30


 2/16/18 16:29  1/21/18 17:59


1,600 MG


 


 Ertapenem 500 mg/


 Sodium Chloride  55 ml @ 


 110 mls/hr  DAILY@1800


 IV  1/18/18 18:00


 1/28/18 17:59  1/21/18 18:09


110 MLS/HR


 


 Guaifenesin


  (Organidin Nr


 Tab)  200 mg  Q8  PRN


 PO  1/20/18 09:30


 2/19/18 09:29   


 


 


 Vancomycin HCl


  (Vancomycin Oral


 Soln)  125 mg  Q6H


 PO  1/20/18 20:00


 2/3/18 19:59  1/22/18 01:42


125 MG


 


 Raspberry


  (Raspberry Syrup


 5ml Cup)  5 ml  Q6H


 PO  1/20/18 20:00


 2/3/18 19:59  1/22/18 01:42


5 ML


 


 Heparin Sodium


  (Porcine)


  (No Heparin In


 Dialysis)  1 ea  ONE  ONCE


 N/A  1/22/18 08:00


 1/22/18 08:01   


 


 


 Albumin Human


  (Albumin 25%)  12.5 gm  0800,0930


 IV  1/22/18 08:00


 1/22/18 16:00   


 


 


 Insulin Aspart


  (novoLOG ASPART)  **SLIDING


 SCALE**


 **G...  ACHS


 SC  1/22/18 07:00


 2/21/18 06:59   


 











Last 24 Hours








Test


  1/21/18


11:13 1/21/18


16:03 1/21/18


19:59


 


Bedside Glucose 149 mg/dl  185 mg/dl  211 mg/dl 











Assessment & Plan


ESRD-bp at outpt unit and previous hospitalization was in the 100s usually.  

has been persistently in the 80s during this hospitilization.  echo showed ef 

65 to 70% with mild to mod mr and moderate mitral annular calcification.  tsh 

2.16.  blood cultures negative. on antibiotics for uti and also on medication 

for cdiff.  will discuss further with hospitalist.  would recommend obtaining 

cortisol stim test to see if element of adrenal insufficiency.  for dialysis 

today. 





Anemia of Renal Failure-goal hg of 10 to 11 and will give procrit today.  





UBALDO-phos levels are high. currently on phosphate binders. will continue to 

monitor.

## 2018-01-22 NOTE — GASTROINTESTINAL CONSULTATION
Gastrointestinal Consultation


Date of Consultation:  Jan 22, 2018


Attending Physician:  John


Consulting Physician:  Sherman


Reason for Consultation:  c.diff, abd pain


History of Present Illness


Patient is a 57 year old female w/ history of ESRD on dialysis, aortic valve 

replacement on ASA, plavix who was a transfer from [facility] for hypotension and 

vomiting after dialysis, at [facility] treated for UTI w/ ESBL producing E coli on 

ertapenem IV, developed diarrhea which was c.diff positive and is now on PO 

vancomycin. GI was asked to evaluate the pt for abdominal pain, rectal bleeding 

and c.diff. Pt was seen and evaluated, chart reviewed. Discussed with RN. Pt 

notes that about four days ago, she developed bilateral lower quadrant 

abdominal pain. Explained as cramping, worse after PO intake, slightly better 

with BM and worse at night. Pain has been persisted, two days ago developed 

loose stools. Stool for c.diff was checked and positive. Notes she is having 3 

BMs daily, loose without blood. Review of records show 3-8 loose stools daily, 

reports as black and trevor in color. She denies rectal pain. No UGI pain. No 

GERD, nausea. Did have an isolated episode of vomiting prior to admission, none 

sense. She is tolerating diet. No fever, chills, CP, SOB





NSAIDs: ASA daily


ABX: current


Other: Plavix, heparin gtt during admission





Colonoscopy: none


EGD: none 


 


Chest XR 1/17/18: No significant change from the preceding study Persistent 

mild pulmonary vascular congestion/edema.


KUB 1/16/18: Prominent loop of small bowel within the left mid abdomen with no 

convincing evidence for small bowel obstruction.





Past Medical/Surgical History


abd pain, diarrhea


Past Medical History:


T2DM, HTN, CHF, Pulm HTN, Mitral Valve Stenosis, ESRD on dialysis. Depression, 

Asthma, Dyslipidemia, Recurrent UTI, CKD, c.diff


Past Surgical History:


WANDY, Cholecystectomy, Aortic Valve Replacement





Family History





FH: heart disease


  MOTHER





Social History


Smoking Status:  Never Smoker


Drug Use:  none


Marital Status:  





Allergies


Coded Allergies:  


     Hydrocodone (Verified  Allergy, Unknown, unspecified, 12/27/17)


     Morphine (Verified  Allergy, Unknown, unspecified , 12/27/17)





Current Medications





Home Meds and Scripts








 Medications  Dose


 Route/Sig


 Max Daily Dose Days Date Category


 


 Neurontin


  (Gabapentin) 100


 Mg Cap  2 Cap


 PO HS


   30 1/16/18 Reported


 


 Levaquin


  (Levofloxacin)


 500 Mg Tab  500 Mg


 PO DAILY


   7 1/16/18 Reported


 


 Combivent


 Respimat


  (Ipratropium-Albuterol)


 1 Aer Aer  1 Puffs


 INH QID


   30 1/6/18 Rx


 


 Breo Ellipta


  (Fluticasone


 Furoate-Vilanterol)


 1 Inh Inh  1 Puff


 INH BID


   30 1/6/18 Rx


 


 Humulin N


  (Insulin Human


 NPH) 100 Units/Ml


 Susp  40 Units


 SC UD


   8 1/5/18 Rx


 


 Ultram (Tramadol


 HCl) 50 Mg Tab  50 Mg


 PO Q4H PRN


    12/27/17 Reported


 


 Nephrocaps


  (Vitamin B


 Complex/Vit


 C/Folic Acid)  Cap  1 Cap


 PO DAILY


    12/27/17 Reported


 


 Proair Respiclick


  (Albuterol


 Sulfate) 108


 Mcg/Act Aer  2 Puffs


 INH  PRN


    12/27/17 Reported


 


 Protonix


  (Pantoprazole


 Sodium) 40 Mg Tab  40 Mg


 PO DAILY


    12/27/17 Reported


 


 Aspirin Chewable


  (Aspirin) 81 Mg


 Chew  81 Mg


 PO DAILY


    12/27/17 Reported


 


 Plavix


  (Clopidogrel


 Bisulfate) 75 Mg


 Tab  75 Mg


 PO DAILY


    12/27/17 Reported


 


 Zoloft


  (Sertraline HCl)


 50 Mg Tab  75 Mg


 PO DAILY


    12/27/17 Reported


 


 Lipitor


  (Atorvastatin


 Calcium) 40 Mg Tab  40 Mg


 PO DAILY


    12/27/17 Reported


 


 Zyloprim


  (Allopurinol) 100


 Mg Tab  100 Mg


 PO BID


    12/27/17 Reported


 


 Aciphex


  (Rabeprazole


 Sodium) 20 Mg Tab  20 Mg


 PO DAILY


    12/27/17 Reported


 


 Vitamin B-1


  (Thiamine HCl) 50


 Mg Tab  50 Mg


 PO DAILY


    12/27/17 Reported


 


 Mirapex


  (Pramipexole


 Dihydrochloride)


 0.125 Mg Tab  0.25 Mg


 PO HS


    12/27/17 Reported


 


 Colace (Docusate


 Sodium) 100 Mg Cap  1 Cap


 PO BID


    12/27/17 Reported


 


 Renvela


  (Sevelamer


 Carbonate) 800 Mg


 Tab  1 Tab


 PO TID


    12/27/17 Reported


 


 Auryxia (Ferric


 Citrate) 210 Mg


 Tab  1 Tab


 PO DAILY


    12/27/17 Reported


 


 Mircera (Methoxy


 Polyethylene


 Glycol-Ep) 150


 Mcg/0.3 Ml Sol  1 Tab


 PO TID


    12/27/17 Reported


 


 Venofer (Iron


 Sucrose) 100 Mg/5


 Ml Inj  100 Mg


 IV


    12/27/17 Reported


 


 Venofer (Iron


 Sucrose) 20 Mg/Ml


 Inj  50 Mg


 IV


    12/27/17 Reported











Review of Systems


Constitutional:  No fever, No chills


Respiratory:  + cough, + shortness of breath


Cardiac:  No chest pain


Abdomen:  + pain, + diarrhea, + GI bleeding, No nausea, No vomiting, No 

constipation





Physical Exam











  Date Time  Temp Pulse Resp B/P (MAP) Pulse Ox O2 Delivery O2 Flow Rate FiO2


 


1/22/18 12:00     95 Nasal Cannula 2.0 


 


1/22/18 11:45  96  85/47    


 


1/22/18 11:30  95  85/48    


 


1/22/18 11:15  95  83/49    


 


1/22/18 11:04 36.6 93 18 81/47 (58) 97   


 


1/22/18 11:00  93  80/47    


 


1/22/18 10:45  94  81/51    


 


1/22/18 10:30  93  91/47    


 


1/22/18 10:15  94  86/50    


 


1/22/18 10:00  93  75/46    


 


1/22/18 09:45  92  84/50    


 


1/22/18 09:30  92  95/56    


 


1/22/18 09:21  94  98/53    


 


1/22/18 09:13 36.6 94  90/49 (63)    


 


1/22/18 08:20     95 Nasal Cannula 2.0 


 


1/22/18 07:35 36.4 94 20 96/63 (74) 95 Nasal Cannula 2.0 


 


1/22/18 04:00      Nasal Cannula 2.0 


 


1/22/18 03:48 37.0 91 18 88/53 (65) 94   


 


1/22/18 00:24 36.7 89 20 78/53 (61) 93   


 


1/22/18 00:00      Nasal Cannula 2.0 


 


1/21/18 20:21 36.4 91 18 80/52 (61) 98 Nasal Cannula 2.0 


 


1/21/18 20:00      Nasal Cannula 2.0 


 


1/21/18 16:33 36.5 87 18 85/55 (65) 99 Nasal Cannula 2.0 


 


1/21/18 16:00      Nasal Cannula 2.0 








General Appearance:  no apparent distress, + pertinent finding (chronicall ill 

appearing, wearing O2 in bed)


Eyes:  PERRL


ENT:  hearing grossly normal


Neck:  supple


Respiratory/Chest:  chest non-tender, no respiratory distress, no accessory 

muscle use, + decreased breath sounds, + rhonchi, + pertinent finding (wearing 

O2)


Cardiovascular:  regular rate, rhythm


Abdomen:  normal bowel sounds, soft, no organomegaly, no pulsatile mass, + 

tenderness (generalized, worse in bilateral lower quadrants)


Neurologic/Psych:  alert, normal mood/affect, oriented x 3


Skin:  normal color, no jaundice





Laboratory Results





Last 24 Hours








Test


  1/21/18


16:03 1/21/18


19:59 1/22/18


06:31 1/22/18


09:00


 


Bedside Glucose 185 mg/dl  211 mg/dl  179 mg/dl  


 


Stool Occult Blood    POSITIVE 


 


Test


  1/22/18


11:40 1/22/18


13:05 


  


 


 


Bedside Glucose 143 mg/dl    


 


White Blood Count  15.72 K/uL   


 


Red Blood Count  2.29 M/uL   


 


Hemoglobin  7.4 g/dL   


 


Hematocrit  22.9 %   


 


Mean Corpuscular Volume  100.0 fL   


 


Mean Corpuscular Hemoglobin  32.3 pg   


 


Mean Corpuscular Hemoglobin


Concent 


  32.3 g/dl 


  


  


 


 


RDW Standard Deviation  53.9 fL   


 


RDW Coefficient of Variation  15.0 %   


 


Platelet Count  150 K/uL   


 


Mean Platelet Volume  9.6 fL   


 


Nucleated RBC Absolute Count


(auto) 


  0.07 K/uL 


  


  


 


 


Nucleated Red Blood Cells %  0.5 %   


 


Sodium Level  136 mmol/L   


 


Potassium Level  3.8 mmol/L   


 


Chloride Level  100 mmol/L   


 


Carbon Dioxide Level  25 mmol/L   


 


Anion Gap  11.0 mmol/L   


 


Blood Urea Nitrogen  41 mg/dl   


 


Creatinine  4.52 mg/dl   


 


Est Creatinine Clear Calc


Drug Dose 


  15.8 ml/min 


  


  


 


 


Estimated GFR (


American) 


  11.5 


  


  


 


 


Estimated GFR (Non-


American 


  9.9 


  


  


 


 


BUN/Creatinine Ratio  9.0   


 


Random Glucose  142 mg/dl   


 


Calcium Level  7.8 mg/dl   











Impression


Patient is a 57 year old female w/ ESRD on dialysis, aortic valve replacement 

on ASA, Plavix who is a transfer from [facility], on IV ABX for UTI, developed 

c.diff, now with numerous loose stools daily w/ report of black stools and 

BRBPR. She has abdominal pain, generalized but worse in bilateral lower 

quadrants. She has c.diff colitis, would recommend ruling out toxic megacolon 

with KUB. Her abdominal pain and blood in stool is likely secondary to her 

c.diff but ischemia is also in the differential.





She is afebrile, hypotensive, BP 85/47, pulse 96. WBC 16, H&H 7.4/22, , 

BUN 41, CRT 4.5





Plan


- Rule out toxic megacolon


   - ABD pain


   - White count


   - C.diff


      - if there is any dilation, will need surgical evaluation


- Trend H&H


- Monitor stools


- Transfuse PRN


- Vancomycin 125 QID x 14 days


- Add IV Flagyl 500 mg TID 


- No narcotics


- No antimotility agents 


- Can have Bentyl 10 mg TID for abdominal pain as needed


- Limit other antibiotic use


   - Appreciate ID recommendations 





- Outpatient Colonoscopy at appointment to be scheduled 





I saw and evaluated the patient with Ms. Marcano.  GI is consulted for 

evalaution of C diff coliltis.  Patient was found to have evidence of C diff 

infection, complicated by abdominal pain, hematochezia and loose stools. The 

patient has a large number of comorbid problems to include CRF on dialysis.  





PE: 


NAD


Mild abdominal tenderness





Impression: Patient now with C diff infection, complication to include 

hematochezia, abdominal pain and leukocytosis.  As this time I would suggest 

adding metronidazole to her current regimen (Vanco 125 po  mg qid, 

Metronidazole 500 mg IV tid) and obtaining a KUB.  if found to have evidence of 

colonic dilation we would then suggest a surgical consultation.





Recomendations:


KUB today (medicine should check the results)


Vancomycin 125 mg po qid


metronidazole 500 mg IV tid


Avoid narcotics / anti-diarrheals

## 2018-01-23 VITALS
OXYGEN SATURATION: 96 % | DIASTOLIC BLOOD PRESSURE: 55 MMHG | TEMPERATURE: 98.42 F | HEART RATE: 89 BPM | SYSTOLIC BLOOD PRESSURE: 83 MMHG

## 2018-01-23 VITALS
DIASTOLIC BLOOD PRESSURE: 57 MMHG | HEART RATE: 86 BPM | TEMPERATURE: 97.88 F | OXYGEN SATURATION: 94 % | SYSTOLIC BLOOD PRESSURE: 90 MMHG

## 2018-01-23 VITALS
HEART RATE: 88 BPM | TEMPERATURE: 98.06 F | SYSTOLIC BLOOD PRESSURE: 82 MMHG | DIASTOLIC BLOOD PRESSURE: 45 MMHG | OXYGEN SATURATION: 95 %

## 2018-01-23 VITALS — SYSTOLIC BLOOD PRESSURE: 83 MMHG | DIASTOLIC BLOOD PRESSURE: 48 MMHG | OXYGEN SATURATION: 93 % | HEART RATE: 89 BPM

## 2018-01-23 VITALS — OXYGEN SATURATION: 97 %

## 2018-01-23 VITALS
SYSTOLIC BLOOD PRESSURE: 75 MMHG | TEMPERATURE: 97.34 F | HEART RATE: 85 BPM | OXYGEN SATURATION: 92 % | DIASTOLIC BLOOD PRESSURE: 46 MMHG

## 2018-01-23 VITALS
DIASTOLIC BLOOD PRESSURE: 50 MMHG | TEMPERATURE: 97.88 F | SYSTOLIC BLOOD PRESSURE: 83 MMHG | HEART RATE: 90 BPM | OXYGEN SATURATION: 93 %

## 2018-01-23 VITALS
HEART RATE: 93 BPM | SYSTOLIC BLOOD PRESSURE: 96 MMHG | DIASTOLIC BLOOD PRESSURE: 59 MMHG | OXYGEN SATURATION: 98 % | TEMPERATURE: 97.7 F

## 2018-01-23 VITALS
HEART RATE: 89 BPM | DIASTOLIC BLOOD PRESSURE: 52 MMHG | TEMPERATURE: 98.24 F | OXYGEN SATURATION: 92 % | SYSTOLIC BLOOD PRESSURE: 82 MMHG

## 2018-01-23 VITALS
SYSTOLIC BLOOD PRESSURE: 87 MMHG | DIASTOLIC BLOOD PRESSURE: 51 MMHG | HEART RATE: 88 BPM | TEMPERATURE: 98.06 F | OXYGEN SATURATION: 97 %

## 2018-01-23 VITALS — HEART RATE: 90 BPM | SYSTOLIC BLOOD PRESSURE: 86 MMHG | DIASTOLIC BLOOD PRESSURE: 50 MMHG

## 2018-01-23 VITALS — OXYGEN SATURATION: 94 %

## 2018-01-23 LAB
BUN SERPL-MCNC: 59 MG/DL (ref 7–18)
CALCIUM SERPL-MCNC: 8.8 MG/DL (ref 8.5–10.1)
CO2 SERPL-SCNC: 26 MMOL/L (ref 21–32)
CREAT SERPL-MCNC: 6.49 MG/DL (ref 0.6–1.2)
EOSINOPHIL NFR BLD AUTO: 152 K/UL (ref 130–400)
GLUCOSE SERPL-MCNC: 99 MG/DL (ref 70–99)
HCT VFR BLD CALC: 27.4 % (ref 37–47)
HGB BLD-MCNC: 8.6 G/DL (ref 12–16)
MCH RBC QN AUTO: 31.5 PG (ref 25–34)
MCHC RBC AUTO-ENTMCNC: 31.4 G/DL (ref 32–36)
MCV RBC AUTO: 100.4 FL (ref 80–100)
NRBC BLD AUTO-RTO: 1.4 %
NUCLEATED RED BLOOD CELL ABS: 0.2 K/UL (ref 0–0)
PMV BLD AUTO: 9.8 FL (ref 7.4–10.4)
POTASSIUM SERPL-SCNC: 4.1 MMOL/L (ref 3.5–5.1)
RED CELL DISTRIBUTION WIDTH CV: 15.7 % (ref 11.5–14.5)
RED CELL DISTRIBUTION WIDTH SD: 56 FL (ref 36.4–46.3)
SODIUM SERPL-SCNC: 134 MMOL/L (ref 136–145)
WBC # BLD AUTO: 14.19 K/UL (ref 4.8–10.8)

## 2018-01-23 RX ADMIN — IPRATROPIUM BROMIDE AND ALBUTEROL SCH PUFFS: 20; 100 SPRAY, METERED RESPIRATORY (INHALATION) at 20:43

## 2018-01-23 RX ADMIN — Medication SCH ML: at 20:43

## 2018-01-23 RX ADMIN — INSULIN ASPART SCH UNITS: 100 INJECTION, SOLUTION INTRAVENOUS; SUBCUTANEOUS at 11:00

## 2018-01-23 RX ADMIN — ERTAPENEM SODIUM SCH MLS/HR: 1 INJECTION, POWDER, LYOPHILIZED, FOR SOLUTION INTRAMUSCULAR; INTRAVENOUS at 18:04

## 2018-01-23 RX ADMIN — VANCOMYCIN HYDROCHLORIDE SCH MG: 1 INJECTION, POWDER, LYOPHILIZED, FOR SOLUTION INTRAVENOUS at 09:24

## 2018-01-23 RX ADMIN — METRONIDAZOLE SCH MLS/HR: 500 INJECTION, SOLUTION INTRAVENOUS at 03:02

## 2018-01-23 RX ADMIN — VANCOMYCIN HYDROCHLORIDE SCH BTL: 1 INJECTION, POWDER, LYOPHILIZED, FOR SOLUTION INTRAVENOUS at 21:26

## 2018-01-23 RX ADMIN — INSULIN ASPART SCH UNITS: 100 INJECTION, SOLUTION INTRAVENOUS; SUBCUTANEOUS at 16:15

## 2018-01-23 RX ADMIN — INSULIN ASPART SCH UNITS: 100 INJECTION, SOLUTION INTRAVENOUS; SUBCUTANEOUS at 09:32

## 2018-01-23 RX ADMIN — PANTOPRAZOLE SODIUM SCH MLS/MIN: 40 INJECTION, POWDER, FOR SOLUTION INTRAVENOUS at 09:26

## 2018-01-23 RX ADMIN — IPRATROPIUM BROMIDE AND ALBUTEROL SCH PUFFS: 20; 100 SPRAY, METERED RESPIRATORY (INHALATION) at 09:25

## 2018-01-23 RX ADMIN — ALUMINUM ZIRCONIUM TRICHLOROHYDREX GLY SCH EA: 0.2 STICK TOPICAL at 15:24

## 2018-01-23 RX ADMIN — IPRATROPIUM BROMIDE AND ALBUTEROL SCH PUFFS: 20; 100 SPRAY, METERED RESPIRATORY (INHALATION) at 17:57

## 2018-01-23 RX ADMIN — ALUMINUM ZIRCONIUM TRICHLOROHYDREX GLY SCH EA: 0.2 STICK TOPICAL at 15:23

## 2018-01-23 RX ADMIN — METRONIDAZOLE SCH MLS/HR: 500 INJECTION, SOLUTION INTRAVENOUS at 09:25

## 2018-01-23 RX ADMIN — VANCOMYCIN HYDROCHLORIDE SCH BTL: 1 INJECTION, POWDER, LYOPHILIZED, FOR SOLUTION INTRAVENOUS at 17:56

## 2018-01-23 RX ADMIN — VANCOMYCIN HYDROCHLORIDE SCH MG: 1 INJECTION, POWDER, LYOPHILIZED, FOR SOLUTION INTRAVENOUS at 13:41

## 2018-01-23 RX ADMIN — Medication SCH ML: at 09:25

## 2018-01-23 RX ADMIN — Medication SCH ML: at 02:29

## 2018-01-23 RX ADMIN — INSULIN ASPART SCH UNITS: 100 INJECTION, SOLUTION INTRAVENOUS; SUBCUTANEOUS at 21:00

## 2018-01-23 RX ADMIN — Medication SCH ML: at 13:40

## 2018-01-23 RX ADMIN — ALUMINUM ZIRCONIUM TRICHLOROHYDREX GLY SCH EA: 0.2 STICK TOPICAL at 00:00

## 2018-01-23 RX ADMIN — IPRATROPIUM BROMIDE AND ALBUTEROL SCH PUFFS: 20; 100 SPRAY, METERED RESPIRATORY (INHALATION) at 13:41

## 2018-01-23 RX ADMIN — VANCOMYCIN HYDROCHLORIDE SCH MG: 1 INJECTION, POWDER, LYOPHILIZED, FOR SOLUTION INTRAVENOUS at 02:29

## 2018-01-23 RX ADMIN — METRONIDAZOLE SCH MLS/HR: 500 INJECTION, SOLUTION INTRAVENOUS at 17:57

## 2018-01-23 RX ADMIN — VANCOMYCIN HYDROCHLORIDE SCH MG: 1 INJECTION, POWDER, LYOPHILIZED, FOR SOLUTION INTRAVENOUS at 20:43

## 2018-01-23 RX ADMIN — ALUMINUM ZIRCONIUM TRICHLOROHYDREX GLY SCH EA: 0.2 STICK TOPICAL at 23:45

## 2018-01-23 NOTE — INFECTIOUS DISEASE PROGRESS NT
Progress Note


Date of Service


Jan 23, 2018.





Subjective


Pt evaluation today including:  conversation w/ patient, physical exam, chart 

review, lab review, review of studies, conversation w/ consultant, review of 

inpatient medication list


Patient continues to complain of abdominal pain.  CT scan of obtained, shows 

evidence of possible small bowel obstruction with transition point in the 

pelvis likely due to adhesions.





   All Other Systems:  Reviewed and Negative





Medications





Current Inpatient Medications








 Medications


  (Trade)  Dose


 Ordered  Sig/Dnaiel


 Route  Start Time


 Stop Time Status Last Admin


Dose Admin


 


 Acetaminophen


  (Tylenol Tab)  650 mg  Q4H  PRN


 PO  1/16/18 02:30


 2/15/18 02:29  1/18/18 14:57


650 MG


 


 Allopurinol


  (Zyloprim Tab)  100 mg  BID


 PO  1/16/18 09:00


 2/15/18 08:59 Future Hold 1/22/18 08:39


100 MG


 


 Aspirin


  (Ecotrin Tab)  81 mg  QAM


 PO  1/16/18 09:00


 2/15/18 08:59 Future Hold 1/22/18 08:39


81 MG


 


 Atorvastatin


 Calcium


  (Lipitor Tab)  40 mg  QAM


 PO  1/16/18 09:00


 2/15/18 08:59 Future Hold 1/22/18 08:39


40 MG


 


 Docusate Sodium


  (coLACE CAP)  100 mg  BID


 PO  1/16/18 09:00


 2/15/18 08:59 Future Hold 1/22/18 08:40


100 MG


 


 Albuterol/


 Ipratropium


  (Combivent


 Respimat Inh)  1 puffs  QID


 INH  1/16/18 09:00


 2/15/18 08:59  1/23/18 13:41


1 PUFFS


 


 Pantoprazole


 Sodium


  (Protonix Tab)  40 mg  DAILY


 PO  1/16/18 09:00


 2/15/18 08:59 Future Hold 1/22/18 08:41


40 MG


 


 Pramipexole


 Dihydrochloride


  (miraPEX TAB)  0.25 mg  HS


 PO  1/16/18 21:00


 2/15/18 20:59 Future Hold 1/21/18 20:58


0.25 MG


 


 Sertraline HCl


  (Zoloft Tab)  75 mg  DAILY


 PO  1/16/18 09:00


 2/15/18 08:59 Future Hold 1/22/18 08:39


75 MG


 


 Thiamine HCl


  (Vitamin B-1 Tab)  50 mg  DAILY


 PO  1/16/18 09:00


 2/15/18 08:59 Future Hold 1/22/18 08:41


50 MG


 


 Tramadol HCl


  (Ultram Tab)  50 mg  Q4H  PRN


 PO  1/16/18 02:45


 2/15/18 02:44 Future Hold 1/19/18 20:40


50 MG


 


 Vitamin B Complex/


 Vit C/Folic Acid


  (Nephrocaps)  1 cap  DAILY


 PO  1/16/18 09:00


 2/15/18 08:59 Future Hold 1/22/18 08:40


1 CAP


 


 Miscellaneous


 Information


  (Order Awaiting


 Action)  1 ea  QS


 N/A  1/16/18 08:00


 2/15/18 07:59  1/16/18 08:21


1 EA


 


 Miscellaneous


 Information


  (Order Awaiting


 Action)  1 ea  QS


 N/A  1/16/18 08:00


 2/15/18 07:59  1/16/18 08:21


1 EA


 


 Gabapentin


  (Neurontin Cap)  200 mg  HS


 PO  1/16/18 21:00


 2/15/18 20:59 Future Hold 1/21/18 20:58


200 MG


 


 Miscellaneous


 Information


  (Consult


 Glycemic


 Management


 Pharmacy)  1 ea  UD  PRN


 N/A  1/16/18 04:15


 2/15/18 04:14   


 


 


 Glucose


  (Glucose 40% Gel)  15-30


 GRAMS 15


 GRAMS...  UD  PRN


 PO  1/16/18 04:30


 2/15/18 04:29   


 


 


 Glucose


  (Glucose Chew


 Tab)  4-8


 Tablets 4


 Tabl...  UD  PRN


 PO  1/16/18 04:30


 2/15/18 04:29   


 


 


 Dextrose


  (Dextrose 50%


 50ML Syringe)  25-50ML OF


 50% DW IV


 FOR...  UD  PRN


 IV  1/16/18 04:30


 2/15/18 04:29  1/17/18 09:52


25 ML


 


 Glucagon


  (Glucagon Inj)  1 mg  UD  PRN


 SQ  1/16/18 04:30


 2/15/18 04:29   


 


 


 Ondansetron HCl


  (Zofran Inj)  4 mg  Q4H  PRN


 IV  1/16/18 18:15


 2/15/18 18:14  1/19/18 12:15


4 MG


 


 Clopidogrel


 Bisulfate


  (plAVix TAB)  75 mg  DAILY


 PO  1/17/18 09:00


 2/16/18 08:59 Future Hold 1/22/18 08:39


75 MG


 


 Heparin Sodium


  (Porcine)


  (Heparin Sq 5000


 Unit/0.5ml)  5,000 unit  Q8


 SQ  1/17/18 15:00


 2/16/18 14:59 Future Hold 1/22/18 05:59


5,000 UNIT


 


 Sevelamer HCl


  (Renagel Tab)  1,600 mg  TIDM


 PO  1/17/18 16:30


 2/16/18 16:29 Future Hold 1/22/18 13:27


1,600 MG


 


 Ertapenem 500 mg/


 Sodium Chloride  55 ml @ 


 110 mls/hr  DAILY@1800


 IV  1/18/18 18:00


 1/28/18 17:59  1/22/18 18:10


110 MLS/HR


 


 Guaifenesin


  (Organidin Nr


 Tab)  200 mg  Q8  PRN


 PO  1/20/18 09:30


 2/19/18 09:29 Future Hold  


 


 


 Vancomycin HCl


  (Vancomycin Oral


 Soln)  125 mg  Q6H


 PO  1/20/18 20:00


 2/3/18 19:59  1/23/18 13:41


125 MG


 


 Raspberry


  (Raspberry Syrup


 5ml Cup)  5 ml  Q6H


 PO  1/20/18 20:00


 2/3/18 19:59  1/23/18 13:40


5 ML


 


 Insulin Aspart


  (novoLOG ASPART)  **SLIDING


 SCALE**


 **G...  ACHS


 SC  1/22/18 07:00


 2/21/18 06:59  1/23/18 09:32


2 UNITS


 


 Ioversol


  (Optiray 320)  100 ml  UD  PRN


 IV  1/22/18 13:45


 1/26/18 13:44   


 


 


 Metronidazole 500


 mg/Prmx  100 ml @ 


 100 mls/hr  Q8H


 IV  1/22/18 18:00


 2/1/18 17:59  1/23/18 09:25


100 MLS/HR


 


 Pantoprazole


 Sodium 40 mg/


 Syringe  10 ml @ 5


 mls/min  DAILY@1100


 IV  1/23/18 11:00


 2/22/18 10:59  1/23/18 09:26


5 MLS/MIN


 


 Vancomycin HCl


  (Vancomycin


 Enema)  500 mg  QID


 RI  1/23/18 17:00


 2/2/18 16:59   


 











Objective


Vital Signs











  Date Time  Temp Pulse Resp B/P (MAP) Pulse Ox O2 Delivery O2 Flow Rate FiO2


 


1/23/18 12:00      Nasal Cannula 2.0 


 


1/23/18 11:01 36.3 85 20 82/55 (64) 92 Nasal Cannula 2.0 





    75/46 (56)    


 


1/23/18 08:01 36.6 86 20 90/57 (68) 94 Nasal Cannula 2.0 


 


1/23/18 08:00     94 Nasal Cannula 2.0 


 


1/23/18 04:17 36.9 89 18 83/55 (64) 96 Nasal Cannula 2.0 


 


1/23/18 02:34  90  86/50 (62)    


 


1/23/18 02:22  89  83/48 (60) 93   


 


1/23/18 01:52 36.8 89 20 82/52 92   


 


1/23/18 00:59 36.6 90 18 83/50 93   


 


1/23/18 00:01 36.7 88 18 87/51 97  2.0 


 


1/23/18 00:00     97 Nasal Cannula 2.0 


 


1/22/18 23:24 36.5 84 18 73/46 95   


 


1/22/18 23:08 36.7 89 18 80/47 97  2.0 


 


1/22/18 22:47 36.7 94 20 79/47 97   


 


1/22/18 21:30  91  77/43 (54)    


 


1/22/18 20:30  92  80/51 (61)    


 


1/22/18 20:00     97 Nasal Cannula 2.0 


 


1/22/18 20:00  93  72/40 (51)    


 


1/22/18 19:00 36.8 92 16 76/39 (51) 95 Nasal Cannula 2.0 


 


1/22/18 18:20    83/51 (62)    


 


1/22/18 17:15 36.5 97 20 70/42 (51) 97 Nasal Cannula 2.0 


 


1/22/18 16:59  150   95   


 


1/22/18 16:30      Nasal Cannula 2.0 











Physical Exam


General Appearance:  WD/WN, no apparent distress


Eyes:  normal inspection, sclerae normal


ENT:  normal ENT inspection, pharynx normal


Neck:  supple, no adenopathy, trachea midline


Respiratory/Chest:  chest non-tender, no respiratory distress, no accessory 

muscle use, + rhonchi


Cardiovascular:  regular rate, rhythm, no gallop, no murmur


Abdomen:  normal bowel sounds, soft, no organomegaly, + tenderness


Extremities:  non-tender, no calf tenderness


Neurologic/Psychiatric:  alert, oriented x 3


Skin:  normal color, warm/dry, no rash


Lymphatic:  no adenopathy





Laboratory Results





Last 24 Hours








Test


  1/22/18


16:34 1/22/18


20:05 1/22/18


21:07 1/22/18


23:10


 


Bedside Glucose 180 mg/dl   145 mg/dl  


 


Hemoglobin  7.2 g/dL   


 


Hematocrit  23.4 %   


 


Sodium Level    135 mmol/L 


 


Potassium Level    4.1 mmol/L 


 


Chloride Level    101 mmol/L 


 


Carbon Dioxide Level    25 mmol/L 


 


Anion Gap    9.0 mmol/L 


 


Blood Urea Nitrogen    55 mg/dl 


 


Creatinine    5.93 mg/dl 


 


Est Creatinine Clear Calc


Drug Dose 


  


  


  12.1 ml/min 


 


 


Estimated GFR (


American) 


  


  


  8.3 


 


 


Estimated GFR (Non-


American 


  


  


  7.2 


 


 


BUN/Creatinine Ratio    9.5 


 


Random Glucose    119 mg/dl 


 


Lactic Acid Level    1.0 mmol/L 


 


Calcium Level    8.3 mg/dl 


 


Magnesium Level    2.1 mg/dl 


 


Test


  1/23/18


06:04 1/23/18


06:33 1/23/18


10:55 


 


 


White Blood Count 14.19 K/uL    


 


Red Blood Count 2.73 M/uL    


 


Hemoglobin 8.6 g/dL    


 


Hematocrit 27.4 %    


 


Mean Corpuscular Volume 100.4 fL    


 


Mean Corpuscular Hemoglobin 31.5 pg    


 


Mean Corpuscular Hemoglobin


Concent 31.4 g/dl 


  


  


  


 


 


RDW Standard Deviation 56.0 fL    


 


RDW Coefficient of Variation 15.7 %    


 


Platelet Count 152 K/uL    


 


Mean Platelet Volume 9.8 fL    


 


Nucleated RBC Absolute Count


(auto) 0.20 K/uL 


  


  


  


 


 


Nucleated Red Blood Cells % 1.4 %    


 


Sodium Level 134 mmol/L    


 


Potassium Level 4.1 mmol/L    


 


Chloride Level 100 mmol/L    


 


Carbon Dioxide Level 26 mmol/L    


 


Anion Gap 8.0 mmol/L    


 


Blood Urea Nitrogen 59 mg/dl    


 


Creatinine 6.49 mg/dl    


 


Est Creatinine Clear Calc


Drug Dose 10.8 ml/min 


  


  


  


 


 


Estimated GFR (


American) 7.4 


  


  


  


 


 


Estimated GFR (Non-


American 6.4 


  


  


  


 


 


BUN/Creatinine Ratio 9.0    


 


Random Glucose 99 mg/dl    


 


Calcium Level 8.8 mg/dl    


 


Chemistry Specimen Hemolysis     


 


Bedside Glucose  176 mg/dl  88 mg/dl  








                                                                               

                                                                 


Patient Name: JORDON RAMOS                               Unit Number:  

[id number]                  


 








 











Dictated: 01/22/18 1545


 


Transcribed: 01/22/18 1545


 


EV


 


Printed Date/Time: [~ rep prt dt]/[~ rep prt tm]








 [~ rep ct labl] - [~ rep ct ivnm]


 





   [facility]


 Radiology Department


 [place] [address] [phone]





 











Dictated: 01/22/18 1545


 


Transcribed: 01/22/18 1545


 


EV


 


Printed Date/Time: [~ rep prt dt]/[~ rep prt tm]








 [~ rep ct labl] - [~ rep ct ivnm]


 








[~ rep ct add3]]


CT SCAN OF THE ABDOMEN AND PELVIS WITH IV CONTRAST





CLINICAL HISTORY:   GI bleeding.





COMPARISON STUDY:  Abdominal CT dated 1/15/2018.





TECHNIQUE: Following the IV administration of  88 cc of Optiray 320, CT scan of


the abdomen and pelvis is performed from the lung bases to the proximal femora.


Images are reviewed in the axial, sagittal, and coronal planes. IV contrast was


administered without complication. A dose lowering technique was utilized


adhering to the principles of ALARA. 





CT DOSE: 1382.74 mGy.cm





FINDINGS:





Lung bases: The heart is enlarged and without pericardial effusion. The patient


is status post midline sternotomy and aortic valve surgery. The coronary


arteries are densely calcified. There are small pleural effusions, right larger


than left with associated bibasilar consolidation emphysematous change is


identified. The tip of the catheter terminates in the right atrium. Calcified


pleural plaque is present the right lung base.





Liver: The contrast-enhanced liver is cirrhotic in morphology and heterogeneous


in attenuation. There is hypertrophy of the left lobe and caudate and nodularity


of the surface contour. There is mild central intrahepatic biliary ductal


dilatation. The hepatic veins and portal veins are patent. There is a


splenorenal shunt.





Gallbladder: Surgically absent noting clips in the gallbladder fossa.





Spleen: The spleen is mildly enlarged measuring 13.5 cm in length.





Pancreas: Moderately atrophic and grossly unremarkable.





Adrenal glands: Unremarkable.





Kidneys: The contrast enhanced kidneys are atrophic and without hydronephrosis.


The kidneys enhance symmetrically. There are numerous bilateral nonobstructing


kidney stones. The largest is seen on the left and measures up to 11 mm. Renal


cysts and indeterminant cortical hypodensities measure up to 2.0 cm. No


enhancing mass lesion is suggested.





Abdominal vasculature: The abdominal aorta is normal in course and caliber


noting moderate to advanced atherosclerotic calcification.





Bowel: There are distended and fluid-filled loops of small bowel which measure


up to 4.7 cm in diameter. A transition point is suggested in the ventral pelvis


on image #316, and the appearance is consistent with a small bowel obstruction.


This is likely on the basis of adhesions. There is no pneumatosis intestinalis


or portal venous gas. No focally thick walled bowel loops are identified. The


appendix is  normal as visualized.





Peritoneum: There is no intraperitoneal free air or abdominal ascites. A


fat-containing hernia is present within the right ventral abdominal wall on


image #175. This may be related to a previous laparoscopy port.





Lymphadenopathy: None.





Pelvic viscera: The bladder is decompressed and grossly unremarkable. The uterus


is surgically absent. No adnexal lesion is seen.





Skeletal structures: The skeletal structures are osteopenic. There is moderate


lumbosacral spondylosis. No lytic or blastic lesions are seen. A compression


deformity is noted in T12.





Soft tissues: There is mild body wall edema.








IMPRESSION:





1. Findings are consistent with a small bowel obstruction. A transition point is


seen in the right pelvis and this is likely on the basis of adhesions.





2. There is no pneumatosis intestinalis, portal venous gas, or intraperitoneal


free air.





3. Cirrhotic liver morphology.





4. Splenomegaly.





5. There are small pleural effusions with bibasilar consolidation. This could


present atelectasis versus pneumonia. Clinical correlation will be required.





6. Cardiomegaly and emphysema.





7. Bilateral nephrolithiasis.





8. Additional findings as above.











Electronically signed by:  Ramírez Hernandez M.D.


1/22/2018 3:54 PM





Dictated Date/Time:  1/22/2018 3:45 PM





 The status of this report is Signed.   


 Draft = Not yet reviewed or approved by Radiologist.  


 Signed = Reviewed and approved by Radiologist.


<AttendingPhy>Florida Lopez M.D.</AttendingPhy> <FamilyPhy>No Doctor, 

Assigned</FamilyPhy> <PrimaryPhy>No Doctor, Assigned</PrimaryPhy> <UnitNumber>

[id number]</UnitNumber> <VisitNumber>[id number]</VisitNumber> <PatientName>

JORDON RAMOS</PatientName> <DateOfBirth>1960</DateOfBirth> <Location>C.2[facility]

</Location> <ServiceDate></ServiceDate> <MNE>ESINDI</MNE> <OrderingPhy>[place] Lopez M.D.</OrderingPhy> <OrderingPhyMNE>f rep ord dr arcos</OrderingPhyMNE> <

DictatingPhyMNE>f rep dict dr arcos</DictatingPhyMNE> <CCListMNE>f rep ct mne</

CCListMNE> <AdmittingPhyMNE>f pt admit dr arcos</AdmittingPhyMNE> <AttendingPhyMNE

>f pt attend dr arcos</AttendingPhyMNE>


<ConsultingPhyMNE>f pt consult dr arcos</ConsultingPhyMNE> <FamilyPhyMNE>f pt fam 

dr arcos</FamilyPhyMNE> <OtherPhyMNE>f pt other dr arcos</OtherPhyMNE> <

PrimaryPhyMNE>f pt prim care dr arcos</PrimaryPhyMNE> <ReferringPhyMNE>f pt 

referring dr arcos</ReferringPhyMNE>





Assessment and Plan


Urinary tract infection with ESBL producing E coli, as well as now C difficile 

colitisAnd possible small-bowel obstruction. .  Patient should be treated with 

combination of ertapenem IV and oral vancomycin.  Will require 2 weeks for 

vancomycin, and likely in the range of 10 days for ertapenem.  Will follow.

## 2018-01-23 NOTE — PROGRESS NOTE
Medicine Progress Note


Date & Time of Visit:


Jan 23, 2018 at 16:29.


Subjective


-coughing up greenish phlegm


-abdominal pain present but controlled with meds


-loose stool x 2 today


-very fatigued


-denies nausea


-weakness and difficulty ambulating.





Objective





Last 8 Hrs








  Date Time  Temp Pulse Resp B/P (MAP) Pulse Ox O2 Delivery O2 Flow Rate FiO2


 


1/23/18 12:00      Nasal Cannula 2.0 


 


1/23/18 11:01 36.3 85 20 82/55 (64) 92 Nasal Cannula 2.0 





    75/46 (56)    








Physical Exam:


GEN: obese, in no acute distress, alert and appropriate


HEENT: NC/AT, PERRL, normal sclerae, TM clear of fluid and no erythema.  

Pharynx is non-acute.  Some maxillary sinus TTP, no LAD. 


CARDIO: reg rate, S1/2 heard without m/g/r


LUNGS: coarse rhonchi throughout


ABD: soft, diffuse TTP, slight distension, +BS


EXTREMITY: RP and DP palpable 2+ bilat, no LE swelling or edema, extremities 

are warm and well-perfused


NEURO: CN 2-12 grossly intact


MUSC: 5/5 strength throughout, no focal deficits


SKIN:  warm and dry


Laboratory Results:


1/23/18 06:04








1/23/18 06:04

















Test


  1/16/18


02:55 1/16/18


03:37 1/16/18


03:50 1/16/18


15:00


 


Toxic Vacuolation 2+    


 


Prothrombin Time


  10.8 SECONDS


(9.0-12.0) 


  


  


 


 


Prothromb Time International


Ratio 1.0 (0.9-1.1) 


  


  


  


 


 


Activated Partial


Thromboplast Time 27.1 SECONDS


(21.0-31.0) 


  


  


 


 


Partial Thromboplastin Ratio 1.0    


 


Creatine Kinase MB


  1.4 ng/ml


(0.5-3.6) 


  


  


 


 


Creatine Kinase MB Ratio  (0-3.0)    


 


Troponin I


  0.329 ng/ml


(0-0.045) 


  


  


 


 


Globulin


  3.6 gm/dl


(2.5-4.0) 


  


  


 


 


Albumin/Globulin Ratio 0.6 (0.9-2)    


 


Procalcitonin


  4.56 ng/ml


(0-0.5) 


  


  


 


 


Blood Gas Sample Site  L Brachial   


 


Bedside Blood Gas pH (LAB)


  


  7.38


(7.35-7.45) 


  


 


 


Bedside Blood Gas pCO2 (LAB)


  


  33 mmHg


(35-46) 


  


 


 


Bedside Blood Gas pO2 (LAB)


  


  76 mmHg


(80-95) 


  


 


 


Bedside Blood Gas HCO3 (LAB)


  


  19 meq/L


(19-24) 


  


 


 


Bedside Blood Gas Total CO2


  


  20 mEq/l


(24-31) 


  


 


 


Bedside Blood Gas Base Excess


(LAB) 


  -6.0 meq/L


(-9-1.8) 


  


 


 


Bedside Blood Gas O2


Saturation 


  95.0 % (90-95) 


  


  


 


 


Kiko Test  Pass   


 


Oxygen Delivery Device  Room Air   


 


Influenza Type A (RT-PCR)


  


  


  Neg for Influ


A (NEG) 


 


 


Influenza Type B (RT-PCR)


  


  


  Neg for Influ


B (NEG) 


 


 


Urine Color    DK YELLOW 


 


Urine Appearance    TURBID (CLEAR) 


 


Urine pH    5.5 (4.5-7.5) 


 


Urine Specific Gravity


  


  


  


  1.021


(1.000-1.030)


 


Urine Protein    3+ (NEG) 


 


Urine Glucose (UA)    TRACE (NEG) 


 


Urine Ketones    TRACE (NEG) 


 


Urine Occult Blood    3+ (NEG) 


 


Urine Nitrite    POS (NEG) 


 


Urine Bilirubin    NEG (NEG) 


 


Urine Urobilinogen    NEG (NEG) 


 


Urine Leukocyte Esterase    LARGE (NEG) 


 


Urine WBC (Auto)    >30 /hpf (0-5) 


 


Urine RBC (Auto)


  


  


  


  5-10 /hpf


(0-4)


 


Urine Hyaline Casts (Auto)    1-5 /lpf (0-5) 


 


Urine Epithelial Cells (Auto)    >30 /lpf (0-5) 


 


Urine Bacteria (Auto)    1+ (NEG) 


 


Urine Pathogenic Casts     /lpf (0) 


 


Urine Yeast (Auto)     (NONE PRSENT) 


 


Test


  1/17/18


05:34 1/17/18


09:43 1/18/18


05:20 1/20/18


01:39


 


Estimated Average Glucose 140 mg/dl    


 


Hemoglobin A1c


  6.5 %


(4.5-5.6) 


  


  


 


 


Total Bilirubin


  


  0.8 mg/dl


(0.2-1) 


  


 


 


Direct Bilirubin


  


  0.1 mg/dl


(0-0.2) 


  


 


 


Aspartate Amino Transf


(AST/SGOT) 


  11 U/L (15-37) 


  


  


 


 


Alanine Aminotransferase


(ALT/SGPT) 


  11 U/L (12-78) 


  


  


 


 


Alkaline Phosphatase


  


  115 U/L


() 


  


 


 


Total Protein


  


  6.2 gm/dl


(6.4-8.2) 


  


 


 


Albumin


  


  2.2 gm/dl


(3.4-5.0) 


  


 


 


Amylase Level


  


  22 U/L


() 


  


 


 


Lipase


  


  63 U/L


() 


  


 


 


Random Vancomycin Level   21.9 mcg/ml  


 


Red Blood Cell Morphology    Unremarkable 


 


Test


  1/21/18


06:33 1/22/18


09:00 1/22/18


23:10 1/23/18


06:04


 


Immature Granulocyte % (Auto) 9.5 %    


 


White Blood Count


  12.98 K/uL


(4.8-10.8) 


  


  


 


 


Red Blood Count


  2.57 M/uL


(4.2-5.4) 


  


  2.73 M/uL


(4.2-5.4)


 


Hemoglobin


  8.0 g/dL


(12.0-16.0) 


  


  


 


 


Hematocrit 26.1 % (37-47)    


 


Mean Corpuscular Volume


  101.6 fL


() 


  


  100.4 fL


()


 


Mean Corpuscular Hemoglobin


  31.1 pg


(25-34) 


  


  31.5 pg


(25-34)


 


Mean Corpuscular Hemoglobin


Concent 30.7 g/dl


(32-36) 


  


  31.4 g/dl


(32-36)


 


Platelet Count


  137 K/uL


(130-400) 


  


  


 


 


Mean Platelet Volume


  10.0 fL


(7.4-10.4) 


  


  9.8 fL


(7.4-10.4)


 


Neutrophils (%) (Auto) 78.9 %    


 


Lymphocytes (%) (Auto) 7.3 %    


 


Monocytes (%) (Auto) 4.0 %    


 


Eosinophils (%) (Auto) 0.1 %    


 


Basophils (%) (Auto) 0.2 %    


 


Neutrophils # (Auto)


  10.24 K/uL


(1.4-6.5) 


  


  


 


 


Lymphocytes # (Auto)


  0.95 K/uL


(1.2-3.4) 


  


  


 


 


Monocytes # (Auto)


  0.52 K/uL


(0.11-0.59) 


  


  


 


 


Eosinophils # (Auto)


  0.01 K/uL


(0-0.5) 


  


  


 


 


Basophils # (Auto)


  0.03 K/uL


(0-0.2) 


  


  


 


 


Immature Granulocyte # (Auto)


  1.23 K/uL


(0.00-0.02) 


  


  


 


 


Phosphorus Level


  7.3 mg/dl


(2.5-4.9) 


  


  


 


 


Stool Occult Blood


  


  POSITIVE


(NEGATIVE) 


  


 


 


Lactic Acid Level


  


  


  1.0 mmol/L


(0.4-2.0) 


 


 


Magnesium Level


  


  


  2.1 mg/dl


(1.8-2.4) 


 


 


RDW Standard Deviation


  


  


  


  56.0 fL


(36.4-46.3)


 


RDW Coefficient of Variation


  


  


  


  15.7 %


(11.5-14.5)


 


Nucleated RBC Absolute Count


(auto) 


  


  


  0.20 K/uL


(0-0)


 


Nucleated Red Blood Cells %    1.4 % 


 


Anion Gap


  


  


  


  8.0 mmol/L


(3-11)


 


Est Creatinine Clear Calc


Drug Dose 


  


  


  10.8 ml/min 


 


 


Estimated GFR (


American) 


  


  


  7.4 


 


 


Estimated GFR (Non-


American 


  


  


  6.4 


 


 


BUN/Creatinine Ratio    9.0 (10-20) 


 


Calcium Level


  


  


  


  8.8 mg/dl


(8.5-10.1)


 


Chemistry Specimen Hemolysis     


 


Test


  1/23/18


16:15 


  


  


 


 


Bedside Glucose


  136 mg/dl


(70-90) 


  


  


 














 Date/Time


Source Procedure


Growth Status


 


 


 1/16/18 04:23


Blood Blood Culture - Final


NO GROWTH Complete


 


 1/16/18 00:00


Nasal MRSA DNA Surveillance Screen - Final


Specimen Positive for MRSA by DNA Probe Complete


 


 1/20/18 15:30


Stool C.difficile Toxin B Gene (PCR) - Final


Positive for C. difficile toxin B gene Complete


 


 1/16/18 05:22


Urine , Clean Catch Urine Culture - Final


Escherichia Coli Complete








Last 24 Hours








Test


  1/22/18


16:34 1/22/18


20:05 1/22/18


21:07 1/22/18


23:10


 


Bedside Glucose 180 mg/dl   145 mg/dl  


 


Hemoglobin  7.2 g/dL   


 


Hematocrit  23.4 %   


 


Sodium Level    135 mmol/L 


 


Potassium Level    4.1 mmol/L 


 


Chloride Level    101 mmol/L 


 


Carbon Dioxide Level    25 mmol/L 


 


Anion Gap    9.0 mmol/L 


 


Blood Urea Nitrogen    55 mg/dl 


 


Creatinine    5.93 mg/dl 


 


Est Creatinine Clear Calc


Drug Dose 


  


  


  12.1 ml/min 


 


 


Estimated GFR (


American) 


  


  


  8.3 


 


 


Estimated GFR (Non-


American 


  


  


  7.2 


 


 


BUN/Creatinine Ratio    9.5 


 


Random Glucose    119 mg/dl 


 


Lactic Acid Level    1.0 mmol/L 


 


Calcium Level    8.3 mg/dl 


 


Magnesium Level    2.1 mg/dl 


 


Test


  1/23/18


06:04 1/23/18


06:33 1/23/18


10:55 1/23/18


16:15


 


White Blood Count 14.19 K/uL    


 


Red Blood Count 2.73 M/uL    


 


Hemoglobin 8.6 g/dL    


 


Hematocrit 27.4 %    


 


Mean Corpuscular Volume 100.4 fL    


 


Mean Corpuscular Hemoglobin 31.5 pg    


 


Mean Corpuscular Hemoglobin


Concent 31.4 g/dl 


  


  


  


 


 


RDW Standard Deviation 56.0 fL    


 


RDW Coefficient of Variation 15.7 %    


 


Platelet Count 152 K/uL    


 


Mean Platelet Volume 9.8 fL    


 


Nucleated RBC Absolute Count


(auto) 0.20 K/uL 


  


  


  


 


 


Nucleated Red Blood Cells % 1.4 %    


 


Sodium Level 134 mmol/L    


 


Potassium Level 4.1 mmol/L    


 


Chloride Level 100 mmol/L    


 


Carbon Dioxide Level 26 mmol/L    


 


Anion Gap 8.0 mmol/L    


 


Blood Urea Nitrogen 59 mg/dl    


 


Creatinine 6.49 mg/dl    


 


Est Creatinine Clear Calc


Drug Dose 10.8 ml/min 


  


  


  


 


 


Estimated GFR (


American) 7.4 


  


  


  


 


 


Estimated GFR (Non-


American 6.4 


  


  


  


 


 


BUN/Creatinine Ratio 9.0    


 


Random Glucose 99 mg/dl    


 


Calcium Level 8.8 mg/dl    


 


Chemistry Specimen Hemolysis     


 


Bedside Glucose  176 mg/dl  88 mg/dl  136 mg/dl 











Assessment & Plan








58 yo F presented with hypotension after dialysis.  She was transferred to [facility] 

from [facility] and was admitted to the ICU.  She did have some cough productive 

of greenish phlegm for two weeks and was started on empiric abx upon transfer 

from [facility] on 1/16.  She also consistently mentioned some abdominal pain.  

Successive KUBs were performed revealing no convincing evidence for 

obstruction. No acute pulmonary process was noted on CXR and she was saturating 

well on room air. Flu was negatigve and she was on empiric treatment with Vanc 

and Zosyn.  She was put on a pressor and midodrine and abx were subsequently 

changed to Primaxin. UCx grew ESBL-producing E coli and blood cultures were 

negative. Her abx were switched to Ertapenem and ID was consulted.  She 

developed c-diff colitis and was placed on Vanc and Flagyl. She was transferred 

out of the ICU on 1/18 as she was off pressors and midodrine, maintaining her 

BP.  CT a/p revealed a partial SBO and she was made NPO on 1/22.  Gen Surg was 

consulted and recommended against surgery at this time unless she declined 

clinically.  No NGT was placed so that she could continue to receive the PO 

Vanc to treat her C-diff.  GI was consulted and is assisting with c-diff 

management.  They added a Vanc enema QID on 1/23.  The patient denies any 

nausea today and had two loose BMs.  She is still having a productive greenish 

cough and denies fevers but states that she feels cold.  She reports 

significant fatigue and doesn't feel much better since receiving the blood 

yesterday. She does feel some abdominal pain that is diffuse and some abdominal 

distention.





1.  Hypotension


2.  ESBL E coli UTI-Ertapenem


3.  C-diff colitis-Vanc PO, Flagyl IV, Vanc enema added today (1/23)


4.  Anemia--FOBT positive and nursing reports of dark red blood in stools 

yesterday.  DVT prophy held.  Pt is still somewhat hypotensive. Only received 1 

Unit pRBC but approrpiate response and no further blood in stools seen today. 


5.  DMII-at goal


6.  Obesity


7.  ESRD with fluid overload-on HD, does not make urine. Cont per Nephro


8.  Hypoxia? not on oxygen at home-fluid management in HD, pt has been stable 

on this amount since admission.  Will cont to try to wean. 





Full Code


DVT proph-SCDs, chemoprophylaxis contraindicated in anemia with FOBT positive 

requiring blood tx.


Dispo-uncertain at this time, cont tele.


Consultants:


Intensivist


Nephro


Current Inpatient Medications:





Current Inpatient Medications








 Medications


  (Trade)  Dose


 Ordered  Sig/Daniel


 Route  Start Time


 Stop Time Status Last Admin


Dose Admin


 


 Albuterol/


 Ipratropium


  (Combivent


 Respimat Inh)  1 puffs  QID


 INH  1/16/18 09:00


 2/15/18 08:59  1/23/18 13:41


1 PUFFS


 


 Miscellaneous


 Information


  (Order Awaiting


 Action)  1 ea  QS


 N/A  1/16/18 08:00


 2/15/18 07:59  1/16/18 08:21


1 EA


 


 Miscellaneous


 Information


  (Order Awaiting


 Action)  1 ea  QS


 N/A  1/16/18 08:00


 2/15/18 07:59  1/16/18 08:21


1 EA


 


 Miscellaneous


 Information


  (Consult


 Glycemic


 Management


 Pharmacy)  1 ea  UD  PRN


 N/A  1/16/18 04:15


 2/15/18 04:14   


 


 


 Glucose


  (Glucose 40% Gel)  15-30


 GRAMS 15


 GRAMS...  UD  PRN


 PO  1/16/18 04:30


 2/15/18 04:29   


 


 


 Glucose


  (Glucose Chew


 Tab)  4-8


 Tablets 4


 Tabl...  UD  PRN


 PO  1/16/18 04:30


 2/15/18 04:29   


 


 


 Dextrose


  (Dextrose 50%


 50ML Syringe)  25-50ML OF


 50% DW IV


 FOR...  UD  PRN


 IV  1/16/18 04:30


 2/15/18 04:29  1/17/18 09:52


25 ML


 


 Glucagon


  (Glucagon Inj)  1 mg  UD  PRN


 SQ  1/16/18 04:30


 2/15/18 04:29   


 


 


 Ondansetron HCl


  (Zofran Inj)  4 mg  Q4H  PRN


 IV  1/16/18 18:15


 2/15/18 18:14  1/19/18 12:15


4 MG


 


 Heparin Sodium


  (Porcine)


  (Heparin Sq 5000


 Unit/0.5ml)  5,000 unit  Q8


 SQ  1/17/18 15:00


 2/16/18 14:59 Future Hold 1/22/18 05:59


5,000 UNIT


 


 Ertapenem 500 mg/


 Sodium Chloride  55 ml @ 


 110 mls/hr  DAILY@1800


 IV  1/18/18 18:00


 1/28/18 17:59  1/22/18 18:10


110 MLS/HR


 


 Vancomycin HCl


  (Vancomycin Oral


 Soln)  125 mg  Q6H


 PO  1/20/18 20:00


 2/3/18 19:59  1/23/18 13:41


125 MG


 


 Raspberry


  (Raspberry Syrup


 5ml Cup)  5 ml  Q6H


 PO  1/20/18 20:00


 2/3/18 19:59  1/23/18 13:40


5 ML


 


 Insulin Aspart


  (novoLOG ASPART)  **SLIDING


 SCALE**


 **G...  ACHS


 SC  1/22/18 07:00


 2/21/18 06:59  1/23/18 09:32


2 UNITS


 


 Ioversol


  (Optiray 320)  100 ml  UD  PRN


 IV  1/22/18 13:45


 1/26/18 13:44   


 


 


 Metronidazole 500


 mg/Prmx  100 ml @ 


 100 mls/hr  Q8H


 IV  1/22/18 18:00


 2/1/18 17:59  1/23/18 09:25


100 MLS/HR


 


 Pantoprazole


 Sodium 40 mg/


 Syringe  10 ml @ 5


 mls/min  DAILY@1100


 IV  1/23/18 11:00


 2/22/18 10:59  1/23/18 09:26


5 MLS/MIN


 


 Miscellaneous


  (Unit Dose


 Compound)  1 ea  QID


 TX  1/23/18 17:00


 2/22/18 16:59   


 


 


 Vancomycin HCl/


 Sterile Water    QID


 TX  1/23/18 17:00


 2/2/18 16:59   


 


 


 Acetaminophen 650


 mg/Empty Bag  65 ml @ 


 260 mls/hr  Q6H  PRN


 IV  1/23/18 16:30


 2/22/18 16:29 UNV

## 2018-01-23 NOTE — DIAGNOSTIC IMAGING REPORT
KUB



HISTORY:      Follow-up small bowel obstruction.



COMPARISON: Abdomen and pelvis CT 1/22/2018.



FINDINGS: Gas-filled dilated loops of small bowel within the midabdomen are

again noted consistent with the patient's history of a small bowel obstruction.

These are distended up to 4.2 cm. This is similar to the prior study.

Cholecystectomy. Left-sided nephrolithiasis.      No pneumoperitoneum or

pneumatosis.



IMPRESSION:  

No change in the distended gas-filled loops of small bowel consistent with an

obstruction.







Electronically signed by:  Jadiel Oneal M.D.

1/23/2018 8:59 AM



Dictated Date/Time:  1/23/2018 8:57 AM

## 2018-01-23 NOTE — GASTROENTEROLOGY PROGRESS NOTE
Progress Note


Date of Service:  Jan 23, 2018


Subjective


Pt evaluation today including:  conversation w/ patient, physical exam, chart 

review, lab review


Pt seen and evaluated, chart reviewed. GI asked to evaluate the pt yesterday 

for abdominal pain and c.diff. Image was ordered w/ evidence of SBO. Surgery 

was consulted who is following. Pt notes continued abdominal pain, bloating and 

pressure. Constant. Worse at night and after PO intake. Is moving bowels. Has 

had 3 episodes of stools since she was evaluated yesterday. Loose. Dark red 

colored per chart review. No UGI symptoms, no nausea, vomiting, GERD. No CP or 

SOB. No fever, chills.





NSAIDs: ASA daily


ABX: current


Other: Plavix, heparin gtt during admission





Colonoscopy: none


EGD: none 


 


KUB 1/23/18: No change in the distended gas-filled loops of small bowel 

consistent with an obstruction.


CT 1/22/18: Findings are consistent with a small bowel obstruction. A 

transition point is seen in the right pelvis and this is likely on the basis of 

adhesions.  There is no pneumatosis intestinalis, portal venous gas, or 

intraperitoneal free air. Cirrhotic liver morphology.Splenomegaly. There are 

small pleural effusions with bibasilar consolidation. This could present 

atelectasis versus pneumonia. Clinical correlation will be required. 

Cardiomegaly and emphysema. Bilateral nephrolithiasis


KUB 1/22/18: There is nonspecific gaseous distention of the small bowel. 

Correlate clinically for evidence of obstruction.


Chest XR 1/17/18: No significant change from the preceding study Persistent 

mild pulmonary vascular congestion/edema.


KUB 1/16/18: Prominent loop of small bowel within the left mid abdomen with no 

convincing evidence for small bowel obstruction.





Review of Systems


Constitutional:  No fever, No chills


Respiratory:  No cough, No shortness of breath


Cardiac:  No chest pain, No edema


Abdomen:  + pain, + diarrhea, + GI bleeding, No nausea, No vomiting





Medications





Current Inpatient Medications








 Medications


  (Trade)  Dose


 Ordered  Sig/Daniel


 Route  Start Time


 Stop Time Status Last Admin


Dose Admin


 


 Acetaminophen


  (Tylenol Tab)  650 mg  Q4H  PRN


 PO  1/16/18 02:30


 2/15/18 02:29  1/18/18 14:57


650 MG


 


 Allopurinol


  (Zyloprim Tab)  100 mg  BID


 PO  1/16/18 09:00


 2/15/18 08:59 Future Hold 1/22/18 08:39


100 MG


 


 Aspirin


  (Ecotrin Tab)  81 mg  QAM


 PO  1/16/18 09:00


 2/15/18 08:59 Future Hold 1/22/18 08:39


81 MG


 


 Atorvastatin


 Calcium


  (Lipitor Tab)  40 mg  QAM


 PO  1/16/18 09:00


 2/15/18 08:59 Future Hold 1/22/18 08:39


40 MG


 


 Docusate Sodium


  (coLACE CAP)  100 mg  BID


 PO  1/16/18 09:00


 2/15/18 08:59 Future Hold 1/22/18 08:40


100 MG


 


 Albuterol/


 Ipratropium


  (Combivent


 Respimat Inh)  1 puffs  QID


 INH  1/16/18 09:00


 2/15/18 08:59  1/23/18 09:25


1 PUFFS


 


 Pantoprazole


 Sodium


  (Protonix Tab)  40 mg  DAILY


 PO  1/16/18 09:00


 2/15/18 08:59 Future Hold 1/22/18 08:41


40 MG


 


 Pramipexole


 Dihydrochloride


  (miraPEX TAB)  0.25 mg  HS


 PO  1/16/18 21:00


 2/15/18 20:59 Future Hold 1/21/18 20:58


0.25 MG


 


 Sertraline HCl


  (Zoloft Tab)  75 mg  DAILY


 PO  1/16/18 09:00


 2/15/18 08:59 Future Hold 1/22/18 08:39


75 MG


 


 Thiamine HCl


  (Vitamin B-1 Tab)  50 mg  DAILY


 PO  1/16/18 09:00


 2/15/18 08:59 Future Hold 1/22/18 08:41


50 MG


 


 Tramadol HCl


  (Ultram Tab)  50 mg  Q4H  PRN


 PO  1/16/18 02:45


 2/15/18 02:44 Future Hold 1/19/18 20:40


50 MG


 


 Vitamin B Complex/


 Vit C/Folic Acid


  (Nephrocaps)  1 cap  DAILY


 PO  1/16/18 09:00


 2/15/18 08:59 Future Hold 1/22/18 08:40


1 CAP


 


 Miscellaneous


 Information


  (Order Awaiting


 Action)  1 ea  QS


 N/A  1/16/18 08:00


 2/15/18 07:59  1/16/18 08:21


1 EA


 


 Miscellaneous


 Information


  (Order Awaiting


 Action)  1 ea  QS


 N/A  1/16/18 08:00


 2/15/18 07:59  1/16/18 08:21


1 EA


 


 Gabapentin


  (Neurontin Cap)  200 mg  HS


 PO  1/16/18 21:00


 2/15/18 20:59 Future Hold 1/21/18 20:58


200 MG


 


 Miscellaneous


 Information


  (Consult


 Glycemic


 Management


 Pharmacy)  1 ea  UD  PRN


 N/A  1/16/18 04:15


 2/15/18 04:14   


 


 


 Glucose


  (Glucose 40% Gel)  15-30


 GRAMS 15


 GRAMS...  UD  PRN


 PO  1/16/18 04:30


 2/15/18 04:29   


 


 


 Glucose


  (Glucose Chew


 Tab)  4-8


 Tablets 4


 Tabl...  UD  PRN


 PO  1/16/18 04:30


 2/15/18 04:29   


 


 


 Dextrose


  (Dextrose 50%


 50ML Syringe)  25-50ML OF


 50% DW IV


 FOR...  UD  PRN


 IV  1/16/18 04:30


 2/15/18 04:29  1/17/18 09:52


25 ML


 


 Glucagon


  (Glucagon Inj)  1 mg  UD  PRN


 SQ  1/16/18 04:30


 2/15/18 04:29   


 


 


 Ondansetron HCl


  (Zofran Inj)  4 mg  Q4H  PRN


 IV  1/16/18 18:15


 2/15/18 18:14  1/19/18 12:15


4 MG


 


 Clopidogrel


 Bisulfate


  (plAVix TAB)  75 mg  DAILY


 PO  1/17/18 09:00


 2/16/18 08:59 Future Hold 1/22/18 08:39


75 MG


 


 Heparin Sodium


  (Porcine)


  (Heparin Sq 5000


 Unit/0.5ml)  5,000 unit  Q8


 SQ  1/17/18 15:00


 2/16/18 14:59 Future Hold 1/22/18 05:59


5,000 UNIT


 


 Sevelamer HCl


  (Renagel Tab)  1,600 mg  TIDM


 PO  1/17/18 16:30


 2/16/18 16:29 Future Hold 1/22/18 13:27


1,600 MG


 


 Ertapenem 500 mg/


 Sodium Chloride  55 ml @ 


 110 mls/hr  DAILY@1800


 IV  1/18/18 18:00


 1/28/18 17:59  1/22/18 18:10


110 MLS/HR


 


 Guaifenesin


  (Organidin Nr


 Tab)  200 mg  Q8  PRN


 PO  1/20/18 09:30


 2/19/18 09:29 Future Hold  


 


 


 Vancomycin HCl


  (Vancomycin Oral


 Soln)  125 mg  Q6H


 PO  1/20/18 20:00


 2/3/18 19:59  1/23/18 09:24


125 MG


 


 Raspberry


  (Raspberry Syrup


 5ml Cup)  5 ml  Q6H


 PO  1/20/18 20:00


 2/3/18 19:59  1/23/18 09:25


5 ML


 


 Insulin Aspart


  (novoLOG ASPART)  **SLIDING


 SCALE**


 **G...  ACHS


 SC  1/22/18 07:00


 2/21/18 06:59  1/23/18 09:32


2 UNITS


 


 Ioversol


  (Optiray 320)  100 ml  UD  PRN


 IV  1/22/18 13:45


 1/26/18 13:44   


 


 


 Metronidazole 500


 mg/Prmx  100 ml @ 


 100 mls/hr  Q8H


 IV  1/22/18 18:00


 2/1/18 17:59  1/23/18 09:25


100 MLS/HR


 


 Pantoprazole


 Sodium 40 mg/


 Syringe  10 ml @ 5


 mls/min  DAILY@1100


 IV  1/23/18 11:00


 2/22/18 10:59  1/23/18 09:26


5 MLS/MIN











Objective


Vital Signs











  Date Time  Temp Pulse Resp B/P (MAP) Pulse Ox O2 Delivery O2 Flow Rate FiO2


 


1/23/18 08:01 36.6 86 20 90/57 (68) 94 Nasal Cannula 2.0 


 


1/23/18 04:17 36.9 89 18 83/55 (64) 96 Nasal Cannula 2.0 


 


1/23/18 02:34  90  86/50 (62)    


 


1/23/18 02:22  89  83/48 (60) 93   


 


1/23/18 01:52 36.8 89 20 82/52 92   


 


1/23/18 00:59 36.6 90 18 83/50 93   


 


1/23/18 00:01 36.7 88 18 87/51 97  2.0 


 


1/23/18 00:00     97 Nasal Cannula 2.0 


 


1/22/18 23:24 36.5 84 18 73/46 95   


 


1/22/18 23:08 36.7 89 18 80/47 97  2.0 


 


1/22/18 22:47 36.7 94 20 79/47 97   


 


1/22/18 21:30  91  77/43 (54)    


 


1/22/18 20:30  92  80/51 (61)    


 


1/22/18 20:00     97 Nasal Cannula 2.0 


 


1/22/18 20:00  93  72/40 (51)    


 


1/22/18 19:00 36.8 92 16 76/39 (51) 95 Nasal Cannula 2.0 


 


1/22/18 18:20    83/51 (62)    


 


1/22/18 17:15 36.5 97 20 70/42 (51) 97 Nasal Cannula 2.0 


 


1/22/18 16:59  150   95   


 


1/22/18 16:30      Nasal Cannula 2.0 


 


1/22/18 12:40 36.8 100  85/50 (62)    


 


1/22/18 12:15  96  86/49    


 


1/22/18 12:00     95 Nasal Cannula 2.0 


 


1/22/18 12:00  96  95/44    


 


1/22/18 11:45  96  85/47    


 


1/22/18 11:30  95  85/48    


 


1/22/18 11:15  95  83/49    


 


1/22/18 11:04 36.6 93 18 81/47 (58) 97   


 


1/22/18 11:00  93  80/47    


 


1/22/18 10:45  94  81/51    


 


1/22/18 10:30  93  91/47    











Physical Exam


General Appearance:  no apparent distress


Eyes:  PERRL


ENT:  hearing grossly normal


Neck:  supple


Respiratory/Chest:  + rhonchi


Cardiovascular:  regular rate, rhythm


Abdomen:  normal bowel sounds, soft, no organomegaly, + tenderness (generalized)


Neurologic/Psych:  alert, normal mood/affect, oriented x 3


Skin:  normal color





Laboratory Results





Last 24 Hours








Test


  1/22/18


11:40 1/22/18


13:05 1/22/18


16:34 1/22/18


20:05


 


Bedside Glucose 143 mg/dl   180 mg/dl  


 


White Blood Count  15.72 K/uL   


 


Red Blood Count  2.29 M/uL   


 


Hemoglobin  7.4 g/dL   7.2 g/dL 


 


Hematocrit  22.9 %   23.4 % 


 


Mean Corpuscular Volume  100.0 fL   


 


Mean Corpuscular Hemoglobin  32.3 pg   


 


Mean Corpuscular Hemoglobin


Concent 


  32.3 g/dl 


  


  


 


 


RDW Standard Deviation  53.9 fL   


 


RDW Coefficient of Variation  15.0 %   


 


Platelet Count  150 K/uL   


 


Mean Platelet Volume  9.6 fL   


 


Nucleated RBC Absolute Count


(auto) 


  0.07 K/uL 


  


  


 


 


Nucleated Red Blood Cells %  0.5 %   


 


Sodium Level  136 mmol/L   


 


Potassium Level  3.8 mmol/L   


 


Chloride Level  100 mmol/L   


 


Carbon Dioxide Level  25 mmol/L   


 


Anion Gap  11.0 mmol/L   


 


Blood Urea Nitrogen  41 mg/dl   


 


Creatinine  4.52 mg/dl   


 


Est Creatinine Clear Calc


Drug Dose 


  15.8 ml/min 


  


  


 


 


Estimated GFR (


American) 


  11.5 


  


  


 


 


Estimated GFR (Non-


American 


  9.9 


  


  


 


 


BUN/Creatinine Ratio  9.0   


 


Random Glucose  142 mg/dl   


 


Calcium Level  7.8 mg/dl   


 


Test


  1/22/18


21:07 1/22/18


23:10 1/23/18


06:04 1/23/18


06:33


 


Bedside Glucose 145 mg/dl    176 mg/dl 


 


Sodium Level  135 mmol/L  134 mmol/L  


 


Potassium Level  4.1 mmol/L  4.1 mmol/L  


 


Chloride Level  101 mmol/L  100 mmol/L  


 


Carbon Dioxide Level  25 mmol/L  26 mmol/L  


 


Anion Gap  9.0 mmol/L  8.0 mmol/L  


 


Blood Urea Nitrogen  55 mg/dl  59 mg/dl  


 


Creatinine  5.93 mg/dl  6.49 mg/dl  


 


Est Creatinine Clear Calc


Drug Dose 


  12.1 ml/min 


  10.8 ml/min 


  


 


 


Estimated GFR (


American) 


  8.3 


  7.4 


  


 


 


Estimated GFR (Non-


American 


  7.2 


  6.4 


  


 


 


BUN/Creatinine Ratio  9.5  9.0  


 


Random Glucose  119 mg/dl  99 mg/dl  


 


Lactic Acid Level  1.0 mmol/L   


 


Calcium Level  8.3 mg/dl  8.8 mg/dl  


 


Magnesium Level  2.1 mg/dl   


 


White Blood Count   14.19 K/uL  


 


Red Blood Count   2.73 M/uL  


 


Hemoglobin   8.6 g/dL  


 


Hematocrit   27.4 %  


 


Mean Corpuscular Volume   100.4 fL  


 


Mean Corpuscular Hemoglobin   31.5 pg  


 


Mean Corpuscular Hemoglobin


Concent 


  


  31.4 g/dl 


  


 


 


RDW Standard Deviation   56.0 fL  


 


RDW Coefficient of Variation   15.7 %  


 


Platelet Count   152 K/uL  


 


Mean Platelet Volume   9.8 fL  


 


Nucleated RBC Absolute Count


(auto) 


  


  0.20 K/uL 


  


 


 


Nucleated Red Blood Cells %   1.4 %  


 


Chemistry Specimen Hemolysis     











Assessment and Plan


57 year old female w/ ESRD on dialysis, aortic valve replacement on ASA, Plavix 

who is a transfer from [facility], on IV ABX for UTI, developed c.diff, now with 

numerous loose stools daily w/ report of black stools and BRBPR. She has 

abdominal pain, generalized but worse in bilateral lower quadrants. She has 

c.diff colitis, would recommend ruling out toxic megacolon with KUB. Her 

abdominal pain and blood in stool is likely secondary to her c.diff but 

ischemia is also in the differential. KUB w/ evidence of SBO, general surgery 

is following.





She is afebrile, hypotensive, BP 90/57, pulse 86. WBC 15, H&H 8.6/27 after 

transfusion of one unit, , BUN 59, CRT 5





Plan


- Rule out toxic megacolon


   - ABD pain


   - White count


   - C.diff


      - if there is any dilation, will need surgical evaluation


         - Management of SBO per surgery


         - Daily KUB


- Trend H&H


- Monitor stools


- Transfuse PRN


- Vancomycin 125 QID x 14 days


- Add IV Flagyl 500 mg TID 


- No narcotics


- No antimotility agents 


- Can have Bentyl 10 mg TID for abdominal pain as needed


- Limit other antibiotic use


   - Appreciate ID recommendations 





- Outpatient Colonoscopy at appointment to be scheduled.





GI to follow. Please call with any questions or concerns. 





I saw and evaluated the patient.  She does have persistent abdominal pain and 

was found to have evidence of a small bowel obstruction.  The patient does 

continue to have hematochezia and was found have a bowel obstruction yesterday.

  Given this and may be prudent to add a vancomycin retention enema.





Recommendations


Continue vancomycin 125 4 times a day


Continue metronidazole 3 times a day


Begin vancomycin enema 500 mg 4 times a day

## 2018-01-23 NOTE — SURGERY PROGRESS NOTE
Surgery Progress Note


Date of Service


Jan 23, 2018.





Subjective


awake, alert- some abd pain





loose bms





Objective


Vital Signs:











  Date Time  Temp Pulse Resp B/P (MAP) Pulse Ox O2 Delivery O2 Flow Rate FiO2


 


1/23/18 04:17 36.9 89 18 83/55 (64) 96 Nasal Cannula 2.0 


 


1/23/18 02:34  90  86/50 (62)    


 


1/23/18 02:22  89  83/48 (60) 93   


 


1/23/18 01:52 36.8 89 20 82/52 92   


 


1/23/18 00:59 36.6 90 18 83/50 93   


 


1/23/18 00:01 36.7 88 18 87/51 97  2.0 


 


1/23/18 00:00     97 Nasal Cannula 2.0 


 


1/22/18 23:24 36.5 84 18 73/46 95   


 


1/22/18 23:08 36.7 89 18 80/47 97  2.0 


 


1/22/18 22:47 36.7 94 20 79/47 97   


 


1/22/18 21:30  91  77/43 (54)    


 


1/22/18 20:30  92  80/51 (61)    


 


1/22/18 20:00     97 Nasal Cannula 2.0 


 


1/22/18 20:00  93  72/40 (51)    


 


1/22/18 19:00 36.8 92 16 76/39 (51) 95 Nasal Cannula 2.0 


 


1/22/18 18:20    83/51 (62)    


 


1/22/18 17:15 36.5 97 20 70/42 (51) 97 Nasal Cannula 2.0 


 


1/22/18 16:59  150   95   


 


1/22/18 16:30      Nasal Cannula 2.0 


 


1/22/18 12:40 36.8 100  85/50 (62)    


 


1/22/18 12:15  96  86/49    


 


1/22/18 12:00     95 Nasal Cannula 2.0 


 


1/22/18 12:00  96  95/44    


 


1/22/18 11:45  96  85/47    


 


1/22/18 11:30  95  85/48    


 


1/22/18 11:15  95  83/49    


 


1/22/18 11:04 36.6 93 18 81/47 (58) 97   


 


1/22/18 11:00  93  80/47    


 


1/22/18 10:45  94  81/51    


 


1/22/18 10:30  93  91/47    


 


1/22/18 10:15  94  86/50    


 


1/22/18 10:00  93  75/46    


 


1/22/18 09:45  92  84/50    


 


1/22/18 09:30  92  95/56    


 


1/22/18 09:21  94  98/53    


 


1/22/18 09:13 36.6 94  90/49 (63)    


 


1/22/18 08:20     95 Nasal Cannula 2.0 


 


1/22/18 07:35 36.4 94 20 96/63 (74) 95 Nasal Cannula 2.0 








General Appearance:  no apparent distress


Respiratory/Chest:  + rales, + rhonchi


Abdomen:  normal bowel sounds, soft, + tenderness (with deep palpation)


Laboratory Results:





Results Past 24 Hours








Test


  1/22/18


09:00 1/22/18


11:40 1/22/18


13:05 1/22/18


16:34 Range/Units


 


 


Stool Occult Blood POSITIVE    NEGATIVE  


 


Bedside Glucose  143  180 70-90  mg/dl


 


White Blood Count   15.72  4.8-10.8  K/uL


 


Red Blood Count   2.29  4.2-5.4  M/uL


 


Hemoglobin   7.4  12.0-16.0  g/dL


 


Hematocrit   22.9  37-47  %


 


Mean Corpuscular Volume   100.0    fL


 


Mean Corpuscular Hemoglobin   32.3  25-34  pg


 


Mean Corpuscular Hemoglobin


Concent 


  


  32.3


  


  32-36  g/dl


 


 


RDW Standard Deviation   53.9  36.4-46.3  fL


 


RDW Coefficient of Variation   15.0  11.5-14.5  %


 


Platelet Count   150  130-400  K/uL


 


Mean Platelet Volume   9.6  7.4-10.4  fL


 


Nucleated RBC Absolute Count


(auto) 


  


  0.07


  


  0-0  K/uL


 


 


Nucleated Red Blood Cells %   0.5   %


 


Sodium Level   136  136-145  mmol/L


 


Potassium Level   3.8  3.5-5.1  mmol/L


 


Chloride Level   100    mmol/L


 


Carbon Dioxide Level   25  21-32  mmol/L


 


Anion Gap   11.0  3-11  mmol/L


 


Blood Urea Nitrogen   41  7-18  mg/dl


 


Creatinine


  


  


  4.52


  


  0.60-1.20


mg/dl


 


Est Creatinine Clear Calc


Drug Dose 


  


  15.8


  


   ml/min


 


 


Estimated GFR (


American) 


  


  11.5


  


   


 


 


Estimated GFR (Non-


American 


  


  9.9


  


   


 


 


BUN/Creatinine Ratio   9.0  10-20  


 


Random Glucose   142  70-99  mg/dl


 


Calcium Level   7.8  8.5-10.1  mg/dl


 


Test


  1/22/18


20:05 1/22/18


21:07 1/22/18


23:10 1/23/18


06:04 Range/Units


 


 


Hemoglobin 7.2   8.6 12.0-16.0  g/dL


 


Hematocrit 23.4   27.4 37-47  %


 


Bedside Glucose  145   70-90  mg/dl


 


Sodium Level   135  136-145  mmol/L


 


Potassium Level   4.1  3.5-5.1  mmol/L


 


Chloride Level   101    mmol/L


 


Carbon Dioxide Level   25  21-32  mmol/L


 


Anion Gap   9.0  3-11  mmol/L


 


Blood Urea Nitrogen   55  7-18  mg/dl


 


Creatinine


  


  


  5.93


  


  0.60-1.20


mg/dl


 


Est Creatinine Clear Calc


Drug Dose 


  


  12.1


  


   ml/min


 


 


Estimated GFR (


American) 


  


  8.3


  


   


 


 


Estimated GFR (Non-


American 


  


  7.2


  


   


 


 


BUN/Creatinine Ratio   9.5  10-20  


 


Random Glucose   119  70-99  mg/dl


 


Lactic Acid Level   1.0  0.4-2.0  mmol/L


 


Calcium Level   8.3  8.5-10.1  mg/dl


 


Magnesium Level   2.1  1.8-2.4  mg/dl


 


White Blood Count    14.19 4.8-10.8  K/uL


 


Red Blood Count    2.73 4.2-5.4  M/uL


 


Mean Corpuscular Volume    100.4   fL


 


Mean Corpuscular Hemoglobin    31.5 25-34  pg


 


Mean Corpuscular Hemoglobin


Concent 


  


  


  31.4


  32-36  g/dl


 


 


RDW Standard Deviation    56.0 36.4-46.3  fL


 


RDW Coefficient of Variation    15.7 11.5-14.5  %


 


Platelet Count    152 130-400  K/uL


 


Mean Platelet Volume    9.8 7.4-10.4  fL


 


Nucleated RBC Absolute Count


(auto) 


  


  


  0.20


  0-0  K/uL


 


 


Nucleated Red Blood Cells %    1.4  %


 


Test


  1/23/18


06:33 


  


  


  Range/Units


 


 


Bedside Glucose 176    70-90  mg/dl











Assessment & Plan


1/23/18- has normal bowel sounds and abd soft- some tenderness


    colon looks relativel normal on CT. Cont po, IV meds/atbx.


    Surgical intervention would be extreme risk to pt and only if


    she worsens to toxic megacolon.

## 2018-01-24 VITALS — HEART RATE: 98 BPM | SYSTOLIC BLOOD PRESSURE: 88 MMHG | DIASTOLIC BLOOD PRESSURE: 47 MMHG

## 2018-01-24 VITALS — HEART RATE: 99 BPM | SYSTOLIC BLOOD PRESSURE: 85 MMHG | DIASTOLIC BLOOD PRESSURE: 45 MMHG

## 2018-01-24 VITALS — HEART RATE: 97 BPM | DIASTOLIC BLOOD PRESSURE: 49 MMHG | SYSTOLIC BLOOD PRESSURE: 86 MMHG

## 2018-01-24 VITALS — HEART RATE: 98 BPM | DIASTOLIC BLOOD PRESSURE: 43 MMHG | SYSTOLIC BLOOD PRESSURE: 79 MMHG

## 2018-01-24 VITALS — HEART RATE: 96 BPM | SYSTOLIC BLOOD PRESSURE: 85 MMHG | DIASTOLIC BLOOD PRESSURE: 46 MMHG

## 2018-01-24 VITALS — SYSTOLIC BLOOD PRESSURE: 87 MMHG | DIASTOLIC BLOOD PRESSURE: 50 MMHG | HEART RATE: 96 BPM

## 2018-01-24 VITALS
TEMPERATURE: 97.52 F | OXYGEN SATURATION: 95 % | HEART RATE: 97 BPM | SYSTOLIC BLOOD PRESSURE: 82 MMHG | DIASTOLIC BLOOD PRESSURE: 60 MMHG

## 2018-01-24 VITALS — DIASTOLIC BLOOD PRESSURE: 41 MMHG | HEART RATE: 99 BPM | SYSTOLIC BLOOD PRESSURE: 77 MMHG

## 2018-01-24 VITALS
DIASTOLIC BLOOD PRESSURE: 51 MMHG | OXYGEN SATURATION: 91 % | HEART RATE: 119 BPM | SYSTOLIC BLOOD PRESSURE: 80 MMHG | TEMPERATURE: 97.7 F

## 2018-01-24 VITALS — DIASTOLIC BLOOD PRESSURE: 47 MMHG | HEART RATE: 99 BPM | SYSTOLIC BLOOD PRESSURE: 91 MMHG

## 2018-01-24 VITALS
SYSTOLIC BLOOD PRESSURE: 88 MMHG | OXYGEN SATURATION: 91 % | DIASTOLIC BLOOD PRESSURE: 50 MMHG | TEMPERATURE: 98.24 F | HEART RATE: 98 BPM

## 2018-01-24 VITALS — SYSTOLIC BLOOD PRESSURE: 99 MMHG | DIASTOLIC BLOOD PRESSURE: 54 MMHG | HEART RATE: 98 BPM | TEMPERATURE: 98.24 F

## 2018-01-24 VITALS
DIASTOLIC BLOOD PRESSURE: 62 MMHG | SYSTOLIC BLOOD PRESSURE: 96 MMHG | HEART RATE: 106 BPM | OXYGEN SATURATION: 91 % | TEMPERATURE: 98.78 F

## 2018-01-24 VITALS — HEART RATE: 97 BPM | SYSTOLIC BLOOD PRESSURE: 83 MMHG | DIASTOLIC BLOOD PRESSURE: 48 MMHG

## 2018-01-24 VITALS
HEART RATE: 103 BPM | TEMPERATURE: 98.78 F | SYSTOLIC BLOOD PRESSURE: 91 MMHG | DIASTOLIC BLOOD PRESSURE: 61 MMHG | OXYGEN SATURATION: 100 %

## 2018-01-24 VITALS
OXYGEN SATURATION: 97 % | HEART RATE: 97 BPM | DIASTOLIC BLOOD PRESSURE: 57 MMHG | SYSTOLIC BLOOD PRESSURE: 93 MMHG | TEMPERATURE: 97.88 F

## 2018-01-24 VITALS — HEART RATE: 94 BPM | TEMPERATURE: 97.52 F | SYSTOLIC BLOOD PRESSURE: 83 MMHG | DIASTOLIC BLOOD PRESSURE: 40 MMHG

## 2018-01-24 VITALS — HEART RATE: 98 BPM | SYSTOLIC BLOOD PRESSURE: 86 MMHG | DIASTOLIC BLOOD PRESSURE: 47 MMHG

## 2018-01-24 VITALS — DIASTOLIC BLOOD PRESSURE: 51 MMHG | HEART RATE: 98 BPM | SYSTOLIC BLOOD PRESSURE: 82 MMHG

## 2018-01-24 VITALS — DIASTOLIC BLOOD PRESSURE: 43 MMHG | HEART RATE: 97 BPM | SYSTOLIC BLOOD PRESSURE: 84 MMHG

## 2018-01-24 RX ADMIN — ALBUMIN HUMAN SCH GM: 250 SOLUTION INTRAVENOUS at 09:23

## 2018-01-24 RX ADMIN — VANCOMYCIN HYDROCHLORIDE SCH BTL: 1 INJECTION, POWDER, LYOPHILIZED, FOR SOLUTION INTRAVENOUS at 21:09

## 2018-01-24 RX ADMIN — ALUMINUM ZIRCONIUM TRICHLOROHYDREX GLY SCH EA: 0.2 STICK TOPICAL at 15:39

## 2018-01-24 RX ADMIN — IPRATROPIUM BROMIDE AND ALBUTEROL SCH PUFFS: 20; 100 SPRAY, METERED RESPIRATORY (INHALATION) at 17:34

## 2018-01-24 RX ADMIN — INSULIN ASPART SCH UNITS: 100 INJECTION, SOLUTION INTRAVENOUS; SUBCUTANEOUS at 12:17

## 2018-01-24 RX ADMIN — PANTOPRAZOLE SODIUM SCH MLS/MIN: 40 INJECTION, POWDER, FOR SOLUTION INTRAVENOUS at 12:30

## 2018-01-24 RX ADMIN — VANCOMYCIN HYDROCHLORIDE SCH MG: 1 INJECTION, POWDER, LYOPHILIZED, FOR SOLUTION INTRAVENOUS at 02:23

## 2018-01-24 RX ADMIN — Medication SCH ML: at 12:30

## 2018-01-24 RX ADMIN — METRONIDAZOLE SCH MLS/HR: 500 INJECTION, SOLUTION INTRAVENOUS at 02:23

## 2018-01-24 RX ADMIN — INSULIN ASPART SCH UNITS: 100 INJECTION, SOLUTION INTRAVENOUS; SUBCUTANEOUS at 07:00

## 2018-01-24 RX ADMIN — VANCOMYCIN HYDROCHLORIDE SCH MG: 1 INJECTION, POWDER, LYOPHILIZED, FOR SOLUTION INTRAVENOUS at 12:30

## 2018-01-24 RX ADMIN — VANCOMYCIN HYDROCHLORIDE SCH MG: 1 INJECTION, POWDER, LYOPHILIZED, FOR SOLUTION INTRAVENOUS at 19:32

## 2018-01-24 RX ADMIN — ERTAPENEM SODIUM SCH MLS/HR: 1 INJECTION, POWDER, LYOPHILIZED, FOR SOLUTION INTRAMUSCULAR; INTRAVENOUS at 17:36

## 2018-01-24 RX ADMIN — MIDODRINE HYDROCHLORIDE SCH MG: 2.5 TABLET ORAL at 19:32

## 2018-01-24 RX ADMIN — IPRATROPIUM BROMIDE AND ALBUTEROL SCH PUFFS: 20; 100 SPRAY, METERED RESPIRATORY (INHALATION) at 12:30

## 2018-01-24 RX ADMIN — IPRATROPIUM BROMIDE AND ALBUTEROL SCH PUFFS: 20; 100 SPRAY, METERED RESPIRATORY (INHALATION) at 21:09

## 2018-01-24 RX ADMIN — VANCOMYCIN HYDROCHLORIDE SCH BTL: 1 INJECTION, POWDER, LYOPHILIZED, FOR SOLUTION INTRAVENOUS at 17:35

## 2018-01-24 RX ADMIN — IPRATROPIUM BROMIDE AND ALBUTEROL SCH PUFFS: 20; 100 SPRAY, METERED RESPIRATORY (INHALATION) at 07:40

## 2018-01-24 RX ADMIN — ALUMINUM ZIRCONIUM TRICHLOROHYDREX GLY SCH EA: 0.2 STICK TOPICAL at 07:39

## 2018-01-24 RX ADMIN — METRONIDAZOLE SCH MLS/HR: 500 INJECTION, SOLUTION INTRAVENOUS at 07:41

## 2018-01-24 RX ADMIN — VANCOMYCIN HYDROCHLORIDE SCH MG: 1 INJECTION, POWDER, LYOPHILIZED, FOR SOLUTION INTRAVENOUS at 07:39

## 2018-01-24 RX ADMIN — Medication SCH ML: at 19:32

## 2018-01-24 RX ADMIN — ALBUMIN HUMAN SCH GM: 250 SOLUTION INTRAVENOUS at 10:19

## 2018-01-24 RX ADMIN — VANCOMYCIN HYDROCHLORIDE SCH BTL: 1 INJECTION, POWDER, LYOPHILIZED, FOR SOLUTION INTRAVENOUS at 12:29

## 2018-01-24 RX ADMIN — Medication SCH ML: at 07:39

## 2018-01-24 RX ADMIN — VANCOMYCIN HYDROCHLORIDE SCH BTL: 1 INJECTION, POWDER, LYOPHILIZED, FOR SOLUTION INTRAVENOUS at 07:41

## 2018-01-24 RX ADMIN — INSULIN ASPART SCH UNITS: 100 INJECTION, SOLUTION INTRAVENOUS; SUBCUTANEOUS at 21:12

## 2018-01-24 RX ADMIN — INSULIN ASPART SCH UNITS: 100 INJECTION, SOLUTION INTRAVENOUS; SUBCUTANEOUS at 17:03

## 2018-01-24 RX ADMIN — METRONIDAZOLE SCH MLS/HR: 500 INJECTION, SOLUTION INTRAVENOUS at 17:35

## 2018-01-24 RX ADMIN — Medication SCH ML: at 02:23

## 2018-01-24 NOTE — DIAGNOSTIC IMAGING REPORT
KUB



CLINICAL HISTORY: Generalized abdominal pain. 



FINDINGS: 2 AP, portable, supine abdominal radiographs are compared to study

dated 1/23/2018 and correlated with abdominal CT 1/22/2018. There is persistent

gaseous distention of the small bowel. There is no significant colonic

distention. Cholecystectomy clips are present in the right upper quadrant.

Nonobstructing left renal calculi are identified. The skeletal structures are

osteopenic. Degenerative change is noted in the spine.



IMPRESSION: Gaseous distention of the small bowel persists. There is no

significant colonic dilatation.







Electronically signed by:  Ramírez Hernandez M.D.

1/24/2018 9:10 PM



Dictated Date/Time:  1/24/2018 9:09 PM

## 2018-01-24 NOTE — SURGERY PROGRESS NOTE
Surgery Progress Note


Date of Service


Jan 24, 2018.





Subjective


more alert- hungry





less frequent loose bms





min meds for pain





Objective


Vital Signs:











  Date Time  Temp Pulse Resp B/P (MAP) Pulse Ox O2 Delivery O2 Flow Rate FiO2


 


1/24/18 04:04 36.8 98 20 88/50 (63) 91   


 


1/24/18 04:00      Nasal Cannula 1.0 


 


1/24/18 00:38 36.6 97 18 93/57 (69) 97   


 


1/24/18 00:00      Nasal Cannula 1.0 


 


1/23/18 20:16 36.5 93 20 96/59 (71) 98 Nasal Cannula 2.0 


 


1/23/18 20:00      Nasal Cannula 2.0 


 


1/23/18 16:34 36.7 88 18 82/45 (57) 95 Nasal Cannula 2.0 


 


1/23/18 16:00      Nasal Cannula 2.0 


 


1/23/18 12:00      Nasal Cannula 2.0 


 


1/23/18 11:01 36.3 85 20 82/55 (64) 92 Nasal Cannula 2.0 





    75/46 (56)    


 


1/23/18 08:01 36.6 86 20 90/57 (68) 94 Nasal Cannula 2.0 


 


1/23/18 08:00     94 Nasal Cannula 2.0 








General Appearance:  no apparent distress


Respiratory/Chest:  no respiratory distress


Abdomen:  normal bowel sounds, soft, + pertinent finding (mild tenderness)


Laboratory Results:





Results Past 24 Hours








Test


  1/23/18


10:55 1/23/18


16:15 1/23/18


20:49 1/24/18


06:34 Range/Units


 


 


Bedside Glucose 88 136 72 70 70-90  mg/dl











Assessment & Plan


1/23/18- has normal bowel sounds and abd soft- some tenderness


    colon looks relativel normal on CT. Cont po, IV meds/atbx.


    Surgical intervention would be extreme risk to pt and only if


    she worsens to toxic megacolon.


1/24/18- slowly improving- no emesis, pt is hungry- try diet


    cont other atbx. doubt significant mechanical obstruction








1/23/18- has normal bowel sounds and abd soft- some tenderness


    colon looks relativel normal on CT. Cont po, IV meds/atbx.


    Surgical intervention would be extreme risk to pt and only if


    she worsens to toxic megacolon.

## 2018-01-24 NOTE — GASTROENTEROLOGY PROGRESS NOTE
Progress Note


Date of Service:  Jan 24, 2018


Subjective


Pt evaluation today including:  conversation w/ patient, physical exam, chart 

review


Pt was seen and evaluated, chart reviewed. Is more alert today. Reports less 

abdominal pain. There is still some mild cramping. She tells me she has not had 

a BM since last night. Her stool last night was loose though. No fever, chills. 

No nausea, vomiting. She is tolerating diet.





NSAIDs: ASA daily


ABX: current


Other: Plavix, heparin gtt during admission





Colonoscopy: none


EGD: none 


 


KUB 1/23/18: No change in the distended gas-filled loops of small bowel 

consistent with an obstruction.


CT 1/22/18: Findings are consistent with a small bowel obstruction. A 

transition point is seen in the right pelvis and this is likely on the basis of 

adhesions.  There is no pneumatosis intestinalis, portal venous gas, or 

intraperitoneal free air. Cirrhotic liver morphology.Splenomegaly. There are 

small pleural effusions with bibasilar consolidation. This could present 

atelectasis versus pneumonia. Clinical correlation will be required. 

Cardiomegaly and emphysema. Bilateral nephrolithiasis


KUB 1/22/18: There is nonspecific gaseous distention of the small bowel. 

Correlate clinically for evidence of obstruction.


Chest XR 1/17/18: No significant change from the preceding study Persistent 

mild pulmonary vascular congestion/edema.


KUB 1/16/18: Prominent loop of small bowel within the left mid abdomen with no 

convincing evidence for small bowel obstruction.





Review of Systems


Constitutional:  No fever, No chills


Respiratory:  + cough, No shortness of breath


Cardiac:  No chest pain


Abdomen:  + pain, No nausea, No vomiting, No diarrhea, No constipation, No GI 

bleeding





Medications





Current Inpatient Medications








 Medications


  (Trade)  Dose


 Ordered  Sig/Daniel


 Route  Start Time


 Stop Time Status Last Admin


Dose Admin


 


 Albuterol/


 Ipratropium


  (Combivent


 Respimat Inh)  1 puffs  QID


 INH  1/16/18 09:00


 2/15/18 08:59  1/24/18 07:40


1 PUFFS


 


 Miscellaneous


 Information


  (Order Awaiting


 Action)  1 ea  QS


 N/A  1/16/18 08:00


 2/15/18 07:59  1/16/18 08:21


1 EA


 


 Miscellaneous


 Information


  (Order Awaiting


 Action)  1 ea  QS


 N/A  1/16/18 08:00


 2/15/18 07:59  1/16/18 08:21


1 EA


 


 Miscellaneous


 Information


  (Consult


 Glycemic


 Management


 Pharmacy)  1 ea  UD  PRN


 N/A  1/16/18 04:15


 2/15/18 04:14   


 


 


 Glucose


  (Glucose 40% Gel)  15-30


 GRAMS 15


 GRAMS...  UD  PRN


 PO  1/16/18 04:30


 2/15/18 04:29   


 


 


 Glucose


  (Glucose Chew


 Tab)  4-8


 Tablets 4


 Tabl...  UD  PRN


 PO  1/16/18 04:30


 2/15/18 04:29   


 


 


 Dextrose


  (Dextrose 50%


 50ML Syringe)  25-50ML OF


 50% DW IV


 FOR...  UD  PRN


 IV  1/16/18 04:30


 2/15/18 04:29  1/17/18 09:52


25 ML


 


 Glucagon


  (Glucagon Inj)  1 mg  UD  PRN


 SQ  1/16/18 04:30


 2/15/18 04:29   


 


 


 Ondansetron HCl


  (Zofran Inj)  4 mg  Q4H  PRN


 IV  1/16/18 18:15


 2/15/18 18:14  1/19/18 12:15


4 MG


 


 Heparin Sodium


  (Porcine)


  (Heparin Sq 5000


 Unit/0.5ml)  5,000 unit  Q8


 SQ  1/17/18 15:00


 2/16/18 14:59 Future Hold 1/22/18 05:59


5,000 UNIT


 


 Ertapenem 500 mg/


 Sodium Chloride  55 ml @ 


 110 mls/hr  DAILY@1800


 IV  1/18/18 18:00


 1/28/18 17:59  1/23/18 18:04


110 MLS/HR


 


 Vancomycin HCl


  (Vancomycin Oral


 Soln)  125 mg  Q6H


 PO  1/20/18 20:00


 2/3/18 19:59  1/24/18 07:39


125 MG


 


 Raspberry


  (Raspberry Syrup


 5ml Cup)  5 ml  Q6H


 PO  1/20/18 20:00


 2/3/18 19:59  1/24/18 07:39


5 ML


 


 Insulin Aspart


  (novoLOG ASPART)  **SLIDING


 SCALE**


 **G...  ACHS


 SC  1/22/18 07:00


 2/21/18 06:59  1/23/18 09:32


2 UNITS


 


 Ioversol


  (Optiray 320)  100 ml  UD  PRN


 IV  1/22/18 13:45


 1/26/18 13:44   


 


 


 Metronidazole 500


 mg/Prmx  100 ml @ 


 100 mls/hr  Q8H


 IV  1/22/18 18:00


 2/1/18 17:59  1/24/18 07:41


100 MLS/HR


 


 Pantoprazole


 Sodium 40 mg/


 Syringe  10 ml @ 5


 mls/min  DAILY@1100


 IV  1/23/18 11:00


 2/22/18 10:59  1/23/18 09:26


5 MLS/MIN


 


 Miscellaneous


  (Unit Dose


 Compound)  1 ea  QID


 VT  1/23/18 17:00


 2/22/18 16:59  1/23/18 21:26


1 EA


 


 Vancomycin HCl/


 Sterile Water    QID


 VT  1/23/18 17:00


 2/2/18 16:59  1/24/18 07:41


60 BTL


 


 Acetaminophen 650


 mg/Empty Bag  65 ml @ 


 260 mls/hr  Q6H  PRN


 IV  1/23/18 16:30


 2/22/18 16:29   


 


 


 Albumin Human


  (Albumin 25%)  12.5 gm  TODAY@0900,1000


 IV  1/24/18 09:00


 1/24/18 23:59  1/24/18 09:23


12.5 GM











Objective


Vital Signs











  Date Time  Temp Pulse Resp B/P (MAP) Pulse Ox O2 Delivery O2 Flow Rate FiO2


 


1/24/18 09:45  98  86/47    


 


1/24/18 09:30  99  77/41    


 


1/24/18 09:23  98  88/47    


 


1/24/18 09:10 36.4 94  83/40 (54)    


 


1/24/18 07:42 37.1 106 20 96/62 (73) 91 Nasal Cannula 2.0 


 


1/24/18 04:04 36.8 98 20 88/50 (63) 91   


 


1/24/18 04:00      Nasal Cannula 1.0 


 


1/24/18 00:38 36.6 97 18 93/57 (69) 97   


 


1/24/18 00:00      Nasal Cannula 1.0 


 


1/23/18 20:16 36.5 93 20 96/59 (71) 98 Nasal Cannula 2.0 


 


1/23/18 20:00      Nasal Cannula 2.0 


 


1/23/18 16:34 36.7 88 18 82/45 (57) 95 Nasal Cannula 2.0 


 


1/23/18 16:00      Nasal Cannula 2.0 


 


1/23/18 12:00      Nasal Cannula 2.0 


 


1/23/18 11:01 36.3 85 20 82/55 (64) 92 Nasal Cannula 2.0 





    75/46 (56)    











Physical Exam


General Appearance:  no apparent distress, + obese, + pertinent finding (ill 

appearing)


Eyes:  PERRL


ENT:  hearing grossly normal


Respiratory/Chest:  lungs clear, normal breath sounds


Abdomen:  normal bowel sounds, soft, no organomegaly, + tenderness (generalized 

cramping)


Neurologic/Psych:  alert, normal mood/affect


Skin:  normal color





Laboratory Results





Last 24 Hours








Test


  1/23/18


10:55 1/23/18


16:15 1/23/18


20:49 1/24/18


06:34


 


Bedside Glucose 88 mg/dl  136 mg/dl  72 mg/dl  70 mg/dl 











Assessment and Plan


57 year old female w/ ESRD on dialysis, aortic valve replacement on ASA, Plavix 

who is a transfer from [facility], on IV ABX for UTI, developed c.diff, now with 

numerous loose stools daily w/ report of black stools and BRBPR. She has 

abdominal pain, generalized but worse in bilateral lower quadrants. She has 

c.diff colitis, would recommend ruling out toxic megacolon with KUB. Her 

abdominal pain and blood in stool is likely secondary to her c.diff but 

ischemia is also in the differential. KUB w/ evidence of SBO, general surgery 

is following. Given SBO, GI added vancomycin enema QID yesterday to aid in 

c.diff coverage.





- GI would suggest repeat labs today and repeat KUB


- Rule out toxic megacolon


   - ABD pain


   - White count


   - C.diff


      - if there is any dilation, will need surgical evaluation


         - Management of SBO per surgery


         - Daily KUB


- Trend H&H


- Monitor stools


- Transfuse PRN


- Vancomycin 125 QID x 14 days


- Add IV Flagyl 500 mg TID 


- Begin vancomycin enema 500 mg 4 times a day


- No narcotics


- No antimotility agents 


- Can have Bentyl 10 mg TID for abdominal pain as needed


- Limit other antibiotic use


   - Appreciate ID recommendations 





- Outpatient Colonoscopy at appointment to be scheduled.





GI to follow. Please call with any questions or concerns. 





I saw and evaluated the patient.  we are presently following her for 

Clostridium difficile infection.  This afternoon she does appear somewhat 

improved although no labs are available for imaging available for review.





Physical examination:


Patient does appear better today


Still with abdominal discomfort but no rebound or peritoneal signs





Recommendations


Continue vancomycin enema, oral vancomycin and intravenous metronidazole


Avoid antimotility agents and narcotics


Consider daily labs and x-rays for follow-up of her small bowel obstruction





Please call with any questions or concerns





Dr. William Whitaker

## 2018-01-24 NOTE — DIAGNOSTIC IMAGING REPORT
CT OF THE CHEST WITHOUT IV CONTRAST



CLINICAL HISTORY: Persistent hypoxia and productive cough with rhonchi on exam. 

  



COMPARISON STUDY:  Chest radiograph January 17, 2018. 



CT DOSE: 986.63 mGy.cm



TECHNIQUE:  Axial images of the chest were obtained without IV contrast.  Images

were reviewed in the axial, sagittal, and coronal planes. IV contrast was not

administered for this examination.  A dose lowering technique was utilized

adhering to the principles of ALARA.





FINDINGS:  There are median sternotomy wires and a prosthetic aortic valve. The

heart is moderately enlarged. There is no pericardial effusion. Note is made of

mild dilatation of the main pulmonary artery which measures 3.6 cm in caliber.

There is no pneumothorax. Small bilateral pleural effusions are noted. The right

pleural effusion may be partially loculated. There is right pleural

calcification. Central airways are patent. Lungs are suboptimally assessed due

to respiratory motion. Interlobular septal thickening is noted. Subpleural right

lower lobe airspace opacity with volume loss favors atelectasis. There are mild

secretions within the trachea and mainstem bronchi. Subpleural left lower lobe

opacity favors atelectasis as well. There is mild emphysema. Note is made of

several mildly enlarged mediastinal lymph nodes, including two mildly enlarged

right paratracheal lymph nodes. An old T12 compression fracture is noted.

Visualized portions of the upper abdomen demonstrate a cirrhotic liver

morphology with mild splenomegaly and upper abdominal collateral formation.

There are several prominent retrocrural lymph nodes. These are nonspecific.





IMPRESSION:  



1. Interlobular septal thickening indicative of pulmonary edema.



2. Small bilateral pleural effusions with associated subpleural opacities

suggestive of atelectasis. No consolidation to suggest pneumonia.



3. Mild secretions within the trachea and mainstem bronchi.



4. Mild mediastinal lymphadenopathy which is nonspecific. A follow-up chest CT

in 6 months is recommended.



5. Mild emphysema.



6. Mild dilatation of the main pulmonary artery which raises the possibility of

pulmonary arterial hypertension.



7. Cirrhotic liver with mild splenomegaly and upper abdominal varices which

suggests portal hypertension.







Electronically signed by:  Angel Dominguez M.D.

1/24/2018 6:50 PM



Dictated Date/Time:  1/24/2018 6:39 PM

## 2018-01-24 NOTE — PROGRESS NOTE
Medicine Progress Note


Date & Time of Visit:


Jan 24, 2018 at 17:24.


Subjective





56 yo F presented with hypotension after dialysis.  She was transferred to [facility] 

from [facility] and was admitted to the ICU.  She did have some cough productive 

of greenish phlegm for two weeks and was started on empiric abx upon transfer 

from [facility] on 1/16.  She also consistently mentioned some abdominal pain.  

Successive KUBs were performed revealing no convincing evidence for 

obstruction. No acute pulmonary process was noted on CXR and she was saturating 

well on room air. Flu was negatigve and she was started on empiric treatment 

with Vanc and Zosyn.  She was put on a pressor and midodrine and abx were 

subsequently changed to Primaxin to treat an ESBL-producing E coli in her 

urine. Blood cultures were negative and all lines appear clean.  ID was 

consulted.  She developed c-diff colitis and was placed on Vanc and Flagyl. She 

was transferred out of the ICU on 1/18 as she was off pressors and midodrine, 

maintaining her BP.  CT a/p revealed a partial SBO and she was made NPO on 1/ 22.  Gen Surg was consulted and recommended against surgery at this time unless 

she declined clinically.  No NGT was placed so that she could continue to 

receive the PO Vanc to treat her C-diff.  GI was consulted and is assisting 

with c-diff management.  They added a Vanc enema QID on 1/23.  The patient 

denies any nausea today and had two loose BMs.  She is still having a 

productive greenish cough and denies fevers.  She is fatigued from HD today.  

She otherwise is tolerating PO, and mentating well.  She reports some 

improvement in her abdomen today but still has pain.  She reports 4 BMs today.





CT chest was performed in the setting of persistent hypoxia, rhonchi on exam 

and cough productive of greenish sputum.  On review of her abx, she is on Vanc 

PO, Vanc enema, Flagyl IV and Ertapenem.  Microbes not covered include 

Pseudomonas, MRSA, and atypicals such as Mycoplasma.  CT chest did not reveal 

an infiltrate but clinical picture resembles a bronchitis.  Azithromycin was 

added to cover atypicals.  Other cause of cough may be fluid overload as seen 

on CT.  This is being managed through HD.  Midodrine was started for BP 

management at a low dose.  Cosyntropin stim test was negative for adrenal 

insufficiency.





Objective





Last 8 Hrs








  Date Time  Temp Pulse Resp B/P (MAP) Pulse Ox O2 Delivery O2 Flow Rate FiO2


 


1/24/18 15:37 37.1 103 18 91/61 (71) 100 Nasal Cannula 2.0 


 


1/24/18 13:22      Nasal Cannula 1.0 


 


1/24/18 12:47 36.8 98  99/54 (69)    


 


1/24/18 12:15  99  91/47    


 


1/24/18 12:00  97  84/43    


 


1/24/18 11:45  97  83/48    


 


1/24/18 11:30  97  86/49    


 


1/24/18 11:15  96  85/46    


 


1/24/18 11:00  97  82/50    


 


1/24/18 11:00 36.4 96 20 82/60 (67) 95 Nasal Cannula 2.0 


 


1/24/18 10:45  96  87/50    


 


1/24/18 10:30  99  85/45    


 


1/24/18 10:15  98  79/43    


 


1/24/18 10:00  98  82/51    


 


1/24/18 09:45  98  86/47    


 


1/24/18 09:30  99  77/41    








Physical Exam:


GEN: obese, in no acute distress, alert and appropriate


HEENT: NC/AT, PERRL, normal sclerae


CARDIO: reg rate, S1/2 heard without m/g/r


LUNGS: coarse rhonchi throughout


ABD: soft, TTP worse in upper abdomen, nondistended, +BS


EXTREMITY: RP and DP palpable 2+ bilat, no LE swelling or edema, extremities 

are warm and well-perfused


NEURO: CN 2-12 grossly intact


MUSC: 5/5 strength throughout, no focal deficits


SKIN:  warm and dry


Laboratory Results:


1/23/18 06:04








1/23/18 06:04

















Test


  1/16/18


02:55 1/16/18


03:37 1/16/18


03:50 1/16/18


15:00


 


Toxic Vacuolation 2+    


 


Prothrombin Time


  10.8 SECONDS


(9.0-12.0) 


  


  


 


 


Prothromb Time International


Ratio 1.0 (0.9-1.1) 


  


  


  


 


 


Activated Partial


Thromboplast Time 27.1 SECONDS


(21.0-31.0) 


  


  


 


 


Partial Thromboplastin Ratio 1.0    


 


Creatine Kinase MB


  1.4 ng/ml


(0.5-3.6) 


  


  


 


 


Creatine Kinase MB Ratio  (0-3.0)    


 


Troponin I


  0.329 ng/ml


(0-0.045) 


  


  


 


 


Globulin


  3.6 gm/dl


(2.5-4.0) 


  


  


 


 


Albumin/Globulin Ratio 0.6 (0.9-2)    


 


Procalcitonin


  4.56 ng/ml


(0-0.5) 


  


  


 


 


Blood Gas Sample Site  L Brachial   


 


Bedside Blood Gas pH (LAB)


  


  7.38


(7.35-7.45) 


  


 


 


Bedside Blood Gas pCO2 (LAB)


  


  33 mmHg


(35-46) 


  


 


 


Bedside Blood Gas pO2 (LAB)


  


  76 mmHg


(80-95) 


  


 


 


Bedside Blood Gas HCO3 (LAB)


  


  19 meq/L


(19-24) 


  


 


 


Bedside Blood Gas Total CO2


  


  20 mEq/l


(24-31) 


  


 


 


Bedside Blood Gas Base Excess


(LAB) 


  -6.0 meq/L


(-9-1.8) 


  


 


 


Bedside Blood Gas O2


Saturation 


  95.0 % (90-95) 


  


  


 


 


Kiko Test  Pass   


 


Oxygen Delivery Device  Room Air   


 


Influenza Type A (RT-PCR)


  


  


  Neg for Influ


A (NEG) 


 


 


Influenza Type B (RT-PCR)


  


  


  Neg for Influ


B (NEG) 


 


 


Urine Color    DK YELLOW 


 


Urine Appearance    TURBID (CLEAR) 


 


Urine pH    5.5 (4.5-7.5) 


 


Urine Specific Gravity


  


  


  


  1.021


(1.000-1.030)


 


Urine Protein    3+ (NEG) 


 


Urine Glucose (UA)    TRACE (NEG) 


 


Urine Ketones    TRACE (NEG) 


 


Urine Occult Blood    3+ (NEG) 


 


Urine Nitrite    POS (NEG) 


 


Urine Bilirubin    NEG (NEG) 


 


Urine Urobilinogen    NEG (NEG) 


 


Urine Leukocyte Esterase    LARGE (NEG) 


 


Urine WBC (Auto)    >30 /hpf (0-5) 


 


Urine RBC (Auto)


  


  


  


  5-10 /hpf


(0-4)


 


Urine Hyaline Casts (Auto)    1-5 /lpf (0-5) 


 


Urine Epithelial Cells (Auto)    >30 /lpf (0-5) 


 


Urine Bacteria (Auto)    1+ (NEG) 


 


Urine Pathogenic Casts     /lpf (0) 


 


Urine Yeast (Auto)     (NONE PRSENT) 


 


Test


  1/17/18


05:34 1/17/18


09:43 1/18/18


05:20 1/20/18


01:39


 


Estimated Average Glucose 140 mg/dl    


 


Hemoglobin A1c


  6.5 %


(4.5-5.6) 


  


  


 


 


Total Bilirubin


  


  0.8 mg/dl


(0.2-1) 


  


 


 


Direct Bilirubin


  


  0.1 mg/dl


(0-0.2) 


  


 


 


Aspartate Amino Transf


(AST/SGOT) 


  11 U/L (15-37) 


  


  


 


 


Alanine Aminotransferase


(ALT/SGPT) 


  11 U/L (12-78) 


  


  


 


 


Alkaline Phosphatase


  


  115 U/L


() 


  


 


 


Total Protein


  


  6.2 gm/dl


(6.4-8.2) 


  


 


 


Albumin


  


  2.2 gm/dl


(3.4-5.0) 


  


 


 


Amylase Level


  


  22 U/L


() 


  


 


 


Lipase


  


  63 U/L


() 


  


 


 


Random Vancomycin Level   21.9 mcg/ml  


 


Red Blood Cell Morphology    Unremarkable 


 


Test


  1/21/18


06:33 1/22/18


09:00 1/22/18


23:10 1/23/18


06:04


 


Immature Granulocyte % (Auto) 9.5 %    


 


White Blood Count


  12.98 K/uL


(4.8-10.8) 


  


  


 


 


Red Blood Count


  2.57 M/uL


(4.2-5.4) 


  


  2.73 M/uL


(4.2-5.4)


 


Hemoglobin


  8.0 g/dL


(12.0-16.0) 


  


  


 


 


Hematocrit 26.1 % (37-47)    


 


Mean Corpuscular Volume


  101.6 fL


() 


  


  100.4 fL


()


 


Mean Corpuscular Hemoglobin


  31.1 pg


(25-34) 


  


  31.5 pg


(25-34)


 


Mean Corpuscular Hemoglobin


Concent 30.7 g/dl


(32-36) 


  


  31.4 g/dl


(32-36)


 


Platelet Count


  137 K/uL


(130-400) 


  


  


 


 


Mean Platelet Volume


  10.0 fL


(7.4-10.4) 


  


  9.8 fL


(7.4-10.4)


 


Neutrophils (%) (Auto) 78.9 %    


 


Lymphocytes (%) (Auto) 7.3 %    


 


Monocytes (%) (Auto) 4.0 %    


 


Eosinophils (%) (Auto) 0.1 %    


 


Basophils (%) (Auto) 0.2 %    


 


Neutrophils # (Auto)


  10.24 K/uL


(1.4-6.5) 


  


  


 


 


Lymphocytes # (Auto)


  0.95 K/uL


(1.2-3.4) 


  


  


 


 


Monocytes # (Auto)


  0.52 K/uL


(0.11-0.59) 


  


  


 


 


Eosinophils # (Auto)


  0.01 K/uL


(0-0.5) 


  


  


 


 


Basophils # (Auto)


  0.03 K/uL


(0-0.2) 


  


  


 


 


Immature Granulocyte # (Auto)


  1.23 K/uL


(0.00-0.02) 


  


  


 


 


Phosphorus Level


  7.3 mg/dl


(2.5-4.9) 


  


  


 


 


Stool Occult Blood


  


  POSITIVE


(NEGATIVE) 


  


 


 


Lactic Acid Level


  


  


  1.0 mmol/L


(0.4-2.0) 


 


 


Magnesium Level


  


  


  2.1 mg/dl


(1.8-2.4) 


 


 


RDW Standard Deviation


  


  


  


  56.0 fL


(36.4-46.3)


 


RDW Coefficient of Variation


  


  


  


  15.7 %


(11.5-14.5)


 


Nucleated RBC Absolute Count


(auto) 


  


  


  0.20 K/uL


(0-0)


 


Nucleated Red Blood Cells %    1.4 % 


 


Anion Gap


  


  


  


  8.0 mmol/L


(3-11)


 


Est Creatinine Clear Calc


Drug Dose 


  


  


  10.8 ml/min 


 


 


Estimated GFR (


American) 


  


  


  7.4 


 


 


Estimated GFR (Non-


American 


  


  


  6.4 


 


 


BUN/Creatinine Ratio    9.0 (10-20) 


 


Calcium Level


  


  


  


  8.8 mg/dl


(8.5-10.1)


 


Chemistry Specimen Hemolysis     


 


Test


  1/24/18


12:40 1/24/18


19:45 


  


 


 


Cortisol Response to


Stimulation Cortisol


Baseline 


  


  


 


 


Bedside Glucose


  


  196 mg/dl


(70-90) 


  


 














 Date/Time


Source Procedure


Growth Status


 


 


 1/16/18 04:23


Blood Blood Culture - Final


NO GROWTH Complete


 


 1/16/18 00:00


Nasal MRSA DNA Surveillance Screen - Final


Specimen Positive for MRSA by DNA Probe Complete


 


 1/20/18 15:30


Stool C.difficile Toxin B Gene (PCR) - Final


Positive for C. difficile toxin B gene Complete





 1/24/18 20:58


Sputum Expectorated Sputum Gram Stain


Pending Ordered


 


 1/24/18 20:58


Sputum Expectorated Sputum Sputum Culture


Pending Ordered


 


 1/16/18 05:22


Urine , Clean Catch Urine Culture - Final


Escherichia Coli Complete








Last 24 Hours








Test


  1/23/18


20:49 1/24/18


06:34 1/24/18


12:09 1/24/18


12:40


 


Bedside Glucose 72 mg/dl  70 mg/dl  80 mg/dl  


 


Cortisol Response to


Stimulation 


  


  


  Cortisol


Baseline


 


Test


  1/24/18


16:25 


  


  


 


 


Bedside Glucose 130 mg/dl    








Diagnostic Imaging:


CT OF THE CHEST WITHOUT IV CONTRAST





CLINICAL HISTORY: Persistent hypoxia and productive cough with rhonchi on exam. 


  





COMPARISON STUDY:  Chest radiograph January 17, 2018. 





CT DOSE: 986.63 mGy.cm





TECHNIQUE:  Axial images of the chest were obtained without IV contrast.  Images


were reviewed in the axial, sagittal, and coronal planes. IV contrast was not


administered for this examination.  A dose lowering technique was utilized


adhering to the principles of ALARA.








FINDINGS:  There are median sternotomy wires and a prosthetic aortic valve. The


heart is moderately enlarged. There is no pericardial effusion. Note is made of


mild dilatation of the main pulmonary artery which measures 3.6 cm in caliber.


There is no pneumothorax. Small bilateral pleural effusions are noted. The right


pleural effusion may be partially loculated. There is right pleural


calcification. Central airways are patent. Lungs are suboptimally assessed due


to respiratory motion. Interlobular septal thickening is noted. Subpleural right


lower lobe airspace opacity with volume loss favors atelectasis. There are mild


secretions within the trachea and mainstem bronchi. Subpleural left lower lobe


opacity favors atelectasis as well. There is mild emphysema. Note is made of


several mildly enlarged mediastinal lymph nodes, including two mildly enlarged


right paratracheal lymph nodes. An old T12 compression fracture is noted.


Visualized portions of the upper abdomen demonstrate a cirrhotic liver


morphology with mild splenomegaly and upper abdominal collateral formation.


There are several prominent retrocrural lymph nodes. These are nonspecific.








IMPRESSION:  





1. Interlobular septal thickening indicative of pulmonary edema.





2. Small bilateral pleural effusions with associated subpleural opacities


suggestive of atelectasis. No consolidation to suggest pneumonia.





3. Mild secretions within the trachea and mainstem bronchi.





4. Mild mediastinal lymphadenopathy which is nonspecific. A follow-up chest CT


in 6 months is recommended.





5. Mild emphysema.





6. Mild dilatation of the main pulmonary artery which raises the possibility of


pulmonary arterial hypertension.





7. Cirrhotic liver with mild splenomegaly and upper abdominal varices which


suggests portal hypertension.





Assessment & Plan








56 yo F presented with hypotension after dialysis.  She was transferred to [facility] 

from [facility] and was admitted to the ICU.  She did have some cough productive 

of greenish phlegm for two weeks and was started on empiric abx upon transfer 

from [facility] on 1/16.  She also consistently mentioned some abdominal pain.  

Successive KUBs were performed revealing no convincing evidence for 

obstruction. No acute pulmonary process was noted on CXR and she was saturating 

well on room air. Flu was negatigve and she was started on empiric treatment 

with Vanc and Zosyn.  She was put on a pressor and midodrine and abx were 

subsequently changed to Primaxin to treat an ESBL-producing E coli in her 

urine. Blood cultures were negative and all lines appear clean.  ID was 

consulted.  She developed c-diff colitis and was placed on Vanc and Flagyl. She 

was transferred out of the ICU on 1/18 as she was off pressors and midodrine, 

maintaining her BP.  CT a/p revealed a partial SBO and she was made NPO on 1/ 22.  Gen Surg was consulted and recommended against surgery at this time unless 

she declined clinically.  No NGT was placed so that she could continue to 

receive the PO Vanc to treat her C-diff.  GI was consulted and is assisting 

with c-diff management.  They added a Vanc enema QID on 1/23.  The patient 

denies any nausea today and had two loose BMs.  She is still having a 

productive greenish cough and denies fevers.  She is fatigued from HD today.  

She otherwise is tolerating PO, and mentating well.  She reports some 

improvement in her abdomen today but still has pain.  She reports 4 BMs today.





CT chest was performed in the setting of persistent hypoxia, rhonchi on exam 

and cough productive of greenish sputum.  On review of her abx, she is on Vanc 

PO, Vanc enema, Flagyl IV and Ertapenem.  Microbes not covered include 

Pseudomonas, MRSA, and atypicals such as Mycoplasma.  CT chest did not reveal 

an infiltrate but clinical picture resembles a bronchitis.  Azithromycin was 

added to cover atypicals.  Other cause of cough may be fluid overload as seen 

on CT.  This is being managed through HD.  Midodrine was started for BP 

management at a low dose.  Cosyntropin stim test was negative for adrenal 

insufficiency.





1.  Hypotension


2.  ESBL E coli UTI-Ertapenem


3.  C-diff colitis-Vanc PO, Flagyl IV, Vanc enema added today (1/23)


4.  ESRD with fluid overload-on HD, does not make urine. Cont per Nephro


5.  Hypoxia 2/2 fluid overload in setting of possible infectious bronchitis.  

Added azithromycin. Not on oxygen at home-fluid management in HD, pt has been 

stable on this amount since admission.  Will cont to try to wean. 


6  SBO-resolving with somewhat improved abdominal tenderness on exam and now 

tolerating solid food somewhat.  Appreciate Surgical input.


7.  Anemia--FOBT positive and nursing reports of dark red blood in stools 

yesterday.  DVT prophy held.  Pt is still somewhat hypotensive but does not 

appear to be actively bleeding. 


8.  Cirrhosis-will need outpatient assessment with GI or PCP.


8.  DMII-at goal


9.  Obesity


 


Full Code


DVT proph-SCDs, chemoprophylaxis contraindicated in anemia with FOBT positive 

requiring blood tx.


Dispo-uncertain at this time, cont tele. 





Nisha Escobar DO


[facility] Hospitalist


Consultants:


Nephro-Oncu


Current Inpatient Medications:





Current Inpatient Medications








 Medications


  (Trade)  Dose


 Ordered  Sig/Daniel


 Route  Start Time


 Stop Time Status Last Admin


Dose Admin


 


 Albuterol/


 Ipratropium


  (Combivent


 Respimat Inh)  1 puffs  QID


 INH  1/16/18 09:00


 2/15/18 08:59  1/24/18 12:30


1 PUFFS


 


 Miscellaneous


 Information


  (Order Awaiting


 Action)  1 ea  QS


 N/A  1/16/18 08:00


 2/15/18 07:59  1/16/18 08:21


1 EA


 


 Miscellaneous


 Information


  (Order Awaiting


 Action)  1 ea  QS


 N/A  1/16/18 08:00


 2/15/18 07:59  1/16/18 08:21


1 EA


 


 Miscellaneous


 Information


  (Consult


 Glycemic


 Management


 Pharmacy)  1 ea  UD  PRN


 N/A  1/16/18 04:15


 2/15/18 04:14   


 


 


 Glucose


  (Glucose 40% Gel)  15-30


 GRAMS 15


 GRAMS...  UD  PRN


 PO  1/16/18 04:30


 2/15/18 04:29   


 


 


 Glucose


  (Glucose Chew


 Tab)  4-8


 Tablets 4


 Tabl...  UD  PRN


 PO  1/16/18 04:30


 2/15/18 04:29   


 


 


 Dextrose


  (Dextrose 50%


 50ML Syringe)  25-50ML OF


 50% DW IV


 FOR...  UD  PRN


 IV  1/16/18 04:30


 2/15/18 04:29  1/17/18 09:52


25 ML


 


 Glucagon


  (Glucagon Inj)  1 mg  UD  PRN


 SQ  1/16/18 04:30


 2/15/18 04:29   


 


 


 Ondansetron HCl


  (Zofran Inj)  4 mg  Q4H  PRN


 IV  1/16/18 18:15


 2/15/18 18:14  1/19/18 12:15


4 MG


 


 Heparin Sodium


  (Porcine)


  (Heparin Sq 5000


 Unit/0.5ml)  5,000 unit  Q8


 SQ  1/17/18 15:00


 2/16/18 14:59 Future Hold 1/22/18 05:59


5,000 UNIT


 


 Ertapenem 500 mg/


 Sodium Chloride  55 ml @ 


 110 mls/hr  DAILY@1800


 IV  1/18/18 18:00


 1/28/18 17:59  1/23/18 18:04


110 MLS/HR


 


 Vancomycin HCl


  (Vancomycin Oral


 Soln)  125 mg  Q6H


 PO  1/20/18 20:00


 2/3/18 19:59  1/24/18 12:30


125 MG


 


 Raspberry


  (Raspberry Syrup


 5ml Cup)  5 ml  Q6H


 PO  1/20/18 20:00


 2/3/18 19:59  1/24/18 12:30


5 ML


 


 Insulin Aspart


  (novoLOG ASPART)  **SLIDING


 SCALE**


 **G...  ACHS


 SC  1/22/18 07:00


 2/21/18 06:59  1/23/18 09:32


2 UNITS


 


 Ioversol


  (Optiray 320)  100 ml  UD  PRN


 IV  1/22/18 13:45


 1/26/18 13:44   


 


 


 Metronidazole 500


 mg/Prmx  100 ml @ 


 100 mls/hr  Q8H


 IV  1/22/18 18:00


 2/1/18 17:59  1/24/18 07:41


100 MLS/HR


 


 Pantoprazole


 Sodium 40 mg/


 Syringe  10 ml @ 5


 mls/min  DAILY@1100


 IV  1/23/18 11:00


 2/22/18 10:59  1/24/18 12:30


5 MLS/MIN


 


 Miscellaneous


  (Unit Dose


 Compound)  1 ea  QID


 MI  1/23/18 17:00


 2/22/18 16:59  1/24/18 13:04


1 EA


 


 Vancomycin HCl/


 Sterile Water    QID


 MI  1/23/18 17:00


 2/2/18 16:59  1/24/18 12:29


500 BTL


 


 Acetaminophen 650


 mg/Empty Bag  65 ml @ 


 260 mls/hr  Q6H  PRN


 IV  1/23/18 16:30


 2/22/18 16:29   


 


 


 Albumin Human


  (Albumin 25%)  12.5 gm  TODAY@0900,1000


 IV  1/24/18 09:00


 1/24/18 23:59  1/24/18 10:19


12.5 GM

## 2018-01-24 NOTE — NEPHROLOGY PROGRESS NOTE
Nephrology Progress Note


Date of Service:


Jan 24, 2018.


Subjective


58 yo female with ecoli uti/ cdiff / hypotension / uri.  diarrhea is better.  

continues to have mild abdominal pain.  started to eat better this morning.





Objective











  Date Time  Temp Pulse Resp B/P (MAP) Pulse Ox O2 Delivery O2 Flow Rate FiO2


 


1/24/18 04:04 36.8 98 20 88/50 (63) 91   


 


1/24/18 04:00      Nasal Cannula 1.0 


 


1/24/18 00:38 36.6 97 18 93/57 (69) 97   


 


1/24/18 00:00      Nasal Cannula 1.0 


 


1/23/18 20:16 36.5 93 20 96/59 (71) 98 Nasal Cannula 2.0 


 


1/23/18 20:00      Nasal Cannula 2.0 


 


1/23/18 16:34 36.7 88 18 82/45 (57) 95 Nasal Cannula 2.0 


 


1/23/18 16:00      Nasal Cannula 2.0 


 


1/23/18 12:00      Nasal Cannula 2.0 


 


1/23/18 11:01 36.3 85 20 82/55 (64) 92 Nasal Cannula 2.0 





    75/46 (56)    


 


1/23/18 08:01 36.6 86 20 90/57 (68) 94 Nasal Cannula 2.0 


 


1/23/18 08:00     94 Nasal Cannula 2.0 








Physical Exam:


General-aaox3, obese


Eyes-no scleral icterus


ENT-mmm


Neck-supple


Lungs-decreased at bases


Heart-regular


Abdomen-mild diffuse abdominal pain


Extremities-no c/c/e


Neuro-nonfocal





Current Inpatient Medications








 Medications


  (Trade)  Dose


 Ordered  Sig/Daniel


 Route  Start Time


 Stop Time Status Last Admin


Dose Admin


 


 Albuterol/


 Ipratropium


  (Combivent


 Respimat Inh)  1 puffs  QID


 INH  1/16/18 09:00


 2/15/18 08:59  1/24/18 07:40


1 PUFFS


 


 Miscellaneous


 Information


  (Order Awaiting


 Action)  1 ea  QS


 N/A  1/16/18 08:00


 2/15/18 07:59  1/16/18 08:21


1 EA


 


 Miscellaneous


 Information


  (Order Awaiting


 Action)  1 ea  QS


 N/A  1/16/18 08:00


 2/15/18 07:59  1/16/18 08:21


1 EA


 


 Miscellaneous


 Information


  (Consult


 Glycemic


 Management


 Pharmacy)  1 ea  UD  PRN


 N/A  1/16/18 04:15


 2/15/18 04:14   


 


 


 Glucose


  (Glucose 40% Gel)  15-30


 GRAMS 15


 GRAMS...  UD  PRN


 PO  1/16/18 04:30


 2/15/18 04:29   


 


 


 Glucose


  (Glucose Chew


 Tab)  4-8


 Tablets 4


 Tabl...  UD  PRN


 PO  1/16/18 04:30


 2/15/18 04:29   


 


 


 Dextrose


  (Dextrose 50%


 50ML Syringe)  25-50ML OF


 50% DW IV


 FOR...  UD  PRN


 IV  1/16/18 04:30


 2/15/18 04:29  1/17/18 09:52


25 ML


 


 Glucagon


  (Glucagon Inj)  1 mg  UD  PRN


 SQ  1/16/18 04:30


 2/15/18 04:29   


 


 


 Ondansetron HCl


  (Zofran Inj)  4 mg  Q4H  PRN


 IV  1/16/18 18:15


 2/15/18 18:14  1/19/18 12:15


4 MG


 


 Heparin Sodium


  (Porcine)


  (Heparin Sq 5000


 Unit/0.5ml)  5,000 unit  Q8


 SQ  1/17/18 15:00


 2/16/18 14:59 Future Hold 1/22/18 05:59


5,000 UNIT


 


 Ertapenem 500 mg/


 Sodium Chloride  55 ml @ 


 110 mls/hr  DAILY@1800


 IV  1/18/18 18:00


 1/28/18 17:59  1/23/18 18:04


110 MLS/HR


 


 Vancomycin HCl


  (Vancomycin Oral


 Soln)  125 mg  Q6H


 PO  1/20/18 20:00


 2/3/18 19:59  1/24/18 07:39


125 MG


 


 Raspberry


  (Raspberry Syrup


 5ml Cup)  5 ml  Q6H


 PO  1/20/18 20:00


 2/3/18 19:59  1/24/18 07:39


5 ML


 


 Insulin Aspart


  (novoLOG ASPART)  **SLIDING


 SCALE**


 **G...  ACHS


 SC  1/22/18 07:00


 2/21/18 06:59  1/23/18 09:32


2 UNITS


 


 Ioversol


  (Optiray 320)  100 ml  UD  PRN


 IV  1/22/18 13:45


 1/26/18 13:44   


 


 


 Metronidazole 500


 mg/Prmx  100 ml @ 


 100 mls/hr  Q8H


 IV  1/22/18 18:00


 2/1/18 17:59  1/24/18 07:41


100 MLS/HR


 


 Pantoprazole


 Sodium 40 mg/


 Syringe  10 ml @ 5


 mls/min  DAILY@1100


 IV  1/23/18 11:00


 2/22/18 10:59  1/23/18 09:26


5 MLS/MIN


 


 Miscellaneous


  (Unit Dose


 Compound)  1 ea  QID


 [facility]  1/23/18 17:00


 2/22/18 16:59  1/23/18 21:26


1 EA


 


 Vancomycin HCl/


 Sterile Water    QID


 [facility]  1/23/18 17:00


 2/2/18 16:59  1/24/18 07:41


60 BTL


 


 Acetaminophen 650


 mg/Empty Bag  65 ml @ 


 260 mls/hr  Q6H  PRN


 IV  1/23/18 16:30


 2/22/18 16:29   


 











Last 24 Hours








Test


  1/23/18


10:55 1/23/18


16:15 1/23/18


20:49 1/24/18


06:34


 


Bedside Glucose 88 mg/dl  136 mg/dl  72 mg/dl  70 mg/dl 











Assessment & Plan


ESRD-for dialysis again today.  usually bp in the low 100s as an outpt. bp has 

not improved despite appropriate therapies for uti and cdiff.  will attempt 

500cc off today with the use of albumin. 





Anemia of Renal Failure-goal hg of 10 to 11 and will give procrit today.

## 2018-01-25 VITALS
TEMPERATURE: 97.7 F | SYSTOLIC BLOOD PRESSURE: 114 MMHG | DIASTOLIC BLOOD PRESSURE: 75 MMHG | HEART RATE: 119 BPM | OXYGEN SATURATION: 97 %

## 2018-01-25 VITALS — HEART RATE: 82 BPM | SYSTOLIC BLOOD PRESSURE: 92 MMHG | DIASTOLIC BLOOD PRESSURE: 58 MMHG | OXYGEN SATURATION: 99 %

## 2018-01-25 VITALS — DIASTOLIC BLOOD PRESSURE: 56 MMHG | HEART RATE: 89 BPM | SYSTOLIC BLOOD PRESSURE: 87 MMHG

## 2018-01-25 VITALS — DIASTOLIC BLOOD PRESSURE: 60 MMHG | HEART RATE: 90 BPM | SYSTOLIC BLOOD PRESSURE: 88 MMHG

## 2018-01-25 VITALS — HEART RATE: 120 BPM | SYSTOLIC BLOOD PRESSURE: 98 MMHG | DIASTOLIC BLOOD PRESSURE: 54 MMHG

## 2018-01-25 VITALS
DIASTOLIC BLOOD PRESSURE: 43 MMHG | TEMPERATURE: 97.7 F | SYSTOLIC BLOOD PRESSURE: 78 MMHG | OXYGEN SATURATION: 97 % | HEART RATE: 79 BPM

## 2018-01-25 VITALS — HEART RATE: 94 BPM | DIASTOLIC BLOOD PRESSURE: 57 MMHG | SYSTOLIC BLOOD PRESSURE: 90 MMHG

## 2018-01-25 VITALS — SYSTOLIC BLOOD PRESSURE: 90 MMHG | HEART RATE: 120 BPM | DIASTOLIC BLOOD PRESSURE: 60 MMHG

## 2018-01-25 VITALS
HEART RATE: 127 BPM | SYSTOLIC BLOOD PRESSURE: 92 MMHG | DIASTOLIC BLOOD PRESSURE: 65 MMHG | TEMPERATURE: 97.88 F | OXYGEN SATURATION: 98 %

## 2018-01-25 VITALS — DIASTOLIC BLOOD PRESSURE: 58 MMHG | SYSTOLIC BLOOD PRESSURE: 88 MMHG | HEART RATE: 100 BPM

## 2018-01-25 VITALS
SYSTOLIC BLOOD PRESSURE: 83 MMHG | OXYGEN SATURATION: 97 % | DIASTOLIC BLOOD PRESSURE: 49 MMHG | HEART RATE: 83 BPM | TEMPERATURE: 98.06 F

## 2018-01-25 VITALS — HEART RATE: 115 BPM | SYSTOLIC BLOOD PRESSURE: 95 MMHG | DIASTOLIC BLOOD PRESSURE: 62 MMHG

## 2018-01-25 VITALS
OXYGEN SATURATION: 99 % | DIASTOLIC BLOOD PRESSURE: 54 MMHG | TEMPERATURE: 97.34 F | HEART RATE: 86 BPM | SYSTOLIC BLOOD PRESSURE: 89 MMHG

## 2018-01-25 VITALS
TEMPERATURE: 98.6 F | HEART RATE: 125 BPM | DIASTOLIC BLOOD PRESSURE: 56 MMHG | OXYGEN SATURATION: 92 % | SYSTOLIC BLOOD PRESSURE: 85 MMHG

## 2018-01-25 VITALS — SYSTOLIC BLOOD PRESSURE: 89 MMHG | DIASTOLIC BLOOD PRESSURE: 58 MMHG | HEART RATE: 118 BPM

## 2018-01-25 VITALS — DIASTOLIC BLOOD PRESSURE: 43 MMHG | SYSTOLIC BLOOD PRESSURE: 74 MMHG

## 2018-01-25 VITALS — HEART RATE: 101 BPM | SYSTOLIC BLOOD PRESSURE: 89 MMHG | DIASTOLIC BLOOD PRESSURE: 62 MMHG

## 2018-01-25 VITALS
TEMPERATURE: 97.7 F | HEART RATE: 118 BPM | OXYGEN SATURATION: 92 % | DIASTOLIC BLOOD PRESSURE: 52 MMHG | SYSTOLIC BLOOD PRESSURE: 86 MMHG

## 2018-01-25 LAB
BASOPHILS # BLD: 0.04 K/UL (ref 0–0.2)
BASOPHILS NFR BLD: 0.2 %
BUN SERPL-MCNC: 38 MG/DL (ref 7–18)
CALCIUM SERPL-MCNC: 8.4 MG/DL (ref 8.5–10.1)
CO2 SERPL-SCNC: 24 MMOL/L (ref 21–32)
CREAT SERPL-MCNC: 5.81 MG/DL (ref 0.6–1.2)
EOS ABS #: 0.11 K/UL (ref 0–0.5)
EOSINOPHIL NFR BLD AUTO: 176 K/UL (ref 130–400)
GLUCOSE SERPL-MCNC: 127 MG/DL (ref 70–99)
HCT VFR BLD CALC: 29.3 % (ref 37–47)
HGB BLD-MCNC: 9.1 G/DL (ref 12–16)
IG#: 1.41 K/UL (ref 0–0.02)
IMM GRANULOCYTES NFR BLD AUTO: 7.6 %
INR PPP: 1.2 (ref 0.9–1.1)
LYMPHOCYTES # BLD: 1.46 K/UL (ref 1.2–3.4)
MCH RBC QN AUTO: 31.5 PG (ref 25–34)
MCHC RBC AUTO-ENTMCNC: 31.1 G/DL (ref 32–36)
MCV RBC AUTO: 101.4 FL (ref 80–100)
MONO ABS #: 0.88 K/UL (ref 0.11–0.59)
MONOCYTES NFR BLD: 4.6 %
NEUT ABS #: 15.41 K/UL (ref 1.4–6.5)
NEUTROPHILS # BLD AUTO: 0.6 %
NEUTROPHILS NFR BLD AUTO: 79.7 %
NRBC BLD AUTO-RTO: 0.9 %
NUCLEATED RED BLOOD CELL ABS: 0.17 K/UL (ref 0–0)
PHOSPHATE SERPL-MCNC: 4.4 MG/DL (ref 2.5–4.9)
PMV BLD AUTO: 9.6 FL (ref 7.4–10.4)
POTASSIUM SERPL-SCNC: 3.7 MMOL/L (ref 3.5–5.1)
PTT PATIENT: 27.3 SECONDS (ref 21–31)
PTT PATIENT: 48.1 SECONDS (ref 21–31)
RED CELL DISTRIBUTION WIDTH CV: 16.3 % (ref 11.5–14.5)
RED CELL DISTRIBUTION WIDTH SD: 57 FL (ref 36.4–46.3)
SODIUM SERPL-SCNC: 136 MMOL/L (ref 136–145)
WBC # BLD AUTO: 18.2 K/UL (ref 4.8–10.8)

## 2018-01-25 RX ADMIN — VANCOMYCIN HYDROCHLORIDE SCH BTL: 1 INJECTION, POWDER, LYOPHILIZED, FOR SOLUTION INTRAVENOUS at 08:25

## 2018-01-25 RX ADMIN — INSULIN ASPART SCH UNITS: 100 INJECTION, SOLUTION INTRAVENOUS; SUBCUTANEOUS at 18:10

## 2018-01-25 RX ADMIN — METRONIDAZOLE SCH MLS/HR: 500 INJECTION, SOLUTION INTRAVENOUS at 18:03

## 2018-01-25 RX ADMIN — PANTOPRAZOLE SODIUM SCH MLS/MIN: 40 INJECTION, POWDER, FOR SOLUTION INTRAVENOUS at 10:37

## 2018-01-25 RX ADMIN — ALUMINUM ZIRCONIUM TRICHLOROHYDREX GLY SCH EA: 0.2 STICK TOPICAL at 07:19

## 2018-01-25 RX ADMIN — ALUMINUM ZIRCONIUM TRICHLOROHYDREX GLY SCH EA: 0.2 STICK TOPICAL at 15:54

## 2018-01-25 RX ADMIN — VANCOMYCIN HYDROCHLORIDE SCH MG: 1 INJECTION, POWDER, LYOPHILIZED, FOR SOLUTION INTRAVENOUS at 19:57

## 2018-01-25 RX ADMIN — Medication SCH ML: at 13:03

## 2018-01-25 RX ADMIN — Medication SCH ML: at 08:24

## 2018-01-25 RX ADMIN — ERTAPENEM SODIUM SCH MLS/HR: 1 INJECTION, POWDER, LYOPHILIZED, FOR SOLUTION INTRAMUSCULAR; INTRAVENOUS at 18:03

## 2018-01-25 RX ADMIN — AZITHROMYCIN SCH MG: 250 TABLET, FILM COATED ORAL at 19:23

## 2018-01-25 RX ADMIN — INSULIN ASPART SCH UNITS: 100 INJECTION, SOLUTION INTRAVENOUS; SUBCUTANEOUS at 07:00

## 2018-01-25 RX ADMIN — VANCOMYCIN HYDROCHLORIDE SCH MG: 1 INJECTION, POWDER, LYOPHILIZED, FOR SOLUTION INTRAVENOUS at 19:16

## 2018-01-25 RX ADMIN — IPRATROPIUM BROMIDE AND ALBUTEROL SCH PUFFS: 20; 100 SPRAY, METERED RESPIRATORY (INHALATION) at 21:44

## 2018-01-25 RX ADMIN — MIDODRINE HYDROCHLORIDE SCH MG: 2.5 TABLET ORAL at 08:25

## 2018-01-25 RX ADMIN — MIDODRINE HYDROCHLORIDE SCH MG: 2.5 TABLET ORAL at 18:02

## 2018-01-25 RX ADMIN — METRONIDAZOLE SCH MLS/HR: 500 INJECTION, SOLUTION INTRAVENOUS at 03:00

## 2018-01-25 RX ADMIN — METRONIDAZOLE SCH MLS/HR: 500 INJECTION, SOLUTION INTRAVENOUS at 09:39

## 2018-01-25 RX ADMIN — INSULIN ASPART SCH UNITS: 100 INJECTION, SOLUTION INTRAVENOUS; SUBCUTANEOUS at 21:43

## 2018-01-25 RX ADMIN — ALUMINUM ZIRCONIUM TRICHLOROHYDREX GLY SCH EA: 0.2 STICK TOPICAL at 07:20

## 2018-01-25 RX ADMIN — IPRATROPIUM BROMIDE AND ALBUTEROL SCH PUFFS: 20; 100 SPRAY, METERED RESPIRATORY (INHALATION) at 18:02

## 2018-01-25 RX ADMIN — HEPARIN SODIUM PRN MLS/HR: 5000 INJECTION, SOLUTION INTRAVENOUS at 10:36

## 2018-01-25 RX ADMIN — VANCOMYCIN HYDROCHLORIDE SCH MG: 1 INJECTION, POWDER, LYOPHILIZED, FOR SOLUTION INTRAVENOUS at 08:24

## 2018-01-25 RX ADMIN — VANCOMYCIN HYDROCHLORIDE SCH MG: 1 INJECTION, POWDER, LYOPHILIZED, FOR SOLUTION INTRAVENOUS at 13:03

## 2018-01-25 RX ADMIN — MIDODRINE HYDROCHLORIDE SCH MG: 2.5 TABLET ORAL at 12:29

## 2018-01-25 RX ADMIN — IPRATROPIUM BROMIDE AND ALBUTEROL SCH PUFFS: 20; 100 SPRAY, METERED RESPIRATORY (INHALATION) at 08:25

## 2018-01-25 RX ADMIN — Medication SCH ML: at 03:00

## 2018-01-25 RX ADMIN — INSULIN ASPART SCH UNITS: 100 INJECTION, SOLUTION INTRAVENOUS; SUBCUTANEOUS at 11:39

## 2018-01-25 RX ADMIN — VANCOMYCIN HYDROCHLORIDE SCH BTL: 1 INJECTION, POWDER, LYOPHILIZED, FOR SOLUTION INTRAVENOUS at 12:47

## 2018-01-25 RX ADMIN — Medication SCH ML: at 19:16

## 2018-01-25 RX ADMIN — VANCOMYCIN HYDROCHLORIDE SCH MG: 1 INJECTION, POWDER, LYOPHILIZED, FOR SOLUTION INTRAVENOUS at 03:00

## 2018-01-25 RX ADMIN — IPRATROPIUM BROMIDE AND ALBUTEROL SCH PUFFS: 20; 100 SPRAY, METERED RESPIRATORY (INHALATION) at 12:29

## 2018-01-25 RX ADMIN — ALUMINUM ZIRCONIUM TRICHLOROHYDREX GLY SCH EA: 0.2 STICK TOPICAL at 00:00

## 2018-01-25 NOTE — PHARMACY PROGRESS NOTE
Pharmacy Glycemic Short Note 2


Date of Service


Jan 25, 2018.


OUTPATIENT ANTIDIABETIC REGIMEN: 


* NPH 40 units SQ daily when taking prednisone (otherwise no insulin taken)








ASSESSMENT:


* Patient has not required any basal insulin since steroids were stopped a 

couple of days ago.


* BSGs have been stable with Novolog coverage only.  Parameters loosened and 

carb coverage stopped yesterday to reduce the risk of hypoglycemia.


* No changes required at this time.








PLAN FOR INPATIENT GLYCEMIC CONTROL:


* Correctional Insulin with NOVOLOG per scale ACHS


 * Goal Range:  Low 120 mg/dL - High 150 mg/dL


 * Correction Factor: 30 mg/dL/unit


 * Nutritional / Prandial insulin:  none








PLAN FOR DISCHARGE:


* Expect that patient may be discharged on previous home regimen.





* Please note that the plan above was derived based on current level of insulin 

resistance and hospital stress. These recommendations are appropriate for 

inpatient admission only. Plan of care upon discharge will need to be 

reassessed to avoid potential outpatient hypo/hyperglycemia. 








Thank you.

## 2018-01-25 NOTE — GASTROENTEROLOGY PROGRESS NOTE
Progress Note


Date of Service:  Jan 25, 2018


Subjective


Pt evaluation today including:  conversation w/ patient, physical exam, chart 

review, lab review


Pt was seen and evaluated, chart reviewed. No acute events overnight. Care was 

coordinated w/ nursing staff. Pt started having semi-formed to formed stools 

last evening. Dark in color. No longer having any BRBPR. Can have some 

abdominal cramping. No nausea, vomiting. Is starting to feel better from a GI 

standpoint. Continues to have a productive cough. Can have green discharge at 

time. Continues to have SOB.





NSAIDs: ASA daily


ABX: current


Other: Plavix, heparin gtt during admission





Colonoscopy: none


EGD: none 





Chest CT 1/24/18: Interlobular septal thickening indicative of pulmonary edema. 

Small bilateral pleural effusions with associated subpleural opacities 

suggestive of atelectasis. No consolidation to suggest pneumonia. Mild 

secretions within the trachea and mainstem bronchi.  Mild mediastinal 

lymphadenopathy which is nonspecific. A follow-up chest CT  in 6 months is 

recommended. Mild emphysema. Mild dilatation of the main pulmonary artery which 

raises the possibility of pulmonary arterial hypertension.  Cirrhotic liver 

with mild splenomegaly and upper abdominal varices which suggests portal 

hypertension.


KUB 1/24/18: Gaseous distention of the small bowel persists. There is no 

significant colonic dilatation.


KUB 1/23/18: No change in the distended gas-filled loops of small bowel 

consistent with an obstruction.


CT 1/22/18: Findings are consistent with a small bowel obstruction. A 

transition point is seen in the right pelvis and this is likely on the basis of 

adhesions.  There is no pneumatosis intestinalis, portal venous gas, or 

intraperitoneal free air. Cirrhotic liver morphology.Splenomegaly. There are 

small pleural effusions with bibasilar consolidation. This could present 

atelectasis versus pneumonia. Clinical correlation will be required. 

Cardiomegaly and emphysema. Bilateral nephrolithiasis


KUB 1/22/18: There is nonspecific gaseous distention of the small bowel. 

Correlate clinically for evidence of obstruction.


Chest XR 1/17/18: No significant change from the preceding study Persistent 

mild pulmonary vascular congestion/edema.


KUB 1/16/18: Prominent loop of small bowel within the left mid abdomen with no 

convincing evidence for small bowel obstruction.








Review of Systems


Constitutional:  No fever, No chills


Respiratory:  + cough, + shortness of breath


Cardiac:  No chest pain, No edema


Abdomen:  + pain, No nausea, No vomiting, No diarrhea, No constipation, No GI 

bleeding





Medications





Current Inpatient Medications








 Medications


  (Trade)  Dose


 Ordered  Sig/Daniel


 Route  Start Time


 Stop Time Status Last Admin


Dose Admin


 


 Albuterol/


 Ipratropium


  (Combivent


 Respimat Inh)  1 puffs  QID


 INH  1/16/18 09:00


 2/15/18 08:59  1/25/18 08:25


1 PUFFS


 


 Miscellaneous


 Information


  (Order Awaiting


 Action)  1 ea  QS


 N/A  1/16/18 08:00


 2/15/18 07:59  1/16/18 08:21


1 EA


 


 Miscellaneous


 Information


  (Order Awaiting


 Action)  1 ea  QS


 N/A  1/16/18 08:00


 2/15/18 07:59  1/16/18 08:21


1 EA


 


 Miscellaneous


 Information


  (Consult


 Glycemic


 Management


 Pharmacy)  1 ea  UD  PRN


 N/A  1/16/18 04:15


 2/15/18 04:14   


 


 


 Glucose


  (Glucose 40% Gel)  15-30


 GRAMS 15


 GRAMS...  UD  PRN


 PO  1/16/18 04:30


 2/15/18 04:29   


 


 


 Glucose


  (Glucose Chew


 Tab)  4-8


 Tablets 4


 Tabl...  UD  PRN


 PO  1/16/18 04:30


 2/15/18 04:29   


 


 


 Dextrose


  (Dextrose 50%


 50ML Syringe)  25-50ML OF


 50% DW IV


 FOR...  UD  PRN


 IV  1/16/18 04:30


 2/15/18 04:29  1/17/18 09:52


25 ML


 


 Glucagon


  (Glucagon Inj)  1 mg  UD  PRN


 SQ  1/16/18 04:30


 2/15/18 04:29   


 


 


 Ondansetron HCl


  (Zofran Inj)  4 mg  Q4H  PRN


 IV  1/16/18 18:15


 2/15/18 18:14  1/19/18 12:15


4 MG


 


 Heparin Sodium


  (Porcine)


  (Heparin Sq 5000


 Unit/0.5ml)  5,000 unit  Q8


 SQ  1/17/18 15:00


 2/16/18 14:59 Future Hold 1/22/18 05:59


5,000 UNIT


 


 Ertapenem 500 mg/


 Sodium Chloride  55 ml @ 


 110 mls/hr  DAILY@1800


 IV  1/18/18 18:00


 1/28/18 17:59  1/24/18 17:36


110 MLS/HR


 


 Vancomycin HCl


  (Vancomycin Oral


 Soln)  125 mg  Q6H


 PO  1/20/18 20:00


 2/3/18 19:59  1/25/18 08:24


125 MG


 


 Raspberry


  (Raspberry Syrup


 5ml Cup)  5 ml  Q6H


 PO  1/20/18 20:00


 2/3/18 19:59  1/25/18 08:24


5 ML


 


 Insulin Aspart


  (novoLOG ASPART)  **SLIDING


 SCALE**


 **G...  ACHS


 SC  1/22/18 07:00


 2/21/18 06:59  1/24/18 21:12


2 UNITS


 


 Ioversol


  (Optiray 320)  100 ml  UD  PRN


 IV  1/22/18 13:45


 1/26/18 13:44   


 


 


 Metronidazole 500


 mg/Prmx  100 ml @ 


 100 mls/hr  Q8H


 IV  1/22/18 18:00


 2/1/18 17:59  1/25/18 09:39


100 MLS/HR


 


 Pantoprazole


 Sodium 40 mg/


 Syringe  10 ml @ 5


 mls/min  DAILY@1100


 IV  1/23/18 11:00


 2/22/18 10:59  1/24/18 12:30


5 MLS/MIN


 


 Miscellaneous


  (Unit Dose


 Compound)  1 ea  QID


 MO  1/23/18 17:00


 2/22/18 16:59  1/24/18 21:09


1 EA


 


 Vancomycin HCl/


 Sterile Water    QID


 MO  1/23/18 17:00


 2/2/18 16:59  1/24/18 21:09


1 BTL


 


 Acetaminophen 650


 mg/Empty Bag  65 ml @ 


 260 mls/hr  Q6H  PRN


 IV  1/23/18 16:30


 2/22/18 16:29   


 


 


 Midodrine


  (Proamatine Tab)  2.5 mg  TID@08,12,17


 PO  1/24/18 17:30


 2/23/18 17:29  1/25/18 08:25


2.5 MG


 


 Azithromycin


  (Zithromax Tab)  250 mg  PM


 PO  1/25/18 21:00


 1/31/18 20:59   


 











Objective


Vital Signs











  Date Time  Temp Pulse Resp B/P (MAP) Pulse Ox O2 Delivery O2 Flow Rate FiO2


 


1/25/18 07:06 36.6 127 20 92/65 (74) 98 Nasal Cannula 2.0 


 


1/25/18 04:00      Room Air  


 


1/25/18 03:48 37.0 125 18 85/56 (66) 92   


 


1/25/18 00:07 36.5 118 18 86/52 (63) 92 Room Air  


 


1/24/18 23:59      Room Air  


 


1/24/18 20:21 36.5 119 18 80/51 (61) 91 Room Air  


 


1/24/18 20:00      Room Air  


 


1/24/18 16:00      Nasal Cannula 2.0 


 


1/24/18 15:37 37.1 103 18 91/61 (71) 100 Nasal Cannula 2.0 


 


1/24/18 13:22      Nasal Cannula 1.0 


 


1/24/18 12:47 36.8 98  99/54 (69)    


 


1/24/18 12:15  99  91/47    


 


1/24/18 12:00  97  84/43    


 


1/24/18 11:45  97  83/48    


 


1/24/18 11:30  97  86/49    


 


1/24/18 11:15  96  85/46    


 


1/24/18 11:00  97  82/50    


 


1/24/18 11:00 36.4 96 20 82/60 (67) 95 Nasal Cannula 2.0 


 


1/24/18 10:45  96  87/50    


 


1/24/18 10:30  99  85/45    


 


1/24/18 10:15  98  79/43    


 


1/24/18 10:00  98  82/51    


 


1/24/18 09:45  98  86/47    











Physical Exam


General Appearance:  no apparent distress


Eyes:  PERRL


ENT:  TMs normal


Neck:  supple


Respiratory/Chest:  no respiratory distress, no accessory muscle use, + 

decreased breath sounds, + pertinent finding (wearing O2)


Cardiovascular:  regular rate, rhythm


Abdomen:  normal bowel sounds, non tender, soft, no organomegaly


Neurologic/Psych:  alert, normal mood/affect, oriented x 3


Skin:  normal color





Laboratory Results





Last 24 Hours








Test


  1/24/18


12:09 1/24/18


12:40 1/24/18


16:25 1/24/18


19:45


 


Bedside Glucose 80 mg/dl   130 mg/dl  196 mg/dl 


 


Cortisol Response to


Stimulation 


  Cortisol


Baseline 


  


 


 


Test


  1/25/18


05:28 1/25/18


06:33 


  


 


 


White Blood Count 18.20 K/uL    


 


Red Blood Count 2.89 M/uL    


 


Hemoglobin 9.1 g/dL    


 


Hematocrit 29.3 %    


 


Mean Corpuscular Volume 101.4 fL    


 


Mean Corpuscular Hemoglobin 31.5 pg    


 


Mean Corpuscular Hemoglobin


Concent 31.1 g/dl 


  


  


  


 


 


RDW Standard Deviation 57.0 fL    


 


RDW Coefficient of Variation 16.3 %    


 


Platelet Count 176 K/uL    


 


Mean Platelet Volume 9.6 fL    


 


Nucleated RBC Absolute Count


(auto) 0.17 K/uL 


  


  


  


 


 


Nucleated Red Blood Cells % 0.9 %    


 


Sodium Level 136 mmol/L    


 


Potassium Level 3.7 mmol/L    


 


Chloride Level 101 mmol/L    


 


Carbon Dioxide Level 24 mmol/L    


 


Anion Gap 11.0 mmol/L    


 


Blood Urea Nitrogen 38 mg/dl    


 


Creatinine 5.81 mg/dl    


 


Est Creatinine Clear Calc


Drug Dose 12.1 ml/min 


  


  


  


 


 


Estimated GFR (


American) 8.5 


  


  


  


 


 


Estimated GFR (Non-


American 7.3 


  


  


  


 


 


BUN/Creatinine Ratio 6.5    


 


Random Glucose 127 mg/dl    


 


Calcium Level 8.4 mg/dl    


 


Phosphorus Level 4.4 mg/dl    


 


Magnesium Level 2.0 mg/dl    


 


Bedside Glucose  128 mg/dl   











Assessment and Plan


57 year old female w/ ESRD on dialysis, aortic valve replacement on ASA, Plavix 

who is a transfer from [facility], on IV ABX for UTI, developed c.diff, now with 

numerous loose stools daily w/ report of black stools and BRBPR. She has 

abdominal pain, generalized but worse in bilateral lower quadrants. She has 

c.diff colitis, would recommend ruling out toxic megacolon with KUB. Her 

abdominal pain and blood in stool is likely secondary to her c.diff but 

ischemia is also in the differential. KUB w/ evidence of SBO, general surgery 

is following. Given SBO, GI added vancomycin enema QID yesterday to aid in 

c.diff coverage. Pt notes improvement of her GI symptoms, is now having formed 

stools without evidence of rectal bleeding





- Daily KUB and SBO management per general surgery


- Vancomycin 125 QID x 14 days


- Add IV Flagyl 500 mg TID, ok to DC at time of discharge


- Stop vancomycin enema 500 mg 4 times a day


- No narcotics


- No antimotility agents 


- Can have Bentyl 10 mg TID for abdominal pain as needed


- Limit other antibiotic use


   - Appreciate ID recommendations 





- Outpatient Colonoscopy at appointment to be scheduled.





GI to sign off as pt is improving from a GI standpoint, with formed stools 

without evidence of ongoing rectal bleeding. Please call with any questions, 

concerns or acute changes. Thank you





I saw and evaluated the patient.  She does continue to have bowel movements but 

is no longer noticing hematochezia.  She admits to a large amount of abdominal 

distention and her last abdominal x-ray was yesterday.  At this time it appears 

that her C. difficile is starting to be more well controlled.


Recommendations


Continue IV metronidazole while patient remains in the hospital


Would suggest continuing vancomycin for 10 days after antibiotics are 

discontinued for her other infections


May discontinue the vancomycin enemas


Management of small bowel obstruction.  Dr. Pascual.





Please call with any questions or concerns.  GI to sign off this patient for 

the present time.

## 2018-01-25 NOTE — PROGRESS NOTE
Medicine Progress Note


Date & Time of Visit:


Jan 25, 2018 at 09:59.


Subjective


Pt went into atrial flutter overnight around 7pm.  BP is around the same on the 

midodrine.  Pt feels poorly-fatigued with no worsening SOB or chest pain at 

this time.  Apathetic to food.  HR has been in the 120-130s overnight.  Per 

nurse no further blood in stools and vanc enemas stopped by GI as stools are 

more formed.  Cough still present-one dose azithro given last night and plan to 

cont 5 day course.  Diltiazem 10mg IV ordered and heparin drip with CHADs vasc 

4.  Cardiology consulted. Leukocytosis increased.  Poss 2/2 cosyntropin stim 

test yesterday.  She denies any fevers or chills and appears improved overall 

today with an improvement in her abdominal exam although still reports some 

pain and slight nausea.





Objective





Last 8 Hrs








  Date Time  Temp Pulse Resp B/P (MAP) Pulse Ox O2 Delivery O2 Flow Rate FiO2


 


1/25/18 07:06 36.6 127 20 92/65 (74) 98 Nasal Cannula 2.0 


 


1/25/18 04:00      Room Air  


 


1/25/18 03:48 37.0 125 18 85/56 (66) 92   








Physical Exam:


GEN: obese, in no acute distress, alert and appropriate


HEENT: NC/AT, PERRL, normal sclerae, MMM


CARDIO: reg rate, S1/2 heard without m/g/r, HD in place in L anterior chest 

wall and appears clean and intact with no redness.  


LUNGS: coarse rhonchi more at bases today-some wheezing present that is mild-

appears to be improved since yesterday


ABD: soft, TTP worse in upper abdomen, nondistended, +BS


EXTREMITY: RP and DP palpable 2+ bilat, no LE swelling or edema, extremities 

are warm and well-perfused


NEURO: CN 2-12 grossly intact


MUSC: generalized weakness but no gross focal deficits, moves extremities 

equally.


SKIN:  warm and dry


Laboratory Results:


1/25/18 05:28








1/25/18 05:28

















Test


  1/16/18


02:55 1/16/18


03:37 1/16/18


03:50 1/16/18


15:00


 


Toxic Vacuolation 2+    


 


Prothrombin Time


  10.8 SECONDS


(9.0-12.0) 


  


  


 


 


Prothromb Time International


Ratio 1.0 (0.9-1.1) 


  


  


  


 


 


Activated Partial


Thromboplast Time 27.1 SECONDS


(21.0-31.0) 


  


  


 


 


Partial Thromboplastin Ratio 1.0    


 


Creatine Kinase MB


  1.4 ng/ml


(0.5-3.6) 


  


  


 


 


Creatine Kinase MB Ratio  (0-3.0)    


 


Troponin I


  0.329 ng/ml


(0-0.045) 


  


  


 


 


Globulin


  3.6 gm/dl


(2.5-4.0) 


  


  


 


 


Albumin/Globulin Ratio 0.6 (0.9-2)    


 


Procalcitonin


  4.56 ng/ml


(0-0.5) 


  


  


 


 


Blood Gas Sample Site  L Brachial   


 


Bedside Blood Gas pH (LAB)


  


  7.38


(7.35-7.45) 


  


 


 


Bedside Blood Gas pCO2 (LAB)


  


  33 mmHg


(35-46) 


  


 


 


Bedside Blood Gas pO2 (LAB)


  


  76 mmHg


(80-95) 


  


 


 


Bedside Blood Gas HCO3 (LAB)


  


  19 meq/L


(19-24) 


  


 


 


Bedside Blood Gas Total CO2


  


  20 mEq/l


(24-31) 


  


 


 


Bedside Blood Gas Base Excess


(LAB) 


  -6.0 meq/L


(-9-1.8) 


  


 


 


Bedside Blood Gas O2


Saturation 


  95.0 % (90-95) 


  


  


 


 


Kiko Test  Pass   


 


Oxygen Delivery Device  Room Air   


 


Influenza Type A (RT-PCR)


  


  


  Neg for Influ


A (NEG) 


 


 


Influenza Type B (RT-PCR)


  


  


  Neg for Influ


B (NEG) 


 


 


Urine Color    DK YELLOW 


 


Urine Appearance    TURBID (CLEAR) 


 


Urine pH    5.5 (4.5-7.5) 


 


Urine Specific Gravity


  


  


  


  1.021


(1.000-1.030)


 


Urine Protein    3+ (NEG) 


 


Urine Glucose (UA)    TRACE (NEG) 


 


Urine Ketones    TRACE (NEG) 


 


Urine Occult Blood    3+ (NEG) 


 


Urine Nitrite    POS (NEG) 


 


Urine Bilirubin    NEG (NEG) 


 


Urine Urobilinogen    NEG (NEG) 


 


Urine Leukocyte Esterase    LARGE (NEG) 


 


Urine WBC (Auto)    >30 /hpf (0-5) 


 


Urine RBC (Auto)


  


  


  


  5-10 /hpf


(0-4)


 


Urine Hyaline Casts (Auto)    1-5 /lpf (0-5) 


 


Urine Epithelial Cells (Auto)    >30 /lpf (0-5) 


 


Urine Bacteria (Auto)    1+ (NEG) 


 


Urine Pathogenic Casts     /lpf (0) 


 


Urine Yeast (Auto)     (NONE PRSENT) 


 


Test


  1/17/18


05:34 1/17/18


09:43 1/18/18


05:20 1/20/18


01:39


 


Estimated Average Glucose 140 mg/dl    


 


Hemoglobin A1c


  6.5 %


(4.5-5.6) 


  


  


 


 


Total Bilirubin


  


  0.8 mg/dl


(0.2-1) 


  


 


 


Direct Bilirubin


  


  0.1 mg/dl


(0-0.2) 


  


 


 


Aspartate Amino Transf


(AST/SGOT) 


  11 U/L (15-37) 


  


  


 


 


Alanine Aminotransferase


(ALT/SGPT) 


  11 U/L (12-78) 


  


  


 


 


Alkaline Phosphatase


  


  115 U/L


() 


  


 


 


Total Protein


  


  6.2 gm/dl


(6.4-8.2) 


  


 


 


Albumin


  


  2.2 gm/dl


(3.4-5.0) 


  


 


 


Amylase Level


  


  22 U/L


() 


  


 


 


Lipase


  


  63 U/L


() 


  


 


 


Random Vancomycin Level   21.9 mcg/ml  


 


Red Blood Cell Morphology    Unremarkable 


 


Test


  1/21/18


06:33 1/22/18


09:00 1/22/18


23:10 1/23/18


06:04


 


Immature Granulocyte % (Auto) 9.5 %    


 


White Blood Count


  12.98 K/uL


(4.8-10.8) 


  


  


 


 


Red Blood Count


  2.57 M/uL


(4.2-5.4) 


  


  


 


 


Hemoglobin


  8.0 g/dL


(12.0-16.0) 


  


  


 


 


Hematocrit 26.1 % (37-47)    


 


Mean Corpuscular Volume


  101.6 fL


() 


  


  


 


 


Mean Corpuscular Hemoglobin


  31.1 pg


(25-34) 


  


  


 


 


Mean Corpuscular Hemoglobin


Concent 30.7 g/dl


(32-36) 


  


  


 


 


Platelet Count


  137 K/uL


(130-400) 


  


  


 


 


Mean Platelet Volume


  10.0 fL


(7.4-10.4) 


  


  


 


 


Neutrophils (%) (Auto) 78.9 %    


 


Lymphocytes (%) (Auto) 7.3 %    


 


Monocytes (%) (Auto) 4.0 %    


 


Eosinophils (%) (Auto) 0.1 %    


 


Basophils (%) (Auto) 0.2 %    


 


Neutrophils # (Auto)


  10.24 K/uL


(1.4-6.5) 


  


  


 


 


Lymphocytes # (Auto)


  0.95 K/uL


(1.2-3.4) 


  


  


 


 


Monocytes # (Auto)


  0.52 K/uL


(0.11-0.59) 


  


  


 


 


Eosinophils # (Auto)


  0.01 K/uL


(0-0.5) 


  


  


 


 


Basophils # (Auto)


  0.03 K/uL


(0-0.2) 


  


  


 


 


Immature Granulocyte # (Auto)


  1.23 K/uL


(0.00-0.02) 


  


  


 


 


Stool Occult Blood


  


  POSITIVE


(NEGATIVE) 


  


 


 


Lactic Acid Level


  


  


  1.0 mmol/L


(0.4-2.0) 


 


 


Chemistry Specimen Hemolysis     


 


Test


  1/24/18


12:40 1/25/18


05:28 1/25/18


06:33 


 


 


Cortisol Response to


Stimulation Cortisol


Baseline 


  


  


 


 


Red Blood Count


  


  2.89 M/uL


(4.2-5.4) 


  


 


 


Mean Corpuscular Volume


  


  101.4 fL


() 


  


 


 


Mean Corpuscular Hemoglobin


  


  31.5 pg


(25-34) 


  


 


 


Mean Corpuscular Hemoglobin


Concent 


  31.1 g/dl


(32-36) 


  


 


 


RDW Standard Deviation


  


  57.0 fL


(36.4-46.3) 


  


 


 


RDW Coefficient of Variation


  


  16.3 %


(11.5-14.5) 


  


 


 


Mean Platelet Volume


  


  9.6 fL


(7.4-10.4) 


  


 


 


Nucleated RBC Absolute Count


(auto) 


  0.17 K/uL


(0-0) 


  


 


 


Nucleated Red Blood Cells %  0.9 %   


 


Anion Gap


  


  11.0 mmol/L


(3-11) 


  


 


 


Est Creatinine Clear Calc


Drug Dose 


  12.1 ml/min 


  


  


 


 


Estimated GFR (


American) 


  8.5 


  


  


 


 


Estimated GFR (Non-


American 


  7.3 


  


  


 


 


BUN/Creatinine Ratio  6.5 (10-20)   


 


Calcium Level


  


  8.4 mg/dl


(8.5-10.1) 


  


 


 


Phosphorus Level


  


  4.4 mg/dl


(2.5-4.9) 


  


 


 


Magnesium Level


  


  2.0 mg/dl


(1.8-2.4) 


  


 


 


Bedside Glucose


  


  


  128 mg/dl


(70-90) 


 














 Date/Time


Source Procedure


Growth Status


 


 


 1/16/18 04:23


Blood Blood Culture - Final


NO GROWTH Complete


 


 1/16/18 00:00


Nasal MRSA DNA Surveillance Screen - Final


Specimen Positive for MRSA by DNA Probe Complete


 


 1/20/18 15:30


Stool C.difficile Toxin B Gene (PCR) - Final


Positive for C. difficile toxin B gene Complete


 


 1/16/18 05:22


Urine , Clean Catch Urine Culture - Final


Escherichia Coli Complete








Last 24 Hours








Test


  1/24/18


12:09 1/24/18


12:40 1/24/18


16:25 1/24/18


19:45


 


Bedside Glucose 80 mg/dl   130 mg/dl  196 mg/dl 


 


Cortisol Response to


Stimulation 


  Cortisol


Baseline 


  


 


 


Test


  1/25/18


05:28 1/25/18


06:33 


  


 


 


White Blood Count 18.20 K/uL    


 


Red Blood Count 2.89 M/uL    


 


Hemoglobin 9.1 g/dL    


 


Hematocrit 29.3 %    


 


Mean Corpuscular Volume 101.4 fL    


 


Mean Corpuscular Hemoglobin 31.5 pg    


 


Mean Corpuscular Hemoglobin


Concent 31.1 g/dl 


  


  


  


 


 


RDW Standard Deviation 57.0 fL    


 


RDW Coefficient of Variation 16.3 %    


 


Platelet Count 176 K/uL    


 


Mean Platelet Volume 9.6 fL    


 


Nucleated RBC Absolute Count


(auto) 0.17 K/uL 


  


  


  


 


 


Nucleated Red Blood Cells % 0.9 %    


 


Sodium Level 136 mmol/L    


 


Potassium Level 3.7 mmol/L    


 


Chloride Level 101 mmol/L    


 


Carbon Dioxide Level 24 mmol/L    


 


Anion Gap 11.0 mmol/L    


 


Blood Urea Nitrogen 38 mg/dl    


 


Creatinine 5.81 mg/dl    


 


Est Creatinine Clear Calc


Drug Dose 12.1 ml/min 


  


  


  


 


 


Estimated GFR (


American) 8.5 


  


  


  


 


 


Estimated GFR (Non-


American 7.3 


  


  


  


 


 


BUN/Creatinine Ratio 6.5    


 


Random Glucose 127 mg/dl    


 


Calcium Level 8.4 mg/dl    


 


Phosphorus Level 4.4 mg/dl    


 


Magnesium Level 2.0 mg/dl    


 


Bedside Glucose  128 mg/dl   











Assessment & Plan


58 yo F presented with hypotension after dialysis.  She was transferred to [facility] 

from [facility] and was admitted to the ICU.  She did have some cough productive 

of greenish phlegm for two weeks and was started on empiric abx upon transfer 

from [facility] on 1/16.  She also consistently mentioned some abdominal pain.  

Successive KUBs were performed revealing no convincing evidence for 

obstruction. No acute pulmonary process was noted on CXR and she was saturating 

well on room air. Flu was negatigve and she was started on empiric treatment 

with Vanc and Zosyn.  She was put on a pressor and midodrine and abx were 

subsequently changed to Primaxin to treat an ESBL-producing E coli in her 

urine. Blood cultures were negative and all lines appear clean.  ID was 

consulted.  She developed c-diff colitis and was placed on Vanc and Flagyl. She 

was transferred out of the ICU on 1/18 as she was off pressors and midodrine, 

maintaining her BP.  CT a/p revealed a partial SBO and she was made NPO on 1/ 22.  Gen Surg was consulted and recommended against surgery at this time unless 

she declined clinically.  No NGT was placed so that she could continue to 

receive the PO Vanc to treat her C-diff.  GI was consulted and is assisting 

with c-diff management.  They added a Vanc enema QID on 1/23.  The patient 

denies any nausea today and had two loose BMs.  She is still having a 

productive greenish cough and denies fevers.  She is fatigued from HD today.  

She otherwise is tolerating PO, and mentating well.  She reports some 

improvement in her abdomen today but still has pain.  She reports 4 BMs today.





1/24:  CT chest was performed in the setting of persistent hypoxia, rhonchi on 

exam and cough productive of greenish sputum.  On review of her abx, she is on 

Vanc PO, Vanc enema, Flagyl IV and Ertapenem.  Microbes not covered include 

Pseudomonas, MRSA, and atypicals such as Mycoplasma.  CT chest did not reveal 

an infiltrate but clinical picture resembles a bronchitis.  Azithromycin was 

added to cover atypicals.  Other cause of cough may be fluid overload as seen 

on CT.  This is being managed through HD.  Midodrine was started for BP 

management at a low dose.  Cosyntropin stim test was negative for adrenal 

insufficiency.  KUB did not show progression of megacolon


1/25:  Pt went into atrial flutter overnight around 7pm.  BP is around the same 

on the midodrine.  Pt feels poorly-fatigued with no worsening SOB or chest pain 

at this time.  Apathetic to food.  HR has been in the 120-130s overnight.  Per 

nurse no further blood in stools and vanc enemas stopped by GI as stools are 

more formed.  Cough still present-one dose azithro given last night and plan to 

cont 5 day course.  Diltiazem 10mg IV ordered and heparin drip with CHADs vasc 

4.  Cardiology consulted. Leukocytosis increased.  Poss 2/2 cosyntropin stim 

test yesterday.  She denies any fevers or chills and appears improved overall 

today with an improvement in her abdominal exam. 





1.  Hypotension


2.  ESBL E coli UTI-Ertapenem


3.  C-diff colitis-Vanc PO, Flagyl IV, Vanc enema added today (1/23)


4.  ESRD with fluid overload-on HD, does not make urine. Cont per Nephro


5.  Hypoxia 2/2 fluid overload in setting of possible infectious bronchitis.  

Added azithromycin. Not on oxygen at home-fluid management in HD, pt has been 

stable on this amount since admission.  Will cont to try to wean. 


6  SBO-resolving with somewhat improved abdominal tenderness on exam and now 

tolerating solid food somewhat.  Appreciate Surgical input.


7.  Atrial flutter-limited records available but no known history-diltiazem 

10mg IV and hep drip started as she has been stable from a bleed standpoint for 

several days now with normal stools.  Will watch closely for bleeding.  Pending 

Cards evaluation.


8.  Leukocytosis-multiple infections present and appear to be improved.  

Increased stress with cosyntropin stim test yesterday-poss cause?  Will monitor.


9.  Anemia--FOBT positive early in hospital course but normal stools for 

several days.   No debra blood ever noted, but darker color noted by nursing 

staff.  Has multiple comorbidities likely contributing to her anemia including 

ESRD and cirrhosis.  Will monitor CBC closely while on heparin drip.  


10.  Cirrhosis-will need outpatient assessment with GI or PCP.


11.  DMII-at goal


12.  Obesity


 


Full Code


DVT proph-SCDs, chemoprophylaxis contraindicated in anemia with FOBT positive 

requiring blood tx.


Dispo-uncertain at this time, cont tele. 





Nisha Escobar DO


[facility] Hospitalist


Consultants:


Nephro-Oncu


Fariha-Patrick


Current Inpatient Medications:





Current Inpatient Medications








 Medications


  (Trade)  Dose


 Ordered  Sig/Daniel


 Route  Start Time


 Stop Time Status Last Admin


Dose Admin


 


 Albuterol/


 Ipratropium


  (Combivent


 Respimat Inh)  1 puffs  QID


 INH  1/16/18 09:00


 2/15/18 08:59  1/25/18 08:25


1 PUFFS


 


 Miscellaneous


 Information


  (Order Awaiting


 Action)  1 ea  QS


 N/A  1/16/18 08:00


 2/15/18 07:59  1/16/18 08:21


1 EA


 


 Miscellaneous


 Information


  (Order Awaiting


 Action)  1 ea  QS


 N/A  1/16/18 08:00


 2/15/18 07:59  1/16/18 08:21


1 EA


 


 Miscellaneous


 Information


  (Consult


 Glycemic


 Management


 Pharmacy)  1 ea  UD  PRN


 N/A  1/16/18 04:15


 2/15/18 04:14   


 


 


 Glucose


  (Glucose 40% Gel)  15-30


 GRAMS 15


 GRAMS...  UD  PRN


 PO  1/16/18 04:30


 2/15/18 04:29   


 


 


 Glucose


  (Glucose Chew


 Tab)  4-8


 Tablets 4


 Tabl...  UD  PRN


 PO  1/16/18 04:30


 2/15/18 04:29   


 


 


 Dextrose


  (Dextrose 50%


 50ML Syringe)  25-50ML OF


 50% DW IV


 FOR...  UD  PRN


 IV  1/16/18 04:30


 2/15/18 04:29  1/17/18 09:52


25 ML


 


 Glucagon


  (Glucagon Inj)  1 mg  UD  PRN


 SQ  1/16/18 04:30


 2/15/18 04:29   


 


 


 Ondansetron HCl


  (Zofran Inj)  4 mg  Q4H  PRN


 IV  1/16/18 18:15


 2/15/18 18:14  1/19/18 12:15


4 MG


 


 Heparin Sodium


  (Porcine)


  (Heparin Sq 5000


 Unit/0.5ml)  5,000 unit  Q8


 SQ  1/17/18 15:00


 2/16/18 14:59 Future Hold 1/22/18 05:59


5,000 UNIT


 


 Ertapenem 500 mg/


 Sodium Chloride  55 ml @ 


 110 mls/hr  DAILY@1800


 IV  1/18/18 18:00


 1/28/18 17:59  1/24/18 17:36


110 MLS/HR


 


 Vancomycin HCl


  (Vancomycin Oral


 Soln)  125 mg  Q6H


 PO  1/20/18 20:00


 2/3/18 19:59  1/25/18 08:24


125 MG


 


 Raspberry


  (Raspberry Syrup


 5ml Cup)  5 ml  Q6H


 PO  1/20/18 20:00


 2/3/18 19:59  1/25/18 08:24


5 ML


 


 Insulin Aspart


  (novoLOG ASPART)  **SLIDING


 SCALE**


 **G...  ACHS


 SC  1/22/18 07:00


 2/21/18 06:59  1/24/18 21:12


2 UNITS


 


 Ioversol


  (Optiray 320)  100 ml  UD  PRN


 IV  1/22/18 13:45


 1/26/18 13:44   


 


 


 Metronidazole 500


 mg/Prmx  100 ml @ 


 100 mls/hr  Q8H


 IV  1/22/18 18:00


 2/1/18 17:59  1/25/18 09:39


100 MLS/HR


 


 Pantoprazole


 Sodium 40 mg/


 Syringe  10 ml @ 5


 mls/min  DAILY@1100


 IV  1/23/18 11:00


 2/22/18 10:59  1/24/18 12:30


5 MLS/MIN


 


 Miscellaneous


  (Unit Dose


 Compound)  1 ea  QID


 [place]  1/23/18 17:00


 2/22/18 16:59  1/24/18 21:09


1 EA


 


 Vancomycin HCl/


 Sterile Water    QID


 [place]  1/23/18 17:00


 2/2/18 16:59  1/24/18 21:09


1 BTL


 


 Acetaminophen 650


 mg/Empty Bag  65 ml @ 


 260 mls/hr  Q6H  PRN


 IV  1/23/18 16:30


 2/22/18 16:29   


 


 


 Midodrine


  (Proamatine Tab)  2.5 mg  TID@08,12,17


 PO  1/24/18 17:30


 2/23/18 17:29  1/25/18 08:25


2.5 MG


 


 Azithromycin


  (Zithromax Tab)  250 mg  PM


 PO  1/25/18 21:00


 1/31/18 20:59

## 2018-01-25 NOTE — SURGERY PROGRESS NOTE
Surgery Progress Note


Date of Service


Jan 25, 2018.





Subjective


being treated for a flutter





no complaints of abd pain- less bloody bms





Objective


Vital Signs:











  Date Time  Temp Pulse Resp B/P (MAP) Pulse Ox O2 Delivery O2 Flow Rate FiO2


 


1/25/18 12:40  100  88/58 (68)    


 


1/25/18 12:26  120  90/60 (70)    


 


1/25/18 12:22  115  95/62 (73)    


 


1/25/18 12:10  118  89/58 (68)    


 


1/25/18 12:05  120  98/54 (69)    


 


1/25/18 12:00      Nasal Cannula 2.0 


 


1/25/18 10:39 36.5 119 20 114/75 (88) 97 Nasal Cannula 2.0 


 


1/25/18 08:00      Nasal Cannula 2.0 


 


1/25/18 07:06 36.6 127 20 92/65 (74) 98 Nasal Cannula 2.0 


 


1/25/18 04:00      Room Air  


 


1/25/18 03:48 37.0 125 18 85/56 (66) 92   


 


1/25/18 00:07 36.5 118 18 86/52 (63) 92 Room Air  


 


1/24/18 23:59      Room Air  


 


1/24/18 20:21 36.5 119 18 80/51 (61) 91 Room Air  


 


1/24/18 20:00      Room Air  


 


1/24/18 16:00      Nasal Cannula 2.0 


 


1/24/18 15:37 37.1 103 18 91/61 (71) 100 Nasal Cannula 2.0 


 


1/24/18 13:22      Nasal Cannula 1.0 








Laboratory Results:





Results Past 24 Hours








Test


  1/24/18


16:25 1/24/18


19:45 1/25/18


05:28 1/25/18


06:33 Range/Units


 


 


Bedside Glucose 130 196  128 70-90  mg/dl


 


White Blood Count   18.20  4.8-10.8  K/uL


 


Red Blood Count   2.89  4.2-5.4  M/uL


 


Hemoglobin   9.1  12.0-16.0  g/dL


 


Hematocrit   29.3  37-47  %


 


Mean Corpuscular Volume   101.4    fL


 


Mean Corpuscular Hemoglobin   31.5  25-34  pg


 


Mean Corpuscular Hemoglobin


Concent 


  


  31.1


  


  32-36  g/dl


 


 


Platelet Count   176  130-400  K/uL


 


Mean Platelet Volume   9.6  7.4-10.4  fL


 


Neutrophils (%) (Auto)   79.7   %


 


Lymphocytes (%) (Auto)   7.6   %


 


Monocytes (%) (Auto)   4.6   %


 


Eosinophils (%) (Auto)   0.6   %


 


Basophils (%) (Auto)   0.2   %


 


Neutrophils # (Auto)   15.41  1.4-6.5  K/uL


 


Lymphocytes # (Auto)   1.46  1.2-3.4  K/uL


 


Monocytes # (Auto)   0.88  0.11-0.59  K/uL


 


Eosinophils # (Auto)   0.11  0-0.5  K/uL


 


Basophils # (Auto)   0.04  0-0.2  K/uL


 


RDW Standard Deviation   57.0  36.4-46.3  fL


 


RDW Coefficient of Variation   16.3  11.5-14.5  %


 


Immature Granulocyte % (Auto)   7.3   %


 


Immature Granulocyte # (Auto)   1.41  0.00-0.02  K/uL


 


Nucleated RBC Absolute Count


(auto) 


  


  0.17


  


  0-0  K/uL


 


 


Nucleated Red Blood Cells %   0.9   %


 


Toxic Granulation   1+   


 


Toxic Vacuolation   1+   


 


Polychromasia   1+   


 


Basophilic Stippling   OCCASIONAL   


 


Sodium Level   136  136-145  mmol/L


 


Potassium Level   3.7  3.5-5.1  mmol/L


 


Chloride Level   101    mmol/L


 


Carbon Dioxide Level   24  21-32  mmol/L


 


Anion Gap   11.0  3-11  mmol/L


 


Blood Urea Nitrogen   38  7-18  mg/dl


 


Creatinine


  


  


  5.81


  


  0.60-1.20


mg/dl


 


Est Creatinine Clear Calc


Drug Dose 


  


  12.1


  


   ml/min


 


 


Estimated GFR (


American) 


  


  8.5


  


   


 


 


Estimated GFR (Non-


American 


  


  7.3


  


   


 


 


BUN/Creatinine Ratio   6.5  10-20  


 


Random Glucose   127  70-99  mg/dl


 


Calcium Level   8.4  8.5-10.1  mg/dl


 


Phosphorus Level   4.4  2.5-4.9  mg/dl


 


Magnesium Level   2.0  1.8-2.4  mg/dl


 


Test


  1/25/18


10:46 1/25/18


11:10 


  


  Range/Units


 


 


Prothrombin Time


  12.2


  


  


  


  9.0-12.0


SECONDS


 


Prothromb Time International


Ratio 1.2


  


  


  


  0.9-1.1  


 


 


Activated Partial


Thromboplast Time 27.3


  


  


  


  21.0-31.0


SECONDS


 


Partial Thromboplastin Ratio 1.1     


 


Bedside Glucose  145   70-90  mg/dl











Assessment & Plan


1/25/18- slow progress- no acute worsening of GI sxs


     cont nonoperative mgt








1/24/18- slowly improving- no emesis, pt is hungry- try diet


    cont other atbx. doubt significant mechanical obstruction








1/23/18- has normal bowel sounds and abd soft- some tenderness


    colon looks relativel normal on CT. Cont po, IV meds/atbx.


    Surgical intervention would be extreme risk to pt and only if


    she worsens to toxic megacolon.


1/24/18- slowly improving- no emesis, pt is hungry- try diet


    cont other atbx. doubt significant mechanical obstruction








1/23/18- has normal bowel sounds and abd soft- some tenderness


    colon looks relativel normal on CT. Cont po, IV meds/atbx.


    Surgical intervention would be extreme risk to pt and only if


    she worsens to toxic megacolon.

## 2018-01-25 NOTE — CARDIOLOGY CONSULTATION
Cardiology Consultation


Date of Consultation:  Jan 25, 2018


Requesting Physician:  Shawn


Attending Cardiologist:  Patrick


 (Lombardo,Mark, PA-C)





History of Present Illness


Patient is a 57 year old female who is being seen at the request of Dr. Escobar. 

Reason for consultation is new onset atrial flutter with a rapid ventricular 

response. 





Patient transferred from [facility] to [facility] on January 16 , 2018 after developing hypotension, fever and tachycardia during dialysis. She 

was treated for sepsis with broad spectrum antibiotics, initially requiring 

pressor support in the ICU. Workup revealed a urinary tract infection with ESBL 

with course complicated by c-diff colitis, hematochezia, and a partial small 

bowel obstruction. She remains hypotensive. Last evening she was noted to 

develop new onset atrial fibrillation/flutter with a rapid ventricular response 

prompting cardiology consultation. 





Complaints voiced today include cough and chest congestion, ongoing diarrhea, 

and right ankle pain that has been present since she slipped on the ice on 

Monday, January 15, 2018. She denies chest pain. She denies palpitations. She 

denies worsening dyspnea with the new onset atrial fibrillation/flutter. She 

denies prior history of atrial arrhythmias. She denies history of coronary 

artery disease and notes undergoing cardiac catheterization prior to aortic 

valve replacement in [place], [place]. She is status post aortic valve 

replacement with a bioprosthetic valve in March 2016 in [place]. She has end-

stage renal disease for which she is on hemodialysis. Transesophageal 

echocardiography performed on 1/2/18 at [facility], [place] revealed moderate mitral annular 

calcification with a significant diastolic transmitral valve gradient of 13 mm 

Hg by doppler assessment, mild to moderate mitral stenosis by 2D evaluation, 

mild to moderate mitral regurgitation. The bioprosthetic aortic valve was 

without significant prosthetic stenosis. EF 65-70%.


 (Lombardo,Mark, PA-C)





Past Medical/Surgical History


Problem List:


Status post aortic valve replacement with a bioprosthesis


Mitral valve stenosis


No significant coronary artery disease via catheterization prior to AVR, per 

patient report


Preserved left ventricular systolic function. 


End-stage renal disease, on hemodialysis


Obesity hypoventilation syndrome


Presumed COPD, history of significant tobacco abuse


Type II diabetes mellitus


Hypertension


Dyslipidemia


 (Lombardo,Mark, PA-C)





Family History





FH: heart disease


  MOTHER


Her mother is alive at 81, status post prior ? AVR. Two sisters with ? CAD. 

Father without CAD. 


 (Lombardo,Mark, PA-C)





FH: heart disease


  MOTHER (Vitor Nguyen D.O.)


Social History


Reformed smoker, quit in 2017 after smoking 2+ ppd x 42 years (12 to 57). No 

smokeless tobacco use. No significant alcohol use. No illegal drug use. 

. Offspring lives next door. Lives in [place], [place]. 


 (Lombardo,Mark, PA-C)





Review Of Systems


General: No current fever or chills.


HEENT: Glasses. No headache. 


Cardiovascular: See above. 


Pulmonary: See above. + Excessive snoring. Denies hemoptysis. 


Gastrointestinal: See above. Ongoing diarrhea. No current nausea or vomiting. 


Skin: No rash. 


Musculoskeletal: Right ankle pain post fall on 1/15/2018. 


Neurological: Denies history of TIA, CVA, or seizure


Complete review of systems is as stated above, negative, or noncontributory. 


 (Lombardo,Mark, PA-C)





Allergies


Coded Allergies:  


     Hydrocodone (Verified  Allergy, Unknown, unspecified, 12/27/17)


     Morphine (Verified  Allergy, Unknown, unspecified , 12/27/17)





Medications





Reported Home Medications








 Medications  Dose


 Route/Sig


 Max Daily Dose Days Date Category


 


 Neurontin


  (Gabapentin) 100


 Mg Cap  2 Cap


 PO HS


   30 1/16/18 Reported


 


 Levaquin


  (Levofloxacin)


 500 Mg Tab  500 Mg


 PO DAILY


   7 1/16/18 Reported


 


 Combivent


 Respimat


  (Ipratropium-Albuterol)


 1 Aer Aer  1 Puffs


 INH QID


   30 1/6/18 Rx


 


 Breo Ellipta


  (Fluticasone


 Furoate-Vilanterol)


 1 Inh Inh  1 Puff


 INH BID


   30 1/6/18 Rx


 


 Humulin N


  (Insulin Human


 NPH) 100 Units/Ml


 Susp  40 Units


 SC UD


   8 1/5/18 Rx


 


 Ultram (Tramadol


 HCl) 50 Mg Tab  50 Mg


 PO Q4H PRN


    12/27/17 Reported


 


 Nephrocaps


  (Vitamin B


 Complex/Vit


 C/Folic Acid)  Cap  1 Cap


 PO DAILY


    12/27/17 Reported


 


 Proair Respiclick


  (Albuterol


 Sulfate) 108


 Mcg/Act Aer  2 Puffs


 INH  PRN


    12/27/17 Reported


 


 Protonix


  (Pantoprazole


 Sodium) 40 Mg Tab  40 Mg


 PO DAILY


    12/27/17 Reported


 


 Aspirin Chewable


  (Aspirin) 81 Mg


 Chew  81 Mg


 PO DAILY


    12/27/17 Reported


 


 Plavix


  (Clopidogrel


 Bisulfate) 75 Mg


 Tab  75 Mg


 PO DAILY


    12/27/17 Reported


 


 Zoloft


  (Sertraline HCl)


 50 Mg Tab  75 Mg


 PO DAILY


    12/27/17 Reported


 


 Lipitor


  (Atorvastatin


 Calcium) 40 Mg Tab  40 Mg


 PO DAILY


    12/27/17 Reported


 


 Zyloprim


  (Allopurinol) 100


 Mg Tab  100 Mg


 PO BID


    12/27/17 Reported


 


 Aciphex


  (Rabeprazole


 Sodium) 20 Mg Tab  20 Mg


 PO DAILY


    12/27/17 Reported


 


 Vitamin B-1


  (Thiamine HCl) 50


 Mg Tab  50 Mg


 PO DAILY


    12/27/17 Reported


 


 Mirapex


  (Pramipexole


 Dihydrochloride)


 0.125 Mg Tab  0.25 Mg


 PO HS


    12/27/17 Reported


 


 Colace (Docusate


 Sodium) 100 Mg Cap  1 Cap


 PO BID


    12/27/17 Reported


 


 Renvela


  (Sevelamer


 Carbonate) 800 Mg


 Tab  1 Tab


 PO TID


    12/27/17 Reported


 


 Auryxia (Ferric


 Citrate) 210 Mg


 Tab  1 Tab


 PO DAILY


    12/27/17 Reported


 


 Mircera (Methoxy


 Polyethylene


 Glycol-Ep) 150


 Mcg/0.3 Ml Sol  1 Tab


 PO TID


    12/27/17 Reported


 


 Venofer (Iron


 Sucrose) 100 Mg/5


 Ml Inj  100 Mg


 IV


    12/27/17 Reported


 


 Venofer (Iron


 Sucrose) 20 Mg/Ml


 Inj  50 Mg


 IV


    12/27/17 Reported








 (Lombardo,Mark, PA-C)





Physical Exam


Vital Signs (Last 8hrs):





Last 8 Hrs








  Date Time  Temp Pulse Resp B/P (MAP) Pulse Ox O2 Delivery O2 Flow Rate FiO2


 


1/25/18 07:06 36.6 127 20 92/65 (74) 98 Nasal Cannula 2.0 


 


1/25/18 04:00      Room Air  


 


1/25/18 03:48 37.0 125 18 85/56 (66) 92   








General Appearance: Alert and Oriented x3. NAD. Elevated BMI


HEENT: Normocephalic Atraumatic. PER, EOMI, conjunctiva and sclera clear


Neck:  No carotid bruits noted. Normal JVD. 


Respiratory: Diminished. Decreased. Dry rales on the right. No wheeze. 


Cardiovascular: Regular at 130 bpm. I could not appreciate any murmur. No rub. 

PMI was not palpable. 


Abdomen: Obese +BS. No abdominal bruits. Soft. Nontender. 


Extremities: Minimal edema on the right. Marked healing ecchymosis on the 

right. The right ankle is tender with minimal palpation. No edema on the left. 

No clubbing or cyanosis on the left. Distal pulses were 1/4 bilaterally. 


Neuro:  No focal deficits. 


Psychiatric: Normal affect. 


 (Lombardo,Mark, AWA)





Data





Last 24 Hours








Test


  1/24/18


12:09 1/24/18


12:40 1/24/18


16:25 1/24/18


19:45


 


Bedside Glucose 80 mg/dl   130 mg/dl  196 mg/dl 


 


Cortisol Response to


Stimulation 


  Cortisol


Baseline 


  


 


 


Test


  1/25/18


05:28 1/25/18


06:33 1/25/18


10:09 


 


 


White Blood Count 18.20 K/uL    


 


Red Blood Count 2.89 M/uL    


 


Hemoglobin 9.1 g/dL    


 


Hematocrit 29.3 %    


 


Mean Corpuscular Volume 101.4 fL    


 


Mean Corpuscular Hemoglobin 31.5 pg    


 


Mean Corpuscular Hemoglobin


Concent 31.1 g/dl 


  


  


  


 


 


RDW Standard Deviation 57.0 fL    


 


RDW Coefficient of Variation 16.3 %    


 


Platelet Count 176 K/uL    


 


Mean Platelet Volume 9.6 fL    


 


Nucleated RBC Absolute Count


(auto) 0.17 K/uL 


  


  


  


 


 


Nucleated Red Blood Cells % 0.9 %    


 


Sodium Level 136 mmol/L    


 


Potassium Level 3.7 mmol/L    


 


Chloride Level 101 mmol/L    


 


Carbon Dioxide Level 24 mmol/L    


 


Anion Gap 11.0 mmol/L    


 


Blood Urea Nitrogen 38 mg/dl    


 


Creatinine 5.81 mg/dl    


 


Est Creatinine Clear Calc


Drug Dose 12.1 ml/min 


  


  


  


 


 


Estimated GFR (


American) 8.5 


  


  


  


 


 


Estimated GFR (Non-


American 7.3 


  


  


  


 


 


BUN/Creatinine Ratio 6.5    


 


Random Glucose 127 mg/dl    


 


Calcium Level 8.4 mg/dl    


 


Phosphorus Level 4.4 mg/dl    


 


Magnesium Level 2.0 mg/dl    


 


Bedside Glucose  128 mg/dl   








January 2, 2018 [facility] ([facility], [place], Dr. Holbrook): Moderate mitral annular calcification 

was noted posteriorly with restriction of the posterior mitral valve leaflet. 

By Doppler assessment , there is a significant Diastolic transmitral valve 

gradient of 13 mm Hg. Only mild to moderate mitral stenosis however, is present 

by 2D evaluation. The high gradient is likely due to high cardiac output state 

, and relatively high heart rates of 80-90 bpm. There is mild to moderate 

mitral regurgitation. There is a bioprosthetic aortic valve. There is no 

significant prosthetic stenosis. The leaflets were not well visualized, but 

appeared to have normal mobility on limited visual assessment. The aortic valve 

gradients were normal as assess by recent transthoracic echocardiogram. There 

is mild concentric left ventricular hypertrophy. The LV Ejection Fraction = 65-

70%.





EKG dated and timed 16-JAN-2018 @ 03:48:59: Sinus tachycardia at 108 bpm. 

Possible Left atrial enlargement. Low voltage QRS. 





EKG dated and timed 20-Jan-2018 at 05:28:12 appears to reveal atrial 

fibrillation at 132 bpm. 





EKG dated and timed 25-Jan-2018 at 10:00:27 revealed atrial fibrillation at 125 

bpm. 





Telemetry: Atrial fibrillation/flutter since 1/24/2018 at 18:49:29. PAF 

observed on 1/20/2018. No significant bradycardia or pauses. No sustained 

ventricular arrhythmias. 


 (Lombardo,Mark, PA-C)





Assessment & Plan


Presumed new onset, hemodynamically stable, atrial fibrillation/flutter with a 

rapid ventricular response


Mitral valve stenosis


History of aortic valve disease status post aortic valve replacement with a 

bioprosthesis in 2016


No significant coronary artery disease via catheterization prior to aortic 

valve replacement, per patient report


Preserved left ventricular systolic function. 


End-stage renal disease, on hemodialysis


Obesity hypoventilation syndrome





Initial management will consist of controlling the ventricular response. A 

Diltiazem drip will be initiated with close attention to her blood pressure. 

From a cardiac standpoint anticoagulation with Coumadin is clearly indicated. 

Use of NOAC's is contraindicated as this is valvular atrial fibrillation. Agree 

with the cautious trial of heparin as ordered by the hospitalist service. 

Recommend closely following her H&H (hematuria and hematochezia observed 

earlier in her hospitalization, anemia status post transfusion of PRBC's. 

Because of the possible exaggerated adverse hemodynamic effects with atrial 

fibrillation in the setting of mitral valve stenosis and her presumed 

underlying obstructive pulmonary disease consideration may need to be made for 

utilization of antiarrhythmic therapy. Further recommendations pending 

evaluation by Dr. Nguyen and her ongoing hospitalization. 


 (Lombardo,Mark, PA-C)


Cardiology attending:


Pt seen and examined, agree with findings and assessment as per Mark Lombardo PA -C. Actually, rather surprising this is her first episode of atrial arrhythmia 

with her history of valvular disease. Will attempt rate control at this time 

with a cardizem gtt, BP is relatively low and will be monitored closely. 

Anticoagulation with heparin/coumadin (not a NOAC) is indicated from a cardiac 

standpoint. Given multiple comorbidities not an ideal candidate for 

antiarrhythmics and given mitral stenosis there is a high likelihood or 

recurrence. Will initiate a rate control strategy for now and follow 

clinically. 


 (Vitor Nguyen, D.O.)

## 2018-01-26 VITALS — HEART RATE: 96 BPM | SYSTOLIC BLOOD PRESSURE: 76 MMHG | DIASTOLIC BLOOD PRESSURE: 49 MMHG

## 2018-01-26 VITALS
OXYGEN SATURATION: 99 % | TEMPERATURE: 97.7 F | DIASTOLIC BLOOD PRESSURE: 40 MMHG | SYSTOLIC BLOOD PRESSURE: 72 MMHG | HEART RATE: 100 BPM

## 2018-01-26 VITALS
SYSTOLIC BLOOD PRESSURE: 81 MMHG | OXYGEN SATURATION: 97 % | HEART RATE: 97 BPM | DIASTOLIC BLOOD PRESSURE: 51 MMHG | TEMPERATURE: 98.06 F

## 2018-01-26 VITALS — HEART RATE: 96 BPM | SYSTOLIC BLOOD PRESSURE: 81 MMHG | DIASTOLIC BLOOD PRESSURE: 52 MMHG

## 2018-01-26 VITALS — SYSTOLIC BLOOD PRESSURE: 86 MMHG | HEART RATE: 99 BPM | DIASTOLIC BLOOD PRESSURE: 51 MMHG

## 2018-01-26 VITALS — DIASTOLIC BLOOD PRESSURE: 53 MMHG | HEART RATE: 98 BPM | SYSTOLIC BLOOD PRESSURE: 54 MMHG

## 2018-01-26 VITALS — OXYGEN SATURATION: 95 %

## 2018-01-26 VITALS
TEMPERATURE: 97.88 F | SYSTOLIC BLOOD PRESSURE: 80 MMHG | OXYGEN SATURATION: 97 % | HEART RATE: 93 BPM | DIASTOLIC BLOOD PRESSURE: 45 MMHG

## 2018-01-26 VITALS — DIASTOLIC BLOOD PRESSURE: 51 MMHG | SYSTOLIC BLOOD PRESSURE: 75 MMHG | HEART RATE: 98 BPM

## 2018-01-26 VITALS
OXYGEN SATURATION: 92 % | TEMPERATURE: 98.6 F | SYSTOLIC BLOOD PRESSURE: 80 MMHG | DIASTOLIC BLOOD PRESSURE: 52 MMHG | HEART RATE: 127 BPM

## 2018-01-26 VITALS
OXYGEN SATURATION: 95 % | TEMPERATURE: 98.24 F | HEART RATE: 103 BPM | DIASTOLIC BLOOD PRESSURE: 57 MMHG | SYSTOLIC BLOOD PRESSURE: 90 MMHG

## 2018-01-26 VITALS — SYSTOLIC BLOOD PRESSURE: 78 MMHG | HEART RATE: 93 BPM | DIASTOLIC BLOOD PRESSURE: 47 MMHG

## 2018-01-26 VITALS — SYSTOLIC BLOOD PRESSURE: 87 MMHG | TEMPERATURE: 97.88 F | HEART RATE: 101 BPM | DIASTOLIC BLOOD PRESSURE: 54 MMHG

## 2018-01-26 VITALS — HEART RATE: 97 BPM | SYSTOLIC BLOOD PRESSURE: 81 MMHG | DIASTOLIC BLOOD PRESSURE: 51 MMHG

## 2018-01-26 VITALS — HEART RATE: 100 BPM | DIASTOLIC BLOOD PRESSURE: 33 MMHG | SYSTOLIC BLOOD PRESSURE: 83 MMHG

## 2018-01-26 VITALS — HEART RATE: 95 BPM | SYSTOLIC BLOOD PRESSURE: 78 MMHG | DIASTOLIC BLOOD PRESSURE: 51 MMHG

## 2018-01-26 VITALS — SYSTOLIC BLOOD PRESSURE: 82 MMHG | DIASTOLIC BLOOD PRESSURE: 49 MMHG | TEMPERATURE: 97.88 F | HEART RATE: 95 BPM

## 2018-01-26 VITALS
HEART RATE: 62 BPM | OXYGEN SATURATION: 95 % | SYSTOLIC BLOOD PRESSURE: 86 MMHG | TEMPERATURE: 97.52 F | DIASTOLIC BLOOD PRESSURE: 52 MMHG

## 2018-01-26 VITALS — SYSTOLIC BLOOD PRESSURE: 83 MMHG | HEART RATE: 101 BPM | DIASTOLIC BLOOD PRESSURE: 52 MMHG

## 2018-01-26 VITALS — DIASTOLIC BLOOD PRESSURE: 50 MMHG | SYSTOLIC BLOOD PRESSURE: 86 MMHG | HEART RATE: 95 BPM

## 2018-01-26 VITALS — OXYGEN SATURATION: 97 % | HEART RATE: 99 BPM

## 2018-01-26 VITALS — DIASTOLIC BLOOD PRESSURE: 57 MMHG | HEART RATE: 100 BPM | SYSTOLIC BLOOD PRESSURE: 82 MMHG

## 2018-01-26 VITALS — OXYGEN SATURATION: 97 %

## 2018-01-26 LAB
BUN SERPL-MCNC: 48 MG/DL (ref 7–18)
CALCIUM SERPL-MCNC: 8.4 MG/DL (ref 8.5–10.1)
CO2 SERPL-SCNC: 26 MMOL/L (ref 21–32)
CREAT SERPL-MCNC: 7.3 MG/DL (ref 0.6–1.2)
EOSINOPHIL NFR BLD AUTO: 221 K/UL (ref 130–400)
GLUCOSE SERPL-MCNC: 134 MG/DL (ref 70–99)
HCT VFR BLD CALC: 27.2 % (ref 37–47)
HCT VFR BLD CALC: 27.4 % (ref 37–47)
HGB BLD-MCNC: 8.3 G/DL (ref 12–16)
HGB BLD-MCNC: 8.3 G/DL (ref 12–16)
MCH RBC QN AUTO: 31.6 PG (ref 25–34)
MCHC RBC AUTO-ENTMCNC: 30.5 G/DL (ref 32–36)
MCV RBC AUTO: 103.4 FL (ref 80–100)
NRBC BLD AUTO-RTO: 0.3 %
NUCLEATED RED BLOOD CELL ABS: 0.07 K/UL (ref 0–0)
PMV BLD AUTO: 10 FL (ref 7.4–10.4)
POTASSIUM SERPL-SCNC: 3.9 MMOL/L (ref 3.5–5.1)
PTT PATIENT: 53.2 SECONDS (ref 21–31)
RED CELL DISTRIBUTION WIDTH CV: 17.4 % (ref 11.5–14.5)
RED CELL DISTRIBUTION WIDTH SD: 59.1 FL (ref 36.4–46.3)
SODIUM SERPL-SCNC: 137 MMOL/L (ref 136–145)
WBC # BLD AUTO: 24.03 K/UL (ref 4.8–10.8)

## 2018-01-26 RX ADMIN — VANCOMYCIN HYDROCHLORIDE SCH MG: 1 INJECTION, POWDER, LYOPHILIZED, FOR SOLUTION INTRAVENOUS at 13:41

## 2018-01-26 RX ADMIN — AZITHROMYCIN SCH MG: 250 TABLET, FILM COATED ORAL at 21:43

## 2018-01-26 RX ADMIN — IPRATROPIUM BROMIDE AND ALBUTEROL SCH PUFFS: 20; 100 SPRAY, METERED RESPIRATORY (INHALATION) at 08:43

## 2018-01-26 RX ADMIN — Medication SCH ML: at 13:41

## 2018-01-26 RX ADMIN — IPRATROPIUM BROMIDE AND ALBUTEROL SCH PUFFS: 20; 100 SPRAY, METERED RESPIRATORY (INHALATION) at 12:52

## 2018-01-26 RX ADMIN — ALUMINUM ZIRCONIUM TRICHLOROHYDREX GLY SCH EA: 0.2 STICK TOPICAL at 00:00

## 2018-01-26 RX ADMIN — Medication SCH ML: at 21:42

## 2018-01-26 RX ADMIN — INSULIN ASPART SCH UNITS: 100 INJECTION, SOLUTION INTRAVENOUS; SUBCUTANEOUS at 17:33

## 2018-01-26 RX ADMIN — VANCOMYCIN HYDROCHLORIDE SCH MG: 1 INJECTION, POWDER, LYOPHILIZED, FOR SOLUTION INTRAVENOUS at 08:44

## 2018-01-26 RX ADMIN — IPRATROPIUM BROMIDE AND ALBUTEROL SCH PUFFS: 20; 100 SPRAY, METERED RESPIRATORY (INHALATION) at 21:00

## 2018-01-26 RX ADMIN — VANCOMYCIN HYDROCHLORIDE SCH MG: 1 INJECTION, POWDER, LYOPHILIZED, FOR SOLUTION INTRAVENOUS at 17:30

## 2018-01-26 RX ADMIN — ALUMINUM ZIRCONIUM TRICHLOROHYDREX GLY SCH EA: 0.2 STICK TOPICAL at 08:00

## 2018-01-26 RX ADMIN — INSULIN ASPART SCH UNITS: 100 INJECTION, SOLUTION INTRAVENOUS; SUBCUTANEOUS at 11:00

## 2018-01-26 RX ADMIN — VANCOMYCIN HYDROCHLORIDE SCH MG: 1 INJECTION, POWDER, LYOPHILIZED, FOR SOLUTION INTRAVENOUS at 21:43

## 2018-01-26 RX ADMIN — MIDODRINE HYDROCHLORIDE SCH MG: 2.5 TABLET ORAL at 12:52

## 2018-01-26 RX ADMIN — IPRATROPIUM BROMIDE AND ALBUTEROL SCH PUFFS: 20; 100 SPRAY, METERED RESPIRATORY (INHALATION) at 17:34

## 2018-01-26 RX ADMIN — Medication SCH ML: at 02:02

## 2018-01-26 RX ADMIN — INSULIN ASPART SCH UNITS: 100 INJECTION, SOLUTION INTRAVENOUS; SUBCUTANEOUS at 21:00

## 2018-01-26 RX ADMIN — MIDODRINE HYDROCHLORIDE SCH MG: 2.5 TABLET ORAL at 17:34

## 2018-01-26 RX ADMIN — ERTAPENEM SODIUM SCH MLS/HR: 1 INJECTION, POWDER, LYOPHILIZED, FOR SOLUTION INTRAMUSCULAR; INTRAVENOUS at 17:35

## 2018-01-26 RX ADMIN — VANCOMYCIN HYDROCHLORIDE SCH MG: 1 INJECTION, POWDER, LYOPHILIZED, FOR SOLUTION INTRAVENOUS at 02:03

## 2018-01-26 RX ADMIN — MIDODRINE HYDROCHLORIDE SCH MG: 2.5 TABLET ORAL at 08:42

## 2018-01-26 RX ADMIN — Medication SCH ML: at 08:42

## 2018-01-26 RX ADMIN — VANCOMYCIN HYDROCHLORIDE SCH MG: 1 INJECTION, POWDER, LYOPHILIZED, FOR SOLUTION INTRAVENOUS at 08:42

## 2018-01-26 RX ADMIN — METRONIDAZOLE SCH MLS/HR: 500 INJECTION, SOLUTION INTRAVENOUS at 02:04

## 2018-01-26 RX ADMIN — METRONIDAZOLE SCH MLS/HR: 500 INJECTION, SOLUTION INTRAVENOUS at 08:45

## 2018-01-26 RX ADMIN — PANTOPRAZOLE SCH MG: 40 TABLET, DELAYED RELEASE ORAL at 08:43

## 2018-01-26 RX ADMIN — ALUMINUM ZIRCONIUM TRICHLOROHYDREX GLY SCH EA: 0.2 STICK TOPICAL at 16:00

## 2018-01-26 RX ADMIN — INSULIN ASPART SCH UNITS: 100 INJECTION, SOLUTION INTRAVENOUS; SUBCUTANEOUS at 08:41

## 2018-01-26 RX ADMIN — METRONIDAZOLE SCH MLS/HR: 500 INJECTION, SOLUTION INTRAVENOUS at 17:35

## 2018-01-26 RX ADMIN — LEVALBUTEROL HYDROCHLORIDE SCH MG: 1.25 SOLUTION RESPIRATORY (INHALATION) at 19:38

## 2018-01-26 NOTE — GASTROENTEROLOGY PROGRESS NOTE
Progress Note


Date of Service:  Jan 26, 2018


Subjective


Pt evaluation today including:  conversation w/ patient, physical exam, chart 

review, lab review


Pt was seen and evaluated, chart reviewed. Pt was stopped vanco enemas 

yesterday as she was having formed, brown stools. Call from nursing last 

evening as pt had return of diarrhea, numerous episodes. Per pt no bleeding. 

Per chart review no bleeding. Discussed w/ nursing briefly who report he was 

not told of any GI bleeding in report. Did have a 2pt drop in hematocrit. On 

heparin gtt. Pt tells me she feels ok. She did have an episode of CP this AM, 

continuous SOB. Cough w/ green sputum. Can have mild abd cramping before a BM, 

relieved w/ BM. Had two very small BMs this AM. 





NSAIDs: ASA daily


ABX: current


Other: Plavix, heparin gtt during admission





Colonoscopy: none


EGD: none 





Chest CT 1/24/18: Interlobular septal thickening indicative of pulmonary edema. 

Small bilateral pleural effusions with associated subpleural opacities 

suggestive of atelectasis. No consolidation to suggest pneumonia. Mild 

secretions within the trachea and mainstem bronchi.  Mild mediastinal 

lymphadenopathy which is nonspecific. A follow-up chest CT  in 6 months is 

recommended. Mild emphysema. Mild dilatation of the main pulmonary artery which 

raises the possibility of pulmonary arterial hypertension.  Cirrhotic liver 

with mild splenomegaly and upper abdominal varices which suggests portal 

hypertension.


KUB 1/24/18: Gaseous distention of the small bowel persists. There is no 

significant colonic dilatation.


KUB 1/23/18: No change in the distended gas-filled loops of small bowel 

consistent with an obstruction.


CT 1/22/18: Findings are consistent with a small bowel obstruction. A 

transition point is seen in the right pelvis and this is likely on the basis of 

adhesions.  There is no pneumatosis intestinalis, portal venous gas, or 

intraperitoneal free air. Cirrhotic liver morphology.Splenomegaly. There are 

small pleural effusions with bibasilar consolidation. This could present 

atelectasis versus pneumonia. Clinical correlation will be required. 

Cardiomegaly and emphysema. Bilateral nephrolithiasis


KUB 1/22/18: There is nonspecific gaseous distention of the small bowel. 

Correlate clinically for evidence of obstruction.


Chest XR 1/17/18: No significant change from the preceding study Persistent 

mild pulmonary vascular congestion/edema.


KUB 1/16/18: Prominent loop of small bowel within the left mid abdomen with no 

convincing evidence for small bowel obstruction.





Review of Systems


Constitutional:  No fever, No chills


Respiratory:  + cough, + sputum


Cardiac:  + chest pain (resolved, had an episdode this AM)


Abdomen:  + pain, + diarrhea, No nausea, No vomiting, No constipation, No GI 

bleeding





Medications





Current Inpatient Medications








 Medications


  (Trade)  Dose


 Ordered  Sig/Daniel


 Route  Start Time


 Stop Time Status Last Admin


Dose Admin


 


 Albuterol/


 Ipratropium


  (Combivent


 Respimat Inh)  1 puffs  QID


 INH  1/16/18 09:00


 2/15/18 08:59  1/26/18 08:43


1 PUFFS


 


 Miscellaneous


 Information


  (Order Awaiting


 Action)  1 ea  QS


 N/A  1/16/18 08:00


 2/15/18 07:59  1/16/18 08:21


1 EA


 


 Miscellaneous


 Information


  (Order Awaiting


 Action)  1 ea  QS


 N/A  1/16/18 08:00


 2/15/18 07:59  1/16/18 08:21


1 EA


 


 Miscellaneous


 Information


  (Consult


 Glycemic


 Management


 Pharmacy)  1 ea  UD  PRN


 N/A  1/16/18 04:15


 2/15/18 04:14   


 


 


 Glucose


  (Glucose 40% Gel)  15-30


 GRAMS 15


 GRAMS...  UD  PRN


 PO  1/16/18 04:30


 2/15/18 04:29   


 


 


 Glucose


  (Glucose Chew


 Tab)  4-8


 Tablets 4


 Tabl...  UD  PRN


 PO  1/16/18 04:30


 2/15/18 04:29   


 


 


 Dextrose


  (Dextrose 50%


 50ML Syringe)  25-50ML OF


 50% DW IV


 FOR...  UD  PRN


 IV  1/16/18 04:30


 2/15/18 04:29  1/17/18 09:52


25 ML


 


 Glucagon


  (Glucagon Inj)  1 mg  UD  PRN


 SQ  1/16/18 04:30


 2/15/18 04:29   


 


 


 Ondansetron HCl


  (Zofran Inj)  4 mg  Q4H  PRN


 IV  1/16/18 18:15


 2/15/18 18:14  1/19/18 12:15


4 MG


 


 Ertapenem 500 mg/


 Sodium Chloride  55 ml @ 


 110 mls/hr  DAILY@1800


 IV  1/18/18 18:00


 1/28/18 17:59  1/25/18 18:03


110 MLS/HR


 


 Vancomycin HCl


  (Vancomycin Oral


 Soln)  125 mg  Q6H


 PO  1/20/18 20:00


 2/3/18 19:59  1/26/18 08:42


125 MG


 


 Raspberry


  (Raspberry Syrup


 5ml Cup)  5 ml  Q6H


 PO  1/20/18 20:00


 2/3/18 19:59  1/26/18 08:42


5 ML


 


 Insulin Aspart


  (novoLOG ASPART)  **SLIDING


 SCALE**


 **G...  ACHS


 SC  1/22/18 07:00


 2/21/18 06:59  1/25/18 21:43


3 UNITS


 


 Ioversol


  (Optiray 320)  100 ml  UD  PRN


 IV  1/22/18 13:45


 1/26/18 13:44   


 


 


 Metronidazole 500


 mg/Prmx  100 ml @ 


 100 mls/hr  Q8H


 IV  1/22/18 18:00


 2/1/18 17:59  1/26/18 08:45


100 MLS/HR


 


 Acetaminophen 650


 mg/Empty Bag  65 ml @ 


 260 mls/hr  Q6H  PRN


 IV  1/23/18 16:30


 2/22/18 16:29   


 


 


 Midodrine


  (Proamatine Tab)  2.5 mg  TID@08,12,17


 PO  1/24/18 17:30


 2/23/18 17:29  1/26/18 08:42


2.5 MG


 


 Azithromycin


  (Zithromax Tab)  250 mg  PM


 PO  1/25/18 21:00


 1/31/18 20:59  1/25/18 19:23


250 MG


 


 Heparin Sodium/


 Dextrose  500 ml @ 


 26 mls/hr  [id number] PRN


 IV  1/25/18 10:30


 2/24/18 10:29  1/25/18 10:36


26 MLS/HR


 


 Diltiazem HCl 125


 mg/Dextrose  125 ml @ 0


 mls/hr  Q0M PRN


 IV  1/25/18 12:45


 2/24/18 11:29   


 


 


 Vancomycin HCl


  (Vancomycin


 Enema)  500 mg  QID


 MI  1/25/18 17:00


 2/4/18 16:59  1/26/18 08:44


500 MG


 


 Pantoprazole


 Sodium


  (Protonix Tab)  40 mg  QAM


 PO  1/26/18 09:00


 2/25/18 08:59  1/26/18 08:43


40 MG


 


 Digoxin


  (Lanoxin Tab)  0.125 mg  MoWeFr@1600


 PO  1/26/18 10:00


 2/25/18 09:59   


 











Objective


Vital Signs











  Date Time  Temp Pulse Resp B/P (MAP) Pulse Ox O2 Delivery O2 Flow Rate FiO2


 


1/26/18 07:36 36.6 93 22 80/45 (57) 97 Room Air  


 


1/26/18 04:30     95 Nasal Cannula 2.0 


 


1/26/18 03:25 36.4 62 18 86/52 (63) 95  2.0 


 


1/26/18 00:01     97 Nasal Cannula 2.0 


 


1/25/18 23:44 36.7 83 18 83/49 (60) 97  2.0 


 


1/25/18 21:46    74/43 (53)    


 


1/25/18 20:31 36.5 79 18 78/43 (55) 97 Nasal Cannula 2.0 


 


1/25/18 20:00      Nasal Cannula 2.0 


 


1/25/18 16:00      Nasal Cannula 2.0 


 


1/25/18 16:00 36.3 86 18 89/54 (66) 99 Nasal Cannula 2.0 


 


1/25/18 14:45  82  92/58 (69) 99 Nasal Cannula 2.0 


 


1/25/18 14:00  90  88/60 (69)    


 


1/25/18 13:32  94  90/57 (68)    


 


1/25/18 13:13  89  87/56 (66)    


 


1/25/18 13:02  101  89/62 (71)    


 


1/25/18 12:40  100  88/58 (68)    


 


1/25/18 12:26  120  90/60 (70)    


 


1/25/18 12:22  115  95/62 (73)    


 


1/25/18 12:10  118  89/58 (68)    


 


1/25/18 12:05  120  98/54 (69)    


 


1/25/18 12:00      Nasal Cannula 2.0 


 


1/25/18 10:39 36.5 119 20 114/75 (88) 97 Nasal Cannula 2.0 











Physical Exam


General Appearance:  no apparent distress, + obese, + pertinent finding (pt 

appears chronically ill)


Eyes:  PERRL


Respiratory/Chest:  no accessory muscle use


Cardiovascular:  regular rate, rhythm, no murmur, + tachycardia


Abdomen:  normal bowel sounds, non tender, soft, no organomegaly


Neurologic/Psych:  alert, normal mood/affect, oriented x 3


Skin:  normal color





Laboratory Results





Last 24 Hours








Test


  1/25/18


10:46 1/25/18


11:10 1/25/18


16:26 1/25/18


16:29


 


Prothrombin Time 12.2 SECONDS    


 


Prothromb Time International


Ratio 1.2 


  


  


  


 


 


Activated Partial


Thromboplast Time 27.3 SECONDS 


  


  


  48.1 SECONDS 


 


 


Partial Thromboplastin Ratio 1.1    1.9 


 


Bedside Glucose  145 mg/dl  236 mg/dl  


 


Test


  1/25/18


20:23 1/26/18


05:00 


  


 


 


Bedside Glucose 228 mg/dl    


 


White Blood Count  24.03 K/uL   


 


Red Blood Count  2.63 M/uL   


 


Hemoglobin  8.3 g/dL   


 


Hematocrit  27.2 %   


 


Mean Corpuscular Volume  103.4 fL   


 


Mean Corpuscular Hemoglobin  31.6 pg   


 


Mean Corpuscular Hemoglobin


Concent 


  30.5 g/dl 


  


  


 


 


RDW Standard Deviation  59.1 fL   


 


RDW Coefficient of Variation  17.4 %   


 


Platelet Count  221 K/uL   


 


Mean Platelet Volume  10.0 fL   


 


Nucleated RBC Absolute Count


(auto) 


  0.07 K/uL 


  


  


 


 


Nucleated Red Blood Cells %  0.3 %   


 


Activated Partial


Thromboplast Time 


  53.2 SECONDS 


  


  


 


 


Partial Thromboplastin Ratio  2.0   


 


Sodium Level  137 mmol/L   


 


Potassium Level  3.9 mmol/L   


 


Chloride Level  102 mmol/L   


 


Carbon Dioxide Level  26 mmol/L   


 


Anion Gap  9.0 mmol/L   


 


Blood Urea Nitrogen  48 mg/dl   


 


Creatinine  7.30 mg/dl   


 


Est Creatinine Clear Calc


Drug Dose 


  9.6 ml/min 


  


  


 


 


Estimated GFR (


American) 


  6.5 


  


  


 


 


Estimated GFR (Non-


American 


  5.6 


  


  


 


 


BUN/Creatinine Ratio  6.6   


 


Random Glucose  134 mg/dl   


 


Calcium Level  8.4 mg/dl   


 


Lipase  355 U/L   











Assessment and Plan


57 year old female w/ ESRD on dialysis, aortic valve replacement on ASA, Plavix 

who is a transfer from [facility], on IV ABX for UTI, developed c.diff, now with 

numerous loose stools daily w/ report of black stools and BRBPR. She has 

abdominal pain, generalized but worse in bilateral lower quadrants. She has 

c.diff colitis, would recommend ruling out toxic megacolon with KUB. Her 

abdominal pain and blood in stool is likely secondary to her c.diff but 

ischemia is also in the differential. KUB w/ evidence of SBO, general surgery 

is following. Given SBO, GI added vancomycin enema QID yesterday to aid in 

c.diff coverage. Pt notes improvement of her GI symptoms, is now having formed 

stools without evidence of rectal bleeding





- Daily KUB and SBO management per general surgery


- Vancomycin 125 QID x 14 days


- Add IV Flagyl 500 mg TID, ok to DC at time of discharge


- Resume vancomycin enema 500 mg 4 times a day


   - Continue PRN


- No narcotics


- No antimotility agents 


- Can have Bentyl 10 mg TID for abdominal pain as needed


- Limit other antibiotic use


   - Appreciate ID recommendations 





- Outpatient Colonoscopy at appointment to be scheduled.





GI to sign off as pt is improving from a GI standpoint, with formed stools 

without evidence of ongoing rectal bleeding. Please call with any questions, 

concerns or acute changes. Thank you








I saw and evaluated the patient with Ms. Waddell.  The patient was admitted 

with multiple problems to include Clostridium difficile colitis.  She doesn't 

seem to be somewhat better today and is eating a regular meal during our 

evaluation.





Physical examination


No obvious distress


No abdominal tenderness noted on exam today





Impression: Patient with C. difficile colitis seems to be improving with use of 

her current regimen.





Recommendations


Continue metronidazole 3 times a day while an inpatient


Vancomycin 125 4 times a day continue for 14 days after finish all other 

antibiotics


Continue V have her continue the vancomycin enemas for another 48 hours


Please call with any questions or concerns, GI to sign off for the time being

## 2018-01-26 NOTE — INFECTIOUS DISEASE PROGRESS NT
Progress Note


Date of Service


Jan 26, 2018.





Subjective


Pt evaluation today including:  conversation w/ patient, conversation w/ family

, physical exam, chart review, lab review, review of studies, conversation w/ 

consultant, review of inpatient medication list


Patient offers no new complaints today.  Diarrhea about the same.  No increase 

in abdominal pain.  White count increased, remains afebrile.





   All Other Systems:  Reviewed and Negative





Medications





Current Inpatient Medications








 Medications


  (Trade)  Dose


 Ordered  Sig/Daniel


 Route  Start Time


 Stop Time Status Last Admin


Dose Admin


 


 Albuterol/


 Ipratropium


  (Combivent


 Respimat Inh)  1 puffs  QID


 INH  1/16/18 09:00


 2/15/18 08:59  1/26/18 17:34


1 PUFFS


 


 Miscellaneous


 Information


  (Order Awaiting


 Action)  1 ea  QS


 N/A  1/16/18 08:00


 2/15/18 07:59  1/16/18 08:21


1 EA


 


 Miscellaneous


 Information


  (Order Awaiting


 Action)  1 ea  QS


 N/A  1/16/18 08:00


 2/15/18 07:59  1/16/18 08:21


1 EA


 


 Miscellaneous


 Information


  (Consult


 Glycemic


 Management


 Pharmacy)  1 ea  UD  PRN


 N/A  1/16/18 04:15


 2/15/18 04:14   


 


 


 Glucose


  (Glucose 40% Gel)  15-30


 GRAMS 15


 GRAMS...  UD  PRN


 PO  1/16/18 04:30


 2/15/18 04:29   


 


 


 Glucose


  (Glucose Chew


 Tab)  4-8


 Tablets 4


 Tabl...  UD  PRN


 PO  1/16/18 04:30


 2/15/18 04:29   


 


 


 Dextrose


  (Dextrose 50%


 50ML Syringe)  25-50ML OF


 50% DW IV


 FOR...  UD  PRN


 IV  1/16/18 04:30


 2/15/18 04:29  1/17/18 09:52


25 ML


 


 Glucagon


  (Glucagon Inj)  1 mg  UD  PRN


 SQ  1/16/18 04:30


 2/15/18 04:29   


 


 


 Ondansetron HCl


  (Zofran Inj)  4 mg  Q4H  PRN


 IV  1/16/18 18:15


 2/15/18 18:14  1/19/18 12:15


4 MG


 


 Ertapenem 500 mg/


 Sodium Chloride  55 ml @ 


 110 mls/hr  DAILY@1800


 IV  1/18/18 18:00


 1/28/18 17:59  1/26/18 17:35


110 MLS/HR


 


 Vancomycin HCl


  (Vancomycin Oral


 Soln)  125 mg  Q6H


 PO  1/20/18 20:00


 2/3/18 19:59  1/26/18 13:41


125 MG


 


 Raspberry


  (Raspberry Syrup


 5ml Cup)  5 ml  Q6H


 PO  1/20/18 20:00


 2/3/18 19:59  1/26/18 13:41


5 ML


 


 Insulin Aspart


  (novoLOG ASPART)  **SLIDING


 SCALE**


 **G...  ACHS


 SC  1/22/18 07:00


 2/21/18 06:59  1/26/18 17:33


6 UNITS


 


 Metronidazole 500


 mg/Prmx  100 ml @ 


 100 mls/hr  Q8H


 IV  1/22/18 18:00


 2/1/18 17:59  1/26/18 17:35


100 MLS/HR


 


 Acetaminophen 650


 mg/Empty Bag  65 ml @ 


 260 mls/hr  Q6H  PRN


 IV  1/23/18 16:30


 2/22/18 16:29   


 


 


 Midodrine


  (Proamatine Tab)  2.5 mg  TID@08,12,17


 PO  1/24/18 17:30


 2/23/18 17:29  1/26/18 17:34


2.5 MG


 


 Azithromycin


  (Zithromax Tab)  250 mg  PM


 PO  1/25/18 21:00


 1/31/18 20:59  1/25/18 19:23


250 MG


 


 Heparin Sodium/


 Dextrose  500 ml @ 


 26 mls/hr  [id number] PRN


 IV  1/25/18 10:30


 2/24/18 10:29  1/25/18 10:36


26 MLS/HR


 


 Diltiazem HCl 125


 mg/Dextrose  125 ml @ 0


 mls/hr  Q0M PRN


 IV  1/25/18 12:45


 2/24/18 11:29   


 


 


 Pantoprazole


 Sodium


  (Protonix Tab)  40 mg  QAM


 PO  1/26/18 09:00


 2/25/18 08:59  1/26/18 08:43


40 MG


 


 Digoxin


  (Lanoxin Tab)  0.125 mg  MoWeFr@1600


 PO  1/26/18 10:00


 2/25/18 09:59  1/26/18 12:52


0.125 MG


 


 Levalbuterol


  (Xopenex 1.25MG/


 3ML Neb)  1.25 mg  Q6R


 INH  1/26/18 21:00


 2/25/18 20:59   


 











Objective


Vital Signs











  Date Time  Temp Pulse Resp B/P (MAP) Pulse Ox O2 Delivery O2 Flow Rate FiO2


 


1/26/18 16:00      Room Air 2.0 


 


1/26/18 15:39 36.8 103 22 90/57 (68) 95 Nasal Cannula 1.0 


 


1/26/18 12:52  102      


 


1/26/18 12:21 36.6 101  87/54 (65)    


 


1/26/18 12:15  100  82/57    


 


1/26/18 12:00      Room Air 2.0 


 


1/26/18 12:00  100  83/33    


 


1/26/18 11:45  98  81/51    


 


1/26/18 11:37 36.7 97 22 81/51 (61) 97 Nasal Cannula 4.0 


 


1/26/18 11:30  96  76/49    


 


1/26/18 11:15  96  81/52    


 


1/26/18 11:00  101  83/52    


 


1/26/18 10:45  97  81/51    


 


1/26/18 10:30  98  75/51    


 


1/26/18 10:15  95  78/51    


 


1/26/18 10:00  93  78/47    


 


1/26/18 09:45  95  86/50    


 


1/26/18 09:30  99  86/51    


 


1/26/18 09:16 36.6 95  82/49 (60)    


 


1/26/18 08:00      Room Air 2.0 


 


1/26/18 07:36 36.6 93 22 80/45 (57) 97   


 


1/26/18 04:30     95 Nasal Cannula 2.0 


 


1/26/18 03:25 36.4 62 18 86/52 (63) 95  2.0 


 


1/26/18 00:01     97 Nasal Cannula 2.0 


 


1/25/18 23:44 36.7 83 18 83/49 (60) 97  2.0 


 


1/25/18 21:46    74/43 (53)    


 


1/25/18 20:31 36.5 79 18 78/43 (55) 97 Nasal Cannula 2.0 


 


1/25/18 20:00      Nasal Cannula 2.0 











Physical Exam


General Appearance:  WD/WN, no apparent distress


Eyes:  normal inspection, EOMI, sclerae normal


ENT:  normal ENT inspection, pharynx normal


Neck:  supple, no adenopathy, thyroid normal, trachea midline


Respiratory/Chest:  chest non-tender, lungs clear, normal breath sounds, no 

respiratory distress


Cardiovascular:  regular rate, rhythm, no gallop, no murmur


Abdomen:  normal bowel sounds, non tender, soft, no organomegaly


Extremities:  non-tender, no calf tenderness, normal capillary refill


Neurologic/Psychiatric:  no motor/sensory deficits, alert, oriented x 3


Skin:  normal color, warm/dry, no rash


Lymphatic:  no adenopathy





Laboratory Results





Last 24 Hours








Test


  1/25/18


20:23 1/26/18


05:00 1/26/18


11:11 1/26/18


14:29


 


Bedside Glucose 228 mg/dl   124 mg/dl  


 


White Blood Count  24.03 K/uL   


 


Red Blood Count  2.63 M/uL   


 


Hemoglobin  8.3 g/dL   8.3 g/dL 


 


Hematocrit  27.2 %   27.4 % 


 


Mean Corpuscular Volume  103.4 fL   


 


Mean Corpuscular Hemoglobin  31.6 pg   


 


Mean Corpuscular Hemoglobin


Concent 


  30.5 g/dl 


  


  


 


 


RDW Standard Deviation  59.1 fL   


 


RDW Coefficient of Variation  17.4 %   


 


Platelet Count  221 K/uL   


 


Mean Platelet Volume  10.0 fL   


 


Nucleated RBC Absolute Count


(auto) 


  0.07 K/uL 


  


  


 


 


Nucleated Red Blood Cells %  0.3 %   


 


Activated Partial


Thromboplast Time 


  53.2 SECONDS 


  


  


 


 


Partial Thromboplastin Ratio  2.0   


 


Sodium Level  137 mmol/L   


 


Potassium Level  3.9 mmol/L   


 


Chloride Level  102 mmol/L   


 


Carbon Dioxide Level  26 mmol/L   


 


Anion Gap  9.0 mmol/L   


 


Blood Urea Nitrogen  48 mg/dl   


 


Creatinine  7.30 mg/dl   


 


Est Creatinine Clear Calc


Drug Dose 


  9.6 ml/min 


  


  


 


 


Estimated GFR (


American) 


  6.5 


  


  


 


 


Estimated GFR (Non-


American 


  5.6 


  


  


 


 


BUN/Creatinine Ratio  6.6   


 


Random Glucose  134 mg/dl   


 


Calcium Level  8.4 mg/dl   


 


Lipase  355 U/L   


 


Thyroid Stimulating Hormone


(TSH) 


  


  


  4.040 uIu/ml 


 


 


Test


  1/26/18


16:14 


  


  


 


 


Bedside Glucose 189 mg/dl    











Assessment and Plan


Urinary tract infection with ESBL producing E coli, as well as now C difficile 

colitis and possible small-bowel obstruction. .  Small bowel obstruction 

appears improved, no obvious source of increasing white blood cell count, 

wonder whether this is leukemoid reaction to previous infections.  Recommend 

following on present therapy for now, with repeat white blood cell count 

tomorrow.  Will follow.

## 2018-01-26 NOTE — CARDIOLOGY FOLLOW-UP
Subjective


General


Date of Service:


Jan 26, 2018.


Chief Complaint:  F/U PAF


Pt evaluation today including:  conversation w/ patient, physical exam, chart 

review, lab review, review of studies, review of inpatient medication list





History of Present Illness


Patient seen and examined. 


+ Cough. + Chest congestion. + Right ankle pain


No chest pain. No palpitations. Stable dyspnea. 





Telemetry: Atrial fibrillation/flutter status post spontaneous conversion to 

sinus tachycardia (currently ~ 100 bpm) around 18:00 on 1/25/2018.





Allergies


Coded Allergies:  


     Hydrocodone (Verified  Allergy, Unknown, unspecified, 12/27/17)


     Morphine (Verified  Allergy, Unknown, unspecified , 12/27/17)





Social History


Hx Tobacco Use In Past Year?:  No


Hx Alcohol Use - Type And Amou:  No


Hx Substance Use - Type And Am:  No





Physical Exam


Vital Signs





Last Vital Signs Documentation








  Date Time  Temp Pulse Resp B/P (MAP) Pulse Ox O2 Delivery O2 Flow Rate FiO2


 


1/26/18 07:36 36.6 93 22 80/45 (57) 97 Room Air  


 


1/26/18 04:30       2.0 











Physical Exam


Constitutional:  


   General Apperance:  obese


   Level of Distress:  acutely ill, chronically ill


Psychiatric:  


   Mental Status:  active & alert


   Orientation:  to time, to place, to person


   Memory:  recent memory normal, remote memory normal


Head:  normocephalic, atraumatic


Eyes:  


   Pupils:  PERRLA


Neck:  pertinent finding (Elevated JVP)


Lungs:  


   Auscultation:  deminished air movement, decreased breath sounds, expiratory 

wheezing, rhonchi, rales/crackles on the left, rales/crackles on the right


Cardiovascular:  


   Heart Auscultation:  normal S1, normal S2, no murmurs, tachycardia


Peripheral Pulses:  


   Radial Pulse:  normal on the left, normal on the right


   Dorsalis Pedis Pulse:  absent on the left, absent on the right


Abdomen:  


   Bowel Sounds:  normal


   Inspection & Palpation:  soft


Extremities:  no cyanosis, no clubbing, edema


Neurologic:  


   Cranial Nerves:  grossly intact





Assessment and Plan


Assessment and Plan


Presumed new onset, hemodynamically stable, atrial fibrillation/flutter with a 

rapid ventricular response status post spontaneous conversion back to sinus 

tachycardia. 


Symptomatic hypotension, on midodrine. 


Mitral valve stenosis


History of aortic valve disease status post aortic valve replacement with a 

bioprosthesis in 2016


No significant coronary artery disease via catheterization prior to aortic 

valve replacement, per patient report


Preserved left ventricular systolic function. 


End-stage renal disease, on hemodialysis


Obesity hypoventilation syndrome





RECOMMENDATIONS/PLAN: 


Symptomatic hypotension will not allow for beta-blocker or calcium channel 

blocker therapy.


Antiarrhythmic options are significantly limited given symptomatic hypotension, 

renal failure, what appears to be considerably pulmonary disease, etc. 


Trial digoxin 125 mcg only on Monday's, Wednesday's, and Friday's.


From a cardiac standpoint anticoagulation is clearly indicated. Use of NOAC's 

is contraindicated as this is valvular atrial fibrillation. 


Continue cautious trial of heparin. 


Hold off on adding Coumadin today. 





Cardiology attending:


Pt seen and examined, agree with findings and assessment as per Salomón MCKNIGHT. Pt spontaneously converted to sinus rhythm last PM. BP remains low. Very 

limited medication options for long term rhythm control. Will start a trial of 

digoxin. But renal failure, chronic pulmonary issues, and hypotension limit 

options. Anticoagulation is indicated from a cardiac standpoint if patient is 

able to tolerate it.





Laboratory Results





Last 24 Hours








Test


  1/25/18


10:46 1/25/18


11:10 1/25/18


16:26 1/25/18


16:29


 


Prothrombin Time 12.2 SECONDS    


 


Prothromb Time International


Ratio 1.2 


  


  


  


 


 


Activated Partial


Thromboplast Time 27.3 SECONDS 


  


  


  48.1 SECONDS 


 


 


Partial Thromboplastin Ratio 1.1    1.9 


 


Bedside Glucose  145 mg/dl  236 mg/dl  


 


Test


  1/25/18


20:23 1/26/18


05:00 


  


 


 


Bedside Glucose 228 mg/dl    


 


White Blood Count  24.03 K/uL   


 


Red Blood Count  2.63 M/uL   


 


Hemoglobin  8.3 g/dL   


 


Hematocrit  27.2 %   


 


Mean Corpuscular Volume  103.4 fL   


 


Mean Corpuscular Hemoglobin  31.6 pg   


 


Mean Corpuscular Hemoglobin


Concent 


  30.5 g/dl 


  


  


 


 


RDW Standard Deviation  59.1 fL   


 


RDW Coefficient of Variation  17.4 %   


 


Platelet Count  221 K/uL   


 


Mean Platelet Volume  10.0 fL   


 


Nucleated RBC Absolute Count


(auto) 


  0.07 K/uL 


  


  


 


 


Nucleated Red Blood Cells %  0.3 %   


 


Activated Partial


Thromboplast Time 


  53.2 SECONDS 


  


  


 


 


Partial Thromboplastin Ratio  2.0   


 


Sodium Level  137 mmol/L   


 


Potassium Level  3.9 mmol/L   


 


Chloride Level  102 mmol/L   


 


Carbon Dioxide Level  26 mmol/L   


 


Anion Gap  9.0 mmol/L   


 


Blood Urea Nitrogen  48 mg/dl   


 


Creatinine  7.30 mg/dl   


 


Est Creatinine Clear Calc


Drug Dose 


  9.6 ml/min 


  


  


 


 


Estimated GFR (


American) 


  6.5 


  


  


 


 


Estimated GFR (Non-


American 


  5.6 


  


  


 


 


BUN/Creatinine Ratio  6.6   


 


Random Glucose  134 mg/dl   


 


Calcium Level  8.4 mg/dl

## 2018-01-26 NOTE — NEPHROLOGY PROGRESS NOTE
Nephrology Progress Note


Date of Service:


Jan 26, 2018.


Subjective


58 yo female with ecoli uti/ cdiff / hypotension / uri.  pt had a sharp pain in 

the left flank this morning. continues to have mild tenderness in abdomen 

diffusely.  pt went into aflutter yesterday and converted on cardizem drip last 

night.





Objective











  Date Time  Temp Pulse Resp B/P (MAP) Pulse Ox O2 Delivery O2 Flow Rate FiO2


 


1/26/18 04:30     95 Nasal Cannula 2.0 


 


1/26/18 03:25 36.4 62 18 86/52 (63) 95  2.0 


 


1/26/18 00:01     97 Nasal Cannula 2.0 


 


1/25/18 23:44 36.7 83 18 83/49 (60) 97  2.0 


 


1/25/18 21:46    74/43 (53)    


 


1/25/18 20:31 36.5 79 18 78/43 (55) 97 Nasal Cannula 2.0 


 


1/25/18 20:00      Nasal Cannula 2.0 


 


1/25/18 16:00      Nasal Cannula 2.0 


 


1/25/18 16:00 36.3 86 18 89/54 (66) 99 Nasal Cannula 2.0 


 


1/25/18 14:45  82  92/58 (69) 99 Nasal Cannula 2.0 


 


1/25/18 14:00  90  88/60 (69)    


 


1/25/18 13:32  94  90/57 (68)    


 


1/25/18 13:13  89  87/56 (66)    


 


1/25/18 13:02  101  89/62 (71)    


 


1/25/18 12:40  100  88/58 (68)    


 


1/25/18 12:26  120  90/60 (70)    


 


1/25/18 12:22  115  95/62 (73)    


 


1/25/18 12:10  118  89/58 (68)    


 


1/25/18 12:05  120  98/54 (69)    


 


1/25/18 12:00      Nasal Cannula 2.0 


 


1/25/18 10:39 36.5 119 20 114/75 (88) 97 Nasal Cannula 2.0 


 


1/25/18 08:00      Nasal Cannula 2.0 








Physical Exam:


General-aaox3, obese


Eyes-no scleral icterus


ENT-mmm


Neck-supple


Lungs-rales and rhonchi


Heart-regular


Abdomen-mild diffuse abdominal pain


Extremities-no c/c/e


Neuro-nonfocal





Current Inpatient Medications








 Medications


  (Trade)  Dose


 Ordered  Sig/Daniel


 Route  Start Time


 Stop Time Status Last Admin


Dose Admin


 


 Albuterol/


 Ipratropium


  (Combivent


 Respimat Inh)  1 puffs  QID


 INH  1/16/18 09:00


 2/15/18 08:59  1/25/18 21:44


1 PUFFS


 


 Miscellaneous


 Information


  (Order Awaiting


 Action)  1 ea  QS


 N/A  1/16/18 08:00


 2/15/18 07:59  1/16/18 08:21


1 EA


 


 Miscellaneous


 Information


  (Order Awaiting


 Action)  1 ea  QS


 N/A  1/16/18 08:00


 2/15/18 07:59  1/16/18 08:21


1 EA


 


 Miscellaneous


 Information


  (Consult


 Glycemic


 Management


 Pharmacy)  1 ea  UD  PRN


 N/A  1/16/18 04:15


 2/15/18 04:14   


 


 


 Glucose


  (Glucose 40% Gel)  15-30


 GRAMS 15


 GRAMS...  UD  PRN


 PO  1/16/18 04:30


 2/15/18 04:29   


 


 


 Glucose


  (Glucose Chew


 Tab)  4-8


 Tablets 4


 Tabl...  UD  PRN


 PO  1/16/18 04:30


 2/15/18 04:29   


 


 


 Dextrose


  (Dextrose 50%


 50ML Syringe)  25-50ML OF


 50% DW IV


 FOR...  UD  PRN


 IV  1/16/18 04:30


 2/15/18 04:29  1/17/18 09:52


25 ML


 


 Glucagon


  (Glucagon Inj)  1 mg  UD  PRN


 SQ  1/16/18 04:30


 2/15/18 04:29   


 


 


 Ondansetron HCl


  (Zofran Inj)  4 mg  Q4H  PRN


 IV  1/16/18 18:15


 2/15/18 18:14  1/19/18 12:15


4 MG


 


 Ertapenem 500 mg/


 Sodium Chloride  55 ml @ 


 110 mls/hr  DAILY@1800


 IV  1/18/18 18:00


 1/28/18 17:59  1/25/18 18:03


110 MLS/HR


 


 Vancomycin HCl


  (Vancomycin Oral


 Soln)  125 mg  Q6H


 PO  1/20/18 20:00


 2/3/18 19:59  1/26/18 02:03


125 MG


 


 Raspberry


  (Raspberry Syrup


 5ml Cup)  5 ml  Q6H


 PO  1/20/18 20:00


 2/3/18 19:59  1/26/18 02:02


5 ML


 


 Insulin Aspart


  (novoLOG ASPART)  **SLIDING


 SCALE**


 **G...  ACHS


 SC  1/22/18 07:00


 2/21/18 06:59  1/25/18 21:43


3 UNITS


 


 Ioversol


  (Optiray 320)  100 ml  UD  PRN


 IV  1/22/18 13:45


 1/26/18 13:44   


 


 


 Metronidazole 500


 mg/Prmx  100 ml @ 


 100 mls/hr  Q8H


 IV  1/22/18 18:00


 2/1/18 17:59  1/26/18 02:04


100 MLS/HR


 


 Acetaminophen 650


 mg/Empty Bag  65 ml @ 


 260 mls/hr  Q6H  PRN


 IV  1/23/18 16:30


 2/22/18 16:29   


 


 


 Midodrine


  (Proamatine Tab)  2.5 mg  TID@08,12,17


 PO  1/24/18 17:30


 2/23/18 17:29  1/25/18 18:02


2.5 MG


 


 Azithromycin


  (Zithromax Tab)  250 mg  PM


 PO  1/25/18 21:00


 1/31/18 20:59  1/25/18 19:23


250 MG


 


 Heparin Sodium/


 Dextrose  500 ml @ 


 26 mls/hr  [id number] PRN


 IV  1/25/18 10:30


 2/24/18 10:29  1/25/18 10:36


26 MLS/HR


 


 Diltiazem HCl 125


 mg/Dextrose  125 ml @ 0


 mls/hr  Q0M PRN


 IV  1/25/18 12:45


 2/24/18 11:29   


 


 


 Vancomycin HCl


  (Vancomycin


 Enema)  500 mg  QID


 SD  1/25/18 17:00


 2/4/18 16:59  1/25/18 19:16


500 MG


 


 Pantoprazole


 Sodium


  (Protonix Tab)  40 mg  QAM


 PO  1/26/18 09:00


 2/25/18 08:59   


 











Last 24 Hours








Test


  1/25/18


10:46 1/25/18


11:10 1/25/18


16:26 1/25/18


16:29


 


Prothrombin Time 12.2 SECONDS    


 


Prothromb Time International


Ratio 1.2 


  


  


  


 


 


Activated Partial


Thromboplast Time 27.3 SECONDS 


  


  


  48.1 SECONDS 


 


 


Partial Thromboplastin Ratio 1.1    1.9 


 


Bedside Glucose  145 mg/dl  236 mg/dl  


 


Test


  1/25/18


20:23 1/26/18


05:00 1/26/18


07:09 


 


 


Bedside Glucose 228 mg/dl    


 


Activated Partial


Thromboplast Time 


  53.2 SECONDS 


  


  


 


 


Partial Thromboplastin Ratio  2.0   











Assessment & Plan


ESRD-difficult situation with the chronically low blood pressures.  pt now with 

wheeze and rales and need to remove fluid on her.  pt though recently went into 

aflutter yesterday and has low blood pressures.  will attempt two liters fluid 

removal as tolerated to help with her overall volume status. 





Anemia of Renal Failure-hg goal of 10 to 11 and will redose procrit today.  





Hypotension: bp continues to be in the systolics of 80s.  workup negative so 

far.  on midodrine 2.5mg po tid and may need to titrate up the dose.  

cosyntropin test negative. tsh good. appropriately treated infections.  ct of 

abdomen relatively benign.

## 2018-01-26 NOTE — DIAGNOSTIC IMAGING REPORT
KUB



CLINICAL HISTORY: abd pain, cdiff pain. Nausea.



COMPARISON STUDY:  1/24/2018 



FINDINGS: Several air-filled loops of small bowel. Mild nonobstructive ileus.



Prior cholecystectomy. Unremarkable colonic pattern.



IMPRESSION:  Mild nonobstructive small bowel ileus. No significant bowel

distention. Improved pattern compared to 1/24/2018 













The above report was generated using voice recognition software.  It may contain

grammatical, syntax or spelling errors.







Electronically signed by:  Salomón Heck M.D.

1/26/2018 2:05 PM



Dictated Date/Time:  1/26/2018 2:04 PM

## 2018-01-26 NOTE — PROGRESS NOTE
Medicine Progress Note


Date & Time of Visit:


Jan 26, 2018 at 18:42.


Subjective


-pt states she feels slightly better


-tired after HD today


-denies abdominal pain


-reports 4 BMs


-reports improvement in diarrhea.





Objective





Last 8 Hrs








  Date Time  Temp Pulse Resp B/P (MAP) Pulse Ox O2 Delivery O2 Flow Rate FiO2


 


1/26/18 16:00      Room Air 2.0 


 


1/26/18 15:39 36.8 103 22 90/57 (68) 95 Nasal Cannula 1.0 


 


1/26/18 12:52  102      


 


1/26/18 12:21 36.6 101  87/54 (65)    


 


1/26/18 12:15  100  82/57    


 


1/26/18 12:00      Room Air 2.0 


 


1/26/18 12:00  100  83/33    


 


1/26/18 11:45  98  81/51    


 


1/26/18 11:37 36.7 97 22 81/51 (61) 97 Nasal Cannula 4.0 


 


1/26/18 11:30  96  76/49    


 


1/26/18 11:15  96  81/52    


 


1/26/18 11:00  101  83/52    


 


1/26/18 10:45  97  81/51    








Physical Exam:


GEN: obese, in no acute distress, alert and appropriate, ill-appearing


HEENT: NC/AT, PERRL, normal sclerae, MMM


CARDIO: reg rate, S1/2 heard without m/g/r, HD in place in L anterior chest 

wall and appears clean and intact with no redness.  


LUNGS: coarse rhonchi more at bases today-some wheezing present that is mild-

appears to be worse since yesterday


ABD: soft, TTP worse in upper abdomen-unchanged in last two days, nondistended, 

+BS


EXTREMITY: RP and DP palpable 2+ bilat, no LE swelling or edema, extremities 

are warm and well-perfused


NEURO: CN 2-12 grossly intact


MUSC: generalized weakness but no gross focal deficits, moves extremities 

equally.


SKIN:  warm and dry


Laboratory Results:


1/26/18 05:00








1/26/18 14:29








1/26/18 05:00

















Test


  1/16/18


02:55 1/16/18


03:37 1/16/18


03:50 1/16/18


15:00


 


Creatine Kinase MB


  1.4 ng/ml


(0.5-3.6) 


  


  


 


 


Creatine Kinase MB Ratio  (0-3.0)    


 


Troponin I


  0.329 ng/ml


(0-0.045) 


  


  


 


 


Globulin


  3.6 gm/dl


(2.5-4.0) 


  


  


 


 


Albumin/Globulin Ratio 0.6 (0.9-2)    


 


Procalcitonin


  4.56 ng/ml


(0-0.5) 


  


  


 


 


Blood Gas Sample Site  L Brachial   


 


Bedside Blood Gas pH (LAB)


  


  7.38


(7.35-7.45) 


  


 


 


Bedside Blood Gas pCO2 (LAB)


  


  33 mmHg


(35-46) 


  


 


 


Bedside Blood Gas pO2 (LAB)


  


  76 mmHg


(80-95) 


  


 


 


Bedside Blood Gas HCO3 (LAB)


  


  19 meq/L


(19-24) 


  


 


 


Bedside Blood Gas Total CO2


  


  20 mEq/l


(24-31) 


  


 


 


Bedside Blood Gas Base Excess


(LAB) 


  -6.0 meq/L


(-9-1.8) 


  


 


 


Bedside Blood Gas O2


Saturation 


  95.0 % (90-95) 


  


  


 


 


Kiko Test  Pass   


 


Oxygen Delivery Device  Room Air   


 


Influenza Type A (RT-PCR)


  


  


  Neg for Influ


A (NEG) 


 


 


Influenza Type B (RT-PCR)


  


  


  Neg for Influ


B (NEG) 


 


 


Urine Color    DK YELLOW 


 


Urine Appearance    TURBID (CLEAR) 


 


Urine pH    5.5 (4.5-7.5) 


 


Urine Specific Gravity


  


  


  


  1.021


(1.000-1.030)


 


Urine Protein    3+ (NEG) 


 


Urine Glucose (UA)    TRACE (NEG) 


 


Urine Ketones    TRACE (NEG) 


 


Urine Occult Blood    3+ (NEG) 


 


Urine Nitrite    POS (NEG) 


 


Urine Bilirubin    NEG (NEG) 


 


Urine Urobilinogen    NEG (NEG) 


 


Urine Leukocyte Esterase    LARGE (NEG) 


 


Urine WBC (Auto)    >30 /hpf (0-5) 


 


Urine RBC (Auto)


  


  


  


  5-10 /hpf


(0-4)


 


Urine Hyaline Casts (Auto)    1-5 /lpf (0-5) 


 


Urine Epithelial Cells (Auto)    >30 /lpf (0-5) 


 


Urine Bacteria (Auto)    1+ (NEG) 


 


Urine Pathogenic Casts     /lpf (0) 


 


Urine Yeast (Auto)     (NONE PRSENT) 


 


Test


  1/17/18


05:34 1/17/18


09:43 1/18/18


05:20 1/20/18


01:39


 


Estimated Average Glucose 140 mg/dl    


 


Hemoglobin A1c


  6.5 %


(4.5-5.6) 


  


  


 


 


Total Bilirubin


  


  0.8 mg/dl


(0.2-1) 


  


 


 


Direct Bilirubin


  


  0.1 mg/dl


(0-0.2) 


  


 


 


Aspartate Amino Transf


(AST/SGOT) 


  11 U/L (15-37) 


  


  


 


 


Alanine Aminotransferase


(ALT/SGPT) 


  11 U/L (12-78) 


  


  


 


 


Alkaline Phosphatase


  


  115 U/L


() 


  


 


 


Total Protein


  


  6.2 gm/dl


(6.4-8.2) 


  


 


 


Albumin


  


  2.2 gm/dl


(3.4-5.0) 


  


 


 


Amylase Level


  


  22 U/L


() 


  


 


 


Random Vancomycin Level   21.9 mcg/ml  


 


Red Blood Cell Morphology    Unremarkable 


 


Test


  1/22/18


09:00 1/22/18


23:10 1/23/18


06:04 1/24/18


12:40


 


Stool Occult Blood


  POSITIVE


(NEGATIVE) 


  


  


 


 


Lactic Acid Level


  


  1.0 mmol/L


(0.4-2.0) 


  


 


 


Chemistry Specimen Hemolysis     


 


Cortisol Response to


Stimulation 


  


  


  Cortisol


Baseline


 


Test


  1/25/18


05:28 1/25/18


10:46 1/26/18


05:00 1/26/18


14:29


 


Immature Granulocyte % (Auto) 7.3 %    


 


White Blood Count


  18.20 K/uL


(4.8-10.8) 


  


  


 


 


Red Blood Count


  2.89 M/uL


(4.2-5.4) 


  2.63 M/uL


(4.2-5.4) 


 


 


Hemoglobin


  9.1 g/dL


(12.0-16.0) 


  


  


 


 


Hematocrit 29.3 % (37-47)    


 


Mean Corpuscular Volume


  101.4 fL


() 


  103.4 fL


() 


 


 


Mean Corpuscular Hemoglobin


  31.5 pg


(25-34) 


  31.6 pg


(25-34) 


 


 


Mean Corpuscular Hemoglobin


Concent 31.1 g/dl


(32-36) 


  30.5 g/dl


(32-36) 


 


 


Platelet Count


  176 K/uL


(130-400) 


  


  


 


 


Mean Platelet Volume


  9.6 fL


(7.4-10.4) 


  10.0 fL


(7.4-10.4) 


 


 


Neutrophils (%) (Auto) 79.7 %    


 


Lymphocytes (%) (Auto) 7.6 %    


 


Monocytes (%) (Auto) 4.6 %    


 


Eosinophils (%) (Auto) 0.6 %    


 


Basophils (%) (Auto) 0.2 %    


 


Neutrophils # (Auto)


  15.41 K/uL


(1.4-6.5) 


  


  


 


 


Lymphocytes # (Auto)


  1.46 K/uL


(1.2-3.4) 


  


  


 


 


Monocytes # (Auto)


  0.88 K/uL


(0.11-0.59) 


  


  


 


 


Eosinophils # (Auto)


  0.11 K/uL


(0-0.5) 


  


  


 


 


Basophils # (Auto)


  0.04 K/uL


(0-0.2) 


  


  


 


 


Immature Granulocyte # (Auto)


  1.41 K/uL


(0.00-0.02) 


  


  


 


 


Toxic Granulation 1+    


 


Toxic Vacuolation 1+    


 


Polychromasia 1+    


 


Basophilic Stippling OCCASIONAL    


 


Phosphorus Level


  4.4 mg/dl


(2.5-4.9) 


  


  


 


 


Magnesium Level


  2.0 mg/dl


(1.8-2.4) 


  


  


 


 


Prothrombin Time


  


  12.2 SECONDS


(9.0-12.0) 


  


 


 


Prothromb Time International


Ratio 


  1.2 (0.9-1.1) 


  


  


 


 


RDW Standard Deviation


  


  


  59.1 fL


(36.4-46.3) 


 


 


RDW Coefficient of Variation


  


  


  17.4 %


(11.5-14.5) 


 


 


Nucleated RBC Absolute Count


(auto) 


  


  0.07 K/uL


(0-0) 


 


 


Nucleated Red Blood Cells %   0.3 %  


 


Activated Partial


Thromboplast Time 


  


  53.2 SECONDS


(21.0-31.0) 


 


 


Partial Thromboplastin Ratio   2.0  


 


Anion Gap


  


  


  9.0 mmol/L


(3-11) 


 


 


Est Creatinine Clear Calc


Drug Dose 


  


  9.6 ml/min 


  


 


 


Estimated GFR (


American) 


  


  6.5 


  


 


 


Estimated GFR (Non-


American 


  


  5.6 


  


 


 


BUN/Creatinine Ratio   6.6 (10-20)  


 


Calcium Level


  


  


  8.4 mg/dl


(8.5-10.1) 


 


 


Lipase


  


  


  355 U/L


() 


 


 


Thyroid Stimulating Hormone


(TSH) 


  


  


  4.040 uIu/ml


(0.300-4.500)


 


Test


  1/26/18


20:11 


  


  


 


 


Bedside Glucose


  132 mg/dl


(70-90) 


  


  


 














 Date/Time


Source Procedure


Growth Status


 


 


 1/16/18 04:23


Blood Blood Culture - Final


NO GROWTH Complete


 


 1/16/18 00:00


Nasal MRSA DNA Surveillance Screen - Final


Specimen Positive for MRSA by DNA Probe Complete


 


 1/20/18 15:30


Stool C.difficile Toxin B Gene (PCR) - Final


Positive for C. difficile toxin B gene Complete


 


 1/16/18 05:22


Urine , Clean Catch Urine Culture - Final


Escherichia Coli Complete








Last 24 Hours








Test


  1/25/18


20:23 1/26/18


05:00 1/26/18


11:11 1/26/18


14:29


 


Bedside Glucose 228 mg/dl   124 mg/dl  


 


White Blood Count  24.03 K/uL   


 


Red Blood Count  2.63 M/uL   


 


Hemoglobin  8.3 g/dL   8.3 g/dL 


 


Hematocrit  27.2 %   27.4 % 


 


Mean Corpuscular Volume  103.4 fL   


 


Mean Corpuscular Hemoglobin  31.6 pg   


 


Mean Corpuscular Hemoglobin


Concent 


  30.5 g/dl 


  


  


 


 


RDW Standard Deviation  59.1 fL   


 


RDW Coefficient of Variation  17.4 %   


 


Platelet Count  221 K/uL   


 


Mean Platelet Volume  10.0 fL   


 


Nucleated RBC Absolute Count


(auto) 


  0.07 K/uL 


  


  


 


 


Nucleated Red Blood Cells %  0.3 %   


 


Activated Partial


Thromboplast Time 


  53.2 SECONDS 


  


  


 


 


Partial Thromboplastin Ratio  2.0   


 


Sodium Level  137 mmol/L   


 


Potassium Level  3.9 mmol/L   


 


Chloride Level  102 mmol/L   


 


Carbon Dioxide Level  26 mmol/L   


 


Anion Gap  9.0 mmol/L   


 


Blood Urea Nitrogen  48 mg/dl   


 


Creatinine  7.30 mg/dl   


 


Est Creatinine Clear Calc


Drug Dose 


  9.6 ml/min 


  


  


 


 


Estimated GFR (


American) 


  6.5 


  


  


 


 


Estimated GFR (Non-


American 


  5.6 


  


  


 


 


BUN/Creatinine Ratio  6.6   


 


Random Glucose  134 mg/dl   


 


Calcium Level  8.4 mg/dl   


 


Lipase  355 U/L   


 


Thyroid Stimulating Hormone


(TSH) 


  


  


  4.040 uIu/ml 


 


 


Test


  1/26/18


16:14 


  


  


 


 


Bedside Glucose 189 mg/dl    








Diagnostic Imaging:


[~ rep ct add3]]


KUB





CLINICAL HISTORY: abd pain, cdiff pain. Nausea.





COMPARISON STUDY:  1/24/2018 





FINDINGS: Several air-filled loops of small bowel. Mild nonobstructive ileus.





Prior cholecystectomy. Unremarkable colonic pattern.





IMPRESSION:  Mild nonobstructive small bowel ileus. No significant bowel


distention. Improved pattern compared to 1/24/2018





Assessment & Plan


58 yo F presented with hypotension after dialysis.  She was transferred to [facility] 

from [facility] and was admitted to the ICU.  She did have some cough productive 

of greenish phlegm for two weeks and was started on empiric abx upon transfer 

from [facility] on 1/16.  She also consistently mentioned some abdominal pain.  

Successive KUBs were performed revealing no convincing evidence for 

obstruction. No acute pulmonary process was noted on CXR and she was saturating 

well on room air. Flu was negatigve and she was started on empiric treatment 

with Vanc and Zosyn.  She was put on a pressor and midodrine and abx were 

subsequently changed to Primaxin to treat an ESBL-producing E coli in her 

urine. Blood cultures were negative and all lines appear clean.  ID was 

consulted.  She developed c-diff colitis and was placed on Vanc and Flagyl. She 

was transferred out of the ICU on 1/18 as she was off pressors and midodrine, 

maintaining her BP.  CT a/p revealed a partial SBO and she was made NPO on 1/ 22.  Gen Surg was consulted and recommended against surgery at this time unless 

she declined clinically.  No NGT was placed so that she could continue to 

receive the PO Vanc to treat her C-diff.  GI was consulted and is assisting 

with c-diff management.  They added a Vanc enema QID on 1/23.  The patient 

denies any nausea today and had two loose BMs.  She is still having a 

productive greenish cough and denies fevers.  She is fatigued from HD today.  

She otherwise is tolerating PO, and mentating well.  She reports some 

improvement in her abdomen today but still has pain.  She reports 4 BMs today.





1/24:  CT chest was performed in the setting of persistent hypoxia, rhonchi on 

exam and cough productive of greenish sputum.  On review of her abx, she is on 

Vanc PO, Vanc enema, Flagyl IV and Ertapenem.  Microbes not covered include 

Pseudomonas, MRSA, and atypicals such as Mycoplasma.  CT chest did not reveal 

an infiltrate but clinical picture resembles a bronchitis.  Azithromycin was 

added to cover atypicals.  Other cause of cough may be fluid overload as seen 

on CT.  This is being managed through HD.  Midodrine was started for BP 

management at a low dose.  Cosyntropin stim test was negative for adrenal 

insufficiency.  KUB did not show progression of megacolon


1/25:  Pt went into atrial flutter overnight around 7pm.  BP is around the same 

on the midodrine.  Pt feels poorly-fatigued with no worsening SOB or chest pain 

at this time.  Apathetic to food.  HR has been in the 120-130s overnight.  Per 

nurse no further blood in stools and vanc enemas stopped by GI as stools are 

more formed.  Cough still present-one dose azithro given last night and plan to 

cont 5 day course.  Diltiazem 10mg IV ordered and heparin drip with Kranthi daniels 

4.  Cardiology consulted. Leukocytosis increased.  Poss 2/2 cosyntropin stim 

test yesterday.  She denies any fevers or chills and appears improved overall 

today with an improvement in her abdominal exam. 


1/26:  WBC up to 24 K-discussed with ID, uncertain etiology, poss leukomoid 

reaction.  Monitor in am.  Pt appears the same and is ill-appearing but 

reporting feeling somewhat better.  Denies abdominal pain and KUB now eveals no 

obstruction.  Tolerating reg diet.  Pain to abdominal palpation.  HR is 

controlled and she is being transitioned to coumadin with Cards placing her on 

digoxin. Remains in good spirits.  Fatigued after HD today-removed 2L to 

optimize volume status.  Still coughing up junk and lungs sound poor.  Adding 

bronchodilator and flutter valve.  Remains hypotensive-on low dose midodrine. 





1.  Hypotension


2.  ESBL E coli UTI-Ertapenem


3.  C-diff colitis-Vanc PO, Flagyl IV, Vanc enema added today (1/23)


4.  ESRD with fluid overload-on HD, does not make urine. Cont per Nephro


5.  Hypoxia 2/2 fluid overload in setting of possible infectious bronchitis.  

Added azithromycin. Not on oxygen at home-fluid management in HD, pt has been 

stable on this amount since admission.  Will cont to try to wean. 


6  SBO-resolved.  KUB today reveals resolution of the obstruction.  


7.  Atrial flutter-anticoauglation with heparin with close H/H monitoring.  

Digoxin per Cards.  Rate controlled. 


8.  Leukocytosis-apprec ID input.  Recheck in am.  Avoid adding more empiric 

therapy in setting of cdiff colitis.


9.  Anemia--FOBT positive early in hospital course but normal stools for 

several days.   No debra blood ever noted, but darker color noted by nursing 

staff.  Has multiple comorbidities likely contributing to her anemia including 

ESRD and cirrhosis.  Will monitor CBC closely while on heparin drip.  Currently 

stable and no transfusion required.


10.  Cirrhosis-will need outpatient assessment with GI or PCP.


11.  DMII-at goal


12.  Obesity


 


Full Code


DVT proph-heparin


Dispo-uncertain at this time, cont tele. 





Nisha Escobar DO


[facility] Hospitalist


Consultants:


Nephro-Oncu


Nicole


Current Inpatient Medications:





Current Inpatient Medications








 Medications


  (Trade)  Dose


 Ordered  Sig/Daniel


 Route  Start Time


 Stop Time Status Last Admin


Dose Admin


 


 Albuterol/


 Ipratropium


  (Combivent


 Respimat Inh)  1 puffs  QID


 INH  1/16/18 09:00


 2/15/18 08:59  1/26/18 17:34


1 PUFFS


 


 Miscellaneous


 Information


  (Order Awaiting


 Action)  1 ea  QS


 N/A  1/16/18 08:00


 2/15/18 07:59  1/16/18 08:21


1 EA


 


 Miscellaneous


 Information


  (Order Awaiting


 Action)  1 ea  QS


 N/A  1/16/18 08:00


 2/15/18 07:59  1/16/18 08:21


1 EA


 


 Miscellaneous


 Information


  (Consult


 Glycemic


 Management


 Pharmacy)  1 ea  UD  PRN


 N/A  1/16/18 04:15


 2/15/18 04:14   


 


 


 Glucose


  (Glucose 40% Gel)  15-30


 GRAMS 15


 GRAMS...  UD  PRN


 PO  1/16/18 04:30


 2/15/18 04:29   


 


 


 Glucose


  (Glucose Chew


 Tab)  4-8


 Tablets 4


 Tabl...  UD  PRN


 PO  1/16/18 04:30


 2/15/18 04:29   


 


 


 Dextrose


  (Dextrose 50%


 50ML Syringe)  25-50ML OF


 50% DW IV


 FOR...  UD  PRN


 IV  1/16/18 04:30


 2/15/18 04:29  1/17/18 09:52


25 ML


 


 Glucagon


  (Glucagon Inj)  1 mg  UD  PRN


 SQ  1/16/18 04:30


 2/15/18 04:29   


 


 


 Ondansetron HCl


  (Zofran Inj)  4 mg  Q4H  PRN


 IV  1/16/18 18:15


 2/15/18 18:14  1/19/18 12:15


4 MG


 


 Ertapenem 500 mg/


 Sodium Chloride  55 ml @ 


 110 mls/hr  DAILY@1800


 IV  1/18/18 18:00


 1/28/18 17:59  1/26/18 17:35


110 MLS/HR


 


 Vancomycin HCl


  (Vancomycin Oral


 Soln)  125 mg  Q6H


 PO  1/20/18 20:00


 2/3/18 19:59  1/26/18 13:41


125 MG


 


 Raspberry


  (Raspberry Syrup


 5ml Cup)  5 ml  Q6H


 PO  1/20/18 20:00


 2/3/18 19:59  1/26/18 13:41


5 ML


 


 Insulin Aspart


  (novoLOG ASPART)  **SLIDING


 SCALE**


 **G...  ACHS


 SC  1/22/18 07:00


 2/21/18 06:59  1/26/18 17:33


6 UNITS


 


 Metronidazole 500


 mg/Prmx  100 ml @ 


 100 mls/hr  Q8H


 IV  1/22/18 18:00


 2/1/18 17:59  1/26/18 17:35


100 MLS/HR


 


 Acetaminophen 650


 mg/Empty Bag  65 ml @ 


 260 mls/hr  Q6H  PRN


 IV  1/23/18 16:30


 2/22/18 16:29   


 


 


 Midodrine


  (Proamatine Tab)  2.5 mg  TID@08,12,17


 PO  1/24/18 17:30


 2/23/18 17:29  1/26/18 17:34


2.5 MG


 


 Azithromycin


  (Zithromax Tab)  250 mg  PM


 PO  1/25/18 21:00


 1/31/18 20:59  1/25/18 19:23


250 MG


 


 Heparin Sodium/


 Dextrose  500 ml @ 


 26 mls/hr  [id number] PRN


 IV  1/25/18 10:30


 2/24/18 10:29  1/25/18 10:36


26 MLS/HR


 


 Diltiazem HCl 125


 mg/Dextrose  125 ml @ 0


 mls/hr  Q0M PRN


 IV  1/25/18 12:45


 2/24/18 11:29   


 


 


 Vancomycin HCl


  (Vancomycin


 Enema)  500 mg  QID


 MS  1/25/18 17:00


 2/4/18 16:59  1/26/18 17:30


500 MG


 


 Pantoprazole


 Sodium


  (Protonix Tab)  40 mg  QAM


 PO  1/26/18 09:00


 2/25/18 08:59  1/26/18 08:43


40 MG


 


 Digoxin


  (Lanoxin Tab)  0.125 mg  MoWeFr@1600


 PO  1/26/18 10:00


 2/25/18 09:59  1/26/18 12:52


0.125 MG


 


 Levalbuterol


  (Xopenex 1.25MG/


 3ML Neb)  1.25 mg  Q6R


 INH  1/26/18 21:00


 2/25/18 20:59

## 2018-01-26 NOTE — SURGERY PROGRESS NOTE
Surgery Progress Note


Date of Service


Jan 26, 2018.





Subjective


had toast for breakfast, no nausea, no BM today





Objective


Vital Signs:











  Date Time  Temp Pulse Resp B/P (MAP) Pulse Ox O2 Delivery O2 Flow Rate FiO2


 


1/26/18 11:45  98  54/53    


 


1/26/18 11:37 36.7 97 22 81/51 (61) 97 Nasal Cannula 4.0 


 


1/26/18 11:30  96  76/49    


 


1/26/18 11:15  96  81/52    


 


1/26/18 11:00  101  83/52    


 


1/26/18 10:45  97  81/51    


 


1/26/18 10:30  98  75/51    


 


1/26/18 10:15  95  78/51    


 


1/26/18 10:00  93  78/47    


 


1/26/18 09:45  95  86/50    


 


1/26/18 09:30  99  86/51    


 


1/26/18 09:16 36.6 95  82/49 (60)    


 


1/26/18 08:00      Room Air 2.0 


 


1/26/18 07:36 36.6 93 22 80/45 (57) 97   


 


1/26/18 04:30     95 Nasal Cannula 2.0 


 


1/26/18 03:25 36.4 62 18 86/52 (63) 95  2.0 


 


1/26/18 00:01     97 Nasal Cannula 2.0 


 


1/25/18 23:44 36.7 83 18 83/49 (60) 97  2.0 


 


1/25/18 21:46    74/43 (53)    


 


1/25/18 20:31 36.5 79 18 78/43 (55) 97 Nasal Cannula 2.0 


 


1/25/18 20:00      Nasal Cannula 2.0 


 


1/25/18 16:00      Nasal Cannula 2.0 


 


1/25/18 16:00 36.3 86 18 89/54 (66) 99 Nasal Cannula 2.0 


 


1/25/18 14:45  82  92/58 (69) 99 Nasal Cannula 2.0 


 


1/25/18 14:00  90  88/60 (69)    


 


1/25/18 13:32  94  90/57 (68)    


 


1/25/18 13:13  89  87/56 (66)    


 


1/25/18 13:02  101  89/62 (71)    


 


1/25/18 12:40  100  88/58 (68)    


 


1/25/18 12:26  120  90/60 (70)    


 


1/25/18 12:22  115  95/62 (73)    


 


1/25/18 12:10  118  89/58 (68)    








Abdomen:  non tender, non distended, soft


Laboratory Results:





Results Past 24 Hours








Test


  1/25/18


16:26 1/25/18


16:29 1/25/18


20:23 1/26/18


05:00 Range/Units


 


 


Bedside Glucose 236  228  70-90  mg/dl


 


Activated Partial


Thromboplast Time 


  48.1


  


  53.2


  21.0-31.0


SECONDS


 


Partial Thromboplastin Ratio  1.9  2.0  


 


White Blood Count    24.03 4.8-10.8  K/uL


 


Red Blood Count    2.63 4.2-5.4  M/uL


 


Hemoglobin    8.3 12.0-16.0  g/dL


 


Hematocrit    27.2 37-47  %


 


Mean Corpuscular Volume    103.4   fL


 


Mean Corpuscular Hemoglobin    31.6 25-34  pg


 


Mean Corpuscular Hemoglobin


Concent 


  


  


  30.5


  32-36  g/dl


 


 


RDW Standard Deviation    59.1 36.4-46.3  fL


 


RDW Coefficient of Variation    17.4 11.5-14.5  %


 


Platelet Count    221 130-400  K/uL


 


Mean Platelet Volume    10.0 7.4-10.4  fL


 


Nucleated RBC Absolute Count


(auto) 


  


  


  0.07


  0-0  K/uL


 


 


Nucleated Red Blood Cells %    0.3  %


 


Sodium Level    137 136-145  mmol/L


 


Potassium Level    3.9 3.5-5.1  mmol/L


 


Chloride Level    102   mmol/L


 


Carbon Dioxide Level    26 21-32  mmol/L


 


Anion Gap    9.0 3-11  mmol/L


 


Blood Urea Nitrogen    48 7-18  mg/dl


 


Creatinine


  


  


  


  7.30


  0.60-1.20


mg/dl


 


Est Creatinine Clear Calc


Drug Dose 


  


  


  9.6


   ml/min


 


 


Estimated GFR (


American) 


  


  


  6.5


   


 


 


Estimated GFR (Non-


American 


  


  


  5.6


   


 


 


BUN/Creatinine Ratio    6.6 10-20  


 


Random Glucose    134 70-99  mg/dl


 


Calcium Level    8.4 8.5-10.1  mg/dl


 


Lipase    355   U/L


 


Test


  1/26/18


11:11 1/26/18


12:00 


  


  Range/Units


 


 


Bedside Glucose 124    70-90  mg/dl











Assessment & Plan


C. diff colitis


PSBO, resolving


   Abdomen remains soft. Tolerating diet. Remains on IV flagyl, po vanco.

## 2018-01-26 NOTE — PHARMACY PROGRESS NOTE
Pharmacy Glycemic Short Note 2


Date of Service


Jan 26, 2018.


OUTPATIENT ANTIDIABETIC REGIMEN: 


* NPH 40 units SQ daily when taking prednisone (otherwise no insulin taken)





ASSESSMENT:


* Patient has not required any basal insulin since steroids were stopped a 

couple of days ago.


* BSGs had been stable with Novolog coverage only, therefore parameters were 

loosened and carb coverage was stopped on 1/24 to reduce the risk of 

hypoglycemia. 


* Meal-time BSGs elevated yesterday (145, 236, 228 mg/dL). Will add back a 

conservative carb ratio at this time.








PLAN FOR INPATIENT GLYCEMIC CONTROL:


* Correctional Insulin with NOVOLOG per scale ACHS


 * Goal Range:  Low 120 mg/dL - High 150 mg/dL


 * Correction Factor: 30 mg/dL/unit


 * Nutritional / Prandial insulin:  1 units for every 15 grams of carb








PLAN FOR DISCHARGE:


* A1c indicated good outpatient glycemic control.


* Expect that patient may be discharged on previous home regimen.





* Please note that the plan above was derived based on current level of insulin 

resistance and hospital stress. These recommendations are appropriate for 

inpatient admission only. Plan of care upon discharge will need to be 

reassessed to avoid potential outpatient hypo/hyperglycemia. 








Thank you.

## 2018-01-27 VITALS — SYSTOLIC BLOOD PRESSURE: 96 MMHG | HEART RATE: 107 BPM | OXYGEN SATURATION: 95 % | DIASTOLIC BLOOD PRESSURE: 62 MMHG

## 2018-01-27 VITALS
HEART RATE: 122 BPM | SYSTOLIC BLOOD PRESSURE: 100 MMHG | DIASTOLIC BLOOD PRESSURE: 61 MMHG | OXYGEN SATURATION: 95 % | TEMPERATURE: 98.42 F

## 2018-01-27 VITALS
HEART RATE: 103 BPM | DIASTOLIC BLOOD PRESSURE: 57 MMHG | TEMPERATURE: 97.34 F | OXYGEN SATURATION: 98 % | SYSTOLIC BLOOD PRESSURE: 87 MMHG

## 2018-01-27 VITALS
OXYGEN SATURATION: 93 % | TEMPERATURE: 98.06 F | SYSTOLIC BLOOD PRESSURE: 87 MMHG | HEART RATE: 134 BPM | DIASTOLIC BLOOD PRESSURE: 54 MMHG

## 2018-01-27 VITALS
HEART RATE: 130 BPM | SYSTOLIC BLOOD PRESSURE: 87 MMHG | DIASTOLIC BLOOD PRESSURE: 54 MMHG | TEMPERATURE: 98.06 F | OXYGEN SATURATION: 93 %

## 2018-01-27 VITALS — OXYGEN SATURATION: 97 % | HEART RATE: 107 BPM

## 2018-01-27 VITALS — TEMPERATURE: 100.58 F

## 2018-01-27 VITALS — HEART RATE: 101 BPM | DIASTOLIC BLOOD PRESSURE: 62 MMHG | SYSTOLIC BLOOD PRESSURE: 96 MMHG | OXYGEN SATURATION: 95 %

## 2018-01-27 VITALS
TEMPERATURE: 97.88 F | OXYGEN SATURATION: 93 % | SYSTOLIC BLOOD PRESSURE: 81 MMHG | HEART RATE: 130 BPM | DIASTOLIC BLOOD PRESSURE: 63 MMHG

## 2018-01-27 VITALS — TEMPERATURE: 99.68 F

## 2018-01-27 VITALS — HEART RATE: 116 BPM | DIASTOLIC BLOOD PRESSURE: 56 MMHG | SYSTOLIC BLOOD PRESSURE: 92 MMHG

## 2018-01-27 VITALS — SYSTOLIC BLOOD PRESSURE: 91 MMHG | OXYGEN SATURATION: 100 % | HEART RATE: 121 BPM | DIASTOLIC BLOOD PRESSURE: 63 MMHG

## 2018-01-27 VITALS — OXYGEN SATURATION: 96 % | HEART RATE: 99 BPM

## 2018-01-27 LAB
BASOPHILS # BLD: 0.03 K/UL (ref 0–0.2)
BASOPHILS NFR BLD: 0.2 %
BUN SERPL-MCNC: 27 MG/DL (ref 7–18)
CALCIUM SERPL-MCNC: 8.4 MG/DL (ref 8.5–10.1)
CO2 SERPL-SCNC: 29 MMOL/L (ref 21–32)
CREAT SERPL-MCNC: 5.56 MG/DL (ref 0.6–1.2)
EOS ABS #: 0.25 K/UL (ref 0–0.5)
EOSINOPHIL NFR BLD AUTO: 235 K/UL (ref 130–400)
FLUAV RNA SPEC QL NAA+PROBE: (no result)
FLUBV RNA SPEC QL NAA+PROBE: (no result)
GLUCOSE SERPL-MCNC: 154 MG/DL (ref 70–99)
HCT VFR BLD CALC: 30.3 % (ref 37–47)
HGB BLD-MCNC: 9.2 G/DL (ref 12–16)
IG#: 0.43 K/UL (ref 0–0.02)
IMM GRANULOCYTES NFR BLD AUTO: 9.8 %
LYMPHOCYTES # BLD: 1.73 K/UL (ref 1.2–3.4)
MCH RBC QN AUTO: 31.6 PG (ref 25–34)
MCHC RBC AUTO-ENTMCNC: 30.4 G/DL (ref 32–36)
MCV RBC AUTO: 104.1 FL (ref 80–100)
MONO ABS #: 0.99 K/UL (ref 0.11–0.59)
MONOCYTES NFR BLD: 5.6 %
NEUT ABS #: 14.22 K/UL (ref 1.4–6.5)
NEUTROPHILS # BLD AUTO: 1.4 %
NEUTROPHILS NFR BLD AUTO: 80.6 %
NRBC BLD AUTO-RTO: 0.2 %
NUCLEATED RED BLOOD CELL ABS: 0.03 K/UL (ref 0–0)
PHOSPHATE SERPL-MCNC: 4.9 MG/DL (ref 2.5–4.9)
PMV BLD AUTO: 9.8 FL (ref 7.4–10.4)
POTASSIUM SERPL-SCNC: 3.4 MMOL/L (ref 3.5–5.1)
PTT PATIENT: 59.2 SECONDS (ref 21–31)
RED CELL DISTRIBUTION WIDTH CV: 17.9 % (ref 11.5–14.5)
RED CELL DISTRIBUTION WIDTH SD: 60.7 FL (ref 36.4–46.3)
SODIUM SERPL-SCNC: 136 MMOL/L (ref 136–145)
WBC # BLD AUTO: 17.65 K/UL (ref 4.8–10.8)

## 2018-01-27 RX ADMIN — VANCOMYCIN HYDROCHLORIDE SCH MG: 1 INJECTION, POWDER, LYOPHILIZED, FOR SOLUTION INTRAVENOUS at 21:34

## 2018-01-27 RX ADMIN — METRONIDAZOLE SCH MLS/HR: 500 INJECTION, SOLUTION INTRAVENOUS at 09:14

## 2018-01-27 RX ADMIN — VANCOMYCIN HYDROCHLORIDE SCH MG: 1 INJECTION, POWDER, LYOPHILIZED, FOR SOLUTION INTRAVENOUS at 09:15

## 2018-01-27 RX ADMIN — AZITHROMYCIN SCH MG: 250 TABLET, FILM COATED ORAL at 21:35

## 2018-01-27 RX ADMIN — ACETAMINOPHEN PRN MLS/HR: 10 INJECTION, SOLUTION INTRAVENOUS at 11:47

## 2018-01-27 RX ADMIN — VANCOMYCIN HYDROCHLORIDE SCH MG: 1 INJECTION, POWDER, LYOPHILIZED, FOR SOLUTION INTRAVENOUS at 11:53

## 2018-01-27 RX ADMIN — MIDODRINE HYDROCHLORIDE SCH MG: 2.5 TABLET ORAL at 09:14

## 2018-01-27 RX ADMIN — VANCOMYCIN HYDROCHLORIDE SCH MG: 1 INJECTION, POWDER, LYOPHILIZED, FOR SOLUTION INTRAVENOUS at 16:24

## 2018-01-27 RX ADMIN — INSULIN GLARGINE SCH UNITS: 100 INJECTION, SOLUTION SUBCUTANEOUS at 21:36

## 2018-01-27 RX ADMIN — ALUMINUM ZIRCONIUM TRICHLOROHYDREX GLY SCH EA: 0.2 STICK TOPICAL at 16:00

## 2018-01-27 RX ADMIN — ERTAPENEM SODIUM SCH MLS/HR: 1 INJECTION, POWDER, LYOPHILIZED, FOR SOLUTION INTRAMUSCULAR; INTRAVENOUS at 18:37

## 2018-01-27 RX ADMIN — INSULIN ASPART SCH UNITS: 100 INJECTION, SOLUTION INTRAVENOUS; SUBCUTANEOUS at 21:37

## 2018-01-27 RX ADMIN — ALUMINUM ZIRCONIUM TRICHLOROHYDREX GLY SCH EA: 0.2 STICK TOPICAL at 09:13

## 2018-01-27 RX ADMIN — Medication SCH ML: at 02:59

## 2018-01-27 RX ADMIN — PANTOPRAZOLE SCH MG: 40 TABLET, DELAYED RELEASE ORAL at 09:15

## 2018-01-27 RX ADMIN — HEPARIN SODIUM PRN MLS/HR: 5000 INJECTION, SOLUTION INTRAVENOUS at 23:09

## 2018-01-27 RX ADMIN — Medication SCH ML: at 09:14

## 2018-01-27 RX ADMIN — INSULIN ASPART SCH UNITS: 100 INJECTION, SOLUTION INTRAVENOUS; SUBCUTANEOUS at 07:00

## 2018-01-27 RX ADMIN — ALUMINUM ZIRCONIUM TRICHLOROHYDREX GLY SCH EA: 0.2 STICK TOPICAL at 08:00

## 2018-01-27 RX ADMIN — LEVALBUTEROL HYDROCHLORIDE SCH MG: 1.25 SOLUTION RESPIRATORY (INHALATION) at 08:04

## 2018-01-27 RX ADMIN — INSULIN ASPART SCH UNITS: 100 INJECTION, SOLUTION INTRAVENOUS; SUBCUTANEOUS at 11:58

## 2018-01-27 RX ADMIN — HEPARIN SODIUM PRN MLS/HR: 5000 INJECTION, SOLUTION INTRAVENOUS at 05:11

## 2018-01-27 RX ADMIN — METRONIDAZOLE SCH MLS/HR: 500 INJECTION, SOLUTION INTRAVENOUS at 17:57

## 2018-01-27 RX ADMIN — AMIODARONE HYDROCHLORIDE SCH MLS/HR: 1.8 INJECTION, SOLUTION INTRAVENOUS at 17:54

## 2018-01-27 RX ADMIN — MIDODRINE HYDROCHLORIDE SCH MG: 2.5 TABLET ORAL at 11:54

## 2018-01-27 RX ADMIN — Medication SCH ML: at 13:53

## 2018-01-27 RX ADMIN — IPRATROPIUM BROMIDE AND ALBUTEROL SCH PUFFS: 20; 100 SPRAY, METERED RESPIRATORY (INHALATION) at 09:15

## 2018-01-27 RX ADMIN — METRONIDAZOLE SCH MLS/HR: 500 INJECTION, SOLUTION INTRAVENOUS at 02:59

## 2018-01-27 RX ADMIN — METHYLPREDNISOLONE SODIUM SUCCINATE SCH MLS/MIN: 1 INJECTION, POWDER, FOR SOLUTION INTRAMUSCULAR; INTRAVENOUS at 21:35

## 2018-01-27 RX ADMIN — LEVALBUTEROL HYDROCHLORIDE SCH MG: 1.25 SOLUTION RESPIRATORY (INHALATION) at 14:16

## 2018-01-27 RX ADMIN — METHYLPREDNISOLONE SODIUM SUCCINATE SCH MLS/MIN: 1 INJECTION, POWDER, FOR SOLUTION INTRAMUSCULAR; INTRAVENOUS at 13:54

## 2018-01-27 RX ADMIN — INSULIN ASPART SCH UNITS: 100 INJECTION, SOLUTION INTRAVENOUS; SUBCUTANEOUS at 16:42

## 2018-01-27 RX ADMIN — LEVALBUTEROL HYDROCHLORIDE SCH MG: 1.25 SOLUTION RESPIRATORY (INHALATION) at 19:02

## 2018-01-27 RX ADMIN — Medication SCH ML: at 21:34

## 2018-01-27 RX ADMIN — MIDODRINE HYDROCHLORIDE SCH MG: 2.5 TABLET ORAL at 16:39

## 2018-01-27 RX ADMIN — VANCOMYCIN HYDROCHLORIDE SCH MG: 1 INJECTION, POWDER, LYOPHILIZED, FOR SOLUTION INTRAVENOUS at 13:53

## 2018-01-27 RX ADMIN — ALUMINUM ZIRCONIUM TRICHLOROHYDREX GLY SCH EA: 0.2 STICK TOPICAL at 00:00

## 2018-01-27 RX ADMIN — VANCOMYCIN HYDROCHLORIDE SCH MG: 1 INJECTION, POWDER, LYOPHILIZED, FOR SOLUTION INTRAVENOUS at 02:59

## 2018-01-27 RX ADMIN — LEVALBUTEROL HYDROCHLORIDE SCH MG: 1.25 SOLUTION RESPIRATORY (INHALATION) at 01:51

## 2018-01-27 NOTE — PHARMACY PROGRESS NOTE
Pharmacy Glycemic Short Note 2


Date of Service


Jan 27, 2018.





OUTPATIENT ANTIDIABETIC REGIMEN: 


* NPH 40 units SQ daily when taking prednisone (otherwise no insulin taken)





ASSESSMENT:





1/27/18


* Lantus 5 units given this am for elevated fasting BSG of 149 mg/dL


* BSG over the past 24 hours: 134, 124, 189, 132


* Patient started on IV steroids this afternoon for COPD exacerbation - will 

tighten CF/CR based on previous data when patient given steroids


* Will also add an order for Lantus BID per scale based on weight





1/26/18:


* Patient has not required any basal insulin since steroids were stopped a 

couple of days ago.


* BSGs had been stable with Novolog coverage only, therefore parameters were 

loosened and carb coverage was stopped on 1/24 to reduce the risk of 

hypoglycemia. 


* Meal-time BSGs elevated yesterday (145, 236, 228 mg/dL). Will add back a 

conservative carb ratio at this time.





PLAN FOR INPATIENT GLYCEMIC CONTROL:





* Basal Insulin


 * Lantus 5 units SQ this am


 * Lantus SQ per scale HS (9 units for BSG less than 140, 15 units for -

180, 20 units for BSG > 180)


 


* Bolus Insulin - tighten


 * NOVOLOG per scale ACHS


 * Goal Range:  Low 120 mg/dL - High 150 mg/dL


 * Correction Factor: 20 mg/dL/unit


 * Nutritional / Prandial insulin:  1 units for every 7 grams of carb


 * Add overnight checks





PLAN FOR DISCHARGE:


* A1c indicated good outpatient glycemic control.


* Expect that patient may be discharged on previous home regimen.





Thank you.

## 2018-01-27 NOTE — PROGRESS NOTE
DATE: 01/27/2018

 

FOLLOWUP VISIT

 

SUBJECTIVE:  The patient is a 57-year-old who has had a lengthy hospital

admission.  She was admitted with an UTI and has C. difficile colitis, being

followed by infectious disease.  She has end-stage renal disease and is on

hemodialysis.  From a cardiac point, she had an aortic valve replacement with

a bioprosthesis and also due to ongoing dialysis calcification of her

mitral annulus causing some mitral stenosis.  The patient has developed

atrial arrhythmias and is currently in atrial flutter with heart rates in the

130-140 range.  This patient states she just does not feel well.  She appears

to be flushed and I would not be surprised if she is febrile.

 

OBJECTIVE:

GENERAL:  She is alert and oriented.

VITAL SIGNS:  Rectal temperature is 38 degrees centigrade, pulse is irregular

at 130 beats per minute, and blood pressure is 85/60.

HEENT:  She is normocephalic.  Pupils are equal and reactive to light. 

Extraocular muscles are intact bilaterally.

NECK:  The neck veins are flat.  Carotids have good upstrokes bilaterally

without bruits.  Thyroid is nonpalpable.

RESPIRATORY:  Breath sounds equal bilaterally and clear to auscultation.

CARDIOVASCULAR:  Heart has an irregular rhythm.  There are no cardiac rubs or

murmurs.

GASTROINTESTINAL:  Abdomen is soft and nontender without organomegaly.

EXTREMITIES:  Free of edema, digit clubbing, or cyanosis.

NEUROLOGIC:  Grossly intact.

SKIN:  Warm to touch.

LYMPH NODES:  Negative to palpation.

 

LABORATORY DATA:  WBC count is 17 and hemoglobin is 9.2.  Potassium is 3.4.

 

IMPRESSION:

1.  Febrile illness.

2.  Urinary tract infection and Clostridium difficile.

3.  Atrial flutter with rapid ventricular response.

4.  History of a bioprosthetic aortic valve and mitral stenosis due to

calcified annulus of the mitral valve.

 

RECOMMENDATIONS:  The patient has been treated with digoxin 3 times a week,

which obviously is not effective at present.  This patient appears to be

toxic with sepsis and this is contributing to her tachycardia.  She is

hypotensive, requiring midodrine to maintain her blood pressure.  Medications

such as diltiazem or beta blocker would be contraindicated.  At this point, I

would stop the digoxin.  I will add an IV infusion of amiodarone for rate 
control which will not affect her blood pressure.  Treating

her underlying sepsis would certainly benefit her atrial arrhythmias.

 

 

 

 

[facility]

## 2018-01-27 NOTE — PROGRESS NOTE
Medicine Progress Note


Date & Time of Visit:


Jan 27, 2018 at 12:29.


Subjective


WBC down to 17K but has a rectal temp of 38.  Feels worse overall today, 

continues to cough up greenish sputum which is pending culture.  Discussed the 

case with the ICU physician as she continues to be hypotensive.  We reviewed 

her scans together and decided to try solumedrol for COPD exacerbation and 

monitor her closely.  She remains on Azithro, Ertapenem, Vanc PO, Vanc enema 

and Flagyl IV.  She reports one formed BM today and an improvement in her 

abdominal pain, which is also improved somewhat on exam.  She is still very ill 

appearing.  Will repeat Blood cultures on her now in setting of fever.  She 

does have a prosthestic valve.  Tolerating PO but has no appetite.





Objective





Last 8 Hrs








  Date Time  Temp Pulse Resp B/P (MAP) Pulse Ox O2 Delivery O2 Flow Rate FiO2


 


1/27/18 11:52  116  92/56 (68)    


 


1/27/18 11:28 38.1       


 


1/27/18 11:00 36.6 130 20 81/63 (69) 93 Nasal Cannula 4.0 


 


1/27/18 08:00      Nasal Cannula 2.0 


 


1/27/18 07:56 36.9 122 22 100/61 (74) 95 Nasal Cannula 2.0 


 


1/27/18 06:37 36.7 134 22  93 Nasal Cannula  








Physical Exam:


GEN: obese, in no acute distress, alert and appropriate, rpm-[id number] 

worse than yesterday.


HEENT: NC/AT, normal sclerae, MMM


CARDIO: tachy rate, S1/2 heard without m/g/r, HD in place in L anterior chest 

wall and appears clean and intact with no redness.  


LUNGS: coarse rhonchi throughout-again appears worse than yesterday


ABD: soft, TTP in upper abdomen-improved today.


EXTREMITY: RP and DP palpable 2+ bilat, no LE swelling or edema, extremities 

are warm and well-perfused


NEURO: CN 2-12 grossly intact


MUSC: generalized weakness but no gross focal deficits, moves extremities 

equally.


SKIN:  warm and dry


Laboratory Results:


1/27/18 05:14








Red Blood Count 2.91, Mean Corpuscular Volume 104.1, Mean Corpuscular 

Hemoglobin 31.6, Mean Corpuscular Hemoglobin Concent 30.4, Mean Platelet Volume 

9.8, Neutrophils (%) (Auto) 80.6, Lymphocytes (%) (Auto) 9.8, Monocytes (%) (

Auto) 5.6, Eosinophils (%) (Auto) 1.4, Basophils (%) (Auto) 0.2, Neutrophils # (

Auto) 14.22, Lymphocytes # (Auto) 1.73, Monocytes # (Auto) 0.99, Eosinophils # (

Auto) 0.25, Basophils # (Auto) 0.03





1/27/18 05:14

















Test


  1/16/18


02:55 1/16/18


03:37 1/16/18


15:00 1/17/18


05:34


 


Creatine Kinase MB


  1.4 ng/ml


(0.5-3.6) 


  


  


 


 


Creatine Kinase MB Ratio  (0-3.0)    


 


Troponin I


  0.329 ng/ml


(0-0.045) 


  


  


 


 


Globulin


  3.6 gm/dl


(2.5-4.0) 


  


  


 


 


Albumin/Globulin Ratio 0.6 (0.9-2)    


 


Procalcitonin


  4.56 ng/ml


(0-0.5) 


  


  


 


 


Blood Gas Sample Site  L Brachial   


 


Bedside Blood Gas pH (LAB)


  


  7.38


(7.35-7.45) 


  


 


 


Bedside Blood Gas pCO2 (LAB)


  


  33 mmHg


(35-46) 


  


 


 


Bedside Blood Gas pO2 (LAB)


  


  76 mmHg


(80-95) 


  


 


 


Bedside Blood Gas HCO3 (LAB)


  


  19 meq/L


(19-24) 


  


 


 


Bedside Blood Gas Total CO2


  


  20 mEq/l


(24-31) 


  


 


 


Bedside Blood Gas Base Excess


(LAB) 


  -6.0 meq/L


(-9-1.8) 


  


 


 


Bedside Blood Gas O2


Saturation 


  95.0 % (90-95) 


  


  


 


 


Kiko Test  Pass   


 


Oxygen Delivery Device  Room Air   


 


Urine Color   DK YELLOW  


 


Urine Appearance   TURBID (CLEAR)  


 


Urine pH   5.5 (4.5-7.5)  


 


Urine Specific Gravity


  


  


  1.021


(1.000-1.030) 


 


 


Urine Protein   3+ (NEG)  


 


Urine Glucose (UA)   TRACE (NEG)  


 


Urine Ketones   TRACE (NEG)  


 


Urine Occult Blood   3+ (NEG)  


 


Urine Nitrite   POS (NEG)  


 


Urine Bilirubin   NEG (NEG)  


 


Urine Urobilinogen   NEG (NEG)  


 


Urine Leukocyte Esterase   LARGE (NEG)  


 


Urine WBC (Auto)   >30 /hpf (0-5)  


 


Urine RBC (Auto)


  


  


  5-10 /hpf


(0-4) 


 


 


Urine Hyaline Casts (Auto)   1-5 /lpf (0-5)  


 


Urine Epithelial Cells (Auto)   >30 /lpf (0-5)  


 


Urine Bacteria (Auto)   1+ (NEG)  


 


Urine Pathogenic Casts    /lpf (0)  


 


Urine Yeast (Auto)    (NONE PRSENT)  


 


Estimated Average Glucose    140 mg/dl 


 


Hemoglobin A1c


  


  


  


  6.5 %


(4.5-5.6)


 


Test


  1/17/18


09:43 1/18/18


05:20 1/20/18


01:39 1/22/18


09:00


 


Total Bilirubin


  0.8 mg/dl


(0.2-1) 


  


  


 


 


Direct Bilirubin


  0.1 mg/dl


(0-0.2) 


  


  


 


 


Aspartate Amino Transf


(AST/SGOT) 11 U/L (15-37) 


  


  


  


 


 


Alanine Aminotransferase


(ALT/SGPT) 11 U/L (12-78) 


  


  


  


 


 


Alkaline Phosphatase


  115 U/L


() 


  


  


 


 


Total Protein


  6.2 gm/dl


(6.4-8.2) 


  


  


 


 


Albumin


  2.2 gm/dl


(3.4-5.0) 


  


  


 


 


Amylase Level


  22 U/L


() 


  


  


 


 


Random Vancomycin Level  21.9 mcg/ml   


 


Red Blood Cell Morphology   Unremarkable  


 


Stool Occult Blood


  


  


  


  POSITIVE


(NEGATIVE)


 


Test


  1/22/18


23:10 1/23/18


06:04 1/24/18


12:40 1/25/18


05:28


 


Lactic Acid Level


  1.0 mmol/L


(0.4-2.0) 


  


  


 


 


Chemistry Specimen Hemolysis     


 


Cortisol Response to


Stimulation 


  


  Cortisol


Baseline 


 


 


Toxic Granulation    1+ 


 


Toxic Vacuolation    1+ 


 


Polychromasia    1+ 


 


Basophilic Stippling    OCCASIONAL 


 


Test


  1/25/18


10:46 1/26/18


05:00 1/26/18


14:29 1/27/18


05:14


 


Prothrombin Time


  12.2 SECONDS


(9.0-12.0) 


  


  


 


 


Prothromb Time International


Ratio 1.2 (0.9-1.1) 


  


  


  


 


 


Lipase


  


  355 U/L


() 


  


 


 


Thyroid Stimulating Hormone


(TSH) 


  


  4.040 uIu/ml


(0.300-4.500) 


 


 


White Blood Count


  


  


  


  17.65 K/uL


(4.8-10.8)


 


Red Blood Count


  


  


  


  2.91 M/uL


(4.2-5.4)


 


Hemoglobin


  


  


  


  9.2 g/dL


(12.0-16.0)


 


Hematocrit    30.3 % (37-47) 


 


Mean Corpuscular Volume


  


  


  


  104.1 fL


()


 


Mean Corpuscular Hemoglobin


  


  


  


  31.6 pg


(25-34)


 


Mean Corpuscular Hemoglobin


Concent 


  


  


  30.4 g/dl


(32-36)


 


Platelet Count


  


  


  


  235 K/uL


(130-400)


 


Mean Platelet Volume


  


  


  


  9.8 fL


(7.4-10.4)


 


Neutrophils (%) (Auto)    80.6 % 


 


Lymphocytes (%) (Auto)    9.8 % 


 


Monocytes (%) (Auto)    5.6 % 


 


Eosinophils (%) (Auto)    1.4 % 


 


Basophils (%) (Auto)    0.2 % 


 


Neutrophils # (Auto)


  


  


  


  14.22 K/uL


(1.4-6.5)


 


Lymphocytes # (Auto)


  


  


  


  1.73 K/uL


(1.2-3.4)


 


Monocytes # (Auto)


  


  


  


  0.99 K/uL


(0.11-0.59)


 


Eosinophils # (Auto)


  


  


  


  0.25 K/uL


(0-0.5)


 


Basophils # (Auto)


  


  


  


  0.03 K/uL


(0-0.2)


 


RDW Standard Deviation


  


  


  


  60.7 fL


(36.4-46.3)


 


RDW Coefficient of Variation


  


  


  


  17.9 %


(11.5-14.5)


 


Immature Granulocyte % (Auto)    2.4 % 


 


Immature Granulocyte # (Auto)


  


  


  


  0.43 K/uL


(0.00-0.02)


 


Nucleated RBC Absolute Count


(auto) 


  


  


  0.03 K/uL


(0-0)


 


Nucleated Red Blood Cells %    0.2 % 


 


Activated Partial


Thromboplast Time 


  


  


  59.2 SECONDS


(21.0-31.0)


 


Partial Thromboplastin Ratio    2.3 


 


Anion Gap


  


  


  


  6.0 mmol/L


(3-11)


 


Est Creatinine Clear Calc


Drug Dose 


  


  


  12.6 ml/min 


 


 


Estimated GFR (


American) 


  


  


  9.0 


 


 


Estimated GFR (Non-


American 


  


  


  7.7 


 


 


BUN/Creatinine Ratio    4.9 (10-20) 


 


Calcium Level


  


  


  


  8.4 mg/dl


(8.5-10.1)


 


Phosphorus Level


  


  


  


  4.9 mg/dl


(2.5-4.9)


 


Magnesium Level


  


  


  


  1.9 mg/dl


(1.8-2.4)


 


Digoxin Level


  


  


  


  0.4 ng/ml


(0.8-2.0)


 


Test


  1/27/18


11:26 1/27/18


12:37 


  


 


 


Bedside Glucose


  185 mg/dl


(70-90) 


  


  


 














 Date/Time


Source Procedure


Growth Status


 


 


 1/27/18 12:32


Blood Blood Culture


Pending Ordered


 


 1/16/18 00:00


Nasal MRSA DNA Surveillance Screen - Final


Specimen Positive for MRSA by DNA Probe Complete


 


 1/20/18 15:30


Stool C.difficile Toxin B Gene (PCR) - Final


Positive for C. difficile toxin B gene Complete





 1/27/18 03:45


Sputum Expectorated Sputum Gram Stain - Final Resulted


 


 1/27/18 03:45


Sputum Expectorated Sputum Sputum Culture


Pending Resulted


 


 1/16/18 05:22


Urine , Clean Catch Urine Culture - Final


Escherichia Coli Complete








Last 24 Hours








Test


  1/26/18


14:29 1/26/18


16:14 1/26/18


20:11 1/27/18


05:14


 


Hemoglobin 8.3 g/dL    9.2 g/dL 


 


Hematocrit 27.4 %    30.3 % 


 


Thyroid Stimulating Hormone


(TSH) 4.040 uIu/ml 


  


  


  


 


 


Bedside Glucose  189 mg/dl  132 mg/dl  


 


White Blood Count    17.65 K/uL 


 


Red Blood Count    2.91 M/uL 


 


Mean Corpuscular Volume    104.1 fL 


 


Mean Corpuscular Hemoglobin    31.6 pg 


 


Mean Corpuscular Hemoglobin


Concent 


  


  


  30.4 g/dl 


 


 


Platelet Count    235 K/uL 


 


Mean Platelet Volume    9.8 fL 


 


Neutrophils (%) (Auto)    80.6 % 


 


Lymphocytes (%) (Auto)    9.8 % 


 


Monocytes (%) (Auto)    5.6 % 


 


Eosinophils (%) (Auto)    1.4 % 


 


Basophils (%) (Auto)    0.2 % 


 


Neutrophils # (Auto)    14.22 K/uL 


 


Lymphocytes # (Auto)    1.73 K/uL 


 


Monocytes # (Auto)    0.99 K/uL 


 


Eosinophils # (Auto)    0.25 K/uL 


 


Basophils # (Auto)    0.03 K/uL 


 


RDW Standard Deviation    60.7 fL 


 


RDW Coefficient of Variation    17.9 % 


 


Immature Granulocyte % (Auto)    2.4 % 


 


Immature Granulocyte # (Auto)    0.43 K/uL 


 


Nucleated RBC Absolute Count


(auto) 


  


  


  0.03 K/uL 


 


 


Nucleated Red Blood Cells %    0.2 % 


 


Activated Partial


Thromboplast Time 


  


  


  59.2 SECONDS 


 


 


Partial Thromboplastin Ratio    2.3 


 


Sodium Level    136 mmol/L 


 


Potassium Level    3.4 mmol/L 


 


Chloride Level    101 mmol/L 


 


Carbon Dioxide Level    29 mmol/L 


 


Anion Gap    6.0 mmol/L 


 


Blood Urea Nitrogen    27 mg/dl 


 


Creatinine    5.56 mg/dl 


 


Est Creatinine Clear Calc


Drug Dose 


  


  


  12.6 ml/min 


 


 


Estimated GFR (


American) 


  


  


  9.0 


 


 


Estimated GFR (Non-


American 


  


  


  7.7 


 


 


BUN/Creatinine Ratio    4.9 


 


Random Glucose    154 mg/dl 


 


Calcium Level    8.4 mg/dl 


 


Phosphorus Level    4.9 mg/dl 


 


Magnesium Level    1.9 mg/dl 


 


Digoxin Level    0.4 ng/ml 


 


Test


  1/27/18


06:47 1/27/18


11:26 


  


 


 


Bedside Glucose 149 mg/dl  185 mg/dl   














 Date/Time


Source Procedure


Growth Status


 


 


 1/27/18 03:45


Sputum Expectorated Sputum Gram Stain - Final Resulted


 


 1/27/18 03:45


Sputum Expectorated Sputum Sputum Culture


Pending Resulted











Assessment & Plan


58 yo F presented with hypotension after dialysis.  She was transferred to [facility] 

from [facility] and was admitted to the ICU.  She did have some cough productive 

of greenish phlegm for two weeks and was started on empiric abx upon transfer 

from [facility] on 1/16.  She also consistently mentioned some abdominal pain.  

Successive KUBs were performed revealing no convincing evidence for 

obstruction. No acute pulmonary process was noted on CXR and she was saturating 

well on room air. Flu was negatigve and she was started on empiric treatment 

with Vanc and Zosyn.  She was put on a pressor and midodrine and abx were 

subsequently changed to Primaxin to treat an ESBL-producing E coli in her 

urine. Blood cultures were negative and all lines appear clean.  ID was 

consulted.  She developed c-diff colitis and was placed on Vanc and Flagyl. She 

was transferred out of the ICU on 1/18 as she was off pressors and midodrine, 

maintaining her BP.  CT a/p revealed a partial SBO and she was made NPO on 1/ 22.  Gen Surg was consulted and recommended against surgery at this time unless 

she declined clinically.  No NGT was placed so that she could continue to 

receive the PO Vanc to treat her C-diff.  GI was consulted and is assisting 

with c-diff management.  They added a Vanc enema QID on 1/23.  The patient 

denies any nausea today and had two loose BMs.  She is still having a 

productive greenish cough and denies fevers.  She is fatigued from HD today.  

She otherwise is tolerating PO, and mentating well.  She reports some 

improvement in her abdomen today but still has pain.  She reports 4 BMs today.





1/24:  CT chest was performed in the setting of persistent hypoxia, rhonchi on 

exam and cough productive of greenish sputum.  On review of her abx, she is on 

Vanc PO, Vanc enema, Flagyl IV and Ertapenem.  Microbes not covered include 

Pseudomonas, MRSA, and atypicals such as Mycoplasma.  CT chest did not reveal 

an infiltrate but clinical picture resembles a bronchitis.  Azithromycin was 

added to cover atypicals.  Other cause of cough may be fluid overload as seen 

on CT.  This is being managed through HD.  Midodrine was started for BP 

management at a low dose.  Cosyntropin stim test was negative for adrenal 

insufficiency.  KUB did not show progression of megacolon


1/25:  Pt went into atrial flutter overnight around 7pm.  BP is around the same 

on the midodrine.  Pt feels poorly-fatigued with no worsening SOB or chest pain 

at this time.  Apathetic to food.  HR has been in the 120-130s overnight.  Per 

nurse no further blood in stools and vanc enemas stopped by GI as stools are 

more formed.  Cough still present-one dose azithro given last night and plan to 

cont 5 day course.  Diltiazem 10mg IV ordered and heparin drip with CHADs vasc 

4.  Cardiology consulted. Leukocytosis increased.  Poss 2/2 cosyntropin stim 

test yesterday.  She denies any fevers or chills and appears improved overall 

today with an improvement in her abdominal exam. 


1/26:  WBC up to 24 K-discussed with ID, uncertain etiology, poss leukomoid 

reaction.  Monitor in am.  Pt appears the same and is ill-appearing but 

reporting feeling somewhat better.  Denies abdominal pain and KUB now eveals no 

obstruction.  Tolerating reg diet.  Pain to abdominal palpation.  HR is 

controlled and she is being transitioned to coumadin with Cards placing her on 

digoxin. Remains in good spirits.  Fatigued after HD today-removed 2L to 

optimize volume status.  Still coughing up junk and lungs sound poor.  Adding 

bronchodilator and flutter valve.  Remains hypotensive-on low dose midodrine. 


1/27:  WBC down to 17K but has a rectal temp of 38.  Feels worse overall today, 

continues to cough up greenish sputum which is pending culture.  Discussed the 

case with the ICU physician as she continues to be hypotensive.  We reviewed 

her scans together and decided to try solumedrol for COPD exacerbation and 

monitor her closely.  She remains on Azithro, Ertapenem, Vanc PO, Vanc enema 

and Flagyl IV.  She reports one formed BM today and an improvement in her 

abdominal pain, which is also improved somewhat on exam.  She is still very ill 

appearing.  Will repeat Blood cultures on her now in setting of fever.  She 

does have a prosthestic valve.  Tolerating PO but has no appetite.  





1.  Hypotension-persistent despite midodrine


2.  ESBL E coli UTI-Ertapenem (Day 9/10)


3.  C-diff colitis-Vanc PO, Flagyl IV, Vanc enema added (1/23)-GI wanted to 

keep one more day with elevated WBC to 24K yesterday.


4.  ESRD with fluid overload-on HD, does not make urine. Cont per Nephro.  2L 

off yesterday


5.  Hypoxia 2/2 fluid overload in setting of acute COPD exacerbation-  cont 

azithromycin, bronchodilators, and will add steroids now.  


6   SBO-resolved.  KUB reveals resolution of the obstruction.  Tolerating PO


7.  Atrial flutter-anticoagulation with heparin with close H/H monitoring.  

Holding off on adding coumadin while patient is ill-appearing.  Dig added by 

Cardiology yesterday without a load and heart rate remained 130s-140s 

overnight.  BB and CCBs relatively contraindicated in setting of hypotension.  

Amiodarone added today. 


8.  Leukocytosis-apprec ID input.  Avoid adding more empiric therapy in setting 

of cdiff colitis.  Repeat check today was down to 17K despite patient looking 

and feeling worse overall. 


9.  Anemia--stable on heparin drip. 


10.  Cirrhosis-will need outpatient assessment with GI or PCP.


11.  DMII-at goal


12.  Obesity


13.  Hypokalemia-replaced.  Recheck in am. 


 


Full Code


DVT proph-heparin


Dispo-cont tele, if she continues to worsen will move to the ICU.  This was 

discussed with Dr. Dos Santos (ICU attending), Dr. Coulter and the patient. 





Nisha Escobar DO


[facility] Hospitalist


Consultants:


Nephro-Oncu


Fariha-Marylin


Current Inpatient Medications:





Current Inpatient Medications








 Medications


  (Trade)  Dose


 Ordered  Sig/Daniel


 Route  Start Time


 Stop Time Status Last Admin


Dose Admin


 


 Miscellaneous


 Information


  (Order Awaiting


 Action)  1 ea  QS


 N/A  1/16/18 08:00


 2/15/18 07:59  1/16/18 08:21


1 EA


 


 Miscellaneous


 Information


  (Order Awaiting


 Action)  1 ea  QS


 N/A  1/16/18 08:00


 2/15/18 07:59  1/16/18 08:21


1 EA


 


 Miscellaneous


 Information


  (Consult


 Glycemic


 Management


 Pharmacy)  1 ea  UD  PRN


 N/A  1/16/18 04:15


 2/15/18 04:14   


 


 


 Glucose


  (Glucose 40% Gel)  15-30


 GRAMS 15


 GRAMS...  UD  PRN


 PO  1/16/18 04:30


 2/15/18 04:29   


 


 


 Glucose


  (Glucose Chew


 Tab)  4-8


 Tablets 4


 Tabl...  UD  PRN


 PO  1/16/18 04:30


 2/15/18 04:29   


 


 


 Dextrose


  (Dextrose 50%


 50ML Syringe)  25-50ML OF


 50% DW IV


 FOR...  UD  PRN


 IV  1/16/18 04:30


 2/15/18 04:29  1/17/18 09:52


25 ML


 


 Glucagon


  (Glucagon Inj)  1 mg  UD  PRN


 SQ  1/16/18 04:30


 2/15/18 04:29   


 


 


 Ondansetron HCl


  (Zofran Inj)  4 mg  Q4H  PRN


 IV  1/16/18 18:15


 2/15/18 18:14  1/19/18 12:15


4 MG


 


 Ertapenem 500 mg/


 Sodium Chloride  55 ml @ 


 110 mls/hr  DAILY@1800


 IV  1/18/18 18:00


 1/28/18 17:59  1/26/18 17:35


110 MLS/HR


 


 Vancomycin HCl


  (Vancomycin Oral


 Soln)  125 mg  Q6H


 PO  1/20/18 20:00


 2/3/18 19:59  1/27/18 09:15


125 MG


 


 Raspberry


  (Raspberry Syrup


 5ml Cup)  5 ml  Q6H


 PO  1/20/18 20:00


 2/3/18 19:59  1/27/18 09:14


5 ML


 


 Insulin Aspart


  (novoLOG ASPART)  **SLIDING


 SCALE**


 **G...  ACHS


 SC  1/22/18 07:00


 2/21/18 06:59  1/27/18 11:58


2 UNITS


 


 Metronidazole 500


 mg/Prmx  100 ml @ 


 100 mls/hr  Q8H


 IV  1/22/18 18:00


 2/1/18 17:59  1/27/18 09:14


100 MLS/HR


 


 Acetaminophen 650


 mg/Empty Bag  65 ml @ 


 260 mls/hr  Q6H  PRN


 IV  1/23/18 16:30


 2/22/18 16:29  1/27/18 11:47


260 MLS/HR


 


 Midodrine


  (Proamatine Tab)  2.5 mg  TID@08,12,17


 PO  1/24/18 17:30


 2/23/18 17:29  1/27/18 11:54


2.5 MG


 


 Azithromycin


  (Zithromax Tab)  250 mg  PM


 PO  1/25/18 21:00


 1/31/18 20:59  1/26/18 21:43


250 MG


 


 Heparin Sodium/


 Dextrose  500 ml @ 


 26 mls/hr  [id number] PRN


 IV  1/25/18 10:30


 2/24/18 10:29  1/27/18 05:11


26 MLS/HR


 


 Diltiazem HCl 125


 mg/Dextrose  125 ml @ 0


 mls/hr  Q0M PRN


 IV  1/25/18 12:45


 2/24/18 11:29   


 


 


 Pantoprazole


 Sodium


  (Protonix Tab)  40 mg  QAM


 PO  1/26/18 09:00


 2/25/18 08:59  1/27/18 09:15


40 MG


 


 Levalbuterol


  (Xopenex 1.25MG/


 3ML Neb)  1.25 mg  Q6R


 INH  1/26/18 21:00


 2/25/18 20:59  1/26/18 19:38


1.25 MG


 


 Vancomycin HCl


  (Vancomycin


 Enema)  500 mg  QID


 MD  1/27/18 09:00


 2/10/18 08:59  1/27/18 09:15


500 MG


 


 Amiodarone HCL/


 Dextrose  200 ml @ 


 33.3 mls/hr  [id number]


 IV  1/27/18 11:45


 1/27/18 17:45  1/27/18 11:28


33.3 MLS/HR


 


 Amiodarone HCL/


 Dextrose  200 ml @ 


 16.7 mls/hr  [id number]


 IV  1/27/18 17:45


 2/26/18 17:44

## 2018-01-27 NOTE — SURGERY PROGRESS NOTE
Surgery Progress Note


Date of Service


Jan 27, 2018.





Subjective


+ bowel movement, + flatus, No nausea, No vomiting


Poor appetite





Objective


Vital Signs:











  Date Time  Temp Pulse Resp B/P (MAP) Pulse Ox O2 Delivery O2 Flow Rate FiO2


 


1/27/18 11:28 38.1       


 


1/27/18 11:00 36.6 130 20 81/63 (69) 93 Nasal Cannula 4.0 


 


1/27/18 08:00      Nasal Cannula 2.0 


 


1/27/18 07:56 36.9 122 22 100/61 (74) 95 Nasal Cannula 2.0 


 


1/27/18 06:37 36.7 134 22  93 Nasal Cannula  


 


1/27/18 04:22  134      


 


1/27/18 04:00      Nasal Cannula 2.0 


 


1/27/18 02:15 36.7 130 22 87/54 (65) 93 Nasal Cannula 1.0 


 


1/27/18 00:00      Nasal Cannula 2.0 


 


1/26/18 23:21 37.0 127 20 80/52 (61) 92 Nasal Cannula 1.0 





  128      


 


1/26/18 20:00      Nasal Cannula 2.0 


 


1/26/18 19:38  99 18  97 Nasal Cannula 1.0 


 


1/26/18 19:06 36.5 100 22 72/40 (51) 99 Nasal Cannula 2.0 


 


1/26/18 16:00      Room Air 2.0 


 


1/26/18 15:39 36.8 103 22 90/57 (68) 95 Nasal Cannula 1.0 


 


1/26/18 12:52  102      


 


1/26/18 12:21 36.6 101  87/54 (65)    


 


1/26/18 12:15  100  82/57    


 


1/26/18 12:00      Room Air 2.0 


 


1/26/18 12:00  100  83/33    








Abdomen:  soft, + distended (mild), + tenderness (mostly upper abdomen)


Laboratory Results:





Results Past 24 Hours








Test


  1/26/18


14:29 1/26/18


16:14 1/26/18


20:11 1/27/18


05:14 Range/Units


 


 


Hemoglobin 8.3   9.2 12.0-16.0  g/dL


 


Hematocrit 27.4   30.3 37-47  %


 


Thyroid Stimulating Hormone


(TSH) 4.040


  


  


  


  0.300-4.500


uIu/ml


 


Bedside Glucose  189 132  70-90  mg/dl


 


White Blood Count    17.65 4.8-10.8  K/uL


 


Red Blood Count    2.91 4.2-5.4  M/uL


 


Mean Corpuscular Volume    104.1   fL


 


Mean Corpuscular Hemoglobin    31.6 25-34  pg


 


Mean Corpuscular Hemoglobin


Concent 


  


  


  30.4


  32-36  g/dl


 


 


Platelet Count    235 130-400  K/uL


 


Mean Platelet Volume    9.8 7.4-10.4  fL


 


Neutrophils (%) (Auto)    80.6  %


 


Lymphocytes (%) (Auto)    9.8  %


 


Monocytes (%) (Auto)    5.6  %


 


Eosinophils (%) (Auto)    1.4  %


 


Basophils (%) (Auto)    0.2  %


 


Neutrophils # (Auto)    14.22 1.4-6.5  K/uL


 


Lymphocytes # (Auto)    1.73 1.2-3.4  K/uL


 


Monocytes # (Auto)    0.99 0.11-0.59  K/uL


 


Eosinophils # (Auto)    0.25 0-0.5  K/uL


 


Basophils # (Auto)    0.03 0-0.2  K/uL


 


RDW Standard Deviation    60.7 36.4-46.3  fL


 


RDW Coefficient of Variation    17.9 11.5-14.5  %


 


Immature Granulocyte % (Auto)    2.4  %


 


Immature Granulocyte # (Auto)    0.43 0.00-0.02  K/uL


 


Nucleated RBC Absolute Count


(auto) 


  


  


  0.03


  0-0  K/uL


 


 


Nucleated Red Blood Cells %    0.2  %


 


Activated Partial


Thromboplast Time 


  


  


  59.2


  21.0-31.0


SECONDS


 


Partial Thromboplastin Ratio    2.3  


 


Sodium Level    136 136-145  mmol/L


 


Potassium Level    3.4 3.5-5.1  mmol/L


 


Chloride Level    101   mmol/L


 


Carbon Dioxide Level    29 21-32  mmol/L


 


Anion Gap    6.0 3-11  mmol/L


 


Blood Urea Nitrogen    27 7-18  mg/dl


 


Creatinine


  


  


  


  5.56


  0.60-1.20


mg/dl


 


Est Creatinine Clear Calc


Drug Dose 


  


  


  12.6


   ml/min


 


 


Estimated GFR (


American) 


  


  


  9.0


   


 


 


Estimated GFR (Non-


American 


  


  


  7.7


   


 


 


BUN/Creatinine Ratio    4.9 10-20  


 


Random Glucose    154 70-99  mg/dl


 


Calcium Level    8.4 8.5-10.1  mg/dl


 


Phosphorus Level    4.9 2.5-4.9  mg/dl


 


Magnesium Level    1.9 1.8-2.4  mg/dl


 


Digoxin Level    0.4 0.8-2.0  ng/ml


 


Test


  1/27/18


06:47 1/27/18


11:26 


  


  Range/Units


 


 


Bedside Glucose 149 185   70-90  mg/dl








Microbiology Results


1/27/18 Gram Stain - Final, Resulted


          


1/27/18 Sputum Culture, Resulted


          Pending





Assessment & Plan


C diff colitis on PO vanco and IV Flagyl


Hr remains elevate


Intermittent temp elevations


Cardiology and IM noted


No indication for surgical intervention at this time

## 2018-01-28 VITALS
HEART RATE: 99 BPM | DIASTOLIC BLOOD PRESSURE: 70 MMHG | SYSTOLIC BLOOD PRESSURE: 109 MMHG | OXYGEN SATURATION: 98 % | TEMPERATURE: 98.24 F

## 2018-01-28 VITALS
OXYGEN SATURATION: 99 % | SYSTOLIC BLOOD PRESSURE: 83 MMHG | HEART RATE: 105 BPM | TEMPERATURE: 98.24 F | DIASTOLIC BLOOD PRESSURE: 49 MMHG

## 2018-01-28 VITALS
DIASTOLIC BLOOD PRESSURE: 57 MMHG | OXYGEN SATURATION: 91 % | TEMPERATURE: 97.7 F | SYSTOLIC BLOOD PRESSURE: 83 MMHG | HEART RATE: 87 BPM

## 2018-01-28 VITALS — OXYGEN SATURATION: 95 % | HEART RATE: 102 BPM

## 2018-01-28 VITALS
HEART RATE: 86 BPM | DIASTOLIC BLOOD PRESSURE: 59 MMHG | TEMPERATURE: 97.34 F | OXYGEN SATURATION: 99 % | SYSTOLIC BLOOD PRESSURE: 93 MMHG

## 2018-01-28 VITALS — HEART RATE: 95 BPM | OXYGEN SATURATION: 98 %

## 2018-01-28 VITALS
TEMPERATURE: 97.52 F | DIASTOLIC BLOOD PRESSURE: 57 MMHG | OXYGEN SATURATION: 99 % | HEART RATE: 95 BPM | SYSTOLIC BLOOD PRESSURE: 91 MMHG

## 2018-01-28 VITALS
OXYGEN SATURATION: 99 % | SYSTOLIC BLOOD PRESSURE: 90 MMHG | TEMPERATURE: 98.78 F | HEART RATE: 86 BPM | DIASTOLIC BLOOD PRESSURE: 52 MMHG

## 2018-01-28 VITALS — OXYGEN SATURATION: 98 % | HEART RATE: 82 BPM

## 2018-01-28 VITALS — HEART RATE: 90 BPM | OXYGEN SATURATION: 98 %

## 2018-01-28 LAB
PTT PATIENT: 112.5 SECONDS (ref 21–31)
PTT PATIENT: 28.9 SECONDS (ref 21–31)
PTT PATIENT: 61.1 SECONDS (ref 21–31)

## 2018-01-28 RX ADMIN — INSULIN ASPART SCH UNITS: 100 INJECTION, SOLUTION INTRAVENOUS; SUBCUTANEOUS at 08:24

## 2018-01-28 RX ADMIN — ALUMINUM ZIRCONIUM TRICHLOROHYDREX GLY SCH EA: 0.2 STICK TOPICAL at 08:00

## 2018-01-28 RX ADMIN — ALUMINUM ZIRCONIUM TRICHLOROHYDREX GLY SCH EA: 0.2 STICK TOPICAL at 16:00

## 2018-01-28 RX ADMIN — INSULIN ASPART SCH UNITS: 100 INJECTION, SOLUTION INTRAVENOUS; SUBCUTANEOUS at 00:16

## 2018-01-28 RX ADMIN — ALUMINUM ZIRCONIUM TRICHLOROHYDREX GLY SCH EA: 0.2 STICK TOPICAL at 08:42

## 2018-01-28 RX ADMIN — HEPARIN SODIUM PRN MLS/HR: 5000 INJECTION, SOLUTION INTRAVENOUS at 08:27

## 2018-01-28 RX ADMIN — METRONIDAZOLE SCH MLS/HR: 500 INJECTION, SOLUTION INTRAVENOUS at 17:51

## 2018-01-28 RX ADMIN — Medication SCH ML: at 02:09

## 2018-01-28 RX ADMIN — MIDODRINE HYDROCHLORIDE SCH MG: 2.5 TABLET ORAL at 08:16

## 2018-01-28 RX ADMIN — INSULIN ASPART SCH UNITS: 100 INJECTION, SOLUTION INTRAVENOUS; SUBCUTANEOUS at 20:36

## 2018-01-28 RX ADMIN — AMIODARONE HYDROCHLORIDE SCH MLS/HR: 1.8 INJECTION, SOLUTION INTRAVENOUS at 17:56

## 2018-01-28 RX ADMIN — METHYLPREDNISOLONE SODIUM SUCCINATE SCH MLS/MIN: 1 INJECTION, POWDER, FOR SOLUTION INTRAMUSCULAR; INTRAVENOUS at 08:13

## 2018-01-28 RX ADMIN — METRONIDAZOLE SCH MLS/HR: 500 INJECTION, SOLUTION INTRAVENOUS at 02:09

## 2018-01-28 RX ADMIN — LEVALBUTEROL HYDROCHLORIDE SCH MG: 1.25 SOLUTION RESPIRATORY (INHALATION) at 14:38

## 2018-01-28 RX ADMIN — VANCOMYCIN HYDROCHLORIDE SCH MG: 1 INJECTION, POWDER, LYOPHILIZED, FOR SOLUTION INTRAVENOUS at 08:16

## 2018-01-28 RX ADMIN — AZITHROMYCIN SCH MG: 250 TABLET, FILM COATED ORAL at 20:32

## 2018-01-28 RX ADMIN — Medication SCH ML: at 08:16

## 2018-01-28 RX ADMIN — ALUMINUM ZIRCONIUM TRICHLOROHYDREX GLY SCH EA: 0.2 STICK TOPICAL at 00:00

## 2018-01-28 RX ADMIN — Medication SCH ML: at 14:26

## 2018-01-28 RX ADMIN — METHYLPREDNISOLONE SODIUM SUCCINATE SCH MLS/MIN: 1 INJECTION, POWDER, FOR SOLUTION INTRAMUSCULAR; INTRAVENOUS at 02:10

## 2018-01-28 RX ADMIN — LEVALBUTEROL HYDROCHLORIDE SCH MG: 1.25 SOLUTION RESPIRATORY (INHALATION) at 02:06

## 2018-01-28 RX ADMIN — METHYLPREDNISOLONE SODIUM SUCCINATE SCH MLS/MIN: 1 INJECTION, POWDER, FOR SOLUTION INTRAMUSCULAR; INTRAVENOUS at 20:31

## 2018-01-28 RX ADMIN — LEVALBUTEROL HYDROCHLORIDE SCH MG: 1.25 SOLUTION RESPIRATORY (INHALATION) at 07:16

## 2018-01-28 RX ADMIN — VANCOMYCIN HYDROCHLORIDE SCH MG: 1 INJECTION, POWDER, LYOPHILIZED, FOR SOLUTION INTRAVENOUS at 20:30

## 2018-01-28 RX ADMIN — AMIODARONE HYDROCHLORIDE SCH MLS/HR: 1.8 INJECTION, SOLUTION INTRAVENOUS at 05:19

## 2018-01-28 RX ADMIN — METHYLPREDNISOLONE SODIUM SUCCINATE SCH MLS/MIN: 1 INJECTION, POWDER, FOR SOLUTION INTRAMUSCULAR; INTRAVENOUS at 14:26

## 2018-01-28 RX ADMIN — INSULIN ASPART SCH UNITS: 100 INJECTION, SOLUTION INTRAVENOUS; SUBCUTANEOUS at 17:56

## 2018-01-28 RX ADMIN — INSULIN ASPART SCH UNITS: 100 INJECTION, SOLUTION INTRAVENOUS; SUBCUTANEOUS at 12:34

## 2018-01-28 RX ADMIN — Medication SCH ML: at 20:31

## 2018-01-28 RX ADMIN — HEPARIN SODIUM PRN MLS/HR: 5000 INJECTION, SOLUTION INTRAVENOUS at 20:29

## 2018-01-28 RX ADMIN — INSULIN GLARGINE SCH UNITS: 100 INJECTION, SOLUTION SUBCUTANEOUS at 20:35

## 2018-01-28 RX ADMIN — MIDODRINE HYDROCHLORIDE SCH MG: 2.5 TABLET ORAL at 12:36

## 2018-01-28 RX ADMIN — LEVALBUTEROL HYDROCHLORIDE SCH MG: 1.25 SOLUTION RESPIRATORY (INHALATION) at 20:21

## 2018-01-28 RX ADMIN — VANCOMYCIN HYDROCHLORIDE SCH MG: 1 INJECTION, POWDER, LYOPHILIZED, FOR SOLUTION INTRAVENOUS at 14:26

## 2018-01-28 RX ADMIN — METRONIDAZOLE SCH MLS/HR: 500 INJECTION, SOLUTION INTRAVENOUS at 10:18

## 2018-01-28 RX ADMIN — VANCOMYCIN HYDROCHLORIDE SCH MG: 1 INJECTION, POWDER, LYOPHILIZED, FOR SOLUTION INTRAVENOUS at 02:09

## 2018-01-28 RX ADMIN — INSULIN GLARGINE SCH UNITS: 100 INJECTION, SOLUTION SUBCUTANEOUS at 08:26

## 2018-01-28 RX ADMIN — PANTOPRAZOLE SCH MG: 40 TABLET, DELAYED RELEASE ORAL at 08:17

## 2018-01-28 RX ADMIN — INSULIN ASPART SCH UNITS: 100 INJECTION, SOLUTION INTRAVENOUS; SUBCUTANEOUS at 04:02

## 2018-01-28 RX ADMIN — MIDODRINE HYDROCHLORIDE SCH MG: 2.5 TABLET ORAL at 17:50

## 2018-01-28 NOTE — PROGRESS NOTE
DATE: 01/28/2018

 

FOLLOWUP VISIT

 

SUBJECTIVE:  The patient is a 57-year-old who has had a lengthy hospital

admission and was admitted with a UTI and as well as C. difficile colitis. 

She has end-stage renal disease and is on hemodialysis.  She also is status

post aortic valve replacement with a bioprosthesis and mitral stenosis due to

heavy calcification of the mitral annulus from ongoing dialysis.  She looks

much better today.  She states that she feels improved.  Her heart rates have

decreased after the start of the amiodarone.  She remains in atrial flutter,

but with better heart rate control.

 

OBJECTIVE:

GENERAL:  She is alert and oriented.

VITAL SIGNS:  Blood pressure is 100/60, pulse is irregular at 86 beats per

minute.  She is afebrile.

HEENT:  She is normocephalic.  Pupils are equal and reactive to light. 

Extraocular muscles are intact bilaterally.

NECK:  The neck veins are flat.  Carotids have good upstrokes bilaterally

without bruits.  Thyroid is nonpalpable.

RESPIRATORY:  Breath sounds equal bilaterally and clear to auscultation.

CARDIOVASCULAR:  Heart has a regular rhythm.  Normal S1, S2.  No S3, S4.  No

cardiac rubs or murmurs.

GASTROINTESTINAL:  Abdomen is soft, nontender without organomegaly.

EXTREMITIES:  Free of edema, digit clubbing, or cyanosis.

NEUROLOGIC:  Grossly intact.

SKIN:  Warm to touch.

LYMPH NODES:  Negative to palpation.

 

IMPRESSION:

1.  Atrial flutter.

2.  History of bioprosthetic aortic valve and mitral stenosis.

3.  Urinary tract infection and Clostridium difficile.

 

RECOMMENDATIONS:  As stated above, the patient overall looks improved.  She

remains hypotensive, which is still a concern.  She is receiving midodrine. 

She will need additional dialysis and hopefully, with a low blood pressure,

she will be able to receive dialysis.  Infectious disease is following.

## 2018-01-28 NOTE — PROGRESS NOTE
Medicine Progress Note


Date & Time of Visit:


Jan 28, 2018 at 15:43.


Subjective


she has improved on the solumedrol clinically.  Still states she has no 

appetite and has poor intake of food.  3 BMs overnight and she reports diarrhea 

being worse as a result.  Noted that she has already had 10 days of ertapenem, 

so this was stopped as may be contributing to this issue.  She continues on the 

Vanc PO and IV Flagyl with Azithro for pulm coverage.  Heart rate and BP 

improved on the amio drip-cont per Cards.





Objective





Last 8 Hrs








  Date Time  Temp Pulse Resp B/P (MAP) Pulse Ox O2 Delivery O2 Flow Rate FiO2


 


1/28/18 14:39  95 18  98 Nasal Cannula 3.0 


 


1/28/18 12:04 37.1 86 20 90/52 (65) 99 Nasal Cannula 3.0 


 


1/28/18 12:00      Nasal Cannula 2.0 


 


1/28/18 08:21 36.8 105 20 83/49 (60) 99 Nasal Cannula 3.0 


 


1/28/18 08:00      Nasal Cannula 2.0 








Physical Exam:


GEN: obese, in no acute distress, alert and appropriate, [id number] 

better than yesterday.


HEENT: NC/AT, normal sclerae, MMM


CARDIO: tachy rate, S1/2 heard without m/g/r, HD in place in L anterior chest 

wall and appears clean and intact with no redness.  


LUNGS: coarse rhonchi throughout-improved air flow since yesterday


ABD: soft, TTP in upper abdomen-improved again today.


EXTREMITY: RP and DP palpable 2+ bilat, no LE swelling or edema, extremities 

are warm and well-perfused


NEURO: CN 2-12 grossly intact


MUSC: generalized weakness but no gross focal deficits, moves extremities 

equally.


SKIN:  warm and dry


Laboratory Results:


1/27/18 05:14








Red Blood Count 2.91, Mean Corpuscular Volume 104.1, Mean Corpuscular 

Hemoglobin 31.6, Mean Corpuscular Hemoglobin Concent 30.4, Mean Platelet Volume 

9.8, Neutrophils (%) (Auto) 80.6, Lymphocytes (%) (Auto) 9.8, Monocytes (%) (

Auto) 5.6, Eosinophils (%) (Auto) 1.4, Basophils (%) (Auto) 0.2, Neutrophils # (

Auto) 14.22, Lymphocytes # (Auto) 1.73, Monocytes # (Auto) 0.99, Eosinophils # (

Auto) 0.25, Basophils # (Auto) 0.03





1/27/18 05:14

















Test


  1/16/18


02:55 1/16/18


03:37 1/16/18


15:00 1/17/18


05:34


 


Creatine Kinase MB


  1.4 ng/ml


(0.5-3.6) 


  


  


 


 


Creatine Kinase MB Ratio  (0-3.0)    


 


Troponin I


  0.329 ng/ml


(0-0.045) 


  


  


 


 


Globulin


  3.6 gm/dl


(2.5-4.0) 


  


  


 


 


Albumin/Globulin Ratio 0.6 (0.9-2)    


 


Procalcitonin


  4.56 ng/ml


(0-0.5) 


  


  


 


 


Blood Gas Sample Site  L Brachial   


 


Bedside Blood Gas pH (LAB)


  


  7.38


(7.35-7.45) 


  


 


 


Bedside Blood Gas pCO2 (LAB)


  


  33 mmHg


(35-46) 


  


 


 


Bedside Blood Gas pO2 (LAB)


  


  76 mmHg


(80-95) 


  


 


 


Bedside Blood Gas HCO3 (LAB)


  


  19 meq/L


(19-24) 


  


 


 


Bedside Blood Gas Total CO2


  


  20 mEq/l


(24-31) 


  


 


 


Bedside Blood Gas Base Excess


(LAB) 


  -6.0 meq/L


(-9-1.8) 


  


 


 


Bedside Blood Gas O2


Saturation 


  95.0 % (90-95) 


  


  


 


 


Kiko Test  Pass   


 


Oxygen Delivery Device  Room Air   


 


Urine Color   DK YELLOW  


 


Urine Appearance   TURBID (CLEAR)  


 


Urine pH   5.5 (4.5-7.5)  


 


Urine Specific Gravity


  


  


  1.021


(1.000-1.030) 


 


 


Urine Protein   3+ (NEG)  


 


Urine Glucose (UA)   TRACE (NEG)  


 


Urine Ketones   TRACE (NEG)  


 


Urine Occult Blood   3+ (NEG)  


 


Urine Nitrite   POS (NEG)  


 


Urine Bilirubin   NEG (NEG)  


 


Urine Urobilinogen   NEG (NEG)  


 


Urine Leukocyte Esterase   LARGE (NEG)  


 


Urine WBC (Auto)   >30 /hpf (0-5)  


 


Urine RBC (Auto)


  


  


  5-10 /hpf


(0-4) 


 


 


Urine Hyaline Casts (Auto)   1-5 /lpf (0-5)  


 


Urine Epithelial Cells (Auto)   >30 /lpf (0-5)  


 


Urine Bacteria (Auto)   1+ (NEG)  


 


Urine Pathogenic Casts    /lpf (0)  


 


Urine Yeast (Auto)    (NONE PRSENT)  


 


Estimated Average Glucose    140 mg/dl 


 


Hemoglobin A1c


  


  


  


  6.5 %


(4.5-5.6)


 


Test


  1/17/18


09:43 1/18/18


05:20 1/20/18


01:39 1/22/18


09:00


 


Total Bilirubin


  0.8 mg/dl


(0.2-1) 


  


  


 


 


Direct Bilirubin


  0.1 mg/dl


(0-0.2) 


  


  


 


 


Aspartate Amino Transf


(AST/SGOT) 11 U/L (15-37) 


  


  


  


 


 


Alanine Aminotransferase


(ALT/SGPT) 11 U/L (12-78) 


  


  


  


 


 


Alkaline Phosphatase


  115 U/L


() 


  


  


 


 


Total Protein


  6.2 gm/dl


(6.4-8.2) 


  


  


 


 


Albumin


  2.2 gm/dl


(3.4-5.0) 


  


  


 


 


Amylase Level


  22 U/L


() 


  


  


 


 


Random Vancomycin Level  21.9 mcg/ml   


 


Red Blood Cell Morphology   Unremarkable  


 


Stool Occult Blood


  


  


  


  POSITIVE


(NEGATIVE)


 


Test


  1/22/18


23:10 1/23/18


06:04 1/24/18


12:40 1/25/18


05:28


 


Lactic Acid Level


  1.0 mmol/L


(0.4-2.0) 


  


  


 


 


Chemistry Specimen Hemolysis     


 


Cortisol Response to


Stimulation 


  


  Cortisol


Baseline 


 


 


Toxic Granulation    1+ 


 


Toxic Vacuolation    1+ 


 


Polychromasia    1+ 


 


Basophilic Stippling    OCCASIONAL 


 


Test


  1/25/18


10:46 1/26/18


05:00 1/26/18


14:29 1/27/18


05:14


 


Prothrombin Time


  12.2 SECONDS


(9.0-12.0) 


  


  


 


 


Prothromb Time International


Ratio 1.2 (0.9-1.1) 


  


  


  


 


 


Lipase


  


  355 U/L


() 


  


 


 


Thyroid Stimulating Hormone


(TSH) 


  


  4.040 uIu/ml


(0.300-4.500) 


 


 


White Blood Count


  


  


  


  17.65 K/uL


(4.8-10.8)


 


Red Blood Count


  


  


  


  2.91 M/uL


(4.2-5.4)


 


Hemoglobin


  


  


  


  9.2 g/dL


(12.0-16.0)


 


Hematocrit    30.3 % (37-47) 


 


Mean Corpuscular Volume


  


  


  


  104.1 fL


()


 


Mean Corpuscular Hemoglobin


  


  


  


  31.6 pg


(25-34)


 


Mean Corpuscular Hemoglobin


Concent 


  


  


  30.4 g/dl


(32-36)


 


Platelet Count


  


  


  


  235 K/uL


(130-400)


 


Mean Platelet Volume


  


  


  


  9.8 fL


(7.4-10.4)


 


Neutrophils (%) (Auto)    80.6 % 


 


Lymphocytes (%) (Auto)    9.8 % 


 


Monocytes (%) (Auto)    5.6 % 


 


Eosinophils (%) (Auto)    1.4 % 


 


Basophils (%) (Auto)    0.2 % 


 


Neutrophils # (Auto)


  


  


  


  14.22 K/uL


(1.4-6.5)


 


Lymphocytes # (Auto)


  


  


  


  1.73 K/uL


(1.2-3.4)


 


Monocytes # (Auto)


  


  


  


  0.99 K/uL


(0.11-0.59)


 


Eosinophils # (Auto)


  


  


  


  0.25 K/uL


(0-0.5)


 


Basophils # (Auto)


  


  


  


  0.03 K/uL


(0-0.2)


 


RDW Standard Deviation


  


  


  


  60.7 fL


(36.4-46.3)


 


RDW Coefficient of Variation


  


  


  


  17.9 %


(11.5-14.5)


 


Immature Granulocyte % (Auto)    2.4 % 


 


Immature Granulocyte # (Auto)


  


  


  


  0.43 K/uL


(0.00-0.02)


 


Nucleated RBC Absolute Count


(auto) 


  


  


  0.03 K/uL


(0-0)


 


Nucleated Red Blood Cells %    0.2 % 


 


Anion Gap


  


  


  


  6.0 mmol/L


(3-11)


 


Est Creatinine Clear Calc


Drug Dose 


  


  


  12.6 ml/min 


 


 


Estimated GFR (


American) 


  


  


  9.0 


 


 


Estimated GFR (Non-


American 


  


  


  7.7 


 


 


BUN/Creatinine Ratio    4.9 (10-20) 


 


Calcium Level


  


  


  


  8.4 mg/dl


(8.5-10.1)


 


Phosphorus Level


  


  


  


  4.9 mg/dl


(2.5-4.9)


 


Magnesium Level


  


  


  


  1.9 mg/dl


(1.8-2.4)


 


Digoxin Level


  


  


  


  0.4 ng/ml


(0.8-2.0)


 


Test


  1/27/18


11:50 1/28/18


15:09 1/28/18


16:16 


 


 


Influenza Type A (RT-PCR)


  Neg for Influ


A (NEG) 


  


  


 


 


Influenza Type B (RT-PCR)


  Neg for Influ


B (NEG) 


  


  


 


 


Activated Partial


Thromboplast Time 


  112.5 SECONDS


(21.0-31.0) 


  


 


 


Partial Thromboplastin Ratio  4.3   


 


Bedside Glucose


  


  


  219 mg/dl


(70-90) 


 














 Date/Time


Source Procedure


Growth Status


 


 


 1/27/18 13:08


Blood Blood Culture


Pending Received


 


 1/16/18 00:00


Nasal MRSA DNA Surveillance Screen - Final


Specimen Positive for MRSA by DNA Probe Complete


 


 1/20/18 15:30


Stool C.difficile Toxin B Gene (PCR) - Final


Positive for C. difficile toxin B gene Complete





 1/27/18 03:45


Sputum Expectorated Sputum Gram Stain - Final Resulted


 


 1/27/18 03:45


Sputum Expectorated Sputum Sputum Culture - Preliminary


PIN-POINT GROWTH PRESENT, REINCUBATING. Resulted


 


 1/16/18 05:22


Urine , Clean Catch Urine Culture - Final


Escherichia Coli Complete








Last 24 Hours








Test


  1/27/18


16:21 1/27/18


20:25 1/28/18


00:10 1/28/18


03:57


 


Bedside Glucose 171 mg/dl  226 mg/dl  311 mg/dl  255 mg/dl 


 


Test


  1/28/18


06:47 1/28/18


07:04 1/28/18


11:04 1/28/18


15:09


 


Bedside Glucose 243 mg/dl   307 mg/dl  


 


Activated Partial


Thromboplast Time 


  28.9 SECONDS 


  


  


 


 


Partial Thromboplastin Ratio  1.1   











Assessment & Plan


58 yo F presented with hypotension after dialysis.  She was transferred to [facility] 

from [facility] and was admitted to the ICU.  She did have some cough productive 

of greenish phlegm for two weeks and was started on empiric abx upon transfer 

from [facility] on 1/16.  She also consistently mentioned some abdominal pain.  

Successive KUBs were performed revealing no convincing evidence for 

obstruction. No acute pulmonary process was noted on CXR and she was saturating 

well on room air. Flu was negatigve and she was started on empiric treatment 

with Vanc and Zosyn.  She was put on a pressor and midodrine and abx were 

subsequently changed to Primaxin to treat an ESBL-producing E coli in her 

urine. Blood cultures were negative and all lines appear clean.  ID was 

consulted.  She developed c-diff colitis and was placed on Vanc and Flagyl. She 

was transferred out of the ICU on 1/18 as she was off pressors and midodrine, 

maintaining her BP.  CT a/p revealed a partial SBO and she was made NPO on 1/ 22.  Gen Surg was consulted and recommended against surgery at this time unless 

she declined clinically.  No NGT was placed so that she could continue to 

receive the PO Vanc to treat her C-diff.  GI was consulted and is assisting 

with c-diff management.  They added a Vanc enema QID on 1/23.  The patient 

denies any nausea today and had two loose BMs.  She is still having a 

productive greenish cough and denies fevers.  She is fatigued from HD today.  

She otherwise is tolerating PO, and mentating well.  She reports some 

improvement in her abdomen today but still has pain.  She reports 4 BMs today.





1/24:  CT chest was performed in the setting of persistent hypoxia, rhonchi on 

exam and cough productive of greenish sputum.  On review of her abx, she is on 

Vanc PO, Vanc enema, Flagyl IV and Ertapenem.  Microbes not covered include 

Pseudomonas, MRSA, and atypicals such as Mycoplasma.  CT chest did not reveal 

an infiltrate but clinical picture resembles a bronchitis.  Azithromycin was 

added to cover atypicals.  Other cause of cough may be fluid overload as seen 

on CT.  This is being managed through HD.  Midodrine was started for BP 

management at a low dose.  Cosyntropin stim test was negative for adrenal 

insufficiency.  KUB did not show progression of megacolon


1/25:  Pt went into atrial flutter overnight around 7pm.  BP is around the same 

on the midodrine.  Pt feels poorly-fatigued with no worsening SOB or chest pain 

at this time.  Apathetic to food.  HR has been in the 120-130s overnight.  Per 

nurse no further blood in stools and vanc enemas stopped by GI as stools are 

more formed.  Cough still present-one dose azithro given last night and plan to 

cont 5 day course.  Diltiazem 10mg IV ordered and heparin drip with CHADs vasc 

4.  Cardiology consulted. Leukocytosis increased.  Poss 2/2 cosyntropin stim 

test yesterday.  She denies any fevers or chills and appears improved overall 

today with an improvement in her abdominal exam. 


1/26:  WBC up to 24 K-discussed with ID, uncertain etiology, poss leukomoid 

reaction.  Monitor in am.  Pt appears the same and is ill-appearing but 

reporting feeling somewhat better.  Denies abdominal pain and KUB now eveals no 

obstruction.  Tolerating reg diet.  Pain to abdominal palpation.  HR is 

controlled and she is being transitioned to coumadin with Cards placing her on 

digoxin. Remains in good spirits.  Fatigued after HD today-removed 2L to 

optimize volume status.  Still coughing up junk and lungs sound poor.  Adding 

bronchodilator and flutter valve.  Remains hypotensive-on low dose midodrine. 


1/27:  WBC down to 17K but has a rectal temp of 38.  Feels worse overall today, 

continues to cough up greenish sputum which is pending culture.  Discussed the 

case with the ICU physician as she continues to be hypotensive.  We reviewed 

her scans together and decided to try solumedrol for COPD exacerbation and 

monitor her closely.  She remains on Azithro, Ertapenem, Vanc PO, Vanc enema 

and Flagyl IV.  She reports one formed BM today and an improvement in her 

abdominal pain, which is also improved somewhat on exam.  She is still very ill 

appearing.  Will repeat Blood cultures on her now in setting of fever.  She 

does have a prosthestic valve.  Tolerating PO but has no appetite.  


1/28:  she has improved on the solumedrol clinically.  Still states she has no 

appetite and has poor intake of food.  3 BMs overnight and she reports diarrhea 

being worse as a result.  Noted that she has already had 10 days of ertapenem, 

so this was stopped as may be contributing to this issue.  She continues on the 

Vanc PO and IV Flagyl with Azithro for pulm coverage.  Heart rate and BP 

improved on the amio drip-cont per Cards. 





1.  Hypotension-somewhat improved on midodrine and recent therapy 


2.  ESBL E coli UTI-course of ertapenem completed


3.  C-diff colitis-Vanc PO, Flagyl IV, continues to have loose stools.


4.  ESRD with fluid overload-on HD, does not make urine. Cont per Nephro.  2L 

off last session


5.  Hypoxia 2/2 fluid overload in setting of acute COPD exacerbation-  cont 

azithromycin, bronchodilators, steroids.  She is  really using the oxygen more 

for her comfort at this point.  Lungs sound improved.


6   SBO-resolved.  KUB reveals resolution of the obstruction.  Tolerating PO


7.  Atrial flutter-anticoagulation with heparin with close H/H monitoring.  

Holding off on adding coumadin while patient is ill-appearing.  Dig added by 

Cardiology initially without a load and heart rate remained 130s-140s 

overnight.  BB and CCBs relatively contraindicated in setting of hypotension.  

Amiodarone added yesterday with improvement in HR and hemodynamics. 


8.  Leukocytosis-improved, recheck in am. 


9.  Anemia--stable on heparin drip. reassess H/H in am.


10.  Cirrhosis-will need outpatient assessment with GI or PCP.


11.  DMII-uncontrolled on dextrose drip and steroids added yesterday.  

Discussed treatment plan with pharmacist who is treating more aggressively 

overnight and will be monitoring closely.


12.  Obesity


 


Full Code


DVT proph-heparin


Dispo-cont tele, if she continues to worsen will move to the ICU.  This was 

discussed with Dr. Dos Santos (ICU attending), Dr. Coulter and the patient. 





Nisha Escobar DO


[facility] Hospitalist


Consultants:


Nephro-Oncu


Farhad


Current Inpatient Medications:





Current Inpatient Medications








 Medications


  (Trade)  Dose


 Ordered  Sig/Daniel


 Route  Start Time


 Stop Time Status Last Admin


Dose Admin


 


 Miscellaneous


 Information


  (Order Awaiting


 Action)  1 ea  QS


 N/A  1/16/18 08:00


 2/15/18 07:59  1/16/18 08:21


1 EA


 


 Miscellaneous


 Information


  (Order Awaiting


 Action)  1 ea  QS


 N/A  1/16/18 08:00


 2/15/18 07:59  1/16/18 08:21


1 EA


 


 Miscellaneous


 Information


  (Consult


 Glycemic


 Management


 Pharmacy)  1 ea  UD  PRN


 N/A  1/16/18 04:15


 2/15/18 04:14   


 


 


 Glucose


  (Glucose 40% Gel)  15-30


 GRAMS 15


 GRAMS...  UD  PRN


 PO  1/16/18 04:30


 2/15/18 04:29   


 


 


 Glucose


  (Glucose Chew


 Tab)  4-8


 Tablets 4


 Tabl...  UD  PRN


 PO  1/16/18 04:30


 2/15/18 04:29   


 


 


 Dextrose


  (Dextrose 50%


 50ML Syringe)  25-50ML OF


 50% DW IV


 FOR...  UD  PRN


 IV  1/16/18 04:30


 2/15/18 04:29  1/17/18 09:52


25 ML


 


 Glucagon


  (Glucagon Inj)  1 mg  UD  PRN


 SQ  1/16/18 04:30


 2/15/18 04:29   


 


 


 Ondansetron HCl


  (Zofran Inj)  4 mg  Q4H  PRN


 IV  1/16/18 18:15


 2/15/18 18:14  1/19/18 12:15


4 MG


 


 Vancomycin HCl


  (Vancomycin Oral


 Soln)  125 mg  Q6H


 PO  1/20/18 20:00


 2/3/18 19:59  1/28/18 14:26


125 MG


 


 Raspberry


  (Raspberry Syrup


 5ml Cup)  5 ml  Q6H


 PO  1/20/18 20:00


 2/3/18 19:59  1/28/18 14:26


5 ML


 


 Insulin Aspart


  (novoLOG ASPART)  **SLIDING


 SCALE**


 **G...  ACHS


 SC  1/22/18 07:00


 2/21/18 06:59  1/28/18 12:34


22 UNITS


 


 Metronidazole 500


 mg/Prmx  100 ml @ 


 100 mls/hr  Q8H


 IV  1/22/18 18:00


 2/1/18 17:59  1/28/18 10:18


100 MLS/HR


 


 Acetaminophen 650


 mg/Empty Bag  65 ml @ 


 260 mls/hr  Q6H  PRN


 IV  1/23/18 16:30


 2/22/18 16:29  1/27/18 11:47


260 MLS/HR


 


 Midodrine


  (Proamatine Tab)  2.5 mg  TID@08,12,17


 PO  1/24/18 17:30


 2/23/18 17:29  1/28/18 12:36


2.5 MG


 


 Azithromycin


  (Zithromax Tab)  250 mg  PM


 PO  1/25/18 21:00


 1/31/18 20:59  1/27/18 21:35


250 MG


 


 Heparin Sodium/


 Dextrose  500 ml @ 


 32 mls/hr  [id number] PRN


 IV  1/25/18 10:30


 2/24/18 10:29  1/28/18 08:27


32 MLS/HR


 


 Diltiazem HCl 125


 mg/Dextrose  125 ml @ 0


 mls/hr  Q0M PRN


 IV  1/25/18 12:45


 2/24/18 11:29   


 


 


 Pantoprazole


 Sodium


  (Protonix Tab)  40 mg  QAM


 PO  1/26/18 09:00


 2/25/18 08:59  1/28/18 08:17


40 MG


 


 Levalbuterol


  (Xopenex 1.25MG/


 3ML Neb)  1.25 mg  Q6R


 INH  1/26/18 21:00


 2/25/18 20:59  1/28/18 14:38


1.25 MG


 


 Amiodarone HCL/


 Dextrose  200 ml @ 


 16.7 mls/hr  [id number]


 IV  1/27/18 17:45


 2/26/18 17:44  1/28/18 05:19


16.7 MLS/HR


 


 Methylprednisolone


 Sodium Succinate


 40 mg/Syringe  0.64 ml @ 


 1.5 mls/min  Q6H


 IV  1/27/18 14:00


 2/26/18 13:59  1/28/18 14:26


1.5 MLS/MIN


 


 Insulin Glargine


  (Lantus Solostar


 Pen)  SEE


 PROTOCOL


 TEXT  BID


 SC  1/27/18 21:00


 2/26/18 20:59  1/28/18 08:26


25 UNITS


 


 Insulin Aspart


  (novoLOG ASPART)  **SLIDING


 SCALE**


 **G...  0000,0400


 SC  1/29/18 00:00


 1/29/18 04:01

## 2018-01-28 NOTE — SURGERY PROGRESS NOTE
Surgery Progress Note


Date of Service


Jan 28, 2018.





Subjective


+ feeling well, + bowel movement (only 2 so far today), + flatus, No nausea, No 

vomiting





Objective


Vital Signs:











  Date Time  Temp Pulse Resp B/P (MAP) Pulse Ox O2 Delivery O2 Flow Rate FiO2


 


1/28/18 12:04 37.1 86 20 90/52 (65) 99 Nasal Cannula 3.0 


 


1/28/18 12:00      Nasal Cannula 2.0 


 


1/28/18 08:21 36.8 105 20 83/49 (60) 99 Nasal Cannula 3.0 


 


1/28/18 08:00      Nasal Cannula 2.0 


 


1/28/18 07:16  90 18  98 Nasal Cannula 3.0 


 


1/28/18 04:00      Nasal Cannula 3.0 


 


1/28/18 03:18 36.8 99 19 109/70 (83) 98 Nasal Cannula 3.0 





  97      


 


1/28/18 02:04  102 18  95 Nasal Cannula 3.0 


 


1/28/18 00:00      Nasal Cannula 3.0 


 


1/27/18 23:33 36.3 103 21 87/57 (67) 98 Nasal Cannula 3.0 


 


1/27/18 20:00      Nasal Cannula 3.0 


 


1/27/18 19:02  99 18  96 Nasal Cannula 3.0 


 


1/27/18 18:45  101 20 96/62 (73) 95 Nasal Cannula 3.0 


 


1/27/18 16:00      Nasal Cannula 2.0 


 


1/27/18 14:57 37.6       


 


1/27/18 14:40  107 22 96/62 (73) 95 Nasal Cannula 3.0 


 


1/27/18 14:16  107 18  97 Nasal Cannula 3.0 








Abdomen:  non tender, non distended, soft


Laboratory Results:





Results Past 24 Hours








Test


  1/27/18


16:21 1/27/18


20:25 1/28/18


00:10 1/28/18


03:57 Range/Units


 


 


Bedside Glucose 171 226 311 255 70-90  mg/dl


 


Test


  1/28/18


06:47 1/28/18


07:04 1/28/18


11:04 1/28/18


14:10 Range/Units


 


 


Bedside Glucose 243  307  70-90  mg/dl


 


Activated Partial


Thromboplast Time 


  28.9


  


  


  21.0-31.0


SECONDS


 


Partial Thromboplastin Ratio  1.1    











Assessment & Plan


C diff colitis on PO vanco and IV Flagyl


Hr improved on amiodarone


Cardiology and IM noted


No indication for surgical intervention at this time

## 2018-01-28 NOTE — PROGRESS NOTE
DATE: 01/28/2018

 

SUBJECTIVE:  Overnight, no new issues.  She feels about the same.  

57-year-old female with E. coli UTI/C. diff/hypotension URI.  Her last

dialysis was on Friday.  She continues to have low blood pressure with the

most recent blood pressure of 90/52, 98% on 3 liters nasal cannula, pulse

rate 95 per minute, temperature 36.9.  She still has atrial flutter and is

still on the heparin and amiodarone.

 

OBJECTIVE:

GENERAL:  Awake, alert, oriented.

VITAL SIGNS:  Blood pressure is still low at 90/52, 98% on 3 liter nasal

cannula.

HEENT:  Mucous membranes are moist.

NECK:  Supple.  No jugular venous distention.

LUNGS:  Bilateral decreased breath sounds, poor inspiratory effort.

HEART:  Regular rate and rhythm.  Soft systolic murmur.

ABDOMEN:  Soft, nontender, obese.

EXTREMITIES:  Show trace edema.

SKIN:  Warm.

 

ASSESSMENT AND PLAN:  A 57-year-old female with end-stage renal disease, on

chronic hemodialysis Monday, Wednesday, Friday.  She continues to have

chronically low blood pressure.  She has had a lengthy hospital stay with

multiple medical problems including infection, sepsis, Clostridium difficile;

now she has atrial flutter and she is requiring heparin and amiodarone.  Her

next dialysis is tentatively scheduled for tomorrow.  She has evidence of

fluid overload and every attempt should be made to take as much fluid as

possible, but this has been very difficult with her chronically low blood

pressure.  We will be giving her albumin as well as midodrine prior to

dialysis.

 

 

 

 

GISSELLE

## 2018-01-28 NOTE — PHARMACY PROGRESS NOTE
Pharmacy Glycemic Short Note 2


Date of Service


Jan 28, 2018.





OUTPATIENT ANTIDIABETIC REGIMEN: 


* NPH 40 units SQ daily when taking prednisone (otherwise no insulin taken)





ASSESSMENT:


* Ms. Bro had adequate glycemic control with bolus insulin only until high 

dose IV steroids were initiated on 1/27.


* She continues to be on Solu-medrol 40 mg IV every 6 hours, heparin infusion 

and amiodarone infusion-> Basal insulin restarted 


* A one time dose of 8 units IV regular insulin administered with lunch for 

severe hyperglycemia,  mg/dL


* Fasting BSG elevated - increase basal to weight/stress 3 dosing


* Further tighten CF/CF








PLAN FOR INPATIENT GLYCEMIC CONTROL:





* Basal Insulin


 * Lantus 17-25 units SQ BID


 * 17 units for BSG < 180, 25 units for  or more


 


* Bolus Insulin - tighten


 * NOVOLOG per scale ACHS


 * Goal Range:  Low 120 mg/dL - High 150 mg/dL


 * Correction Factor: 12 mg/dL/unit


 * Nutritional / Prandial insulin:  1 units for every 4 grams of carb


 * Continue overnight checks with coverage at 00 and 04





PLAN FOR DISCHARGE:


* A1c indicated good outpatient glycemic control.


* Expect that patient may be discharged on previous home regimen.





Thank you.

## 2018-01-29 VITALS — DIASTOLIC BLOOD PRESSURE: 53 MMHG | HEART RATE: 76 BPM | SYSTOLIC BLOOD PRESSURE: 100 MMHG

## 2018-01-29 VITALS — SYSTOLIC BLOOD PRESSURE: 97 MMHG | DIASTOLIC BLOOD PRESSURE: 53 MMHG | HEART RATE: 75 BPM

## 2018-01-29 VITALS — HEART RATE: 72 BPM | OXYGEN SATURATION: 95 %

## 2018-01-29 VITALS — DIASTOLIC BLOOD PRESSURE: 43 MMHG | TEMPERATURE: 97.52 F | SYSTOLIC BLOOD PRESSURE: 89 MMHG | HEART RATE: 76 BPM

## 2018-01-29 VITALS — SYSTOLIC BLOOD PRESSURE: 96 MMHG | HEART RATE: 84 BPM | DIASTOLIC BLOOD PRESSURE: 41 MMHG

## 2018-01-29 VITALS — SYSTOLIC BLOOD PRESSURE: 87 MMHG | DIASTOLIC BLOOD PRESSURE: 53 MMHG | HEART RATE: 79 BPM

## 2018-01-29 VITALS
TEMPERATURE: 97.52 F | DIASTOLIC BLOOD PRESSURE: 64 MMHG | SYSTOLIC BLOOD PRESSURE: 98 MMHG | OXYGEN SATURATION: 92 % | HEART RATE: 92 BPM

## 2018-01-29 VITALS
SYSTOLIC BLOOD PRESSURE: 78 MMHG | TEMPERATURE: 97.7 F | DIASTOLIC BLOOD PRESSURE: 48 MMHG | OXYGEN SATURATION: 95 % | HEART RATE: 79 BPM

## 2018-01-29 VITALS — SYSTOLIC BLOOD PRESSURE: 91 MMHG | HEART RATE: 73 BPM | DIASTOLIC BLOOD PRESSURE: 55 MMHG

## 2018-01-29 VITALS — HEART RATE: 75 BPM | SYSTOLIC BLOOD PRESSURE: 90 MMHG | DIASTOLIC BLOOD PRESSURE: 46 MMHG

## 2018-01-29 VITALS — SYSTOLIC BLOOD PRESSURE: 100 MMHG | DIASTOLIC BLOOD PRESSURE: 49 MMHG | HEART RATE: 87 BPM

## 2018-01-29 VITALS
SYSTOLIC BLOOD PRESSURE: 77 MMHG | DIASTOLIC BLOOD PRESSURE: 46 MMHG | TEMPERATURE: 98.24 F | HEART RATE: 94 BPM | OXYGEN SATURATION: 93 %

## 2018-01-29 VITALS — SYSTOLIC BLOOD PRESSURE: 87 MMHG | HEART RATE: 83 BPM | DIASTOLIC BLOOD PRESSURE: 49 MMHG

## 2018-01-29 VITALS — OXYGEN SATURATION: 90 % | HEART RATE: 75 BPM

## 2018-01-29 VITALS
DIASTOLIC BLOOD PRESSURE: 56 MMHG | OXYGEN SATURATION: 98 % | HEART RATE: 70 BPM | TEMPERATURE: 97.34 F | SYSTOLIC BLOOD PRESSURE: 92 MMHG

## 2018-01-29 VITALS
OXYGEN SATURATION: 98 % | DIASTOLIC BLOOD PRESSURE: 59 MMHG | TEMPERATURE: 97.88 F | SYSTOLIC BLOOD PRESSURE: 93 MMHG | HEART RATE: 79 BPM

## 2018-01-29 VITALS — HEART RATE: 84 BPM | DIASTOLIC BLOOD PRESSURE: 47 MMHG | SYSTOLIC BLOOD PRESSURE: 107 MMHG

## 2018-01-29 VITALS — DIASTOLIC BLOOD PRESSURE: 33 MMHG | HEART RATE: 76 BPM | SYSTOLIC BLOOD PRESSURE: 90 MMHG

## 2018-01-29 VITALS — HEART RATE: 70 BPM | SYSTOLIC BLOOD PRESSURE: 90 MMHG | DIASTOLIC BLOOD PRESSURE: 43 MMHG | TEMPERATURE: 97.52 F

## 2018-01-29 VITALS
OXYGEN SATURATION: 97 % | SYSTOLIC BLOOD PRESSURE: 91 MMHG | DIASTOLIC BLOOD PRESSURE: 55 MMHG | TEMPERATURE: 97.52 F | HEART RATE: 73 BPM

## 2018-01-29 VITALS — DIASTOLIC BLOOD PRESSURE: 49 MMHG | HEART RATE: 100 BPM | SYSTOLIC BLOOD PRESSURE: 83 MMHG

## 2018-01-29 VITALS — HEART RATE: 82 BPM | DIASTOLIC BLOOD PRESSURE: 46 MMHG | SYSTOLIC BLOOD PRESSURE: 89 MMHG

## 2018-01-29 VITALS — HEART RATE: 85 BPM | OXYGEN SATURATION: 93 %

## 2018-01-29 VITALS — OXYGEN SATURATION: 99 % | HEART RATE: 87 BPM

## 2018-01-29 VITALS — HEART RATE: 76 BPM | DIASTOLIC BLOOD PRESSURE: 40 MMHG | SYSTOLIC BLOOD PRESSURE: 105 MMHG

## 2018-01-29 LAB
BASOPHILS # BLD: 0.02 K/UL (ref 0–0.2)
BASOPHILS NFR BLD: 0.1 %
BUN SERPL-MCNC: 55 MG/DL (ref 7–18)
CALCIUM SERPL-MCNC: 8.3 MG/DL (ref 8.5–10.1)
CO2 SERPL-SCNC: 21 MMOL/L (ref 21–32)
CREAT SERPL-MCNC: 8.34 MG/DL (ref 0.6–1.2)
EOS ABS #: 0 K/UL (ref 0–0.5)
EOSINOPHIL NFR BLD AUTO: 324 K/UL (ref 130–400)
GLUCOSE SERPL-MCNC: 228 MG/DL (ref 70–99)
HCT VFR BLD CALC: 30.1 % (ref 37–47)
HGB BLD-MCNC: 9.3 G/DL (ref 12–16)
IG#: 0.36 K/UL (ref 0–0.02)
IMM GRANULOCYTES NFR BLD AUTO: 1.3 %
LYMPHOCYTES # BLD: 0.41 K/UL (ref 1.2–3.4)
MCH RBC QN AUTO: 31.6 PG (ref 25–34)
MCHC RBC AUTO-ENTMCNC: 30.9 G/DL (ref 32–36)
MCV RBC AUTO: 102.4 FL (ref 80–100)
MONO ABS #: 0.59 K/UL (ref 0.11–0.59)
MONOCYTES NFR BLD: 1.8 %
NEUT ABS #: 31.2 K/UL (ref 1.4–6.5)
NEUTROPHILS # BLD AUTO: 0 %
NEUTROPHILS NFR BLD AUTO: 95.7 %
NRBC BLD AUTO-RTO: 0 %
NUCLEATED RED BLOOD CELL ABS: 0 K/UL (ref 0–0)
PHOSPHATE SERPL-MCNC: 8.8 MG/DL (ref 2.5–4.9)
PMV BLD AUTO: 10.1 FL (ref 7.4–10.4)
POTASSIUM SERPL-SCNC: 4.2 MMOL/L (ref 3.5–5.1)
PTT PATIENT: 57.5 SECONDS (ref 21–31)
PTT PATIENT: 76 SECONDS (ref 21–31)
PTT PATIENT: 90 SECONDS (ref 21–31)
RED CELL DISTRIBUTION WIDTH CV: 18.4 % (ref 11.5–14.5)
RED CELL DISTRIBUTION WIDTH SD: 64.8 FL (ref 36.4–46.3)
SODIUM SERPL-SCNC: 133 MMOL/L (ref 136–145)
WBC # BLD AUTO: 31.1 K/UL (ref 4.8–10.8)

## 2018-01-29 RX ADMIN — INSULIN ASPART SCH UNITS: 100 INJECTION, SOLUTION INTRAVENOUS; SUBCUTANEOUS at 14:25

## 2018-01-29 RX ADMIN — AMIODARONE HYDROCHLORIDE SCH MLS/HR: 1.8 INJECTION, SOLUTION INTRAVENOUS at 22:35

## 2018-01-29 RX ADMIN — INSULIN GLARGINE SCH UNITS: 100 INJECTION, SOLUTION SUBCUTANEOUS at 08:54

## 2018-01-29 RX ADMIN — AMIODARONE HYDROCHLORIDE SCH MLS/HR: 1.8 INJECTION, SOLUTION INTRAVENOUS at 10:57

## 2018-01-29 RX ADMIN — ALUMINUM ZIRCONIUM TRICHLOROHYDREX GLY SCH EA: 0.2 STICK TOPICAL at 08:00

## 2018-01-29 RX ADMIN — METHYLPREDNISOLONE SODIUM SUCCINATE SCH MLS/MIN: 1 INJECTION, POWDER, FOR SOLUTION INTRAMUSCULAR; INTRAVENOUS at 13:55

## 2018-01-29 RX ADMIN — Medication SCH ML: at 08:42

## 2018-01-29 RX ADMIN — LEVALBUTEROL HYDROCHLORIDE SCH MG: 1.25 SOLUTION RESPIRATORY (INHALATION) at 19:29

## 2018-01-29 RX ADMIN — PANTOPRAZOLE SCH MG: 40 TABLET, DELAYED RELEASE ORAL at 08:42

## 2018-01-29 RX ADMIN — METHYLPREDNISOLONE SODIUM SUCCINATE SCH MLS/MIN: 1 INJECTION, POWDER, FOR SOLUTION INTRAMUSCULAR; INTRAVENOUS at 02:09

## 2018-01-29 RX ADMIN — MIDODRINE HYDROCHLORIDE SCH MG: 2.5 TABLET ORAL at 13:58

## 2018-01-29 RX ADMIN — MIDODRINE HYDROCHLORIDE SCH MG: 2.5 TABLET ORAL at 16:40

## 2018-01-29 RX ADMIN — INSULIN ASPART SCH UNITS: 100 INJECTION, SOLUTION INTRAVENOUS; SUBCUTANEOUS at 00:18

## 2018-01-29 RX ADMIN — Medication SCH ML: at 13:55

## 2018-01-29 RX ADMIN — INSULIN ASPART SCH UNITS: 100 INJECTION, SOLUTION INTRAVENOUS; SUBCUTANEOUS at 21:24

## 2018-01-29 RX ADMIN — MIDODRINE HYDROCHLORIDE SCH MG: 2.5 TABLET ORAL at 08:43

## 2018-01-29 RX ADMIN — INSULIN GLARGINE SCH UNITS: 100 INJECTION, SOLUTION SUBCUTANEOUS at 21:25

## 2018-01-29 RX ADMIN — VANCOMYCIN HYDROCHLORIDE SCH MG: 1 INJECTION, POWDER, LYOPHILIZED, FOR SOLUTION INTRAVENOUS at 02:08

## 2018-01-29 RX ADMIN — METRONIDAZOLE SCH MLS/HR: 500 INJECTION, SOLUTION INTRAVENOUS at 02:08

## 2018-01-29 RX ADMIN — ACETAMINOPHEN PRN MLS/HR: 10 INJECTION, SOLUTION INTRAVENOUS at 10:55

## 2018-01-29 RX ADMIN — HEPARIN SODIUM PRN MLS/HR: 5000 INJECTION, SOLUTION INTRAVENOUS at 16:15

## 2018-01-29 RX ADMIN — VANCOMYCIN HYDROCHLORIDE SCH MG: 1 INJECTION, POWDER, LYOPHILIZED, FOR SOLUTION INTRAVENOUS at 13:55

## 2018-01-29 RX ADMIN — INSULIN ASPART SCH UNITS: 100 INJECTION, SOLUTION INTRAVENOUS; SUBCUTANEOUS at 08:54

## 2018-01-29 RX ADMIN — Medication SCH ML: at 19:47

## 2018-01-29 RX ADMIN — METRONIDAZOLE SCH MLS/HR: 500 INJECTION, SOLUTION INTRAVENOUS at 13:54

## 2018-01-29 RX ADMIN — AMIODARONE HYDROCHLORIDE SCH MLS/HR: 1.8 INJECTION, SOLUTION INTRAVENOUS at 04:26

## 2018-01-29 RX ADMIN — METRONIDAZOLE SCH MLS/HR: 500 INJECTION, SOLUTION INTRAVENOUS at 18:30

## 2018-01-29 RX ADMIN — VANCOMYCIN HYDROCHLORIDE SCH MG: 1 INJECTION, POWDER, LYOPHILIZED, FOR SOLUTION INTRAVENOUS at 19:47

## 2018-01-29 RX ADMIN — LEVALBUTEROL HYDROCHLORIDE SCH MG: 1.25 SOLUTION RESPIRATORY (INHALATION) at 07:04

## 2018-01-29 RX ADMIN — INSULIN ASPART SCH UNITS: 100 INJECTION, SOLUTION INTRAVENOUS; SUBCUTANEOUS at 17:04

## 2018-01-29 RX ADMIN — ALBUMIN HUMAN SCH GM: 250 SOLUTION INTRAVENOUS at 09:24

## 2018-01-29 RX ADMIN — METHYLPREDNISOLONE SODIUM SUCCINATE SCH MLS/MIN: 1 INJECTION, POWDER, FOR SOLUTION INTRAMUSCULAR; INTRAVENOUS at 08:42

## 2018-01-29 RX ADMIN — ALBUMIN HUMAN SCH GM: 250 SOLUTION INTRAVENOUS at 12:15

## 2018-01-29 RX ADMIN — METHYLPREDNISOLONE SODIUM SUCCINATE SCH MLS/MIN: 1 INJECTION, POWDER, FOR SOLUTION INTRAMUSCULAR; INTRAVENOUS at 19:47

## 2018-01-29 RX ADMIN — ALUMINUM ZIRCONIUM TRICHLOROHYDREX GLY SCH EA: 0.2 STICK TOPICAL at 00:00

## 2018-01-29 RX ADMIN — VANCOMYCIN HYDROCHLORIDE SCH MG: 1 INJECTION, POWDER, LYOPHILIZED, FOR SOLUTION INTRAVENOUS at 08:56

## 2018-01-29 RX ADMIN — INSULIN ASPART SCH UNITS: 100 INJECTION, SOLUTION INTRAVENOUS; SUBCUTANEOUS at 04:02

## 2018-01-29 RX ADMIN — HEPARIN SODIUM PRN MLS/HR: 5000 INJECTION, SOLUTION INTRAVENOUS at 04:48

## 2018-01-29 RX ADMIN — LEVALBUTEROL HYDROCHLORIDE SCH MG: 1.25 SOLUTION RESPIRATORY (INHALATION) at 14:28

## 2018-01-29 RX ADMIN — LEVALBUTEROL HYDROCHLORIDE SCH MG: 1.25 SOLUTION RESPIRATORY (INHALATION) at 02:16

## 2018-01-29 RX ADMIN — Medication SCH ML: at 02:08

## 2018-01-29 NOTE — SURGERY PROGRESS NOTE
Surgery Progress Note


Date of Service


Jan 29, 2018.





Subjective


slept overnight- awakened- asked about abd pain- none





no loose bm since yest 4pm- but still loose





given IV steroids for COPD exacerbation





Objective


Vital Signs:











  Date Time  Temp Pulse Resp B/P (MAP) Pulse Ox O2 Delivery O2 Flow Rate FiO2


 


1/29/18 07:45 36.3 70 20 92/56 (68) 98 Nasal Cannula 3.0 


 


1/29/18 07:05  72 18  95 Room Air  


 


1/29/18 04:00      Nasal Cannula 3.0 


 


1/29/18 03:43 36.4 92 17 98/64 (75) 92 Room Air  


 


1/29/18 02:16  87 18  99 Nasal Cannula 2.0 


 


1/29/18 00:00      Nasal Cannula 3.0 


 


1/28/18 23:45 36.5 87 18 83/57 (66) 91 Room Air  


 


1/28/18 20:00      Nasal Cannula 3.0 


 


1/28/18 19:20  82 18  98 Nasal Cannula 2.0 


 


1/28/18 18:40 36.4 95 20 91/57 (68) 99 Nasal Cannula 3.0 


 


1/28/18 16:00      Nasal Cannula 2.0 


 


1/28/18 15:20 36.3 86 20 93/59 (70) 99 Nasal Cannula 3.0 


 


1/28/18 14:39  95 18  98 Nasal Cannula 3.0 


 


1/28/18 12:04 37.1 86 20 90/52 (65) 99 Nasal Cannula 3.0 


 


1/28/18 12:00      Nasal Cannula 2.0 


 


1/28/18 08:21 36.8 105 20 83/49 (60) 99 Nasal Cannula 3.0 








Abdomen:  normal bowel sounds (minimal tenderness), soft


Laboratory Results:





Results Past 24 Hours








Test


  1/28/18


11:04 1/28/18


15:09 1/28/18


16:16 1/28/18


20:08 Range/Units


 


 


Bedside Glucose 307  219 286 70-90  mg/dl


 


Activated Partial


Thromboplast Time 


  112.5


  


  


  21.0-31.0


SECONDS


 


Partial Thromboplastin Ratio  4.3    


 


Test


  1/28/18


23:03 1/28/18


23:43 1/29/18


03:54 1/29/18


03:59 Range/Units


 


 


Activated Partial


Thromboplast Time 61.1


  


  


  76.0


  21.0-31.0


SECONDS


 


Partial Thromboplastin Ratio 2.4   2.9  


 


Bedside Glucose  220 240  70-90  mg/dl


 


White Blood Count    31.10 4.8-10.8  K/uL


 


Red Blood Count    2.94 4.2-5.4  M/uL


 


Hemoglobin    9.3 12.0-16.0  g/dL


 


Hematocrit    30.1 37-47  %


 


Mean Corpuscular Volume    102.4   fL


 


Mean Corpuscular Hemoglobin    31.6 25-34  pg


 


Mean Corpuscular Hemoglobin


Concent 


  


  


  30.9


  32-36  g/dl


 


 


RDW Standard Deviation    64.8 36.4-46.3  fL


 


RDW Coefficient of Variation    18.4 11.5-14.5  %


 


Platelet Count    324 130-400  K/uL


 


Mean Platelet Volume    10.1 7.4-10.4  fL


 


Sodium Level    133 136-145  mmol/L


 


Potassium Level    4.2 3.5-5.1  mmol/L


 


Chloride Level    96   mmol/L


 


Carbon Dioxide Level    21 21-32  mmol/L


 


Anion Gap    16.0 3-11  mmol/L


 


Blood Urea Nitrogen    55 7-18  mg/dl


 


Creatinine


  


  


  


  8.34


  0.60-1.20


mg/dl


 


Est Creatinine Clear Calc


Drug Dose 


  


  


  8.6


   ml/min


 


 


Estimated GFR (


American) 


  


  


  5.5


   


 


 


Estimated GFR (Non-


American 


  


  


  4.7


   


 


 


BUN/Creatinine Ratio    6.5 10-20  


 


Random Glucose    228 70-99  mg/dl


 


Calcium Level    8.3 8.5-10.1  mg/dl


 


Phosphorus Level    8.8 2.5-4.9  mg/dl


 


Magnesium Level    1.9 1.8-2.4  mg/dl


 


Test


  1/29/18


06:43 


  


  


  Range/Units


 


 


Bedside Glucose 189    70-90  mg/dl











Assessment & Plan


1/29/18- difficult case with multiple comorbidities- now  with


    loose , productive cough, treated for underlying C diff-


    Would be extreme risk for any operation- not indicated


    yet- improved abd exam from last week. Any sign of


    worsening in her may be consideration for transfer to


    tertiary care center.








1/25/18- slow progress- no acute worsening of GI sxs


     cont nonoperative mgt








1/24/18- slowly improving- no emesis, pt is hungry- try diet


    cont other atbx. doubt significant mechanical obstruction








1/23/18- has normal bowel sounds and abd soft- some tenderness


    colon looks relativel normal on CT. Cont po, IV meds/atbx.


    Surgical intervention would be extreme risk to pt and only if


    she worsens to toxic megacolon.


1/25/18- slow progress- no acute worsening of GI sxs


     cont nonoperative mgt








1/24/18- slowly improving- no emesis, pt is hungry- try diet


    cont other atbx. doubt significant mechanical obstruction








1/23/18- has normal bowel sounds and abd soft- some tenderness


    colon looks relativel normal on CT. Cont po, IV meds/atbx.


    Surgical intervention would be extreme risk to pt and only if


    she worsens to toxic megacolon.

## 2018-01-29 NOTE — INFECTIOUS DISEASE PROGRESS NT
Progress Note


Date of Service


Jan 29, 2018.





Subjective


Pt evaluation today including:  conversation w/ patient, physical exam, chart 

review, lab review, review of studies, conversation w/ consultant, review of 

inpatient medication list


Patient feels about the same today.  Diarrhea slightly better.  Remains 

afebrile.  White blood cell count significantly elevated, but on steroid 

therapy.  No other new specific complaints.





   All Other Systems:  Reviewed and Negative





Medications





Current Inpatient Medications








 Medications


  (Trade)  Dose


 Ordered  Sig/Daniel


 Route  Start Time


 Stop Time Status Last Admin


Dose Admin


 


 Miscellaneous


 Information


  (Order Awaiting


 Action)  1 ea  QS


 N/A  1/16/18 08:00


 2/15/18 07:59  1/16/18 08:21


1 EA


 


 Miscellaneous


 Information


  (Order Awaiting


 Action)  1 ea  QS


 N/A  1/16/18 08:00


 2/15/18 07:59  1/16/18 08:21


1 EA


 


 Miscellaneous


 Information


  (Consult


 Glycemic


 Management


 Pharmacy)  1 ea  UD  PRN


 N/A  1/16/18 04:15


 2/15/18 04:14   


 


 


 Glucose


  (Glucose 40% Gel)  15-30


 GRAMS 15


 GRAMS...  UD  PRN


 PO  1/16/18 04:30


 2/15/18 04:29   


 


 


 Glucose


  (Glucose Chew


 Tab)  4-8


 Tablets 4


 Tabl...  UD  PRN


 PO  1/16/18 04:30


 2/15/18 04:29   


 


 


 Dextrose


  (Dextrose 50%


 50ML Syringe)  25-50ML OF


 50% DW IV


 FOR...  UD  PRN


 IV  1/16/18 04:30


 2/15/18 04:29  1/17/18 09:52


25 ML


 


 Glucagon


  (Glucagon Inj)  1 mg  UD  PRN


 SQ  1/16/18 04:30


 2/15/18 04:29   


 


 


 Ondansetron HCl


  (Zofran Inj)  4 mg  Q4H  PRN


 IV  1/16/18 18:15


 2/15/18 18:14  1/19/18 12:15


4 MG


 


 Vancomycin HCl


  (Vancomycin Oral


 Soln)  125 mg  Q6H


 PO  1/20/18 20:00


 2/3/18 19:59  1/29/18 19:47


125 MG


 


 Raspberry


  (Raspberry Syrup


 5ml Cup)  5 ml  Q6H


 PO  1/20/18 20:00


 2/3/18 19:59  1/29/18 19:47


5 ML


 


 Insulin Aspart


  (novoLOG ASPART)  **SLIDING


 SCALE**


 **G...  ACHS


 SC  1/22/18 07:00


 2/21/18 06:59  1/29/18 17:04


29 UNITS


 


 Metronidazole 500


 mg/Prmx  100 ml @ 


 100 mls/hr  Q8H


 IV  1/22/18 18:00


 2/1/18 17:59  1/29/18 18:30


100 MLS/HR


 


 Acetaminophen 650


 mg/Empty Bag  65 ml @ 


 260 mls/hr  Q6H  PRN


 IV  1/23/18 16:30


 2/22/18 16:29  1/29/18 10:55


260 MLS/HR


 


 Heparin Sodium/


 Dextrose  500 ml @ 


 24 mls/hr  [id number] PRN


 IV  1/25/18 10:30


 2/24/18 10:29  1/29/18 16:15


24 MLS/HR


 


 Diltiazem HCl 125


 mg/Dextrose  125 ml @ 0


 mls/hr  Q0M PRN


 IV  1/25/18 12:45


 2/24/18 11:29   


 


 


 Pantoprazole


 Sodium


  (Protonix Tab)  40 mg  QAM


 PO  1/26/18 09:00


 2/25/18 08:59  1/29/18 08:42


40 MG


 


 Levalbuterol


  (Xopenex 1.25MG/


 3ML Neb)  1.25 mg  Q6R


 INH  1/26/18 21:00


 2/25/18 20:59  1/29/18 19:29


1.25 MG


 


 Amiodarone HCL/


 Dextrose  200 ml @ 


 16.7 mls/hr  [id number]


 IV  1/27/18 17:45


 2/26/18 17:44  1/29/18 10:57


16.7 MLS/HR


 


 Methylprednisolone


 Sodium Succinate


 40 mg/Syringe  0.64 ml @ 


 1.5 mls/min  Q6H


 IV  1/27/18 14:00


 2/26/18 13:59  1/29/18 19:47


1.5 MLS/MIN


 


 Insulin Glargine


  (Lantus Solostar


 Pen)  SEE


 PROTOCOL


 TEXT  BID


 SC  1/27/18 21:00


 2/26/18 20:59 Future hold 1/29/18 08:54


25 UNITS


 


 Midodrine


  (Proamatine Tab)  5 mg  TID@08,12,17


 PO  1/28/18 17:00


 2/27/18 16:59  1/29/18 16:40


5 MG


 


 Albumin Human


  (Albumin 25%)  12.5 gm  TODAY@0915,1045


 IV  1/29/18 09:15


 1/29/18 21:00  1/29/18 12:15


12.5 GM


 


 Miscellaneous


 Information


  (Pending Order)  1 ea  ACHS


 N/A  1/29/18 16:15


 1/30/18 06:00   


 


 


 Insulin Aspart


  (novoLOG ASPART)  **SLIDING


 SCALE**


 **G...  0000,0400


 SC  1/30/18 00:00


 1/30/18 04:01   


 











Objective


Vital Signs











  Date Time  Temp Pulse Resp B/P (MAP) Pulse Ox O2 Delivery O2 Flow Rate FiO2


 


1/29/18 20:34 36.8 94 20 77/46 (56) 93 Room Air  


 


1/29/18 19:29  75 18  90 Room Air  


 


1/29/18 16:00      Room Air  


 


1/29/18 15:19 36.5 79 20 78/45 (56) 95 Room Air  





    75/48 (57)    


 


1/29/18 14:29  85 18  93 Room Air  


 


1/29/18 13:45 36.4 70  90/43 (59)    


 


1/29/18 13:15  76  100/53    


 


1/29/18 13:00  84  96/41    


 


1/29/18 12:45  84  107/47    


 


1/29/18 12:30  75  90/46    


 


1/29/18 12:15  76  105/40    


 


1/29/18 12:00      Room Air  


 


1/29/18 12:00  79  87/53    


 


1/29/18 11:51 36.4 73 20 91/55 (67) 97 Nasal Cannula 3.0 


 


1/29/18 11:45  76  90/33    


 


1/29/18 11:30  82  89/46    


 


1/29/18 11:15  100  83/49    


 


1/29/18 11:00  83  87/49    


 


1/29/18 10:45  87  100/49    


 


1/29/18 10:30  73  91/55    


 


1/29/18 10:21  75  97/53    


 


1/29/18 10:15 36.4 76  89/43 (58)    


 


1/29/18 08:00      Room Air  


 


1/29/18 07:45 36.3 70 20 92/56 (68) 98 Nasal Cannula 3.0 


 


1/29/18 07:05  72 18  95 Room Air  


 


1/29/18 04:00      Nasal Cannula 3.0 


 


1/29/18 03:43 36.4 92 17 98/64 (75) 92 Room Air  


 


1/29/18 02:16  87 18  99 Nasal Cannula 2.0 


 


1/29/18 00:00      Nasal Cannula 3.0 


 


1/28/18 23:45 36.5 87 18 83/57 (66) 91 Room Air  











Physical Exam


General Appearance:  no apparent distress, + pertinent finding (Chronically ill-

appearing)


Eyes:  normal inspection, EOMI, sclerae normal


ENT:  normal ENT inspection, pharynx normal


Neck:  supple, no adenopathy, trachea midline


Respiratory/Chest:  chest non-tender, no respiratory distress, no accessory 

muscle use, + rhonchi, + wheezing


Cardiovascular:  regular rate, rhythm, no gallop, no murmur


Abdomen:  normal bowel sounds, non tender, soft, no organomegaly


Extremities:  non-tender, no calf tenderness


Neurologic/Psychiatric:  alert, oriented x 3


Skin:  normal color, warm/dry, no rash


Lymphatic:  no adenopathy





Laboratory Results





Last 24 Hours








Test


  1/28/18


23:03 1/28/18


23:43 1/29/18


03:54 1/29/18


03:59


 


Activated Partial


Thromboplast Time 61.1 SECONDS 


  


  


  76.0 SECONDS 


 


 


Partial Thromboplastin Ratio 2.4    2.9 


 


Bedside Glucose  220 mg/dl  240 mg/dl  


 


White Blood Count    31.10 K/uL 


 


Red Blood Count    2.94 M/uL 


 


Hemoglobin    9.3 g/dL 


 


Hematocrit    30.1 % 


 


Mean Corpuscular Volume    102.4 fL 


 


Mean Corpuscular Hemoglobin    31.6 pg 


 


Mean Corpuscular Hemoglobin


Concent 


  


  


  30.9 g/dl 


 


 


Platelet Count    324 K/uL 


 


Mean Platelet Volume    10.1 fL 


 


Neutrophils (%) (Auto)    95.7 % 


 


Lymphocytes (%) (Auto)    1.3 % 


 


Monocytes (%) (Auto)    1.8 % 


 


Eosinophils (%) (Auto)    0.0 % 


 


Basophils (%) (Auto)    0.1 % 


 


Neutrophils # (Auto)    31.20 K/uL 


 


Lymphocytes # (Auto)    0.41 K/uL 


 


Monocytes # (Auto)    0.59 K/uL 


 


Eosinophils # (Auto)    0.00 K/uL 


 


Basophils # (Auto)    0.02 K/uL 


 


RDW Standard Deviation    64.8 fL 


 


RDW Coefficient of Variation    18.4 % 


 


Immature Granulocyte % (Auto)    1.1 % 


 


Immature Granulocyte # (Auto)    0.36 K/uL 


 


Nucleated RBC Absolute Count


(auto) 


  


  


  0.00 K/uL 


 


 


Nucleated Red Blood Cells %    0.0 % 


 


Sodium Level    133 mmol/L 


 


Potassium Level    4.2 mmol/L 


 


Chloride Level    96 mmol/L 


 


Carbon Dioxide Level    21 mmol/L 


 


Anion Gap    16.0 mmol/L 


 


Blood Urea Nitrogen    55 mg/dl 


 


Creatinine    8.34 mg/dl 


 


Est Creatinine Clear Calc


Drug Dose 


  


  


  8.6 ml/min 


 


 


Estimated GFR (


American) 


  


  


  5.5 


 


 


Estimated GFR (Non-


American 


  


  


  4.7 


 


 


BUN/Creatinine Ratio    6.5 


 


Random Glucose    228 mg/dl 


 


Calcium Level    8.3 mg/dl 


 


Phosphorus Level    8.8 mg/dl 


 


Magnesium Level    1.9 mg/dl 


 


Test


  1/29/18


06:43 1/29/18


10:07 1/29/18


11:04 1/29/18


13:45


 


Bedside Glucose 189 mg/dl   261 mg/dl  148 mg/dl 


 


Activated Partial


Thromboplast Time 


  90.0 SECONDS 


  


  


 


 


Partial Thromboplastin Ratio  3.5   


 


Test


  1/29/18


16:30 1/29/18


16:59 


  


 


 


Bedside Glucose 196 mg/dl    


 


Activated Partial


Thromboplast Time 


  57.5 SECONDS 


  


  


 


 


Partial Thromboplastin Ratio  2.2   











Assessment and Plan


Urinary tract infection with ESBL producing E coli, as well as now C difficile 

colitis and possible small-bowel obstruction. .  Small bowel obstruction 

appears improved,  Increasing WBC likely due to combination of C diff in 

steroids.  Will continue to follow.

## 2018-01-29 NOTE — DIAGNOSTIC IMAGING REPORT
R ANKLE MIN 3 VIEWS ROUTINE



CLINICAL HISTORY: Right ankle pain status post trauma  ECCHYMOSIS



COMPARISON: None.



DISCUSSION: There are corticated ossicles adjacent to the medial and lateral

malleoli, likely the sequela of prior trauma. In addition there is an

age-indeterminate avulsion arising from the medial malleolus. The ankle mortise

appears intact. There is Achilles insertional and plantar calcaneal spurring.

The bones are mildly osteopenic. There is nonaggressive periostitis involving

the medial malleolus.



IMPRESSION: 

1. Age-indeterminate avulsion arising from the medial malleolar tip

2. Periostitis involving the medial malleolus. This appears nonaggressive







Electronically signed by:  Yrn Hernandez M.D.

1/29/2018 6:37 PM



Dictated Date/Time:  1/29/2018 6:35 PM

## 2018-01-29 NOTE — PROGRESS NOTE
Medicine Progress Note


Date & Time of Visit:


Jan 29, 2018 at 16:16.


Subjective


stable clinical picture, no real change in BP, pt reports "feeling lousy" as 

she has for the last week.  Persistent hypotension despite treatment of 

infections and midodrine.  Off ertapenem and improvement in diarrhea today-2BMs 

reported so far.  Blood sugar and WBC are up related to high dose steroids, 

however, she reports breathing easier and coughing less.  Discussed plan with 

pharmacist who is considering insulin drip later today based on dinner values.  

Pt denies abdominal pain and this is again improved on exam.  She is tolerating 

PO and reports min appetite.  She had twisted her L ankle prior to arrival and 

reports that this is sore.  she is not currently bearing weight on it.  

Continues on the amio drip per Cards with stable heart rate in the 70s 

overnight and persistent atrial flutter on telemetry.  Awaiting plan to switch 

to PO.  Continues on the heparin drip and no coumadin has been started until 

clinical appearance improves, but this is the long-term plan.





Objective





Last 8 Hrs








  Date Time  Temp Pulse Resp B/P (MAP) Pulse Ox O2 Delivery O2 Flow Rate FiO2


 


1/29/18 15:19 36.5 79 20 78/45 (56) 95 Room Air  





    75/48 (57)    


 


1/29/18 14:29  85 18  93 Room Air  


 


1/29/18 13:45 36.4 70  90/43 (59)    


 


1/29/18 13:15  76  100/53    


 


1/29/18 13:00  84  96/41    


 


1/29/18 12:45  84  107/47    


 


1/29/18 12:30  75  90/46    


 


1/29/18 12:15  76  105/40    


 


1/29/18 12:00      Room Air  


 


1/29/18 12:00  79  87/53    


 


1/29/18 11:51 36.4 73 20 91/55 (67) 97 Nasal Cannula 3.0 


 


1/29/18 11:45  76  90/33    


 


1/29/18 11:30  82  89/46    


 


1/29/18 11:15  100  83/49    


 


1/29/18 11:00  83  87/49    


 


1/29/18 10:45  87  100/49    


 


1/29/18 10:30  73  91/55    


 


1/29/18 10:21  75  97/53    


 


1/29/18 10:15 36.4 76  89/43 (58)    








Physical Exam:


GEN: obese, in no acute distress, alert and appropriate, ill-appearing


HEENT: NC/AT, normal sclerae, MMM


CARDIO:reg rate, S1/2 heard without m/g/r, HD in place in L anterior chest wall 

and appears clean and intact with no redness.  


LUNGS: coarse rhonchi throughout, some wheezing present


ABD: soft, soreness in upper abdomen to palpation, continues to improve since 

exam yesterday


EXTREMITY: RP and DP palpable 2+ bilat, no LE swelling or edema, extremities 

are warm and well-perfused, R ankle with limited active ROM and bruises all 

over.


NEURO: CN 2-12 grossly intact


MUSC: generalized weakness but no gross focal deficits, moves extremities 

equally.


SKIN:  warm and dry


Laboratory Results:


1/29/18 03:59








Red Blood Count 2.94, Mean Corpuscular Volume 102.4, Mean Corpuscular 

Hemoglobin 31.6, Mean Corpuscular Hemoglobin Concent 30.9, Mean Platelet Volume 

10.1, Neutrophils (%) (Auto) 95.7, Lymphocytes (%) (Auto) 1.3, Monocytes (%) (

Auto) 1.8, Eosinophils (%) (Auto) 0.0, Basophils (%) (Auto) 0.1, Neutrophils # (

Auto) 31.20, Lymphocytes # (Auto) 0.41, Monocytes # (Auto) 0.59, Eosinophils # (

Auto) 0.00, Basophils # (Auto) 0.02





1/29/18 03:59

















Test


  1/16/18


02:55 1/16/18


03:37 1/16/18


15:00 1/17/18


05:34


 


Creatine Kinase MB


  1.4 ng/ml


(0.5-3.6) 


  


  


 


 


Creatine Kinase MB Ratio  (0-3.0)    


 


Troponin I


  0.329 ng/ml


(0-0.045) 


  


  


 


 


Globulin


  3.6 gm/dl


(2.5-4.0) 


  


  


 


 


Albumin/Globulin Ratio 0.6 (0.9-2)    


 


Procalcitonin


  4.56 ng/ml


(0-0.5) 


  


  


 


 


Blood Gas Sample Site  L Brachial   


 


Bedside Blood Gas pH (LAB)


  


  7.38


(7.35-7.45) 


  


 


 


Bedside Blood Gas pCO2 (LAB)


  


  33 mmHg


(35-46) 


  


 


 


Bedside Blood Gas pO2 (LAB)


  


  76 mmHg


(80-95) 


  


 


 


Bedside Blood Gas HCO3 (LAB)


  


  19 meq/L


(19-24) 


  


 


 


Bedside Blood Gas Total CO2


  


  20 mEq/l


(24-31) 


  


 


 


Bedside Blood Gas Base Excess


(LAB) 


  -6.0 meq/L


(-9-1.8) 


  


 


 


Bedside Blood Gas O2


Saturation 


  95.0 % (90-95) 


  


  


 


 


Kiko Test  Pass   


 


Oxygen Delivery Device  Room Air   


 


Urine Color   DK YELLOW  


 


Urine Appearance   TURBID (CLEAR)  


 


Urine pH   5.5 (4.5-7.5)  


 


Urine Specific Gravity


  


  


  1.021


(1.000-1.030) 


 


 


Urine Protein   3+ (NEG)  


 


Urine Glucose (UA)   TRACE (NEG)  


 


Urine Ketones   TRACE (NEG)  


 


Urine Occult Blood   3+ (NEG)  


 


Urine Nitrite   POS (NEG)  


 


Urine Bilirubin   NEG (NEG)  


 


Urine Urobilinogen   NEG (NEG)  


 


Urine Leukocyte Esterase   LARGE (NEG)  


 


Urine WBC (Auto)   >30 /hpf (0-5)  


 


Urine RBC (Auto)


  


  


  5-10 /hpf


(0-4) 


 


 


Urine Hyaline Casts (Auto)   1-5 /lpf (0-5)  


 


Urine Epithelial Cells (Auto)   >30 /lpf (0-5)  


 


Urine Bacteria (Auto)   1+ (NEG)  


 


Urine Pathogenic Casts    /lpf (0)  


 


Urine Yeast (Auto)    (NONE PRSENT)  


 


Estimated Average Glucose    140 mg/dl 


 


Hemoglobin A1c


  


  


  


  6.5 %


(4.5-5.6)


 


Test


  1/17/18


09:43 1/18/18


05:20 1/20/18


01:39 1/22/18


09:00


 


Total Bilirubin


  0.8 mg/dl


(0.2-1) 


  


  


 


 


Direct Bilirubin


  0.1 mg/dl


(0-0.2) 


  


  


 


 


Aspartate Amino Transf


(AST/SGOT) 11 U/L (15-37) 


  


  


  


 


 


Alanine Aminotransferase


(ALT/SGPT) 11 U/L (12-78) 


  


  


  


 


 


Alkaline Phosphatase


  115 U/L


() 


  


  


 


 


Total Protein


  6.2 gm/dl


(6.4-8.2) 


  


  


 


 


Albumin


  2.2 gm/dl


(3.4-5.0) 


  


  


 


 


Amylase Level


  22 U/L


() 


  


  


 


 


Random Vancomycin Level  21.9 mcg/ml   


 


Red Blood Cell Morphology   Unremarkable  


 


Stool Occult Blood


  


  


  


  POSITIVE


(NEGATIVE)


 


Test


  1/22/18


23:10 1/23/18


06:04 1/24/18


12:40 1/25/18


05:28


 


Lactic Acid Level


  1.0 mmol/L


(0.4-2.0) 


  


  


 


 


Chemistry Specimen Hemolysis     


 


Cortisol Response to


Stimulation 


  


  Cortisol


Baseline 


 


 


Toxic Granulation    1+ 


 


Toxic Vacuolation    1+ 


 


Polychromasia    1+ 


 


Basophilic Stippling    OCCASIONAL 


 


Test


  1/25/18


10:46 1/26/18


05:00 1/26/18


14:29 1/27/18


05:14


 


Prothrombin Time


  12.2 SECONDS


(9.0-12.0) 


  


  


 


 


Prothromb Time International


Ratio 1.2 (0.9-1.1) 


  


  


  


 


 


Lipase


  


  355 U/L


() 


  


 


 


Thyroid Stimulating Hormone


(TSH) 


  


  4.040 uIu/ml


(0.300-4.500) 


 


 


Digoxin Level


  


  


  


  0.4 ng/ml


(0.8-2.0)


 


Test


  1/27/18


11:50 1/29/18


03:59 1/29/18


10:07 1/29/18


13:45


 


Influenza Type A (RT-PCR)


  Neg for Influ


A (NEG) 


  


  


 


 


Influenza Type B (RT-PCR)


  Neg for Influ


B (NEG) 


  


  


 


 


White Blood Count


  


  31.10 K/uL


(4.8-10.8) 


  


 


 


Red Blood Count


  


  2.94 M/uL


(4.2-5.4) 


  


 


 


Hemoglobin


  


  9.3 g/dL


(12.0-16.0) 


  


 


 


Hematocrit  30.1 % (37-47)   


 


Mean Corpuscular Volume


  


  102.4 fL


() 


  


 


 


Mean Corpuscular Hemoglobin


  


  31.6 pg


(25-34) 


  


 


 


Mean Corpuscular Hemoglobin


Concent 


  30.9 g/dl


(32-36) 


  


 


 


Platelet Count


  


  324 K/uL


(130-400) 


  


 


 


Mean Platelet Volume


  


  10.1 fL


(7.4-10.4) 


  


 


 


Neutrophils (%) (Auto)  95.7 %   


 


Lymphocytes (%) (Auto)  1.3 %   


 


Monocytes (%) (Auto)  1.8 %   


 


Eosinophils (%) (Auto)  0.0 %   


 


Basophils (%) (Auto)  0.1 %   


 


Neutrophils # (Auto)


  


  31.20 K/uL


(1.4-6.5) 


  


 


 


Lymphocytes # (Auto)


  


  0.41 K/uL


(1.2-3.4) 


  


 


 


Monocytes # (Auto)


  


  0.59 K/uL


(0.11-0.59) 


  


 


 


Eosinophils # (Auto)


  


  0.00 K/uL


(0-0.5) 


  


 


 


Basophils # (Auto)


  


  0.02 K/uL


(0-0.2) 


  


 


 


RDW Standard Deviation


  


  64.8 fL


(36.4-46.3) 


  


 


 


RDW Coefficient of Variation


  


  18.4 %


(11.5-14.5) 


  


 


 


Immature Granulocyte % (Auto)  1.1 %   


 


Immature Granulocyte # (Auto)


  


  0.36 K/uL


(0.00-0.02) 


  


 


 


Nucleated RBC Absolute Count


(auto) 


  0.00 K/uL


(0-0) 


  


 


 


Nucleated Red Blood Cells %  0.0 %   


 


Anion Gap


  


  16.0 mmol/L


(3-11) 


  


 


 


Est Creatinine Clear Calc


Drug Dose 


  8.6 ml/min 


  


  


 


 


Estimated GFR (


American) 


  5.5 


  


  


 


 


Estimated GFR (Non-


American 


  4.7 


  


  


 


 


BUN/Creatinine Ratio  6.5 (10-20)   


 


Calcium Level


  


  8.3 mg/dl


(8.5-10.1) 


  


 


 


Phosphorus Level


  


  8.8 mg/dl


(2.5-4.9) 


  


 


 


Magnesium Level


  


  1.9 mg/dl


(1.8-2.4) 


  


 


 


Activated Partial


Thromboplast Time 


  


  90.0 SECONDS


(21.0-31.0) 


 


 


Partial Thromboplastin Ratio   3.5  


 


Bedside Glucose


  


  


  


  148 mg/dl


(70-90)














 Date/Time


Source Procedure


Growth Status


 


 


 1/27/18 13:08


Blood Blood Culture - Preliminary


NO GROWTH TO DATE. Resulted


 


 1/16/18 00:00


Nasal MRSA DNA Surveillance Screen - Final


Specimen Positive for MRSA by DNA Probe Complete


 


 1/20/18 15:30


Stool C.difficile Toxin B Gene (PCR) - Final


Positive for C. difficile toxin B gene Complete





 1/27/18 03:45


Sputum Expectorated Sputum Gram Stain - Final Complete


 


 1/27/18 03:45


Sputum Expectorated Sputum Sputum Culture - Final


MODERATE NORMAL HEENA. Complete


 


 1/16/18 05:22


Urine , Clean Catch Urine Culture - Final


Escherichia Coli Complete








Last 24 Hours








Test


  1/28/18


20:08 1/28/18


23:03 1/28/18


23:43 1/29/18


03:54


 


Bedside Glucose 286 mg/dl   220 mg/dl  240 mg/dl 


 


Activated Partial


Thromboplast Time 


  61.1 SECONDS 


  


  


 


 


Partial Thromboplastin Ratio  2.4   


 


Test


  1/29/18


03:59 1/29/18


06:43 1/29/18


10:07 1/29/18


11:04


 


White Blood Count 31.10 K/uL    


 


Red Blood Count 2.94 M/uL    


 


Hemoglobin 9.3 g/dL    


 


Hematocrit 30.1 %    


 


Mean Corpuscular Volume 102.4 fL    


 


Mean Corpuscular Hemoglobin 31.6 pg    


 


Mean Corpuscular Hemoglobin


Concent 30.9 g/dl 


  


  


  


 


 


Platelet Count 324 K/uL    


 


Mean Platelet Volume 10.1 fL    


 


Neutrophils (%) (Auto) 95.7 %    


 


Lymphocytes (%) (Auto) 1.3 %    


 


Monocytes (%) (Auto) 1.8 %    


 


Eosinophils (%) (Auto) 0.0 %    


 


Basophils (%) (Auto) 0.1 %    


 


Neutrophils # (Auto) 31.20 K/uL    


 


Lymphocytes # (Auto) 0.41 K/uL    


 


Monocytes # (Auto) 0.59 K/uL    


 


Eosinophils # (Auto) 0.00 K/uL    


 


Basophils # (Auto) 0.02 K/uL    


 


RDW Standard Deviation 64.8 fL    


 


RDW Coefficient of Variation 18.4 %    


 


Immature Granulocyte % (Auto) 1.1 %    


 


Immature Granulocyte # (Auto) 0.36 K/uL    


 


Nucleated RBC Absolute Count


(auto) 0.00 K/uL 


  


  


  


 


 


Nucleated Red Blood Cells % 0.0 %    


 


Activated Partial


Thromboplast Time 76.0 SECONDS 


  


  90.0 SECONDS 


  


 


 


Partial Thromboplastin Ratio 2.9   3.5  


 


Sodium Level 133 mmol/L    


 


Potassium Level 4.2 mmol/L    


 


Chloride Level 96 mmol/L    


 


Carbon Dioxide Level 21 mmol/L    


 


Anion Gap 16.0 mmol/L    


 


Blood Urea Nitrogen 55 mg/dl    


 


Creatinine 8.34 mg/dl    


 


Est Creatinine Clear Calc


Drug Dose 8.6 ml/min 


  


  


  


 


 


Estimated GFR (


American) 5.5 


  


  


  


 


 


Estimated GFR (Non-


American 4.7 


  


  


  


 


 


BUN/Creatinine Ratio 6.5    


 


Random Glucose 228 mg/dl    


 


Calcium Level 8.3 mg/dl    


 


Phosphorus Level 8.8 mg/dl    


 


Magnesium Level 1.9 mg/dl    


 


Bedside Glucose  189 mg/dl   261 mg/dl 


 


Test


  1/29/18


13:45 


  


  


 


 


Bedside Glucose 148 mg/dl    











Assessment & Plan


58 yo F presented with hypotension after dialysis.  She was transferred to [facility] 

from [facility] and was admitted to the ICU.  She did have some cough productive 

of greenish phlegm for two weeks and was started on empiric abx upon transfer 

from [facility] on 1/16.  She also consistently mentioned some abdominal pain.  

Successive KUBs were performed revealing no convincing evidence for 

obstruction. No acute pulmonary process was noted on CXR and she was saturating 

well on room air. Flu was negatigve and she was started on empiric treatment 

with Vanc and Zosyn.  She was put on a pressor and midodrine and abx were 

subsequently changed to Primaxin to treat an ESBL-producing E coli in her 

urine. Blood cultures were negative and all lines appear clean.  ID was 

consulted.  She developed c-diff colitis and was placed on Vanc and Flagyl. She 

was transferred out of the ICU on 1/18 as she was off pressors and midodrine, 

maintaining her BP.  CT a/p revealed a partial SBO and she was made NPO on 1/ 22.  Gen Surg was consulted and recommended against surgery at this time unless 

she declined clinically.  No NGT was placed so that she could continue to 

receive the PO Vanc to treat her C-diff.  GI was consulted and is assisting 

with c-diff management.  They added a Vanc enema QID on 1/23.  The patient 

denies any nausea today and had two loose BMs.  She is still having a 

productive greenish cough and denies fevers.  She is fatigued from HD today.  

She otherwise is tolerating PO, and mentating well.  She reports some 

improvement in her abdomen today but still has pain.  She reports 4 BMs today.





1/24:  CT chest was performed in the setting of persistent hypoxia, rhonchi on 

exam and cough productive of greenish sputum.  On review of her abx, she is on 

Vanc PO, Vanc enema, Flagyl IV and Ertapenem.  Microbes not covered include 

Pseudomonas, MRSA, and atypicals such as Mycoplasma.  CT chest did not reveal 

an infiltrate but clinical picture resembles a bronchitis.  Azithromycin was 

added to cover atypicals.  Other cause of cough may be fluid overload as seen 

on CT.  This is being managed through HD.  Midodrine was started for BP 

management at a low dose.  Cosyntropin stim test was negative for adrenal 

insufficiency.  KUB did not show progression of megacolon


1/25:  Pt went into atrial flutter overnight around 7pm.  BP is around the same 

on the midodrine.  Pt feels poorly-fatigued with no worsening SOB or chest pain 

at this time.  Apathetic to food.  HR has been in the 120-130s overnight.  Per 

nurse no further blood in stools and vanc enemas stopped by GI as stools are 

more formed.  Cough still present-one dose azithro given last night and plan to 

cont 5 day course.  Diltiazem 10mg IV ordered and heparin drip with Kranthi daniels 

4.  Cardiology consulted. Leukocytosis increased.  Poss 2/2 cosyntropin stim 

test yesterday.  She denies any fevers or chills and appears improved overall 

today with an improvement in her abdominal exam. 


1/26:  WBC up to 24 K-discussed with ID, uncertain etiology, poss leukomoid 

reaction.  Monitor in am.  Pt appears the same and is ill-appearing but 

reporting feeling somewhat better.  Denies abdominal pain and KUB now eveals no 

obstruction.  Tolerating reg diet.  Pain to abdominal palpation.  HR is 

controlled and she is being transitioned to coumadin with Cards placing her on 

digoxin. Remains in good spirits.  Fatigued after HD today-removed 2L to 

optimize volume status.  Still coughing up junk and lungs sound poor.  Adding 

bronchodilator and flutter valve.  Remains hypotensive-on low dose midodrine. 


1/27:  WBC down to 17K but has a rectal temp of 38.  Feels worse overall today, 

continues to cough up greenish sputum which is pending culture.  Discussed the 

case with the ICU physician as she continues to be hypotensive.  We reviewed 

her scans together and decided to try solumedrol for COPD exacerbation and 

monitor her closely.  She remains on Azithro, Ertapenem, Vanc PO, Vanc enema 

and Flagyl IV.  She reports one formed BM today and an improvement in her 

abdominal pain, which is also improved somewhat on exam.  She is still very ill 

appearing.  Will repeat Blood cultures on her now in setting of fever.  She 

does have a prosthestic valve.  Tolerating PO but has no appetite.  


1/28:  she has improved on the solumedrol clinically.  Still states she has no 

appetite and has poor intake of food.  3 BMs overnight and she reports diarrhea 

being worse as a result.  Noted that she has already had 10 days of ertapenem, 

so this was stopped as may be contributing to this issue.  She continues on the 

Vanc PO and IV Flagyl with Azithro for pulm coverage.  Heart rate and BP 

improved on the amio drip-cont per Cards. 


1/29:  stable clinical picture, no real change in BP, pt reports "feeling lousy

" as she has for the last week.  Persistent hypotension despite treatment of 

infections and midodrine.  Off ertapenem and improvement in diarrhea today-2BMs 

reported so far.  Blood sugar and WBC are up related to high dose steroids, 

however, she reports breathing easier and coughing less.  Discussed plan with 

pharmacist who is considering insulin drip later today based on dinner values.  

Pt denies abdominal pain and this is again improved on exam.  She is tolerating 

PO and reports min appetite.  She had twisted her L ankle prior to arrival and 

reports that this is sore.  she is not currently bearing weight on it.  

Continues on the amio drip per Cards with stable heart rate in the 70s 

overnight and persistent atrial flutter on telemetry.  Awaiting plan to switch 

to PO.  Continues on the heparin drip and no coumadin has been started until 

clinical appearance improves, but this is the long-term plan. 





1.  Hypotension-stable on midodrine, more hypotensive than her baseline.  

Limited records outpatient but per HD unit this is around 100 systolic.  


2.  ESBL E coli UTI-course of ertapenem completed


3.  C-diff colitis-Vanc PO, Flagyl IV, continues to have loose stools.


4.  ESRD with fluid overload-on HD, does not make urine. Cont per Nephro.  2L 

off last session, HD scheduled for today.


5.  Hypoxia 2/2 fluid overload in setting of acute COPD exacerbation-  has 

completed 5 days of azithromycin which has been stopped, cont bronchodilators, 

steroids.  She is  really using the oxygen more for her comfort at this point.  

Lungs sound stable without much improvement today.  May help for her to 

ambulate and move more, encouraged ambulation with assist as tolerated.


6   SBO-resolved.  KUB reveals resolution of the obstruction.  Tolerating PO


7.  Atrial flutter-anticoagulation with heparin with close H/H monitoring.  

Holding off on adding coumadin while patient is ill-appearing.  Dig added by 

Cardiology initially without a load and heart rate remained 130s-140s 

overnight.  BB and CCBs relatively contraindicated in setting of hypotension.  

Amiodarone IV with improvement in HR and hemodynamics. Await Cards plan.


8.  Leukocytosis-worsened today likely 2/2 the steroids. 


9.  Anemia--stable on heparin drip.  No active bleeding.


10.  Cirrhosis-will need outpatient assessment with GI or PCP.


11.  DMII-uncontrolled on dextrose drip (amio) and steroids added yesterday.  

Discussed treatment plan with pharmacist who is treating more aggressively and 

is considering drip later today.


12.  Obesity


13.  R ankle sprain 2/2/ recent trauma with limited ROM and ecchymosis.  Xrays 

today to look for fracture.


 


Full Code


DVT proph-heparin


Dispo-cont tele, if she continues to worsen will move to the ICU.  This was 

discussed with Dr. Dos Santos (ICU attending), Dr. Coulter and the patient. 





Nisha Escobar DO


[facility] Hospitalist


Consultants:


Nephro-Oncu


Farhad


Current Inpatient Medications:





Current Inpatient Medications








 Medications


  (Trade)  Dose


 Ordered  Sig/Daniel


 Route  Start Time


 Stop Time Status Last Admin


Dose Admin


 


 Miscellaneous


 Information


  (Order Awaiting


 Action)  1 ea  QS


 N/A  1/16/18 08:00


 2/15/18 07:59  1/16/18 08:21


1 EA


 


 Miscellaneous


 Information


  (Order Awaiting


 Action)  1 ea  QS


 N/A  1/16/18 08:00


 2/15/18 07:59  1/16/18 08:21


1 EA


 


 Miscellaneous


 Information


  (Consult


 Glycemic


 Management


 Pharmacy)  1 ea  UD  PRN


 N/A  1/16/18 04:15


 2/15/18 04:14   


 


 


 Glucose


  (Glucose 40% Gel)  15-30


 GRAMS 15


 GRAMS...  UD  PRN


 PO  1/16/18 04:30


 2/15/18 04:29   


 


 


 Glucose


  (Glucose Chew


 Tab)  4-8


 Tablets 4


 Tabl...  UD  PRN


 PO  1/16/18 04:30


 2/15/18 04:29   


 


 


 Dextrose


  (Dextrose 50%


 50ML Syringe)  25-50ML OF


 50% DW IV


 FOR...  UD  PRN


 IV  1/16/18 04:30


 2/15/18 04:29  1/17/18 09:52


25 ML


 


 Glucagon


  (Glucagon Inj)  1 mg  UD  PRN


 SQ  1/16/18 04:30


 2/15/18 04:29   


 


 


 Ondansetron HCl


  (Zofran Inj)  4 mg  Q4H  PRN


 IV  1/16/18 18:15


 2/15/18 18:14  1/19/18 12:15


4 MG


 


 Vancomycin HCl


  (Vancomycin Oral


 Soln)  125 mg  Q6H


 PO  1/20/18 20:00


 2/3/18 19:59  1/29/18 13:55


125 MG


 


 Raspberry


  (Raspberry Syrup


 5ml Cup)  5 ml  Q6H


 PO  1/20/18 20:00


 2/3/18 19:59  1/29/18 13:55


5 ML


 


 Insulin Aspart


  (novoLOG ASPART)  **SLIDING


 SCALE**


 **G...  ACHS


 SC  1/22/18 07:00


 2/21/18 06:59  1/29/18 14:25


15 UNITS


 


 Metronidazole 500


 mg/Prmx  100 ml @ 


 100 mls/hr  Q8H


 IV  1/22/18 18:00


 2/1/18 17:59  1/29/18 13:54


100 MLS/HR


 


 Acetaminophen 650


 mg/Empty Bag  65 ml @ 


 260 mls/hr  Q6H  PRN


 IV  1/23/18 16:30


 2/22/18 16:29  1/29/18 10:55


260 MLS/HR


 


 Azithromycin


  (Zithromax Tab)  250 mg  PM


 PO  1/25/18 21:00


 1/31/18 20:59  1/28/18 20:32


250 MG


 


 Heparin Sodium/


 Dextrose  500 ml @ 


 24 mls/hr  [id number] PRN


 IV  1/25/18 10:30


 2/24/18 10:29  1/29/18 04:48


28 MLS/HR


 


 Diltiazem HCl 125


 mg/Dextrose  125 ml @ 0


 mls/hr  Q0M PRN


 IV  1/25/18 12:45


 2/24/18 11:29   


 


 


 Pantoprazole


 Sodium


  (Protonix Tab)  40 mg  QAM


 PO  1/26/18 09:00


 2/25/18 08:59  1/29/18 08:42


40 MG


 


 Levalbuterol


  (Xopenex 1.25MG/


 3ML Neb)  1.25 mg  Q6R


 INH  1/26/18 21:00


 2/25/18 20:59  1/29/18 14:28


1.25 MG


 


 Amiodarone HCL/


 Dextrose  200 ml @ 


 16.7 mls/hr  [id number]


 IV  1/27/18 17:45


 2/26/18 17:44  1/29/18 10:57


16.7 MLS/HR


 


 Methylprednisolone


 Sodium Succinate


 40 mg/Syringe  0.64 ml @ 


 1.5 mls/min  Q6H


 IV  1/27/18 14:00


 2/26/18 13:59  1/29/18 13:55


1.5 MLS/MIN


 


 Insulin Glargine


  (Lantus Solostar


 Pen)  SEE


 PROTOCOL


 TEXT  BID


 SC  1/27/18 21:00


 2/26/18 20:59 Future hold 1/29/18 08:54


25 UNITS


 


 Midodrine


  (Proamatine Tab)  5 mg  TID@08,12,17


 PO  1/28/18 17:00


 2/27/18 16:59  1/29/18 13:58


5 MG


 


 Albumin Human


  (Albumin 25%)  12.5 gm  TODAY@0915,1045


 IV  1/29/18 09:15


 1/29/18 21:00  1/29/18 12:15


12.5 GM


 


 Miscellaneous


 Information


  (Pending Order)  1 ea  [facility]


 N/A  1/29/18 16:15


 1/30/18 06:00   


 


 


 Insulin Aspart


  (novoLOG ASPART)  **SLIDING


 SCALE**


 **G...  0000,0400


 SC  1/30/18 00:00


 1/30/18 04:01

## 2018-01-29 NOTE — DIALYSIS PROGRESS NOTE
Nephrology Dialysis Note


Date of Service:


Jan 29, 2018.


Subjective


c/o "pain all over" in her joints/ mm/ back and swati distal RLE > no chest pain 

or sob.  no n/v.  states she has been oob past 24 hrs





Objective











  Date Time  Temp Pulse Resp B/P (MAP) Pulse Ox O2 Delivery O2 Flow Rate FiO2


 


1/29/18 12:15  76  105/40    


 


1/29/18 12:00      Room Air  


 


1/29/18 12:00  79  87/53    


 


1/29/18 11:51 36.4 73 20 91/55 (67) 97 Nasal Cannula 3.0 


 


1/29/18 11:45  76  90/33    


 


1/29/18 11:30  82  89/46    


 


1/29/18 11:15  100  83/49    


 


1/29/18 11:00  83  87/49    


 


1/29/18 10:45  87  100/49    


 


1/29/18 10:30  73  91/55    


 


1/29/18 10:21  75  97/53    


 


1/29/18 10:15 36.4 76  89/43 (58)    


 


1/29/18 08:00      Room Air  


 


1/29/18 07:45 36.3 70 20 92/56 (68) 98 Nasal Cannula 3.0 


 


1/29/18 07:05  72 18  95 Room Air  


 


1/29/18 04:00      Nasal Cannula 3.0 


 


1/29/18 03:43 36.4 92 17 98/64 (75) 92 Room Air  


 


1/29/18 02:16  87 18  99 Nasal Cannula 2.0 


 


1/29/18 00:00      Nasal Cannula 3.0 


 


1/28/18 23:45 36.5 87 18 83/57 (66) 91 Room Air  


 


1/28/18 20:00      Nasal Cannula 3.0 


 


1/28/18 19:20  82 18  98 Nasal Cannula 2.0 


 


1/28/18 18:40 36.4 95 20 91/57 (68) 99 Nasal Cannula 3.0 


 


1/28/18 16:00      Nasal Cannula 2.0 


 


1/28/18 15:20 36.3 86 20 93/59 (70) 99 Nasal Cannula 3.0 


 


1/28/18 14:39  95 18  98 Nasal Cannula 3.0 








Physical Exam:


General-on RA, A& 0 x 3


Eyes-eomi


ENT-dry mm


Neck-supple


Lungs-diminished


Heart-irregular in 70s


Abdomen-soft NT +BS


Extremities-trace BL edema


Neuro-garrido, fluent speech





Current Inpatient Medications








 Medications


  (Trade)  Dose


 Ordered  Sig/Daniel


 Route  Start Time


 Stop Time Status Last Admin


Dose Admin


 


 Miscellaneous


 Information


  (Order Awaiting


 Action)  1 ea  QS


 N/A  1/16/18 08:00


 2/15/18 07:59  1/16/18 08:21


1 EA


 


 Miscellaneous


 Information


  (Order Awaiting


 Action)  1 ea  QS


 N/A  1/16/18 08:00


 2/15/18 07:59  1/16/18 08:21


1 EA


 


 Miscellaneous


 Information


  (Consult


 Glycemic


 Management


 Pharmacy)  1 ea  UD  PRN


 N/A  1/16/18 04:15


 2/15/18 04:14   


 


 


 Glucose


  (Glucose 40% Gel)  15-30


 GRAMS 15


 GRAMS...  UD  PRN


 PO  1/16/18 04:30


 2/15/18 04:29   


 


 


 Glucose


  (Glucose Chew


 Tab)  4-8


 Tablets 4


 Tabl...  UD  PRN


 PO  1/16/18 04:30


 2/15/18 04:29   


 


 


 Dextrose


  (Dextrose 50%


 50ML Syringe)  25-50ML OF


 50% DW IV


 FOR...  UD  PRN


 IV  1/16/18 04:30


 2/15/18 04:29  1/17/18 09:52


25 ML


 


 Glucagon


  (Glucagon Inj)  1 mg  UD  PRN


 SQ  1/16/18 04:30


 2/15/18 04:29   


 


 


 Ondansetron HCl


  (Zofran Inj)  4 mg  Q4H  PRN


 IV  1/16/18 18:15


 2/15/18 18:14  1/19/18 12:15


4 MG


 


 Vancomycin HCl


  (Vancomycin Oral


 Soln)  125 mg  Q6H


 PO  1/20/18 20:00


 2/3/18 19:59  1/29/18 08:56


125 MG


 


 Raspberry


  (Raspberry Syrup


 5ml Cup)  5 ml  Q6H


 PO  1/20/18 20:00


 2/3/18 19:59  1/29/18 08:42


5 ML


 


 Insulin Aspart


  (novoLOG ASPART)  **SLIDING


 SCALE**


 **G...  ACHS


 SC  1/22/18 07:00


 2/21/18 06:59  1/29/18 08:54


16 UNITS


 


 Metronidazole 500


 mg/Prmx  100 ml @ 


 100 mls/hr  Q8H


 IV  1/22/18 18:00


 2/1/18 17:59  1/29/18 02:08


100 MLS/HR


 


 Acetaminophen 650


 mg/Empty Bag  65 ml @ 


 260 mls/hr  Q6H  PRN


 IV  1/23/18 16:30


 2/22/18 16:29  1/29/18 10:55


260 MLS/HR


 


 Azithromycin


  (Zithromax Tab)  250 mg  PM


 PO  1/25/18 21:00


 1/31/18 20:59  1/28/18 20:32


250 MG


 


 Heparin Sodium/


 Dextrose  500 ml @ 


 28 mls/hr  [id number] PRN


 IV  1/25/18 10:30


 2/24/18 10:29  1/29/18 04:48


28 MLS/HR


 


 Diltiazem HCl 125


 mg/Dextrose  125 ml @ 0


 mls/hr  Q0M PRN


 IV  1/25/18 12:45


 2/24/18 11:29   


 


 


 Pantoprazole


 Sodium


  (Protonix Tab)  40 mg  QAM


 PO  1/26/18 09:00


 2/25/18 08:59  1/29/18 08:42


40 MG


 


 Levalbuterol


  (Xopenex 1.25MG/


 3ML Neb)  1.25 mg  Q6R


 INH  1/26/18 21:00


 2/25/18 20:59  1/29/18 07:04


1.25 MG


 


 Amiodarone HCL/


 Dextrose  200 ml @ 


 16.7 mls/hr  [id number]


 IV  1/27/18 17:45


 2/26/18 17:44  1/29/18 10:57


16.7 MLS/HR


 


 Methylprednisolone


 Sodium Succinate


 40 mg/Syringe  0.64 ml @ 


 1.5 mls/min  Q6H


 IV  1/27/18 14:00


 2/26/18 13:59  1/29/18 08:42


1.5 MLS/MIN


 


 Insulin Glargine


  (Lantus Solostar


 Pen)  SEE


 PROTOCOL


 TEXT  BID


 SC  1/27/18 21:00


 2/26/18 20:59 Future hold 1/29/18 08:54


25 UNITS


 


 Midodrine


  (Proamatine Tab)  5 mg  TID@08,12,17


 PO  1/28/18 17:00


 2/27/18 16:59  1/29/18 08:43


5 MG


 


 Albumin Human


  (Albumin 25%)  12.5 gm  TODAY@0915,1045


 IV  1/29/18 09:15


 1/29/18 21:00  1/29/18 12:15


12.5 GM


 


 Miscellaneous


 Information


  (Nursing Heparin


 Iv Rate Change)  1 ea  ONE  ONCE


 N/A  1/29/18 11:15


 1/29/18 11:16 UNV  


 


 


 Insulin Human


 Regular 5 units/


 Syringe  5 ml @ 30


 mls/min  1230  ONCE


 IV  1/29/18 12:30


 1/29/18 12:31   


 


 


 Insulin Glargine


  (Lantus Solostar


 Pen)  5 units  1230


 SC  1/29/18 12:30


 2/28/18 12:29   


 











Last 24 Hours








Test


  1/28/18


15:09 1/28/18


16:16 1/28/18


20:08 1/28/18


23:03


 


Activated Partial


Thromboplast Time 112.5 SECONDS 


  


  


  61.1 SECONDS 


 


 


Partial Thromboplastin Ratio 4.3    2.4 


 


Bedside Glucose  219 mg/dl  286 mg/dl  


 


Test


  1/28/18


23:43 1/29/18


03:54 1/29/18


03:59 1/29/18


06:43


 


Bedside Glucose 220 mg/dl  240 mg/dl   189 mg/dl 


 


White Blood Count   31.10 K/uL  


 


Red Blood Count   2.94 M/uL  


 


Hemoglobin   9.3 g/dL  


 


Hematocrit   30.1 %  


 


Mean Corpuscular Volume   102.4 fL  


 


Mean Corpuscular Hemoglobin   31.6 pg  


 


Mean Corpuscular Hemoglobin


Concent 


  


  30.9 g/dl 


  


 


 


Platelet Count   324 K/uL  


 


Mean Platelet Volume   10.1 fL  


 


Neutrophils (%) (Auto)   95.7 %  


 


Lymphocytes (%) (Auto)   1.3 %  


 


Monocytes (%) (Auto)   1.8 %  


 


Eosinophils (%) (Auto)   0.0 %  


 


Basophils (%) (Auto)   0.1 %  


 


Neutrophils # (Auto)   31.20 K/uL  


 


Lymphocytes # (Auto)   0.41 K/uL  


 


Monocytes # (Auto)   0.59 K/uL  


 


Eosinophils # (Auto)   0.00 K/uL  


 


Basophils # (Auto)   0.02 K/uL  


 


RDW Standard Deviation   64.8 fL  


 


RDW Coefficient of Variation   18.4 %  


 


Immature Granulocyte % (Auto)   1.1 %  


 


Immature Granulocyte # (Auto)   0.36 K/uL  


 


Nucleated RBC Absolute Count


(auto) 


  


  0.00 K/uL 


  


 


 


Nucleated Red Blood Cells %   0.0 %  


 


Activated Partial


Thromboplast Time 


  


  76.0 SECONDS 


  


 


 


Partial Thromboplastin Ratio   2.9  


 


Sodium Level   133 mmol/L  


 


Potassium Level   4.2 mmol/L  


 


Chloride Level   96 mmol/L  


 


Carbon Dioxide Level   21 mmol/L  


 


Anion Gap   16.0 mmol/L  


 


Blood Urea Nitrogen   55 mg/dl  


 


Creatinine   8.34 mg/dl  


 


Est Creatinine Clear Calc


Drug Dose 


  


  8.6 ml/min 


  


 


 


Estimated GFR (


American) 


  


  5.5 


  


 


 


Estimated GFR (Non-


American 


  


  4.7 


  


 


 


BUN/Creatinine Ratio   6.5  


 


Random Glucose   228 mg/dl  


 


Calcium Level   8.3 mg/dl  


 


Phosphorus Level   8.8 mg/dl  


 


Magnesium Level   1.9 mg/dl  


 


Test


  1/29/18


10:07 1/29/18


11:04 


  


 


 


Activated Partial


Thromboplast Time 90.0 SECONDS 


  


  


  


 


 


Partial Thromboplastin Ratio 3.5    


 


Bedside Glucose  261 mg/dl   











Assessment & Plan


56 yo female with prosthetic heart valve, ecoli uti/ cdiff / hypotension / uri, 

ESRD on MWF HD





ESRD-difficult situation with chronic hypotension; did tolerate 2L fluid 

removal on 1/26.  will attempt two liters fluid removal as tolerated to help 

with her overall volume status and will use albumin w/ HD





Anemia of Renal Failure-hg goal of 10 to 11 and will redose procrit today.  





Hypotension: bp continues to run in the 80s systolic.  workup negative so far.  

on midodrine 5 mg po tid.  cosyntropin test negative. tsh good. appropriately 

treated infections; though she did have F 1/27 and ongoing productive cough > 

now on stress dosed steroids w/ some improvement.  ct of abdomen relatively 

benign.





Atrial fibrillation/ flutter > remains on heparin, amiodarone gtts.  





Appreciate consult; will follow with you.

## 2018-01-29 NOTE — PHARMACY PROGRESS NOTE
Pharmacy Glycemic Short Note 2


Date of Service


Jan 29, 2018.





OUTPATIENT ANTIDIABETIC REGIMEN: 


* NPH 40 units SQ daily when taking prednisone (otherwise no insulin taken)





ASSESSMENT:


* Ms. Bro had adequate glycemic control with bolus insulin only until high 

dose IV steroids were initiated on 1/27.


* She continues to be on Solu-medrol 40 mg IV every 6 hours, heparin infusion 

and amiodarone infusion-> Basal insulin restarted 


* A one time dose of 8 units IV regular insulin administered with lunch for 

severe hyperglycemia,  mg/dL


* Fasting BSG elevated - increase basal to weight/stress 3 dosing


* Further tighten CF/CF





1/29/18:


* Despite maximizing SQ basal/bolus insulin, patient remains hyperglycemic.  

Patient continues on solu-medrol 40 mg IV every 6 hours, amiodarone infusion, 

and heparin infusion.


* She was administered 119 units of SQ insulin yesterday plus an 8 units 

regular insulin IV bolus. BSGs from previous 24hr: 243, 307, 219, 286, 220


* Fasting BSG of 189 mg/dL today - improved but still significantly above goal


* Bolus insulin parameters tightened again. An IV bolus and additional Lantus 

was ordered but not given per patient off floor at dialysis


* Provider contacted to discuss initiation of IV insulin infusion due to 

persistent hyperglycemia and development of elevated anion gap with bicarb 

approaching lower limit of normal. Contacted RN to initiate IV infusion, 

however patient off floor at the time. When patient returned, BSG decreased to 

148 mg/dL. 





PLAN FOR INPATIENT GLYCEMIC CONTROL:





* Pending order


 * Initiate IV insulin infusion if BSG is > 220 mg/dL at dinner or HS


 * Per moderate stress with goal range 100 - 180 mg/dL


 


* Basal Insulin


 * Lantus 25-30 units SQ BID


 * 25 units for BSG < 180, 30 units for  or more


 


* Bolus Insulin - tighten


 * NOVOLOG per scale ACHS


 * Goal Range:  Low 120 mg/dL - High 150 mg/dL


 * Correction Factor: 10 mg/dL/unit


 * Nutritional / Prandial insulin:  1 units for every 3 grams of carb


 * Continue overnight checks with coverage at 00 and 04





PLAN FOR DISCHARGE:


* A1c indicated good outpatient glycemic control.


* Expect that patient may be discharged on previous home regimen unless 

steroids are continued at discharge.





Thank you.

## 2018-01-29 NOTE — PROGRESS NOTE
DATE: 01/29/2018

 

FOLLOWUP VISIT

 

SUBJECTIVE:  The patient is a 57-year-old with a history of a lengthy

hospital stay, originally admitted with UTI and C. difficile colitis.  She is

also a hemodialysis patient.  She has a history of aortic valve replacement

with bioprosthetic valve and mitral stenosis due to heavy calcification of

the mitral annulus from ongoing dialysis.  She was having atrial flutter with

RVR.  She was started on amiodarone which has improved her heart rates.  She

has no new complaints today.

 

OBJECTIVE:

GENERAL:  She is alert and oriented.

VITAL SIGNS:  Blood pressure is 90/60, pulse is irregular at 70 beats per

minute.  She is afebrile.

HEENT:  She is normocephalic.  Pupils are equal and reactive to light. 

Extraocular muscles are intact bilaterally.

NECK:  The neck veins are flat.  Carotids have good upstrokes bilaterally

without bruits.  Thyroid is nonpalpable.

RESPIRATORY:  Breath sounds equal bilaterally and clear to auscultation.

CARDIOVASCULAR:  Heart has an irregular rhythm.  Normal S1, S2.  No S3, S4. 

No cardiac rubs or murmurs.

GASTROINTESTINAL:  Abdomen is soft, nontender, without organomegaly.

EXTREMITIES:  Free of edema, digit clubbing or cyanosis.

NEUROLOGIC:  Grossly intact.

SKIN:  Warm to touch.

LYMPH NODES:  Negative to palpation.

 

IMPRESSION:

1.  Atrial flutter.

2.  History of bioprosthetic aortic valve and mitral stenosis.

3.  Urinary tract infection, Clostridium difficile.

 

RECOMMENDATIONS:  Clinically, the patient is stable.  At some point, we will

have to switch her to oral medications including oral anticoagulation with

Coumadin.  I will continue the amiodarone drip through today, and depending

on how she is feeling tomorrow, we may start her on oral amiodarone.

## 2018-01-30 VITALS — OXYGEN SATURATION: 100 % | HEART RATE: 78 BPM

## 2018-01-30 VITALS
DIASTOLIC BLOOD PRESSURE: 63 MMHG | OXYGEN SATURATION: 93 % | HEART RATE: 97 BPM | SYSTOLIC BLOOD PRESSURE: 89 MMHG | TEMPERATURE: 97.88 F

## 2018-01-30 VITALS
DIASTOLIC BLOOD PRESSURE: 58 MMHG | TEMPERATURE: 97.7 F | HEART RATE: 72 BPM | OXYGEN SATURATION: 96 % | SYSTOLIC BLOOD PRESSURE: 89 MMHG

## 2018-01-30 VITALS
HEART RATE: 72 BPM | OXYGEN SATURATION: 99 % | DIASTOLIC BLOOD PRESSURE: 57 MMHG | SYSTOLIC BLOOD PRESSURE: 94 MMHG | TEMPERATURE: 98.06 F

## 2018-01-30 VITALS
OXYGEN SATURATION: 99 % | TEMPERATURE: 98.24 F | DIASTOLIC BLOOD PRESSURE: 66 MMHG | HEART RATE: 75 BPM | SYSTOLIC BLOOD PRESSURE: 101 MMHG

## 2018-01-30 VITALS — OXYGEN SATURATION: 95 %

## 2018-01-30 VITALS — OXYGEN SATURATION: 98 % | HEART RATE: 73 BPM

## 2018-01-30 VITALS
OXYGEN SATURATION: 98 % | HEART RATE: 74 BPM | TEMPERATURE: 97.88 F | DIASTOLIC BLOOD PRESSURE: 62 MMHG | SYSTOLIC BLOOD PRESSURE: 98 MMHG

## 2018-01-30 VITALS
SYSTOLIC BLOOD PRESSURE: 108 MMHG | HEART RATE: 84 BPM | TEMPERATURE: 97.88 F | DIASTOLIC BLOOD PRESSURE: 69 MMHG | OXYGEN SATURATION: 94 %

## 2018-01-30 VITALS — HEART RATE: 94 BPM | OXYGEN SATURATION: 97 %

## 2018-01-30 VITALS — OXYGEN SATURATION: 96 %

## 2018-01-30 VITALS — HEART RATE: 77 BPM | OXYGEN SATURATION: 95 %

## 2018-01-30 LAB
PTT PATIENT: 53.1 SECONDS (ref 21–31)
TRANSFERRIN SERPL-MCNC: 159 MG/DL (ref 200–360)

## 2018-01-30 RX ADMIN — LEVALBUTEROL HYDROCHLORIDE SCH MG: 1.25 SOLUTION RESPIRATORY (INHALATION) at 19:59

## 2018-01-30 RX ADMIN — VANCOMYCIN HYDROCHLORIDE SCH MG: 1 INJECTION, POWDER, LYOPHILIZED, FOR SOLUTION INTRAVENOUS at 08:36

## 2018-01-30 RX ADMIN — METHYLPREDNISOLONE SODIUM SUCCINATE SCH MLS/MIN: 1 INJECTION, POWDER, FOR SOLUTION INTRAMUSCULAR; INTRAVENOUS at 01:41

## 2018-01-30 RX ADMIN — LEVALBUTEROL HYDROCHLORIDE SCH MG: 1.25 SOLUTION RESPIRATORY (INHALATION) at 14:13

## 2018-01-30 RX ADMIN — PANTOPRAZOLE SCH MG: 40 TABLET, DELAYED RELEASE ORAL at 08:37

## 2018-01-30 RX ADMIN — INSULIN ASPART SCH UNITS: 100 INJECTION, SOLUTION INTRAVENOUS; SUBCUTANEOUS at 00:31

## 2018-01-30 RX ADMIN — Medication SCH ML: at 19:33

## 2018-01-30 RX ADMIN — VANCOMYCIN HYDROCHLORIDE SCH MG: 1 INJECTION, POWDER, LYOPHILIZED, FOR SOLUTION INTRAVENOUS at 01:41

## 2018-01-30 RX ADMIN — METHYLPREDNISOLONE SODIUM SUCCINATE SCH MLS/MIN: 1 INJECTION, POWDER, FOR SOLUTION INTRAMUSCULAR; INTRAVENOUS at 14:30

## 2018-01-30 RX ADMIN — VANCOMYCIN HYDROCHLORIDE SCH MG: 1 INJECTION, POWDER, LYOPHILIZED, FOR SOLUTION INTRAVENOUS at 14:30

## 2018-01-30 RX ADMIN — INSULIN ASPART SCH UNITS: 100 INJECTION, SOLUTION INTRAVENOUS; SUBCUTANEOUS at 12:19

## 2018-01-30 RX ADMIN — Medication SCH ML: at 01:40

## 2018-01-30 RX ADMIN — METHYLPREDNISOLONE SODIUM SUCCINATE SCH MLS/MIN: 1 INJECTION, POWDER, FOR SOLUTION INTRAMUSCULAR; INTRAVENOUS at 19:33

## 2018-01-30 RX ADMIN — METRONIDAZOLE SCH MLS/HR: 500 INJECTION, SOLUTION INTRAVENOUS at 17:13

## 2018-01-30 RX ADMIN — INSULIN GLARGINE SCH UNITS: 100 INJECTION, SOLUTION SUBCUTANEOUS at 21:23

## 2018-01-30 RX ADMIN — MIDODRINE HYDROCHLORIDE SCH MG: 2.5 TABLET ORAL at 12:14

## 2018-01-30 RX ADMIN — METRONIDAZOLE SCH MLS/HR: 500 INJECTION, SOLUTION INTRAVENOUS at 01:41

## 2018-01-30 RX ADMIN — ACETAMINOPHEN PRN MLS/HR: 10 INJECTION, SOLUTION INTRAVENOUS at 21:19

## 2018-01-30 RX ADMIN — INSULIN ASPART SCH UNITS: 100 INJECTION, SOLUTION INTRAVENOUS; SUBCUTANEOUS at 04:07

## 2018-01-30 RX ADMIN — ACETAMINOPHEN PRN MLS/HR: 10 INJECTION, SOLUTION INTRAVENOUS at 00:01

## 2018-01-30 RX ADMIN — AMIODARONE HYDROCHLORIDE SCH MG: 200 TABLET ORAL at 17:13

## 2018-01-30 RX ADMIN — Medication SCH ML: at 08:36

## 2018-01-30 RX ADMIN — LEVALBUTEROL HYDROCHLORIDE SCH MG: 1.25 SOLUTION RESPIRATORY (INHALATION) at 06:55

## 2018-01-30 RX ADMIN — ALUMINUM ZIRCONIUM TRICHLOROHYDREX GLY SCH EA: 0.2 STICK TOPICAL at 08:35

## 2018-01-30 RX ADMIN — MIDODRINE HYDROCHLORIDE SCH MG: 2.5 TABLET ORAL at 08:36

## 2018-01-30 RX ADMIN — VANCOMYCIN HYDROCHLORIDE SCH MG: 1 INJECTION, POWDER, LYOPHILIZED, FOR SOLUTION INTRAVENOUS at 19:33

## 2018-01-30 RX ADMIN — INSULIN GLARGINE SCH UNITS: 100 INJECTION, SOLUTION SUBCUTANEOUS at 08:48

## 2018-01-30 RX ADMIN — ACETAMINOPHEN PRN MLS/HR: 10 INJECTION, SOLUTION INTRAVENOUS at 08:58

## 2018-01-30 RX ADMIN — ALUMINUM ZIRCONIUM TRICHLOROHYDREX GLY SCH EA: 0.2 STICK TOPICAL at 00:00

## 2018-01-30 RX ADMIN — METRONIDAZOLE SCH MLS/HR: 500 INJECTION, SOLUTION INTRAVENOUS at 09:59

## 2018-01-30 RX ADMIN — Medication SCH ML: at 14:30

## 2018-01-30 RX ADMIN — ALUMINUM ZIRCONIUM TRICHLOROHYDREX GLY SCH EA: 0.2 STICK TOPICAL at 08:00

## 2018-01-30 RX ADMIN — HEPARIN SODIUM PRN MLS/HR: 5000 INJECTION, SOLUTION INTRAVENOUS at 17:17

## 2018-01-30 RX ADMIN — INSULIN ASPART SCH UNITS: 100 INJECTION, SOLUTION INTRAVENOUS; SUBCUTANEOUS at 17:17

## 2018-01-30 RX ADMIN — ALUMINUM ZIRCONIUM TRICHLOROHYDREX GLY SCH EA: 0.2 STICK TOPICAL at 16:00

## 2018-01-30 RX ADMIN — AMIODARONE HYDROCHLORIDE SCH MLS/HR: 1.8 INJECTION, SOLUTION INTRAVENOUS at 04:08

## 2018-01-30 RX ADMIN — INSULIN ASPART SCH UNITS: 100 INJECTION, SOLUTION INTRAVENOUS; SUBCUTANEOUS at 08:48

## 2018-01-30 RX ADMIN — METHYLPREDNISOLONE SODIUM SUCCINATE SCH MLS/MIN: 1 INJECTION, POWDER, FOR SOLUTION INTRAMUSCULAR; INTRAVENOUS at 08:34

## 2018-01-30 RX ADMIN — MIDODRINE HYDROCHLORIDE SCH MG: 2.5 TABLET ORAL at 17:13

## 2018-01-30 RX ADMIN — INSULIN ASPART SCH UNITS: 100 INJECTION, SOLUTION INTRAVENOUS; SUBCUTANEOUS at 21:24

## 2018-01-30 NOTE — PHARMACY PROGRESS NOTE
Pharmacy Glycemic Short Note 2


Date of Service


Jan 30, 2018.





OUTPATIENT ANTIDIABETIC REGIMEN: 


* NPH 40 units SQ daily when taking prednisone (otherwise no insulin taken)








ASSESSMENT:


* 58 yo with severe steroid induced hyperglycemia. Pt typically only needs 

insulin when started on steroids for pulmonary issues. 


* Patient has been requiring 100+ units of insulin per day with near adequate 

control


 * 55 units of basal insulin with Lantus


 * 74 units of prandial/correctional insulin with NovoLog


 * Appropriate distribution for regimen to be weighted towards prandial insulin 

for steroid induced hyperglycemia since steroids have their most profound 

effect on post-prandial hyperglycemia. However, Basal insulin is needed for RTC 

dosing of high dose IV steroids. b


* BSGs 1/29:   189, 261, 196, 177, 171


* BSGs 1/30:  177, 154, 270


* Lunch time typically is the highest BSG of the day --> will tighten CR for 

breakfast only


* Basal insulin dosing is adequate as AM fasting BSG is near goal range at 154 

mg/dl today. 


* Pt continues on Solumedrol 40mg IV Q6hrs (started on 1/27/18)


 * SQ basal bolus regimen will need decreased when steroids tapered 








PLAN FOR INPATIENT GLYCEMIC CONTROL:


* Basal Insulin: no change


 * Lantus 25-30 units SQ BID


 * 25 units for BSG < 180, 30 units for  or more


 


* Bolus Insulin - tighten for breakfast 


 * NOVOLOG per scale ACHS


 * Goal Range:  Low 120 mg/dL - High 150 mg/dL


 * Correction Factor: 10 mg/dL/unit


 * Nutritional / Prandial insulin:  1 units for every 2 grams of carb at 

breakfast


 * Nutritional / Prandial insulin:  1 units for every 3 grams of carb at lunch, 

dinner, and HS 


 * Continue overnight checks with coverage at 00 and 04





PLAN FOR DISCHARGE:


* A1c indicated good outpatient glycemic control.


* Expect that patient may be discharged on previous home regimen unless 

steroids are continued at discharge.





Thank you.

## 2018-01-30 NOTE — ORTHOPEDIC CONSULTATION
Orthopedic Consultation


Date of Consultation:


Jan 30, 2018.


Attending Physician:


Blas Lockett M.D.


Reason for Consultation:


Right ankle injury.


History of Present Illness


Patient reports injury to the right ankle on 01/22/2018, reports fall and pain 

to right ankle on her way to dialysis clinic.  She was subsequently admitted to 

the hospital for sepsis and continues to have pain and bruising to her right 

ankle.  She does report a prior history of injury when she was 13 years old of 

right ankle fracture.  Denies numbness tingling does have baseline neuropathy 

bilateral lower extremities.  Denies any associated injuries to additional 

extremities.





Family History





FH: heart disease


  MOTHER





Allergies


Coded Allergies:  


     Hydrocodone (Verified  Allergy, Unknown, unspecified, 12/27/17)


     Morphine (Verified  Allergy, Unknown, unspecified , 12/27/17)





Home Medications


Scheduled


Allopurinol (Zyloprim), 100 MG PO BID


Aspirin (Aspirin Chewable), 81 MG PO DAILY


Atorvastatin (Lipitor), 40 MG PO DAILY


Clopidogrel Bisulfate (Plavix), 75 MG PO DAILY


Docusate Sodium (Colace), 1 CAP PO BID


Ferric Citrate (Auryxia), 1 TAB PO DAILY


Fluticasone Furoate-Vilanterol (Breo Ellipta), 1 PUFF INH BID


Gabapentin (Neurontin), 2 CAP PO HS


Insulin Human NPH (Humulin N), 40 UNITS SC UD


Ipratropium-Albuterol (Combivent Respimat), 1 PUFFS INH QID


Levofloxacin (Levaquin), 500 MG PO DAILY


Methoxy Polyethylene Glycol-Ep (Mircera), 1 TAB PO TID


Pantoprazole (Protonix), 40 MG PO DAILY


Pramipexole (Mirapex), 0.25 MG PO HS


Rabeprazole Sodium (Aciphex), 20 MG PO DAILY


Sertraline (Zoloft), 75 MG PO DAILY


Sevelamer Carbonate (Renvela), 1 TAB PO TID


Thiamine Hcl (Vitamin B-1), 50 MG PO DAILY


Vitamin B Cmplx/Vitc/Folic Ac (Nephrocaps), 1 CAP PO DAILY





Scheduled PRN


Albuterol Sulfate (Proair Respiclick), 2 PUFFS INH for Shortness of Breath


Tramadol (Ultram), 50 MG PO Q4H PRN for Pain





Miscellaneous Medications


Iron Sucrose (Venofer), 50 MG IV


Iron Sucrose (Venofer), 100 MG IV





Current Inpatient Medications





Current Inpatient Medications








 Medications


  (Trade)  Dose


 Ordered  Sig/Daniel


 Route  Start Time


 Stop Time Status Last Admin


Dose Admin


 


 Miscellaneous


 Information


  (Order Awaiting


 Action)  1 ea  QS


 N/A  1/16/18 08:00


 2/15/18 07:59  1/16/18 08:21


1 EA


 


 Miscellaneous


 Information


  (Order Awaiting


 Action)  1 ea  QS


 N/A  1/16/18 08:00


 2/15/18 07:59  1/16/18 08:21


1 EA


 


 Miscellaneous


 Information


  (Consult


 Glycemic


 Management


 Pharmacy)  1 ea  UD  PRN


 N/A  1/16/18 04:15


 2/15/18 04:14   


 


 


 Glucose


  (Glucose 40% Gel)  15-30


 GRAMS 15


 GRAMS...  UD  PRN


 PO  1/16/18 04:30


 2/15/18 04:29   


 


 


 Glucose


  (Glucose Chew


 Tab)  4-8


 Tablets 4


 Tabl...  UD  PRN


 PO  1/16/18 04:30


 2/15/18 04:29   


 


 


 Dextrose


  (Dextrose 50%


 50ML Syringe)  25-50ML OF


 50% DW IV


 FOR...  UD  PRN


 IV  1/16/18 04:30


 2/15/18 04:29  1/17/18 09:52


25 ML


 


 Glucagon


  (Glucagon Inj)  1 mg  UD  PRN


 SQ  1/16/18 04:30


 2/15/18 04:29   


 


 


 Ondansetron HCl


  (Zofran Inj)  4 mg  Q4H  PRN


 IV  1/16/18 18:15


 2/15/18 18:14  1/19/18 12:15


4 MG


 


 Vancomycin HCl


  (Vancomycin Oral


 Soln)  125 mg  Q6H


 PO  1/20/18 20:00


 2/3/18 19:59  1/30/18 08:36


125 MG


 


 Raspberry


  (Raspberry Syrup


 5ml Cup)  5 ml  Q6H


 PO  1/20/18 20:00


 2/3/18 19:59  1/30/18 08:36


5 ML


 


 Insulin Aspart


  (novoLOG ASPART)  **SLIDING


 SCALE**


 **G...  ACHS


 SC  1/22/18 07:00


 2/21/18 06:59  1/30/18 08:48


6 UNITS


 


 Metronidazole 500


 mg/Prmx  100 ml @ 


 100 mls/hr  Q8H


 IV  1/22/18 18:00


 2/1/18 17:59  1/30/18 09:59


100 MLS/HR


 


 Acetaminophen 650


 mg/Empty Bag  65 ml @ 


 260 mls/hr  Q6H  PRN


 IV  1/23/18 16:30


 2/22/18 16:29  1/30/18 08:58


260 MLS/HR


 


 Heparin Sodium/


 Dextrose  500 ml @ 


 24 mls/hr  [id number] PRN


 IV  1/25/18 10:30


 2/24/18 10:29  1/29/18 16:15


24 MLS/HR


 


 Diltiazem HCl 125


 mg/Dextrose  125 ml @ 0


 mls/hr  Q0M PRN


 IV  1/25/18 12:45


 2/24/18 11:29   


 


 


 Pantoprazole


 Sodium


  (Protonix Tab)  40 mg  QAM


 PO  1/26/18 09:00


 2/25/18 08:59  1/30/18 08:37


40 MG


 


 Levalbuterol


  (Xopenex 1.25MG/


 3ML Neb)  1.25 mg  Q6R


 INH  1/26/18 21:00


 2/25/18 20:59  1/30/18 06:55


1.25 MG


 


 Amiodarone HCL/


 Dextrose  200 ml @ 


 16.7 mls/hr  [id number]


 IV  1/27/18 17:45


 2/26/18 17:44  1/30/18 04:08


16.7 MLS/HR


 


 Methylprednisolone


 Sodium Succinate


 40 mg/Syringe  0.64 ml @ 


 1.5 mls/min  Q6H


 IV  1/27/18 14:00


 2/26/18 13:59  1/30/18 08:34


1.5 MLS/MIN


 


 Insulin Glargine


  (Lantus Solostar


 Pen)  SEE


 PROTOCOL


 TEXT  BID


 SC  1/27/18 21:00


 2/26/18 20:59 Future hold 1/30/18 08:48


30 UNITS


 


 Midodrine


  (Proamatine Tab)  5 mg  TID@08,12,17


 PO  1/28/18 17:00


 2/27/18 16:59  1/30/18 08:36


5 MG


 


 Heparin Sodium


  (Porcine)


  (Heparin Iv


 Bolus)  1,000 unit  ONE


 IV  1/31/18 08:00


 1/31/18 08:01   


 


 


 Heparin Sodium


  (Porcine)


  (Heparin Iv


 Bolus)  400 unit  Q1H


 IV  1/31/18 08:00


 1/31/18 10:01   


 


 


 Epoetin Geovany


  (Procrit Inj)  10,000 units  ONE


 IV.  1/31/18 08:00


 1/31/18 15:00   


 


 


 Albumin Human


  (Albumin 25%)  12.5 gm  Q90M


 IV  1/31/18 08:00


 1/31/18 09:31   


 











Review of Systems


Review of systems negative with the exception of those mentioned in the history 

of present illness above.





Physical Exam











  Date Time  Temp Pulse Resp B/P (MAP) Pulse Ox O2 Delivery O2 Flow Rate FiO2


 


1/30/18 08:00 36.5 72 20 89/58 (68) 96 Nasal Cannula 3.0 


 


1/30/18 06:55  73 18  98 Nasal Cannula 3.0 


 


1/30/18 04:00      Room Air  


 


1/30/18 03:38 36.6 74 23 98/62 (74) 98 Nasal Cannula 3.0 


 


1/30/18 02:27  77 18  95 Nasal Cannula 2.0 


 


1/30/18 00:00      Room Air  


 


1/29/18 23:40 36.6 79 23 93/59 (70) 98 Nasal Cannula 3.0 


 


1/29/18 20:34 36.8 94 20 77/46 (56) 93 Room Air  


 


1/29/18 20:00      Room Air  


 


1/29/18 19:29  75 18  90 Room Air  


 


1/29/18 16:00      Room Air  


 


1/29/18 15:19 36.5 79 20 78/45 (56) 95 Room Air  





    75/48 (57)    


 


1/29/18 14:29  85 18  93 Room Air  


 


1/29/18 13:45 36.4 70  90/43 (59)    


 


1/29/18 13:15  76  100/53    


 


1/29/18 13:00  84  96/41    


 


1/29/18 12:45  84  107/47    


 


1/29/18 12:30  75  90/46    


 


1/29/18 12:15  76  105/40    


 


1/29/18 12:00      Room Air  


 


1/29/18 12:00  79  87/53    


 


1/29/18 11:51 36.4 73 20 91/55 (67) 97 Nasal Cannula 3.0 


 


1/29/18 11:45  76  90/33    


 


1/29/18 11:30  82  89/46    


 


1/29/18 11:15  100  83/49    


 


1/29/18 11:00  83  87/49    








Alert and oriented 3, no apparent distress.


Right lower extremity neurovascular sensory intact, baseline neuropathy, equal 

bilateral lower extremities.  Positive ecchymoses to the medial and lateral 

ankle, mild edema.  Ligamentously stable negative anterior drawer.  Positive 

tenderness to medial and lateral malleoli.  Compartments soft nontender, skin 

intact.  Positive EHL/FHL/painful range of motion with dorsiflexion and 

plantarflexion.





Laboratory Results





Last 24 Hours








Test


  1/29/18


11:04 1/29/18


13:45 1/29/18


16:30 1/29/18


16:59


 


Bedside Glucose 261 mg/dl  148 mg/dl  196 mg/dl  


 


Activated Partial


Thromboplast Time 


  


  


  57.5 SECONDS 


 


 


Partial Thromboplastin Ratio    2.2 


 


Test


  1/29/18


20:36 1/30/18


00:26 1/30/18


04:00 1/30/18


06:16


 


Bedside Glucose 177 mg/dl  171 mg/dl  177 mg/dl  


 


Activated Partial


Thromboplast Time 


  


  


  53.1 SECONDS 


 


 


Partial Thromboplastin Ratio    2.0 


 


Iron Level    58 mcg/dl 


 


Total Iron Binding Capacity    188 mcg/dl 


 


Transferrin    159 mg/dl 


 


Transferrin % Saturation    26 % 


 


Test


  1/30/18


06:51 


  


  


 


 


Bedside Glucose 154 mg/dl    











Assessment & Plan


Right ankle sprain, acute on chronic injury with medial malleolus avulsion 

fracture


-The patient has an acute ankle sprain involving both medial and lateral ankle 

ligaments.  I suspect this is an acute on chronic injury secondary to findings 

on XR as well as  previous history of right ankle fracture.  I will allow her 

to weight-bear as she can tolerate however she will need cam boot walker when 

ambulating.  A consultation was placed to [facility].  She may 

begin physical therapy and I encouraged ice and elevation.  She may follow-up 

in the office 1-2 weeks after discharge from the hospital.





Thank you for the consultation


XR Right Ankle











IMPRESSION: 


1. Age-indeterminate avulsion arising from the medial malleolar tip


2. Periostitis involving the medial malleolus. This appears nonaggressive

## 2018-01-30 NOTE — NEPHROLOGY PROGRESS NOTE
Nephrology Progress Note


Date of Service:


Jan 29, 2018.


Subjective


duplicate note > see dialysis PN from 1/29





Objective











  Date Time  Temp Pulse Resp B/P (MAP) Pulse Ox O2 Delivery O2 Flow Rate FiO2


 


1/29/18 07:45 36.3 70 20 92/56 (68) 98 Nasal Cannula 3.0 


 


1/29/18 07:05  72 18  95 Room Air  


 


1/29/18 04:00      Nasal Cannula 3.0 


 


1/29/18 03:43 36.4 92 17 98/64 (75) 92 Room Air  


 


1/29/18 02:16  87 18  99 Nasal Cannula 2.0 


 


1/29/18 00:00      Nasal Cannula 3.0 


 


1/28/18 23:45 36.5 87 18 83/57 (66) 91 Room Air  


 


1/28/18 20:00      Nasal Cannula 3.0 


 


1/28/18 19:20  82 18  98 Nasal Cannula 2.0 


 


1/28/18 18:40 36.4 95 20 91/57 (68) 99 Nasal Cannula 3.0 


 


1/28/18 16:00      Nasal Cannula 2.0 


 


1/28/18 15:20 36.3 86 20 93/59 (70) 99 Nasal Cannula 3.0 


 


1/28/18 14:39  95 18  98 Nasal Cannula 3.0 


 


1/28/18 12:04 37.1 86 20 90/52 (65) 99 Nasal Cannula 3.0 


 


1/28/18 12:00      Nasal Cannula 2.0 











Current Inpatient Medications








 Medications


  (Trade)  Dose


 Ordered  Sig/Daniel


 Route  Start Time


 Stop Time Status Last Admin


Dose Admin


 


 Miscellaneous


 Information


  (Order Awaiting


 Action)  1 ea  QS


 N/A  1/16/18 08:00


 2/15/18 07:59  1/16/18 08:21


1 EA


 


 Miscellaneous


 Information


  (Order Awaiting


 Action)  1 ea  QS


 N/A  1/16/18 08:00


 2/15/18 07:59  1/16/18 08:21


1 EA


 


 Miscellaneous


 Information


  (Consult


 Glycemic


 Management


 Pharmacy)  1 ea  UD  PRN


 N/A  1/16/18 04:15


 2/15/18 04:14   


 


 


 Glucose


  (Glucose 40% Gel)  15-30


 GRAMS 15


 GRAMS...  UD  PRN


 PO  1/16/18 04:30


 2/15/18 04:29   


 


 


 Glucose


  (Glucose Chew


 Tab)  4-8


 Tablets 4


 Tabl...  UD  PRN


 PO  1/16/18 04:30


 2/15/18 04:29   


 


 


 Dextrose


  (Dextrose 50%


 50ML Syringe)  25-50ML OF


 50% DW IV


 FOR...  UD  PRN


 IV  1/16/18 04:30


 2/15/18 04:29  1/17/18 09:52


25 ML


 


 Glucagon


  (Glucagon Inj)  1 mg  UD  PRN


 SQ  1/16/18 04:30


 2/15/18 04:29   


 


 


 Ondansetron HCl


  (Zofran Inj)  4 mg  Q4H  PRN


 IV  1/16/18 18:15


 2/15/18 18:14  1/19/18 12:15


4 MG


 


 Vancomycin HCl


  (Vancomycin Oral


 Soln)  125 mg  Q6H


 PO  1/20/18 20:00


 2/3/18 19:59  1/29/18 08:56


125 MG


 


 Raspberry


  (Raspberry Syrup


 5ml Cup)  5 ml  Q6H


 PO  1/20/18 20:00


 2/3/18 19:59  1/29/18 08:42


5 ML


 


 Insulin Aspart


  (novoLOG ASPART)  **SLIDING


 SCALE**


 **G...  ACHS


 SC  1/22/18 07:00


 2/21/18 06:59  1/29/18 08:54


16 UNITS


 


 Metronidazole 500


 mg/Prmx  100 ml @ 


 100 mls/hr  Q8H


 IV  1/22/18 18:00


 2/1/18 17:59  1/29/18 02:08


100 MLS/HR


 


 Acetaminophen 650


 mg/Empty Bag  65 ml @ 


 260 mls/hr  Q6H  PRN


 IV  1/23/18 16:30


 2/22/18 16:29  1/27/18 11:47


260 MLS/HR


 


 Azithromycin


  (Zithromax Tab)  250 mg  PM


 PO  1/25/18 21:00


 1/31/18 20:59  1/28/18 20:32


250 MG


 


 Heparin Sodium/


 Dextrose  500 ml @ 


 28 mls/hr  [id number] PRN


 IV  1/25/18 10:30


 2/24/18 10:29  1/29/18 04:48


28 MLS/HR


 


 Diltiazem HCl 125


 mg/Dextrose  125 ml @ 0


 mls/hr  Q0M PRN


 IV  1/25/18 12:45


 2/24/18 11:29   


 


 


 Pantoprazole


 Sodium


  (Protonix Tab)  40 mg  QAM


 PO  1/26/18 09:00


 2/25/18 08:59  1/29/18 08:42


40 MG


 


 Levalbuterol


  (Xopenex 1.25MG/


 3ML Neb)  1.25 mg  Q6R


 INH  1/26/18 21:00


 2/25/18 20:59  1/29/18 07:04


1.25 MG


 


 Amiodarone HCL/


 Dextrose  200 ml @ 


 16.7 mls/hr  [id number]


 IV  1/27/18 17:45


 2/26/18 17:44  1/29/18 04:26


16.7 MLS/HR


 


 Methylprednisolone


 Sodium Succinate


 40 mg/Syringe  0.64 ml @ 


 1.5 mls/min  Q6H


 IV  1/27/18 14:00


 2/26/18 13:59  1/29/18 08:42


1.5 MLS/MIN


 


 Insulin Glargine


  (Lantus Solostar


 Pen)  SEE


 PROTOCOL


 TEXT  BID


 SC  1/27/18 21:00


 2/26/18 20:59  1/29/18 08:54


25 UNITS


 


 Midodrine


  (Proamatine Tab)  5 mg  TID@08,12,17


 PO  1/28/18 17:00


 2/27/18 16:59  1/29/18 08:43


5 MG











Last 24 Hours








Test


  1/28/18


11:04 1/28/18


15:09 1/28/18


16:16 1/28/18


20:08


 


Bedside Glucose 307 mg/dl   219 mg/dl  286 mg/dl 


 


Activated Partial


Thromboplast Time 


  112.5 SECONDS 


  


  


 


 


Partial Thromboplastin Ratio  4.3   


 


Test


  1/28/18


23:03 1/28/18


23:43 1/29/18


03:54 1/29/18


03:59


 


Activated Partial


Thromboplast Time 61.1 SECONDS 


  


  


  76.0 SECONDS 


 


 


Partial Thromboplastin Ratio 2.4    2.9 


 


Bedside Glucose  220 mg/dl  240 mg/dl  


 


White Blood Count    31.10 K/uL 


 


Red Blood Count    2.94 M/uL 


 


Hemoglobin    9.3 g/dL 


 


Hematocrit    30.1 % 


 


Mean Corpuscular Volume    102.4 fL 


 


Mean Corpuscular Hemoglobin    31.6 pg 


 


Mean Corpuscular Hemoglobin


Concent 


  


  


  30.9 g/dl 


 


 


Platelet Count    324 K/uL 


 


Mean Platelet Volume    10.1 fL 


 


Neutrophils (%) (Auto)    95.7 % 


 


Lymphocytes (%) (Auto)    1.3 % 


 


Monocytes (%) (Auto)    1.8 % 


 


Eosinophils (%) (Auto)    0.0 % 


 


Basophils (%) (Auto)    0.1 % 


 


Neutrophils # (Auto)    31.20 K/uL 


 


Lymphocytes # (Auto)    0.41 K/uL 


 


Monocytes # (Auto)    0.59 K/uL 


 


Eosinophils # (Auto)    0.00 K/uL 


 


Basophils # (Auto)    0.02 K/uL 


 


RDW Standard Deviation    64.8 fL 


 


RDW Coefficient of Variation    18.4 % 


 


Immature Granulocyte % (Auto)    1.1 % 


 


Immature Granulocyte # (Auto)    0.36 K/uL 


 


Nucleated RBC Absolute Count


(auto) 


  


  


  0.00 K/uL 


 


 


Nucleated Red Blood Cells %    0.0 % 


 


Sodium Level    133 mmol/L 


 


Potassium Level    4.2 mmol/L 


 


Chloride Level    96 mmol/L 


 


Carbon Dioxide Level    21 mmol/L 


 


Anion Gap    16.0 mmol/L 


 


Blood Urea Nitrogen    55 mg/dl 


 


Creatinine    8.34 mg/dl 


 


Est Creatinine Clear Calc


Drug Dose 


  


  


  8.6 ml/min 


 


 


Estimated GFR (


American) 


  


  


  5.5 


 


 


Estimated GFR (Non-


American 


  


  


  4.7 


 


 


BUN/Creatinine Ratio    6.5 


 


Random Glucose    228 mg/dl 


 


Calcium Level    8.3 mg/dl 


 


Phosphorus Level    8.8 mg/dl 


 


Magnesium Level    1.9 mg/dl 


 


Test


  1/29/18


06:43 


  


  


 


 


Bedside Glucose 189 mg/dl

## 2018-01-30 NOTE — PROGRESS NOTE
Medicine Progress Note


Date & Time of Visit:


Jan 30, 2018 at 18:44


.


Subjective





CC: 


Follow-up visit for multiple problems.





HPI: 


No fever.


Persistent congested cough. 


Experiencing dyspnea on on exertion.


No chest pain.


Persistent loose stools - at least 3 today thus far.


Anuric. Did not receive hemodialysis today.


Seen by Ortho for right ankle sprain; now wearing cam boot.


Noted skin peeling off tips of fingers of right hand. No associated bullae, rash

, etc.


Blood sugars elevated.





ROS:


As noted above.


.





Objective





Last 8 Hrs








  Date Time  Temp Pulse Resp B/P (MAP) Pulse Ox O2 Delivery O2 Flow Rate FiO2


 


1/30/18 16:06 36.8 75 20 101/66 (78) 99 Nasal Cannula 3.0 


 


1/30/18 16:00     96 Nasal Cannula 3.0 


 


1/30/18 14:14  94 18  97 Nasal Cannula 3.0 


 


1/30/18 12:00     95 Nasal Cannula 3.0 


 


1/30/18 11:26 36.7 72 20 94/57 (69) 99 Nasal Cannula 3.0 








Physical Exam:





General- sitting in chair; no distress


Lungs- scattered rhonchi, diffuse wheezing; no respiratory distress


Cardiovascular- RRR;  no gallop; no JVD; trace pretibial edema


Abdomen- + bowel sounds, soft, nontender 


Extremities- no cyanosis; no calf tenderness; cam boot RLE


Neuro- alert, oriented


Skin- warm & dry; desquamation of fingertips of right 2nd, 3rd, 4th fingers 

without associated erythema or drainage


.


Laboratory Results:





Last 24 Hours








Test


  1/29/18


20:36 1/30/18


00:26 1/30/18


04:00 1/30/18


06:16


 


Bedside Glucose 177 mg/dl  171 mg/dl  177 mg/dl  


 


Activated Partial


Thromboplast Time 


  


  


  53.1 SECONDS 


 


 


Partial Thromboplastin Ratio    2.0 


 


Iron Level    58 mcg/dl 


 


Total Iron Binding Capacity    188 mcg/dl 


 


Transferrin    159 mg/dl 


 


Transferrin % Saturation    26 % 


 


Test


  1/30/18


06:51 1/30/18


11:20 1/30/18


16:09 


 


 


Bedside Glucose 154 mg/dl  270 mg/dl  314 mg/dl  











Assessment & Plan





HYPOTENSION


Improved.


Started on midodrine.


Continue to monitor.





ATRIAL FLUTTER


Cardiology consulted.


Receiving amiodarone and IV heparin.





UTI E COLI (present on admission)


UA on admission demonstrated WBC's and bacteria.


Urine culture grew E coli, ESBL.


Treated with course of ertapenem.





C DIFFICILE COLITIS


Persistent diarrhea.


Continue IV metronidazole and PO vancomycin.





BRONCHITIS / EXACERBATION OF COPD


Received course of azithromycin.


Taper steroids.





SBO 


Resolved.





DM TYPE 2


Pharmacy consulted for glycemic management.


Taper steroids.





CIRRHOSIS


Will need outpatient follow-up with GI.





RIGHT ANKLE SPRAIN


Seen by Ortho.


CAM boot applied.





DESQUAMATION FINGERTIPS


No apparent drug reaction, infection.


Follow.





VTE PROPHYLAXIS


Receiving IV heparin.


Ambulate as able.





DISPOSITION


To be determined.


.


Consultants:


[facility]phro-[facility]


[facility]


Current Inpatient Medications:





Current Inpatient Medications








 Medications


  (Trade)  Dose


 Ordered  Sig/Daniel


 Route  Start Time


 Stop Time Status Last Admin


Dose Admin


 


 Miscellaneous


 Information


  (Order Awaiting


 Action)  1 ea  QS


 N/A  1/16/18 08:00


 2/15/18 07:59  1/16/18 08:21


1 EA


 


 Miscellaneous


 Information


  (Order Awaiting


 Action)  1 ea  QS


 N/A  1/16/18 08:00


 2/15/18 07:59  1/16/18 08:21


1 EA


 


 Miscellaneous


 Information


  (Consult


 Glycemic


 Management


 Pharmacy)  1 ea  UD  PRN


 N/A  1/16/18 04:15


 2/15/18 04:14   


 


 


 Glucose


  (Glucose 40% Gel)  15-30


 GRAMS 15


 GRAMS...  UD  PRN


 PO  1/16/18 04:30


 2/15/18 04:29   


 


 


 Glucose


  (Glucose Chew


 Tab)  4-8


 Tablets 4


 Tabl...  UD  PRN


 PO  1/16/18 04:30


 2/15/18 04:29   


 


 


 Dextrose


  (Dextrose 50%


 50ML Syringe)  25-50ML OF


 50% DW IV


 FOR...  UD  PRN


 IV  1/16/18 04:30


 2/15/18 04:29  1/17/18 09:52


25 ML


 


 Glucagon


  (Glucagon Inj)  1 mg  UD  PRN


 SQ  1/16/18 04:30


 2/15/18 04:29   


 


 


 Ondansetron HCl


  (Zofran Inj)  4 mg  Q4H  PRN


 IV  1/16/18 18:15


 2/15/18 18:14  1/19/18 12:15


4 MG


 


 Vancomycin HCl


  (Vancomycin Oral


 Soln)  125 mg  Q6H


 PO  1/20/18 20:00


 2/3/18 19:59  1/30/18 14:30


125 MG


 


 Raspberry


  (Raspberry Syrup


 5ml Cup)  5 ml  Q6H


 PO  1/20/18 20:00


 2/3/18 19:59  1/30/18 14:30


5 ML


 


 Metronidazole 500


 mg/Prmx  100 ml @ 


 100 mls/hr  Q8H


 IV  1/22/18 18:00


 2/1/18 17:59  1/30/18 17:13


100 MLS/HR


 


 Acetaminophen 650


 mg/Empty Bag  65 ml @ 


 260 mls/hr  Q6H  PRN


 IV  1/23/18 16:30


 2/22/18 16:29  1/30/18 08:58


260 MLS/HR


 


 Heparin Sodium/


 Dextrose  500 ml @ 


 24 mls/hr  [id number] PRN


 IV  1/25/18 10:30


 2/24/18 10:29  1/30/18 17:17


24 MLS/HR


 


 Pantoprazole


 Sodium


  (Protonix Tab)  40 mg  QAM


 PO  1/26/18 09:00


 2/25/18 08:59  1/30/18 08:37


40 MG


 


 Levalbuterol


  (Xopenex 1.25MG/


 3ML Neb)  1.25 mg  Q6R


 INH  1/26/18 21:00


 2/25/18 20:59  1/30/18 14:13


1.25 MG


 


 Methylprednisolone


 Sodium Succinate


 40 mg/Syringe  0.64 ml @ 


 1.5 mls/min  Q6H


 IV  1/27/18 14:00


 2/26/18 13:59  1/30/18 14:30


1.5 MLS/MIN


 


 Insulin Glargine


  (Lantus Solostar


 Pen)  SEE


 PROTOCOL


 TEXT  BID


 SC  1/27/18 21:00


 2/26/18 20:59 Future hold 1/30/18 08:48


30 UNITS


 


 Midodrine


  (Proamatine Tab)  5 mg  TID@08,12,17


 PO  1/28/18 17:00


 2/27/18 16:59  1/30/18 17:13


5 MG


 


 Heparin Sodium


  (Porcine)


  (Heparin Iv


 Bolus)  1,000 unit  ONE


 IV  1/31/18 08:00


 1/31/18 08:01   


 


 


 Heparin Sodium


  (Porcine)


  (Heparin Iv


 Bolus)  400 unit  Q1H


 IV  1/31/18 08:00


 1/31/18 10:01   


 


 


 Epoetin Geovany


  (Procrit Inj)  10,000 units  ONE


 IV.  1/31/18 08:00


 1/31/18 15:00   


 


 


 Albumin Human


  (Albumin 25%)  12.5 gm  Q90M


 IV  1/31/18 08:00


 1/31/18 09:31   


 


 


 Amiodarone HCl


  (Cordarone Tab)  200 mg  TIDM


 PO  1/30/18 16:45


 3/1/18 16:44  1/30/18 17:13


200 MG


 


 Insulin Aspart


  (novoLOG ASPART)  **SLIDING


 SCALE**


 **G...  QDB


 SC  1/31/18 07:30


 3/2/18 07:29   


 


 


 Insulin Aspart


  (novoLOG ASPART)  **SLIDING


 SCALE**


 **G...  TID@1100,1615,2100


 SC  1/30/18 12:00


 3/1/18 11:59  1/30/18 17:17


34 UNITS

## 2018-01-30 NOTE — PROGRESS NOTE
Subjective


Date of Service:


Jan 30, 2018.


FOLLOWUP VISIT


 


SUBJECTIVE:  The patient is a 57-year-old with a history of a lengthy


hospital stay, originally admitted with UTI and C. difficile colitis.  She is


also a hemodialysis patient.  She has a history of aortic valve replacement


with bioprosthetic valve and mitral stenosis due to heavy calcification of


the mitral annulus from ongoing dialysis.  She was having atrial flutter with


RVR.  She was started on amiodarone which has improved her heart rates.  


She has no new complaints today.  She is sitting in the chair comfortable.


Review of Systems


All Other Systems:  Reviewed and Negative





Objective


Vital Signs











  Date Time  Temp Pulse Resp B/P (MAP) Pulse Ox O2 Delivery O2 Flow Rate FiO2


 


1/30/18 11:26 36.7 72 20 94/57 (69) 99 Nasal Cannula 3.0 


 


1/30/18 08:00 36.5 72 20 89/58 (68) 96 Nasal Cannula 3.0 


 


1/30/18 06:55  73 18  98 Nasal Cannula 3.0 


 


1/30/18 04:00      Room Air  


 


1/30/18 03:38 36.6 74 23 98/62 (74) 98 Nasal Cannula 3.0 


 


1/30/18 02:27  77 18  95 Nasal Cannula 2.0 


 


1/30/18 00:00      Room Air  


 


1/29/18 23:40 36.6 79 23 93/59 (70) 98 Nasal Cannula 3.0 


 


1/29/18 20:34 36.8 94 20 77/46 (56) 93 Room Air  


 


1/29/18 20:00      Room Air  


 


1/29/18 19:29  75 18  90 Room Air  


 


1/29/18 16:00      Room Air  


 


1/29/18 15:19 36.5 79 20 78/45 (56) 95 Room Air  





    75/48 (57)    


 


1/29/18 14:29  85 18  93 Room Air  


 


1/29/18 13:45 36.4 70  90/43 (59)    


 


1/29/18 13:15  76  100/53    


 


1/29/18 13:00  84  96/41    


 


1/29/18 12:45  84  107/47    


 


1/29/18 12:30  75  90/46    


 


1/29/18 12:15  76  105/40    


 


1/29/18 12:00      Room Air  


 


1/29/18 12:00  79  87/53    


 


1/29/18 11:51 36.4 73 20 91/55 (67) 97 Nasal Cannula 3.0 


 


1/29/18 11:45  76  90/33    











Physical Exam


General Appearance:  WD/WN, no apparent distress


Eyes:  normal inspection, PERRL, EOMI


ENT:  normal ENT inspection


Neck:  supple, no JVD


Respiratory/Chest:  lungs clear, normal breath sounds


Cardiovascular:  no edema, no murmur


Abdomen:  normal bowel sounds, non tender, soft


Extremities:  normal range of motion, non-tender


Neurologic/Psychiatric:  no motor/sensory deficits, normal mood/affect, 

oriented x 3


Skin:  normal color, warm/dry


Lymphatic:  no adenopathy





Laboratory Results





Last 24 Hours








Test


  1/29/18


13:45 1/29/18


16:30 1/29/18


16:59 1/29/18


20:36


 


Bedside Glucose 148 mg/dl  196 mg/dl   177 mg/dl 


 


Activated Partial


Thromboplast Time 


  


  57.5 SECONDS 


  


 


 


Partial Thromboplastin Ratio   2.2  


 


Test


  1/30/18


00:26 1/30/18


04:00 1/30/18


06:16 1/30/18


06:51


 


Bedside Glucose 171 mg/dl  177 mg/dl   154 mg/dl 


 


Activated Partial


Thromboplast Time 


  


  53.1 SECONDS 


  


 


 


Partial Thromboplastin Ratio   2.0  


 


Iron Level   58 mcg/dl  


 


Total Iron Binding Capacity   188 mcg/dl  


 


Transferrin   159 mg/dl  


 


Transferrin % Saturation   26 %  


 


Test


  1/30/18


11:20 


  


  


 


 


Bedside Glucose 270 mg/dl    











Assessment and Plan


Impression: #1 atrial flutter #2 end-stage kidney disease on hemodialysis #3 

UTI and Clostridium difficile





Recommendations: The patient's heart rates have been controlled on amiodarone.  

I will switch her today to oral medication and off of the IV version of the 

amiodarone.  Otherwise the patient is doing well and is currently stable.

## 2018-01-31 VITALS — HEART RATE: 86 BPM | OXYGEN SATURATION: 98 %

## 2018-01-31 VITALS — HEART RATE: 94 BPM | OXYGEN SATURATION: 91 %

## 2018-01-31 VITALS
DIASTOLIC BLOOD PRESSURE: 55 MMHG | TEMPERATURE: 97.7 F | SYSTOLIC BLOOD PRESSURE: 95 MMHG | HEART RATE: 83 BPM | OXYGEN SATURATION: 91 %

## 2018-01-31 VITALS — DIASTOLIC BLOOD PRESSURE: 55 MMHG | HEART RATE: 102 BPM | SYSTOLIC BLOOD PRESSURE: 102 MMHG

## 2018-01-31 VITALS — SYSTOLIC BLOOD PRESSURE: 100 MMHG | HEART RATE: 97 BPM | DIASTOLIC BLOOD PRESSURE: 54 MMHG

## 2018-01-31 VITALS — SYSTOLIC BLOOD PRESSURE: 101 MMHG | HEART RATE: 90 BPM | TEMPERATURE: 98.24 F | DIASTOLIC BLOOD PRESSURE: 53 MMHG

## 2018-01-31 VITALS — OXYGEN SATURATION: 97 % | HEART RATE: 98 BPM

## 2018-01-31 VITALS — DIASTOLIC BLOOD PRESSURE: 69 MMHG | SYSTOLIC BLOOD PRESSURE: 109 MMHG | HEART RATE: 100 BPM

## 2018-01-31 VITALS
TEMPERATURE: 98.24 F | HEART RATE: 84 BPM | OXYGEN SATURATION: 97 % | DIASTOLIC BLOOD PRESSURE: 45 MMHG | SYSTOLIC BLOOD PRESSURE: 86 MMHG

## 2018-01-31 VITALS — DIASTOLIC BLOOD PRESSURE: 72 MMHG | SYSTOLIC BLOOD PRESSURE: 120 MMHG

## 2018-01-31 VITALS — SYSTOLIC BLOOD PRESSURE: 115 MMHG | HEART RATE: 87 BPM | DIASTOLIC BLOOD PRESSURE: 57 MMHG

## 2018-01-31 VITALS — HEART RATE: 90 BPM | DIASTOLIC BLOOD PRESSURE: 57 MMHG | SYSTOLIC BLOOD PRESSURE: 102 MMHG

## 2018-01-31 VITALS — DIASTOLIC BLOOD PRESSURE: 53 MMHG | HEART RATE: 90 BPM | SYSTOLIC BLOOD PRESSURE: 107 MMHG

## 2018-01-31 VITALS
TEMPERATURE: 98.24 F | SYSTOLIC BLOOD PRESSURE: 109 MMHG | HEART RATE: 96 BPM | OXYGEN SATURATION: 100 % | DIASTOLIC BLOOD PRESSURE: 53 MMHG

## 2018-01-31 VITALS — DIASTOLIC BLOOD PRESSURE: 75 MMHG | SYSTOLIC BLOOD PRESSURE: 114 MMHG | HEART RATE: 67 BPM

## 2018-01-31 VITALS
TEMPERATURE: 97.52 F | DIASTOLIC BLOOD PRESSURE: 57 MMHG | SYSTOLIC BLOOD PRESSURE: 89 MMHG | OXYGEN SATURATION: 100 % | HEART RATE: 81 BPM

## 2018-01-31 VITALS
TEMPERATURE: 96.44 F | SYSTOLIC BLOOD PRESSURE: 109 MMHG | OXYGEN SATURATION: 97 % | HEART RATE: 86 BPM | DIASTOLIC BLOOD PRESSURE: 60 MMHG

## 2018-01-31 VITALS — OXYGEN SATURATION: 95 %

## 2018-01-31 VITALS — DIASTOLIC BLOOD PRESSURE: 55 MMHG | HEART RATE: 86 BPM | SYSTOLIC BLOOD PRESSURE: 121 MMHG

## 2018-01-31 VITALS — HEART RATE: 73 BPM | SYSTOLIC BLOOD PRESSURE: 98 MMHG | DIASTOLIC BLOOD PRESSURE: 45 MMHG

## 2018-01-31 VITALS — DIASTOLIC BLOOD PRESSURE: 60 MMHG | SYSTOLIC BLOOD PRESSURE: 116 MMHG | HEART RATE: 98 BPM

## 2018-01-31 VITALS
OXYGEN SATURATION: 97 % | SYSTOLIC BLOOD PRESSURE: 92 MMHG | HEART RATE: 93 BPM | TEMPERATURE: 97.52 F | DIASTOLIC BLOOD PRESSURE: 56 MMHG

## 2018-01-31 VITALS — DIASTOLIC BLOOD PRESSURE: 52 MMHG | HEART RATE: 71 BPM | SYSTOLIC BLOOD PRESSURE: 95 MMHG

## 2018-01-31 VITALS — HEART RATE: 89 BPM | DIASTOLIC BLOOD PRESSURE: 58 MMHG | SYSTOLIC BLOOD PRESSURE: 94 MMHG

## 2018-01-31 VITALS — SYSTOLIC BLOOD PRESSURE: 114 MMHG | DIASTOLIC BLOOD PRESSURE: 62 MMHG | HEART RATE: 100 BPM

## 2018-01-31 VITALS — HEART RATE: 75 BPM | TEMPERATURE: 97.52 F | DIASTOLIC BLOOD PRESSURE: 48 MMHG | SYSTOLIC BLOOD PRESSURE: 106 MMHG

## 2018-01-31 VITALS — SYSTOLIC BLOOD PRESSURE: 102 MMHG | HEART RATE: 102 BPM | DIASTOLIC BLOOD PRESSURE: 55 MMHG

## 2018-01-31 VITALS — DIASTOLIC BLOOD PRESSURE: 55 MMHG | HEART RATE: 80 BPM | SYSTOLIC BLOOD PRESSURE: 117 MMHG

## 2018-01-31 VITALS — SYSTOLIC BLOOD PRESSURE: 111 MMHG | HEART RATE: 84 BPM | DIASTOLIC BLOOD PRESSURE: 51 MMHG

## 2018-01-31 VITALS — DIASTOLIC BLOOD PRESSURE: 45 MMHG | SYSTOLIC BLOOD PRESSURE: 115 MMHG | HEART RATE: 85 BPM

## 2018-01-31 VITALS — HEART RATE: 90 BPM | OXYGEN SATURATION: 97 %

## 2018-01-31 VITALS — OXYGEN SATURATION: 94 %

## 2018-01-31 LAB
EOSINOPHIL NFR BLD AUTO: 276 K/UL (ref 130–400)
HCT VFR BLD CALC: 29 % (ref 37–47)
HGB BLD-MCNC: 9.1 G/DL (ref 12–16)
MCH RBC QN AUTO: 31.9 PG (ref 25–34)
MCHC RBC AUTO-ENTMCNC: 31.4 G/DL (ref 32–36)
MCV RBC AUTO: 101.8 FL (ref 80–100)
PMV BLD AUTO: 9.6 FL (ref 7.4–10.4)
PTT PATIENT: 101.9 SECONDS (ref 21–31)
PTT PATIENT: 72.4 SECONDS (ref 21–31)
RED CELL DISTRIBUTION WIDTH CV: 18.8 % (ref 11.5–14.5)
RED CELL DISTRIBUTION WIDTH SD: 67.5 FL (ref 36.4–46.3)
WBC # BLD AUTO: 22.73 K/UL (ref 4.8–10.8)

## 2018-01-31 RX ADMIN — HEPARIN SODIUM SCH UNIT: 1000 INJECTION, SOLUTION INTRAVENOUS; SUBCUTANEOUS at 09:00

## 2018-01-31 RX ADMIN — VANCOMYCIN HYDROCHLORIDE SCH MG: 1 INJECTION, POWDER, LYOPHILIZED, FOR SOLUTION INTRAVENOUS at 01:50

## 2018-01-31 RX ADMIN — AMIODARONE HYDROCHLORIDE SCH MG: 200 TABLET ORAL at 13:25

## 2018-01-31 RX ADMIN — LEVALBUTEROL HYDROCHLORIDE SCH MG: 1.25 SOLUTION RESPIRATORY (INHALATION) at 14:16

## 2018-01-31 RX ADMIN — ASCORBIC ACID, THIAMINE MONONITRATE,RIBOFLAVIN, NIACINAMIDE, PYRIDOXINE HYDROCHLORIDE, FOLIC ACID, CYANOCOBALAMIN, BIOTIN, CALCIUM PANTOTHENATE, SCH CAP: 100; 1.5; 1.7; 20; 10; 1; 6000; 150000; 5 CAPSULE, LIQUID FILLED ORAL at 21:52

## 2018-01-31 RX ADMIN — VANCOMYCIN HYDROCHLORIDE SCH MG: 1 INJECTION, POWDER, LYOPHILIZED, FOR SOLUTION INTRAVENOUS at 21:05

## 2018-01-31 RX ADMIN — MIDODRINE HYDROCHLORIDE SCH MG: 2.5 TABLET ORAL at 16:09

## 2018-01-31 RX ADMIN — Medication SCH ML: at 13:30

## 2018-01-31 RX ADMIN — INSULIN GLARGINE SCH UNITS: 100 INJECTION, SOLUTION SUBCUTANEOUS at 21:52

## 2018-01-31 RX ADMIN — RENAGEL SCH MG: 800 TABLET ORAL at 17:58

## 2018-01-31 RX ADMIN — LEVALBUTEROL HYDROCHLORIDE SCH MG: 1.25 SOLUTION RESPIRATORY (INHALATION) at 20:29

## 2018-01-31 RX ADMIN — ALUMINUM ZIRCONIUM TRICHLOROHYDREX GLY SCH EA: 0.2 STICK TOPICAL at 08:00

## 2018-01-31 RX ADMIN — METHYLPREDNISOLONE SODIUM SUCCINATE SCH MLS/MIN: 1 INJECTION, POWDER, FOR SOLUTION INTRAMUSCULAR; INTRAVENOUS at 13:25

## 2018-01-31 RX ADMIN — ALUMINUM ZIRCONIUM TRICHLOROHYDREX GLY SCH EA: 0.2 STICK TOPICAL at 00:00

## 2018-01-31 RX ADMIN — INSULIN ASPART SCH UNITS: 100 INJECTION, SOLUTION INTRAVENOUS; SUBCUTANEOUS at 16:51

## 2018-01-31 RX ADMIN — ALBUMIN HUMAN SCH GM: 250 SOLUTION INTRAVENOUS at 10:52

## 2018-01-31 RX ADMIN — METRONIDAZOLE SCH MLS/HR: 500 INJECTION, SOLUTION INTRAVENOUS at 13:25

## 2018-01-31 RX ADMIN — METRONIDAZOLE SCH MLS/HR: 500 INJECTION, SOLUTION INTRAVENOUS at 01:50

## 2018-01-31 RX ADMIN — RENAGEL SCH MG: 800 TABLET ORAL at 13:25

## 2018-01-31 RX ADMIN — HEPARIN SODIUM PRN MLS/HR: 5000 INJECTION, SOLUTION INTRAVENOUS at 13:38

## 2018-01-31 RX ADMIN — METHYLPREDNISOLONE SODIUM SUCCINATE SCH MLS/MIN: 1 INJECTION, POWDER, FOR SOLUTION INTRAMUSCULAR; INTRAVENOUS at 21:06

## 2018-01-31 RX ADMIN — AMIODARONE HYDROCHLORIDE SCH MG: 200 TABLET ORAL at 16:10

## 2018-01-31 RX ADMIN — INSULIN GLARGINE SCH UNITS: 100 INJECTION, SOLUTION SUBCUTANEOUS at 13:36

## 2018-01-31 RX ADMIN — INSULIN ASPART SCH UNITS: 100 INJECTION, SOLUTION INTRAVENOUS; SUBCUTANEOUS at 21:51

## 2018-01-31 RX ADMIN — Medication SCH ML: at 21:06

## 2018-01-31 RX ADMIN — HEPARIN SODIUM SCH UNIT: 1000 INJECTION, SOLUTION INTRAVENOUS; SUBCUTANEOUS at 10:00

## 2018-01-31 RX ADMIN — ACETAMINOPHEN PRN MLS/HR: 10 INJECTION, SOLUTION INTRAVENOUS at 21:53

## 2018-01-31 RX ADMIN — INSULIN ASPART SCH UNITS: 100 INJECTION, SOLUTION INTRAVENOUS; SUBCUTANEOUS at 12:12

## 2018-01-31 RX ADMIN — HEPARIN SODIUM PRN MLS/HR: 5000 INJECTION, SOLUTION INTRAVENOUS at 10:12

## 2018-01-31 RX ADMIN — HEPARIN SODIUM PRN MLS/HR: 5000 INJECTION, SOLUTION INTRAVENOUS at 18:00

## 2018-01-31 RX ADMIN — AMIODARONE HYDROCHLORIDE SCH MG: 200 TABLET ORAL at 08:02

## 2018-01-31 RX ADMIN — PANTOPRAZOLE SCH MG: 40 TABLET, DELAYED RELEASE ORAL at 13:24

## 2018-01-31 RX ADMIN — LEVALBUTEROL HYDROCHLORIDE SCH MG: 1.25 SOLUTION RESPIRATORY (INHALATION) at 01:27

## 2018-01-31 RX ADMIN — HEPARIN SODIUM SCH UNIT: 1000 INJECTION, SOLUTION INTRAVENOUS; SUBCUTANEOUS at 08:00

## 2018-01-31 RX ADMIN — Medication SCH ML: at 13:25

## 2018-01-31 RX ADMIN — ALUMINUM ZIRCONIUM TRICHLOROHYDREX GLY SCH EA: 0.2 STICK TOPICAL at 23:12

## 2018-01-31 RX ADMIN — ALBUMIN HUMAN SCH GM: 250 SOLUTION INTRAVENOUS at 09:27

## 2018-01-31 RX ADMIN — VANCOMYCIN HYDROCHLORIDE SCH MG: 1 INJECTION, POWDER, LYOPHILIZED, FOR SOLUTION INTRAVENOUS at 13:29

## 2018-01-31 RX ADMIN — Medication SCH ML: at 01:50

## 2018-01-31 RX ADMIN — MIDODRINE HYDROCHLORIDE SCH MG: 2.5 TABLET ORAL at 13:29

## 2018-01-31 RX ADMIN — ALUMINUM ZIRCONIUM TRICHLOROHYDREX GLY SCH EA: 0.2 STICK TOPICAL at 15:11

## 2018-01-31 RX ADMIN — LEVALBUTEROL HYDROCHLORIDE SCH MG: 1.25 SOLUTION RESPIRATORY (INHALATION) at 06:55

## 2018-01-31 RX ADMIN — METRONIDAZOLE SCH MLS/HR: 500 INJECTION, SOLUTION INTRAVENOUS at 16:52

## 2018-01-31 RX ADMIN — MIDODRINE HYDROCHLORIDE SCH MG: 2.5 TABLET ORAL at 13:24

## 2018-01-31 RX ADMIN — INSULIN ASPART SCH UNITS: 100 INJECTION, SOLUTION INTRAVENOUS; SUBCUTANEOUS at 09:13

## 2018-01-31 RX ADMIN — VANCOMYCIN HYDROCHLORIDE SCH MG: 1 INJECTION, POWDER, LYOPHILIZED, FOR SOLUTION INTRAVENOUS at 13:30

## 2018-01-31 NOTE — DIALYSIS PROGRESS NOTE
Nephrology Dialysis Note


Date of Service:


Jan 31, 2018.


Subjective


58 yo female with prosthetic heart valve, ecoli uti/ cdiff / hypotension / uri, 

ESRD on MWF HD in [place]. Patient was seen and examined while on dialysis. 

Patient with a right ankle sprain when she fell before admission going to 

dialysis. Patient denies pain anywhere else today. when asked how much fluid 

she is drinking she states that she has two cups of ice daily. with two empty 

cups at bedside and 250ml left in her [facility] cup and another small bottle of 

water and a half empty cup of ice. I am informed by the tech that patient has 

gone through a cup of ice within 45 minutes. patient now on 3L O2.  no n/v. no 

chest pain. access working well. no other complaints.





Objective











  Date Time  Temp Pulse Resp B/P (MAP) Pulse Ox O2 Delivery O2 Flow Rate FiO2


 


1/31/18 10:45  100  114/62    


 


1/31/18 10:30  86  121/55    


 


1/31/18 10:15  87  115/57    


 


1/31/18 10:00  80  117/55    


 


1/31/18 09:45  85  115/45    


 


1/31/18 09:30  84  111/51    


 


1/31/18 09:15  73  98/45    


 


1/31/18 08:08 35.8 86 20 109/60 (76) 97 Room Air  


 


1/31/18 08:00      Nasal Cannula 3.0 


 


1/31/18 06:55  94 16  91 Nasal Cannula 2.0 


 


1/31/18 04:00 36.5 83 18 95/55 (68) 91 Nasal Cannula 3.0 


 


1/31/18 04:00      Nasal Cannula 3.0 


 


1/31/18 01:28  86 16  98 Nasal Cannula 3.0 


 


1/31/18 00:00      Nasal Cannula 3.0 


 


1/30/18 23:32 36.6 97 18 89/63 (72) 93 Nasal Cannula 3.0 


 


1/30/18 20:01  78 16  100 Nasal Cannula 3.0 


 


1/30/18 20:00      Nasal Cannula 3.0 


 


1/30/18 19:05 36.6 84 20 108/69 (82) 94 Nasal Cannula 3.0 


 


1/30/18 16:06 36.8 75 20 101/66 (78) 99 Nasal Cannula 3.0 


 


1/30/18 16:00     96 Nasal Cannula 3.0 


 


1/30/18 14:14  94 18  97 Nasal Cannula 3.0 


 


1/30/18 12:00     95 Nasal Cannula 3.0 


 


1/30/18 11:26 36.7 72 20 94/57 (69) 99 Nasal Cannula 3.0 








Physical Exam:


General-on 3L O2, A& 0 x 3


Eyes-eomi


ENT-dry mm


Neck-supple


Lungs-diminished


Heart-irregular in 70s


Abdomen-soft NT +BS


Extremities-trace BL edema with bruising on RLE


Neuro-garrido, fluent speech





Current Inpatient Medications








 Medications


  (Trade)  Dose


 Ordered  Sig/Daniel


 Route  Start Time


 Stop Time Status Last Admin


Dose Admin


 


 Miscellaneous


 Information


  (Order Awaiting


 Action)  1 ea  QS


 N/A  1/16/18 08:00


 2/15/18 07:59  1/16/18 08:21


1 EA


 


 Miscellaneous


 Information


  (Order Awaiting


 Action)  1 ea  QS


 N/A  1/16/18 08:00


 2/15/18 07:59  1/16/18 08:21


1 EA


 


 Miscellaneous


 Information


  (Consult


 Glycemic


 Management


 Pharmacy)  1 ea  UD  PRN


 N/A  1/16/18 04:15


 2/15/18 04:14   


 


 


 Glucose


  (Glucose 40% Gel)  15-30


 GRAMS 15


 GRAMS...  UD  PRN


 PO  1/16/18 04:30


 2/15/18 04:29   


 


 


 Glucose


  (Glucose Chew


 Tab)  4-8


 Tablets 4


 Tabl...  UD  PRN


 PO  1/16/18 04:30


 2/15/18 04:29   


 


 


 Dextrose


  (Dextrose 50%


 50ML Syringe)  25-50ML OF


 50% DW IV


 FOR...  UD  PRN


 IV  1/16/18 04:30


 2/15/18 04:29  1/17/18 09:52


25 ML


 


 Glucagon


  (Glucagon Inj)  1 mg  UD  PRN


 SQ  1/16/18 04:30


 2/15/18 04:29   


 


 


 Ondansetron HCl


  (Zofran Inj)  4 mg  Q4H  PRN


 IV  1/16/18 18:15


 2/15/18 18:14  1/19/18 12:15


4 MG


 


 Vancomycin HCl


  (Vancomycin Oral


 Soln)  125 mg  Q6H


 PO  1/20/18 20:00


 2/3/18 19:59  1/31/18 01:50


125 MG


 


 Raspberry


  (Raspberry Syrup


 5ml Cup)  5 ml  Q6H


 PO  1/20/18 20:00


 2/3/18 19:59  1/31/18 01:50


5 ML


 


 Metronidazole 500


 mg/Prmx  100 ml @ 


 100 mls/hr  Q8H


 IV  1/22/18 18:00


 2/1/18 17:59  1/31/18 01:50


100 MLS/HR


 


 Acetaminophen 650


 mg/Empty Bag  65 ml @ 


 260 mls/hr  Q6H  PRN


 IV  1/23/18 16:30


 2/22/18 16:29  1/30/18 21:19


260 MLS/HR


 


 Heparin Sodium/


 Dextrose  500 ml @ 


 23 mls/hr  [id number] PRN


 IV  1/25/18 10:30


 2/24/18 10:29  1/31/18 10:12


23 MLS/HR


 


 Pantoprazole


 Sodium


  (Protonix Tab)  40 mg  QAM


 PO  1/26/18 09:00


 2/25/18 08:59  1/30/18 08:37


40 MG


 


 Levalbuterol


  (Xopenex 1.25MG/


 3ML Neb)  1.25 mg  Q6R


 INH  1/26/18 21:00


 2/25/18 20:59  1/31/18 06:55


1.25 MG


 


 Midodrine


  (Proamatine Tab)  5 mg  TID@08,12,17


 PO  1/28/18 17:00


 2/27/18 16:59  1/30/18 17:13


5 MG


 


 Epoetin Geovany


  (Procrit Inj)  10,000 units  ONE


 IV.  1/31/18 08:00


 1/31/18 15:00   


 


 


 Amiodarone HCl


  (Cordarone Tab)  200 mg  TIDM


 PO  1/30/18 16:45


 3/1/18 16:44  1/31/18 08:02


200 MG


 


 Methylprednisolone


 Sodium Succinate


 20 mg/Syringe  0.32 ml @ 


 1.5 mls/min  Q12


 IV  1/31/18 09:00


 3/2/18 08:59   


 


 


 Insulin Aspart


  (novoLOG ASPART)  **SLIDING


 SCALE**


 **G...  ACHS


 SC  1/31/18 07:00


 3/2/18 06:59   


 


 


 Insulin Glargine


  (Lantus Solostar


 Pen)  10 units  BID


 SC  1/31/18 09:00


 3/2/18 08:59   


 


 


 Vitamin B Complex/


 Vit C/Folic Acid


  (Nephrocaps)  1 cap  HS


 PO  1/31/18 21:00


 3/2/18 20:59   


 


 


 Sevelamer HCl


  (Renagel Tab)  800 mg  TIDM


 PO  1/31/18 11:30


 3/2/18 11:29   


 











Last 24 Hours








Test


  1/30/18


11:20 1/30/18


16:09 1/30/18


20:38 1/31/18


06:19


 


Bedside Glucose 270 mg/dl  314 mg/dl  264 mg/dl  


 


White Blood Count    22.73 K/uL 


 


Red Blood Count    2.85 M/uL 


 


Hemoglobin    9.1 g/dL 


 


Hematocrit    29.0 % 


 


Mean Corpuscular Volume    101.8 fL 


 


Mean Corpuscular Hemoglobin    31.9 pg 


 


Mean Corpuscular Hemoglobin


Concent 


  


  


  31.4 g/dl 


 


 


RDW Standard Deviation    67.5 fL 


 


RDW Coefficient of Variation    18.8 % 


 


Platelet Count    276 K/uL 


 


Mean Platelet Volume    9.6 fL 


 


Activated Partial


Thromboplast Time 


  


  


  72.4 SECONDS 


 


 


Partial Thromboplastin Ratio    2.8 


 


Test


  1/31/18


06:55 


  


  


 


 


Bedside Glucose 119 mg/dl    











Assessment & Plan


ESRD-difficult situation with chronic hypotension. Patient was seen and 

examined while on dialysis. Access working ok with some resistance on 

aspirating venous side. able to run today. BP's in the 110's currently and 

tolerating 2L fluid pull today and on 1/26. getting albumin. potassium 4.2. 

concerned with patient overall fluid intake. will place on fluid restriction to 

improve volume removal. patient was not on O2 before. with minimal/seldom urine 

output. will continue HD on MWF schedule or as clinical condition dictates. 





Anemia of Renal Failure-last hgb 9.1. will continue to give procrit with a hg 

goal of 10 to 11. 





Hypotension: bp continues to run in the 80s systolic but effectively improved 

during dialysis with midodrine and albumin. on midodrine 5 mg po tid.Secondary 

workup negative.





Atrial fibrillation/ flutter > remains on heparin, amiodarone gtts.  





This patient was seen and treated with direct collaboration with Dr. Camargo. 

Thank you for the opportunity to participate in this patient's care. Appreciate 

the Consult.





ATTENDING NOTE: 


I performed a history and physical examination of the patient, including 

specifically on history w/ concern for recent scabbing/lesions on R 2-4th 

distal digit pads, on physical exam scaly fingers R w/ recent scabs, and my 

impression and plan are as above; will also resume phos naun, renal caps. I 

have discussed the patient's management with Veronica Centeno PA-C. Please refer 

to the above note for the documented findings and plan of care.





More Camargo MD, PhD

## 2018-01-31 NOTE — PHARMACY PROGRESS NOTE
Pharmacy Glycemic Short Note 2


Date of Service


Jan 31, 2018.





OUTPATIENT ANTIDIABETIC REGIMEN: 


* NPH 40 units SQ daily when taking prednisone (otherwise no insulin taken)








ASSESSMENT:


* 58 yo with severe steroid induced hyperglycemia. Pt typically only needs 

insulin when started on steroids for pulmonary issues. 


* Patient has been requiring 100-148 units of insulin per day with near 

adequate control while on high dose IV steroids


* Steroids decreased today significantly from Solumedrol 40mg IV Q6hrs to 

Solumedrol 20mg IV Q12hrs. Significant dose decrease needed in insulin dosing 

as patient does not require insulin when not on steroids. 


 








PLAN FOR INPATIENT GLYCEMIC CONTROL:


* Basal Insulin: decrease dose 


 * Lantus 10 units SQ BID


 


 


* Bolus Insulin - loosen parameters  


 * NOVOLOG per scale ACHS


 * Goal Range:  Low 120 mg/dL - High 150 mg/dL


 * Correction Factor: 15 mg/dL/unit


 * Nutritional / Prandial insulin:  1 units for every 5 grams of carb 








PLAN FOR DISCHARGE:


* A1c indicated good outpatient glycemic control.


* Expect that patient may be discharged on previous home regimen unless 

steroids are continued at discharge.





Thank you.

## 2018-01-31 NOTE — PROGRESS NOTE
Medicine Progress Note


Date & Time of Visit:


Jan 31, 2018 at 18:50


.


Subjective





CC: 


Follow-up visit for multiple problems.





HPI: 


Feels better today.


Cough, dyspnea improved.


Diarrhea better; no nausea or vomiting.


Hemodialysis performed without difficulty.





ROS:


General- no fever, no chills


Resp- as noted above in HPI


Cardiac-  no chest pain, no edema


GI- as noted above in HPI


- anuric


.


.





Objective





Last 8 Hrs








  Date Time  Temp Pulse Resp B/P (MAP) Pulse Ox O2 Delivery O2 Flow Rate FiO2


 


1/31/18 19:50 36.8 84 18 86/45 (59) 97 Nasal Cannula 2.0 


 


1/31/18 16:00     95 Nasal Cannula 2.0 


 


1/31/18 15:00 36.4 93 16 92/56 (68) 97 Nasal Cannula  


 


1/31/18 14:16  98 22  97 Nasal Cannula 2.0 


 


1/31/18 14:14      Diffusion Mask  


 


1/31/18 13:05 36.8 90  101/53 (69)    


 


1/31/18 12:48  102  102/55    


 


1/31/18 12:45  102  102/55    


 


1/31/18 12:30  89  94/58    








Physical Exam:





General- lying in bed; no distress


Lungs- few scattered rhonchi, diffuse mild wheezing; no respiratory distress


Cardiovascular- RRR;  no gallop; no JVD; trace pretibial edema


Abdomen- + bowel sounds, soft, nontender 


Extremities- no cyanosis; no calf tenderness


Neuro- alert, oriented


Skin- warm & dry; desquamation of fingertips of right 2nd, 3rd, 4th fingers 

without associated erythema or drainage


.


Laboratory Results:





Last 24 Hours








Test


  1/30/18


20:38 1/31/18


06:19 1/31/18


06:55 1/31/18


11:11


 


Bedside Glucose 264 mg/dl   119 mg/dl  83 mg/dl 


 


White Blood Count  22.73 K/uL   


 


Red Blood Count  2.85 M/uL   


 


Hemoglobin  9.1 g/dL   


 


Hematocrit  29.0 %   


 


Mean Corpuscular Volume  101.8 fL   


 


Mean Corpuscular Hemoglobin  31.9 pg   


 


Mean Corpuscular Hemoglobin


Concent 


  31.4 g/dl 


  


  


 


 


RDW Standard Deviation  67.5 fL   


 


RDW Coefficient of Variation  18.8 %   


 


Platelet Count  276 K/uL   


 


Mean Platelet Volume  9.6 fL   


 


Activated Partial


Thromboplast Time 


  72.4 SECONDS 


  


  


 


 


Partial Thromboplastin Ratio  2.8   


 


Test


  1/31/18


16:00 1/31/18


16:17 


  


 


 


Activated Partial


Thromboplast Time 101.9 SECONDS 


  


  


  


 


 


Partial Thromboplastin Ratio 3.9    


 


Bedside Glucose  160 mg/dl   











Assessment & Plan





HYPOTENSION


Improved.


Started on midodrine.


Continue to monitor.





ATRIAL FLUTTER


Cardiology consulted.


Receiving amiodarone and IV heparin.





UTI E COLI (present on admission)


UA on admission demonstrated WBC's and bacteria.


Urine culture grew E coli, ESBL.


Treated with course of ertapenem.





C DIFFICILE COLITIS


Persistent diarrhea.


Continue IV metronidazole and PO vancomycin.





BRONCHITIS / EXACERBATION OF COPD


Received course of azithromycin.


Taper steroids.





SBO 


Resolved.





DM TYPE 2


Pharmacy consulted for glycemic management.


FBS = 119.


Taper steroids.





CIRRHOSIS


Will need outpatient follow-up with GI.





RIGHT ANKLE SPRAIN


Seen by Ortho.


CAM boot applied.





DESQUAMATION FINGERTIPS


No apparent drug reaction, infection.


Follow.





VTE PROPHYLAXIS


Receiving IV heparin.


Ambulate as able.





DISPOSITION


To be determined.


.


Consultants:


Nephro-Oncu


Farhad


Current Inpatient Medications:





Current Inpatient Medications








 Medications


  (Trade)  Dose


 Ordered  Sig/Daniel


 Route  Start Time


 Stop Time Status Last Admin


Dose Admin


 


 Miscellaneous


 Information


  (Order Awaiting


 Action)  1 ea  QS


 N/A  1/16/18 08:00


 2/15/18 07:59  1/16/18 08:21


1 EA


 


 Miscellaneous


 Information


  (Order Awaiting


 Action)  1 ea  QS


 N/A  1/16/18 08:00


 2/15/18 07:59  1/16/18 08:21


1 EA


 


 Miscellaneous


 Information


  (Consult


 Glycemic


 Management


 Pharmacy)  1 ea  UD  PRN


 N/A  1/16/18 04:15


 2/15/18 04:14   


 


 


 Glucose


  (Glucose 40% Gel)  15-30


 GRAMS 15


 GRAMS...  UD  PRN


 PO  1/16/18 04:30


 2/15/18 04:29   


 


 


 Glucose


  (Glucose Chew


 Tab)  4-8


 Tablets 4


 Tabl...  UD  PRN


 PO  1/16/18 04:30


 2/15/18 04:29   


 


 


 Dextrose


  (Dextrose 50%


 50ML Syringe)  25-50ML OF


 50% DW IV


 FOR...  UD  PRN


 IV  1/16/18 04:30


 2/15/18 04:29  1/17/18 09:52


25 ML


 


 Glucagon


  (Glucagon Inj)  1 mg  UD  PRN


 SQ  1/16/18 04:30


 2/15/18 04:29   


 


 


 Ondansetron HCl


  (Zofran Inj)  4 mg  Q4H  PRN


 IV  1/16/18 18:15


 2/15/18 18:14  1/19/18 12:15


4 MG


 


 Vancomycin HCl


  (Vancomycin Oral


 Soln)  125 mg  Q6H


 PO  1/20/18 20:00


 2/3/18 19:59  1/31/18 13:29


125 MG


 


 Raspberry


  (Raspberry Syrup


 5ml Cup)  5 ml  Q6H


 PO  1/20/18 20:00


 2/3/18 19:59  1/31/18 13:25


5 ML


 


 Metronidazole 500


 mg/Prmx  100 ml @ 


 100 mls/hr  Q8H


 IV  1/22/18 18:00


 2/1/18 17:59  1/31/18 16:52


100 MLS/HR


 


 Acetaminophen 650


 mg/Empty Bag  65 ml @ 


 260 mls/hr  Q6H  PRN


 IV  1/23/18 16:30


 2/22/18 16:29  1/30/18 21:19


260 MLS/HR


 


 Heparin Sodium/


 Dextrose  500 ml @ 


 20 mls/hr  Q24H PRN


 IV  1/25/18 10:30


 2/24/18 10:29  1/31/18 18:00


20 MLS/HR


 


 Pantoprazole


 Sodium


  (Protonix Tab)  40 mg  QAM


 PO  1/26/18 09:00


 2/25/18 08:59  1/31/18 13:24


40 MG


 


 Levalbuterol


  (Xopenex 1.25MG/


 3ML Neb)  1.25 mg  Q6R


 INH  1/26/18 21:00


 2/25/18 20:59  1/31/18 14:16


1.25 MG


 


 Midodrine


  (Proamatine Tab)  5 mg  TID@08,12,17


 PO  1/28/18 17:00


 2/27/18 16:59  1/31/18 16:09


5 MG


 


 Amiodarone HCl


  (Cordarone Tab)  200 mg  TIDM


 PO  1/30/18 16:45


 3/1/18 16:44  1/31/18 16:10


200 MG


 


 Methylprednisolone


 Sodium Succinate


 20 mg/Syringe  0.32 ml @ 


 1.5 mls/min  Q12


 IV  1/31/18 09:00


 3/2/18 08:59  1/31/18 13:25


1.5 MLS/MIN


 


 Insulin Aspart


  (novoLOG ASPART)  **SLIDING


 SCALE**


 **G...  ACHS


 SC  1/31/18 07:00


 3/2/18 06:59  1/31/18 16:51


9 UNITS


 


 Insulin Glargine


  (Lantus Solostar


 Pen)  10 units  BID


 SC  1/31/18 09:00


 3/2/18 08:59  1/31/18 13:36


10 UNITS


 


 Vitamin B Complex/


 Vit C/Folic Acid


  (Nephrocaps)  1 cap  HS


 PO  1/31/18 21:00


 3/2/18 20:59   


 


 


 Sevelamer HCl


  (Renagel Tab)  800 mg  TIDM


 PO  1/31/18 11:30


 3/2/18 11:29  1/31/18 17:58


800 MG

## 2018-01-31 NOTE — INFECTIOUS DISEASE PROGRESS NT
Progress Note


Date of Service


Jan 31, 2018.





Subjective


Pt evaluation today including:  conversation w/ patient, physical exam, chart 

review, lab review, review of studies, conversation w/ consultant, review of 

inpatient medication list


Patient offering no new complaints today.  Now status post hemodialysis.  

Diarrhea better.  No fever.  White count significantly better today.





   All Other Systems:  Reviewed and Negative





Medications





Current Inpatient Medications








 Medications


  (Trade)  Dose


 Ordered  Sig/Daniel


 Route  Start Time


 Stop Time Status Last Admin


Dose Admin


 


 Miscellaneous


 Information


  (Order Awaiting


 Action)  1 ea  QS


 N/A  1/16/18 08:00


 2/15/18 07:59  1/16/18 08:21


1 EA


 


 Miscellaneous


 Information


  (Order Awaiting


 Action)  1 ea  QS


 N/A  1/16/18 08:00


 2/15/18 07:59  1/16/18 08:21


1 EA


 


 Miscellaneous


 Information


  (Consult


 Glycemic


 Management


 Pharmacy)  1 ea  UD  PRN


 N/A  1/16/18 04:15


 2/15/18 04:14   


 


 


 Glucose


  (Glucose 40% Gel)  15-30


 GRAMS 15


 GRAMS...  UD  PRN


 PO  1/16/18 04:30


 2/15/18 04:29   


 


 


 Glucose


  (Glucose Chew


 Tab)  4-8


 Tablets 4


 Tabl...  UD  PRN


 PO  1/16/18 04:30


 2/15/18 04:29   


 


 


 Dextrose


  (Dextrose 50%


 50ML Syringe)  25-50ML OF


 50% DW IV


 FOR...  UD  PRN


 IV  1/16/18 04:30


 2/15/18 04:29  1/17/18 09:52


25 ML


 


 Glucagon


  (Glucagon Inj)  1 mg  UD  PRN


 SQ  1/16/18 04:30


 2/15/18 04:29   


 


 


 Ondansetron HCl


  (Zofran Inj)  4 mg  Q4H  PRN


 IV  1/16/18 18:15


 2/15/18 18:14  1/19/18 12:15


4 MG


 


 Vancomycin HCl


  (Vancomycin Oral


 Soln)  125 mg  Q6H


 PO  1/20/18 20:00


 2/3/18 19:59  1/31/18 13:29


125 MG


 


 Raspberry


  (Raspberry Syrup


 5ml Cup)  5 ml  Q6H


 PO  1/20/18 20:00


 2/3/18 19:59  1/31/18 13:25


5 ML


 


 Metronidazole 500


 mg/Prmx  100 ml @ 


 100 mls/hr  Q8H


 IV  1/22/18 18:00


 2/1/18 17:59  1/31/18 13:25


100 MLS/HR


 


 Acetaminophen 650


 mg/Empty Bag  65 ml @ 


 260 mls/hr  Q6H  PRN


 IV  1/23/18 16:30


 2/22/18 16:29  1/30/18 21:19


260 MLS/HR


 


 Heparin Sodium/


 Dextrose  500 ml @ 


 23 mls/hr  [id number] PRN


 IV  1/25/18 10:30


 2/24/18 10:29  1/31/18 13:38


23 MLS/HR


 


 Pantoprazole


 Sodium


  (Protonix Tab)  40 mg  QAM


 PO  1/26/18 09:00


 2/25/18 08:59  1/31/18 13:24


40 MG


 


 Levalbuterol


  (Xopenex 1.25MG/


 3ML Neb)  1.25 mg  Q6R


 INH  1/26/18 21:00


 2/25/18 20:59  1/31/18 06:55


1.25 MG


 


 Midodrine


  (Proamatine Tab)  5 mg  TID@08,12,17


 PO  1/28/18 17:00


 2/27/18 16:59  1/31/18 13:24


5 MG


 


 Epoetin Geovany


  (Procrit Inj)  10,000 units  ONE


 IV.  1/31/18 08:00


 1/31/18 15:00  1/31/18 12:33


10,000 UNITS


 


 Amiodarone HCl


  (Cordarone Tab)  200 mg  TIDM


 PO  1/30/18 16:45


 3/1/18 16:44  1/31/18 13:25


200 MG


 


 Methylprednisolone


 Sodium Succinate


 20 mg/Syringe  0.32 ml @ 


 1.5 mls/min  Q12


 IV  1/31/18 09:00


 3/2/18 08:59  1/31/18 13:25


1.5 MLS/MIN


 


 Insulin Aspart


  (novoLOG ASPART)  **SLIDING


 SCALE**


 **G...  ACHS


 SC  1/31/18 07:00


 3/2/18 06:59   


 


 


 Insulin Glargine


  (Lantus Solostar


 Pen)  10 units  BID


 SC  1/31/18 09:00


 3/2/18 08:59  1/31/18 13:36


10 UNITS


 


 Vitamin B Complex/


 Vit C/Folic Acid


  (Nephrocaps)  1 cap  HS


 PO  1/31/18 21:00


 3/2/18 20:59   


 


 


 Sevelamer HCl


  (Renagel Tab)  800 mg  TIDM


 PO  1/31/18 11:30


 3/2/18 11:29  1/31/18 13:25


800 MG











Objective


Vital Signs











  Date Time  Temp Pulse Resp B/P (MAP) Pulse Ox O2 Delivery O2 Flow Rate FiO2


 


1/31/18 14:14      Diffusion Mask  


 


1/31/18 13:05 36.8 90  101/53 (69)    


 


1/31/18 12:48  102  102/55    


 


1/31/18 12:45  102  102/55    


 


1/31/18 12:30  89  94/58    


 


1/31/18 12:15  98  116/60    


 


1/31/18 12:00  100  109/69    


 


1/31/18 12:00      Nasal Cannula 3.0 


 


1/31/18 11:45  71  95/52    


 


1/31/18 11:30  90  102/57    


 


1/31/18 11:28 36.8 96 24 109/53 (71) 100 Nasal Cannula 2.0 


 


1/31/18 11:15  97  100/54    


 


1/31/18 11:00  90  107/53    


 


1/31/18 10:45  100  114/62    


 


1/31/18 10:30  86  121/55    


 


1/31/18 10:15  87  115/57    


 


1/31/18 10:00  80  117/55    


 


1/31/18 09:45  85  115/45    


 


1/31/18 09:30  84  111/51    


 


1/31/18 09:15  73  98/45    


 


1/31/18 09:05 36.4 75  106/48 (67)    


 


1/31/18 08:08 35.8 86 20 109/60 (76) 97 Room Air  


 


1/31/18 08:00      Nasal Cannula 3.0 


 


1/31/18 06:55  94 16  91 Nasal Cannula 2.0 


 


1/31/18 04:00 36.5 83 18 95/55 (68) 91 Nasal Cannula 3.0 


 


1/31/18 04:00      Nasal Cannula 3.0 


 


1/31/18 01:28  86 16  98 Nasal Cannula 3.0 


 


1/31/18 00:00      Nasal Cannula 3.0 


 


1/30/18 23:32 36.6 97 18 89/63 (72) 93 Nasal Cannula 3.0 


 


1/30/18 20:01  78 16  100 Nasal Cannula 3.0 


 


1/30/18 20:00      Nasal Cannula 3.0 


 


1/30/18 19:05 36.6 84 20 108/69 (82) 94 Nasal Cannula 3.0 


 


1/30/18 16:06 36.8 75 20 101/66 (78) 99 Nasal Cannula 3.0 


 


1/30/18 16:00     96 Nasal Cannula 3.0 











Physical Exam


General Appearance:  WD/WN, no apparent distress, + pertinent finding (

Chronically ill-appearing)


Eyes:  normal inspection, EOMI, sclerae normal


ENT:  normal ENT inspection, pharynx normal


Neck:  supple, no adenopathy, trachea midline


Respiratory/Chest:  chest non-tender, lungs clear, normal breath sounds, no 

respiratory distress


Cardiovascular:  no gallop, no murmur, + irregularly irregular


Abdomen:  normal bowel sounds, non tender, soft, no organomegaly


Extremities:  non-tender, no calf tenderness, normal capillary refill


Neurologic/Psychiatric:  alert, normal mood/affect, oriented x 3


Skin:  normal color, warm/dry, no rash


Lymphatic:  no adenopathy





Laboratory Results





Last 24 Hours








Test


  1/30/18


16:09 1/30/18


20:38 1/31/18


06:19 1/31/18


06:55


 


Bedside Glucose 314 mg/dl  264 mg/dl   119 mg/dl 


 


White Blood Count   22.73 K/uL  


 


Red Blood Count   2.85 M/uL  


 


Hemoglobin   9.1 g/dL  


 


Hematocrit   29.0 %  


 


Mean Corpuscular Volume   101.8 fL  


 


Mean Corpuscular Hemoglobin   31.9 pg  


 


Mean Corpuscular Hemoglobin


Concent 


  


  31.4 g/dl 


  


 


 


RDW Standard Deviation   67.5 fL  


 


RDW Coefficient of Variation   18.8 %  


 


Platelet Count   276 K/uL  


 


Mean Platelet Volume   9.6 fL  


 


Activated Partial


Thromboplast Time 


  


  72.4 SECONDS 


  


 


 


Partial Thromboplastin Ratio   2.8  


 


Test


  1/31/18


11:11 1/31/18


13:15 


  


 


 


Bedside Glucose 83 mg/dl    











Assessment and Plan


Urinary tract infection with ESBL producing E coli, as well as now C difficile 

colitis and possible small-bowel obstruction. .  Small bowel obstruction 

appears improved,  Increasing WBC likely due to combination of C diff and 

steroids, now appears to be improving..  Will continue to follow.

## 2018-01-31 NOTE — PROGRESS NOTE
DATE: 01/31/2018

 

FOLLOWUP VISIT

 

SUBJECTIVE:  The patient is a 57-year-old who has had a lengthy hospital

stay.  She has end-stage kidney disease and is on hemodialysis.  She was

admitted with UTI and C. difficile colitis.  From a cardiac standpoint, she

has a history of aortic valve replacement with a bioprosthetic valve and

mitral stenosis due to heavy calcification of the mitral annulus due to the

dialysis.  She has chronic atrial flutter.  She has been anticoagulated with

Coumadin and the plan currently is for rate control with amiodarone.  She was

switched over to oral amiodarone yesterday.  She has no new cardiac

complaints.

 

OBJECTIVE:

VITAL SIGNS:  Pulse is irregular at 90 beats per minute and blood pressure is

100/60.  She is afebrile.

GENERAL:  She is alert and oriented in no acute distress.

HEENT:  She is normocephalic.  She wears corrective lenses.  Mucous membranes

are moist.

NECK:  The neck veins are flat.  Carotids have good upstrokes bilaterally

without bruits.  Thyroid is nonpalpable.

RESPIRATORY:  Breath sounds equal bilaterally and clear to auscultation.

CARDIOVASCULAR:  Heart has an irregular rhythm.  Normal S1 and S2.  No S3 or

S4.  No cardiac rubs or murmurs.

GASTROINTESTINAL:  Abdomen is soft and nontender without organomegaly.

EXTREMITIES:  Free of edema, digit clubbing, or cyanosis.

NEUROLOGIC:  Grossly intact.

SKIN:  Warm to touch.

LYMPH NODES:  Negative to palpation.

 

LABORATORY DATA:  Hemoglobin is 9.1 and WBC count is 22.73.

 

IMPRESSION:

1.  Atrial flutter.

2.  End-stage kidney disease, on hemodialysis.

3.  Urinary tract infection and Clostridium difficile.

 

RECOMMENDATIONS:  I would continue the amiodarone at its current oral dose

for several more doses and then she should get down to 200 mg b.i.d. 

Eventually, she will be on 200 mg daily.

## 2018-02-01 VITALS
HEART RATE: 90 BPM | DIASTOLIC BLOOD PRESSURE: 72 MMHG | TEMPERATURE: 98.42 F | SYSTOLIC BLOOD PRESSURE: 98 MMHG | OXYGEN SATURATION: 94 %

## 2018-02-01 VITALS
TEMPERATURE: 97.88 F | OXYGEN SATURATION: 100 % | SYSTOLIC BLOOD PRESSURE: 107 MMHG | DIASTOLIC BLOOD PRESSURE: 66 MMHG | HEART RATE: 80 BPM

## 2018-02-01 VITALS
DIASTOLIC BLOOD PRESSURE: 49 MMHG | SYSTOLIC BLOOD PRESSURE: 84 MMHG | HEART RATE: 93 BPM | OXYGEN SATURATION: 90 % | TEMPERATURE: 97.7 F

## 2018-02-01 VITALS
SYSTOLIC BLOOD PRESSURE: 96 MMHG | OXYGEN SATURATION: 92 % | DIASTOLIC BLOOD PRESSURE: 63 MMHG | TEMPERATURE: 97.52 F | HEART RATE: 105 BPM

## 2018-02-01 VITALS
HEART RATE: 81 BPM | OXYGEN SATURATION: 100 % | DIASTOLIC BLOOD PRESSURE: 69 MMHG | SYSTOLIC BLOOD PRESSURE: 102 MMHG | TEMPERATURE: 97.88 F

## 2018-02-01 VITALS
TEMPERATURE: 97.7 F | SYSTOLIC BLOOD PRESSURE: 102 MMHG | DIASTOLIC BLOOD PRESSURE: 64 MMHG | OXYGEN SATURATION: 96 % | HEART RATE: 89 BPM

## 2018-02-01 VITALS — OXYGEN SATURATION: 91 % | HEART RATE: 82 BPM

## 2018-02-01 VITALS — HEART RATE: 74 BPM | OXYGEN SATURATION: 98 %

## 2018-02-01 VITALS — OXYGEN SATURATION: 91 % | HEART RATE: 94 BPM

## 2018-02-01 VITALS — HEART RATE: 75 BPM | OXYGEN SATURATION: 98 %

## 2018-02-01 LAB
BUN SERPL-MCNC: 37 MG/DL (ref 7–18)
CALCIUM SERPL-MCNC: 8.5 MG/DL (ref 8.5–10.1)
CO2 SERPL-SCNC: 22 MMOL/L (ref 21–32)
CREAT SERPL-MCNC: 5.13 MG/DL (ref 0.6–1.2)
EOSINOPHIL NFR BLD AUTO: 214 K/UL (ref 130–400)
GLUCOSE SERPL-MCNC: 217 MG/DL (ref 70–99)
HCT VFR BLD CALC: 31.4 % (ref 37–47)
HGB BLD-MCNC: 9.5 G/DL (ref 12–16)
INR PPP: 1.1 (ref 0.9–1.1)
MCH RBC QN AUTO: 31.9 PG (ref 25–34)
MCHC RBC AUTO-ENTMCNC: 30.3 G/DL (ref 32–36)
MCV RBC AUTO: 105.4 FL (ref 80–100)
NRBC BLD AUTO-RTO: 0.1 %
NUCLEATED RED BLOOD CELL ABS: 0.02 K/UL (ref 0–0)
PMV BLD AUTO: 10 FL (ref 7.4–10.4)
POTASSIUM SERPL-SCNC: 4.6 MMOL/L (ref 3.5–5.1)
PTT PATIENT: 41.8 SECONDS (ref 21–31)
PTT PATIENT: 44.1 SECONDS (ref 21–31)
PTT PATIENT: 81.9 SECONDS (ref 21–31)
PTT PATIENT: 87.9 SECONDS (ref 21–31)
RED CELL DISTRIBUTION WIDTH CV: 19.4 % (ref 11.5–14.5)
RED CELL DISTRIBUTION WIDTH SD: 72.3 FL (ref 36.4–46.3)
SODIUM SERPL-SCNC: 133 MMOL/L (ref 136–145)
WBC # BLD AUTO: 16.6 K/UL (ref 4.8–10.8)

## 2018-02-01 RX ADMIN — AMIODARONE HYDROCHLORIDE SCH MG: 200 TABLET ORAL at 12:09

## 2018-02-01 RX ADMIN — ALUMINUM ZIRCONIUM TRICHLOROHYDREX GLY SCH EA: 0.2 STICK TOPICAL at 21:43

## 2018-02-01 RX ADMIN — LEVALBUTEROL HYDROCHLORIDE SCH MG: 1.25 SOLUTION RESPIRATORY (INHALATION) at 14:11

## 2018-02-01 RX ADMIN — Medication SCH ML: at 13:41

## 2018-02-01 RX ADMIN — METRONIDAZOLE SCH MLS/HR: 500 INJECTION, SOLUTION INTRAVENOUS at 08:56

## 2018-02-01 RX ADMIN — VANCOMYCIN HYDROCHLORIDE SCH MG: 1 INJECTION, POWDER, LYOPHILIZED, FOR SOLUTION INTRAVENOUS at 13:41

## 2018-02-01 RX ADMIN — RENAGEL SCH MG: 800 TABLET ORAL at 16:53

## 2018-02-01 RX ADMIN — ONDANSETRON PRN MG: 2 INJECTION INTRAMUSCULAR; INTRAVENOUS at 02:24

## 2018-02-01 RX ADMIN — LEVALBUTEROL HYDROCHLORIDE SCH MG: 1.25 SOLUTION RESPIRATORY (INHALATION) at 19:59

## 2018-02-01 RX ADMIN — Medication SCH ML: at 02:16

## 2018-02-01 RX ADMIN — INSULIN GLARGINE SCH UNITS: 100 INJECTION, SOLUTION SUBCUTANEOUS at 21:42

## 2018-02-01 RX ADMIN — METRONIDAZOLE SCH MLS/HR: 500 INJECTION, SOLUTION INTRAVENOUS at 02:16

## 2018-02-01 RX ADMIN — INSULIN ASPART SCH UNITS: 100 INJECTION, SOLUTION INTRAVENOUS; SUBCUTANEOUS at 21:41

## 2018-02-01 RX ADMIN — MIDODRINE HYDROCHLORIDE SCH MG: 2.5 TABLET ORAL at 08:55

## 2018-02-01 RX ADMIN — HEPARIN SODIUM PRN MLS/HR: 5000 INJECTION, SOLUTION INTRAVENOUS at 17:19

## 2018-02-01 RX ADMIN — INSULIN ASPART SCH UNITS: 100 INJECTION, SOLUTION INTRAVENOUS; SUBCUTANEOUS at 16:56

## 2018-02-01 RX ADMIN — AMIODARONE HYDROCHLORIDE SCH MG: 200 TABLET ORAL at 08:55

## 2018-02-01 RX ADMIN — ASCORBIC ACID, THIAMINE MONONITRATE,RIBOFLAVIN, NIACINAMIDE, PYRIDOXINE HYDROCHLORIDE, FOLIC ACID, CYANOCOBALAMIN, BIOTIN, CALCIUM PANTOTHENATE, SCH CAP: 100; 1.5; 1.7; 20; 10; 1; 6000; 150000; 5 CAPSULE, LIQUID FILLED ORAL at 21:42

## 2018-02-01 RX ADMIN — INSULIN ASPART SCH UNITS: 100 INJECTION, SOLUTION INTRAVENOUS; SUBCUTANEOUS at 12:09

## 2018-02-01 RX ADMIN — VANCOMYCIN HYDROCHLORIDE SCH MG: 1 INJECTION, POWDER, LYOPHILIZED, FOR SOLUTION INTRAVENOUS at 02:16

## 2018-02-01 RX ADMIN — AMIODARONE HYDROCHLORIDE SCH MG: 200 TABLET ORAL at 16:53

## 2018-02-01 RX ADMIN — ALUMINUM ZIRCONIUM TRICHLOROHYDREX GLY SCH EA: 0.2 STICK TOPICAL at 21:44

## 2018-02-01 RX ADMIN — LEVALBUTEROL HYDROCHLORIDE SCH MG: 1.25 SOLUTION RESPIRATORY (INHALATION) at 07:08

## 2018-02-01 RX ADMIN — PANTOPRAZOLE SCH MG: 40 TABLET, DELAYED RELEASE ORAL at 08:56

## 2018-02-01 RX ADMIN — MIDODRINE HYDROCHLORIDE SCH MG: 2.5 TABLET ORAL at 16:53

## 2018-02-01 RX ADMIN — RENAGEL SCH MG: 800 TABLET ORAL at 12:09

## 2018-02-01 RX ADMIN — MIDODRINE HYDROCHLORIDE SCH MG: 2.5 TABLET ORAL at 12:10

## 2018-02-01 RX ADMIN — Medication SCH ML: at 21:23

## 2018-02-01 RX ADMIN — RENAGEL SCH MG: 800 TABLET ORAL at 08:55

## 2018-02-01 RX ADMIN — VANCOMYCIN HYDROCHLORIDE SCH MG: 1 INJECTION, POWDER, LYOPHILIZED, FOR SOLUTION INTRAVENOUS at 21:22

## 2018-02-01 RX ADMIN — VANCOMYCIN HYDROCHLORIDE SCH MG: 1 INJECTION, POWDER, LYOPHILIZED, FOR SOLUTION INTRAVENOUS at 08:55

## 2018-02-01 RX ADMIN — LEVALBUTEROL HYDROCHLORIDE SCH MG: 1.25 SOLUTION RESPIRATORY (INHALATION) at 01:49

## 2018-02-01 RX ADMIN — INSULIN ASPART SCH UNITS: 100 INJECTION, SOLUTION INTRAVENOUS; SUBCUTANEOUS at 08:21

## 2018-02-01 RX ADMIN — INSULIN GLARGINE SCH UNITS: 100 INJECTION, SOLUTION SUBCUTANEOUS at 08:22

## 2018-02-01 RX ADMIN — ONDANSETRON PRN MG: 2 INJECTION INTRAMUSCULAR; INTRAVENOUS at 12:37

## 2018-02-01 RX ADMIN — Medication SCH ML: at 08:55

## 2018-02-01 NOTE — PROGRESS NOTE
Subjective


Date of Service:


Feb 1, 2018.


Subjective


The patient is a 57-year-old on hemodialysis with multiple medical problems.  

She has had a long hospital stay.  She was originally admitted with a UTI and 

C. difficile.  There was some thought to small bowel obstruction as well.  She 

has been on amiodarone to control her heart rate with atrial flutter.  She has 

done well on this medication.  Unfortunately, this morning she is bit nauseated 

and does not want to eat.  With her chronic nausea and intermittent small bowel 

obstruction 9 wonders she may have ischemic bowel.





Review of Systems


Abdomen:  + nausea, + diarrhea


All Other Systems:  Reviewed and Negative





Medications





Current Inpatient Medications








 Medications


  (Trade)  Dose


 Ordered  Sig/Daniel


 Route  Start Time


 Stop Time Status Last Admin


Dose Admin


 


 Miscellaneous


 Information


  (Order Awaiting


 Action)  1 ea  QS


 N/A  1/16/18 08:00


 2/15/18 07:59  1/16/18 08:21


1 EA


 


 Miscellaneous


 Information


  (Order Awaiting


 Action)  1 ea  QS


 N/A  1/16/18 08:00


 2/15/18 07:59  1/16/18 08:21


1 EA


 


 Miscellaneous


 Information


  (Consult


 Glycemic


 Management


 Pharmacy)  1 ea  UD  PRN


 N/A  1/16/18 04:15


 2/15/18 04:14   


 


 


 Glucose


  (Glucose 40% Gel)  15-30


 GRAMS 15


 GRAMS...  UD  PRN


 PO  1/16/18 04:30


 2/15/18 04:29   


 


 


 Glucose


  (Glucose Chew


 Tab)  4-8


 Tablets 4


 Tabl...  UD  PRN


 PO  1/16/18 04:30


 2/15/18 04:29   


 


 


 Dextrose


  (Dextrose 50%


 50ML Syringe)  25-50ML OF


 50% DW IV


 FOR...  UD  PRN


 IV  1/16/18 04:30


 2/15/18 04:29  1/17/18 09:52


25 ML


 


 Glucagon


  (Glucagon Inj)  1 mg  UD  PRN


 SQ  1/16/18 04:30


 2/15/18 04:29   


 


 


 Ondansetron HCl


  (Zofran Inj)  4 mg  Q4H  PRN


 IV  1/16/18 18:15


 2/15/18 18:14  2/1/18 02:24


4 MG


 


 Vancomycin HCl


  (Vancomycin Oral


 Soln)  125 mg  Q6H


 PO  1/20/18 20:00


 2/3/18 19:59  2/1/18 08:55


125 MG


 


 Raspberry


  (Raspberry Syrup


 5ml Cup)  5 ml  Q6H


 PO  1/20/18 20:00


 2/3/18 19:59  2/1/18 08:55


5 ML


 


 Metronidazole 500


 mg/Prmx  100 ml @ 


 100 mls/hr  Q8H


 IV  1/22/18 18:00


 2/1/18 17:59  2/1/18 08:56


100 MLS/HR


 


 Acetaminophen 650


 mg/Empty Bag  65 ml @ 


 260 mls/hr  Q6H  PRN


 IV  1/23/18 16:30


 2/22/18 16:29  1/31/18 21:53


260 MLS/HR


 


 Heparin Sodium/


 Dextrose  500 ml @ 


 20 mls/hr  Q24H PRN


 IV  1/25/18 10:30


 2/24/18 10:29  1/31/18 18:00


20 MLS/HR


 


 Pantoprazole


 Sodium


  (Protonix Tab)  40 mg  QAM


 PO  1/26/18 09:00


 2/25/18 08:59  2/1/18 08:56


40 MG


 


 Levalbuterol


  (Xopenex 1.25MG/


 3ML Neb)  1.25 mg  Q6R


 INH  1/26/18 21:00


 2/25/18 20:59  2/1/18 07:08


1.25 MG


 


 Midodrine


  (Proamatine Tab)  5 mg  TID@08,12,17


 PO  1/28/18 17:00


 2/27/18 16:59  2/1/18 12:10


5 MG


 


 Insulin Aspart


  (novoLOG ASPART)  **SLIDING


 SCALE**


 **G...  ACHS


 SC  1/31/18 07:00


 3/2/18 06:59  2/1/18 12:09


5 UNITS


 


 Insulin Glargine


  (Lantus Solostar


 Pen)  10 units  BID


 SC  1/31/18 09:00


 3/2/18 08:59  2/1/18 08:22


10 UNITS


 


 Vitamin B Complex/


 Vit C/Folic Acid


  (Nephrocaps)  1 cap  HS


 PO  1/31/18 21:00


 3/2/18 20:59  1/31/18 21:52


1 CAP


 


 Sevelamer HCl


  (Renagel Tab)  800 mg  TIDM


 PO  1/31/18 11:30


 3/2/18 11:29  2/1/18 12:09


800 MG


 


 Prednisone


  (PredniSONE TAB)  40 mg  DAILY


 PO  2/1/18 09:00


 3/3/18 08:59  2/1/18 08:55


40 MG


 


 Amiodarone HCl


  (Cordarone Tab)  200 mg  BIDM


 PO  2/1/18 16:45


 3/1/18 16:44 UNV  


 











Objective


Vital Signs











  Date Time  Temp Pulse Resp B/P (MAP) Pulse Ox O2 Delivery O2 Flow Rate FiO2


 


2/1/18 12:00      Nasal Cannula 3.0 


 


2/1/18 11:45 36.5 89 20 102/64 (77) 96   


 


2/1/18 08:01 36.9 90 18 98/72 (81) 94   


 


2/1/18 08:00      Nasal Cannula 3.0 


 


2/1/18 07:08  82 18  91 Room Air  


 


2/1/18 04:00      Nasal Cannula 2.0 


 


2/1/18 03:40 36.5 93 22 84/49 (61) 90 Room Air  


 


2/1/18 01:49  75 18  98 Nasal Cannula 2.0 


 


2/1/18 00:01      Nasal Cannula 2.0 


 


1/31/18 23:05 36.4 81 20 89/57 (68) 100 Nasal Cannula 3.0 


 


1/31/18 20:32  90 20  97 Nasal Cannula 2.0 


 


1/31/18 20:00     94 Nasal Cannula 2.0 


 


1/31/18 19:50 36.8 84 18 86/45 (59) 97 Nasal Cannula 2.0 


 


1/31/18 16:00     95 Nasal Cannula 2.0 


 


1/31/18 15:00 36.4 93 16 92/56 (68) 97 Nasal Cannula  


 


1/31/18 14:16  98 22  97 Nasal Cannula 2.0 


 


1/31/18 14:14      Diffusion Mask  


 


1/31/18 13:05 36.8 90  101/53 (69)    


 


1/31/18 12:48  102  102/55    


 


1/31/18 12:45  102  102/55    











Physical Exam


General Appearance:  WD/WN, no apparent distress


Eyes:  PERRL, EOMI


ENT:  normal ENT inspection, hearing grossly normal


Neck:  supple, no adenopathy, no JVD


Respiratory/Chest:  lungs clear, normal breath sounds


Cardiovascular:  no edema, no murmur, + irregularly irregular


Abdomen:  normal bowel sounds, soft, no organomegaly


Extremities:  normal inspection, no pedal edema


Neurologic/Psychiatric:  CNs II-XII nml as tested, no motor/sensory deficits, 

alert


Skin:  normal color, warm/dry, no rash


Lymphatic:  no adenopathy





Laboratory Results





Last 24 Hours








Test


  1/31/18


16:00 1/31/18


16:17 1/31/18


20:27 2/1/18


00:13


 


Activated Partial


Thromboplast Time 101.9 SECONDS 


  


  


  44.1 SECONDS 


 


 


Partial Thromboplastin Ratio 3.9    1.7 


 


Bedside Glucose  160 mg/dl  167 mg/dl  


 


Test


  2/1/18


06:21 2/1/18


08:57 2/1/18


11:09 


 


 


Bedside Glucose 167 mg/dl   222 mg/dl  


 


White Blood Count  16.60 K/uL   


 


Red Blood Count  2.98 M/uL   


 


Hemoglobin  9.5 g/dL   


 


Hematocrit  31.4 %   


 


Mean Corpuscular Volume  105.4 fL   


 


Mean Corpuscular Hemoglobin  31.9 pg   


 


Mean Corpuscular Hemoglobin


Concent 


  30.3 g/dl 


  


  


 


 


RDW Standard Deviation  72.3 fL   


 


RDW Coefficient of Variation  19.4 %   


 


Platelet Count  214 K/uL   


 


Mean Platelet Volume  10.0 fL   


 


Nucleated RBC Absolute Count


(auto) 


  0.02 K/uL 


  


  


 


 


Nucleated Red Blood Cells %  0.1 %   


 


Prothrombin Time  11.8 SECONDS   


 


Prothromb Time International


Ratio 


  1.1 


  


  


 


 


Activated Partial


Thromboplast Time 


  81.9 SECONDS 


  


  


 


 


Partial Thromboplastin Ratio  3.2   


 


Sodium Level  133 mmol/L   


 


Potassium Level  4.6 mmol/L   


 


Chloride Level  100 mmol/L   


 


Carbon Dioxide Level  22 mmol/L   


 


Anion Gap  12.0 mmol/L   


 


Blood Urea Nitrogen  37 mg/dl   


 


Creatinine  5.13 mg/dl   


 


Est Creatinine Clear Calc


Drug Dose 


  13.9 ml/min 


  


  


 


 


Estimated GFR (


American) 


  9.9 


  


  


 


 


Estimated GFR (Non-


American 


  8.5 


  


  


 


 


BUN/Creatinine Ratio  7.3   


 


Random Glucose  217 mg/dl   


 


Calcium Level  8.5 mg/dl   











Assessment and Plan


Impression: #1 atrial flutter #2 end-stage kidney disease on hemodialysis #3 

UTI and Clostridium difficile





Recommendations: The patient's heart rates have been controlled on amiodarone.  

I will decrease the patient's amiodarone to 200 mg twice a day.  As mentioned 

above she is having some nausea and abdominal discomfort.  With her vascular 

disease I'm concerned that she may have ischemic gut.

## 2018-02-01 NOTE — PROGRESS NOTE
Medicine Progress Note


Date & Time of Visit:


Feb 1, 2018 at 20:20


.


Subjective


CC: 


Follow-up visit for multiple problems.





HPI: 


Generally better.


Cough improved.


Still having loose stools- 3 today.


No nausea or vomiting.





ROS:


General- no fever, no chills


Resp- as noted above in HPI


Cardiac-  no chest pain, no edema


GI- as noted above in HPI


- anuric


.





Objective





Last 8 Hrs








  Date Time  Temp Pulse Resp B/P (MAP) Pulse Ox O2 Delivery O2 Flow Rate FiO2


 


2/1/18 19:59  74 18  98 Nasal Cannula 2.0 


 


2/1/18 19:55 36.6 80 18 107/66 (80) 100 Nasal Cannula 2.0 


 


2/1/18 16:07 36.6 81 20 102/69 (80) 100 Nasal Cannula 2.0 


 


2/1/18 16:00      Nasal Cannula 3.0 


 


2/1/18 14:12  94 18  91 Room Air  








Physical Exam:





General- lying in bed; no distress


Lungs- few scattered rhonchi, diffuse mild wheezing; no respiratory distress


Cardiovascular- RRR;  no gallop; no JVD; trace pretibial edema


Abdomen- + bowel sounds, soft, nontender 


Extremities- no cyanosis; no calf tenderness


Neuro- alert, oriented


Skin- warm & dry


.


Laboratory Results:





Last 24 Hours








Test


  2/1/18


00:13 2/1/18


06:21 2/1/18


08:57 2/1/18


11:09


 


Activated Partial


Thromboplast Time 44.1 SECONDS 


  


  81.9 SECONDS 


  


 


 


Partial Thromboplastin Ratio 1.7   3.2  


 


Bedside Glucose  167 mg/dl   222 mg/dl 


 


White Blood Count   16.60 K/uL  


 


Red Blood Count   2.98 M/uL  


 


Hemoglobin   9.5 g/dL  


 


Hematocrit   31.4 %  


 


Mean Corpuscular Volume   105.4 fL  


 


Mean Corpuscular Hemoglobin   31.9 pg  


 


Mean Corpuscular Hemoglobin


Concent 


  


  30.3 g/dl 


  


 


 


RDW Standard Deviation   72.3 fL  


 


RDW Coefficient of Variation   19.4 %  


 


Platelet Count   214 K/uL  


 


Mean Platelet Volume   10.0 fL  


 


Nucleated RBC Absolute Count


(auto) 


  


  0.02 K/uL 


  


 


 


Nucleated Red Blood Cells %   0.1 %  


 


Prothrombin Time   11.8 SECONDS  


 


Prothromb Time International


Ratio 


  


  1.1 


  


 


 


Sodium Level   133 mmol/L  


 


Potassium Level   4.6 mmol/L  


 


Chloride Level   100 mmol/L  


 


Carbon Dioxide Level   22 mmol/L  


 


Anion Gap   12.0 mmol/L  


 


Blood Urea Nitrogen   37 mg/dl  


 


Creatinine   5.13 mg/dl  


 


Est Creatinine Clear Calc


Drug Dose 


  


  13.9 ml/min 


  


 


 


Estimated GFR (


American) 


  


  9.9 


  


 


 


Estimated GFR (Non-


American 


  


  8.5 


  


 


 


BUN/Creatinine Ratio   7.3  


 


Random Glucose   217 mg/dl  


 


Calcium Level   8.5 mg/dl  


 


Test


  2/1/18


15:50 2/1/18


16:05 2/1/18


19:53 


 


 


Activated Partial


Thromboplast Time 41.8 SECONDS 


  


  


  


 


 


Partial Thromboplastin Ratio 1.6    


 


Bedside Glucose  147 mg/dl  169 mg/dl  











Assessment & Plan





HYPOTENSION


Improved.


Started on midodrine.


Continue to monitor.





ATRIAL FLUTTER


Cardiology consulted.


Receiving amiodarone and IV heparin.


Start warfarin.





UTI E COLI (present on admission)


UA on admission demonstrated WBC's and bacteria.


Urine culture grew E coli, ESBL.


Treated with course of ertapenem.





C DIFFICILE COLITIS


Persistent diarrhea.


Continue IV metronidazole and PO vancomycin.





BRONCHITIS / EXACERBATION OF COPD


Received course of azithromycin.


Taper steroids.





SBO 


Resolved.





DM TYPE 2


Pharmacy consulted for glycemic management.


FBS = 167.


Taper steroids.





CIRRHOSIS


Will need outpatient follow-up with GI.





RIGHT ANKLE SPRAIN


Seen by Ortho.


CAM boot applied.





DESQUAMATION FINGERTIPS


No apparent drug reaction, infection.


Follow.





VTE PROPHYLAXIS


Receiving IV heparin.


Ambulate as able.





DISPOSITION


To be determined.


.


Consultants:


Nephro-Oncu


Farhad


Current Inpatient Medications:





Current Inpatient Medications








 Medications


  (Trade)  Dose


 Ordered  Sig/Daniel


 Route  Start Time


 Stop Time Status Last Admin


Dose Admin


 


 Miscellaneous


 Information


  (Order Awaiting


 Action)  1 ea  QS


 N/A  1/16/18 08:00


 2/15/18 07:59  1/16/18 08:21


1 EA


 


 Miscellaneous


 Information


  (Order Awaiting


 Action)  1 ea  QS


 N/A  1/16/18 08:00


 2/15/18 07:59  1/16/18 08:21


1 EA


 


 Miscellaneous


 Information


  (Consult


 Glycemic


 Management


 Pharmacy)  1 ea  UD  PRN


 N/A  1/16/18 04:15


 2/15/18 04:14   


 


 


 Glucose


  (Glucose 40% Gel)  15-30


 GRAMS 15


 GRAMS...  UD  PRN


 PO  1/16/18 04:30


 2/15/18 04:29   


 


 


 Glucose


  (Glucose Chew


 Tab)  4-8


 Tablets 4


 Tabl...  UD  PRN


 PO  1/16/18 04:30


 2/15/18 04:29   


 


 


 Dextrose


  (Dextrose 50%


 50ML Syringe)  25-50ML OF


 50% DW IV


 FOR...  UD  PRN


 IV  1/16/18 04:30


 2/15/18 04:29  1/17/18 09:52


25 ML


 


 Glucagon


  (Glucagon Inj)  1 mg  UD  PRN


 SQ  1/16/18 04:30


 2/15/18 04:29   


 


 


 Ondansetron HCl


  (Zofran Inj)  4 mg  Q4H  PRN


 IV  1/16/18 18:15


 2/15/18 18:14  2/1/18 12:37


4 MG


 


 Vancomycin HCl


  (Vancomycin Oral


 Soln)  125 mg  Q6H


 PO  1/20/18 20:00


 2/3/18 19:59  2/1/18 13:41


125 MG


 


 Raspberry


  (Raspberry Syrup


 5ml Cup)  5 ml  Q6H


 PO  1/20/18 20:00


 2/3/18 19:59  2/1/18 13:41


5 ML


 


 Acetaminophen 650


 mg/Empty Bag  65 ml @ 


 260 mls/hr  Q6H  PRN


 IV  1/23/18 16:30


 2/22/18 16:29  1/31/18 21:53


260 MLS/HR


 


 Heparin Sodium/


 Dextrose  500 ml @ 


 23 mls/hr  [id number] PRN


 IV  1/25/18 10:30


 2/24/18 10:29  2/1/18 17:19


23 MLS/HR


 


 Pantoprazole


 Sodium


  (Protonix Tab)  40 mg  QAM


 PO  1/26/18 09:00


 2/25/18 08:59  2/1/18 08:56


40 MG


 


 Levalbuterol


  (Xopenex 1.25MG/


 3ML Neb)  1.25 mg  Q6R


 INH  1/26/18 21:00


 2/25/18 20:59  2/1/18 19:59


1.25 MG


 


 Midodrine


  (Proamatine Tab)  5 mg  TID@08,12,17


 PO  1/28/18 17:00


 2/27/18 16:59  2/1/18 16:53


5 MG


 


 Insulin Aspart


  (novoLOG ASPART)  **SLIDING


 SCALE**


 **G...  ACHS


 SC  1/31/18 07:00


 3/2/18 06:59  2/1/18 16:56


5 UNITS


 


 Insulin Glargine


  (Lantus Solostar


 Pen)  10 units  BID


 SC  1/31/18 09:00


 3/2/18 08:59  2/1/18 08:22


10 UNITS


 


 Vitamin B Complex/


 Vit C/Folic Acid


  (Nephrocaps)  1 cap  HS


 PO  1/31/18 21:00


 3/2/18 20:59  1/31/18 21:52


1 CAP


 


 Sevelamer HCl


  (Renagel Tab)  800 mg  TIDM


 PO  1/31/18 11:30


 3/2/18 11:29  2/1/18 16:53


800 MG


 


 Prednisone


  (PredniSONE TAB)  40 mg  DAILY


 PO  2/1/18 09:00


 3/3/18 08:59  2/1/18 08:55


40 MG


 


 Amiodarone HCl


  (Cordarone Tab)  200 mg  BIDM


 PO  2/1/18 16:45


 3/1/18 16:44  2/1/18 16:53


200 MG


 


 Warfarin Sodium


  (Coumadin Tab)  10 mg  NOW  ONCE


 PO  2/1/18 20:45


 2/1/18 20:46

## 2018-02-02 VITALS — DIASTOLIC BLOOD PRESSURE: 57 MMHG | HEART RATE: 76 BPM | SYSTOLIC BLOOD PRESSURE: 99 MMHG

## 2018-02-02 VITALS — DIASTOLIC BLOOD PRESSURE: 60 MMHG | SYSTOLIC BLOOD PRESSURE: 72 MMHG | HEART RATE: 85 BPM

## 2018-02-02 VITALS — DIASTOLIC BLOOD PRESSURE: 53 MMHG | SYSTOLIC BLOOD PRESSURE: 82 MMHG | HEART RATE: 80 BPM

## 2018-02-02 VITALS — SYSTOLIC BLOOD PRESSURE: 87 MMHG | DIASTOLIC BLOOD PRESSURE: 58 MMHG | HEART RATE: 90 BPM

## 2018-02-02 VITALS — SYSTOLIC BLOOD PRESSURE: 86 MMHG | HEART RATE: 81 BPM | DIASTOLIC BLOOD PRESSURE: 58 MMHG

## 2018-02-02 VITALS — SYSTOLIC BLOOD PRESSURE: 78 MMHG | DIASTOLIC BLOOD PRESSURE: 57 MMHG | HEART RATE: 90 BPM

## 2018-02-02 VITALS
DIASTOLIC BLOOD PRESSURE: 57 MMHG | TEMPERATURE: 98.24 F | HEART RATE: 97 BPM | SYSTOLIC BLOOD PRESSURE: 93 MMHG | OXYGEN SATURATION: 100 %

## 2018-02-02 VITALS
SYSTOLIC BLOOD PRESSURE: 129 MMHG | TEMPERATURE: 98.24 F | HEART RATE: 90 BPM | OXYGEN SATURATION: 93 % | DIASTOLIC BLOOD PRESSURE: 68 MMHG

## 2018-02-02 VITALS — SYSTOLIC BLOOD PRESSURE: 72 MMHG | HEART RATE: 103 BPM | DIASTOLIC BLOOD PRESSURE: 55 MMHG

## 2018-02-02 VITALS — HEART RATE: 98 BPM | DIASTOLIC BLOOD PRESSURE: 55 MMHG | SYSTOLIC BLOOD PRESSURE: 79 MMHG

## 2018-02-02 VITALS — DIASTOLIC BLOOD PRESSURE: 59 MMHG | HEART RATE: 96 BPM | SYSTOLIC BLOOD PRESSURE: 76 MMHG

## 2018-02-02 VITALS
DIASTOLIC BLOOD PRESSURE: 64 MMHG | HEART RATE: 89 BPM | SYSTOLIC BLOOD PRESSURE: 90 MMHG | OXYGEN SATURATION: 94 % | TEMPERATURE: 98.42 F

## 2018-02-02 VITALS
OXYGEN SATURATION: 97 % | SYSTOLIC BLOOD PRESSURE: 122 MMHG | HEART RATE: 95 BPM | DIASTOLIC BLOOD PRESSURE: 56 MMHG | TEMPERATURE: 97.7 F

## 2018-02-02 VITALS — HEART RATE: 87 BPM | SYSTOLIC BLOOD PRESSURE: 87 MMHG | DIASTOLIC BLOOD PRESSURE: 66 MMHG

## 2018-02-02 VITALS — HEART RATE: 114 BPM | OXYGEN SATURATION: 99 %

## 2018-02-02 VITALS
TEMPERATURE: 98.78 F | SYSTOLIC BLOOD PRESSURE: 87 MMHG | DIASTOLIC BLOOD PRESSURE: 54 MMHG | OXYGEN SATURATION: 96 % | HEART RATE: 100 BPM

## 2018-02-02 VITALS — DIASTOLIC BLOOD PRESSURE: 54 MMHG | HEART RATE: 85 BPM | SYSTOLIC BLOOD PRESSURE: 72 MMHG

## 2018-02-02 VITALS — SYSTOLIC BLOOD PRESSURE: 80 MMHG | DIASTOLIC BLOOD PRESSURE: 51 MMHG | HEART RATE: 87 BPM

## 2018-02-02 VITALS — HEART RATE: 81 BPM | DIASTOLIC BLOOD PRESSURE: 63 MMHG | SYSTOLIC BLOOD PRESSURE: 96 MMHG

## 2018-02-02 VITALS — HEART RATE: 90 BPM | DIASTOLIC BLOOD PRESSURE: 53 MMHG | TEMPERATURE: 98.42 F | SYSTOLIC BLOOD PRESSURE: 80 MMHG

## 2018-02-02 VITALS — DIASTOLIC BLOOD PRESSURE: 61 MMHG | SYSTOLIC BLOOD PRESSURE: 78 MMHG | HEART RATE: 106 BPM

## 2018-02-02 VITALS
HEART RATE: 107 BPM | DIASTOLIC BLOOD PRESSURE: 66 MMHG | OXYGEN SATURATION: 98 % | TEMPERATURE: 98.24 F | SYSTOLIC BLOOD PRESSURE: 94 MMHG

## 2018-02-02 VITALS — SYSTOLIC BLOOD PRESSURE: 77 MMHG | DIASTOLIC BLOOD PRESSURE: 49 MMHG | HEART RATE: 93 BPM

## 2018-02-02 VITALS — HEART RATE: 98 BPM | OXYGEN SATURATION: 94 %

## 2018-02-02 VITALS — TEMPERATURE: 97.34 F | HEART RATE: 87 BPM | SYSTOLIC BLOOD PRESSURE: 94 MMHG | DIASTOLIC BLOOD PRESSURE: 55 MMHG

## 2018-02-02 VITALS — DIASTOLIC BLOOD PRESSURE: 56 MMHG | SYSTOLIC BLOOD PRESSURE: 83 MMHG | HEART RATE: 86 BPM

## 2018-02-02 VITALS — HEART RATE: 102 BPM | SYSTOLIC BLOOD PRESSURE: 93 MMHG | DIASTOLIC BLOOD PRESSURE: 66 MMHG

## 2018-02-02 VITALS — HEART RATE: 96 BPM | OXYGEN SATURATION: 99 %

## 2018-02-02 VITALS — HEART RATE: 81 BPM | OXYGEN SATURATION: 97 %

## 2018-02-02 LAB
BUN SERPL-MCNC: 52 MG/DL (ref 7–18)
CALCIUM SERPL-MCNC: 7.9 MG/DL (ref 8.5–10.1)
CO2 SERPL-SCNC: 21 MMOL/L (ref 21–32)
CREAT SERPL-MCNC: 6.23 MG/DL (ref 0.6–1.2)
EOSINOPHIL NFR BLD AUTO: 244 K/UL (ref 130–400)
GLUCOSE SERPL-MCNC: 163 MG/DL (ref 70–99)
HCT VFR BLD CALC: 31.8 % (ref 37–47)
HGB BLD-MCNC: 9.7 G/DL (ref 12–16)
INR PPP: 1.1 (ref 0.9–1.1)
MCH RBC QN AUTO: 32 PG (ref 25–34)
MCHC RBC AUTO-ENTMCNC: 30.5 G/DL (ref 32–36)
MCV RBC AUTO: 105 FL (ref 80–100)
NRBC BLD AUTO-RTO: 0.2 %
NUCLEATED RED BLOOD CELL ABS: 0.04 K/UL (ref 0–0)
PMV BLD AUTO: 10 FL (ref 7.4–10.4)
POTASSIUM SERPL-SCNC: 4 MMOL/L (ref 3.5–5.1)
PTT PATIENT: 51.3 SECONDS (ref 21–31)
RED CELL DISTRIBUTION WIDTH CV: 18.7 % (ref 11.5–14.5)
RED CELL DISTRIBUTION WIDTH SD: 71.1 FL (ref 36.4–46.3)
SODIUM SERPL-SCNC: 134 MMOL/L (ref 136–145)
WBC # BLD AUTO: 17.33 K/UL (ref 4.8–10.8)

## 2018-02-02 RX ADMIN — HEPARIN SODIUM SCH UNIT: 1000 INJECTION, SOLUTION INTRAVENOUS; SUBCUTANEOUS at 10:15

## 2018-02-02 RX ADMIN — RENAGEL SCH MG: 800 TABLET ORAL at 17:08

## 2018-02-02 RX ADMIN — INSULIN ASPART SCH UNITS: 100 INJECTION, SOLUTION INTRAVENOUS; SUBCUTANEOUS at 12:45

## 2018-02-02 RX ADMIN — MIDODRINE HYDROCHLORIDE SCH MG: 2.5 TABLET ORAL at 12:00

## 2018-02-02 RX ADMIN — HEPARIN SODIUM PRN MLS/HR: 5000 INJECTION, SOLUTION INTRAVENOUS at 00:17

## 2018-02-02 RX ADMIN — ALUMINUM ZIRCONIUM TRICHLOROHYDREX GLY SCH EA: 0.2 STICK TOPICAL at 23:44

## 2018-02-02 RX ADMIN — HEPARIN SODIUM PRN MLS/HR: 5000 INJECTION, SOLUTION INTRAVENOUS at 21:07

## 2018-02-02 RX ADMIN — MIDODRINE HYDROCHLORIDE SCH MG: 2.5 TABLET ORAL at 17:00

## 2018-02-02 RX ADMIN — Medication SCH ML: at 21:10

## 2018-02-02 RX ADMIN — RENAGEL SCH MG: 800 TABLET ORAL at 11:30

## 2018-02-02 RX ADMIN — ALUMINUM ZIRCONIUM TRICHLOROHYDREX GLY SCH EA: 0.2 STICK TOPICAL at 16:00

## 2018-02-02 RX ADMIN — LEVALBUTEROL HYDROCHLORIDE SCH MG: 1.25 SOLUTION RESPIRATORY (INHALATION) at 19:31

## 2018-02-02 RX ADMIN — VANCOMYCIN HYDROCHLORIDE SCH MG: 1 INJECTION, POWDER, LYOPHILIZED, FOR SOLUTION INTRAVENOUS at 15:00

## 2018-02-02 RX ADMIN — AMIODARONE HYDROCHLORIDE SCH MG: 200 TABLET ORAL at 16:45

## 2018-02-02 RX ADMIN — LEVALBUTEROL HYDROCHLORIDE SCH MG: 1.25 SOLUTION RESPIRATORY (INHALATION) at 07:22

## 2018-02-02 RX ADMIN — WARFARIN SODIUM SCH MG: 5 TABLET ORAL at 17:07

## 2018-02-02 RX ADMIN — INSULIN GLARGINE SCH UNITS: 100 INJECTION, SOLUTION SUBCUTANEOUS at 21:10

## 2018-02-02 RX ADMIN — VANCOMYCIN HYDROCHLORIDE SCH MG: 1 INJECTION, POWDER, LYOPHILIZED, FOR SOLUTION INTRAVENOUS at 21:10

## 2018-02-02 RX ADMIN — VANCOMYCIN HYDROCHLORIDE SCH MG: 1 INJECTION, POWDER, LYOPHILIZED, FOR SOLUTION INTRAVENOUS at 08:20

## 2018-02-02 RX ADMIN — RENAGEL SCH MG: 800 TABLET ORAL at 08:26

## 2018-02-02 RX ADMIN — MIDODRINE HYDROCHLORIDE SCH MG: 2.5 TABLET ORAL at 08:20

## 2018-02-02 RX ADMIN — ALUMINUM ZIRCONIUM TRICHLOROHYDREX GLY SCH EA: 0.2 STICK TOPICAL at 08:31

## 2018-02-02 RX ADMIN — INSULIN ASPART SCH UNITS: 100 INJECTION, SOLUTION INTRAVENOUS; SUBCUTANEOUS at 21:09

## 2018-02-02 RX ADMIN — INSULIN ASPART SCH UNITS: 100 INJECTION, SOLUTION INTRAVENOUS; SUBCUTANEOUS at 18:25

## 2018-02-02 RX ADMIN — INSULIN ASPART SCH UNITS: 100 INJECTION, SOLUTION INTRAVENOUS; SUBCUTANEOUS at 08:17

## 2018-02-02 RX ADMIN — VANCOMYCIN HYDROCHLORIDE SCH MG: 1 INJECTION, POWDER, LYOPHILIZED, FOR SOLUTION INTRAVENOUS at 03:09

## 2018-02-02 RX ADMIN — LEVALBUTEROL HYDROCHLORIDE SCH MG: 1.25 SOLUTION RESPIRATORY (INHALATION) at 14:30

## 2018-02-02 RX ADMIN — Medication SCH ML: at 08:20

## 2018-02-02 RX ADMIN — ALUMINUM ZIRCONIUM TRICHLOROHYDREX GLY SCH EA: 0.2 STICK TOPICAL at 08:32

## 2018-02-02 RX ADMIN — Medication SCH ML: at 03:09

## 2018-02-02 RX ADMIN — ASCORBIC ACID, THIAMINE MONONITRATE,RIBOFLAVIN, NIACINAMIDE, PYRIDOXINE HYDROCHLORIDE, FOLIC ACID, CYANOCOBALAMIN, BIOTIN, CALCIUM PANTOTHENATE, SCH CAP: 100; 1.5; 1.7; 20; 10; 1; 6000; 150000; 5 CAPSULE, LIQUID FILLED ORAL at 21:10

## 2018-02-02 RX ADMIN — HEPARIN SODIUM SCH UNIT: 1000 INJECTION, SOLUTION INTRAVENOUS; SUBCUTANEOUS at 11:15

## 2018-02-02 RX ADMIN — INSULIN GLARGINE SCH UNITS: 100 INJECTION, SOLUTION SUBCUTANEOUS at 08:19

## 2018-02-02 RX ADMIN — AMIODARONE HYDROCHLORIDE SCH MG: 200 TABLET ORAL at 08:25

## 2018-02-02 RX ADMIN — Medication SCH ML: at 15:00

## 2018-02-02 RX ADMIN — PANTOPRAZOLE SCH MG: 40 TABLET, DELAYED RELEASE ORAL at 08:21

## 2018-02-02 NOTE — PROGRESS NOTE
Medicine Progress Note


Date & Time of Visit:


Feb 2, 2018 at 16:20


.


Subjective





CC: 


Follow-up visit for multiple problems.





HPI: 


Persistent cough.


Diarrhea improved.


No nausea or vomiting.


BP low today during hemodialysis.





ROS:


General- no fever, no chills


Resp- as noted above in HPI


Cardiac-  no chest pain, no edema


GI- as noted above in HPI


- anuric


.





Objective





Last 8 Hrs








  Date Time  Temp Pulse Resp B/P (MAP) Pulse Ox O2 Delivery O2 Flow Rate FiO2


 


2/2/18 19:34  114 16  99 Nasal Cannula 1.0 


 


2/2/18 19:09 36.8 107 20 94/66 (75) 98 Nasal Cannula 2.0 


 


2/2/18 16:05      Room Air  


 


2/2/18 15:06 36.8 97 20 93/57 (69) 100 Nasal Cannula 2.0 


 


2/2/18 14:31  96 16  99 Nasal Cannula 2.0 


 


2/2/18 13:22 36.9 90  80/53 (62)    


 


2/2/18 12:45  98  79/55    


 


2/2/18 12:34  93  77/49    








Physical Exam:





General- lying in bed; no distress


Lungs- scattered rhonchi, diffuse mild wheezing; no respiratory distress


Cardiovascular- RRR;  no gallop; no JVD; trace pretibial edema


Abdomen- + bowel sounds, soft, nontender 


Extremities- no cyanosis; no calf tenderness


Neuro- alert, oriented


Skin- warm & dry


.


Laboratory Results:





Last 24 Hours








Test


  2/1/18


22:58 2/2/18


05:34 2/2/18


06:20 2/2/18


11:18


 


Activated Partial


Thromboplast Time 87.9 SECONDS 


  51.3 SECONDS 


  


  


 


 


Partial Thromboplastin Ratio 3.4  2.0   


 


White Blood Count  17.33 K/uL   


 


Red Blood Count  3.03 M/uL   


 


Hemoglobin  9.7 g/dL   


 


Hematocrit  31.8 %   


 


Mean Corpuscular Volume  105.0 fL   


 


Mean Corpuscular Hemoglobin  32.0 pg   


 


Mean Corpuscular Hemoglobin


Concent 


  30.5 g/dl 


  


  


 


 


RDW Standard Deviation  71.1 fL   


 


RDW Coefficient of Variation  18.7 %   


 


Platelet Count  244 K/uL   


 


Mean Platelet Volume  10.0 fL   


 


Nucleated RBC Absolute Count


(auto) 


  0.04 K/uL 


  


  


 


 


Nucleated Red Blood Cells %  0.2 %   


 


Prothrombin Time  11.9 SECONDS   


 


Prothromb Time International


Ratio 


  1.1 


  


  


 


 


Sodium Level  134 mmol/L   


 


Potassium Level  4.0 mmol/L   


 


Chloride Level  100 mmol/L   


 


Carbon Dioxide Level  21 mmol/L   


 


Anion Gap  13.0 mmol/L   


 


Blood Urea Nitrogen  52 mg/dl   


 


Creatinine  6.23 mg/dl   


 


Est Creatinine Clear Calc


Drug Dose 


  11.4 ml/min 


  


  


 


 


Estimated GFR (


American) 


  7.8 


  


  


 


 


Estimated GFR (Non-


American 


  6.7 


  


  


 


 


BUN/Creatinine Ratio  8.3   


 


Random Glucose  163 mg/dl   


 


Calcium Level  7.9 mg/dl   


 


Bedside Glucose   146 mg/dl  98 mg/dl 


 


Test


  2/2/18


16:39 


  


  


 


 


Bedside Glucose 188 mg/dl    











Assessment & Plan





HYPOTENSION


Improved.


Started on midodrine.


Low BP's with hemodialysis.


Continue to monitor.





ATRIAL FLUTTER


Cardiology consulted.


Receiving amiodarone and IV heparin.


Started warfarin.





UTI E COLI (present on admission)


UA on admission demonstrated WBC's and bacteria.


Urine culture grew E coli, ESBL.


Treated with course of ertapenem.





C DIFFICILE COLITIS


Persistent diarrhea.


Continue IV metronidazole and PO vancomycin.





BRONCHITIS / EXACERBATION OF COPD


Received course of azithromycin.


Taper steroids.





SBO 


Resolved.





DM TYPE 2


Pharmacy consulted for glycemic management.


FBS = 146.


Taper steroids.





CIRRHOSIS


Will need outpatient follow-up with GI.





RIGHT ANKLE SPRAIN


Seen by Ortho.


CAM boot applied.





DESQUAMATION FINGERTIPS


No apparent drug reaction, infection.


Follow.





VTE PROPHYLAXIS


Receiving IV heparin.


Ambulate as able.





DISPOSITION


To be determined.


.


Consultants:


Nephro-Oncu


Farhad


Current Inpatient Medications:





Current Inpatient Medications








 Medications


  (Trade)  Dose


 Ordered  Sig/Daniel


 Route  Start Time


 Stop Time Status Last Admin


Dose Admin


 


 Miscellaneous


 Information


  (Order Awaiting


 Action)  1 ea  QS


 N/A  1/16/18 08:00


 2/15/18 07:59  1/16/18 08:21


1 EA


 


 Miscellaneous


 Information


  (Order Awaiting


 Action)  1 ea  QS


 N/A  1/16/18 08:00


 2/15/18 07:59  1/16/18 08:21


1 EA


 


 Miscellaneous


 Information


  (Consult


 Glycemic


 Management


 Pharmacy)  1 ea  UD  PRN


 N/A  1/16/18 04:15


 2/15/18 04:14   


 


 


 Glucose


  (Glucose 40% Gel)  15-30


 GRAMS 15


 GRAMS...  UD  PRN


 PO  1/16/18 04:30


 2/15/18 04:29   


 


 


 Glucose


  (Glucose Chew


 Tab)  4-8


 Tablets 4


 Tabl...  UD  PRN


 PO  1/16/18 04:30


 2/15/18 04:29   


 


 


 Dextrose


  (Dextrose 50%


 50ML Syringe)  25-50ML OF


 50% DW IV


 FOR...  UD  PRN


 IV  1/16/18 04:30


 2/15/18 04:29  1/17/18 09:52


25 ML


 


 Glucagon


  (Glucagon Inj)  1 mg  UD  PRN


 SQ  1/16/18 04:30


 2/15/18 04:29   


 


 


 Ondansetron HCl


  (Zofran Inj)  4 mg  Q4H  PRN


 IV  1/16/18 18:15


 2/15/18 18:14  2/1/18 12:37


4 MG


 


 Vancomycin HCl


  (Vancomycin Oral


 Soln)  125 mg  Q6H


 PO  1/20/18 20:00


 2/3/18 19:59  2/2/18 15:00


125 MG


 


 Raspberry


  (Raspberry Syrup


 5ml Cup)  5 ml  Q6H


 PO  1/20/18 20:00


 2/3/18 19:59  2/2/18 15:00


5 ML


 


 Acetaminophen 650


 mg/Empty Bag  65 ml @ 


 260 mls/hr  Q6H  PRN


 IV  1/23/18 16:30


 2/22/18 16:29  1/31/18 21:53


260 MLS/HR


 


 Heparin Sodium/


 Dextrose  500 ml @ 


 20 mls/hr  Q24H PRN


 IV  1/25/18 10:30


 2/24/18 10:29  2/2/18 00:17


20 MLS/HR


 


 Pantoprazole


 Sodium


  (Protonix Tab)  40 mg  QAM


 PO  1/26/18 09:00


 2/25/18 08:59  2/2/18 08:21


40 MG


 


 Levalbuterol


  (Xopenex 1.25MG/


 3ML Neb)  1.25 mg  Q6R


 INH  1/26/18 21:00


 2/25/18 20:59  2/2/18 19:31


1.25 MG


 


 Midodrine


  (Proamatine Tab)  5 mg  TID@08,12,17


 PO  1/28/18 17:00


 2/27/18 16:59  2/2/18 08:20


5 MG


 


 Insulin Aspart


  (novoLOG ASPART)  **SLIDING


 SCALE**


 **G...  ACHS


 SC  1/31/18 07:00


 3/2/18 06:59  2/2/18 18:25


15 UNITS


 


 Insulin Glargine


  (Lantus Solostar


 Pen)  10 units  BID


 SC  1/31/18 09:00


 3/2/18 08:59  2/2/18 08:19


10 UNITS


 


 Vitamin B Complex/


 Vit C/Folic Acid


  (Nephrocaps)  1 cap  HS


 PO  1/31/18 21:00


 3/2/18 20:59  2/1/18 21:42


1 CAP


 


 Sevelamer HCl


  (Renagel Tab)  800 mg  TIDM


 PO  1/31/18 11:30


 3/2/18 11:29  2/2/18 17:08


800 MG


 


 Prednisone


  (PredniSONE TAB)  40 mg  DAILY


 PO  2/1/18 09:00


 3/3/18 08:59  2/2/18 08:23


40 MG


 


 Amiodarone HCl


  (Cordarone Tab)  200 mg  BIDM


 PO  2/1/18 16:45


 3/1/18 16:44  2/2/18 08:25


200 MG


 


 Albumin Human


  (Albumin 25%)  12.5 gm  PRN  PRN


 IV  2/2/18 09:45


 2/5/18 09:44   


 


 


 Warfarin Sodium


  (Coumadin Tab)  5 mg  DAILY@16


 PO  2/2/18 16:00


 3/4/18 15:59  2/2/18 17:07


5 MG

## 2018-02-02 NOTE — DIALYSIS PROGRESS NOTE
Nephrology Dialysis Note


Date of Service:


Feb 2, 2018.


Subjective


seen on dialysis at 1040; no c/o; states L ankle a bit better; bp has been 

better past 24 -48 hrs and getting more uf; many bm daily still





Objective











  Date Time  Temp Pulse Resp B/P (MAP) Pulse Ox O2 Delivery O2 Flow Rate FiO2


 


2/2/18 13:22 36.9 90  80/53 (62)    


 


2/2/18 12:45  98  79/55    


 


2/2/18 12:34  93  77/49    


 


2/2/18 12:30  103  72/55    


 


2/2/18 12:20  90  78/57    


 


2/2/18 12:15  85  72/60    


 


2/2/18 12:00  87  87/66    


 


2/2/18 11:45  85  72/54    


 


2/2/18 11:35 36.9 89 18 90/64 (73) 94   


 


2/2/18 11:30  87  80/51    


 


2/2/18 11:15  96  76/59    


 


2/2/18 11:00  80  82/53    


 


2/2/18 10:45  102  93/66    


 


2/2/18 10:30  86  83/56    


 


2/2/18 10:15  90  87/58    


 


2/2/18 10:00  106  78/61    


 


2/2/18 09:45  76  99/57    


 


2/2/18 09:30  81  86/58    


 


2/2/18 09:22  81  96/63    


 


2/2/18 09:13 36.3 87  94/55 (68)    


 


2/2/18 07:44 36.8 90 22 129/68 (88) 93   


 


2/2/18 07:22  98 20  94 Nasal Cannula 2.0 


 


2/2/18 03:09      Nasal Cannula 2.0 


 


2/2/18 03:09 36.5 95 20 122/56 (78) 97 Nasal Cannula 2.0 


 


2/2/18 02:10  81 18  97 Nasal Cannula 2.0 


 


2/2/18 00:15      Nasal Cannula 2.0 


 


2/1/18 23:36 36.4 105 20 96/63 (74) 92 Nasal Cannula  


 


2/1/18 19:59  74 18  98 Nasal Cannula 2.0 


 


2/1/18 19:55 36.6 80 18 107/66 (80) 100 Nasal Cannula 2.0 


 


2/1/18 19:45      Nasal Cannula 2.0 


 


2/1/18 16:07 36.6 81 20 102/69 (80) 100 Nasal Cannula 2.0 


 


2/1/18 16:00      Nasal Cannula 3.0 


 


2/1/18 14:12  94 18  91 Room Air  








Physical Exam:


lying flat on 02nc, nad, dosing but wakens fully/appropriate


eomi


MMM


supple neck


irregularlly irregular in 90s; trace BL edema


diminished/clear lungs


soft NT abd + bs


trace BL edema


garrido, fluent speech





Current Inpatient Medications








 Medications


  (Trade)  Dose


 Ordered  Sig/Daniel


 Route  Start Time


 Stop Time Status Last Admin


Dose Admin


 


 Miscellaneous


 Information


  (Order Awaiting


 Action)  1 ea  QS


 N/A  1/16/18 08:00


 2/15/18 07:59  1/16/18 08:21


1 EA


 


 Miscellaneous


 Information


  (Order Awaiting


 Action)  1 ea  QS


 N/A  1/16/18 08:00


 2/15/18 07:59  1/16/18 08:21


1 EA


 


 Miscellaneous


 Information


  (Consult


 Glycemic


 Management


 Pharmacy)  1 ea  UD  PRN


 N/A  1/16/18 04:15


 2/15/18 04:14   


 


 


 Glucose


  (Glucose 40% Gel)  15-30


 GRAMS 15


 GRAMS...  UD  PRN


 PO  1/16/18 04:30


 2/15/18 04:29   


 


 


 Glucose


  (Glucose Chew


 Tab)  4-8


 Tablets 4


 Tabl...  UD  PRN


 PO  1/16/18 04:30


 2/15/18 04:29   


 


 


 Dextrose


  (Dextrose 50%


 50ML Syringe)  25-50ML OF


 50% DW IV


 FOR...  UD  PRN


 IV  1/16/18 04:30


 2/15/18 04:29  1/17/18 09:52


25 ML


 


 Glucagon


  (Glucagon Inj)  1 mg  UD  PRN


 SQ  1/16/18 04:30


 2/15/18 04:29   


 


 


 Ondansetron HCl


  (Zofran Inj)  4 mg  Q4H  PRN


 IV  1/16/18 18:15


 2/15/18 18:14  2/1/18 12:37


4 MG


 


 Vancomycin HCl


  (Vancomycin Oral


 Soln)  125 mg  Q6H


 PO  1/20/18 20:00


 2/3/18 19:59  2/2/18 08:20


125 MG


 


 Raspberry


  (Raspberry Syrup


 5ml Cup)  5 ml  Q6H


 PO  1/20/18 20:00


 2/3/18 19:59  2/2/18 08:20


5 ML


 


 Acetaminophen 650


 mg/Empty Bag  65 ml @ 


 260 mls/hr  Q6H  PRN


 IV  1/23/18 16:30


 2/22/18 16:29  1/31/18 21:53


260 MLS/HR


 


 Heparin Sodium/


 Dextrose  500 ml @ 


 20 mls/hr  Q24H PRN


 IV  1/25/18 10:30


 2/24/18 10:29  2/2/18 00:17


20 MLS/HR


 


 Pantoprazole


 Sodium


  (Protonix Tab)  40 mg  QAM


 PO  1/26/18 09:00


 2/25/18 08:59  2/2/18 08:21


40 MG


 


 Levalbuterol


  (Xopenex 1.25MG/


 3ML Neb)  1.25 mg  Q6R


 INH  1/26/18 21:00


 2/25/18 20:59  2/2/18 07:22


1.25 MG


 


 Midodrine


  (Proamatine Tab)  5 mg  TID@08,12,17


 PO  1/28/18 17:00


 2/27/18 16:59  2/2/18 08:20


5 MG


 


 Insulin Aspart


  (novoLOG ASPART)  **SLIDING


 SCALE**


 **G...  ACHS


 SC  1/31/18 07:00


 3/2/18 06:59  2/1/18 21:41


2 UNITS


 


 Insulin Glargine


  (Lantus Solostar


 Pen)  10 units  BID


 SC  1/31/18 09:00


 3/2/18 08:59  2/2/18 08:19


10 UNITS


 


 Vitamin B Complex/


 Vit C/Folic Acid


  (Nephrocaps)  1 cap  HS


 PO  1/31/18 21:00


 3/2/18 20:59  2/1/18 21:42


1 CAP


 


 Sevelamer HCl


  (Renagel Tab)  800 mg  TIDM


 PO  1/31/18 11:30


 3/2/18 11:29  2/2/18 08:26


800 MG


 


 Prednisone


  (PredniSONE TAB)  40 mg  DAILY


 PO  2/1/18 09:00


 3/3/18 08:59  2/2/18 08:23


40 MG


 


 Amiodarone HCl


  (Cordarone Tab)  200 mg  BIDM


 PO  2/1/18 16:45


 3/1/18 16:44  2/2/18 08:25


200 MG


 


 Albumin Human


  (Albumin 25%)  12.5 gm  PRN  PRN


 IV  2/2/18 09:45


 2/5/18 09:44   


 


 


 Warfarin Sodium


  (Coumadin Tab)  5 mg  DAILY@16


 PO  2/2/18 16:00


 3/4/18 15:59   


 











Last 24 Hours








Test


  2/1/18


15:50 2/1/18


16:05 2/1/18


19:53 2/1/18


22:58


 


Activated Partial


Thromboplast Time 41.8 SECONDS 


  


  


  87.9 SECONDS 


 


 


Partial Thromboplastin Ratio 1.6    3.4 


 


Bedside Glucose  147 mg/dl  169 mg/dl  


 


Test


  2/2/18


05:34 2/2/18


06:20 2/2/18


11:18 


 


 


White Blood Count 17.33 K/uL    


 


Red Blood Count 3.03 M/uL    


 


Hemoglobin 9.7 g/dL    


 


Hematocrit 31.8 %    


 


Mean Corpuscular Volume 105.0 fL    


 


Mean Corpuscular Hemoglobin 32.0 pg    


 


Mean Corpuscular Hemoglobin


Concent 30.5 g/dl 


  


  


  


 


 


RDW Standard Deviation 71.1 fL    


 


RDW Coefficient of Variation 18.7 %    


 


Platelet Count 244 K/uL    


 


Mean Platelet Volume 10.0 fL    


 


Nucleated RBC Absolute Count


(auto) 0.04 K/uL 


  


  


  


 


 


Nucleated Red Blood Cells % 0.2 %    


 


Prothrombin Time 11.9 SECONDS    


 


Prothromb Time International


Ratio 1.1 


  


  


  


 


 


Activated Partial


Thromboplast Time 51.3 SECONDS 


  


  


  


 


 


Partial Thromboplastin Ratio 2.0    


 


Sodium Level 134 mmol/L    


 


Potassium Level 4.0 mmol/L    


 


Chloride Level 100 mmol/L    


 


Carbon Dioxide Level 21 mmol/L    


 


Anion Gap 13.0 mmol/L    


 


Blood Urea Nitrogen 52 mg/dl    


 


Creatinine 6.23 mg/dl    


 


Est Creatinine Clear Calc


Drug Dose 11.4 ml/min 


  


  


  


 


 


Estimated GFR (


American) 7.8 


  


  


  


 


 


Estimated GFR (Non-


American 6.7 


  


  


  


 


 


BUN/Creatinine Ratio 8.3    


 


Random Glucose 163 mg/dl    


 


Calcium Level 7.9 mg/dl    


 


Bedside Glucose  146 mg/dl  98 mg/dl  











Assessment & Plan


56 yo female with prosthetic heart valve, ecoli uti/ cdiff / hypotension / uri, 

ESRD on MWF HD in [place].  Patient with a right ankle sprain when she fell 

before admission going to dialysis. 





ESRD-difficult situation with chronic hypotension. 


-tdc working ok; in past had resistance on aspirating venous side. 


-prn albumin w/ HD today w/ goal 2.5L uf


-cont fluid restriction


-next HD tentatively 2/5 or as needs arise


-cont neprhocaps





Anemia of Renal Failure- give procrit with a hg goal of 10 to 11. 





Hypotension: bp improved to 110-120s systolic with midodrine and today for prn 

albumin. 





Atrial fibrillation/ flutter > controlled on po meds 





UBALDO


cont sevelamer





appreciate c/s; will follow with you

## 2018-02-02 NOTE — PHARMACY PROGRESS NOTE
Pharmacy Glycemic Short Note 2


Date of Service


Feb 2, 2018.





OUTPATIENT ANTIDIABETIC REGIMEN: 


* NPH 40 units SQ daily when taking prednisone (otherwise no insulin taken)








ASSESSMENT:


* 58 yo with severe steroid induced hyperglycemia. Pt typically only needs 

insulin when started on steroids for pulmonary issues. 


* Patient has been requiring 36-42 units of insulin per day with near adequate 

control as steroids have tapered down to Prednisone 40mg po daily


* HD today, BSG 98mg/dl after HD, will decrease Lantus if BSGs continue to 

trend downward








PLAN FOR INPATIENT GLYCEMIC CONTROL:


* Basal Insulin: 


 * Lantus 10 units SQ BID


 * HOLD for BSG < 110mg/dl


 


 


* Bolus Insulin:


 * NOVOLOG per scale ACHS


 * Goal Range:  Low 120 mg/dL - High 150 mg/dL


 * Correction Factor: 15 mg/dL/unit


 * Nutritional / Prandial insulin:  1 units for every 5 grams of carb 








PLAN FOR DISCHARGE:


* A1c 6.5% indicated good outpatient glycemic control, although pt is on HD


* Expect that patient may be discharged on previous home regimen unless 

steroids are continued at discharge.

## 2018-02-02 NOTE — PROGRESS NOTE
Subjective


Date of Service:


Feb 2, 2018.


Subjective


The patient is a 57-year-old on hemodialysis with multiple medical problems.  

She has had a long hospital stay.  She was originally admitted with a UTI and 

C. difficile.  There was some thought to small bowel obstruction as well.  She 

has been on amiodarone to control her heart rate with atrial flutter.  She had 

difficult to control heart rates but since starting the amiodarone her heart 

rates have been controlled.  Have nausea and is not eating very well.  She is 

still able to take pills.  She is being transitioned over from heparin to 

Coumadin.





Medications





Current Inpatient Medications








 Medications


  (Trade)  Dose


 Ordered  Sig/Daniel


 Route  Start Time


 Stop Time Status Last Admin


Dose Admin


 


 Miscellaneous


 Information


  (Order Awaiting


 Action)  1 ea  QS


 N/A  1/16/18 08:00


 2/15/18 07:59  1/16/18 08:21


1 EA


 


 Miscellaneous


 Information


  (Order Awaiting


 Action)  1 ea  QS


 N/A  1/16/18 08:00


 2/15/18 07:59  1/16/18 08:21


1 EA


 


 Miscellaneous


 Information


  (Consult


 Glycemic


 Management


 Pharmacy)  1 ea  UD  PRN


 N/A  1/16/18 04:15


 2/15/18 04:14   


 


 


 Glucose


  (Glucose 40% Gel)  15-30


 GRAMS 15


 GRAMS...  UD  PRN


 PO  1/16/18 04:30


 2/15/18 04:29   


 


 


 Glucose


  (Glucose Chew


 Tab)  4-8


 Tablets 4


 Tabl...  UD  PRN


 PO  1/16/18 04:30


 2/15/18 04:29   


 


 


 Dextrose


  (Dextrose 50%


 50ML Syringe)  25-50ML OF


 50% DW IV


 FOR...  UD  PRN


 IV  1/16/18 04:30


 2/15/18 04:29  1/17/18 09:52


25 ML


 


 Glucagon


  (Glucagon Inj)  1 mg  UD  PRN


 SQ  1/16/18 04:30


 2/15/18 04:29   


 


 


 Ondansetron HCl


  (Zofran Inj)  4 mg  Q4H  PRN


 IV  1/16/18 18:15


 2/15/18 18:14  2/1/18 12:37


4 MG


 


 Vancomycin HCl


  (Vancomycin Oral


 Soln)  125 mg  Q6H


 PO  1/20/18 20:00


 2/3/18 19:59  2/2/18 08:20


125 MG


 


 Raspberry


  (Raspberry Syrup


 5ml Cup)  5 ml  Q6H


 PO  1/20/18 20:00


 2/3/18 19:59  2/2/18 08:20


5 ML


 


 Acetaminophen 650


 mg/Empty Bag  65 ml @ 


 260 mls/hr  Q6H  PRN


 IV  1/23/18 16:30


 2/22/18 16:29  1/31/18 21:53


260 MLS/HR


 


 Heparin Sodium/


 Dextrose  500 ml @ 


 20 mls/hr  Q24H PRN


 IV  1/25/18 10:30


 2/24/18 10:29  2/2/18 00:17


20 MLS/HR


 


 Pantoprazole


 Sodium


  (Protonix Tab)  40 mg  QAM


 PO  1/26/18 09:00


 2/25/18 08:59  2/2/18 08:21


40 MG


 


 Levalbuterol


  (Xopenex 1.25MG/


 3ML Neb)  1.25 mg  Q6R


 INH  1/26/18 21:00


 2/25/18 20:59  2/2/18 07:22


1.25 MG


 


 Midodrine


  (Proamatine Tab)  5 mg  TID@08,12,17


 PO  1/28/18 17:00


 2/27/18 16:59  2/2/18 08:20


5 MG


 


 Insulin Aspart


  (novoLOG ASPART)  **SLIDING


 SCALE**


 **G...  ACHS


 SC  1/31/18 07:00


 3/2/18 06:59  2/1/18 21:41


2 UNITS


 


 Insulin Glargine


  (Lantus Solostar


 Pen)  10 units  BID


 SC  1/31/18 09:00


 3/2/18 08:59  2/2/18 08:19


10 UNITS


 


 Vitamin B Complex/


 Vit C/Folic Acid


  (Nephrocaps)  1 cap  HS


 PO  1/31/18 21:00


 3/2/18 20:59  2/1/18 21:42


1 CAP


 


 Sevelamer HCl


  (Renagel Tab)  800 mg  TIDM


 PO  1/31/18 11:30


 3/2/18 11:29  2/2/18 08:26


800 MG


 


 Prednisone


  (PredniSONE TAB)  40 mg  DAILY


 PO  2/1/18 09:00


 3/3/18 08:59  2/2/18 08:23


40 MG


 


 Amiodarone HCl


  (Cordarone Tab)  200 mg  BIDM


 PO  2/1/18 16:45


 3/1/18 16:44  2/2/18 08:25


200 MG


 


 Epoetin Geovany


  (Procrit Inj)  10,000 units  0800


 IV  2/2/18 08:00


 2/2/18 12:00   


 


 


 Albumin Human


  (Albumin 25%)  12.5 gm  PRN  PRN


 IV  2/2/18 09:45


 2/5/18 09:44   


 











Objective


Vital Signs











  Date Time  Temp Pulse Resp B/P (MAP) Pulse Ox O2 Delivery O2 Flow Rate FiO2


 


2/2/18 10:30  86  83/56    


 


2/2/18 10:15  90  87/58    


 


2/2/18 10:00  106  78/61    


 


2/2/18 09:45  76  99/57    


 


2/2/18 09:30  81  86/58    


 


2/2/18 09:22  81  96/63    


 


2/2/18 09:13 36.3 87  94/55 (68)    


 


2/2/18 07:44 36.8 90 22 129/68 (88) 93   


 


2/2/18 07:22  98 20  94 Nasal Cannula 2.0 


 


2/2/18 03:09      Nasal Cannula 2.0 


 


2/2/18 03:09 36.5 95 20 122/56 (78) 97 Nasal Cannula 2.0 


 


2/2/18 02:10  81 18  97 Nasal Cannula 2.0 


 


2/2/18 00:15      Nasal Cannula 2.0 


 


2/1/18 23:36 36.4 105 20 96/63 (74) 92 Nasal Cannula  


 


2/1/18 19:59  74 18  98 Nasal Cannula 2.0 


 


2/1/18 19:55 36.6 80 18 107/66 (80) 100 Nasal Cannula 2.0 


 


2/1/18 19:45      Nasal Cannula 2.0 


 


2/1/18 16:07 36.6 81 20 102/69 (80) 100 Nasal Cannula 2.0 


 


2/1/18 16:00      Nasal Cannula 3.0 


 


2/1/18 14:12  94 18  91 Room Air  


 


2/1/18 12:00      Nasal Cannula 3.0 


 


2/1/18 11:45 36.5 89 20 102/64 (77) 96   











Physical Exam


General Appearance:  no apparent distress


Eyes:  normal inspection, PERRL, EOMI


ENT:  normal ENT inspection, hearing grossly normal


Neck:  supple, no adenopathy, thyroid normal, no JVD


Respiratory/Chest:  lungs clear, normal breath sounds, no respiratory distress


Cardiovascular:  no edema, no gallop, no murmur, + irregularly irregular


Abdomen:  normal bowel sounds, non tender, soft


Extremities:  non-tender, normal inspection, no pedal edema


Neurologic/Psychiatric:  CNs II-XII nml as tested, no motor/sensory deficits, 

alert


Skin:  normal color, warm/dry, no rash


Lymphatic:  no adenopathy





Laboratory Results





Last 24 Hours








Test


  2/1/18


15:50 2/1/18


16:05 2/1/18


19:53 2/1/18


22:58


 


Activated Partial


Thromboplast Time 41.8 SECONDS 


  


  


  87.9 SECONDS 


 


 


Partial Thromboplastin Ratio 1.6    3.4 


 


Bedside Glucose  147 mg/dl  169 mg/dl  


 


Test


  2/2/18


05:34 2/2/18


06:20 


  


 


 


White Blood Count 17.33 K/uL    


 


Red Blood Count 3.03 M/uL    


 


Hemoglobin 9.7 g/dL    


 


Hematocrit 31.8 %    


 


Mean Corpuscular Volume 105.0 fL    


 


Mean Corpuscular Hemoglobin 32.0 pg    


 


Mean Corpuscular Hemoglobin


Concent 30.5 g/dl 


  


  


  


 


 


RDW Standard Deviation 71.1 fL    


 


RDW Coefficient of Variation 18.7 %    


 


Platelet Count 244 K/uL    


 


Mean Platelet Volume 10.0 fL    


 


Nucleated RBC Absolute Count


(auto) 0.04 K/uL 


  


  


  


 


 


Nucleated Red Blood Cells % 0.2 %    


 


Prothrombin Time 11.9 SECONDS    


 


Prothromb Time International


Ratio 1.1 


  


  


  


 


 


Activated Partial


Thromboplast Time 51.3 SECONDS 


  


  


  


 


 


Partial Thromboplastin Ratio 2.0    


 


Sodium Level 134 mmol/L    


 


Potassium Level 4.0 mmol/L    


 


Chloride Level 100 mmol/L    


 


Carbon Dioxide Level 21 mmol/L    


 


Anion Gap 13.0 mmol/L    


 


Blood Urea Nitrogen 52 mg/dl    


 


Creatinine 6.23 mg/dl    


 


Est Creatinine Clear Calc


Drug Dose 11.4 ml/min 


  


  


  


 


 


Estimated GFR (


American) 7.8 


  


  


  


 


 


Estimated GFR (Non-


American 6.7 


  


  


  


 


 


BUN/Creatinine Ratio 8.3    


 


Random Glucose 163 mg/dl    


 


Calcium Level 7.9 mg/dl    


 


Bedside Glucose  146 mg/dl   











Assessment and Plan


Impression: #1 atrial flutter #2 end-stage kidney disease on hemodialysis #3 

UTI and Clostridium difficile





Recommendations: The patient's heart rates have been controlled on amiodarone.  

The patient is being transitioned heparin to Coumadin.  She is not currently 

therapeutic.

## 2018-02-03 VITALS
HEART RATE: 95 BPM | DIASTOLIC BLOOD PRESSURE: 57 MMHG | TEMPERATURE: 98.24 F | SYSTOLIC BLOOD PRESSURE: 90 MMHG | OXYGEN SATURATION: 95 %

## 2018-02-03 VITALS
SYSTOLIC BLOOD PRESSURE: 97 MMHG | TEMPERATURE: 97.7 F | DIASTOLIC BLOOD PRESSURE: 69 MMHG | HEART RATE: 104 BPM | OXYGEN SATURATION: 97 %

## 2018-02-03 VITALS
OXYGEN SATURATION: 92 % | SYSTOLIC BLOOD PRESSURE: 98 MMHG | HEART RATE: 103 BPM | TEMPERATURE: 98.24 F | DIASTOLIC BLOOD PRESSURE: 66 MMHG

## 2018-02-03 VITALS
SYSTOLIC BLOOD PRESSURE: 90 MMHG | HEART RATE: 94 BPM | TEMPERATURE: 98.42 F | OXYGEN SATURATION: 93 % | DIASTOLIC BLOOD PRESSURE: 54 MMHG

## 2018-02-03 VITALS
TEMPERATURE: 97.88 F | OXYGEN SATURATION: 95 % | HEART RATE: 100 BPM | DIASTOLIC BLOOD PRESSURE: 57 MMHG | SYSTOLIC BLOOD PRESSURE: 83 MMHG

## 2018-02-03 VITALS — HEART RATE: 99 BPM | OXYGEN SATURATION: 96 %

## 2018-02-03 VITALS — HEART RATE: 94 BPM | OXYGEN SATURATION: 100 %

## 2018-02-03 VITALS — OXYGEN SATURATION: 97 % | HEART RATE: 90 BPM

## 2018-02-03 VITALS
TEMPERATURE: 98.06 F | DIASTOLIC BLOOD PRESSURE: 59 MMHG | SYSTOLIC BLOOD PRESSURE: 101 MMHG | OXYGEN SATURATION: 96 % | HEART RATE: 84 BPM

## 2018-02-03 VITALS — OXYGEN SATURATION: 97 % | HEART RATE: 98 BPM

## 2018-02-03 LAB
INR PPP: 1.9 (ref 0.9–1.1)
PTT PATIENT: 48.1 SECONDS (ref 21–31)

## 2018-02-03 RX ADMIN — INSULIN GLARGINE SCH UNITS: 100 INJECTION, SOLUTION SUBCUTANEOUS at 21:19

## 2018-02-03 RX ADMIN — LEVALBUTEROL HYDROCHLORIDE SCH MG: 1.25 SOLUTION RESPIRATORY (INHALATION) at 19:40

## 2018-02-03 RX ADMIN — WARFARIN SODIUM SCH MG: 5 TABLET ORAL at 17:38

## 2018-02-03 RX ADMIN — INSULIN ASPART SCH UNITS: 100 INJECTION, SOLUTION INTRAVENOUS; SUBCUTANEOUS at 21:19

## 2018-02-03 RX ADMIN — LEVALBUTEROL HYDROCHLORIDE SCH MG: 1.25 SOLUTION RESPIRATORY (INHALATION) at 02:14

## 2018-02-03 RX ADMIN — RENAGEL SCH MG: 800 TABLET ORAL at 08:51

## 2018-02-03 RX ADMIN — MIDODRINE HYDROCHLORIDE SCH MG: 2.5 TABLET ORAL at 08:51

## 2018-02-03 RX ADMIN — ALUMINUM ZIRCONIUM TRICHLOROHYDREX GLY SCH EA: 0.2 STICK TOPICAL at 16:00

## 2018-02-03 RX ADMIN — HEPARIN SODIUM PRN MLS/HR: 5000 INJECTION, SOLUTION INTRAVENOUS at 21:03

## 2018-02-03 RX ADMIN — MIDODRINE HYDROCHLORIDE SCH MG: 2.5 TABLET ORAL at 12:00

## 2018-02-03 RX ADMIN — VANCOMYCIN HYDROCHLORIDE SCH MG: 1 INJECTION, POWDER, LYOPHILIZED, FOR SOLUTION INTRAVENOUS at 08:50

## 2018-02-03 RX ADMIN — Medication SCH ML: at 13:25

## 2018-02-03 RX ADMIN — VANCOMYCIN HYDROCHLORIDE SCH MG: 1 INJECTION, POWDER, LYOPHILIZED, FOR SOLUTION INTRAVENOUS at 03:01

## 2018-02-03 RX ADMIN — ASCORBIC ACID, THIAMINE MONONITRATE,RIBOFLAVIN, NIACINAMIDE, PYRIDOXINE HYDROCHLORIDE, FOLIC ACID, CYANOCOBALAMIN, BIOTIN, CALCIUM PANTOTHENATE, SCH CAP: 100; 1.5; 1.7; 20; 10; 1; 6000; 150000; 5 CAPSULE, LIQUID FILLED ORAL at 21:20

## 2018-02-03 RX ADMIN — LEVALBUTEROL HYDROCHLORIDE SCH MG: 1.25 SOLUTION RESPIRATORY (INHALATION) at 07:08

## 2018-02-03 RX ADMIN — VANCOMYCIN HYDROCHLORIDE SCH MG: 1 INJECTION, POWDER, LYOPHILIZED, FOR SOLUTION INTRAVENOUS at 13:25

## 2018-02-03 RX ADMIN — PANTOPRAZOLE SCH MG: 40 TABLET, DELAYED RELEASE ORAL at 08:53

## 2018-02-03 RX ADMIN — INSULIN ASPART SCH UNITS: 100 INJECTION, SOLUTION INTRAVENOUS; SUBCUTANEOUS at 08:00

## 2018-02-03 RX ADMIN — INSULIN ASPART SCH UNITS: 100 INJECTION, SOLUTION INTRAVENOUS; SUBCUTANEOUS at 17:43

## 2018-02-03 RX ADMIN — INSULIN ASPART SCH UNITS: 100 INJECTION, SOLUTION INTRAVENOUS; SUBCUTANEOUS at 13:16

## 2018-02-03 RX ADMIN — MIDODRINE HYDROCHLORIDE SCH MG: 2.5 TABLET ORAL at 17:37

## 2018-02-03 RX ADMIN — Medication SCH ML: at 03:01

## 2018-02-03 RX ADMIN — RENAGEL SCH MG: 800 TABLET ORAL at 13:13

## 2018-02-03 RX ADMIN — AMIODARONE HYDROCHLORIDE SCH MG: 200 TABLET ORAL at 08:51

## 2018-02-03 RX ADMIN — RENAGEL SCH MG: 800 TABLET ORAL at 17:36

## 2018-02-03 RX ADMIN — Medication SCH ML: at 08:50

## 2018-02-03 RX ADMIN — ALUMINUM ZIRCONIUM TRICHLOROHYDREX GLY SCH EA: 0.2 STICK TOPICAL at 08:00

## 2018-02-03 RX ADMIN — AMIODARONE HYDROCHLORIDE SCH MG: 200 TABLET ORAL at 17:36

## 2018-02-03 RX ADMIN — LEVALBUTEROL HYDROCHLORIDE SCH MG: 1.25 SOLUTION RESPIRATORY (INHALATION) at 14:19

## 2018-02-03 RX ADMIN — INSULIN GLARGINE SCH UNITS: 100 INJECTION, SOLUTION SUBCUTANEOUS at 08:55

## 2018-02-03 NOTE — PROGRESS NOTE
Medicine Progress Note


Date & Time of Visit:


Feb 3, 2018 at 13:50


.


Subjective





CC: 


Follow-up visit for multiple problems.





HPI: 


Cough improved.


Diarrhea improved.


No nausea or vomiting.





ROS:


General- no fever, no chills


Resp- as noted above in HPI


Cardiac-  no chest pain, no edema


GI- as noted above in HPI


- anuric


.





Objective





Last 8 Hrs








  Date Time  Temp Pulse Resp B/P (MAP) Pulse Ox O2 Delivery O2 Flow Rate FiO2


 


2/3/18 16:42 36.7 84 16 101/59 (73) 96   


 


2/3/18 14:19  94 18  100 Nasal Cannula 1.0 


 


2/3/18 11:47 36.8 95 16 90/57 (68) 95 Nasal Cannula 1.0 








Physical Exam:





General- lying in bed; no distress


Lungs- scattered rhonchi, diffuse mild wheezing; no respiratory distress


Cardiovascular- irregular;  no gallop; no JVD; trace pretibial edema


Abdomen- + bowel sounds, soft, nontender 


Extremities- no cyanosis; no calf tenderness


Neuro- alert, oriented


Skin- warm & dry


.


Laboratory Results:





Last 24 Hours








Test


  2/2/18


21:06 2/3/18


06:43 2/3/18


06:45 2/3/18


11:13


 


Bedside Glucose 135 mg/dl  99 mg/dl   234 mg/dl 


 


Prothrombin Time   19.6 SECONDS  


 


Prothromb Time International


Ratio 


  


  1.9 


  


 


 


Activated Partial


Thromboplast Time 


  


  48.1 SECONDS 


  


 


 


Partial Thromboplastin Ratio   1.9  


 


Test


  2/3/18


16:17 


  


  


 


 


Bedside Glucose 205 mg/dl    











Assessment & Plan





HYPOTENSION


Improved.


Started on midodrine.


Ongoing hypotension with hemodialysis.


Continue to monitor.





ATRIAL FLUTTER


Cardiology consulted.


Receiving amiodarone and IV heparin.


Started warfarin.





UTI E COLI (present on admission)


UA on admission demonstrated WBC's and bacteria.


Urine culture grew E coli, ESBL.


Treated with course of ertapenem.





C DIFFICILE COLITIS


Persistent diarrhea.


Received IV metronidazole and PO vancomycin.


Finished course of metronidazole; continue PO vanco.





BRONCHITIS / EXACERBATION OF COPD


Received course of azithromycin.


Taper steroids.





SBO 


Resolved.





DM TYPE 2


Pharmacy consulted for glycemic management.


FBS = 99.


Taper steroids.





CIRRHOSIS


Noted on CT of chest.


Will need outpatient follow-up with GI.





RIGHT ANKLE SPRAIN


Seen by Ortho.


CAM boot applied.





DESQUAMATION FINGERTIPS


No apparent drug reaction, infection.


Follow.





VTE PROPHYLAXIS


Receiving IV heparin.


Ambulate as able.





DISPOSITION


To be determined.


.


Consultants:


[facility]ro-On[facility]


[facility]


Current Inpatient Medications:





Current Inpatient Medications








 Medications


  (Trade)  Dose


 Ordered  Sig/Daniel


 Route  Start Time


 Stop Time Status Last Admin


Dose Admin


 


 Miscellaneous


 Information


  (Order Awaiting


 Action)  1 ea  QS


 N/A  1/16/18 08:00


 2/15/18 07:59  1/16/18 08:21


1 EA


 


 Miscellaneous


 Information


  (Order Awaiting


 Action)  1 ea  QS


 N/A  1/16/18 08:00


 2/15/18 07:59  1/16/18 08:21


1 EA


 


 Miscellaneous


 Information


  (Consult


 Glycemic


 Management


 Pharmacy)  1 ea  UD  PRN


 N/A  1/16/18 04:15


 2/15/18 04:14   


 


 


 Glucose


  (Glucose 40% Gel)  15-30


 GRAMS 15


 GRAMS...  UD  PRN


 PO  1/16/18 04:30


 2/15/18 04:29   


 


 


 Glucose


  (Glucose Chew


 Tab)  4-8


 Tablets 4


 Tabl...  UD  PRN


 PO  1/16/18 04:30


 2/15/18 04:29   


 


 


 Dextrose


  (Dextrose 50%


 50ML Syringe)  25-50ML OF


 50% DW IV


 FOR...  UD  PRN


 IV  1/16/18 04:30


 2/15/18 04:29  1/17/18 09:52


25 ML


 


 Glucagon


  (Glucagon Inj)  1 mg  UD  PRN


 SQ  1/16/18 04:30


 2/15/18 04:29   


 


 


 Ondansetron HCl


  (Zofran Inj)  4 mg  Q4H  PRN


 IV  1/16/18 18:15


 2/15/18 18:14  2/1/18 12:37


4 MG


 


 Vancomycin HCl


  (Vancomycin Oral


 Soln)  125 mg  Q6H


 PO  1/20/18 20:00


 2/3/18 19:59  2/3/18 13:25


125 MG


 


 Raspberry


  (Raspberry Syrup


 5ml Cup)  5 ml  Q6H


 PO  1/20/18 20:00


 2/3/18 19:59  2/3/18 13:25


5 ML


 


 Acetaminophen 650


 mg/Empty Bag  65 ml @ 


 260 mls/hr  Q6H  PRN


 IV  1/23/18 16:30


 2/22/18 16:29  1/31/18 21:53


260 MLS/HR


 


 Heparin Sodium/


 Dextrose  500 ml @ 


 20 mls/hr  Q24H PRN


 IV  1/25/18 10:30


 2/24/18 10:29  2/2/18 21:07


20 MLS/HR


 


 Pantoprazole


 Sodium


  (Protonix Tab)  40 mg  QAM


 PO  1/26/18 09:00


 2/25/18 08:59  2/3/18 08:53


40 MG


 


 Levalbuterol


  (Xopenex 1.25MG/


 3ML Neb)  1.25 mg  Q6R


 INH  1/26/18 21:00


 2/25/18 20:59  2/3/18 14:19


1.25 MG


 


 Midodrine


  (Proamatine Tab)  5 mg  TID@08,12,17


 PO  1/28/18 17:00


 2/27/18 16:59  2/2/18 08:20


5 MG


 


 Insulin Aspart


  (novoLOG ASPART)  **SLIDING


 SCALE**


 **G...  ACHS


 SC  1/31/18 07:00


 3/2/18 06:59  2/3/18 13:16


15 UNITS


 


 Insulin Glargine


  (Lantus Solostar


 Pen)  10 units  BID


 SC  1/31/18 09:00


 3/2/18 08:59  2/3/18 08:55


10 UNITS


 


 Vitamin B Complex/


 Vit C/Folic Acid


  (Nephrocaps)  1 cap  HS


 PO  1/31/18 21:00


 3/2/18 20:59  2/2/18 21:10


1 CAP


 


 Sevelamer HCl


  (Renagel Tab)  800 mg  TIDM


 PO  1/31/18 11:30


 3/2/18 11:29  2/3/18 13:13


800 MG


 


 Prednisone


  (PredniSONE TAB)  40 mg  DAILY


 PO  2/1/18 09:00


 3/3/18 08:59  2/3/18 08:52


40 MG


 


 Amiodarone HCl


  (Cordarone Tab)  200 mg  BIDM


 PO  2/1/18 16:45


 3/1/18 16:44  2/2/18 08:25


200 MG


 


 Albumin Human


  (Albumin 25%)  12.5 gm  PRN  PRN


 IV  2/2/18 09:45


 2/5/18 09:44   


 


 


 Warfarin Sodium


  (Coumadin Tab)  5 mg  DAILY@16


 PO  2/2/18 16:00


 3/4/18 15:59  2/2/18 17:07


5 MG

## 2018-02-04 VITALS — OXYGEN SATURATION: 93 % | HEART RATE: 95 BPM

## 2018-02-04 VITALS — HEART RATE: 93 BPM | OXYGEN SATURATION: 93 %

## 2018-02-04 VITALS
DIASTOLIC BLOOD PRESSURE: 67 MMHG | HEART RATE: 101 BPM | SYSTOLIC BLOOD PRESSURE: 98 MMHG | TEMPERATURE: 97.88 F | OXYGEN SATURATION: 94 %

## 2018-02-04 VITALS — HEART RATE: 108 BPM | OXYGEN SATURATION: 94 %

## 2018-02-04 VITALS
SYSTOLIC BLOOD PRESSURE: 93 MMHG | TEMPERATURE: 98.06 F | DIASTOLIC BLOOD PRESSURE: 56 MMHG | HEART RATE: 100 BPM | OXYGEN SATURATION: 95 %

## 2018-02-04 VITALS
HEART RATE: 74 BPM | SYSTOLIC BLOOD PRESSURE: 124 MMHG | TEMPERATURE: 98.6 F | OXYGEN SATURATION: 95 % | DIASTOLIC BLOOD PRESSURE: 72 MMHG

## 2018-02-04 VITALS
SYSTOLIC BLOOD PRESSURE: 88 MMHG | TEMPERATURE: 97.88 F | HEART RATE: 112 BPM | OXYGEN SATURATION: 93 % | DIASTOLIC BLOOD PRESSURE: 56 MMHG

## 2018-02-04 VITALS
DIASTOLIC BLOOD PRESSURE: 63 MMHG | SYSTOLIC BLOOD PRESSURE: 109 MMHG | HEART RATE: 86 BPM | TEMPERATURE: 98.6 F | OXYGEN SATURATION: 95 %

## 2018-02-04 VITALS
DIASTOLIC BLOOD PRESSURE: 57 MMHG | HEART RATE: 104 BPM | OXYGEN SATURATION: 91 % | TEMPERATURE: 98.24 F | SYSTOLIC BLOOD PRESSURE: 92 MMHG

## 2018-02-04 VITALS — OXYGEN SATURATION: 93 % | HEART RATE: 108 BPM

## 2018-02-04 LAB
EOSINOPHIL NFR BLD AUTO: 170 K/UL (ref 130–400)
HCT VFR BLD CALC: 30.6 % (ref 37–47)
HGB BLD-MCNC: 9.4 G/DL (ref 12–16)
INR PPP: 2.7 (ref 0.9–1.1)
MCH RBC QN AUTO: 32.4 PG (ref 25–34)
MCHC RBC AUTO-ENTMCNC: 30.7 G/DL (ref 32–36)
MCV RBC AUTO: 105.5 FL (ref 80–100)
PMV BLD AUTO: 10.1 FL (ref 7.4–10.4)
PTT PATIENT: 40.1 SECONDS (ref 21–31)
RED CELL DISTRIBUTION WIDTH CV: 18.8 % (ref 11.5–14.5)
RED CELL DISTRIBUTION WIDTH SD: 71.8 FL (ref 36.4–46.3)
WBC # BLD AUTO: 10.15 K/UL (ref 4.8–10.8)

## 2018-02-04 RX ADMIN — AMIODARONE HYDROCHLORIDE SCH MG: 200 TABLET ORAL at 16:43

## 2018-02-04 RX ADMIN — INSULIN ASPART SCH UNITS: 100 INJECTION, SOLUTION INTRAVENOUS; SUBCUTANEOUS at 18:05

## 2018-02-04 RX ADMIN — INSULIN ASPART SCH UNITS: 100 INJECTION, SOLUTION INTRAVENOUS; SUBCUTANEOUS at 08:35

## 2018-02-04 RX ADMIN — WARFARIN SODIUM SCH MG: 5 TABLET ORAL at 16:44

## 2018-02-04 RX ADMIN — LEVALBUTEROL HYDROCHLORIDE SCH MG: 1.25 SOLUTION RESPIRATORY (INHALATION) at 14:35

## 2018-02-04 RX ADMIN — LEVALBUTEROL HYDROCHLORIDE SCH MG: 1.25 SOLUTION RESPIRATORY (INHALATION) at 07:50

## 2018-02-04 RX ADMIN — RENAGEL SCH MG: 800 TABLET ORAL at 12:49

## 2018-02-04 RX ADMIN — ALUMINUM ZIRCONIUM TRICHLOROHYDREX GLY SCH EA: 0.2 STICK TOPICAL at 00:00

## 2018-02-04 RX ADMIN — VANCOMYCIN HYDROCHLORIDE SCH MG: 1 INJECTION, POWDER, LYOPHILIZED, FOR SOLUTION INTRAVENOUS at 12:57

## 2018-02-04 RX ADMIN — ALUMINUM ZIRCONIUM TRICHLOROHYDREX GLY SCH EA: 0.2 STICK TOPICAL at 08:00

## 2018-02-04 RX ADMIN — RENAGEL SCH MG: 800 TABLET ORAL at 08:29

## 2018-02-04 RX ADMIN — FLUTICASONE PROPIONATE AND SALMETEROL SCH PUFF: 50; 250 POWDER RESPIRATORY (INHALATION) at 12:48

## 2018-02-04 RX ADMIN — MIDODRINE HYDROCHLORIDE SCH MG: 2.5 TABLET ORAL at 12:49

## 2018-02-04 RX ADMIN — INSULIN GLARGINE SCH UNITS: 100 INJECTION, SOLUTION SUBCUTANEOUS at 08:36

## 2018-02-04 RX ADMIN — MIDODRINE HYDROCHLORIDE SCH MG: 2.5 TABLET ORAL at 08:30

## 2018-02-04 RX ADMIN — VANCOMYCIN HYDROCHLORIDE SCH MG: 1 INJECTION, POWDER, LYOPHILIZED, FOR SOLUTION INTRAVENOUS at 21:07

## 2018-02-04 RX ADMIN — Medication SCH ML: at 20:42

## 2018-02-04 RX ADMIN — INSULIN GLARGINE SCH UNITS: 100 INJECTION, SOLUTION SUBCUTANEOUS at 21:06

## 2018-02-04 RX ADMIN — Medication SCH ML: at 12:57

## 2018-02-04 RX ADMIN — Medication SCH ML: at 16:45

## 2018-02-04 RX ADMIN — RENAGEL SCH MG: 800 TABLET ORAL at 16:43

## 2018-02-04 RX ADMIN — ASCORBIC ACID, THIAMINE MONONITRATE,RIBOFLAVIN, NIACINAMIDE, PYRIDOXINE HYDROCHLORIDE, FOLIC ACID, CYANOCOBALAMIN, BIOTIN, CALCIUM PANTOTHENATE, SCH CAP: 100; 1.5; 1.7; 20; 10; 1; 6000; 150000; 5 CAPSULE, LIQUID FILLED ORAL at 20:42

## 2018-02-04 RX ADMIN — MIDODRINE HYDROCHLORIDE SCH MG: 2.5 TABLET ORAL at 16:44

## 2018-02-04 RX ADMIN — LEVALBUTEROL HYDROCHLORIDE SCH MG: 1.25 SOLUTION RESPIRATORY (INHALATION) at 02:21

## 2018-02-04 RX ADMIN — VANCOMYCIN HYDROCHLORIDE SCH MG: 1 INJECTION, POWDER, LYOPHILIZED, FOR SOLUTION INTRAVENOUS at 16:44

## 2018-02-04 RX ADMIN — PANTOPRAZOLE SCH MG: 40 TABLET, DELAYED RELEASE ORAL at 08:30

## 2018-02-04 RX ADMIN — LEVALBUTEROL HYDROCHLORIDE SCH MG: 1.25 SOLUTION RESPIRATORY (INHALATION) at 19:45

## 2018-02-04 RX ADMIN — INSULIN ASPART SCH UNITS: 100 INJECTION, SOLUTION INTRAVENOUS; SUBCUTANEOUS at 21:06

## 2018-02-04 RX ADMIN — AMIODARONE HYDROCHLORIDE SCH MG: 200 TABLET ORAL at 08:30

## 2018-02-04 RX ADMIN — FLUTICASONE PROPIONATE AND SALMETEROL SCH PUFF: 50; 250 POWDER RESPIRATORY (INHALATION) at 20:41

## 2018-02-04 RX ADMIN — INSULIN ASPART SCH UNITS: 100 INJECTION, SOLUTION INTRAVENOUS; SUBCUTANEOUS at 12:56

## 2018-02-04 NOTE — PROGRESS NOTE
Medicine Progress Note


Date & Time of Visit:


Feb 4, 2018 at 16:30


.


Subjective





CC: 


Follow-up visit for multiple problems.





HPI: 


Cough persists, but better.


Diarrhea improved.


No nausea or vomiting.


INR therapeutic- heparin stopped this morning.





ROS:


General- no fever, no chills


Resp- as noted above in HPI


Cardiac-  no chest pain, no edema


GI- as noted above in HPI


- anuric


.





Objective





Last 8 Hrs








  Date Time  Temp Pulse Resp B/P (MAP) Pulse Ox O2 Delivery O2 Flow Rate FiO2


 


2/4/18 16:32 36.6 112 16 88/56 (67) 93 Room Air  


 


2/4/18 16:00      Room Air  


 


2/4/18 14:35  95 20  93 Room Air  


 


2/4/18 12:00      Room Air  








Physical Exam:





General- lying in bed; no distress


Lungs- scattered rhonchi, diffuse mild wheezing; no respiratory distress


Cardiovascular- irregular;  no gallop; no JVD; trace pretibial edema


Abdomen- + bowel sounds, soft, nontender 


Extremities- no cyanosis; no calf tenderness


Neuro- alert, oriented


Skin- warm & dry


.


Laboratory Results:





Last 24 Hours








Test


  2/3/18


20:03 2/4/18


06:04 2/4/18


06:35 2/4/18


11:18


 


Bedside Glucose 174 mg/dl   118 mg/dl  212 mg/dl 


 


White Blood Count  10.15 K/uL   


 


Red Blood Count  2.90 M/uL   


 


Hemoglobin  9.4 g/dL   


 


Hematocrit  30.6 %   


 


Mean Corpuscular Volume  105.5 fL   


 


Mean Corpuscular Hemoglobin  32.4 pg   


 


Mean Corpuscular Hemoglobin


Concent 


  30.7 g/dl 


  


  


 


 


RDW Standard Deviation  71.8 fL   


 


RDW Coefficient of Variation  18.8 %   


 


Platelet Count  170 K/uL   


 


Mean Platelet Volume  10.1 fL   


 


Prothrombin Time  27.6 SECONDS   


 


Prothromb Time International


Ratio 


  2.7 


  


  


 


 


Activated Partial


Thromboplast Time 


  40.1 SECONDS 


  


  


 


 


Partial Thromboplastin Ratio  1.5   


 


Test


  2/4/18


16:34 


  


  


 


 


Bedside Glucose 149 mg/dl    











Assessment & Plan





HYPOTENSION


Improved.


Started on midodrine.


Ongoing hypotension with hemodialysis.


Continue to monitor.





ATRIAL FLUTTER


Cardiology consulted.


Antiarrhythmic therapy with amiodarone initiated.


Anticoagulated with IV heparin --> warfarin.





UTI E COLI (present on admission)


UA on admission demonstrated WBC's and bacteria.


Urine culture grew E coli, ESBL.


Treated with course of ertapenem.





C DIFFICILE COLITIS


Diarrhea improved.


Received IV metronidazole and PO vancomycin.


Finished course of metronidazole; continue PO vanco.





BRONCHITIS / EXACERBATION OF COPD


Received course of azithromycin.


Taper steroids.





SBO 


Resolved.





DM TYPE 2


Pharmacy consulted for glycemic management.


FBS = 118.


Taper steroids.





CIRRHOSIS


Noted on CT of chest.


Will need outpatient follow-up with GI.





RIGHT ANKLE SPRAIN


Seen by Ortho.


CAM boot ordered.





VTE PROPHYLAXIS


Received IV heparin --> warfarin.


Ambulate as able.





DISPOSITION


To be determined.


Anticipated need for rehab or skilled care.


.


Consultants:


[facility]ro-[facility]


[facility]


Current Inpatient Medications:





Current Inpatient Medications








 Medications


  (Trade)  Dose


 Ordered  Sig/Daniel


 Route  Start Time


 Stop Time Status Last Admin


Dose Admin


 


 Miscellaneous


 Information


  (Consult


 Glycemic


 Management


 Pharmacy)  1 ea  UD  PRN


 N/A  1/16/18 04:15


 2/15/18 04:14   


 


 


 Glucose


  (Glucose 40% Gel)  15-30


 GRAMS 15


 GRAMS...  UD  PRN


 PO  1/16/18 04:30


 2/15/18 04:29   


 


 


 Glucose


  (Glucose Chew


 Tab)  4-8


 Tablets 4


 Tabl...  UD  PRN


 PO  1/16/18 04:30


 2/15/18 04:29   


 


 


 Dextrose


  (Dextrose 50%


 50ML Syringe)  25-50ML OF


 50% DW IV


 FOR...  UD  PRN


 IV  1/16/18 04:30


 2/15/18 04:29  1/17/18 09:52


25 ML


 


 Glucagon


  (Glucagon Inj)  1 mg  UD  PRN


 SQ  1/16/18 04:30


 2/15/18 04:29   


 


 


 Ondansetron HCl


  (Zofran Inj)  4 mg  Q4H  PRN


 IV  1/16/18 18:15


 2/15/18 18:14  2/1/18 12:37


4 MG


 


 Acetaminophen 650


 mg/Empty Bag  65 ml @ 


 260 mls/hr  Q6H  PRN


 IV  1/23/18 16:30


 2/22/18 16:29  1/31/18 21:53


260 MLS/HR


 


 Pantoprazole


 Sodium


  (Protonix Tab)  40 mg  QAM


 PO  1/26/18 09:00


 2/25/18 08:59  2/4/18 08:30


40 MG


 


 Levalbuterol


  (Xopenex 1.25MG/


 3ML Neb)  1.25 mg  Q6R


 INH  1/26/18 21:00


 2/25/18 20:59  2/4/18 19:45


1.25 MG


 


 Midodrine


  (Proamatine Tab)  5 mg  TID@08,12,17


 PO  1/28/18 17:00


 2/27/18 16:59  2/4/18 16:44


5 MG


 


 Insulin Aspart


  (novoLOG ASPART)  **SLIDING


 SCALE**


 **G...  ACHS


 SC  1/31/18 07:00


 3/2/18 06:59  2/4/18 18:05


11 UNITS


 


 Insulin Glargine


  (Lantus Solostar


 Pen)  10 units  BID


 SC  1/31/18 09:00


 3/2/18 08:59  2/4/18 08:36


10 UNITS


 


 Vitamin B Complex/


 Vit C/Folic Acid


  (Nephrocaps)  1 cap  HS


 PO  1/31/18 21:00


 3/2/18 20:59  2/3/18 21:20


1 CAP


 


 Sevelamer HCl


  (Renagel Tab)  800 mg  TIDM


 PO  1/31/18 11:30


 3/2/18 11:29  2/4/18 16:43


800 MG


 


 Prednisone


  (PredniSONE TAB)  40 mg  DAILY


 PO  2/1/18 09:00


 3/3/18 08:59  2/4/18 08:29


40 MG


 


 Amiodarone HCl


  (Cordarone Tab)  200 mg  BIDM


 PO  2/1/18 16:45


 3/1/18 16:44  2/4/18 16:43


200 MG


 


 Albumin Human


  (Albumin 25%)  12.5 gm  PRN  PRN


 IV  2/2/18 09:45


 2/5/18 09:44   


 


 


 Warfarin Sodium


  (Coumadin Tab)  5 mg  DAILY@16


 PO  2/2/18 16:00


 3/4/18 15:59  2/4/18 16:44


5 MG


 


 Salmeterol


 Xinafoate/


 Fluticasone


  (Advair Diskus


 250/50 Inh)  1 puff  BID


 INH  2/4/18 09:00


 3/6/18 08:59  2/4/18 12:48


1 PUFF


 


 Raspberry


  (Raspberry Syrup


 5ml Cup)  5 ml  QID


 PO  2/4/18 13:00


 2/18/18 11:59  2/4/18 16:45


5 ML


 


 Vancomycin HCl


  (Vancomycin Oral


 Soln)  125 mg  QID


 PO  2/4/18 13:00


 2/18/18 11:59  2/4/18 16:44


125 MG

## 2018-02-04 NOTE — PHARMACY PROGRESS NOTE
Pharmacy Glycemic Short Note 2


Date of Service


Feb 4, 2018.





OUTPATIENT ANTIDIABETIC REGIMEN: 


* NPH 40 units SQ daily when taking prednisone (otherwise no insulin taken)








ASSESSMENT:


* 56 yo with severe steroid induced hyperglycemia. Pt typically only needs 

insulin when started on steroids for pulmonary issues. 


* Patient has been requiring 36-55 units of insulin per day with near adequate 

control as steroids have tapered down to Prednisone 40mg po daily








PLAN FOR INPATIENT GLYCEMIC CONTROL:


* Basal Insulin: 


 * Lantus 10 units SQ BID


 * HOLD for BSG < 110mg/dl


 


 


* Bolus Insulin:


 * NOVOLOG per scale ACHS


 * Goal Range:  Low 120 mg/dL - High 150 mg/dL


 * Correction Factor: 15 mg/dL/unit


 * Nutritional / Prandial insulin:  1 units for every 5 grams of carb 








PLAN FOR DISCHARGE:


* A1c 6.5% indicated good outpatient glycemic control, although pt is on HD


* Expect that patient may be discharged on previous home regimen unless 

steroids are continued at discharge.

## 2018-02-05 VITALS — DIASTOLIC BLOOD PRESSURE: 39 MMHG | SYSTOLIC BLOOD PRESSURE: 102 MMHG | HEART RATE: 102 BPM

## 2018-02-05 VITALS
TEMPERATURE: 98.42 F | OXYGEN SATURATION: 90 % | HEART RATE: 111 BPM | SYSTOLIC BLOOD PRESSURE: 95 MMHG | DIASTOLIC BLOOD PRESSURE: 62 MMHG

## 2018-02-05 VITALS — HEART RATE: 111 BPM | DIASTOLIC BLOOD PRESSURE: 48 MMHG | SYSTOLIC BLOOD PRESSURE: 115 MMHG

## 2018-02-05 VITALS — TEMPERATURE: 98.24 F | SYSTOLIC BLOOD PRESSURE: 92 MMHG | HEART RATE: 115 BPM | DIASTOLIC BLOOD PRESSURE: 32 MMHG

## 2018-02-05 VITALS — DIASTOLIC BLOOD PRESSURE: 57 MMHG | HEART RATE: 99 BPM | SYSTOLIC BLOOD PRESSURE: 111 MMHG

## 2018-02-05 VITALS — DIASTOLIC BLOOD PRESSURE: 50 MMHG | SYSTOLIC BLOOD PRESSURE: 88 MMHG | HEART RATE: 100 BPM

## 2018-02-05 VITALS — SYSTOLIC BLOOD PRESSURE: 104 MMHG | DIASTOLIC BLOOD PRESSURE: 56 MMHG | HEART RATE: 81 BPM

## 2018-02-05 VITALS — SYSTOLIC BLOOD PRESSURE: 92 MMHG | HEART RATE: 111 BPM | DIASTOLIC BLOOD PRESSURE: 43 MMHG

## 2018-02-05 VITALS
TEMPERATURE: 98.24 F | DIASTOLIC BLOOD PRESSURE: 61 MMHG | HEART RATE: 102 BPM | SYSTOLIC BLOOD PRESSURE: 97 MMHG | OXYGEN SATURATION: 92 %

## 2018-02-05 VITALS — DIASTOLIC BLOOD PRESSURE: 49 MMHG | TEMPERATURE: 98.24 F | HEART RATE: 109 BPM | SYSTOLIC BLOOD PRESSURE: 111 MMHG

## 2018-02-05 VITALS — OXYGEN SATURATION: 97 % | HEART RATE: 115 BPM

## 2018-02-05 VITALS — DIASTOLIC BLOOD PRESSURE: 49 MMHG | HEART RATE: 88 BPM | SYSTOLIC BLOOD PRESSURE: 108 MMHG

## 2018-02-05 VITALS
HEART RATE: 104 BPM | TEMPERATURE: 98.06 F | SYSTOLIC BLOOD PRESSURE: 90 MMHG | OXYGEN SATURATION: 90 % | DIASTOLIC BLOOD PRESSURE: 54 MMHG

## 2018-02-05 VITALS
HEART RATE: 130 BPM | OXYGEN SATURATION: 91 % | DIASTOLIC BLOOD PRESSURE: 60 MMHG | SYSTOLIC BLOOD PRESSURE: 91 MMHG | TEMPERATURE: 98.96 F

## 2018-02-05 VITALS — OXYGEN SATURATION: 90 % | HEART RATE: 104 BPM

## 2018-02-05 VITALS — DIASTOLIC BLOOD PRESSURE: 57 MMHG | SYSTOLIC BLOOD PRESSURE: 121 MMHG | HEART RATE: 97 BPM

## 2018-02-05 VITALS — OXYGEN SATURATION: 93 % | HEART RATE: 96 BPM

## 2018-02-05 VITALS
SYSTOLIC BLOOD PRESSURE: 93 MMHG | OXYGEN SATURATION: 95 % | TEMPERATURE: 98.24 F | DIASTOLIC BLOOD PRESSURE: 59 MMHG | HEART RATE: 142 BPM

## 2018-02-05 VITALS — SYSTOLIC BLOOD PRESSURE: 108 MMHG | DIASTOLIC BLOOD PRESSURE: 51 MMHG | HEART RATE: 91 BPM

## 2018-02-05 VITALS — HEART RATE: 110 BPM | DIASTOLIC BLOOD PRESSURE: 51 MMHG | SYSTOLIC BLOOD PRESSURE: 116 MMHG

## 2018-02-05 VITALS — HEART RATE: 100 BPM | SYSTOLIC BLOOD PRESSURE: 91 MMHG | DIASTOLIC BLOOD PRESSURE: 50 MMHG

## 2018-02-05 VITALS — OXYGEN SATURATION: 91 %

## 2018-02-05 VITALS
OXYGEN SATURATION: 96 % | TEMPERATURE: 97.88 F | DIASTOLIC BLOOD PRESSURE: 57 MMHG | HEART RATE: 97 BPM | SYSTOLIC BLOOD PRESSURE: 98 MMHG

## 2018-02-05 LAB
BUN SERPL-MCNC: 62 MG/DL (ref 7–18)
CALCIUM SERPL-MCNC: 7.7 MG/DL (ref 8.5–10.1)
CO2 SERPL-SCNC: 21 MMOL/L (ref 21–32)
CREAT SERPL-MCNC: 7.81 MG/DL (ref 0.6–1.2)
GLUCOSE SERPL-MCNC: 95 MG/DL (ref 70–99)
INR PPP: 3.7 (ref 0.9–1.1)
POTASSIUM SERPL-SCNC: 4.7 MMOL/L (ref 3.5–5.1)
SODIUM SERPL-SCNC: 138 MMOL/L (ref 136–145)

## 2018-02-05 RX ADMIN — RENAGEL SCH MG: 800 TABLET ORAL at 16:46

## 2018-02-05 RX ADMIN — VANCOMYCIN HYDROCHLORIDE SCH MG: 1 INJECTION, POWDER, LYOPHILIZED, FOR SOLUTION INTRAVENOUS at 16:49

## 2018-02-05 RX ADMIN — Medication SCH ML: at 13:56

## 2018-02-05 RX ADMIN — LEVALBUTEROL HYDROCHLORIDE SCH MG: 1.25 SOLUTION RESPIRATORY (INHALATION) at 14:34

## 2018-02-05 RX ADMIN — INSULIN ASPART SCH UNITS: 100 INJECTION, SOLUTION INTRAVENOUS; SUBCUTANEOUS at 08:20

## 2018-02-05 RX ADMIN — LEVALBUTEROL HYDROCHLORIDE SCH MG: 1.25 SOLUTION RESPIRATORY (INHALATION) at 18:54

## 2018-02-05 RX ADMIN — PANTOPRAZOLE SCH MG: 40 TABLET, DELAYED RELEASE ORAL at 13:48

## 2018-02-05 RX ADMIN — BENZONATATE PRN MG: 100 CAPSULE ORAL at 13:49

## 2018-02-05 RX ADMIN — INSULIN ASPART SCH UNITS: 100 INJECTION, SOLUTION INTRAVENOUS; SUBCUTANEOUS at 21:15

## 2018-02-05 RX ADMIN — ASCORBIC ACID, THIAMINE MONONITRATE,RIBOFLAVIN, NIACINAMIDE, PYRIDOXINE HYDROCHLORIDE, FOLIC ACID, CYANOCOBALAMIN, BIOTIN, CALCIUM PANTOTHENATE, SCH CAP: 100; 1.5; 1.7; 20; 10; 1; 6000; 150000; 5 CAPSULE, LIQUID FILLED ORAL at 21:12

## 2018-02-05 RX ADMIN — VANCOMYCIN HYDROCHLORIDE SCH MG: 1 INJECTION, POWDER, LYOPHILIZED, FOR SOLUTION INTRAVENOUS at 13:56

## 2018-02-05 RX ADMIN — Medication SCH ML: at 16:46

## 2018-02-05 RX ADMIN — AMIODARONE HYDROCHLORIDE SCH MG: 200 TABLET ORAL at 13:51

## 2018-02-05 RX ADMIN — LEVALBUTEROL HYDROCHLORIDE SCH MG: 1.25 SOLUTION RESPIRATORY (INHALATION) at 02:01

## 2018-02-05 RX ADMIN — INSULIN ASPART SCH UNITS: 100 INJECTION, SOLUTION INTRAVENOUS; SUBCUTANEOUS at 13:55

## 2018-02-05 RX ADMIN — Medication SCH ML: at 21:10

## 2018-02-05 RX ADMIN — VANCOMYCIN HYDROCHLORIDE SCH MG: 1 INJECTION, POWDER, LYOPHILIZED, FOR SOLUTION INTRAVENOUS at 21:11

## 2018-02-05 RX ADMIN — FLUTICASONE PROPIONATE AND SALMETEROL SCH PUFF: 50; 250 POWDER RESPIRATORY (INHALATION) at 08:16

## 2018-02-05 RX ADMIN — MIDODRINE HYDROCHLORIDE SCH MG: 2.5 TABLET ORAL at 13:48

## 2018-02-05 RX ADMIN — INSULIN GLARGINE SCH UNITS: 100 INJECTION, SOLUTION SUBCUTANEOUS at 21:16

## 2018-02-05 RX ADMIN — VANCOMYCIN HYDROCHLORIDE SCH MG: 1 INJECTION, POWDER, LYOPHILIZED, FOR SOLUTION INTRAVENOUS at 09:00

## 2018-02-05 RX ADMIN — AMIODARONE HYDROCHLORIDE SCH MG: 200 TABLET ORAL at 18:45

## 2018-02-05 RX ADMIN — MIDODRINE HYDROCHLORIDE SCH MG: 2.5 TABLET ORAL at 08:16

## 2018-02-05 RX ADMIN — FLUTICASONE PROPIONATE AND SALMETEROL SCH PUFF: 50; 250 POWDER RESPIRATORY (INHALATION) at 21:10

## 2018-02-05 RX ADMIN — MIDODRINE HYDROCHLORIDE SCH MG: 2.5 TABLET ORAL at 16:45

## 2018-02-05 RX ADMIN — Medication SCH ML: at 09:00

## 2018-02-05 RX ADMIN — RENAGEL SCH MG: 800 TABLET ORAL at 07:30

## 2018-02-05 RX ADMIN — RENAGEL SCH MG: 800 TABLET ORAL at 13:50

## 2018-02-05 RX ADMIN — INSULIN ASPART SCH UNITS: 100 INJECTION, SOLUTION INTRAVENOUS; SUBCUTANEOUS at 17:41

## 2018-02-05 RX ADMIN — LEVALBUTEROL HYDROCHLORIDE SCH MG: 1.25 SOLUTION RESPIRATORY (INHALATION) at 07:17

## 2018-02-05 RX ADMIN — INSULIN GLARGINE SCH UNITS: 100 INJECTION, SOLUTION SUBCUTANEOUS at 09:00

## 2018-02-05 NOTE — PROGRESS NOTE
Medicine Progress Note


Date & Time of Visit:


Feb 5, 2018 at 16:40


.


Subjective





CC: 


Follow-up visit for multiple problems.





HPI: 


Still coughing.


No significant SOB.


No chest pain.


Heart rate rapid at times.


Diarrhea improved.


No nausea or vomiting.





ROS:


General- no fever, no chills


Resp- as noted above in HPI


Cardiac-  no chest pain, no edema


GI- as noted above in HPI


- anuric


.





Objective





Last 8 Hrs








  Date Time  Temp Pulse Resp B/P (MAP) Pulse Ox O2 Delivery O2 Flow Rate FiO2


 


2/5/18 19:50 37.2 130 18 91/60 (70) 91 Room Air  


 


2/5/18 18:44  128      


 


2/5/18 16:00      Room Air  


 


2/5/18 15:23 36.8 142 22 93/59 (70) 95 Room Air  


 


2/5/18 14:35  115 18  97 Room Air  


 


2/5/18 13:00 36.8 109  111/49 (69)    








Physical Exam:





General- lying in bed; no distress


Lungs- scattered rhonchi, diffuse mild-moderate wheezing; no respiratory 

distress


Cardiovascular- irregular;  no gallop; no JVD; trace pretibial edema


Abdomen- + bowel sounds, soft, nontender 


Extremities- no cyanosis; no calf tenderness


Neuro- alert, oriented


Skin- warm & dry


.


Laboratory Results:





Last 24 Hours








Test


  2/5/18


07:11 2/5/18


07:42 2/5/18


11:16 2/5/18


16:16


 


Bedside Glucose 105 mg/dl   131 mg/dl  90 mg/dl 


 


Prothrombin Time  38.0 SECONDS   


 


Prothromb Time International


Ratio 


  3.7 


  


  


 


 


Sodium Level  138 mmol/L   


 


Potassium Level  4.7 mmol/L   


 


Chloride Level  102 mmol/L   


 


Carbon Dioxide Level  21 mmol/L   


 


Anion Gap  15.0 mmol/L   


 


Blood Urea Nitrogen  62 mg/dl   


 


Creatinine  7.81 mg/dl   


 


Est Creatinine Clear Calc


Drug Dose 


  9.1 ml/min 


  


  


 


 


Estimated GFR (


American) 


  6.0 


  


  


 


 


Estimated GFR (Non-


American 


  5.1 


  


  


 


 


BUN/Creatinine Ratio  8.2   


 


Random Glucose  95 mg/dl   


 


Calcium Level  7.7 mg/dl   


 


Test


  2/5/18


19:53 


  


  


 


 


Bedside Glucose 192 mg/dl    











Assessment & Plan





HYPOTENSION


Improved.


Started on midodrine.


Ongoing hypotension with hemodialysis.


Continue to monitor.





ATRIAL FLUTTER


Cardiology consulted.


Antiarrhythmic therapy with amiodarone initiated.


Rate still fast at times.


Anticoagulated with IV heparin --> warfarin.





UTI E COLI (present on admission)


UA on admission demonstrated WBC's and bacteria.


Urine culture grew E coli, ESBL.


Treated with course of ertapenem.





C DIFFICILE COLITIS


Persistent diarrhea, but less frequent.


Received IV metronidazole and PO vancomycin.


Finished course of metronidazole; continue PO vanco.





BRONCHITIS / EXACERBATION OF COPD


Received course of azithromycin.


Taper steroids.





SBO 


Resolved.





DM TYPE 2


Pharmacy consulted for glycemic management.


FBS = 105.


Taper steroids.





CIRRHOSIS


Noted on CT of chest.


Will need outpatient follow-up with GI.





RIGHT ANKLE SPRAIN


Seen by Ortho.


CAM boot ordered.





VTE PROPHYLAXIS


Received IV heparin --> warfarin.


Ambulate as able.





DISPOSITION


To be determined.


Anticipated need for rehab or skilled care.


.


Consultants:


[facility]


[facility]


Current Inpatient Medications:





Current Inpatient Medications








 Medications


  (Trade)  Dose


 Ordered  Sig/Daniel


 Route  Start Time


 Stop Time Status Last Admin


Dose Admin


 


 Miscellaneous


 Information


  (Consult


 Glycemic


 Management


 Pharmacy)  1 ea  UD  PRN


 N/A  1/16/18 04:15


 2/15/18 04:14   


 


 


 Glucose


  (Glucose 40% Gel)  15-30


 GRAMS 15


 GRAMS...  UD  PRN


 PO  1/16/18 04:30


 2/15/18 04:29   


 


 


 Glucose


  (Glucose Chew


 Tab)  4-8


 Tablets 4


 Tabl...  UD  PRN


 PO  1/16/18 04:30


 2/15/18 04:29   


 


 


 Dextrose


  (Dextrose 50%


 50ML Syringe)  25-50ML OF


 50% DW IV


 FOR...  UD  PRN


 IV  1/16/18 04:30


 2/15/18 04:29  1/17/18 09:52


25 ML


 


 Glucagon


  (Glucagon Inj)  1 mg  UD  PRN


 SQ  1/16/18 04:30


 2/15/18 04:29   


 


 


 Ondansetron HCl


  (Zofran Inj)  4 mg  Q4H  PRN


 IV  1/16/18 18:15


 2/15/18 18:14  2/1/18 12:37


4 MG


 


 Acetaminophen 650


 mg/Empty Bag  65 ml @ 


 260 mls/hr  Q6H  PRN


 IV  1/23/18 16:30


 2/22/18 16:29  1/31/18 21:53


260 MLS/HR


 


 Pantoprazole


 Sodium


  (Protonix Tab)  40 mg  QAM


 PO  1/26/18 09:00


 2/25/18 08:59  2/5/18 13:48


40 MG


 


 Levalbuterol


  (Xopenex 1.25MG/


 3ML Neb)  1.25 mg  Q6R


 INH  1/26/18 21:00


 2/25/18 20:59  2/5/18 14:34


1.25 MG


 


 Midodrine


  (Proamatine Tab)  5 mg  TID@08,12,17


 PO  1/28/18 17:00


 2/27/18 16:59  2/5/18 16:45


5 MG


 


 Insulin Aspart


  (novoLOG ASPART)  **SLIDING


 SCALE**


 **G...  ACHS


 SC  1/31/18 07:00


 3/2/18 06:59  2/5/18 17:41


5 UNITS


 


 Insulin Glargine


  (Lantus Solostar


 Pen)  10 units  BID


 SC  1/31/18 09:00


 3/2/18 08:59  2/4/18 21:06


10 UNITS


 


 Vitamin B Complex/


 Vit C/Folic Acid


  (Nephrocaps)  1 cap  HS


 PO  1/31/18 21:00


 3/2/18 20:59  2/4/18 20:42


1 CAP


 


 Sevelamer HCl


  (Renagel Tab)  800 mg  TIDM


 PO  1/31/18 11:30


 3/2/18 11:29  2/5/18 16:46


800 MG


 


 Amiodarone HCl


  (Cordarone Tab)  200 mg  BIDM


 PO  2/1/18 16:45


 3/1/18 16:44  2/5/18 18:45


200 MG


 


 Salmeterol


 Xinafoate/


 Fluticasone


  (Advair Diskus


 250/50 Inh)  1 puff  BID


 INH  2/4/18 09:00


 3/6/18 08:59  2/5/18 08:16


1 PUFF


 


 Raspberry


  (Raspberry Syrup


 5ml Cup)  5 ml  QID


 PO  2/4/18 13:00


 2/18/18 11:59  2/5/18 16:46


5 ML


 


 Vancomycin HCl


  (Vancomycin Oral


 Soln)  125 mg  QID


 PO  2/4/18 13:00


 2/18/18 11:59  2/5/18 16:49


125 MG


 


 Prednisone


  (PredniSONE TAB)  30 mg  DAILY


 PO  2/5/18 09:00


 3/7/18 08:59  2/5/18 13:50


30 MG


 


 Albumin Human


  (Albumin 25%)  25 gm  TODAY@0900


 IV  2/5/18 09:00


 2/5/18 23:59  2/5/18 09:47


25 GM


 


 Benzonatate


  (Tessalon Perles


 Cap)  100 mg  TID  PRN


 PO  2/5/18 09:00


 3/7/18 08:59  2/5/18 13:49


100 MG

## 2018-02-05 NOTE — NEPHROLOGY PROGRESS NOTE
Nephrology Progress Note


Date of Service:


Feb 5, 2018.


Subjective


58 yo female with complicated hospital admission with ecoli uti/ cdiff / 

hypotension / aflutter.  pt continues to have cough and continues to have 

wheezing but is overall much more comfortable and inquiring about going home.





Objective











  Date Time  Temp Pulse Resp B/P (MAP) Pulse Ox O2 Delivery O2 Flow Rate FiO2


 


2/5/18 08:00      Room Air  


 


2/5/18 07:47 36.7 104 20 90/54 (66) 90 Room Air  


 


2/5/18 07:18  104 18  90 Room Air  


 


2/5/18 04:22 36.8 102 22 97/61 (73) 92 Room Air  


 


2/5/18 04:00      Room Air  


 


2/5/18 02:01  96 18  93 Room Air  


 


2/5/18 00:00      Room Air  


 


2/4/18 23:41 36.7 100 18 93/56 (68) 95 Room Air  


 


2/4/18 20:22 36.6 101 20 98/67 (77) 94 Room Air  


 


2/4/18 20:00      Room Air  


 


2/4/18 19:46  108 18  94 Room Air  


 


2/4/18 16:32 36.6 112 16 88/56 (67) 93 Room Air  


 


2/4/18 16:00      Room Air  


 


2/4/18 14:35  95 20  93 Room Air  


 


2/4/18 12:00      Room Air  


 


2/4/18 11:37 37.0 74 18 124/72 (89) 95   








Physical Exam:


General-aaox3, obese


Eyes-no scleral icterus


ENT-mmm


Neck-supple


Lungs-+diffuse wheeze


Heart-tachy


Abdomen-bs+ s/nt/nd


Extremities-no c/c/e


Neuro-nonfocal





Current Inpatient Medications








 Medications


  (Trade)  Dose


 Ordered  Sig/Daniel


 Route  Start Time


 Stop Time Status Last Admin


Dose Admin


 


 Miscellaneous


 Information


  (Consult


 Glycemic


 Management


 Pharmacy)  1 ea  UD  PRN


 N/A  1/16/18 04:15


 2/15/18 04:14   


 


 


 Glucose


  (Glucose 40% Gel)  15-30


 GRAMS 15


 GRAMS...  UD  PRN


 PO  1/16/18 04:30


 2/15/18 04:29   


 


 


 Glucose


  (Glucose Chew


 Tab)  4-8


 Tablets 4


 Tabl...  UD  PRN


 PO  1/16/18 04:30


 2/15/18 04:29   


 


 


 Dextrose


  (Dextrose 50%


 50ML Syringe)  25-50ML OF


 50% DW IV


 FOR...  UD  PRN


 IV  1/16/18 04:30


 2/15/18 04:29  1/17/18 09:52


25 ML


 


 Glucagon


  (Glucagon Inj)  1 mg  UD  PRN


 SQ  1/16/18 04:30


 2/15/18 04:29   


 


 


 Ondansetron HCl


  (Zofran Inj)  4 mg  Q4H  PRN


 IV  1/16/18 18:15


 2/15/18 18:14  2/1/18 12:37


4 MG


 


 Acetaminophen 650


 mg/Empty Bag  65 ml @ 


 260 mls/hr  Q6H  PRN


 IV  1/23/18 16:30


 2/22/18 16:29  1/31/18 21:53


260 MLS/HR


 


 Pantoprazole


 Sodium


  (Protonix Tab)  40 mg  QAM


 PO  1/26/18 09:00


 2/25/18 08:59  2/4/18 08:30


40 MG


 


 Levalbuterol


  (Xopenex 1.25MG/


 3ML Neb)  1.25 mg  Q6R


 INH  1/26/18 21:00


 2/25/18 20:59  2/5/18 07:17


1.25 MG


 


 Midodrine


  (Proamatine Tab)  5 mg  TID@08,12,17


 PO  1/28/18 17:00


 2/27/18 16:59  2/5/18 08:16


5 MG


 


 Insulin Aspart


  (novoLOG ASPART)  **SLIDING


 SCALE**


 **G...  ACHS


 SC  1/31/18 07:00


 3/2/18 06:59  2/5/18 08:20


6 UNITS


 


 Insulin Glargine


  (Lantus Solostar


 Pen)  10 units  BID


 SC  1/31/18 09:00


 3/2/18 08:59  2/4/18 21:06


10 UNITS


 


 Vitamin B Complex/


 Vit C/Folic Acid


  (Nephrocaps)  1 cap  HS


 PO  1/31/18 21:00


 3/2/18 20:59  2/4/18 20:42


1 CAP


 


 Sevelamer HCl


  (Renagel Tab)  800 mg  TIDM


 PO  1/31/18 11:30


 3/2/18 11:29  2/4/18 16:43


800 MG


 


 Amiodarone HCl


  (Cordarone Tab)  200 mg  BIDM


 PO  2/1/18 16:45


 3/1/18 16:44  2/4/18 16:43


200 MG


 


 Albumin Human


  (Albumin 25%)  12.5 gm  PRN  PRN


 IV  2/2/18 09:45


 2/5/18 09:44   


 


 


 Warfarin Sodium


  (Coumadin Tab)  5 mg  DAILY@16


 PO  2/2/18 16:00


 3/4/18 15:59  2/4/18 16:44


5 MG


 


 Salmeterol


 Xinafoate/


 Fluticasone


  (Advair Diskus


 250/50 Inh)  1 puff  BID


 INH  2/4/18 09:00


 3/6/18 08:59  2/5/18 08:16


1 PUFF


 


 Raspberry


  (Raspberry Syrup


 5ml Cup)  5 ml  QID


 PO  2/4/18 13:00


 2/18/18 11:59  2/4/18 20:42


5 ML


 


 Vancomycin HCl


  (Vancomycin Oral


 Soln)  125 mg  QID


 PO  2/4/18 13:00


 2/18/18 11:59  2/4/18 21:07


125 MG


 


 Prednisone


  (PredniSONE TAB)  30 mg  DAILY


 PO  2/5/18 09:00


 3/7/18 08:59   


 











Last 24 Hours








Test


  2/4/18


11:18 2/4/18


16:34 2/4/18


20:23 2/5/18


07:42


 


Bedside Glucose 212 mg/dl  149 mg/dl  215 mg/dl  


 


Prothrombin Time    38.0 SECONDS 


 


Prothromb Time International


Ratio 


  


  


  3.7 


 











Assessment & Plan


ESRD-dialyze with the aide of midodrine and albumin.  have been removing about 

2 liters with dialysis. plan on dialysis again today. 





Anemia of Renal Failure-hg goal of 10 to 11 and will redose procrit today.

## 2018-02-05 NOTE — PHARMACY PROGRESS NOTE
Pharmacy Glycemic Short Note 2


Date of Service


Feb 5, 2018.





OUTPATIENT ANTIDIABETIC REGIMEN: 


* NPH 40 units SQ daily when taking prednisone (otherwise no insulin taken)





ASSESSMENT:


2/4/18


* 58 yo with severe steroid induced hyperglycemia. Pt typically only needs 

insulin when started on steroids for pulmonary issues. 


* Patient has been requiring 36-55 units of insulin per day with near adequate 

control as steroids have tapered down to Prednisone 40mg po daily





2/5/18


* Pt received 65 units of insulin with BSGs of 118, 212, 149, 215 over the past 

24 hours.


* Fasting BSG of 105 mg/dl is at goal. No changes to basal insulin - keep hold 

parameter as patient will require less basal as steroid dose is tapered. 


* Patient continues to experience prandial BSG elevation due to steroids. 

Prednisone dose decreased from 40 to 30 mg daily starting today, therefore I 

will loosen CF/CR at this time. 





PLAN FOR INPATIENT GLYCEMIC CONTROL:


* Basal Insulin: 


 * Lantus 10 units SQ BID


 * HOLD for BSG < 110mg/dl


 


* Bolus Insulin: - loosen


 * NOVOLOG per scale ACHS


 * Goal Range:  Low 120 mg/dL - High 150 mg/dL


 * Correction Factor: 20 mg/dL/unit


 * Nutritional / Prandial insulin:  1 units for every 7 grams of carb 








PLAN FOR DISCHARGE:


* A1c 6.5% indicates good outpatient glycemic control, although pt is on HD


* Expect that patient may be discharged on previous home regimen unless 

steroids are continued at discharge.





Thank you.

## 2018-02-06 VITALS
SYSTOLIC BLOOD PRESSURE: 95 MMHG | TEMPERATURE: 97.52 F | OXYGEN SATURATION: 96 % | HEART RATE: 101 BPM | DIASTOLIC BLOOD PRESSURE: 64 MMHG

## 2018-02-06 VITALS — HEART RATE: 97 BPM | OXYGEN SATURATION: 96 %

## 2018-02-06 VITALS
HEART RATE: 96 BPM | SYSTOLIC BLOOD PRESSURE: 95 MMHG | OXYGEN SATURATION: 92 % | DIASTOLIC BLOOD PRESSURE: 57 MMHG | TEMPERATURE: 97.7 F

## 2018-02-06 VITALS — OXYGEN SATURATION: 95 % | HEART RATE: 87 BPM

## 2018-02-06 VITALS — OXYGEN SATURATION: 95 % | HEART RATE: 117 BPM

## 2018-02-06 VITALS
DIASTOLIC BLOOD PRESSURE: 52 MMHG | HEART RATE: 90 BPM | OXYGEN SATURATION: 93 % | TEMPERATURE: 97.7 F | SYSTOLIC BLOOD PRESSURE: 98 MMHG

## 2018-02-06 VITALS
OXYGEN SATURATION: 96 % | DIASTOLIC BLOOD PRESSURE: 70 MMHG | SYSTOLIC BLOOD PRESSURE: 105 MMHG | HEART RATE: 93 BPM | TEMPERATURE: 97.88 F

## 2018-02-06 VITALS
HEART RATE: 96 BPM | TEMPERATURE: 97.52 F | DIASTOLIC BLOOD PRESSURE: 66 MMHG | SYSTOLIC BLOOD PRESSURE: 106 MMHG | OXYGEN SATURATION: 97 %

## 2018-02-06 VITALS — OXYGEN SATURATION: 96 %

## 2018-02-06 VITALS — HEART RATE: 106 BPM | OXYGEN SATURATION: 92 %

## 2018-02-06 VITALS
OXYGEN SATURATION: 96 % | HEART RATE: 88 BPM | DIASTOLIC BLOOD PRESSURE: 71 MMHG | SYSTOLIC BLOOD PRESSURE: 107 MMHG | TEMPERATURE: 97.88 F

## 2018-02-06 VITALS — OXYGEN SATURATION: 91 %

## 2018-02-06 VITALS — OXYGEN SATURATION: 93 %

## 2018-02-06 LAB
EOSINOPHIL NFR BLD AUTO: 174 K/UL (ref 130–400)
HCT VFR BLD CALC: 32.7 % (ref 37–47)
HGB BLD-MCNC: 9.9 G/DL (ref 12–16)
INR PPP: 3 (ref 0.9–1.1)
MCH RBC QN AUTO: 31.9 PG (ref 25–34)
MCHC RBC AUTO-ENTMCNC: 30.3 G/DL (ref 32–36)
MCV RBC AUTO: 105.5 FL (ref 80–100)
PMV BLD AUTO: 10.2 FL (ref 7.4–10.4)
RED CELL DISTRIBUTION WIDTH CV: 17.6 % (ref 11.5–14.5)
RED CELL DISTRIBUTION WIDTH SD: 66.8 FL (ref 36.4–46.3)
WBC # BLD AUTO: 12.48 K/UL (ref 4.8–10.8)

## 2018-02-06 RX ADMIN — VANCOMYCIN HYDROCHLORIDE SCH MG: 1 INJECTION, POWDER, LYOPHILIZED, FOR SOLUTION INTRAVENOUS at 16:55

## 2018-02-06 RX ADMIN — RENAGEL SCH MG: 800 TABLET ORAL at 12:07

## 2018-02-06 RX ADMIN — VANCOMYCIN HYDROCHLORIDE SCH MG: 1 INJECTION, POWDER, LYOPHILIZED, FOR SOLUTION INTRAVENOUS at 09:21

## 2018-02-06 RX ADMIN — WARFARIN SODIUM SCH MG: 2.5 TABLET ORAL at 16:50

## 2018-02-06 RX ADMIN — AMIODARONE HYDROCHLORIDE SCH MG: 200 TABLET ORAL at 16:50

## 2018-02-06 RX ADMIN — MIDODRINE HYDROCHLORIDE SCH MG: 2.5 TABLET ORAL at 16:49

## 2018-02-06 RX ADMIN — PANTOPRAZOLE SCH MG: 40 TABLET, DELAYED RELEASE ORAL at 09:21

## 2018-02-06 RX ADMIN — RENAGEL SCH MG: 800 TABLET ORAL at 16:51

## 2018-02-06 RX ADMIN — MIDODRINE HYDROCHLORIDE SCH MG: 2.5 TABLET ORAL at 09:22

## 2018-02-06 RX ADMIN — INSULIN ASPART SCH UNITS: 100 INJECTION, SOLUTION INTRAVENOUS; SUBCUTANEOUS at 16:58

## 2018-02-06 RX ADMIN — FLUTICASONE PROPIONATE AND SALMETEROL SCH PUFF: 50; 250 POWDER RESPIRATORY (INHALATION) at 09:22

## 2018-02-06 RX ADMIN — Medication SCH ML: at 20:29

## 2018-02-06 RX ADMIN — INSULIN ASPART SCH UNITS: 100 INJECTION, SOLUTION INTRAVENOUS; SUBCUTANEOUS at 07:00

## 2018-02-06 RX ADMIN — Medication SCH ML: at 16:53

## 2018-02-06 RX ADMIN — ASCORBIC ACID, THIAMINE MONONITRATE,RIBOFLAVIN, NIACINAMIDE, PYRIDOXINE HYDROCHLORIDE, FOLIC ACID, CYANOCOBALAMIN, BIOTIN, CALCIUM PANTOTHENATE, SCH CAP: 100; 1.5; 1.7; 20; 10; 1; 6000; 150000; 5 CAPSULE, LIQUID FILLED ORAL at 20:30

## 2018-02-06 RX ADMIN — LEVALBUTEROL HYDROCHLORIDE SCH MG: 1.25 SOLUTION RESPIRATORY (INHALATION) at 13:58

## 2018-02-06 RX ADMIN — AMIODARONE HYDROCHLORIDE SCH MG: 200 TABLET ORAL at 09:20

## 2018-02-06 RX ADMIN — INSULIN GLARGINE SCH UNITS: 100 INJECTION, SOLUTION SUBCUTANEOUS at 09:24

## 2018-02-06 RX ADMIN — INSULIN GLARGINE SCH UNITS: 100 INJECTION, SOLUTION SUBCUTANEOUS at 20:28

## 2018-02-06 RX ADMIN — VANCOMYCIN HYDROCHLORIDE SCH MG: 1 INJECTION, POWDER, LYOPHILIZED, FOR SOLUTION INTRAVENOUS at 20:30

## 2018-02-06 RX ADMIN — Medication SCH ML: at 12:16

## 2018-02-06 RX ADMIN — INSULIN ASPART SCH UNITS: 100 INJECTION, SOLUTION INTRAVENOUS; SUBCUTANEOUS at 12:11

## 2018-02-06 RX ADMIN — INSULIN ASPART SCH UNITS: 100 INJECTION, SOLUTION INTRAVENOUS; SUBCUTANEOUS at 20:29

## 2018-02-06 RX ADMIN — RENAGEL SCH MG: 800 TABLET ORAL at 09:21

## 2018-02-06 RX ADMIN — MIDODRINE HYDROCHLORIDE SCH MG: 2.5 TABLET ORAL at 12:08

## 2018-02-06 RX ADMIN — VANCOMYCIN HYDROCHLORIDE SCH MG: 1 INJECTION, POWDER, LYOPHILIZED, FOR SOLUTION INTRAVENOUS at 12:15

## 2018-02-06 RX ADMIN — Medication SCH ML: at 09:21

## 2018-02-06 RX ADMIN — LEVALBUTEROL HYDROCHLORIDE SCH MG: 1.25 SOLUTION RESPIRATORY (INHALATION) at 19:49

## 2018-02-06 RX ADMIN — BENZONATATE PRN MG: 100 CAPSULE ORAL at 09:21

## 2018-02-06 RX ADMIN — LEVALBUTEROL HYDROCHLORIDE SCH MG: 1.25 SOLUTION RESPIRATORY (INHALATION) at 02:22

## 2018-02-06 RX ADMIN — FLUTICASONE PROPIONATE AND SALMETEROL SCH PUFF: 50; 250 POWDER RESPIRATORY (INHALATION) at 20:29

## 2018-02-06 RX ADMIN — LEVALBUTEROL HYDROCHLORIDE SCH MG: 1.25 SOLUTION RESPIRATORY (INHALATION) at 07:00

## 2018-02-06 NOTE — DIAGNOSTIC IMAGING REPORT
CHEST ONE VIEW PORTABLE



CLINICAL HISTORY: cough dyspnea



COMPARISON STUDY:  1/17/2018



FINDINGS: Permacatheter remains at the juncture of the superior vena cava and

right atrium. A trace amount pleural fluid within the right major fissure.

Persistent prominence of pulmonary vasculature. Unchanging atelectasis medial

aspect right base. 



IMPRESSION:  Pulmonary vascular congestion versus components of congestive

failure similar to the prior exam. Unchanging atelectatic/consolidative change

right base.











The above report was generated using voice recognition software.  It may contain

grammatical, syntax or spelling errors.









Electronically signed by:  Salomón Heck M.D.

2/6/2018 7:29 AM



Dictated Date/Time:  2/6/2018 7:26 AM

## 2018-02-06 NOTE — PROGRESS NOTE
Medicine Progress Note


Date & Time of Visit:


Feb 6, 2018 at 19:50


.


Subjective





CC: 


Follow-up visit for multiple problems.





HPI: 


Rapid atrial flutter last evening.


Occasional cough.


No chest pain.


Diarrhea improved.


No nausea or vomiting.





ROS:


General- no fever, no chills


Resp- as noted above in HPI


Cardiac-  no chest pain, no edema


GI- as noted above in HPI


- anuric


.





Objective





Last 8 Hrs








  Date Time  Temp Pulse Resp B/P (MAP) Pulse Ox O2 Delivery O2 Flow Rate FiO2


 


2/6/18 19:49  87 16  95 Room Air  


 


2/6/18 19:36 36.4 96 20 106/66 (79) 97 Room Air  


 


2/6/18 16:00     96 Room Air  


 


2/6/18 15:45 36.6 93 20 105/70 (82) 96 Room Air  


 


2/6/18 14:01  97 18  96 Room Air  








Physical Exam:





General- lying in bed; no distress


Lungs- scattered rhonchi, diffuse mild wheezing; no respiratory distress


Cardiovascular- irregular;  no gallop; no JVD; trace pretibial edema


Abdomen- + bowel sounds, soft, nontender 


Extremities- no cyanosis; no calf tenderness


Neuro- alert, oriented


Skin- warm & dry


.


Laboratory Results:





Last 24 Hours








Test


  2/6/18


05:19 2/6/18


06:40 2/6/18


11:29 2/6/18


16:15


 


White Blood Count 12.48 K/uL    


 


Red Blood Count 3.10 M/uL    


 


Hemoglobin 9.9 g/dL    


 


Hematocrit 32.7 %    


 


Mean Corpuscular Volume 105.5 fL    


 


Mean Corpuscular Hemoglobin 31.9 pg    


 


Mean Corpuscular Hemoglobin


Concent 30.3 g/dl 


  


  


  


 


 


RDW Standard Deviation 66.8 fL    


 


RDW Coefficient of Variation 17.6 %    


 


Platelet Count 174 K/uL    


 


Mean Platelet Volume 10.2 fL    


 


Prothrombin Time 31.0 SECONDS    


 


Prothromb Time International


Ratio 3.0 


  


  


  


 


 


Bedside Glucose  126 mg/dl  131 mg/dl  286 mg/dl 


 


Test


  2/6/18


20:02 


  


  


 


 


Bedside Glucose 293 mg/dl    











Assessment & Plan





HYPOTENSION


Improved.


Started on midodrine.


Ongoing hypotension with hemodialysis.


Continue to monitor.





ATRIAL FLUTTER


Cardiology consulted.


Antiarrhythmic therapy with amiodarone initiated.


Rate still fast at times. Received IV digoxin.


Anticoagulated with IV heparin --> warfarin.





UTI E COLI (present on admission)


UA on admission demonstrated WBC's and bacteria.


Urine culture grew E coli, ESBL.


Treated with course of ertapenem.





C DIFFICILE COLITIS


Persistent diarrhea, but less frequent.


Received IV metronidazole and PO vancomycin.


Finished course of metronidazole; continue PO vanco.





BRONCHITIS / EXACERBATION OF COPD


Received course of azithromycin.


Taper steroids.





SBO 


Resolved.





DM TYPE 2


Pharmacy consulted for glycemic management.


FBS = 126.


Taper steroids.





CIRRHOSIS


Noted on CT of chest.


Will need outpatient follow-up with GI.





RIGHT ANKLE SPRAIN


Seen by Ortho.


CAM boot ordered.





VTE PROPHYLAXIS


Received IV heparin --> warfarin.


Ambulate as able.





DISPOSITION


To be determined.


Anticipated need for rehab or skilled care.


.


Consultants:


Nephro-Oncu


[facility]


Current Inpatient Medications:





Current Inpatient Medications








 Medications


  (Trade)  Dose


 Ordered  Sig/Daniel


 Route  Start Time


 Stop Time Status Last Admin


Dose Admin


 


 Miscellaneous


 Information


  (Consult


 Glycemic


 Management


 Pharmacy)  1 ea  UD  PRN


 N/A  1/16/18 04:15


 2/15/18 04:14   


 


 


 Glucose


  (Glucose 40% Gel)  15-30


 GRAMS 15


 GRAMS...  UD  PRN


 PO  1/16/18 04:30


 2/15/18 04:29   


 


 


 Glucose


  (Glucose Chew


 Tab)  4-8


 Tablets 4


 Tabl...  UD  PRN


 PO  1/16/18 04:30


 2/15/18 04:29   


 


 


 Dextrose


  (Dextrose 50%


 50ML Syringe)  25-50ML OF


 50% DW IV


 FOR...  UD  PRN


 IV  1/16/18 04:30


 2/15/18 04:29  1/17/18 09:52


25 ML


 


 Glucagon


  (Glucagon Inj)  1 mg  UD  PRN


 SQ  1/16/18 04:30


 2/15/18 04:29   


 


 


 Ondansetron HCl


  (Zofran Inj)  4 mg  Q4H  PRN


 IV  1/16/18 18:15


 2/15/18 18:14  2/1/18 12:37


4 MG


 


 Acetaminophen 650


 mg/Empty Bag  65 ml @ 


 260 mls/hr  Q6H  PRN


 IV  1/23/18 16:30


 2/22/18 16:29  1/31/18 21:53


260 MLS/HR


 


 Pantoprazole


 Sodium


  (Protonix Tab)  40 mg  QAM


 PO  1/26/18 09:00


 2/25/18 08:59  2/6/18 09:21


40 MG


 


 Levalbuterol


  (Xopenex 1.25MG/


 3ML Neb)  1.25 mg  Q6R


 INH  1/26/18 21:00


 2/25/18 20:59  2/6/18 19:49


1.25 MG


 


 Midodrine


  (Proamatine Tab)  5 mg  TID@08,12,17


 PO  1/28/18 17:00


 2/27/18 16:59  2/6/18 16:49


5 MG


 


 Insulin Aspart


  (novoLOG ASPART)  **SLIDING


 SCALE**


 **G...  ACHS


 SC  1/31/18 07:00


 3/2/18 06:59  2/6/18 16:58


16 UNITS


 


 Insulin Glargine


  (Lantus Solostar


 Pen)  10 units  BID


 SC  1/31/18 09:00


 3/2/18 08:59  2/6/18 09:24


10 UNITS


 


 Vitamin B Complex/


 Vit C/Folic Acid


  (Nephrocaps)  1 cap  HS


 PO  1/31/18 21:00


 3/2/18 20:59  2/5/18 21:12


1 CAP


 


 Sevelamer HCl


  (Renagel Tab)  800 mg  TIDM


 PO  1/31/18 11:30


 3/2/18 11:29  2/6/18 16:51


800 MG


 


 Amiodarone HCl


  (Cordarone Tab)  200 mg  BIDM


 PO  2/1/18 16:45


 3/1/18 16:44  2/6/18 16:50


200 MG


 


 Salmeterol


 Xinafoate/


 Fluticasone


  (Advair Diskus


 250/50 Inh)  1 puff  BID


 INH  2/4/18 09:00


 3/6/18 08:59  2/6/18 09:22


1 PUFF


 


 Raspberry


  (Raspberry Syrup


 5ml Cup)  5 ml  QID


 PO  2/4/18 13:00


 2/18/18 11:59  2/6/18 16:53


5 ML


 


 Vancomycin HCl


  (Vancomycin Oral


 Soln)  125 mg  QID


 PO  2/4/18 13:00


 2/18/18 11:59  2/6/18 16:55


125 MG


 


 Prednisone


  (PredniSONE TAB)  30 mg  DAILY


 PO  2/5/18 09:00


 3/7/18 08:59  2/6/18 09:20


30 MG


 


 Benzonatate


  (Tessalon Perles


 Cap)  100 mg  TID  PRN


 PO  2/5/18 09:00


 3/7/18 08:59  2/6/18 09:21


100 MG


 


 Warfarin Sodium


  (Coumadin Tab)  2.5 mg  DAILY@16


 PO  2/6/18 16:00


 3/8/18 15:59  2/6/18 16:50


2.5 MG

## 2018-02-07 VITALS
DIASTOLIC BLOOD PRESSURE: 60 MMHG | HEART RATE: 105 BPM | SYSTOLIC BLOOD PRESSURE: 97 MMHG | OXYGEN SATURATION: 95 % | TEMPERATURE: 98.6 F

## 2018-02-07 VITALS — SYSTOLIC BLOOD PRESSURE: 88 MMHG | HEART RATE: 100 BPM | DIASTOLIC BLOOD PRESSURE: 60 MMHG

## 2018-02-07 VITALS — HEART RATE: 78 BPM | DIASTOLIC BLOOD PRESSURE: 57 MMHG | SYSTOLIC BLOOD PRESSURE: 108 MMHG

## 2018-02-07 VITALS — DIASTOLIC BLOOD PRESSURE: 57 MMHG | HEART RATE: 100 BPM | SYSTOLIC BLOOD PRESSURE: 103 MMHG

## 2018-02-07 VITALS — TEMPERATURE: 97.7 F | SYSTOLIC BLOOD PRESSURE: 90 MMHG | DIASTOLIC BLOOD PRESSURE: 54 MMHG | HEART RATE: 95 BPM

## 2018-02-07 VITALS — DIASTOLIC BLOOD PRESSURE: 46 MMHG | SYSTOLIC BLOOD PRESSURE: 73 MMHG | HEART RATE: 100 BPM

## 2018-02-07 VITALS
DIASTOLIC BLOOD PRESSURE: 69 MMHG | TEMPERATURE: 97.7 F | SYSTOLIC BLOOD PRESSURE: 104 MMHG | OXYGEN SATURATION: 95 % | HEART RATE: 86 BPM

## 2018-02-07 VITALS — TEMPERATURE: 97.7 F | SYSTOLIC BLOOD PRESSURE: 91 MMHG | DIASTOLIC BLOOD PRESSURE: 53 MMHG | HEART RATE: 101 BPM

## 2018-02-07 VITALS — HEART RATE: 106 BPM | OXYGEN SATURATION: 96 %

## 2018-02-07 VITALS
SYSTOLIC BLOOD PRESSURE: 92 MMHG | OXYGEN SATURATION: 91 % | DIASTOLIC BLOOD PRESSURE: 55 MMHG | HEART RATE: 103 BPM | TEMPERATURE: 98.78 F

## 2018-02-07 VITALS — SYSTOLIC BLOOD PRESSURE: 96 MMHG | HEART RATE: 111 BPM | DIASTOLIC BLOOD PRESSURE: 55 MMHG

## 2018-02-07 VITALS
DIASTOLIC BLOOD PRESSURE: 70 MMHG | OXYGEN SATURATION: 96 % | SYSTOLIC BLOOD PRESSURE: 109 MMHG | HEART RATE: 80 BPM | TEMPERATURE: 98.06 F

## 2018-02-07 VITALS — HEART RATE: 94 BPM | DIASTOLIC BLOOD PRESSURE: 52 MMHG | SYSTOLIC BLOOD PRESSURE: 98 MMHG

## 2018-02-07 VITALS
HEART RATE: 95 BPM | DIASTOLIC BLOOD PRESSURE: 67 MMHG | TEMPERATURE: 97.7 F | OXYGEN SATURATION: 97 % | SYSTOLIC BLOOD PRESSURE: 98 MMHG

## 2018-02-07 VITALS
OXYGEN SATURATION: 97 % | HEART RATE: 75 BPM | DIASTOLIC BLOOD PRESSURE: 68 MMHG | TEMPERATURE: 97.88 F | SYSTOLIC BLOOD PRESSURE: 115 MMHG

## 2018-02-07 VITALS — DIASTOLIC BLOOD PRESSURE: 64 MMHG | HEART RATE: 98 BPM | SYSTOLIC BLOOD PRESSURE: 84 MMHG

## 2018-02-07 VITALS — OXYGEN SATURATION: 95 % | HEART RATE: 95 BPM

## 2018-02-07 VITALS — SYSTOLIC BLOOD PRESSURE: 100 MMHG | HEART RATE: 113 BPM | DIASTOLIC BLOOD PRESSURE: 47 MMHG

## 2018-02-07 VITALS — HEART RATE: 100 BPM | DIASTOLIC BLOOD PRESSURE: 49 MMHG | SYSTOLIC BLOOD PRESSURE: 103 MMHG

## 2018-02-07 VITALS — HEART RATE: 89 BPM | SYSTOLIC BLOOD PRESSURE: 99 MMHG | DIASTOLIC BLOOD PRESSURE: 51 MMHG

## 2018-02-07 VITALS — HEART RATE: 93 BPM | SYSTOLIC BLOOD PRESSURE: 87 MMHG | DIASTOLIC BLOOD PRESSURE: 56 MMHG

## 2018-02-07 VITALS — OXYGEN SATURATION: 94 % | HEART RATE: 105 BPM

## 2018-02-07 VITALS — HEART RATE: 104 BPM | SYSTOLIC BLOOD PRESSURE: 102 MMHG | DIASTOLIC BLOOD PRESSURE: 49 MMHG

## 2018-02-07 VITALS — DIASTOLIC BLOOD PRESSURE: 51 MMHG | SYSTOLIC BLOOD PRESSURE: 100 MMHG | HEART RATE: 68 BPM

## 2018-02-07 LAB
BUN SERPL-MCNC: 63 MG/DL (ref 7–18)
CALCIUM SERPL-MCNC: 7.8 MG/DL (ref 8.5–10.1)
CO2 SERPL-SCNC: 19 MMOL/L (ref 21–32)
CREAT SERPL-MCNC: 7.33 MG/DL (ref 0.6–1.2)
GLUCOSE SERPL-MCNC: 130 MG/DL (ref 70–99)
INR PPP: 3.8 (ref 0.9–1.1)
POTASSIUM SERPL-SCNC: 4.6 MMOL/L (ref 3.5–5.1)
SODIUM SERPL-SCNC: 138 MMOL/L (ref 136–145)

## 2018-02-07 RX ADMIN — AMIODARONE HYDROCHLORIDE SCH MG: 200 TABLET ORAL at 18:26

## 2018-02-07 RX ADMIN — WARFARIN SODIUM SCH MG: 2.5 TABLET ORAL at 15:20

## 2018-02-07 RX ADMIN — INSULIN ASPART SCH UNITS: 100 INJECTION, SOLUTION INTRAVENOUS; SUBCUTANEOUS at 21:29

## 2018-02-07 RX ADMIN — LEVALBUTEROL HYDROCHLORIDE SCH MG: 1.25 SOLUTION RESPIRATORY (INHALATION) at 14:10

## 2018-02-07 RX ADMIN — VANCOMYCIN HYDROCHLORIDE SCH MG: 1 INJECTION, POWDER, LYOPHILIZED, FOR SOLUTION INTRAVENOUS at 12:33

## 2018-02-07 RX ADMIN — LEVALBUTEROL HYDROCHLORIDE SCH MG: 1.25 SOLUTION RESPIRATORY (INHALATION) at 07:00

## 2018-02-07 RX ADMIN — MIDODRINE HYDROCHLORIDE SCH MG: 2.5 TABLET ORAL at 18:25

## 2018-02-07 RX ADMIN — INSULIN GLARGINE SCH UNITS: 100 INJECTION, SOLUTION SUBCUTANEOUS at 08:07

## 2018-02-07 RX ADMIN — CHOLESTYRAMINE SCH GM: 4 POWDER, FOR SUSPENSION ORAL at 22:24

## 2018-02-07 RX ADMIN — INSULIN ASPART SCH UNITS: 100 INJECTION, SOLUTION INTRAVENOUS; SUBCUTANEOUS at 18:29

## 2018-02-07 RX ADMIN — LEVALBUTEROL HYDROCHLORIDE SCH MG: 1.25 SOLUTION RESPIRATORY (INHALATION) at 02:01

## 2018-02-07 RX ADMIN — Medication SCH ML: at 21:19

## 2018-02-07 RX ADMIN — MIDODRINE HYDROCHLORIDE SCH MG: 2.5 TABLET ORAL at 12:30

## 2018-02-07 RX ADMIN — VANCOMYCIN HYDROCHLORIDE SCH MG: 1 INJECTION, POWDER, LYOPHILIZED, FOR SOLUTION INTRAVENOUS at 18:23

## 2018-02-07 RX ADMIN — Medication SCH ML: at 08:03

## 2018-02-07 RX ADMIN — FLUTICASONE PROPIONATE AND SALMETEROL SCH PUFF: 50; 250 POWDER RESPIRATORY (INHALATION) at 21:20

## 2018-02-07 RX ADMIN — RENAGEL SCH MG: 800 TABLET ORAL at 18:26

## 2018-02-07 RX ADMIN — BENZONATATE PRN MG: 100 CAPSULE ORAL at 08:08

## 2018-02-07 RX ADMIN — AMIODARONE HYDROCHLORIDE SCH MG: 200 TABLET ORAL at 08:10

## 2018-02-07 RX ADMIN — ASCORBIC ACID, THIAMINE MONONITRATE,RIBOFLAVIN, NIACINAMIDE, PYRIDOXINE HYDROCHLORIDE, FOLIC ACID, CYANOCOBALAMIN, BIOTIN, CALCIUM PANTOTHENATE, SCH CAP: 100; 1.5; 1.7; 20; 10; 1; 6000; 150000; 5 CAPSULE, LIQUID FILLED ORAL at 21:20

## 2018-02-07 RX ADMIN — RENAGEL SCH MG: 800 TABLET ORAL at 08:07

## 2018-02-07 RX ADMIN — VANCOMYCIN HYDROCHLORIDE SCH MG: 1 INJECTION, POWDER, LYOPHILIZED, FOR SOLUTION INTRAVENOUS at 21:19

## 2018-02-07 RX ADMIN — INSULIN GLARGINE SCH UNITS: 100 INJECTION, SOLUTION SUBCUTANEOUS at 21:29

## 2018-02-07 RX ADMIN — Medication SCH ML: at 12:32

## 2018-02-07 RX ADMIN — FLUTICASONE PROPIONATE AND SALMETEROL SCH PUFF: 50; 250 POWDER RESPIRATORY (INHALATION) at 08:03

## 2018-02-07 RX ADMIN — PANTOPRAZOLE SCH MG: 40 TABLET, DELAYED RELEASE ORAL at 08:04

## 2018-02-07 RX ADMIN — LEVALBUTEROL HYDROCHLORIDE SCH MG: 1.25 SOLUTION RESPIRATORY (INHALATION) at 19:36

## 2018-02-07 RX ADMIN — INSULIN ASPART SCH UNITS: 100 INJECTION, SOLUTION INTRAVENOUS; SUBCUTANEOUS at 08:06

## 2018-02-07 RX ADMIN — MIDODRINE HYDROCHLORIDE SCH MG: 2.5 TABLET ORAL at 08:07

## 2018-02-07 RX ADMIN — VANCOMYCIN HYDROCHLORIDE SCH MG: 1 INJECTION, POWDER, LYOPHILIZED, FOR SOLUTION INTRAVENOUS at 08:02

## 2018-02-07 RX ADMIN — Medication SCH ML: at 18:24

## 2018-02-07 RX ADMIN — RENAGEL SCH MG: 800 TABLET ORAL at 11:53

## 2018-02-07 NOTE — GASTROENTEROLOGY PROGRESS NOTE
Progress Note


Date of Service:  Feb 7, 2018


Subjective


Pt evaluation today including:  conversation w/ patient, physical exam, chart 

review, lab review, review of inpatient medication list


Pt seen previously by GI team for Cdiff diarrhea. GI reconsulted as pt having 

persistent diarrhea symptoms. Pt reports having small amts of liquid stools, 

brown in color, no blood seen. Has abd pain across mid abd area. Denies any n/

v. Tolerating food well.





Review of Systems


Constitutional:  No fever, No chills


Respiratory:  No cough, No shortness of breath


Cardiac:  No chest pain


Abdomen:  + pain, + diarrhea, No nausea, No vomiting





Medications





Current Inpatient Medications








 Medications


  (Trade)  Dose


 Ordered  Sig/Daniel


 Route  Start Time


 Stop Time Status Last Admin


Dose Admin


 


 Miscellaneous


 Information


  (Consult


 Glycemic


 Management


 Pharmacy)  1 ea  UD  PRN


 N/A  1/16/18 04:15


 2/15/18 04:14   


 


 


 Glucose


  (Glucose 40% Gel)  15-30


 GRAMS 15


 GRAMS...  UD  PRN


 PO  1/16/18 04:30


 2/15/18 04:29   


 


 


 Glucose


  (Glucose Chew


 Tab)  4-8


 Tablets 4


 Tabl...  UD  PRN


 PO  1/16/18 04:30


 2/15/18 04:29   


 


 


 Dextrose


  (Dextrose 50%


 50ML Syringe)  25-50ML OF


 50% DW IV


 FOR...  UD  PRN


 IV  1/16/18 04:30


 2/15/18 04:29  1/17/18 09:52


25 ML


 


 Glucagon


  (Glucagon Inj)  1 mg  UD  PRN


 SQ  1/16/18 04:30


 2/15/18 04:29   


 


 


 Ondansetron HCl


  (Zofran Inj)  4 mg  Q4H  PRN


 IV  1/16/18 18:15


 2/15/18 18:14  2/1/18 12:37


4 MG


 


 Acetaminophen 650


 mg/Empty Bag  65 ml @ 


 260 mls/hr  Q6H  PRN


 IV  1/23/18 16:30


 2/22/18 16:29  1/31/18 21:53


260 MLS/HR


 


 Pantoprazole


 Sodium


  (Protonix Tab)  40 mg  QAM


 PO  1/26/18 09:00


 2/25/18 08:59  2/7/18 08:04


40 MG


 


 Levalbuterol


  (Xopenex 1.25MG/


 3ML Neb)  1.25 mg  Q6R


 INH  1/26/18 21:00


 2/25/18 20:59  2/7/18 07:00


1.25 MG


 


 Midodrine


  (Proamatine Tab)  5 mg  TID@08,12,17


 PO  1/28/18 17:00


 2/27/18 16:59  2/7/18 08:07


5 MG


 


 Insulin Aspart


  (novoLOG ASPART)  **SLIDING


 SCALE**


 **G...  ACHS


 SC  1/31/18 07:00


 3/2/18 06:59  2/7/18 08:06


6 UNITS


 


 Vitamin B Complex/


 Vit C/Folic Acid


  (Nephrocaps)  1 cap  HS


 PO  1/31/18 21:00


 3/2/18 20:59  2/6/18 20:30


1 CAP


 


 Sevelamer HCl


  (Renagel Tab)  800 mg  TIDM


 PO  1/31/18 11:30


 3/2/18 11:29  2/7/18 08:07


800 MG


 


 Amiodarone HCl


  (Cordarone Tab)  200 mg  BIDM


 PO  2/1/18 16:45


 3/1/18 16:44  2/7/18 08:10


200 MG


 


 Salmeterol


 Xinafoate/


 Fluticasone


  (Advair Diskus


 250/50 Inh)  1 puff  BID


 INH  2/4/18 09:00


 3/6/18 08:59  2/7/18 08:03


1 PUFF


 


 Raspberry


  (Raspberry Syrup


 5ml Cup)  5 ml  QID


 PO  2/4/18 13:00


 2/18/18 11:59  2/7/18 08:03


5 ML


 


 Vancomycin HCl


  (Vancomycin Oral


 Soln)  125 mg  QID


 PO  2/4/18 13:00


 2/18/18 11:59  2/7/18 08:02


125 MG


 


 Benzonatate


  (Tessalon Perles


 Cap)  100 mg  TID  PRN


 PO  2/5/18 09:00


 3/7/18 08:59  2/7/18 08:08


100 MG


 


 Warfarin Sodium


  (Coumadin Tab)  2.5 mg  DAILY@16


 PO  2/6/18 16:00


 3/8/18 15:59  2/6/18 16:50


2.5 MG


 


 Prednisone


  (PredniSONE TAB)  20 mg  DAILY


 PO  2/7/18 09:00


 3/9/18 08:59  2/7/18 08:04


20 MG


 


 Epoetin Geovany


  (Procrit Inj)  10,000 units  ONE


 IV.  2/7/18 08:45


 2/7/18 18:00   


 


 


 Albumin Human


  (Albumin 25%)  12.5 gm  TODAY@0600,0700


 IV  2/7/18 06:00


 2/7/18 18:00   


 











Objective


Vital Signs











  Date Time  Temp Pulse Resp B/P (MAP) Pulse Ox O2 Delivery O2 Flow Rate FiO2


 


2/7/18 08:00      Room Air  


 


2/7/18 07:14 36.5 86 18 104/69 (81) 95   


 


2/7/18 07:00  95 18  95 Room Air  


 


2/7/18 04:20      Room Air  


 


2/7/18 04:09 36.5 95 16 98/67 (77) 97 Room Air  


 


2/7/18 03:15  84      


 


2/7/18 02:01  106 16  96 Room Air  


 


2/7/18 00:20      Room Air  


 


2/6/18 23:34 36.4 101 16 95/64 (74) 96 Room Air  


 


2/6/18 20:20      Room Air  


 


2/6/18 19:49  87 16  95 Room Air  


 


2/6/18 19:36 36.4 96 20 106/66 (79) 97 Room Air  


 


2/6/18 16:00     96 Room Air  


 


2/6/18 15:45 36.6 93 20 105/70 (82) 96 Room Air  


 


2/6/18 14:01  97 18  96 Room Air  


 


2/6/18 12:00     96 Room Air  


 


2/6/18 11:51 36.6 88 18 107/71 (83) 96   











Physical Exam


General Appearance:  WD/WN, no apparent distress


Eyes:  normal inspection, PERRL


Neck:  supple, no JVD, trachea midline


Respiratory/Chest:  normal breath sounds, no respiratory distress, no accessory 

muscle use


Cardiovascular:  regular rate, rhythm, no gallop, no murmur


Abdomen:  normal bowel sounds, soft, + tenderness (mid abd area)


Extremities:  normal inspection, no pedal edema, no calf tenderness


Neurologic/Psych:  alert, normal mood/affect, oriented x 3


Skin:  normal color, no jaundice, no rash





Laboratory Results





Last 24 Hours








Test


  2/6/18


11:29 2/6/18


16:15 2/6/18


20:02 2/7/18


06:48


 


Bedside Glucose 131 mg/dl  286 mg/dl  293 mg/dl  113 mg/dl 


 


Test


  2/7/18


08:23 2/7/18


10:59 


  


 


 


Prothrombin Time 38.8 SECONDS    


 


Prothromb Time International


Ratio 3.8 


  


  


  


 


 


Sodium Level 138 mmol/L    


 


Potassium Level 4.6 mmol/L    


 


Chloride Level 103 mmol/L    


 


Carbon Dioxide Level 19 mmol/L    


 


Anion Gap 16.0 mmol/L    


 


Blood Urea Nitrogen 63 mg/dl    


 


Creatinine 7.33 mg/dl    


 


Est Creatinine Clear Calc


Drug Dose 9.5 ml/min 


  


  


  


 


 


Estimated GFR (


American) 6.4 


  


  


  


 


 


Estimated GFR (Non-


American 5.5 


  


  


  


 


 


BUN/Creatinine Ratio 8.6    


 


Random Glucose 130 mg/dl    


 


Calcium Level 7.8 mg/dl    


 


Hepatitis B Surface Antigen NEG    


 


Bedside Glucose  128 mg/dl   











Assessment and Plan


Pt is a 58 y/o female seen previously for Cdiff colitis, SBO. GI reconsulted 

for persistent diarrhea despite completion of Flagyl x 10 days, Vancomycin 

enema. Currently still on Vancomycin 125mg PO QID since 1/20. She having brown 

liquid like stool w/o rectal bleeding 4-6x a day. Mid abd pain but said it's 

better than previously. Tolerating diet well. Had been afebrile. 





- Repeat Cdiff stool test


- Obtain KUB


- Add Dicyclomine 10mg TID prn abd pain


- Add Questran 2g BID. 


- Discussed with Dr. Fraga; will potentially do flex sigmoidscopy with bx 

tomorrow if diarrhea persist. Will keep her NPO after midnight. If going for 

flex sig, will order enema prep tomorrow AM. 








I performed a history and physical examination of the patient, including 

specifically on physical exam - abdomen is soft.I have discussed the patient's 

management with Rehana.  Please refer to the NP's note for the documented 

findings and plan of care.


Unclear if she has persistent C.diff, persistent leukocytosis could be related 

to Prednisone, she may have postinfectious diarrhea. In view of persistent 

diarrhea despite optimal C.diff therapy will plan for Flex Sig tomorrow with 

enema prep to evaluate C.diff and obtain Bx to r/o CMV colitis.


Would try to d/c steroids if not needed.

## 2018-02-07 NOTE — PHARMACY PROGRESS NOTE
Pharmacy Glycemic Short Note 2


Date of Service


Feb 7, 2018.


OUTPATIENT ANTIDIABETIC REGIMEN: 


* NPH 40 units SQ daily when taking prednisone (otherwise no insulin taken)





ASSESSMENT:


2/5/18


* Pt received 65 units of insulin with BSGs of 118, 212, 149, 215 over the past 

24 hours.


* Fasting BSG of 105 mg/dl is at goal. No changes to basal insulin - keep hold 

parameter as patient will require less basal as steroid dose is tapered. 


* Patient continues to experience prandial BSG elevation due to steroids. 

Prednisone dose decreased from 40 to 30 mg daily starting today, therefore I 

will loosen CF/CR at this time. 


2/7/18


* Post prandial BSGs became elevated on 2/6 despite no changes to risk factors 

for hyperglycemia.


* Prednisone decreased from 30 mg to 20 mg daily starting this morning. 


* Changes to regimen:


 * Will add a one time weight based dose of NPH to cover steroid effects (0.2 

unit/kg). 


 * Loosen bolus insulin parameters since NPH was added


 * Fasting BSG at goal, 113 mg/dL - no change to Lantus dose


 


PLAN FOR INPATIENT GLYCEMIC CONTROL:





* NPH 20 units SQ qAM





* Basal Insulin: 


 * Lantus 10 units SQ BID


 * HOLD for BSG < 110mg/dl


 


* Bolus Insulin: - loosen


 * NOVOLOG per scale ACHS


 * Goal Range:  Low 120 mg/dL - High 150 mg/dL


 * Correction Factor: 25 mg/dL/unit


 * Nutritional / Prandial insulin:  1 units for every 9 grams of carb 





PLAN FOR DISCHARGE:


* A1c 6.5% indicates good outpatient glycemic control, although pt is on HD


* Expect that patient may be discharged on previous home regimen unless 

steroids are continued at discharge.





Thank you.

## 2018-02-07 NOTE — DIAGNOSTIC IMAGING REPORT
KUB



CLINICAL HISTORY: abd pain     



COMPARISON STUDY:  1/26/2018 



FINDINGS: There is no pathologic bowel dilatation. Surgical clips are again

visualized within the right upper quadrant.



IMPRESSION:  Unremarkable bowel gas pattern 







Electronically signed by:  Yrn Hernandez M.D.

2/7/2018 1:40 PM



Dictated Date/Time:  2/7/2018 1:39 PM

## 2018-02-07 NOTE — NEPHROLOGY PROGRESS NOTE
Nephrology Progress Note


Date of Service:


Feb 7, 2018.


Subjective


56 yo female with complicated hospital admission with ecoli uti/ cdiff / 

hypotension / aflutter.  pt feels good and is ready to leave in her opinion. 

wearing a walking boot for her sprained ankle.  pt comfortable without 

complaints.





Objective











  Date Time  Temp Pulse Resp B/P (MAP) Pulse Ox O2 Delivery O2 Flow Rate FiO2


 


2/7/18 07:14 36.5 86 18 104/69 (81) 95   


 


2/7/18 07:00  95 18  95 Room Air  


 


2/7/18 04:20      Room Air  


 


2/7/18 04:09 36.5 95 16 98/67 (77) 97 Room Air  


 


2/7/18 03:15  84      


 


2/7/18 02:01  106 16  96 Room Air  


 


2/7/18 00:20      Room Air  


 


2/6/18 23:34 36.4 101 16 95/64 (74) 96 Room Air  


 


2/6/18 20:20      Room Air  


 


2/6/18 19:49  87 16  95 Room Air  


 


2/6/18 19:36 36.4 96 20 106/66 (79) 97 Room Air  


 


2/6/18 16:00     96 Room Air  


 


2/6/18 15:45 36.6 93 20 105/70 (82) 96 Room Air  


 


2/6/18 14:01  97 18  96 Room Air  


 


2/6/18 12:00     96 Room Air  


 


2/6/18 11:51 36.6 88 18 107/71 (83) 96   








Physical Exam:


General-aaox3, obese


Eyes-no scleral icterus


ENT-mmm


Neck-supple


Lungs-+diffuse wheeze


Heart-regular


Abdomen-bs+ s/nt/nd


Extremities-no c/c/+mild edema, right ankle in walking boot


Neuro-nonfocal





Current Inpatient Medications








 Medications


  (Trade)  Dose


 Ordered  Sig/Daniel


 Route  Start Time


 Stop Time Status Last Admin


Dose Admin


 


 Miscellaneous


 Information


  (Consult


 Glycemic


 Management


 Pharmacy)  1 ea  UD  PRN


 N/A  1/16/18 04:15


 2/15/18 04:14   


 


 


 Glucose


  (Glucose 40% Gel)  15-30


 GRAMS 15


 GRAMS...  UD  PRN


 PO  1/16/18 04:30


 2/15/18 04:29   


 


 


 Glucose


  (Glucose Chew


 Tab)  4-8


 Tablets 4


 Tabl...  UD  PRN


 PO  1/16/18 04:30


 2/15/18 04:29   


 


 


 Dextrose


  (Dextrose 50%


 50ML Syringe)  25-50ML OF


 50% DW IV


 FOR...  UD  PRN


 IV  1/16/18 04:30


 2/15/18 04:29  1/17/18 09:52


25 ML


 


 Glucagon


  (Glucagon Inj)  1 mg  UD  PRN


 SQ  1/16/18 04:30


 2/15/18 04:29   


 


 


 Ondansetron HCl


  (Zofran Inj)  4 mg  Q4H  PRN


 IV  1/16/18 18:15


 2/15/18 18:14  2/1/18 12:37


4 MG


 


 Acetaminophen 650


 mg/Empty Bag  65 ml @ 


 260 mls/hr  Q6H  PRN


 IV  1/23/18 16:30


 2/22/18 16:29  1/31/18 21:53


260 MLS/HR


 


 Pantoprazole


 Sodium


  (Protonix Tab)  40 mg  QAM


 PO  1/26/18 09:00


 2/25/18 08:59  2/7/18 08:04


40 MG


 


 Levalbuterol


  (Xopenex 1.25MG/


 3ML Neb)  1.25 mg  Q6R


 INH  1/26/18 21:00


 2/25/18 20:59  2/7/18 07:00


1.25 MG


 


 Midodrine


  (Proamatine Tab)  5 mg  TID@08,12,17


 PO  1/28/18 17:00


 2/27/18 16:59  2/7/18 08:07


5 MG


 


 Insulin Aspart


  (novoLOG ASPART)  **SLIDING


 SCALE**


 **G...  ACHS


 SC  1/31/18 07:00


 3/2/18 06:59  2/7/18 08:06


6 UNITS


 


 Vitamin B Complex/


 Vit C/Folic Acid


  (Nephrocaps)  1 cap  HS


 PO  1/31/18 21:00


 3/2/18 20:59  2/6/18 20:30


1 CAP


 


 Sevelamer HCl


  (Renagel Tab)  800 mg  TIDM


 PO  1/31/18 11:30


 3/2/18 11:29  2/7/18 08:07


800 MG


 


 Amiodarone HCl


  (Cordarone Tab)  200 mg  BIDM


 PO  2/1/18 16:45


 3/1/18 16:44  2/7/18 08:10


200 MG


 


 Salmeterol


 Xinafoate/


 Fluticasone


  (Advair Diskus


 250/50 Inh)  1 puff  BID


 INH  2/4/18 09:00


 3/6/18 08:59  2/7/18 08:03


1 PUFF


 


 Raspberry


  (Raspberry Syrup


 5ml Cup)  5 ml  QID


 PO  2/4/18 13:00


 2/18/18 11:59  2/7/18 08:03


5 ML


 


 Vancomycin HCl


  (Vancomycin Oral


 Soln)  125 mg  QID


 PO  2/4/18 13:00


 2/18/18 11:59  2/7/18 08:02


125 MG


 


 Benzonatate


  (Tessalon Perles


 Cap)  100 mg  TID  PRN


 PO  2/5/18 09:00


 3/7/18 08:59  2/7/18 08:08


100 MG


 


 Warfarin Sodium


  (Coumadin Tab)  2.5 mg  DAILY@16


 PO  2/6/18 16:00


 3/8/18 15:59  2/6/18 16:50


2.5 MG


 


 Prednisone


  (PredniSONE TAB)  20 mg  DAILY


 PO  2/7/18 09:00


 3/9/18 08:59  2/7/18 08:04


20 MG


 


 Epoetin Geovany


  (Procrit Inj)  10,000 units  ONE


 IV.  2/7/18 08:45


 2/7/18 18:00   


 


 


 Albumin Human


  (Albumin 25%)  12.5 gm  TODAY@0600,0700


 IV  2/7/18 06:00


 2/7/18 18:00   


 


 


 Insulin Human NPH


  (novoLIN-N U-100


 NPH PER UNIT)  20 units  QAM


 SQ  2/7/18 09:00


 2/7/18 10:00   


 











Last 24 Hours








Test


  2/6/18


11:29 2/6/18


16:15 2/6/18


20:02 2/7/18


06:48


 


Bedside Glucose 131 mg/dl  286 mg/dl  293 mg/dl  113 mg/dl 


 


Test


  2/7/18


08:23 


  


  


 


 


Prothrombin Time 38.8 SECONDS    


 


Prothromb Time International


Ratio 3.8 


  


  


  


 











Assessment & Plan


ESRD-plan for dialysis today on a 2k bath with 2 liters off as bp tolerates 

with the help of albumin.  has diffuse wheezing but comfortable in the chair.  

also on midodrine to help support bp.  





Anemia of Renal Failure-hg goal of 10 to 11 and will redose procrit today.

## 2018-02-07 NOTE — PROGRESS NOTE
Medicine Progress Note


Date & Time of Visit:


Feb 7, 2018 at 19:10


.


Subjective





CC: 


Follow-up visit for multiple problems.





HPI: 


Occasional cough, unchanged.


No SOB.


No chest pain.


Remains in atrial flutter with variable conduction.


Persistent diarrhea.


No nausea or vomiting.





ROS:


General- no fever, no chills


Resp- as noted above in HPI


Cardiac-  no chest pain, no edema


GI- as noted above in HPI


- anuric


.





Objective





Last 8 Hrs








  Date Time  Temp Pulse Resp B/P (MAP) Pulse Ox O2 Delivery O2 Flow Rate FiO2


 


2/7/18 18:21 36.5 95  90/54 (66)    


 


2/7/18 17:46  100  88/60    


 


2/7/18 17:30  94  98/52    


 


2/7/18 17:15  104  102/49    


 


2/7/18 17:00  93  87/56    


 


2/7/18 16:45  98  84/64    


 


2/7/18 16:30  89  99/51    


 


2/7/18 16:15  100  103/57    


 


2/7/18 16:00      Room Air  


 


2/7/18 16:00  100  103/49    


 


2/7/18 15:45  111  96/55    


 


2/7/18 15:42 36.6 75 16 115/68 (84) 97   


 


2/7/18 15:30  113  100/47    


 


2/7/18 15:15  100  73/46    


 


2/7/18 15:00  78  108/57    


 


2/7/18 14:50  68  100/51    


 


2/7/18 14:47 36.5 101  91/53 (66)    


 


2/7/18 14:10  105 18  94 Room Air  


 


2/7/18 12:00      Room Air  


 


2/7/18 11:47 36.7 80 18 109/70 (83) 96   








Physical Exam:





General- lying in bed; no distress


Lungs- few scattered rhonchi, diffuse mild wheezing; no respiratory distress


Cardiovascular- irregular;  no gallop; slight JVD; trace pretibial edema


Abdomen- + bowel sounds, soft, nontender 


Extremities- no cyanosis; no calf tenderness; waffle boots applied


Neuro- alert, oriented


Skin- warm & dry


.


Laboratory Results:





Last 24 Hours








Test


  2/6/18


20:02 2/7/18


06:48 2/7/18


08:23 2/7/18


10:59


 


Bedside Glucose 293 mg/dl  113 mg/dl   128 mg/dl 


 


Prothrombin Time   38.8 SECONDS  


 


Prothromb Time International


Ratio 


  


  3.8 


  


 


 


Sodium Level   138 mmol/L  


 


Potassium Level   4.6 mmol/L  


 


Chloride Level   103 mmol/L  


 


Carbon Dioxide Level   19 mmol/L  


 


Anion Gap   16.0 mmol/L  


 


Blood Urea Nitrogen   63 mg/dl  


 


Creatinine   7.33 mg/dl  


 


Est Creatinine Clear Calc


Drug Dose 


  


  9.5 ml/min 


  


 


 


Estimated GFR (


American) 


  


  6.4 


  


 


 


Estimated GFR (Non-


American 


  


  5.5 


  


 


 


BUN/Creatinine Ratio   8.6  


 


Random Glucose   130 mg/dl  


 


Calcium Level   7.8 mg/dl  


 


Hepatitis B Surface Antigen   NEG  


 


Test


  2/7/18


16:25 


  


  


 


 


Bedside Glucose 193 mg/dl    














 Date/Time


Source Procedure


Growth Status


 


 


 2/7/18 12:25


Stool C.difficile Toxin B Gene (PCR) - Final


No C. difficile toxin B gene detected Complete











Assessment & Plan





HYPOTENSION


Improved.


Started on midodrine.


Ongoing hypotension with hemodialysis.


Continue to monitor.





ATRIAL FLUTTER


Cardiology consulted.


Antiarrhythmic therapy with amiodarone initiated.


Anticoagulated with IV heparin --> warfarin.


INR high- titrate warfarin.





UTI E COLI (present on admission)


UA on admission demonstrated WBC's and bacteria.


Urine culture grew E coli, ESBL.


Treated with course of ertapenem.





C DIFFICILE COLITIS


Persistent diarrhea.


Received IV metronidazole and PO vancomycin.


Finished course of metronidazole; PO vancomycin continued.


Cholestyramine started by GI.


Sigmoidoscopy planned for tomorrow.





BRONCHITIS / EXACERBATION OF COPD


Received course of azithromycin.


Taper steroids.





SBO 


Resolved.





DM TYPE 2


Pharmacy consulted for glycemic management.


FBS = 113.


Taper steroids.





CIRRHOSIS


Noted on CT of chest.


Will need outpatient follow-up with GI.





RIGHT ANKLE SPRAIN


Seen by Ortho.


CAM boot ordered.





VTE PROPHYLAXIS


Received IV heparin --> warfarin.


Ambulate as able.





DISPOSITION


To be determined.


Anticipated need for rehab or skilled care.


.


Consultants:


[facility]


[facility]


Current Inpatient Medications:





Current Inpatient Medications








 Medications


  (Trade)  Dose


 Ordered  Sig/Daniel


 Route  Start Time


 Stop Time Status Last Admin


Dose Admin


 


 Miscellaneous


 Information


  (Consult


 Glycemic


 Management


 Pharmacy)  1 ea  UD  PRN


 N/A  1/16/18 04:15


 2/15/18 04:14   


 


 


 Glucose


  (Glucose 40% Gel)  15-30


 GRAMS 15


 GRAMS...  UD  PRN


 PO  1/16/18 04:30


 2/15/18 04:29   


 


 


 Glucose


  (Glucose Chew


 Tab)  4-8


 Tablets 4


 Tabl...  UD  PRN


 PO  1/16/18 04:30


 2/15/18 04:29   


 


 


 Dextrose


  (Dextrose 50%


 50ML Syringe)  25-50ML OF


 50% DW IV


 FOR...  UD  PRN


 IV  1/16/18 04:30


 2/15/18 04:29  1/17/18 09:52


25 ML


 


 Glucagon


  (Glucagon Inj)  1 mg  UD  PRN


 SQ  1/16/18 04:30


 2/15/18 04:29   


 


 


 Ondansetron HCl


  (Zofran Inj)  4 mg  Q4H  PRN


 IV  1/16/18 18:15


 2/15/18 18:14  2/1/18 12:37


4 MG


 


 Acetaminophen 650


 mg/Empty Bag  65 ml @ 


 260 mls/hr  Q6H  PRN


 IV  1/23/18 16:30


 2/22/18 16:29  1/31/18 21:53


260 MLS/HR


 


 Pantoprazole


 Sodium


  (Protonix Tab)  40 mg  QAM


 PO  1/26/18 09:00


 2/25/18 08:59  2/7/18 08:04


40 MG


 


 Levalbuterol


  (Xopenex 1.25MG/


 3ML Neb)  1.25 mg  Q6R


 INH  1/26/18 21:00


 2/25/18 20:59  2/7/18 14:10


1.25 MG


 


 Midodrine


  (Proamatine Tab)  5 mg  TID@08,12,17


 PO  1/28/18 17:00


 2/27/18 16:59  2/7/18 18:25


5 MG


 


 Insulin Aspart


  (novoLOG ASPART)  **SLIDING


 SCALE**


 **G...  ACHS


 SC  1/31/18 07:00


 3/2/18 06:59 Future hold 2/7/18 18:29


3 UNITS


 


 Insulin Glargine


  (Lantus Solostar


 Pen)  10 units  BID


 SC  1/31/18 09:00


 3/2/18 08:59  2/7/18 08:07


10 UNITS


 


 Vitamin B Complex/


 Vit C/Folic Acid


  (Nephrocaps)  1 cap  HS


 PO  1/31/18 21:00


 3/2/18 20:59  2/6/18 20:30


1 CAP


 


 Sevelamer HCl


  (Renagel Tab)  800 mg  TIDM


 PO  1/31/18 11:30


 3/2/18 11:29  2/7/18 18:26


800 MG


 


 Amiodarone HCl


  (Cordarone Tab)  200 mg  BIDM


 PO  2/1/18 16:45


 3/1/18 16:44  2/7/18 18:26


200 MG


 


 Salmeterol


 Xinafoate/


 Fluticasone


  (Advair Diskus


 250/50 Inh)  1 puff  BID


 INH  2/4/18 09:00


 3/6/18 08:59  2/7/18 08:03


1 PUFF


 


 Raspberry


  (Raspberry Syrup


 5ml Cup)  5 ml  QID


 PO  2/4/18 13:00


 2/18/18 11:59  2/7/18 18:24


5 ML


 


 Vancomycin HCl


  (Vancomycin Oral


 Soln)  125 mg  QID


 PO  2/4/18 13:00


 2/18/18 11:59  2/7/18 18:23


125 MG


 


 Benzonatate


  (Tessalon Perles


 Cap)  100 mg  TID  PRN


 PO  2/5/18 09:00


 3/7/18 08:59  2/7/18 08:08


100 MG


 


 Warfarin Sodium


  (Coumadin Tab)  2.5 mg  DAILY@16


 PO  2/6/18 16:00


 3/8/18 15:59  2/6/18 16:50


2.5 MG


 


 Prednisone


  (PredniSONE TAB)  20 mg  DAILY


 PO  2/7/18 09:00


 3/9/18 08:59  2/7/18 08:04


20 MG


 


 Dicyclomine HCl


  (Bentyl Tab)  10 mg  TID  PRN


 PO  2/7/18 11:30


 3/9/18 11:29   


 


 


 Cholestyramine


 Resin


  (Questran Powder


 Light)  2 gm  BID@10,22


 PO  2/7/18 22:00


 3/9/18 21:59

## 2018-02-08 VITALS
DIASTOLIC BLOOD PRESSURE: 64 MMHG | SYSTOLIC BLOOD PRESSURE: 104 MMHG | TEMPERATURE: 98.42 F | HEART RATE: 105 BPM | OXYGEN SATURATION: 91 %

## 2018-02-08 VITALS
SYSTOLIC BLOOD PRESSURE: 105 MMHG | TEMPERATURE: 97.34 F | HEART RATE: 98 BPM | DIASTOLIC BLOOD PRESSURE: 72 MMHG | OXYGEN SATURATION: 91 %

## 2018-02-08 VITALS
SYSTOLIC BLOOD PRESSURE: 90 MMHG | HEART RATE: 90 BPM | TEMPERATURE: 97.88 F | OXYGEN SATURATION: 98 % | DIASTOLIC BLOOD PRESSURE: 54 MMHG

## 2018-02-08 VITALS
OXYGEN SATURATION: 89 % | SYSTOLIC BLOOD PRESSURE: 102 MMHG | DIASTOLIC BLOOD PRESSURE: 67 MMHG | TEMPERATURE: 98.24 F | HEART RATE: 99 BPM

## 2018-02-08 VITALS — HEART RATE: 103 BPM | OXYGEN SATURATION: 94 %

## 2018-02-08 VITALS
TEMPERATURE: 98.6 F | DIASTOLIC BLOOD PRESSURE: 56 MMHG | OXYGEN SATURATION: 97 % | HEART RATE: 84 BPM | SYSTOLIC BLOOD PRESSURE: 92 MMHG

## 2018-02-08 VITALS — HEART RATE: 96 BPM | OXYGEN SATURATION: 91 %

## 2018-02-08 LAB
EOSINOPHIL NFR BLD AUTO: 148 K/UL (ref 130–400)
HCT VFR BLD CALC: 30.9 % (ref 37–47)
HGB BLD-MCNC: 9.3 G/DL (ref 12–16)
INR PPP: 3 (ref 0.9–1.1)
MCH RBC QN AUTO: 31.8 PG (ref 25–34)
MCHC RBC AUTO-ENTMCNC: 30.1 G/DL (ref 32–36)
MCV RBC AUTO: 105.8 FL (ref 80–100)
NRBC BLD AUTO-RTO: 0.4 %
NUCLEATED RED BLOOD CELL ABS: 0.03 K/UL (ref 0–0)
PMV BLD AUTO: 9.7 FL (ref 7.4–10.4)
RED CELL DISTRIBUTION WIDTH CV: 17.7 % (ref 11.5–14.5)
RED CELL DISTRIBUTION WIDTH SD: 67.7 FL (ref 36.4–46.3)
WBC # BLD AUTO: 9.46 K/UL (ref 4.8–10.8)

## 2018-02-08 PROCEDURE — 0DJD8ZZ INSPECTION OF LOWER INTESTINAL TRACT, VIA NATURAL OR ARTIFICIAL OPENING ENDOSCOPIC: ICD-10-PCS | Performed by: INTERNAL MEDICINE

## 2018-02-08 RX ADMIN — LEVALBUTEROL HYDROCHLORIDE SCH MG: 1.25 SOLUTION RESPIRATORY (INHALATION) at 07:16

## 2018-02-08 RX ADMIN — MIDODRINE HYDROCHLORIDE SCH MG: 2.5 TABLET ORAL at 17:13

## 2018-02-08 RX ADMIN — ASCORBIC ACID, THIAMINE MONONITRATE,RIBOFLAVIN, NIACINAMIDE, PYRIDOXINE HYDROCHLORIDE, FOLIC ACID, CYANOCOBALAMIN, BIOTIN, CALCIUM PANTOTHENATE, SCH CAP: 100; 1.5; 1.7; 20; 10; 1; 6000; 150000; 5 CAPSULE, LIQUID FILLED ORAL at 21:00

## 2018-02-08 RX ADMIN — INSULIN GLARGINE SCH UNITS: 100 INJECTION, SOLUTION SUBCUTANEOUS at 07:30

## 2018-02-08 RX ADMIN — LEVALBUTEROL HYDROCHLORIDE SCH MG: 1.25 SOLUTION RESPIRATORY (INHALATION) at 02:07

## 2018-02-08 RX ADMIN — VANCOMYCIN HYDROCHLORIDE SCH MG: 1 INJECTION, POWDER, LYOPHILIZED, FOR SOLUTION INTRAVENOUS at 12:49

## 2018-02-08 RX ADMIN — RENAGEL SCH MG: 800 TABLET ORAL at 16:45

## 2018-02-08 RX ADMIN — FLUTICASONE PROPIONATE AND SALMETEROL SCH PUFF: 50; 250 POWDER RESPIRATORY (INHALATION) at 08:32

## 2018-02-08 RX ADMIN — INSULIN ASPART SCH UNITS: 100 INJECTION, SOLUTION INTRAVENOUS; SUBCUTANEOUS at 07:00

## 2018-02-08 RX ADMIN — FLUTICASONE PROPIONATE AND SALMETEROL SCH PUFF: 50; 250 POWDER RESPIRATORY (INHALATION) at 20:59

## 2018-02-08 RX ADMIN — VANCOMYCIN HYDROCHLORIDE SCH MG: 1 INJECTION, POWDER, LYOPHILIZED, FOR SOLUTION INTRAVENOUS at 20:59

## 2018-02-08 RX ADMIN — Medication SCH ML: at 16:45

## 2018-02-08 RX ADMIN — PANTOPRAZOLE SCH MG: 40 TABLET, DELAYED RELEASE ORAL at 08:34

## 2018-02-08 RX ADMIN — AMIODARONE HYDROCHLORIDE SCH MG: 200 TABLET ORAL at 08:34

## 2018-02-08 RX ADMIN — WARFARIN SODIUM SCH MG: 2.5 TABLET ORAL at 15:50

## 2018-02-08 RX ADMIN — CHOLESTYRAMINE SCH GM: 4 POWDER, FOR SUSPENSION ORAL at 22:04

## 2018-02-08 RX ADMIN — MIDODRINE HYDROCHLORIDE SCH MG: 2.5 TABLET ORAL at 12:49

## 2018-02-08 RX ADMIN — RENAGEL SCH MG: 800 TABLET ORAL at 12:49

## 2018-02-08 RX ADMIN — LEVALBUTEROL HYDROCHLORIDE SCH MG: 1.25 SOLUTION RESPIRATORY (INHALATION) at 19:10

## 2018-02-08 RX ADMIN — Medication SCH ML: at 08:32

## 2018-02-08 RX ADMIN — AMIODARONE HYDROCHLORIDE SCH MG: 200 TABLET ORAL at 16:46

## 2018-02-08 RX ADMIN — RENAGEL SCH MG: 800 TABLET ORAL at 08:34

## 2018-02-08 RX ADMIN — CHOLESTYRAMINE SCH GM: 4 POWDER, FOR SUSPENSION ORAL at 10:00

## 2018-02-08 RX ADMIN — INSULIN GLARGINE SCH UNITS: 100 INJECTION, SOLUTION SUBCUTANEOUS at 21:05

## 2018-02-08 RX ADMIN — Medication SCH ML: at 20:59

## 2018-02-08 RX ADMIN — INSULIN ASPART SCH UNITS: 100 INJECTION, SOLUTION INTRAVENOUS; SUBCUTANEOUS at 21:05

## 2018-02-08 RX ADMIN — INSULIN ASPART SCH UNITS: 100 INJECTION, SOLUTION INTRAVENOUS; SUBCUTANEOUS at 11:45

## 2018-02-08 RX ADMIN — DICYCLOMINE HYDROCHLORIDE SCH MG: 20 TABLET ORAL at 20:59

## 2018-02-08 RX ADMIN — LEVALBUTEROL HYDROCHLORIDE SCH MG: 1.25 SOLUTION RESPIRATORY (INHALATION) at 14:21

## 2018-02-08 RX ADMIN — VANCOMYCIN HYDROCHLORIDE SCH MG: 1 INJECTION, POWDER, LYOPHILIZED, FOR SOLUTION INTRAVENOUS at 08:32

## 2018-02-08 RX ADMIN — MIDODRINE HYDROCHLORIDE SCH MG: 2.5 TABLET ORAL at 08:33

## 2018-02-08 RX ADMIN — INSULIN ASPART SCH UNITS: 100 INJECTION, SOLUTION INTRAVENOUS; SUBCUTANEOUS at 16:48

## 2018-02-08 RX ADMIN — VANCOMYCIN HYDROCHLORIDE SCH MG: 1 INJECTION, POWDER, LYOPHILIZED, FOR SOLUTION INTRAVENOUS at 16:45

## 2018-02-08 RX ADMIN — Medication SCH ML: at 12:49

## 2018-02-08 NOTE — CARDIOLOGY PROGRESS NOTE
Cardiology Progress Note


Date of Service


Feb 8, 2018.





Cardiology Progress Note


Patient's telemetry reviewed.


Appears to be in atrial flutter at 100 bpm at rest, napping in bed.


INR has been at goal since 2/4.





Pt had WANDY performed by the undersigned last month to exclude mitral stenosis. 

The MV was calcified, but opened well, it was felt the diastolic MV gradient 

was due to high output state. SR was present then. The AV prosthesis was 

functioning well.





Given risk of stroke in setting of calcified MV with diastolic gradient, I 

would recommend consideration of DCCV after at least 3 weeks of therapeutic 

anticoagulation rather than WANDY CV.





Continue amiodarone. 





Future considerations include AFL ablation, however, would prefer 3 weeks of 

anticoagulation prior to consideration of this as well.

## 2018-02-08 NOTE — ENDO HISTORY AND PHYSICAL
History & Physical


Date of Service:


Feb 8, 2018.


Chief Complaint:





Referring Physician:





History of Present Illness


diarrhea despite treatment.





Past Surgical History


Hx Cardiac Surgery:  Yes (Valve replacement)


Hx Abdominal Surgery:  No


Hx Post-Op Nausea and Vomiting:  No


Hx Cancer Surgery:  No


Hx Thoracic Surgery:  No


Hx Orthopedic:  No


Hx Urinary Tract Surgery:  No





Social History


Hx Substance Use:  No


Hx Alcohol Use:  No





Allergies


Coded Allergies:  


     Hydrocodone (Verified  Allergy, Unknown, unspecified, 12/27/17)


     Morphine (Verified  Allergy, Unknown, unspecified , 12/27/17)





Current Medications





Reported Home Medications








 Medications  Dose


 Route/Sig


 Max Daily Dose Days Date Category


 


 Neurontin


  (Gabapentin) 100


 Mg Cap  2 Cap


 PO HS


   30 1/16/18 Reported


 


 Levaquin


  (Levofloxacin)


 500 Mg Tab  500 Mg


 PO DAILY


   7 1/16/18 Reported


 


 Combivent


 Respimat


  (Ipratropium-Albuterol)


 1 Aer Aer  1 Puffs


 INH QID


   30 1/6/18 Rx


 


 Breo Ellipta


  (Fluticasone


 Furoate-Vilanterol)


 1 Inh Inh  1 Puff


 INH BID


   30 1/6/18 Rx


 


 Humulin N


  (Insulin Human


 NPH) 100 Units/Ml


 Susp  40 Units


 SC UD


   8 1/5/18 Rx


 


 Ultram (Tramadol


 HCl) 50 Mg Tab  50 Mg


 PO Q4H PRN


    12/27/17 Reported


 


 Nephrocaps


  (Vitamin B


 Complex/Vit


 C/Folic Acid)  Cap  1 Cap


 PO DAILY


    12/27/17 Reported


 


 Proair Respiclick


  (Albuterol


 Sulfate) 108


 Mcg/Act Aer  2 Puffs


 INH  PRN


    12/27/17 Reported


 


 Protonix


  (Pantoprazole


 Sodium) 40 Mg Tab  40 Mg


 PO DAILY


    12/27/17 Reported


 


 Aspirin Chewable


  (Aspirin) 81 Mg


 Chew  81 Mg


 PO DAILY


    12/27/17 Reported


 


 Plavix


  (Clopidogrel


 Bisulfate) 75 Mg


 Tab  75 Mg


 PO DAILY


    12/27/17 Reported


 


 Zoloft


  (Sertraline HCl)


 50 Mg Tab  75 Mg


 PO DAILY


    12/27/17 Reported


 


 Lipitor


  (Atorvastatin


 Calcium) 40 Mg Tab  40 Mg


 PO DAILY


    12/27/17 Reported


 


 Zyloprim


  (Allopurinol) 100


 Mg Tab  100 Mg


 PO BID


    12/27/17 Reported


 


 Aciphex


  (Rabeprazole


 Sodium) 20 Mg Tab  20 Mg


 PO DAILY


    12/27/17 Reported


 


 Vitamin B-1


  (Thiamine HCl) 50


 Mg Tab  50 Mg


 PO DAILY


    12/27/17 Reported


 


 Mirapex


  (Pramipexole


 Dihydrochloride)


 0.125 Mg Tab  0.25 Mg


 PO HS


    12/27/17 Reported


 


 Colace (Docusate


 Sodium) 100 Mg Cap  1 Cap


 PO BID


    12/27/17 Reported


 


 Renvela


  (Sevelamer


 Carbonate) 800 Mg


 Tab  1 Tab


 PO TID


    12/27/17 Reported


 


 Auryxia (Ferric


 Citrate) 210 Mg


 Tab  1 Tab


 PO DAILY


    12/27/17 Reported


 


 Mircera (Methoxy


 Polyethylene


 Glycol-Ep) 150


 Mcg/0.3 Ml Sol  1 Tab


 PO TID


    12/27/17 Reported


 


 Venofer (Iron


 Sucrose) 100 Mg/5


 Ml Inj  100 Mg


 IV


    12/27/17 Reported


 


 Venofer (Iron


 Sucrose) 20 Mg/Ml


 Inj  50 Mg


 IV


    12/27/17 Reported











Vital Signs


Weight (Kilograms):  104.500


Height (Feet):  5


Height (Inches):  4.00











  Date Time  Temp Pulse Resp B/P (MAP) Pulse Ox O2 Delivery O2 Flow Rate FiO2


 


2/8/18 11:54 36.3 98 20 105/72 (83) 91 Room Air  


 


2/8/18 08:30      Room Air  


 


2/8/18 08:18 36.8 99 20 102/67 (79) 89 Room Air  


 


2/8/18 08:00      Room Air  


 


2/8/18 07:16  96 18  91 Room Air  


 


2/8/18 04:55      Room Air  


 


2/8/18 04:27 36.9 105 18 104/64 (77) 91 Room Air  


 


2/8/18 00:50      Room Air  


 


2/7/18 23:22 37.1 103 18 92/55 (67) 91 Room Air  


 


2/7/18 20:00      Room Air  


 


2/7/18 19:55 37.0 105 22 97/60 (72) 95 Room Air  


 


2/7/18 18:21 36.5 95  90/54 (66)    


 


2/7/18 17:46  100  88/60    


 


2/7/18 17:30  94  98/52    


 


2/7/18 17:15  104  102/49    


 


2/7/18 17:00  93  87/56    


 


2/7/18 16:45  98  84/64    


 


2/7/18 16:30  89  99/51    


 


2/7/18 16:15  100  103/57    


 


2/7/18 16:00      Room Air  


 


2/7/18 16:00  100  103/49    


 


2/7/18 15:45  111  96/55    


 


2/7/18 15:42 36.6 75 16 115/68 (84) 97   


 


2/7/18 15:30  113  100/47    


 


2/7/18 15:15  100  73/46    


 


2/7/18 15:00  78  108/57    


 


2/7/18 14:50  68  100/51    


 


2/7/18 14:47 36.5 101  91/53 (66)    


 


2/7/18 14:10  105 18  94 Room Air  











Physical Exam


General Appearance:  no apparent distress


Respiratory/Chest:  


   Auscultation:  breath sounds normal, deminished air movement, decreased 

breath sounds, expiratory wheezing, rhonchi, rales/crackles on the left, rales/

crackles on the right


Cardiovascular:  


   Heart Auscultation:  RRR


Abdomen:  


   Inspection & Palpation:  soft





Assessment and Plan


stable for Flexi sig

## 2018-02-08 NOTE — GI REPORT
Procedure Date: 2/8/2018 1:36 PM

Procedure:            Flexible Sigmoidoscopy

Indications:          Diarrhea of presumed infectious origin

Medicines:            None

Complications:        No immediate complications.

Estimated Blood Loss: Estimated blood loss: none.

Procedure:            Pre-Anesthesia Assessment:

                      - Prior to the procedure, a History and Physical was 

                      performed, and patient medications, allergies and 

                      sensitivities were reviewed. The patient's tolerance of 

                      previous anesthesia was reviewed.

                      - The risks and benefits of the procedure and the 

                      sedation options and risks were discussed with the 

                      patient. All questions were answered and informed 

                      consent was obtained.

                      - Patient identification and proposed procedure were 

                      verified prior to the procedure by the physician and 

                      the nurse. The procedure was verified in the 

                      pre-procedure area.

                      - Pre-procedure physical examination revealed no 

                      contraindications to sedation.

                      - After reviewing the risks and benefits, the patient 

                      was deemed in satisfactory condition to undergo the 

                      procedure.

                      - Prior to the procedure, no anesthesia or sedation was 

                      planned.

                      After obtaining informed consent, the endoscope was 

                      passed under direct vision. Throughout the procedure, 

                      the patient's blood pressure, pulse, and oxygen 

                      saturations were monitored continuously. The Scope was 

                      introduced through the anus and advanced to about 40 

                      cm/splenic flexure area. The flexible sigmoidoscopy was 

                      accomplished without difficulty. The patient tolerated 

                      the procedure well. The quality of the bowel 

                      preparation was good.

Findings:

     The perianal and digital rectal examinations were normal.

     The entire examined colon appeared normal.

Impression:           - The entire examined colon is normal.

                      - No specimens collected.

                      - No signs of inflammation or endoscopic evidence of 

                      colitis.

Recommendation:       - Return patient to hospital [facility] for ongoing care.

Michele Lazar M.D.

Michele Lazar MD

2/8/2018 2:02:39 PM

This report has been signed electronically.

Note Initiated On: 2/8/2018 1:36 PM

     I attest to the content of the Intraoperative Record and orders 

     documented therein, exceptions below

## 2018-02-08 NOTE — PROGRESS NOTE
Medicine Progress Note


Date & Time of Visit:


Feb 8, 2018 at 16:20


.


Subjective





CC: 


Follow-up visit for multiple problems.





HPI: 


Occasional cough.


No SOB.


No chest pain.


Remains in atrial flutter with variable conduction.


Several episodes of diarrhea yesterday, better today.


Sigmoidoscopy done this morning by GI.


No nausea or vomiting.





ROS:


General- no fever, no chills


Resp- as noted above in HPI


Cardiac-  no chest pain, no edema


GI- as noted above in HPI


- anuric


.





Objective





Last 8 Hrs








  Date Time  Temp Pulse Resp B/P (MAP) Pulse Ox O2 Delivery O2 Flow Rate FiO2


 


2/8/18 19:39 37.0 84 16 92/56 (68) 97   


 


2/8/18 16:42 36.6 90 18 90/54 (66) 98   


 


2/8/18 16:00      Room Air  


 


2/8/18 14:15  97 20 91/69 (76) 98 Nasal Cannula 3 


 


2/8/18 14:05  100 20 99/68 (78) 98 Nasal Cannula 3 


 


2/8/18 13:58  98 20 111/56 (74) 97 Nasal Cannula 3 


 


2/8/18 13:58  98 20 96/55 97 Oxymask 10 


 


2/8/18 13:55  111 20 104/59 97 Oxymask 10 


 


2/8/18 13:53  96 20 103/68 96 Oxymask 10 


 


2/8/18 13:29 36.9 98 20 90/53 (65) 90 Room Air  


 


2/8/18 12:30      Room Air  


 


2/8/18 11:54 36.3 98 20 105/72 (83) 91 Room Air  








Physical Exam:





General- lying in bed; no distress


Lungs- diffuse mild wheezing; no respiratory distress


Cardiovascular- irregular;  no gallop; slight JVD; trace pretibial edema


Abdomen- + bowel sounds, soft, nontender 


Extremities- no cyanosis; no calf tenderness


Neuro- alert, oriented


Skin- warm & dry


.


Laboratory Results:





Last 24 Hours








Test


  2/7/18


20:40 2/8/18


06:36 2/8/18


06:58 2/8/18


11:31


 


Bedside Glucose 187 mg/dl  80 mg/dl   124 mg/dl 


 


White Blood Count   9.46 K/uL  


 


Red Blood Count   2.92 M/uL  


 


Hemoglobin   9.3 g/dL  


 


Hematocrit   30.9 %  


 


Mean Corpuscular Volume   105.8 fL  


 


Mean Corpuscular Hemoglobin   31.8 pg  


 


Mean Corpuscular Hemoglobin


Concent 


  


  30.1 g/dl 


  


 


 


RDW Standard Deviation   67.7 fL  


 


RDW Coefficient of Variation   17.7 %  


 


Platelet Count   148 K/uL  


 


Mean Platelet Volume   9.7 fL  


 


Nucleated RBC Absolute Count


(auto) 


  


  0.03 K/uL 


  


 


 


Nucleated Red Blood Cells %   0.4 %  


 


Prothrombin Time   30.8 SECONDS  


 


Prothromb Time International


Ratio 


  


  3.0 


  


 


 


Test


  2/8/18


16:07 


  


  


 


 


Bedside Glucose 213 mg/dl    











Assessment & Plan





HYPOTENSION


Improved.


Started on midodrine.


Ongoing hypotension with hemodialysis.


Continue to monitor.





ATRIAL FLUTTER


Cardiology consulted.


Antiarrhythmic therapy with amiodarone initiated.


Anticoagulated with IV heparin --> warfarin.


Titrate warfarin.


Possible cardioversion after adequate course of therapeutic anticoagulation.





UTI E COLI (present on admission)


UA on admission demonstrated WBC's and bacteria.


Urine culture grew E coli, ESBL.


Treated with course of ertapenem.





C DIFFICILE COLITIS / PERSISTENT DIARRHEA


Persistent diarrhea.


Received IV metronidazole and PO vancomycin.


Finished course of metronidazole; PO vancomycin continued.


Cholestyramine started by GI.


Sigmoidoscopy unrevealing.





BRONCHITIS / EXACERBATION OF COPD


Received course of azithromycin.


Taper steroids.





SBO 


Resolved.





DM TYPE 2


Pharmacy consulted for glycemic management.


FBS = 80.


Taper steroids.





CIRRHOSIS


Noted on CT of chest.


Will need outpatient follow-up with GI.





RIGHT ANKLE SPRAIN


Seen by Ortho.


CAM boot ordered.





VTE PROPHYLAXIS


Received IV heparin --> warfarin.


Ambulate as able.





DISPOSITION


To be determined.


Anticipated need for rehab or skilled care.


.


Consultants:


[facility]


[facility]


Current Inpatient Medications:





Current Inpatient Medications








 Medications


  (Trade)  Dose


 Ordered  Sig/Daniel


 Route  Start Time


 Stop Time Status Last Admin


Dose Admin


 


 Miscellaneous


 Information


  (Consult


 Glycemic


 Management


 Pharmacy)  1 ea  UD  PRN


 N/A  1/16/18 04:15


 2/15/18 04:14   


 


 


 Glucose


  (Glucose 40% Gel)  15-30


 GRAMS 15


 GRAMS...  UD  PRN


 PO  1/16/18 04:30


 2/15/18 04:29   


 


 


 Glucose


  (Glucose Chew


 Tab)  4-8


 Tablets 4


 Tabl...  UD  PRN


 PO  1/16/18 04:30


 2/15/18 04:29   


 


 


 Dextrose


  (Dextrose 50%


 50ML Syringe)  25-50ML OF


 50% DW IV


 FOR...  UD  PRN


 IV  1/16/18 04:30


 2/15/18 04:29  1/17/18 09:52


25 ML


 


 Glucagon


  (Glucagon Inj)  1 mg  UD  PRN


 SQ  1/16/18 04:30


 2/15/18 04:29   


 


 


 Ondansetron HCl


  (Zofran Inj)  4 mg  Q4H  PRN


 IV  1/16/18 18:15


 2/15/18 18:14  2/1/18 12:37


4 MG


 


 Acetaminophen 650


 mg/Empty Bag  65 ml @ 


 260 mls/hr  Q6H  PRN


 IV  1/23/18 16:30


 2/22/18 16:29  1/31/18 21:53


260 MLS/HR


 


 Pantoprazole


 Sodium


  (Protonix Tab)  40 mg  QAM


 PO  1/26/18 09:00


 2/25/18 08:59  2/8/18 08:34


40 MG


 


 Levalbuterol


  (Xopenex 1.25MG/


 3ML Neb)  1.25 mg  Q6R


 INH  1/26/18 21:00


 2/25/18 20:59  2/8/18 19:10


1.25 MG


 


 Midodrine


  (Proamatine Tab)  5 mg  TID@08,12,17


 PO  1/28/18 17:00


 2/27/18 16:59  2/8/18 17:13


5 MG


 


 Insulin Aspart


  (novoLOG ASPART)  **SLIDING


 SCALE**


 **G...  ACHS


 SC  1/31/18 07:00


 3/2/18 06:59 Future hold 2/8/18 16:48


4 UNITS


 


 Insulin Glargine


  (Lantus Solostar


 Pen)  10 units  BID


 SC  1/31/18 09:00


 3/2/18 08:59  2/7/18 21:29


10 UNITS


 


 Vitamin B Complex/


 Vit C/Folic Acid


  (Nephrocaps)  1 cap  HS


 PO  1/31/18 21:00


 3/2/18 20:59  2/7/18 21:20


1 CAP


 


 Sevelamer HCl


  (Renagel Tab)  800 mg  TIDM


 PO  1/31/18 11:30


 3/2/18 11:29  2/8/18 16:45


800 MG


 


 Amiodarone HCl


  (Cordarone Tab)  200 mg  BIDM


 PO  2/1/18 16:45


 3/1/18 16:44  2/8/18 16:46


200 MG


 


 Salmeterol


 Xinafoate/


 Fluticasone


  (Advair Diskus


 250/50 Inh)  1 puff  BID


 INH  2/4/18 09:00


 3/6/18 08:59  2/8/18 08:32


1 PUFF


 


 Raspberry


  (Raspberry Syrup


 5ml Cup)  5 ml  QID


 PO  2/4/18 13:00


 2/18/18 11:59  2/8/18 16:45


5 ML


 


 Vancomycin HCl


  (Vancomycin Oral


 Soln)  125 mg  QID


 PO  2/4/18 13:00


 2/18/18 11:59  2/8/18 16:45


125 MG


 


 Benzonatate


  (Tessalon Perles


 Cap)  100 mg  TID  PRN


 PO  2/5/18 09:00


 3/7/18 08:59  2/7/18 08:08


100 MG


 


 Warfarin Sodium


  (Coumadin Tab)  2.5 mg  DAILY@16


 PO  2/6/18 16:00


 3/8/18 15:59  2/8/18 15:50


2.5 MG


 


 Prednisone


  (PredniSONE TAB)  20 mg  DAILY


 PO  2/7/18 09:00


 3/9/18 08:59  2/8/18 08:33


20 MG


 


 Cholestyramine


 Resin


  (Questran Powder


 Light)  2 gm  BID@10,22


 PO  2/7/18 22:00


 3/9/18 21:59  2/7/18 22:24


2 GM


 


 Insulin Human NPH


  (novoLIN-N U-100


 NPH PER UNIT)  20 units  QAM


 SQ  2/8/18 09:00


 3/10/18 08:59  2/8/18 09:08


20 UNITS


 


 Dicyclomine HCl


  (Bentyl Tab)  10 mg  TID


 PO  2/8/18 21:00


 3/10/18 20:59

## 2018-02-09 VITALS — DIASTOLIC BLOOD PRESSURE: 44 MMHG | SYSTOLIC BLOOD PRESSURE: 88 MMHG | HEART RATE: 77 BPM

## 2018-02-09 VITALS — SYSTOLIC BLOOD PRESSURE: 106 MMHG | HEART RATE: 102 BPM | DIASTOLIC BLOOD PRESSURE: 61 MMHG

## 2018-02-09 VITALS
TEMPERATURE: 98.06 F | SYSTOLIC BLOOD PRESSURE: 88 MMHG | OXYGEN SATURATION: 100 % | HEART RATE: 94 BPM | DIASTOLIC BLOOD PRESSURE: 53 MMHG

## 2018-02-09 VITALS — DIASTOLIC BLOOD PRESSURE: 48 MMHG | SYSTOLIC BLOOD PRESSURE: 98 MMHG | HEART RATE: 83 BPM

## 2018-02-09 VITALS — SYSTOLIC BLOOD PRESSURE: 106 MMHG | HEART RATE: 78 BPM | DIASTOLIC BLOOD PRESSURE: 58 MMHG

## 2018-02-09 VITALS — DIASTOLIC BLOOD PRESSURE: 51 MMHG | SYSTOLIC BLOOD PRESSURE: 89 MMHG | HEART RATE: 96 BPM

## 2018-02-09 VITALS — SYSTOLIC BLOOD PRESSURE: 103 MMHG | HEART RATE: 76 BPM | DIASTOLIC BLOOD PRESSURE: 48 MMHG | TEMPERATURE: 97.88 F

## 2018-02-09 VITALS
TEMPERATURE: 98.24 F | SYSTOLIC BLOOD PRESSURE: 102 MMHG | OXYGEN SATURATION: 94 % | HEART RATE: 95 BPM | DIASTOLIC BLOOD PRESSURE: 58 MMHG

## 2018-02-09 VITALS — SYSTOLIC BLOOD PRESSURE: 112 MMHG | HEART RATE: 94 BPM | DIASTOLIC BLOOD PRESSURE: 57 MMHG

## 2018-02-09 VITALS
OXYGEN SATURATION: 92 % | SYSTOLIC BLOOD PRESSURE: 87 MMHG | DIASTOLIC BLOOD PRESSURE: 52 MMHG | HEART RATE: 95 BPM | TEMPERATURE: 98.24 F

## 2018-02-09 VITALS — DIASTOLIC BLOOD PRESSURE: 53 MMHG | HEART RATE: 102 BPM | SYSTOLIC BLOOD PRESSURE: 88 MMHG

## 2018-02-09 VITALS — DIASTOLIC BLOOD PRESSURE: 61 MMHG | HEART RATE: 88 BPM | SYSTOLIC BLOOD PRESSURE: 93 MMHG

## 2018-02-09 VITALS — HEART RATE: 84 BPM | OXYGEN SATURATION: 94 %

## 2018-02-09 VITALS — OXYGEN SATURATION: 93 % | HEART RATE: 76 BPM

## 2018-02-09 VITALS — OXYGEN SATURATION: 92 %

## 2018-02-09 VITALS — SYSTOLIC BLOOD PRESSURE: 91 MMHG | DIASTOLIC BLOOD PRESSURE: 57 MMHG | HEART RATE: 69 BPM

## 2018-02-09 VITALS — SYSTOLIC BLOOD PRESSURE: 87 MMHG | DIASTOLIC BLOOD PRESSURE: 46 MMHG | HEART RATE: 73 BPM

## 2018-02-09 VITALS
HEART RATE: 76 BPM | OXYGEN SATURATION: 94 % | TEMPERATURE: 98.42 F | SYSTOLIC BLOOD PRESSURE: 104 MMHG | DIASTOLIC BLOOD PRESSURE: 68 MMHG

## 2018-02-09 VITALS
OXYGEN SATURATION: 92 % | TEMPERATURE: 98.24 F | DIASTOLIC BLOOD PRESSURE: 72 MMHG | SYSTOLIC BLOOD PRESSURE: 107 MMHG | HEART RATE: 79 BPM

## 2018-02-09 VITALS — SYSTOLIC BLOOD PRESSURE: 105 MMHG | HEART RATE: 87 BPM | DIASTOLIC BLOOD PRESSURE: 43 MMHG

## 2018-02-09 VITALS
DIASTOLIC BLOOD PRESSURE: 66 MMHG | HEART RATE: 78 BPM | OXYGEN SATURATION: 93 % | TEMPERATURE: 98.24 F | SYSTOLIC BLOOD PRESSURE: 103 MMHG

## 2018-02-09 VITALS — HEART RATE: 92 BPM | OXYGEN SATURATION: 94 %

## 2018-02-09 VITALS — SYSTOLIC BLOOD PRESSURE: 102 MMHG | DIASTOLIC BLOOD PRESSURE: 62 MMHG | HEART RATE: 81 BPM

## 2018-02-09 LAB
BUN SERPL-MCNC: 53 MG/DL (ref 7–18)
CALCIUM SERPL-MCNC: 7.1 MG/DL (ref 8.5–10.1)
CO2 SERPL-SCNC: 22 MMOL/L (ref 21–32)
CREAT SERPL-MCNC: 7.03 MG/DL (ref 0.6–1.2)
GLUCOSE SERPL-MCNC: 92 MG/DL (ref 70–99)
INR PPP: 3.2 (ref 0.9–1.1)
POTASSIUM SERPL-SCNC: 4.1 MMOL/L (ref 3.5–5.1)
SODIUM SERPL-SCNC: 137 MMOL/L (ref 136–145)

## 2018-02-09 RX ADMIN — AMIODARONE HYDROCHLORIDE SCH MG: 200 TABLET ORAL at 16:29

## 2018-02-09 RX ADMIN — VANCOMYCIN HYDROCHLORIDE SCH MG: 1 INJECTION, POWDER, LYOPHILIZED, FOR SOLUTION INTRAVENOUS at 13:59

## 2018-02-09 RX ADMIN — MIDODRINE HYDROCHLORIDE SCH MG: 2.5 TABLET ORAL at 13:57

## 2018-02-09 RX ADMIN — AMIODARONE HYDROCHLORIDE SCH MG: 200 TABLET ORAL at 07:49

## 2018-02-09 RX ADMIN — RENAGEL SCH MG: 800 TABLET ORAL at 07:49

## 2018-02-09 RX ADMIN — VANCOMYCIN HYDROCHLORIDE SCH MG: 1 INJECTION, POWDER, LYOPHILIZED, FOR SOLUTION INTRAVENOUS at 09:04

## 2018-02-09 RX ADMIN — DICYCLOMINE HYDROCHLORIDE SCH MG: 20 TABLET ORAL at 13:58

## 2018-02-09 RX ADMIN — VANCOMYCIN HYDROCHLORIDE SCH MG: 1 INJECTION, POWDER, LYOPHILIZED, FOR SOLUTION INTRAVENOUS at 16:32

## 2018-02-09 RX ADMIN — ASCORBIC ACID, THIAMINE MONONITRATE,RIBOFLAVIN, NIACINAMIDE, PYRIDOXINE HYDROCHLORIDE, FOLIC ACID, CYANOCOBALAMIN, BIOTIN, CALCIUM PANTOTHENATE, SCH CAP: 100; 1.5; 1.7; 20; 10; 1; 6000; 150000; 5 CAPSULE, LIQUID FILLED ORAL at 20:04

## 2018-02-09 RX ADMIN — INSULIN ASPART SCH UNITS: 100 INJECTION, SOLUTION INTRAVENOUS; SUBCUTANEOUS at 20:52

## 2018-02-09 RX ADMIN — ALBUMIN HUMAN SCH GM: 250 SOLUTION INTRAVENOUS at 09:34

## 2018-02-09 RX ADMIN — INSULIN ASPART SCH UNITS: 100 INJECTION, SOLUTION INTRAVENOUS; SUBCUTANEOUS at 17:49

## 2018-02-09 RX ADMIN — Medication SCH ML: at 07:51

## 2018-02-09 RX ADMIN — MIDODRINE HYDROCHLORIDE SCH MG: 2.5 TABLET ORAL at 16:30

## 2018-02-09 RX ADMIN — INSULIN GLARGINE SCH UNITS: 100 INJECTION, SOLUTION SUBCUTANEOUS at 08:01

## 2018-02-09 RX ADMIN — ACETAMINOPHEN PRN MLS/HR: 10 INJECTION, SOLUTION INTRAVENOUS at 20:59

## 2018-02-09 RX ADMIN — VANCOMYCIN HYDROCHLORIDE SCH MG: 1 INJECTION, POWDER, LYOPHILIZED, FOR SOLUTION INTRAVENOUS at 20:02

## 2018-02-09 RX ADMIN — Medication SCH ML: at 16:30

## 2018-02-09 RX ADMIN — ALBUMIN HUMAN SCH GM: 250 SOLUTION INTRAVENOUS at 11:24

## 2018-02-09 RX ADMIN — LEVALBUTEROL HYDROCHLORIDE SCH MG: 1.25 SOLUTION RESPIRATORY (INHALATION) at 14:10

## 2018-02-09 RX ADMIN — PANTOPRAZOLE SCH MG: 40 TABLET, DELAYED RELEASE ORAL at 07:51

## 2018-02-09 RX ADMIN — DICYCLOMINE HYDROCHLORIDE SCH MG: 20 TABLET ORAL at 20:04

## 2018-02-09 RX ADMIN — FLUTICASONE PROPIONATE AND SALMETEROL SCH PUFF: 50; 250 POWDER RESPIRATORY (INHALATION) at 07:50

## 2018-02-09 RX ADMIN — BENZONATATE PRN MG: 100 CAPSULE ORAL at 20:03

## 2018-02-09 RX ADMIN — INSULIN ASPART SCH UNITS: 100 INJECTION, SOLUTION INTRAVENOUS; SUBCUTANEOUS at 07:00

## 2018-02-09 RX ADMIN — LEVALBUTEROL HYDROCHLORIDE SCH MG: 1.25 SOLUTION RESPIRATORY (INHALATION) at 20:34

## 2018-02-09 RX ADMIN — CHOLESTYRAMINE SCH GM: 4 POWDER, FOR SUSPENSION ORAL at 21:18

## 2018-02-09 RX ADMIN — RENAGEL SCH MG: 800 TABLET ORAL at 16:30

## 2018-02-09 RX ADMIN — Medication SCH ML: at 20:03

## 2018-02-09 RX ADMIN — Medication SCH ML: at 13:58

## 2018-02-09 RX ADMIN — FLUTICASONE PROPIONATE AND SALMETEROL SCH PUFF: 50; 250 POWDER RESPIRATORY (INHALATION) at 20:02

## 2018-02-09 RX ADMIN — LEVALBUTEROL HYDROCHLORIDE SCH MG: 1.25 SOLUTION RESPIRATORY (INHALATION) at 01:47

## 2018-02-09 RX ADMIN — INSULIN GLARGINE SCH UNITS: 100 INJECTION, SOLUTION SUBCUTANEOUS at 20:53

## 2018-02-09 RX ADMIN — DICYCLOMINE HYDROCHLORIDE SCH MG: 20 TABLET ORAL at 07:50

## 2018-02-09 RX ADMIN — CHOLESTYRAMINE SCH GM: 4 POWDER, FOR SUSPENSION ORAL at 09:04

## 2018-02-09 RX ADMIN — WARFARIN SODIUM SCH MG: 2.5 TABLET ORAL at 16:29

## 2018-02-09 RX ADMIN — RENAGEL SCH MG: 800 TABLET ORAL at 13:57

## 2018-02-09 RX ADMIN — MIDODRINE HYDROCHLORIDE SCH MG: 2.5 TABLET ORAL at 07:49

## 2018-02-09 RX ADMIN — INSULIN ASPART SCH UNITS: 100 INJECTION, SOLUTION INTRAVENOUS; SUBCUTANEOUS at 14:00

## 2018-02-09 NOTE — PHARMACY PROGRESS NOTE
Pharmacy Glycemic Short Note 2


Date of Service


Feb 9, 2018.


OUTPATIENT ANTIDIABETIC REGIMEN: 


* NPH 40 units SQ daily when taking prednisone (otherwise no insulin taken)





ASSESSMENT:





2/7/18


* Post prandial BSGs became elevated on 2/6 despite no changes to risk factors 

for hyperglycemia.


* Prednisone decreased from 30 mg to 20 mg daily starting this morning. 


* Changes to regimen:


 * Will add a one time weight based dose of NPH to cover steroid effects (0.2 

unit/kg). 


 * Loosen bolus insulin parameters since NPH was added


 * Fasting BSG at goal, 113 mg/dL - no change to Lantus dose


2/9/18


* Post Prandial BSGs became elevated on 2/8/18 at dinner and bedtime (-213

-214) indicating that the NPH dose may not be effective.  Yesterday, blood 

sugars ranged from  mg/dL with fasting today of 92 mg/dL.


* Prednisone 20 mg daily decreasing to 15 mg daily.


* Changes to regimen:


 * Increased NPH to 25 units with prednisone today. Since prednisone decreased 

tomorrow, will decrease NPH proportionally to 20 units tomorrow. 


 * Continue Novolog coverage 


 


PLAN FOR INPATIENT GLYCEMIC CONTROL:





* NPH 25 units SQ qAM x 1 then 20 units SQ qAM starting tomorrow





* Basal Insulin: 


 * Lantus 10 units SQ BID


 


* Bolus Insulin:


 * NOVOLOG per scale ACHS


 * Goal Range:  Low 110 mg/dL - High 140 mg/dL


 * Correction Factor: 25 mg/dL/unit


 * Nutritional / Prandial insulin:  1 units for every 9 grams of carb 





PLAN FOR DISCHARGE:


* A1c 6.5% indicates good outpatient glycemic control, although pt is on HD


* Expect that patient may be discharged on previous home regimen unless 

steroids are continued at discharge.





Thank you.

## 2018-02-09 NOTE — PROGRESS NOTE
Internal Med Progress Note


Date of Service:


Feb 9, 2018.


Provider Documentation:





SUBJECTIVE:





starting to have dialysis


says feeling fine


diarrhea has improved


no nausea or abdominal pain


no sob or chest pain


cough improved


afebrile











OBJECTIVE:





Vital Signs-as noted below





Exam:


General-alert and oriented. Not in distress


ENT-Normal hearing


Neck-no neck masses


Lungs-cta b/l no wheezing or crackles


Heart-S 1 and S 2heard regular rate and rhythm no murmurs


Abdomen-soft bowel sounds present non tender no  distension 


Extremities-no edema no erythema 


Neuro-alert and awake 


moves extremities





Lab data as noted below.


ASSESSMENT & PLAN:


Ongoing hypotension with hemodialysis.


on midodrine


Continue to monitor.





ATRIAL FLUTTER


Antiarrhythmic therapy with amiodarone initiated  by cardiology.


was on  IV heparin  but stopped now as inr therapeutic


inr 3.2 today


Possible cardioversion after adequate course of therapeutic anticoagulation.





UTI E COLI (present on admission)


Urine culture grew E coli, ESBL.


received  course of ertapenem.





C DIFFICILE COLITIS / PERSISTENT DIARRHEA


Persistent diarrhea.


Received IV metronidazole and PO vancomycin.


Finished course of metronidazole; PO vancomycin continued.


Cholestyramine started by GI.


Sigmoidoscopy unremarkable


repeat c diff check negative


diarrhea improved as per patient now.





BRONCHITIS / EXACERBATION OF COPD


Received course of azithromycin.


Tapering steroids.





SBO 


Resolved.





DM TYPE 2


Pharmacy consulted for glycemic management.


FBS = 100


Tapering  steroids.





CIRRHOSIS


Noted on CT of chest.


outpatient follow-up with GI.





RIGHT ANKLE SPRAIN


Seen by Ortho.


CAM boot ordered.





VTE PROPHYLAXIS


Inr therapeutic


Ambulate as able.





DISPOSITION


To be determined.


May  need for rehab or skilled care.


Social service for d/c planning





Vital Signs:











  Date Time  Temp Pulse Resp B/P (MAP) Pulse Ox O2 Delivery O2 Flow Rate FiO2


 


2/9/18 19:35  84 18  94 Room Air  


 


2/9/18 19:11 36.8 95 18 87/52 (64) 92 Room Air  


 


2/9/18 16:27 36.7 94 18 88/53 (65) 100 Room Air  


 


2/9/18 16:00      Room Air  


 


2/9/18 14:11  76 18  93 Room Air  


 


2/9/18 13:00 36.6 76  103/48 (66)    


 


2/9/18 12:15  87  105/43    


 


2/9/18 12:00  73  87/46    


 


2/9/18 12:00      Room Air  


 


2/9/18 11:45  77  88/44    


 


2/9/18 11:30  102  106/61    


 


2/9/18 11:15  96  89/51    


 


2/9/18 11:00  94  112/57    


 


2/9/18 10:45  81  102/62    


 


2/9/18 10:30  78  106/58    


 


2/9/18 10:15  69  91/57    


 


2/9/18 10:00  88  93/61    


 


2/9/18 09:45  102  88/53    


 


2/9/18 09:29  83  98/48    


 


2/9/18 08:00      Room Air  


 


2/9/18 07:46 36.9 76 16 104/68 (80) 94 Room Air  


 


2/9/18 04:00     92 Room Air  


 


2/9/18 03:25 36.8 79 16 107/72 (84) 92   


 


2/9/18 01:48  92 18  94 Room Air  


 


2/9/18 00:02 36.8 78 16 103/66 (78) 93   


 


2/9/18 00:00      Room Air  








Lab Results:





Results Past 24 Hours








Test


  2/9/18


06:49 2/9/18


07:09 2/9/18


07:57 2/9/18


12:50 Range/Units


 


 


Bedside Glucose 100   103 70-90  mg/dl


 


Sodium Level  137   136-145  mmol/L


 


Potassium Level  4.1   3.5-5.1  mmol/L


 


Chloride Level  104     mmol/L


 


Carbon Dioxide Level  22   21-32  mmol/L


 


Anion Gap  11.0   3-11  mmol/L


 


Blood Urea Nitrogen  53   7-18  mg/dl


 


Creatinine


  


  7.03


  


  


  0.60-1.20


mg/dl


 


Est Creatinine Clear Calc


Drug Dose 


  10.1


  


  


   ml/min


 


 


Estimated GFR (


American) 


  6.8


  


  


   


 


 


Estimated GFR (Non-


American 


  5.8


  


  


   


 


 


BUN/Creatinine Ratio  7.6   10-20  


 


Random Glucose  92   70-99  mg/dl


 


Calcium Level  7.1   8.5-10.1  mg/dl


 


Prothrombin Time


  


  


  32.8


  


  9.0-12.0


SECONDS


 


Prothromb Time International


Ratio 


  


  3.2


  


  0.9-1.1  


 


 


Test


  2/9/18


16:19 2/9/18


20:25 


  


  Range/Units


 


 


Bedside Glucose 185 166   70-90  mg/dl

## 2018-02-09 NOTE — GASTROENTEROLOGY PROGRESS NOTE
Progress Note


Date of Service:  Feb 9, 2018


Subjective


Pt evaluation today including:  conversation w/ patient, physical exam, chart 

review, lab review, review of inpatient medication list


Pt reports having less BMs, but still quite loose stools. No more abd pain, no n

/v, tolerating diet well.





Review of Systems


Constitutional:  No fever, No chills


Respiratory:  No cough, No shortness of breath


Cardiac:  No chest pain


Abdomen:  + diarrhea, No pain, No nausea, No vomiting





Medications





Current Inpatient Medications








 Medications


  (Trade)  Dose


 Ordered  Sig/Daniel


 Route  Start Time


 Stop Time Status Last Admin


Dose Admin


 


 Miscellaneous


 Information


  (Consult


 Glycemic


 Management


 Pharmacy)  1 ea  UD  PRN


 N/A  1/16/18 04:15


 2/15/18 04:14   


 


 


 Glucose


  (Glucose 40% Gel)  15-30


 GRAMS 15


 GRAMS...  UD  PRN


 PO  1/16/18 04:30


 2/15/18 04:29   


 


 


 Glucose


  (Glucose Chew


 Tab)  4-8


 Tablets 4


 Tabl...  UD  PRN


 PO  1/16/18 04:30


 2/15/18 04:29   


 


 


 Dextrose


  (Dextrose 50%


 50ML Syringe)  25-50ML OF


 50% DW IV


 FOR...  UD  PRN


 IV  1/16/18 04:30


 2/15/18 04:29  1/17/18 09:52


25 ML


 


 Glucagon


  (Glucagon Inj)  1 mg  UD  PRN


 SQ  1/16/18 04:30


 2/15/18 04:29   


 


 


 Ondansetron HCl


  (Zofran Inj)  4 mg  Q4H  PRN


 IV  1/16/18 18:15


 2/15/18 18:14  2/1/18 12:37


4 MG


 


 Acetaminophen 650


 mg/Empty Bag  65 ml @ 


 260 mls/hr  Q6H  PRN


 IV  1/23/18 16:30


 2/22/18 16:29  1/31/18 21:53


260 MLS/HR


 


 Pantoprazole


 Sodium


  (Protonix Tab)  40 mg  QAM


 PO  1/26/18 09:00


 2/25/18 08:59  2/9/18 07:51


40 MG


 


 Levalbuterol


  (Xopenex 1.25MG/


 3ML Neb)  1.25 mg  Q6R


 INH  1/26/18 21:00


 2/25/18 20:59  2/9/18 01:47


1.25 MG


 


 Midodrine


  (Proamatine Tab)  5 mg  TID@08,12,17


 PO  1/28/18 17:00


 2/27/18 16:59  2/9/18 07:49


5 MG


 


 Insulin Aspart


  (novoLOG ASPART)  **SLIDING


 SCALE**


 **G...  ACHS


 SC  1/31/18 07:00


 3/2/18 06:59 Future hold 2/8/18 21:05


8 UNITS


 


 Insulin Glargine


  (Lantus Solostar


 Pen)  10 units  BID


 SC  1/31/18 09:00


 3/2/18 08:59  2/9/18 08:01


10 UNITS


 


 Vitamin B Complex/


 Vit C/Folic Acid


  (Nephrocaps)  1 cap  HS


 PO  1/31/18 21:00


 3/2/18 20:59  2/8/18 21:00


1 CAP


 


 Sevelamer HCl


  (Renagel Tab)  800 mg  TIDM


 PO  1/31/18 11:30


 3/2/18 11:29  2/9/18 07:49


800 MG


 


 Amiodarone HCl


  (Cordarone Tab)  200 mg  BIDM


 PO  2/1/18 16:45


 3/1/18 16:44  2/9/18 07:49


200 MG


 


 Salmeterol


 Xinafoate/


 Fluticasone


  (Advair Diskus


 250/50 Inh)  1 puff  BID


 INH  2/4/18 09:00


 3/6/18 08:59  2/9/18 07:50


1 PUFF


 


 Raspberry


  (Raspberry Syrup


 5ml Cup)  5 ml  QID


 PO  2/4/18 13:00


 2/18/18 11:59  2/9/18 07:51


5 ML


 


 Vancomycin HCl


  (Vancomycin Oral


 Soln)  125 mg  QID


 PO  2/4/18 13:00


 2/18/18 11:59  2/9/18 09:04


125 MG


 


 Benzonatate


  (Tessalon Perles


 Cap)  100 mg  TID  PRN


 PO  2/5/18 09:00


 3/7/18 08:59  2/7/18 08:08


100 MG


 


 Warfarin Sodium


  (Coumadin Tab)  2.5 mg  DAILY@16


 PO  2/6/18 16:00


 3/8/18 15:59  2/8/18 15:50


2.5 MG


 


 Cholestyramine


 Resin


  (Questran Powder


 Light)  2 gm  BID@10,22


 PO  2/7/18 22:00


 3/9/18 21:59  2/9/18 09:04


2 GM


 


 Dicyclomine HCl


  (Bentyl Tab)  10 mg  TID


 PO  2/8/18 21:00


 3/10/18 20:59  2/9/18 07:50


10 MG


 


 Prednisone


  (PredniSONE TAB)  15 mg  DAILY


 PO  2/10/18 09:00


 3/12/18 08:59   


 


 


 Epoetin Geovany


  (Procrit Inj)  10,000 units  TODAY@0800


 IV.  2/9/18 08:00


 2/9/18 23:59  2/9/18 12:22


10,000 UNITS


 


 Albumin Human


  (Albumin 25%)  12.5 gm  TODAY@0800,0900


 IV  2/9/18 08:00


 2/9/18 23:59  2/9/18 11:24


12.5 GM


 


 Insulin Human NPH


  (novoLIN-N NPH)  15 units  QDB


 SC  2/10/18 07:30


 3/12/18 07:29   


 











Objective


Vital Signs











  Date Time  Temp Pulse Resp B/P (MAP) Pulse Ox O2 Delivery O2 Flow Rate FiO2


 


2/9/18 12:15  87  105/43    


 


2/9/18 12:00  73  87/46    


 


2/9/18 11:45  77  88/44    


 


2/9/18 11:30  102  106/61    


 


2/9/18 11:15  96  89/51    


 


2/9/18 11:00  94  112/57    


 


2/9/18 10:45  81  102/62    


 


2/9/18 10:30  78  106/58    


 


2/9/18 10:15  69  91/57    


 


2/9/18 10:00  88  93/61    


 


2/9/18 09:45  102  88/53    


 


2/9/18 09:29  83  98/48    


 


2/9/18 08:00      Room Air  


 


2/9/18 07:46 36.9 76 16 104/68 (80) 94 Room Air  


 


2/9/18 04:00     92 Room Air  


 


2/9/18 03:25 36.8 79 16 107/72 (84) 92   


 


2/9/18 01:48  92 18  94 Room Air  


 


2/9/18 00:02 36.8 78 16 103/66 (78) 93   


 


2/9/18 00:00      Room Air  


 


2/8/18 20:00      Room Air  


 


2/8/18 19:39 37.0 84 16 92/56 (68) 97   


 


2/8/18 19:10  103 18  94 Room Air  


 


2/8/18 16:42 36.6 90 18 90/54 (66) 98   


 


2/8/18 16:00      Room Air  


 


2/8/18 14:15  97 20 91/69 (76) 98 Nasal Cannula 3 


 


2/8/18 14:05  100 20 99/68 (78) 98 Nasal Cannula 3 


 


2/8/18 13:58  98 20 111/56 (74) 97 Nasal Cannula 3 


 


2/8/18 13:58  98 20 96/55 97 Oxymask 10 


 


2/8/18 13:55  111 20 104/59 97 Oxymask 10 


 


2/8/18 13:53  96 20 103/68 96 Oxymask 10 


 


2/8/18 13:29 36.9 98 20 90/53 (65) 90 Room Air  


 


2/8/18 12:30      Room Air  











Physical Exam


General Appearance:  WD/WN, no apparent distress


Eyes:  normal inspection, PERRL, EOMI


Neck:  supple, no JVD, trachea midline


Respiratory/Chest:  no respiratory distress, no accessory muscle use, + 

decreased breath sounds


Cardiovascular:  regular rate, rhythm, no gallop, no murmur


Abdomen:  normal bowel sounds, non tender, soft


Extremities:  normal inspection, no pedal edema, no calf tenderness


Neurologic/Psych:  alert, normal mood/affect, oriented x 3


Skin:  normal color, no jaundice, no rash





Laboratory Results





Last 24 Hours








Test


  2/8/18


16:07 2/8/18


19:56 2/9/18


06:49 2/9/18


07:09


 


Bedside Glucose 213 mg/dl  214 mg/dl  100 mg/dl  


 


Sodium Level    137 mmol/L 


 


Potassium Level    4.1 mmol/L 


 


Chloride Level    104 mmol/L 


 


Carbon Dioxide Level    22 mmol/L 


 


Anion Gap    11.0 mmol/L 


 


Blood Urea Nitrogen    53 mg/dl 


 


Creatinine    7.03 mg/dl 


 


Est Creatinine Clear Calc


Drug Dose 


  


  


  10.1 ml/min 


 


 


Estimated GFR (


American) 


  


  


  6.8 


 


 


Estimated GFR (Non-


American 


  


  


  5.8 


 


 


BUN/Creatinine Ratio    7.6 


 


Random Glucose    92 mg/dl 


 


Calcium Level    7.1 mg/dl 


 


Test


  2/9/18


07:57 


  


  


 


 


Prothrombin Time 32.8 SECONDS    


 


Prothromb Time International


Ratio 3.2 


  


  


  


 











Assessment and Plan


Pt is a 56 y/o female seen previously for Cdiff colitis, SBO. GI reconsulted 

for persistent diarrhea despite completion of Flagyl x 10 days, Vancomycin 

enema. Currently still on Vancomycin 125mg PO QID since 1/20. Repeat CDiff 

stool negative. Flex sig on 2/8 normal w/o evidence of colitis. She is having 

less BMs but stools are still loose. 





- Repeat Cdiff stool test


- Obtain KUB


- Scheduled Dicyclomine 10mg TID 


- Increased Questran to 4g BID. 








I performed a history and physical examination of the patient.  I have 

discussed the patient's case, impression and plan with [facility] [employer]. Her 

note reflects my findings and plan. Stools improving; more formed. Continue 

current care.





Michele Lazar MD

## 2018-02-09 NOTE — NEPHROLOGY PROGRESS NOTE
Nephrology Progress Note


Date of Service:


Feb 9, 2018.


Subjective


56 yo female with complicated hospital admission with ecoli uti/ cdiff / 

hypotension / aflutter. pt doing well. no complaints. pt hoping to leave today.





Objective











  Date Time  Temp Pulse Resp B/P (MAP) Pulse Ox O2 Delivery O2 Flow Rate FiO2


 


2/9/18 07:46 36.9 76 16 104/68 (80) 94 Room Air  


 


2/9/18 04:00     92 Room Air  


 


2/9/18 03:25 36.8 79 16 107/72 (84) 92   


 


2/9/18 01:48  92 18  94 Room Air  


 


2/9/18 00:02 36.8 78 16 103/66 (78) 93   


 


2/9/18 00:00      Room Air  


 


2/8/18 20:00      Room Air  


 


2/8/18 19:39 37.0 84 16 92/56 (68) 97   


 


2/8/18 19:10  103 18  94 Room Air  


 


2/8/18 16:42 36.6 90 18 90/54 (66) 98   


 


2/8/18 16:00      Room Air  


 


2/8/18 14:15  97 20 91/69 (76) 98 Nasal Cannula 3 


 


2/8/18 14:05  100 20 99/68 (78) 98 Nasal Cannula 3 


 


2/8/18 13:58  98 20 111/56 (74) 97 Nasal Cannula 3 


 


2/8/18 13:58  98 20 96/55 97 Oxymask 10 


 


2/8/18 13:55  111 20 104/59 97 Oxymask 10 


 


2/8/18 13:53  96 20 103/68 96 Oxymask 10 


 


2/8/18 13:29 36.9 98 20 90/53 (65) 90 Room Air  


 


2/8/18 12:30      Room Air  


 


2/8/18 11:54 36.3 98 20 105/72 (83) 91 Room Air  








Physical Exam:


General-aaox3, obese


Eyes-no scleral icterus


ENT-mmm


Neck-supple


Lungs-+diffuse wheeze


Heart-rrr


Abdomen-bs+ s/nt/nd


Extremities-no c/c/+mild edema


Neuro-nonfocal





Current Inpatient Medications








 Medications


  (Trade)  Dose


 Ordered  Sig/Daniel


 Route  Start Time


 Stop Time Status Last Admin


Dose Admin


 


 Miscellaneous


 Information


  (Consult


 Glycemic


 Management


 Pharmacy)  1 ea  UD  PRN


 N/A  1/16/18 04:15


 2/15/18 04:14   


 


 


 Glucose


  (Glucose 40% Gel)  15-30


 GRAMS 15


 GRAMS...  UD  PRN


 PO  1/16/18 04:30


 2/15/18 04:29   


 


 


 Glucose


  (Glucose Chew


 Tab)  4-8


 Tablets 4


 Tabl...  UD  PRN


 PO  1/16/18 04:30


 2/15/18 04:29   


 


 


 Dextrose


  (Dextrose 50%


 50ML Syringe)  25-50ML OF


 50% DW IV


 FOR...  UD  PRN


 IV  1/16/18 04:30


 2/15/18 04:29  1/17/18 09:52


25 ML


 


 Glucagon


  (Glucagon Inj)  1 mg  UD  PRN


 SQ  1/16/18 04:30


 2/15/18 04:29   


 


 


 Ondansetron HCl


  (Zofran Inj)  4 mg  Q4H  PRN


 IV  1/16/18 18:15


 2/15/18 18:14  2/1/18 12:37


4 MG


 


 Acetaminophen 650


 mg/Empty Bag  65 ml @ 


 260 mls/hr  Q6H  PRN


 IV  1/23/18 16:30


 2/22/18 16:29  1/31/18 21:53


260 MLS/HR


 


 Pantoprazole


 Sodium


  (Protonix Tab)  40 mg  QAM


 PO  1/26/18 09:00


 2/25/18 08:59  2/9/18 07:51


40 MG


 


 Levalbuterol


  (Xopenex 1.25MG/


 3ML Neb)  1.25 mg  Q6R


 INH  1/26/18 21:00


 2/25/18 20:59  2/9/18 01:47


1.25 MG


 


 Midodrine


  (Proamatine Tab)  5 mg  TID@08,12,17


 PO  1/28/18 17:00


 2/27/18 16:59  2/9/18 07:49


5 MG


 


 Insulin Aspart


  (novoLOG ASPART)  **SLIDING


 SCALE**


 **G...  ACHS


 SC  1/31/18 07:00


 3/2/18 06:59 Future hold 2/8/18 21:05


8 UNITS


 


 Insulin Glargine


  (Lantus Solostar


 Pen)  10 units  BID


 SC  1/31/18 09:00


 3/2/18 08:59  2/9/18 08:01


10 UNITS


 


 Vitamin B Complex/


 Vit C/Folic Acid


  (Nephrocaps)  1 cap  HS


 PO  1/31/18 21:00


 3/2/18 20:59  2/8/18 21:00


1 CAP


 


 Sevelamer HCl


  (Renagel Tab)  800 mg  TIDM


 PO  1/31/18 11:30


 3/2/18 11:29  2/9/18 07:49


800 MG


 


 Amiodarone HCl


  (Cordarone Tab)  200 mg  BIDM


 PO  2/1/18 16:45


 3/1/18 16:44  2/9/18 07:49


200 MG


 


 Salmeterol


 Xinafoate/


 Fluticasone


  (Advair Diskus


 250/50 Inh)  1 puff  BID


 INH  2/4/18 09:00


 3/6/18 08:59  2/9/18 07:50


1 PUFF


 


 Raspberry


  (Raspberry Syrup


 5ml Cup)  5 ml  QID


 PO  2/4/18 13:00


 2/18/18 11:59  2/9/18 07:51


5 ML


 


 Vancomycin HCl


  (Vancomycin Oral


 Soln)  125 mg  QID


 PO  2/4/18 13:00


 2/18/18 11:59  2/8/18 20:59


125 MG


 


 Benzonatate


  (Tessalon Perles


 Cap)  100 mg  TID  PRN


 PO  2/5/18 09:00


 3/7/18 08:59  2/7/18 08:08


100 MG


 


 Warfarin Sodium


  (Coumadin Tab)  2.5 mg  DAILY@16


 PO  2/6/18 16:00


 3/8/18 15:59  2/8/18 15:50


2.5 MG


 


 Prednisone


  (PredniSONE TAB)  20 mg  DAILY


 PO  2/7/18 09:00


 2/9/18 10:00  2/9/18 07:51


20 MG


 


 Cholestyramine


 Resin


  (Questran Powder


 Light)  2 gm  BID@10,22


 PO  2/7/18 22:00


 3/9/18 21:59  2/8/18 22:04


2 GM


 


 Dicyclomine HCl


  (Bentyl Tab)  10 mg  TID


 PO  2/8/18 21:00


 3/10/18 20:59  2/9/18 07:50


10 MG


 


 Prednisone


  (PredniSONE TAB)  15 mg  DAILY


 PO  2/10/18 09:00


 3/12/18 08:59   


 


 


 Epoetin Geovany


  (Procrit Inj)  10,000 units  TODAY@0800


 IV.  2/9/18 08:00


 2/9/18 23:59   


 


 


 Albumin Human


  (Albumin 25%)  12.5 gm  TODAY@0800,0900


 IV  2/9/18 08:00


 2/9/18 23:59   


 


 


 Insulin Human NPH


  (novoLIN-N NPH)  25 units  QDB


 SC  2/9/18 07:30


 3/11/18 07:29   


 











Last 24 Hours








Test


  2/8/18


11:31 2/8/18


16:07 2/8/18


19:56 2/9/18


07:09


 


Bedside Glucose 124 mg/dl  213 mg/dl  214 mg/dl  


 


Sodium Level    137 mmol/L 


 


Potassium Level    4.1 mmol/L 


 


Chloride Level    104 mmol/L 


 


Carbon Dioxide Level    22 mmol/L 


 


Anion Gap    11.0 mmol/L 


 


Blood Urea Nitrogen    53 mg/dl 


 


Creatinine    7.03 mg/dl 


 


Est Creatinine Clear Calc


Drug Dose 


  


  


  10.1 ml/min 


 


 


Estimated GFR (


American) 


  


  


  6.8 


 


 


Estimated GFR (Non-


American 


  


  


  5.8 


 


 


BUN/Creatinine Ratio    7.6 


 


Random Glucose    92 mg/dl 


 


Calcium Level    7.1 mg/dl 


 


Test


  2/9/18


07:57 


  


  


 


 


Prothrombin Time 32.8 SECONDS    


 


Prothromb Time International


Ratio 3.2 


  


  


  


 











Assessment & Plan


ESRD-plan for dialysis today. on albumin to help bp support during dialysis.  

continues to have significant wheeze despite medical interventions. pt though 

relatively asymtpomatic from it. 





Anemia of Renal Failure-hg goal of 10 to 11.  dose procrit accordingly.

## 2018-02-10 VITALS — OXYGEN SATURATION: 94 % | HEART RATE: 95 BPM

## 2018-02-10 VITALS
OXYGEN SATURATION: 93 % | HEART RATE: 76 BPM | TEMPERATURE: 98.24 F | SYSTOLIC BLOOD PRESSURE: 89 MMHG | DIASTOLIC BLOOD PRESSURE: 58 MMHG

## 2018-02-10 VITALS
HEART RATE: 105 BPM | DIASTOLIC BLOOD PRESSURE: 69 MMHG | TEMPERATURE: 98.06 F | OXYGEN SATURATION: 93 % | SYSTOLIC BLOOD PRESSURE: 103 MMHG

## 2018-02-10 VITALS
OXYGEN SATURATION: 92 % | DIASTOLIC BLOOD PRESSURE: 58 MMHG | HEART RATE: 90 BPM | SYSTOLIC BLOOD PRESSURE: 104 MMHG | TEMPERATURE: 98.6 F

## 2018-02-10 VITALS — OXYGEN SATURATION: 91 % | HEART RATE: 85 BPM

## 2018-02-10 VITALS
SYSTOLIC BLOOD PRESSURE: 95 MMHG | HEART RATE: 97 BPM | DIASTOLIC BLOOD PRESSURE: 60 MMHG | OXYGEN SATURATION: 95 % | TEMPERATURE: 98.42 F

## 2018-02-10 VITALS
HEART RATE: 103 BPM | TEMPERATURE: 98.6 F | OXYGEN SATURATION: 95 % | SYSTOLIC BLOOD PRESSURE: 105 MMHG | DIASTOLIC BLOOD PRESSURE: 64 MMHG

## 2018-02-10 VITALS — HEART RATE: 74 BPM | OXYGEN SATURATION: 92 %

## 2018-02-10 VITALS
HEART RATE: 97 BPM | DIASTOLIC BLOOD PRESSURE: 68 MMHG | TEMPERATURE: 97.52 F | SYSTOLIC BLOOD PRESSURE: 104 MMHG | OXYGEN SATURATION: 91 %

## 2018-02-10 VITALS — HEART RATE: 92 BPM | OXYGEN SATURATION: 96 %

## 2018-02-10 LAB
EOSINOPHIL NFR BLD AUTO: 142 K/UL (ref 130–400)
HCT VFR BLD CALC: 32.4 % (ref 37–47)
HGB BLD-MCNC: 9.7 G/DL (ref 12–16)
INR PPP: 2.7 (ref 0.9–1.1)
MCH RBC QN AUTO: 31.7 PG (ref 25–34)
MCHC RBC AUTO-ENTMCNC: 29.9 G/DL (ref 32–36)
MCV RBC AUTO: 105.9 FL (ref 80–100)
NRBC BLD AUTO-RTO: 0.3 %
NUCLEATED RED BLOOD CELL ABS: 0.02 K/UL (ref 0–0)
PMV BLD AUTO: 9.8 FL (ref 7.4–10.4)
RED CELL DISTRIBUTION WIDTH CV: 17.8 % (ref 11.5–14.5)
RED CELL DISTRIBUTION WIDTH SD: 68.6 FL (ref 36.4–46.3)
WBC # BLD AUTO: 8.28 K/UL (ref 4.8–10.8)

## 2018-02-10 RX ADMIN — INSULIN ASPART SCH UNITS: 100 INJECTION, SOLUTION INTRAVENOUS; SUBCUTANEOUS at 08:47

## 2018-02-10 RX ADMIN — Medication SCH ML: at 08:50

## 2018-02-10 RX ADMIN — LEVALBUTEROL HYDROCHLORIDE SCH MG: 1.25 SOLUTION RESPIRATORY (INHALATION) at 01:47

## 2018-02-10 RX ADMIN — RENAGEL SCH MG: 800 TABLET ORAL at 08:50

## 2018-02-10 RX ADMIN — LEVALBUTEROL HYDROCHLORIDE SCH MG: 1.25 SOLUTION RESPIRATORY (INHALATION) at 14:55

## 2018-02-10 RX ADMIN — FLUTICASONE PROPIONATE AND SALMETEROL SCH PUFF: 50; 250 POWDER RESPIRATORY (INHALATION) at 08:52

## 2018-02-10 RX ADMIN — INSULIN ASPART SCH UNITS: 100 INJECTION, SOLUTION INTRAVENOUS; SUBCUTANEOUS at 21:14

## 2018-02-10 RX ADMIN — VANCOMYCIN HYDROCHLORIDE SCH MG: 1 INJECTION, POWDER, LYOPHILIZED, FOR SOLUTION INTRAVENOUS at 12:24

## 2018-02-10 RX ADMIN — WARFARIN SODIUM SCH MG: 2.5 TABLET ORAL at 16:40

## 2018-02-10 RX ADMIN — CHOLESTYRAMINE SCH GM: 4 POWDER, FOR SUSPENSION ORAL at 21:38

## 2018-02-10 RX ADMIN — VANCOMYCIN HYDROCHLORIDE SCH MG: 1 INJECTION, POWDER, LYOPHILIZED, FOR SOLUTION INTRAVENOUS at 08:49

## 2018-02-10 RX ADMIN — INSULIN ASPART SCH UNITS: 100 INJECTION, SOLUTION INTRAVENOUS; SUBCUTANEOUS at 16:46

## 2018-02-10 RX ADMIN — MIDODRINE HYDROCHLORIDE SCH MG: 2.5 TABLET ORAL at 16:41

## 2018-02-10 RX ADMIN — HUMAN INSULIN SCH UNITS: 100 INJECTION, SUSPENSION SUBCUTANEOUS at 08:48

## 2018-02-10 RX ADMIN — ASCORBIC ACID, THIAMINE MONONITRATE,RIBOFLAVIN, NIACINAMIDE, PYRIDOXINE HYDROCHLORIDE, FOLIC ACID, CYANOCOBALAMIN, BIOTIN, CALCIUM PANTOTHENATE, SCH CAP: 100; 1.5; 1.7; 20; 10; 1; 6000; 150000; 5 CAPSULE, LIQUID FILLED ORAL at 20:51

## 2018-02-10 RX ADMIN — Medication SCH ML: at 12:17

## 2018-02-10 RX ADMIN — DICYCLOMINE HYDROCHLORIDE SCH MG: 20 TABLET ORAL at 08:53

## 2018-02-10 RX ADMIN — AMIODARONE HYDROCHLORIDE SCH MG: 200 TABLET ORAL at 08:50

## 2018-02-10 RX ADMIN — INSULIN ASPART SCH UNITS: 100 INJECTION, SOLUTION INTRAVENOUS; SUBCUTANEOUS at 12:23

## 2018-02-10 RX ADMIN — MIDODRINE HYDROCHLORIDE SCH MG: 2.5 TABLET ORAL at 08:52

## 2018-02-10 RX ADMIN — DICYCLOMINE HYDROCHLORIDE SCH MG: 20 TABLET ORAL at 20:50

## 2018-02-10 RX ADMIN — MIDODRINE HYDROCHLORIDE SCH MG: 2.5 TABLET ORAL at 12:16

## 2018-02-10 RX ADMIN — CHOLESTYRAMINE SCH GM: 4 POWDER, FOR SUSPENSION ORAL at 08:54

## 2018-02-10 RX ADMIN — DICYCLOMINE HYDROCHLORIDE SCH MG: 20 TABLET ORAL at 12:24

## 2018-02-10 RX ADMIN — FLUTICASONE PROPIONATE AND SALMETEROL SCH PUFF: 50; 250 POWDER RESPIRATORY (INHALATION) at 21:13

## 2018-02-10 RX ADMIN — INSULIN GLARGINE SCH UNITS: 100 INJECTION, SOLUTION SUBCUTANEOUS at 21:15

## 2018-02-10 RX ADMIN — LEVALBUTEROL HYDROCHLORIDE SCH MG: 1.25 SOLUTION RESPIRATORY (INHALATION) at 19:12

## 2018-02-10 RX ADMIN — VANCOMYCIN HYDROCHLORIDE SCH MG: 1 INJECTION, POWDER, LYOPHILIZED, FOR SOLUTION INTRAVENOUS at 16:41

## 2018-02-10 RX ADMIN — INSULIN GLARGINE SCH UNITS: 100 INJECTION, SOLUTION SUBCUTANEOUS at 08:55

## 2018-02-10 RX ADMIN — RENAGEL SCH MG: 800 TABLET ORAL at 12:17

## 2018-02-10 RX ADMIN — RENAGEL SCH MG: 800 TABLET ORAL at 16:40

## 2018-02-10 RX ADMIN — Medication SCH ML: at 16:42

## 2018-02-10 RX ADMIN — Medication SCH ML: at 21:38

## 2018-02-10 RX ADMIN — VANCOMYCIN HYDROCHLORIDE SCH MG: 1 INJECTION, POWDER, LYOPHILIZED, FOR SOLUTION INTRAVENOUS at 21:38

## 2018-02-10 RX ADMIN — LEVALBUTEROL HYDROCHLORIDE SCH MG: 1.25 SOLUTION RESPIRATORY (INHALATION) at 07:00

## 2018-02-10 RX ADMIN — PANTOPRAZOLE SCH MG: 40 TABLET, DELAYED RELEASE ORAL at 08:53

## 2018-02-10 RX ADMIN — AMIODARONE HYDROCHLORIDE SCH MG: 200 TABLET ORAL at 16:40

## 2018-02-10 NOTE — PROGRESS NOTE
Medicine Progress Note


Date & Time of Visit:


Feb 10, 2018 at 09:20


.


Subjective





CC: 


Follow-up visit for multiple problems.





HPI: 


Occasional nonproductive cough.


No SOB.


No chest pain.


Remains in atrial flutter with variable conduction, rapid at times.


Diarrhea improved since starting cholestyramine.


No nausea or vomiting.





ROS:


General- no fever, no chills


Resp- as noted above in HPI


Cardiac-  no chest pain, no edema


GI- as noted above in HPI


- anuric


.





Objective





Last 8 Hrs








  Date Time  Temp Pulse Resp B/P (MAP) Pulse Ox O2 Delivery O2 Flow Rate FiO2


 


2/10/18 07:00  74 18  92 Room Air  


 


2/10/18 04:00      Room Air  


 


2/10/18 03:06 36.8 76 26 89/58 (68) 93 Room Air  


 


2/10/18 01:47  95 18  94 Room Air  








Physical Exam:





General- lying in bed; no distress


Lungs- diffuse mild wheezing; no respiratory distress


Cardiovascular- irregular;  no gallop; slight JVD; trace pretibial edema


Abdomen- + bowel sounds, soft, nontender 


Extremities- no cyanosis; no calf tenderness


Neuro- alert, oriented


Skin- warm & dry


.


Laboratory Results:





Last 24 Hours








Test


  2/9/18


12:50 2/9/18


16:19 2/9/18


20:25 2/10/18


06:02


 


Bedside Glucose 103 mg/dl  185 mg/dl  166 mg/dl  


 


White Blood Count    8.28 K/uL 


 


Red Blood Count    3.06 M/uL 


 


Hemoglobin    9.7 g/dL 


 


Hematocrit    32.4 % 


 


Mean Corpuscular Volume    105.9 fL 


 


Mean Corpuscular Hemoglobin    31.7 pg 


 


Mean Corpuscular Hemoglobin


Concent 


  


  


  29.9 g/dl 


 


 


RDW Standard Deviation    68.6 fL 


 


RDW Coefficient of Variation    17.8 % 


 


Platelet Count    142 K/uL 


 


Mean Platelet Volume    9.8 fL 


 


Nucleated RBC Absolute Count


(auto) 


  


  


  0.02 K/uL 


 


 


Nucleated Red Blood Cells %    0.3 % 


 


Prothrombin Time    27.5 SECONDS 


 


Prothromb Time International


Ratio 


  


  


  2.7 


 


 


Test


  2/10/18


07:01 


  


  


 


 


Bedside Glucose 91 mg/dl    











Assessment & Plan





HYPOTENSION


Improved.


Started on midodrine.


Ongoing hypotension with hemodialysis.


Continue to monitor.





ATRIAL FLUTTER


Cardiology consulted.


Antiarrhythmic therapy with amiodarone initiated.


Anticoagulated with IV heparin --> warfarin.


Titrate warfarin.


Possible cardioversion after adequate course of therapeutic anticoagulation.





UTI E COLI (present on admission)


UA on admission demonstrated WBC's and bacteria.


Urine culture grew E coli, ESBL.


Treated with course of ertapenem.





C DIFFICILE COLITIS / PERSISTENT DIARRHEA


Persistent diarrhea.


Received IV metronidazole and PO vancomycin.


Finished course of metronidazole; PO vancomycin continued.


Cholestyramine started by GI with improvement.


Sigmoidoscopy unrevealing.





BRONCHITIS / EXACERBATION OF COPD


Received course of azithromycin.


Taper steroids.





SBO 


Resolved.





DM TYPE 2


Pharmacy consulted for glycemic management.


FBS = 91.


Tapering steroids.





CIRRHOSIS


Noted on CT of chest.


Will need outpatient follow-up with GI.





RIGHT ANKLE SPRAIN


Seen by Ortho.


CAM boot ordered.


Pain improved.





VTE PROPHYLAXIS


Received IV heparin --> warfarin.


Ambulate as able.





DISPOSITION


Anticipated need for rehab or skilled care.


Accepted at [facility] pending insurance authorization.


.


Consultants:


[facility]


[facility]had


Current Inpatient Medications:





Current Inpatient Medications








 Medications


  (Trade)  Dose


 Ordered  Sig/Daniel


 Route  Start Time


 Stop Time Status Last Admin


Dose Admin


 


 Miscellaneous


 Information


  (Consult


 Glycemic


 Management


 Pharmacy)  1 ea  UD  PRN


 N/A  1/16/18 04:15


 2/15/18 04:14   


 


 


 Glucose


  (Glucose 40% Gel)  15-30


 GRAMS 15


 GRAMS...  UD  PRN


 PO  1/16/18 04:30


 2/15/18 04:29   


 


 


 Glucose


  (Glucose Chew


 Tab)  4-8


 Tablets 4


 Tabl...  UD  PRN


 PO  1/16/18 04:30


 2/15/18 04:29   


 


 


 Dextrose


  (Dextrose 50%


 50ML Syringe)  25-50ML OF


 50% DW IV


 FOR...  UD  PRN


 IV  1/16/18 04:30


 2/15/18 04:29  1/17/18 09:52


25 ML


 


 Glucagon


  (Glucagon Inj)  1 mg  UD  PRN


 SQ  1/16/18 04:30


 2/15/18 04:29   


 


 


 Ondansetron HCl


  (Zofran Inj)  4 mg  Q4H  PRN


 IV  1/16/18 18:15


 2/15/18 18:14  2/1/18 12:37


4 MG


 


 Acetaminophen 650


 mg/Empty Bag  65 ml @ 


 260 mls/hr  Q6H  PRN


 IV  1/23/18 16:30


 2/22/18 16:29  2/9/18 20:59


260 MLS/HR


 


 Pantoprazole


 Sodium


  (Protonix Tab)  40 mg  QAM


 PO  1/26/18 09:00


 2/25/18 08:59  2/10/18 08:53


40 MG


 


 Levalbuterol


  (Xopenex 1.25MG/


 3ML Neb)  1.25 mg  Q6R


 INH  1/26/18 21:00


 2/25/18 20:59  2/10/18 07:00


1.25 MG


 


 Midodrine


  (Proamatine Tab)  5 mg  TID@08,12,17


 PO  1/28/18 17:00


 2/27/18 16:59  2/10/18 08:52


5 MG


 


 Insulin Aspart


  (novoLOG ASPART)  **SLIDING


 SCALE**


 **G...  ACHS


 SC  1/31/18 07:00


 3/2/18 06:59 Future hold 2/10/18 08:47


2 UNITS


 


 Insulin Glargine


  (Lantus Solostar


 Pen)  10 units  BID


 SC  1/31/18 09:00


 3/2/18 08:59  2/9/18 20:53


10 UNITS


 


 Vitamin B Complex/


 Vit C/Folic Acid


  (Nephrocaps)  1 cap  HS


 PO  1/31/18 21:00


 3/2/18 20:59  2/9/18 20:04


1 CAP


 


 Sevelamer HCl


  (Renagel Tab)  800 mg  TIDM


 PO  1/31/18 11:30


 3/2/18 11:29  2/10/18 08:50


800 MG


 


 Amiodarone HCl


  (Cordarone Tab)  200 mg  BIDM


 PO  2/1/18 16:45


 3/1/18 16:44  2/10/18 08:50


200 MG


 


 Salmeterol


 Xinafoate/


 Fluticasone


  (Advair Diskus


 250/50 Inh)  1 puff  BID


 INH  2/4/18 09:00


 3/6/18 08:59  2/10/18 08:52


1 PUFF


 


 Raspberry


  (Raspberry Syrup


 5ml Cup)  5 ml  QID


 PO  2/4/18 13:00


 2/18/18 11:59  2/10/18 08:50


5 ML


 


 Vancomycin HCl


  (Vancomycin Oral


 Soln)  125 mg  QID


 PO  2/4/18 13:00


 2/18/18 11:59  2/10/18 08:49


125 MG


 


 Benzonatate


  (Tessalon Perles


 Cap)  100 mg  TID  PRN


 PO  2/5/18 09:00


 3/7/18 08:59  2/9/18 20:03


100 MG


 


 Warfarin Sodium


  (Coumadin Tab)  2.5 mg  DAILY@16


 PO  2/6/18 16:00


 3/8/18 15:59  2/9/18 16:29


2.5 MG


 


 Dicyclomine HCl


  (Bentyl Tab)  10 mg  TID


 PO  2/8/18 21:00


 3/10/18 20:59  2/10/18 08:53


10 MG


 


 Prednisone


  (PredniSONE TAB)  15 mg  DAILY


 PO  2/10/18 09:00


 3/12/18 08:59  2/10/18 08:53


15 MG


 


 Cholestyramine


 Resin


  (Questran Powder


 Light)  4 gm  BID@10,22


 PO  2/9/18 22:00


 3/9/18 21:59  2/10/18 08:54


4 GM


 


 Insulin Human NPH


  (novoLIN-N NPH)  18 units  QDB


 SC  2/10/18 07:30


 3/12/18 07:29  2/10/18 08:48


18 UNITS

## 2018-02-11 VITALS — HEART RATE: 102 BPM | OXYGEN SATURATION: 91 %

## 2018-02-11 VITALS
TEMPERATURE: 98.42 F | HEART RATE: 77 BPM | OXYGEN SATURATION: 94 % | SYSTOLIC BLOOD PRESSURE: 98 MMHG | DIASTOLIC BLOOD PRESSURE: 64 MMHG

## 2018-02-11 VITALS
OXYGEN SATURATION: 94 % | HEART RATE: 99 BPM | SYSTOLIC BLOOD PRESSURE: 121 MMHG | TEMPERATURE: 97.34 F | DIASTOLIC BLOOD PRESSURE: 79 MMHG

## 2018-02-11 VITALS
DIASTOLIC BLOOD PRESSURE: 70 MMHG | OXYGEN SATURATION: 93 % | HEART RATE: 101 BPM | TEMPERATURE: 97.7 F | SYSTOLIC BLOOD PRESSURE: 106 MMHG

## 2018-02-11 VITALS
HEART RATE: 85 BPM | DIASTOLIC BLOOD PRESSURE: 91 MMHG | SYSTOLIC BLOOD PRESSURE: 127 MMHG | OXYGEN SATURATION: 98 % | TEMPERATURE: 97.88 F

## 2018-02-11 VITALS
TEMPERATURE: 98.06 F | DIASTOLIC BLOOD PRESSURE: 74 MMHG | SYSTOLIC BLOOD PRESSURE: 110 MMHG | HEART RATE: 99 BPM | OXYGEN SATURATION: 92 %

## 2018-02-11 VITALS
DIASTOLIC BLOOD PRESSURE: 81 MMHG | OXYGEN SATURATION: 96 % | TEMPERATURE: 97.34 F | SYSTOLIC BLOOD PRESSURE: 131 MMHG | HEART RATE: 97 BPM

## 2018-02-11 VITALS — OXYGEN SATURATION: 96 % | HEART RATE: 92 BPM

## 2018-02-11 VITALS — HEART RATE: 88 BPM | OXYGEN SATURATION: 98 %

## 2018-02-11 VITALS — OXYGEN SATURATION: 94 % | HEART RATE: 89 BPM

## 2018-02-11 RX ADMIN — MIDODRINE HYDROCHLORIDE SCH MG: 2.5 TABLET ORAL at 07:59

## 2018-02-11 RX ADMIN — FLUTICASONE PROPIONATE AND SALMETEROL SCH PUFF: 50; 250 POWDER RESPIRATORY (INHALATION) at 20:02

## 2018-02-11 RX ADMIN — VANCOMYCIN HYDROCHLORIDE SCH MG: 1 INJECTION, POWDER, LYOPHILIZED, FOR SOLUTION INTRAVENOUS at 17:19

## 2018-02-11 RX ADMIN — LEVALBUTEROL HYDROCHLORIDE SCH MG: 1.25 SOLUTION RESPIRATORY (INHALATION) at 07:02

## 2018-02-11 RX ADMIN — VANCOMYCIN HYDROCHLORIDE SCH MG: 1 INJECTION, POWDER, LYOPHILIZED, FOR SOLUTION INTRAVENOUS at 08:05

## 2018-02-11 RX ADMIN — INSULIN ASPART SCH UNITS: 100 INJECTION, SOLUTION INTRAVENOUS; SUBCUTANEOUS at 08:05

## 2018-02-11 RX ADMIN — Medication SCH ML: at 12:43

## 2018-02-11 RX ADMIN — RENAGEL SCH MG: 800 TABLET ORAL at 08:00

## 2018-02-11 RX ADMIN — INSULIN ASPART SCH UNITS: 100 INJECTION, SOLUTION INTRAVENOUS; SUBCUTANEOUS at 21:13

## 2018-02-11 RX ADMIN — LEVALBUTEROL HYDROCHLORIDE SCH MG: 1.25 SOLUTION RESPIRATORY (INHALATION) at 02:08

## 2018-02-11 RX ADMIN — CHOLESTYRAMINE SCH GM: 4 POWDER, FOR SUSPENSION ORAL at 08:01

## 2018-02-11 RX ADMIN — MIDODRINE HYDROCHLORIDE SCH MG: 2.5 TABLET ORAL at 17:20

## 2018-02-11 RX ADMIN — PANTOPRAZOLE SCH MG: 40 TABLET, DELAYED RELEASE ORAL at 07:59

## 2018-02-11 RX ADMIN — INSULIN ASPART SCH UNITS: 100 INJECTION, SOLUTION INTRAVENOUS; SUBCUTANEOUS at 17:18

## 2018-02-11 RX ADMIN — ASCORBIC ACID, THIAMINE MONONITRATE,RIBOFLAVIN, NIACINAMIDE, PYRIDOXINE HYDROCHLORIDE, FOLIC ACID, CYANOCOBALAMIN, BIOTIN, CALCIUM PANTOTHENATE, SCH CAP: 100; 1.5; 1.7; 20; 10; 1; 6000; 150000; 5 CAPSULE, LIQUID FILLED ORAL at 20:03

## 2018-02-11 RX ADMIN — INSULIN GLARGINE SCH UNITS: 100 INJECTION, SOLUTION SUBCUTANEOUS at 21:13

## 2018-02-11 RX ADMIN — AMIODARONE HYDROCHLORIDE SCH MG: 200 TABLET ORAL at 17:19

## 2018-02-11 RX ADMIN — VANCOMYCIN HYDROCHLORIDE SCH MG: 1 INJECTION, POWDER, LYOPHILIZED, FOR SOLUTION INTRAVENOUS at 12:43

## 2018-02-11 RX ADMIN — WARFARIN SODIUM SCH MG: 2.5 TABLET ORAL at 15:59

## 2018-02-11 RX ADMIN — INSULIN GLARGINE SCH UNITS: 100 INJECTION, SOLUTION SUBCUTANEOUS at 08:04

## 2018-02-11 RX ADMIN — HUMAN INSULIN SCH UNITS: 100 INJECTION, SUSPENSION SUBCUTANEOUS at 08:03

## 2018-02-11 RX ADMIN — DICYCLOMINE HYDROCHLORIDE SCH MG: 20 TABLET ORAL at 08:00

## 2018-02-11 RX ADMIN — MIDODRINE HYDROCHLORIDE SCH MG: 2.5 TABLET ORAL at 12:43

## 2018-02-11 RX ADMIN — AMIODARONE HYDROCHLORIDE SCH MG: 200 TABLET ORAL at 08:00

## 2018-02-11 RX ADMIN — Medication SCH ML: at 08:00

## 2018-02-11 RX ADMIN — VANCOMYCIN HYDROCHLORIDE SCH MG: 1 INJECTION, POWDER, LYOPHILIZED, FOR SOLUTION INTRAVENOUS at 20:02

## 2018-02-11 RX ADMIN — LEVALBUTEROL HYDROCHLORIDE SCH MG: 1.25 SOLUTION RESPIRATORY (INHALATION) at 20:17

## 2018-02-11 RX ADMIN — FLUTICASONE PROPIONATE AND SALMETEROL SCH PUFF: 50; 250 POWDER RESPIRATORY (INHALATION) at 07:59

## 2018-02-11 RX ADMIN — BENZONATATE PRN MG: 100 CAPSULE ORAL at 07:59

## 2018-02-11 RX ADMIN — DICYCLOMINE HYDROCHLORIDE SCH MG: 20 TABLET ORAL at 20:03

## 2018-02-11 RX ADMIN — RENAGEL SCH MG: 800 TABLET ORAL at 12:43

## 2018-02-11 RX ADMIN — Medication SCH ML: at 20:02

## 2018-02-11 RX ADMIN — INSULIN ASPART SCH UNITS: 100 INJECTION, SOLUTION INTRAVENOUS; SUBCUTANEOUS at 12:48

## 2018-02-11 RX ADMIN — CHOLESTYRAMINE SCH GM: 4 POWDER, FOR SUSPENSION ORAL at 21:13

## 2018-02-11 RX ADMIN — LEVALBUTEROL HYDROCHLORIDE SCH MG: 1.25 SOLUTION RESPIRATORY (INHALATION) at 14:14

## 2018-02-11 RX ADMIN — DICYCLOMINE HYDROCHLORIDE SCH MG: 20 TABLET ORAL at 12:44

## 2018-02-11 RX ADMIN — Medication SCH ML: at 17:19

## 2018-02-11 RX ADMIN — RENAGEL SCH MG: 800 TABLET ORAL at 17:19

## 2018-02-11 NOTE — PROGRESS NOTE
Medicine Progress Note


Date & Time of Visit:


Feb 11, 2018 at 09:40


.


Subjective





CC: 


Follow-up visit for multiple problems.





HPI: 


Diarrhea better since starting cholestyramine.


No N/V.


Occasional cough, no worse than baseline.





ROS:


General- no fever, no chills


Resp- as noted above in HPI


Cardiac-  no chest pain


GI- as noted above in HPI


- anuric


.





Objective





Last 8 Hrs








  Date Time  Temp Pulse Resp B/P (MAP) Pulse Ox O2 Delivery O2 Flow Rate FiO2


 


2/11/18 20:17  92 18  96 Room Air  


 


2/11/18 18:45 36.3 97 18 131/81 (98) 96 Room Air  


 


2/11/18 16:00      Room Air  


 


2/11/18 15:13 36.3 99 22 121/79 (93) 94 Room Air  


 


2/11/18 14:15  88 18  98 Room Air  








Physical Exam:





General- lying in bed; no distress


Lungs- diffuse mild wheezing; no respiratory distress


Cardiovascular- irregular;  no gallop; slight JVD; trace pretibial edema


Abdomen- + bowel sounds, soft, nontender 


Extremities- no cyanosis; no calf tenderness


Neuro- alert, oriented


Skin- warm & dry


.


Laboratory Results:





Last 24 Hours








Test


  2/11/18


06:45 2/11/18


11:21 2/11/18


16:02


 


Bedside Glucose 85 mg/dl  119 mg/dl  182 mg/dl 











Assessment & Plan





HYPOTENSION


Improved.


Started on midodrine.


Ongoing hypotension with hemodialysis.


Continue to monitor.





ATRIAL FLUTTER


Cardiology consulted.


Antiarrhythmic therapy with amiodarone initiated.


Anticoagulated with IV heparin --> warfarin.


Titrate warfarin.


Possible cardioversion after adequate course of therapeutic anticoagulation.





UTI E COLI (present on admission)


UA on admission demonstrated WBC's and bacteria.


Urine culture grew E coli, ESBL.


Treated with course of ertapenem.





C DIFFICILE COLITIS / PERSISTENT DIARRHEA


Persistent diarrhea.


Received IV metronidazole and PO vancomycin.


Finished course of metronidazole; PO vancomycin continued.


Cholestyramine started by GI with improvement.


Sigmoidoscopy unrevealing.





BRONCHITIS / EXACERBATION OF COPD


Received course of azithromycin.


Tapering steroids- current prednisone dose 15 mg.





SBO 


Resolved.





DM TYPE 2


Pharmacy consulted for glycemic management.


FBS = 85.


Tapering steroids.





CIRRHOSIS


Noted on CT of chest.


Will need outpatient follow-up with GI.





RIGHT ANKLE SPRAIN


Seen by Ortho.


CAM boot ordered.


Pain improved.





VTE PROPHYLAXIS


Received IV heparin --> warfarin.


Ambulate as able.





DISPOSITION


Anticipated need for rehab or skilled care.


Accepted at [facility] pending insurance authorization.


.


Consultants:


Nephro-Oncu


Farhad


Current Inpatient Medications:





Current Inpatient Medications








 Medications


  (Trade)  Dose


 Ordered  Sig/Daniel


 Route  Start Time


 Stop Time Status Last Admin


Dose Admin


 


 Miscellaneous


 Information


  (Consult


 Glycemic


 Management


 Pharmacy)  1 ea  UD  PRN


 N/A  1/16/18 04:15


 2/15/18 04:14   


 


 


 Glucose


  (Glucose 40% Gel)  15-30


 GRAMS 15


 GRAMS...  UD  PRN


 PO  1/16/18 04:30


 2/15/18 04:29   


 


 


 Glucose


  (Glucose Chew


 Tab)  4-8


 Tablets 4


 Tabl...  UD  PRN


 PO  1/16/18 04:30


 2/15/18 04:29   


 


 


 Dextrose


  (Dextrose 50%


 50ML Syringe)  25-50ML OF


 50% DW IV


 FOR...  UD  PRN


 IV  1/16/18 04:30


 2/15/18 04:29  1/17/18 09:52


25 ML


 


 Glucagon


  (Glucagon Inj)  1 mg  UD  PRN


 SQ  1/16/18 04:30


 2/15/18 04:29   


 


 


 Ondansetron HCl


  (Zofran Inj)  4 mg  Q4H  PRN


 IV  1/16/18 18:15


 2/15/18 18:14  2/1/18 12:37


4 MG


 


 Acetaminophen 650


 mg/Empty Bag  65 ml @ 


 260 mls/hr  Q6H  PRN


 IV  1/23/18 16:30


 2/22/18 16:29  2/9/18 20:59


260 MLS/HR


 


 Pantoprazole


 Sodium


  (Protonix Tab)  40 mg  QAM


 PO  1/26/18 09:00


 2/25/18 08:59  2/11/18 07:59


40 MG


 


 Levalbuterol


  (Xopenex 1.25MG/


 3ML Neb)  1.25 mg  Q6R


 INH  1/26/18 21:00


 2/25/18 20:59  2/11/18 20:17


1.25 MG


 


 Midodrine


  (Proamatine Tab)  5 mg  TID@08,12,17


 PO  1/28/18 17:00


 2/27/18 16:59  2/11/18 17:20


5 MG


 


 Insulin Aspart


  (novoLOG ASPART)  **SLIDING


 SCALE**


 **G...  ACHS


 SC  1/31/18 07:00


 3/2/18 06:59 Future hold 2/11/18 17:18


6 UNITS


 


 Insulin Glargine


  (Lantus Solostar


 Pen)  10 units  BID


 SC  1/31/18 09:00


 3/2/18 08:59  2/11/18 08:04


10 UNITS


 


 Vitamin B Complex/


 Vit C/Folic Acid


  (Nephrocaps)  1 cap  HS


 PO  1/31/18 21:00


 3/2/18 20:59  2/11/18 20:03


1 CAP


 


 Sevelamer HCl


  (Renagel Tab)  800 mg  TIDM


 PO  1/31/18 11:30


 3/2/18 11:29  2/11/18 17:19


800 MG


 


 Amiodarone HCl


  (Cordarone Tab)  200 mg  BIDM


 PO  2/1/18 16:45


 3/1/18 16:44  2/11/18 17:19


200 MG


 


 Salmeterol


 Xinafoate/


 Fluticasone


  (Advair Diskus


 250/50 Inh)  1 puff  BID


 INH  2/4/18 09:00


 3/6/18 08:59  2/11/18 20:02


1 PUFF


 


 Raspberry


  (Raspberry Syrup


 5ml Cup)  5 ml  QID


 PO  2/4/18 13:00


 2/18/18 11:59  2/11/18 20:02


5 ML


 


 Vancomycin HCl


  (Vancomycin Oral


 Soln)  125 mg  QID


 PO  2/4/18 13:00


 2/18/18 11:59  2/11/18 20:02


125 MG


 


 Benzonatate


  (Tessalon Perles


 Cap)  100 mg  TID  PRN


 PO  2/5/18 09:00


 3/7/18 08:59  2/11/18 07:59


100 MG


 


 Warfarin Sodium


  (Coumadin Tab)  2.5 mg  DAILY@16


 PO  2/6/18 16:00


 3/8/18 15:59  2/11/18 15:59


2.5 MG


 


 Dicyclomine HCl


  (Bentyl Tab)  10 mg  TID


 PO  2/8/18 21:00


 3/10/18 20:59  2/11/18 20:03


10 MG


 


 Prednisone


  (PredniSONE TAB)  15 mg  DAILY


 PO  2/10/18 09:00


 3/12/18 08:59  2/11/18 08:00


15 MG


 


 Cholestyramine


 Resin


  (Questran Powder


 Light)  4 gm  BID@10,22


 PO  2/9/18 22:00


 3/9/18 21:59  2/10/18 21:38


4 GM


 


 Insulin Human NPH


  (novoLIN-N NPH)  18 units  QDB


 SC  2/10/18 07:30


 3/12/18 07:29  2/11/18 08:03


18 UNITS


 


 Heparin Sodium


  (Porcine)


  (Heparin Iv


 Bolus)  1,000 unit  ONE


 IV  2/12/18 08:00


 2/12/18 08:01 UNV  


 


 


 Heparin Sodium


  (Porcine)


  (Heparin Iv


 Bolus)  400 unit  Q1H


 IV  2/12/18 08:00


 2/12/18 10:01 UNV  


 


 


 Epoetin Geovany


  (Procrit Inj)  10,000 units  ONE  ONCE


 IV.  2/12/18 08:00


 2/12/18 08:01 UNV

## 2018-02-11 NOTE — PHARMACY PROGRESS NOTE
Pharmacy Glycemic Short Note 2


Date of Service


Feb 11, 2018.


OUTPATIENT ANTIDIABETIC REGIMEN: 


* NPH 40 units SQ daily when taking prednisone (otherwise no insulin taken)





ASSESSMENT:





* Ms Ivy Bro has remained stable on her NPH doses for several days. Her 

blood sugar ranged from  mg/dL (only one blood sugar over 150 mg/dL). The 

195 mg/dL most likely resulted from a late Lantus administration. She received 

56 units yesterday (20 of which was Lantus and 18 of which was NPH) compared to 

50 units the day before. Most likely increased insulin secondary to increased 

PO intake.


* Fasting today is 85 mg/dL. Continue Lantus.


* Post-prandial blood sugars are well controlled with exception noted above. 

Continue Novolog. For NPH, continue same dose as long as patient is on 

prednisone 15 mg. Reduce dose once prednisone dose reduced.


 


PLAN FOR INPATIENT GLYCEMIC CONTROL:





* NPH 18 units SQ qAM 





* Basal Insulin: 


 * Lantus 10 units SQ BID


 


* Bolus Insulin:


 * NOVOLOG per scale ACHS


 * Goal Range:  Low 110 mg/dL - High 140 mg/dL


 * Correction Factor: 25 mg/dL/unit


 * Nutritional / Prandial insulin:  1 units for every 9 grams of carb 





PLAN FOR DISCHARGE:


* A1c 6.5% indicates good outpatient glycemic control, although pt is on HD


* Expect that patient may be discharged on previous home regimen unless 

steroids are continued at discharge - depending on dose may discharge on NPH 20 

units for prednisone 15 mg.





Thank you.

## 2018-02-12 VITALS — OXYGEN SATURATION: 94 % | HEART RATE: 94 BPM

## 2018-02-12 VITALS — SYSTOLIC BLOOD PRESSURE: 122 MMHG | DIASTOLIC BLOOD PRESSURE: 49 MMHG | HEART RATE: 90 BPM

## 2018-02-12 VITALS — HEART RATE: 103 BPM | SYSTOLIC BLOOD PRESSURE: 112 MMHG | DIASTOLIC BLOOD PRESSURE: 56 MMHG

## 2018-02-12 VITALS
DIASTOLIC BLOOD PRESSURE: 66 MMHG | OXYGEN SATURATION: 97 % | SYSTOLIC BLOOD PRESSURE: 100 MMHG | TEMPERATURE: 98.42 F | HEART RATE: 93 BPM

## 2018-02-12 VITALS — DIASTOLIC BLOOD PRESSURE: 55 MMHG | HEART RATE: 100 BPM | SYSTOLIC BLOOD PRESSURE: 103 MMHG

## 2018-02-12 VITALS — HEART RATE: 94 BPM | SYSTOLIC BLOOD PRESSURE: 116 MMHG | DIASTOLIC BLOOD PRESSURE: 47 MMHG

## 2018-02-12 VITALS — HEART RATE: 93 BPM | DIASTOLIC BLOOD PRESSURE: 51 MMHG | SYSTOLIC BLOOD PRESSURE: 118 MMHG

## 2018-02-12 VITALS — SYSTOLIC BLOOD PRESSURE: 105 MMHG | HEART RATE: 89 BPM | DIASTOLIC BLOOD PRESSURE: 51 MMHG

## 2018-02-12 VITALS
SYSTOLIC BLOOD PRESSURE: 119 MMHG | TEMPERATURE: 97.88 F | HEART RATE: 79 BPM | OXYGEN SATURATION: 99 % | DIASTOLIC BLOOD PRESSURE: 76 MMHG

## 2018-02-12 VITALS — SYSTOLIC BLOOD PRESSURE: 114 MMHG | DIASTOLIC BLOOD PRESSURE: 58 MMHG | TEMPERATURE: 97.52 F | HEART RATE: 84 BPM

## 2018-02-12 VITALS — DIASTOLIC BLOOD PRESSURE: 47 MMHG | HEART RATE: 95 BPM | SYSTOLIC BLOOD PRESSURE: 108 MMHG

## 2018-02-12 VITALS — TEMPERATURE: 97.52 F | HEART RATE: 79 BPM | SYSTOLIC BLOOD PRESSURE: 113 MMHG | DIASTOLIC BLOOD PRESSURE: 65 MMHG

## 2018-02-12 VITALS
HEART RATE: 102 BPM | SYSTOLIC BLOOD PRESSURE: 105 MMHG | DIASTOLIC BLOOD PRESSURE: 66 MMHG | OXYGEN SATURATION: 97 % | TEMPERATURE: 97.52 F

## 2018-02-12 VITALS — OXYGEN SATURATION: 93 % | HEART RATE: 103 BPM

## 2018-02-12 VITALS
OXYGEN SATURATION: 94 % | SYSTOLIC BLOOD PRESSURE: 92 MMHG | DIASTOLIC BLOOD PRESSURE: 60 MMHG | TEMPERATURE: 98.78 F | HEART RATE: 89 BPM

## 2018-02-12 VITALS
OXYGEN SATURATION: 94 % | TEMPERATURE: 97.88 F | HEART RATE: 106 BPM | DIASTOLIC BLOOD PRESSURE: 76 MMHG | SYSTOLIC BLOOD PRESSURE: 120 MMHG

## 2018-02-12 VITALS — HEART RATE: 99 BPM | DIASTOLIC BLOOD PRESSURE: 55 MMHG | SYSTOLIC BLOOD PRESSURE: 105 MMHG

## 2018-02-12 VITALS — HEART RATE: 88 BPM | OXYGEN SATURATION: 94 %

## 2018-02-12 VITALS — DIASTOLIC BLOOD PRESSURE: 52 MMHG | SYSTOLIC BLOOD PRESSURE: 104 MMHG | HEART RATE: 101 BPM

## 2018-02-12 VITALS — DIASTOLIC BLOOD PRESSURE: 46 MMHG | SYSTOLIC BLOOD PRESSURE: 112 MMHG | HEART RATE: 100 BPM

## 2018-02-12 VITALS — HEART RATE: 91 BPM | SYSTOLIC BLOOD PRESSURE: 109 MMHG | DIASTOLIC BLOOD PRESSURE: 46 MMHG

## 2018-02-12 VITALS — SYSTOLIC BLOOD PRESSURE: 107 MMHG | HEART RATE: 88 BPM | DIASTOLIC BLOOD PRESSURE: 52 MMHG

## 2018-02-12 VITALS — HEART RATE: 72 BPM | OXYGEN SATURATION: 97 %

## 2018-02-12 VITALS — DIASTOLIC BLOOD PRESSURE: 48 MMHG | HEART RATE: 99 BPM | SYSTOLIC BLOOD PRESSURE: 112 MMHG

## 2018-02-12 LAB
BUN SERPL-MCNC: 72 MG/DL (ref 7–18)
CALCIUM SERPL-MCNC: 7.3 MG/DL (ref 8.5–10.1)
CO2 SERPL-SCNC: 21 MMOL/L (ref 21–32)
CREAT SERPL-MCNC: 8.32 MG/DL (ref 0.6–1.2)
EOSINOPHIL NFR BLD AUTO: 135 K/UL (ref 130–400)
GLUCOSE SERPL-MCNC: 89 MG/DL (ref 70–99)
HCT VFR BLD CALC: 32.9 % (ref 37–47)
HGB BLD-MCNC: 9.8 G/DL (ref 12–16)
INR PPP: 3.9 (ref 0.9–1.1)
MCH RBC QN AUTO: 30.7 PG (ref 25–34)
MCHC RBC AUTO-ENTMCNC: 29.8 G/DL (ref 32–36)
MCV RBC AUTO: 103.1 FL (ref 80–100)
PMV BLD AUTO: 9.7 FL (ref 7.4–10.4)
POTASSIUM SERPL-SCNC: 4 MMOL/L (ref 3.5–5.1)
RED CELL DISTRIBUTION WIDTH CV: 17.3 % (ref 11.5–14.5)
RED CELL DISTRIBUTION WIDTH SD: 65.1 FL (ref 36.4–46.3)
SODIUM SERPL-SCNC: 140 MMOL/L (ref 136–145)
WBC # BLD AUTO: 9.53 K/UL (ref 4.8–10.8)

## 2018-02-12 RX ADMIN — MIDODRINE HYDROCHLORIDE SCH MG: 2.5 TABLET ORAL at 17:21

## 2018-02-12 RX ADMIN — VANCOMYCIN HYDROCHLORIDE SCH MG: 1 INJECTION, POWDER, LYOPHILIZED, FOR SOLUTION INTRAVENOUS at 20:48

## 2018-02-12 RX ADMIN — RENAGEL SCH MG: 800 TABLET ORAL at 12:21

## 2018-02-12 RX ADMIN — VANCOMYCIN HYDROCHLORIDE SCH MG: 1 INJECTION, POWDER, LYOPHILIZED, FOR SOLUTION INTRAVENOUS at 17:19

## 2018-02-12 RX ADMIN — LEVALBUTEROL HYDROCHLORIDE SCH MG: 1.25 SOLUTION RESPIRATORY (INHALATION) at 19:15

## 2018-02-12 RX ADMIN — HUMAN INSULIN SCH UNITS: 100 INJECTION, SUSPENSION SUBCUTANEOUS at 10:07

## 2018-02-12 RX ADMIN — DICYCLOMINE HYDROCHLORIDE SCH MG: 20 TABLET ORAL at 14:08

## 2018-02-12 RX ADMIN — CHOLESTYRAMINE SCH GM: 4 POWDER, FOR SUSPENSION ORAL at 22:29

## 2018-02-12 RX ADMIN — DICYCLOMINE HYDROCHLORIDE SCH MG: 20 TABLET ORAL at 08:30

## 2018-02-12 RX ADMIN — MIDODRINE HYDROCHLORIDE SCH MG: 2.5 TABLET ORAL at 08:29

## 2018-02-12 RX ADMIN — INSULIN ASPART SCH UNITS: 100 INJECTION, SOLUTION INTRAVENOUS; SUBCUTANEOUS at 12:24

## 2018-02-12 RX ADMIN — INSULIN GLARGINE SCH UNITS: 100 INJECTION, SOLUTION SUBCUTANEOUS at 10:07

## 2018-02-12 RX ADMIN — VANCOMYCIN HYDROCHLORIDE SCH MG: 1 INJECTION, POWDER, LYOPHILIZED, FOR SOLUTION INTRAVENOUS at 14:08

## 2018-02-12 RX ADMIN — INSULIN ASPART SCH UNITS: 100 INJECTION, SOLUTION INTRAVENOUS; SUBCUTANEOUS at 08:40

## 2018-02-12 RX ADMIN — INSULIN ASPART SCH UNITS: 100 INJECTION, SOLUTION INTRAVENOUS; SUBCUTANEOUS at 17:20

## 2018-02-12 RX ADMIN — DICYCLOMINE HYDROCHLORIDE SCH MG: 20 TABLET ORAL at 20:48

## 2018-02-12 RX ADMIN — FLUTICASONE PROPIONATE AND SALMETEROL SCH PUFF: 50; 250 POWDER RESPIRATORY (INHALATION) at 20:49

## 2018-02-12 RX ADMIN — ASCORBIC ACID, THIAMINE MONONITRATE,RIBOFLAVIN, NIACINAMIDE, PYRIDOXINE HYDROCHLORIDE, FOLIC ACID, CYANOCOBALAMIN, BIOTIN, CALCIUM PANTOTHENATE, SCH CAP: 100; 1.5; 1.7; 20; 10; 1; 6000; 150000; 5 CAPSULE, LIQUID FILLED ORAL at 20:49

## 2018-02-12 RX ADMIN — PANTOPRAZOLE SCH MG: 40 TABLET, DELAYED RELEASE ORAL at 08:29

## 2018-02-12 RX ADMIN — RENAGEL SCH MG: 800 TABLET ORAL at 17:21

## 2018-02-12 RX ADMIN — RENAGEL SCH MG: 800 TABLET ORAL at 08:29

## 2018-02-12 RX ADMIN — Medication SCH ML: at 20:48

## 2018-02-12 RX ADMIN — HEPARIN SODIUM SCH UNIT: 1000 INJECTION, SOLUTION INTRAVENOUS; SUBCUTANEOUS at 11:00

## 2018-02-12 RX ADMIN — LEVALBUTEROL HYDROCHLORIDE SCH MG: 1.25 SOLUTION RESPIRATORY (INHALATION) at 07:49

## 2018-02-12 RX ADMIN — INSULIN ASPART SCH UNITS: 100 INJECTION, SOLUTION INTRAVENOUS; SUBCUTANEOUS at 20:54

## 2018-02-12 RX ADMIN — FLUTICASONE PROPIONATE AND SALMETEROL SCH PUFF: 50; 250 POWDER RESPIRATORY (INHALATION) at 08:30

## 2018-02-12 RX ADMIN — CHOLESTYRAMINE SCH GM: 4 POWDER, FOR SUSPENSION ORAL at 10:00

## 2018-02-12 RX ADMIN — AMIODARONE HYDROCHLORIDE SCH MG: 200 TABLET ORAL at 08:29

## 2018-02-12 RX ADMIN — LEVALBUTEROL HYDROCHLORIDE SCH MG: 1.25 SOLUTION RESPIRATORY (INHALATION) at 14:11

## 2018-02-12 RX ADMIN — Medication SCH ML: at 08:42

## 2018-02-12 RX ADMIN — MIDODRINE HYDROCHLORIDE SCH MG: 2.5 TABLET ORAL at 12:22

## 2018-02-12 RX ADMIN — AMIODARONE HYDROCHLORIDE SCH MG: 200 TABLET ORAL at 17:22

## 2018-02-12 RX ADMIN — Medication SCH ML: at 17:20

## 2018-02-12 RX ADMIN — Medication SCH ML: at 14:08

## 2018-02-12 RX ADMIN — VANCOMYCIN HYDROCHLORIDE SCH MG: 1 INJECTION, POWDER, LYOPHILIZED, FOR SOLUTION INTRAVENOUS at 08:42

## 2018-02-12 RX ADMIN — HEPARIN SODIUM SCH UNIT: 1000 INJECTION, SOLUTION INTRAVENOUS; SUBCUTANEOUS at 10:00

## 2018-02-12 NOTE — PROGRESS NOTE
Medicine Progress Note


Date & Time of Visit:


Feb 12, 2018 at ~ 19:00


.


Subjective





CC: 


Follow-up visit for multiple problems.





HPI: 


Still having some loose stools, but much better since starting cholestyramine.


No abdominal pain, nausea, vomiting.


Respiratory symptoms at baseline with chronic cough.


Underwent dialysis today without difficulty.


Remains in atrial flutter.





ROS:


General- no fever, no chills


Resp- as noted above in HPI


Cardiac-  no chest pain


GI- as noted above in HPI


- anuric


.





Objective





Last 8 Hrs








  Date Time  Temp Pulse Resp B/P (MAP) Pulse Ox O2 Delivery O2 Flow Rate FiO2


 


2/12/18 19:15  88 16  94 Room Air  


 


2/12/18 16:10 37.1 89 20 92/60 (71) 94 Room Air  


 


2/12/18 16:00      Room Air  


 


2/12/18 14:14  103 16  93 Room Air  


 


2/12/18 12:05 36.4 84  114/58 (76)    


 


2/12/18 12:00  100  103/55    


 


2/12/18 12:00      Room Air  


 


2/12/18 11:45  103  112/56    








Physical Exam:





General- lying in bed; no distress


Lungs- few scattered rhonchi, diffuse mild wheezing; no respiratory distress


Cardiovascular- irregular;  no gallop; slight JVD; trace pretibial edema


Abdomen- + bowel sounds, soft, nontender 


Extremities- no cyanosis; no calf tenderness


Neuro- alert, oriented


Skin- warm & dry


.


Laboratory Results:





Last 24 Hours








Test


  2/11/18


20:22 2/12/18


05:40 2/12/18


06:56 2/12/18


11:18


 


Bedside Glucose 153 mg/dl   84 mg/dl  102 mg/dl 


 


White Blood Count  9.53 K/uL   


 


Red Blood Count  3.19 M/uL   


 


Hemoglobin  9.8 g/dL   


 


Hematocrit  32.9 %   


 


Mean Corpuscular Volume  103.1 fL   


 


Mean Corpuscular Hemoglobin  30.7 pg   


 


Mean Corpuscular Hemoglobin


Concent 


  29.8 g/dl 


  


  


 


 


RDW Standard Deviation  65.1 fL   


 


RDW Coefficient of Variation  17.3 %   


 


Platelet Count  135 K/uL   


 


Mean Platelet Volume  9.7 fL   


 


Prothrombin Time  39.6 SECONDS   


 


Prothromb Time International


Ratio 


  3.9 


  


  


 


 


Sodium Level  140 mmol/L   


 


Potassium Level  4.0 mmol/L   


 


Chloride Level  105 mmol/L   


 


Carbon Dioxide Level  21 mmol/L   


 


Anion Gap  14.0 mmol/L   


 


Blood Urea Nitrogen  72 mg/dl   


 


Creatinine  8.32 mg/dl   


 


Est Creatinine Clear Calc


Drug Dose 


  8.4 ml/min 


  


  


 


 


Estimated GFR (


American) 


  5.5 


  


  


 


 


Estimated GFR (Non-


American 


  4.8 


  


  


 


 


BUN/Creatinine Ratio  8.7   


 


Random Glucose  89 mg/dl   


 


Calcium Level  7.3 mg/dl   


 


Test


  2/12/18


16:10 


  


  


 


 


Bedside Glucose 118 mg/dl    











Assessment & Plan





HYPOTENSION


Improved.


Started on midodrine.


Ongoing hypotension with hemodialysis.


Continue to monitor.





ATRIAL FLUTTER


Cardiology consulted.


Antiarrhythmic therapy with amiodarone initiated.


Anticoagulated with IV heparin --> warfarin.


Titrate warfarin.


Possible cardioversion after adequate course of therapeutic anticoagulation.


INR high today- hold warfarin, titrate dose.





UTI E COLI (present on admission)


UA on admission demonstrated WBC's and bacteria.


Urine culture grew E coli, ESBL.


Treated with course of ertapenem.





C DIFFICILE COLITIS / PERSISTENT DIARRHEA


Persistent diarrhea.


Received IV metronidazole and PO vancomycin.


Finished course of metronidazole; PO vancomycin continued.


Cholestyramine started by GI with improvement.


Sigmoidoscopy unrevealing.





BRONCHITIS / EXACERBATION OF COPD


Received course of azithromycin.


Tapering steroids- current prednisone dose 15 mg.


Patient indicates that she has a chronic cough and is back to baseline.





SBO 


Resolved.





DM TYPE 2


Pharmacy consulted for glycemic management.


FBS = 84.


Tapering steroids.





CIRRHOSIS


Noted on CT of chest.


Will need outpatient follow-up with GI.





RIGHT ANKLE SPRAIN


Seen by Ortho.


CAM boot ordered.


Pain improved.





VTE PROPHYLAXIS


Received IV heparin --> warfarin.


Ambulate as able.





DISPOSITION


Anticipated need for rehab or skilled care.


Accepted at [facility] pending insurance authorization.


.


Consultants:


Nephro-Oncu


Farhad


Current Inpatient Medications:





Current Inpatient Medications








 Medications


  (Trade)  Dose


 Ordered  Sig/Daniel


 Route  Start Time


 Stop Time Status Last Admin


Dose Admin


 


 Miscellaneous


 Information


  (Consult


 Glycemic


 Management


 Pharmacy)  1 ea  UD  PRN


 N/A  1/16/18 04:15


 2/15/18 04:14   


 


 


 Glucose


  (Glucose 40% Gel)  15-30


 GRAMS 15


 GRAMS...  UD  PRN


 PO  1/16/18 04:30


 2/15/18 04:29   


 


 


 Glucose


  (Glucose Chew


 Tab)  4-8


 Tablets 4


 Tabl...  UD  PRN


 PO  1/16/18 04:30


 2/15/18 04:29   


 


 


 Dextrose


  (Dextrose 50%


 50ML Syringe)  25-50ML OF


 50% DW IV


 FOR...  UD  PRN


 IV  1/16/18 04:30


 2/15/18 04:29  1/17/18 09:52


25 ML


 


 Glucagon


  (Glucagon Inj)  1 mg  UD  PRN


 SQ  1/16/18 04:30


 2/15/18 04:29   


 


 


 Ondansetron HCl


  (Zofran Inj)  4 mg  Q4H  PRN


 IV  1/16/18 18:15


 2/15/18 18:14  2/1/18 12:37


4 MG


 


 Acetaminophen 650


 mg/Empty Bag  65 ml @ 


 260 mls/hr  Q6H  PRN


 IV  1/23/18 16:30


 2/22/18 16:29  2/9/18 20:59


260 MLS/HR


 


 Pantoprazole


 Sodium


  (Protonix Tab)  40 mg  QAM


 PO  1/26/18 09:00


 2/25/18 08:59  2/12/18 08:29


40 MG


 


 Levalbuterol


  (Xopenex 1.25MG/


 3ML Neb)  1.25 mg  Q6R


 INH  1/26/18 21:00


 2/25/18 20:59  2/12/18 19:15


1.25 MG


 


 Midodrine


  (Proamatine Tab)  5 mg  TID@08,12,17


 PO  1/28/18 17:00


 2/27/18 16:59  2/12/18 17:21


5 MG


 


 Insulin Aspart


  (novoLOG ASPART)  **SLIDING


 SCALE**


 **G...  ACHS


 SC  1/31/18 07:00


 3/2/18 06:59 Future hold 2/12/18 17:20


2 UNITS


 


 Vitamin B Complex/


 Vit C/Folic Acid


  (Nephrocaps)  1 cap  HS


 PO  1/31/18 21:00


 3/2/18 20:59  2/11/18 20:03


1 CAP


 


 Sevelamer HCl


  (Renagel Tab)  800 mg  TIDM


 PO  1/31/18 11:30


 3/2/18 11:29  2/12/18 17:21


800 MG


 


 Amiodarone HCl


  (Cordarone Tab)  200 mg  BIDM


 PO  2/1/18 16:45


 3/1/18 16:44  2/12/18 17:22


200 MG


 


 Salmeterol


 Xinafoate/


 Fluticasone


  (Advair Diskus


 250/50 Inh)  1 puff  BID


 INH  2/4/18 09:00


 3/6/18 08:59  2/12/18 08:30


1 PUFF


 


 Raspberry


  (Raspberry Syrup


 5ml Cup)  5 ml  QID


 PO  2/4/18 13:00


 2/18/18 11:59  2/12/18 17:20


5 ML


 


 Vancomycin HCl


  (Vancomycin Oral


 Soln)  125 mg  QID


 PO  2/4/18 13:00


 2/18/18 11:59  2/12/18 17:19


125 MG


 


 Benzonatate


  (Tessalon Perles


 Cap)  100 mg  TID  PRN


 PO  2/5/18 09:00


 3/7/18 08:59  2/11/18 07:59


100 MG


 


 Dicyclomine HCl


  (Bentyl Tab)  10 mg  TID


 PO  2/8/18 21:00


 3/10/18 20:59  2/12/18 14:08


10 MG


 


 Prednisone


  (PredniSONE TAB)  15 mg  DAILY


 PO  2/10/18 09:00


 3/12/18 08:59  2/12/18 08:29


15 MG


 


 Cholestyramine


 Resin


  (Questran Powder


 Light)  4 gm  BID@10,22


 PO  2/9/18 22:00


 3/9/18 21:59  2/11/18 21:13


4 GM


 


 Insulin Human NPH


  (novoLIN-N NPH)  20 units  QDB


 SC  2/12/18 07:30


 3/14/18 07:29  2/12/18 10:07


20 UNITS


 


 Insulin Glargine


  (Lantus Solostar


 Pen)  10 units  DAILY


 SC  2/12/18 09:00


 3/14/18 08:59  2/12/18 10:07


10 UNITS

## 2018-02-13 VITALS
TEMPERATURE: 98.42 F | OXYGEN SATURATION: 95 % | SYSTOLIC BLOOD PRESSURE: 106 MMHG | HEART RATE: 97 BPM | DIASTOLIC BLOOD PRESSURE: 68 MMHG

## 2018-02-13 VITALS
HEART RATE: 107 BPM | DIASTOLIC BLOOD PRESSURE: 77 MMHG | SYSTOLIC BLOOD PRESSURE: 117 MMHG | OXYGEN SATURATION: 95 % | TEMPERATURE: 98.42 F

## 2018-02-13 VITALS
HEART RATE: 90 BPM | SYSTOLIC BLOOD PRESSURE: 97 MMHG | OXYGEN SATURATION: 95 % | TEMPERATURE: 98.78 F | DIASTOLIC BLOOD PRESSURE: 60 MMHG

## 2018-02-13 VITALS
OXYGEN SATURATION: 95 % | HEART RATE: 107 BPM | SYSTOLIC BLOOD PRESSURE: 117 MMHG | DIASTOLIC BLOOD PRESSURE: 77 MMHG | TEMPERATURE: 98.42 F

## 2018-02-13 VITALS — HEART RATE: 98 BPM | OXYGEN SATURATION: 93 %

## 2018-02-13 VITALS — OXYGEN SATURATION: 94 % | HEART RATE: 96 BPM

## 2018-02-13 LAB — INR PPP: 2.9 (ref 0.9–1.1)

## 2018-02-13 RX ADMIN — Medication SCH ML: at 08:10

## 2018-02-13 RX ADMIN — DICYCLOMINE HYDROCHLORIDE SCH MG: 20 TABLET ORAL at 08:11

## 2018-02-13 RX ADMIN — VANCOMYCIN HYDROCHLORIDE SCH MG: 1 INJECTION, POWDER, LYOPHILIZED, FOR SOLUTION INTRAVENOUS at 13:46

## 2018-02-13 RX ADMIN — MIDODRINE HYDROCHLORIDE SCH MG: 2.5 TABLET ORAL at 12:06

## 2018-02-13 RX ADMIN — RENAGEL SCH MG: 800 TABLET ORAL at 08:10

## 2018-02-13 RX ADMIN — VANCOMYCIN HYDROCHLORIDE SCH MG: 1 INJECTION, POWDER, LYOPHILIZED, FOR SOLUTION INTRAVENOUS at 08:09

## 2018-02-13 RX ADMIN — INSULIN ASPART SCH UNITS: 100 INJECTION, SOLUTION INTRAVENOUS; SUBCUTANEOUS at 07:50

## 2018-02-13 RX ADMIN — MIDODRINE HYDROCHLORIDE SCH MG: 2.5 TABLET ORAL at 08:12

## 2018-02-13 RX ADMIN — FLUTICASONE PROPIONATE AND SALMETEROL SCH PUFF: 50; 250 POWDER RESPIRATORY (INHALATION) at 08:10

## 2018-02-13 RX ADMIN — RENAGEL SCH MG: 800 TABLET ORAL at 12:05

## 2018-02-13 RX ADMIN — LEVALBUTEROL HYDROCHLORIDE SCH MG: 1.25 SOLUTION RESPIRATORY (INHALATION) at 07:00

## 2018-02-13 RX ADMIN — HUMAN INSULIN SCH UNITS: 100 INJECTION, SUSPENSION SUBCUTANEOUS at 07:52

## 2018-02-13 RX ADMIN — LEVALBUTEROL HYDROCHLORIDE SCH MG: 1.25 SOLUTION RESPIRATORY (INHALATION) at 01:53

## 2018-02-13 RX ADMIN — LEVALBUTEROL HYDROCHLORIDE SCH MG: 1.25 SOLUTION RESPIRATORY (INHALATION) at 15:00

## 2018-02-13 RX ADMIN — AMIODARONE HYDROCHLORIDE SCH MG: 200 TABLET ORAL at 08:10

## 2018-02-13 RX ADMIN — INSULIN GLARGINE SCH UNITS: 100 INJECTION, SOLUTION SUBCUTANEOUS at 07:53

## 2018-02-13 RX ADMIN — CHOLESTYRAMINE SCH GM: 4 POWDER, FOR SUSPENSION ORAL at 10:00

## 2018-02-13 RX ADMIN — PANTOPRAZOLE SCH MG: 40 TABLET, DELAYED RELEASE ORAL at 08:11

## 2018-02-13 RX ADMIN — INSULIN ASPART SCH UNITS: 100 INJECTION, SOLUTION INTRAVENOUS; SUBCUTANEOUS at 12:10

## 2018-02-13 RX ADMIN — Medication SCH ML: at 13:46

## 2018-02-13 RX ADMIN — DICYCLOMINE HYDROCHLORIDE SCH MG: 20 TABLET ORAL at 13:47

## 2018-02-13 NOTE — DISCHARGE SUMMARY
Discharge Summary


Date of Service


Feb 13, 2018.





Discharge Summary


Admission Date:


Jan 16, 2018 at 02:25


Discharge Date:  Feb 13, 2018


Discharge Disposition:  Skilled nursing facility ([facility])


Principal Diagnosis:


suspected sepsis secondary to E coli UTI (present on admission)





OTHER ACUTE DIAGNOSES:


hypotension with hemodialysis


atrial flutter


C difficile colitis


bronchitis


exacerbation COPD


small bowel obstruction


.


Secondary Diagnoses/Problems:





Chronic and Resolved Medical Problems:





(1) Asthma


Status: Chronic  





(2) CKD (chronic kidney disease), stage V


Status: Chronic  





(3) Congestive heart failure


Status: Chronic  





(4) COPD (chronic obstructive pulmonary disease)


Status: Chronic  





(5) Diabetes mellitus, type 2


Status: Chronic  





(6) Dyslipidemia


Status: Chronic  





(7) ESRD on dialysis


Status: Chronic  





(8) Hypertension


Status: Chronic  





(9) Mitral stenosis


Status: Chronic  





(10) Obesity hypoventilation syndrome


Status: Chronic  





(11) Pulmonary hypertension


Status: Chronic  








Surgical Problems:





(1) Status post aortic valve replacement with bioprosthetic valve


Status: Chronic  





(2) Status post cardiac catheterization


Status: Chronic  


.





Procedures:


cardiac monitoring


IV meds


echocardiogram, transthoracic


echocardiogram, transesophageal


colonoscopy


sigmoidoscopy


hemodialysis


CT abdomen and pelvis


.


Consultations:


Critical Care Medicine


Nephrology


Cardiology


GI


Orthopedics


.





Medication Reconciliation


New Medications:  


Acetaminophen (Tylenol Extra Strength) 500 Mg Tab


500 MG PO Q6H PRN for Pain, #30 TAB





Dicyclomine Hcl (Dicyclomine Hcl) 10 Mg Cap


10 MG PO TID PRN for Diarrhea, #30 CAP 3 Refills





Insulin Human NPH (Humulin N) 100 Units/Ml Susp


30 UNITS SQ DAILY for 30 Days





Midodrine Hcl (Midodrine Hcl) 5 Mg Tab


5 MG PO TIDM, #90 TAB 5 Refills





Prednisone (Prednisone) 5 Mg Tab


0 PO UD, #12 TAB


10 mg daily x 4 days, 5 mg daily x 4 days, then stop.


Warfarin Sodium (Warfarin Sodium) 2 Mg Tab


2 MG PO DAILY, #30 TAB 5 Refills


Next dose 2/14/18.


 Adjust to maintain INR 2-3.


Amiodarone HCl (Amiodarone HCl) 200 Mg Tab


200 MG PO BIDM, #30 TAB 5 Refills





Cholestyramine (Cholestyramine Light) 4 Gm Pack


4 GM PO BID@10,22 for 30 Days


Continue until diarrhea improved.


 


Continued Medications:  


Albuterol Sulfate (Proair Respiclick) 108 Mcg/Act Aer


2 PUFFS INH PRN for Shortness of Breath





Allopurinol (Zyloprim) 100 Mg Tab


100 MG PO BID





Atorvastatin (Lipitor) 40 Mg Tab


40 MG PO DAILY





Ferric Citrate (Auryxia) 210 Mg Tab


1 TAB PO DAILY





Fluticasone Furoate-Vilanterol (Breo Ellipta) 1 Inh Inh


1 PUFF INH BID for 30 Days, #3 INHALER 5 Refills





Gabapentin (Neurontin) 100 Mg Cap


2 CAP PO HS for 30 Days, #60 CAP 4 Refills





Ipratropium-Albuterol (Combivent Respimat) 1 Aer Aer


1 PUFFS INH QID for 30 Days, #1 INHALER 5 Refills





Iron Sucrose (Venofer) 20 Mg/Ml Inj


50 MG IV





Iron Sucrose (Venofer) 100 Mg/5 Ml Inj


100 MG IV





Methoxy Polyethylene Glycol-Ep (Mircera) 150 Mcg/0.3 Ml Sol


1 TAB PO TID





Pramipexole (Mirapex) 0.125 Mg Tab


0.25 MG PO HS, TAB





Rabeprazole Sodium (Aciphex) 20 Mg Tab


20 MG PO DAILY





Sertraline (Zoloft) 50 Mg Tab


75 MG PO DAILY





Sevelamer Carbonate (Renvela) 800 Mg Tab


1 TAB PO TID





Thiamine Hcl (Vitamin B-1) 50 Mg Tab


50 MG PO DAILY





Vitamin B Cmplx/Vitc/Folic Ac (Nephrocaps)  Cap


1 CAP PO DAILY





 


Discontinued Medications:  


Aspirin (Aspirin Chewable) 81 Mg Chew


81 MG PO DAILY





Clopidogrel Bisulfate (Plavix) 75 Mg Tab


75 MG PO DAILY





Docusate Sodium (Colace) 100 Mg Cap


1 CAP PO BID





Insulin Human NPH (Humulin N) 100 Units/Ml Susp


40 UNITS SC UD for 8 Days, #1 VIAL





Levofloxacin (Levaquin) 500 Mg Tab


500 MG PO DAILY for 7 Days, #7 TAB





Pantoprazole (Protonix) 40 Mg Tab


40 MG PO DAILY





Tramadol (Ultram) 50 Mg Tab


50 MG PO Q4H PRN for Pain











Admission Information


HPI (per Admitting provider):


57 year old F transferred from [facility] to [facility] ICU. As per discussion with ICU staff and the patient, patient has been 

on dialysis MWF was at her dialysis center on Monday 1/15/18 and may have had 

about 90 minutes out of usual 3 hour dialysis session when experiencing 

symptoms. As per the HPI from [facility], patient's blood pressure was too low to 

continue dialysis therapy. When arrive to [facility], had recorded blood pressure 

68/38 and afebrile with mild tachypnea. X ray performed at [facility] described 

bilateral pulmonary vascular congestion but no acute process. Patient was given 

nebulizer treatment at [facility] and 250 cc bolus of normal saline. Initially [facility] was to transfer patient to her nephrologist Dr. Geiger in [facility]. 

Patient also found to have elevated lactic acid and to be receiving gentamicin 

and Levophed as per documentation from [facility]





Patient instead was accepted by Intensivist Dr. Kyler Hensley transferred to 

[facility] from [facility] for further care. Admission vitals 

afebrile,  and blood pressure 67/43. Hospitalist evaluated the patient at 

bedside. Her heart rate 105 bpm, saturating 93% on room air, and arm blood 

pressure 64/41. Patient is coherent and in no acute distress. She explains that 

during dialysis session she was feeling sick and like having pain in her 

stomach. Physical Exam remarkable for lung congestion on both lung fields on 

auscultation.


.


Physical Exam (per Admitting):  


   General Appearance:  no apparent distress


   Head:  normocephalic, atraumatic


   Eyes:  normal inspection, EOMI, sclerae normal


   ENT:  normal ENT inspection, hearing grossly normal, pharynx normal


   Neck:  supple, no JVD, trachea midline


   Respiratory/Chest:  chest non-tender, lungs clear, no respiratory distress, 

no accessory muscle use, + pertinent finding (diminished air entry suggestive 

of bilateral pulmonary congestion/edema)


   Cardiovascular:  + tachycardia, + pertinent finding (edema of lower 

extremities bilaterally)


   Abdomen/GI:  normal bowel sounds, soft, no organomegaly, no pulsatile mass, 

+ pertinent finding (minimal tenderness on palpation of abdomen)


   Back:  normal inspection, no muscle spasm


   Extremities/Musculoskelatal:  no calf tenderness, non-tender, + pertinent 

finding (bilateral lower extremity edema)


   Neurologic/Psych:  alert, normal mood/affect, oriented x 3


   Skin:  normal color, warm/dry, no rash





Hospital Course





HYPOTENSION


Experienced hypotension associated with hemodialysis day of admission, possibly 

due to sepsis from UTI.


Admitted to ICU.


Received IV fluids, pressors, antibiotics.


Had ongoing hypotension with dialysis.


Started on midodrine with improvement.





UTI E COLI (present on admission)


UA on admission demonstrated WBC's and bacteria.


Urine culture grew E coli, ESBL.


Treated with course of ertapenem.





ATRIAL FLUTTER


Cardiology consulted.


Antiarrhythmic therapy with amiodarone initiated.


Anticoagulated with IV heparin --> warfarin.


Possible cardioversion after adequate course of therapeutic anticoagulation.





C DIFFICILE COLITIS / PERSISTENT DIARRHEA


Persistent diarrhea.


Received IV metronidazole and PO vancomycin.


Had persistent diarrhea.


Sigmoidoscopy unrevealing.


Repeat C diff PCR negative.


Cholestyramine started by GI with improvement.





BRONCHITIS / EXACERBATION OF COPD


Treated with steroids, nebs,  azithromycin.


Tapering steroids will discharge on prednisone 10 mg daily with ongoing taper.


Patient indicates that she has a chronic cough and is back to baseline.





SBO 


Resolved.





DM TYPE 2


Usually well-controlled.


Hgb A1C = 6.5.


Fluctuating blood sugars due to acute illness.


Pharmacy consulted for glycemic management.


FBS = 109.


Tapering steroids.





CIRRHOSIS


Noted on CT of chest.


Will need outpatient follow-up with GI.





RIGHT ANKLE SPRAIN


Seen by Ortho.


CAM boot ordered.


Pain improved.





VTE PROPHYLAXIS


Received IV heparin --> warfarin.


Ambulate as able.





DISPOSITION


Needs skilled care.


Accepted at [facility] pending insurance authorization.


.


Total time spent on discharge = 60 min.


This includes examination of the patient, discharge planning, medication 

reconciliation, and communication with other providers.


.





Discharge Instructions





Discharge Instructions


Date of Service


Feb 13, 2018.





Admission


Reason for Admission:  hypotension


.





Discharge


Discharge Diagnosis / Problem:  hypotension, suspected sepsis secondary to UTI





Discharge Goals


Goal(s):  Decrease discomfort, Improve function, Increase independence, Improve 

disease control





Activity Recommendations


Activity Level:  Assistance Required


Therapies:  Physical Therapy, Occupational Therapy





.





Additional Information


Patient informed of condition:  Yes


Advance Directives:  No


DNR:  No


Level of Care:  Skilled


Communicable Disease:  No


Prognosis:  Improving


Meyer Catheter:  No





Instructions / Follow-Up


Instructions / Follow-Up





FOLLOW-UP APPOINTMENTS:





FAMILY MEDICINE


Dr. Gary





NEPHROLOGY


Dr. Geiger





CARDIOLOGY


To be arranged re: atrial flutter





GASTROENTEROLOGY


To be arranged re: cirrhosis











Thank you for receiving this patient in transfer.


Please call if you have any questions.


Blas Lockett


Cell:  [phone]


.





Current Hospital Diet


Patient's current hospital diet: Renal Diet, Diabetes Type 2 Diet





Discharge Diet


Recommended Diet:  AHA Diet (Heart Healthy), Diabetes Type 2 Diet, Renal Diet


Fluid Restriction:  1500 ml (6 cups)





Pending Studies


Studies pending at discharge:  no





Physician Orders On Transfer


Special Precautions:





fall precautions


.


Vital Signs:





routine


.


Weigh:





routine


.


Additional Orders:





Please check blood sugars AC & HS.





Warfarin monitoring per your institution's routine.


Initial warfarin dose 2 mg daily, starting 2/14/18.


INR goal 2-3


Indication atrial flutter.





Hemodialysis M-W-F


.





Laboratory Results





Hemoglobin A1c








Test


  1/17/18


05:34 Range/Units


 


 


Estimated Average Glucose 140   mg/dl


 


Hemoglobin A1c 6.5 H 4.5-5.6  %











Medical Emergencies








.


Who to Call and When:





Medical Emergencies:  If at any time you feel your situation is an emergency, 

please call 911 immediately.





.





Non-Emergent Contact


Non-Emergency issues call your:  Primary Care Provider, Cardiologist, Hospital 

Doctor





.


.








"Provider Documentation" section prepared by Blas Lockett.








.





Consultant Recommendations


Consultant Recommendations:


MAY WEIGHT BEAR AS TOLERATED


WEAR CAM BOOT WHEN AMBULATORY


ICE AND ELEVATION


FOLLOW UP WITH DR. PUENTE IN 1-2 WEEKS. [phone]





Core Measure Problem


Core Measures:  None





[place]


Search Results:  patient reviewed within database, no issues identified





.





Additional Copies To


Lennox Geiger M.D.; Miladys Gary D.O.

## 2018-02-13 NOTE — PROGRESS NOTE
Medicine Progress Note


Date & Time of Visit:


Feb 13, 2018 at 14:02


.


Subjective





CC: 


Follow-up visit for multiple problems.





HPI: 


Doing well.


Diarrhea improved.


No abdominal pain, nausea, vomiting.


Occasional cough, chronic.


Remains in atrial flutter.


Ready for discharge.





ROS:


General- no fever, no chills


Resp- as noted above in HPI


Cardiac-  no chest pain


GI- as noted above in HPI


- anuric


.





Objective





Last 8 Hrs








  Date Time  Temp Pulse Resp B/P (MAP) Pulse Ox O2 Delivery O2 Flow Rate FiO2


 


2/13/18 11:06 36.9 107 24 117/77 (90) 95 Room Air  


 


2/13/18 08:00      Room Air  


 


2/13/18 07:30 36.9 97 18 106/68 (81) 95 Room Air  


 


2/13/18 07:00  98 18  93 Room Air  








Physical Exam:





General- sitting in chair; no distress


Lungs- diffuse mild wheezing; no respiratory distress


Cardiovascular- irregular;  no gallop; slight JVD; trace-1+ pretibial edema


Abdomen- + bowel sounds, soft, nontender 


Extremities- no cyanosis; no calf tenderness


Neuro- alert, oriented


Skin- warm & dry


.


Laboratory Results:





Last 24 Hours








Test


  2/12/18


16:10 2/12/18


20:17 2/13/18


05:11 2/13/18


06:42


 


Bedside Glucose 118 mg/dl  149 mg/dl   109 mg/dl 


 


Prothrombin Time   29.6 SECONDS  


 


Prothromb Time International


Ratio 


  


  2.9 


  


 


 


Test


  2/13/18


11:30 


  


  


 


 


Bedside Glucose 103 mg/dl    











Assessment & Plan





HYPOTENSION


Experienced hypotension associated with hemodialysis day of admission, possibly 

due to sepsis from UTI.


Admitted to ICU.


Received IV fluids, pressors, antibiotics.


Had ongoing hypotension with dialysis.


Started on midodrine with improvement.





UTI E COLI (present on admission)


UA on admission demonstrated WBC's and bacteria.


Urine culture grew E coli, ESBL.


Treated with course of ertapenem.





ATRIAL FLUTTER


Cardiology consulted.


Antiarrhythmic therapy with amiodarone initiated.


Anticoagulated with IV heparin --> warfarin.


Possible cardioversion after adequate course of therapeutic anticoagulation.





C DIFFICILE COLITIS / PERSISTENT DIARRHEA


Persistent diarrhea.


Received IV metronidazole and PO vancomycin.


Had persistent diarrhea.


Sigmoidoscopy unrevealing.


Repeat C diff PCR negative.


Cholestyramine started by GI with improvement.





BRONCHITIS / EXACERBATION OF COPD


Treated with steroids, nebs,  azithromycin.


Tapering steroids will discharge on prednisone 10 mg daily with ongoing taper.


Patient indicates that she has a chronic cough and is back to baseline.





SBO 


Resolved.





DM TYPE 2


Usually well-controlled.


Hgb A1C = 6.5.


Fluctuating blood sugars due to acute illness.


Pharmacy consulted for glycemic management.


FBS = 109.


Tapering steroids.





CIRRHOSIS


Noted on CT of chest.


Will need outpatient follow-up with GI.





RIGHT ANKLE SPRAIN


Seen by Ortho.


CAM boot ordered.


Pain improved.





VTE PROPHYLAXIS


Received IV heparin --> warfarin.


Ambulate as able.





DISPOSITION


Needs skilled care.


Accepted at [facility] pending insurance authorization.


.


Consultants:


Nephro-Oncu


Farhad


Current Inpatient Medications:





Current Inpatient Medications








 Medications


  (Trade)  Dose


 Ordered  Sig/Daniel


 Route  Start Time


 Stop Time Status Last Admin


Dose Admin


 


 Miscellaneous


 Information


  (Consult


 Glycemic


 Management


 Pharmacy)  1 ea  UD  PRN


 N/A  1/16/18 04:15


 2/15/18 04:14   


 


 


 Glucose


  (Glucose 40% Gel)  15-30


 GRAMS 15


 GRAMS...  UD  PRN


 PO  1/16/18 04:30


 2/15/18 04:29   


 


 


 Glucose


  (Glucose Chew


 Tab)  4-8


 Tablets 4


 Tabl...  UD  PRN


 PO  1/16/18 04:30


 2/15/18 04:29   


 


 


 Dextrose


  (Dextrose 50%


 50ML Syringe)  25-50ML OF


 50% DW IV


 FOR...  UD  PRN


 IV  1/16/18 04:30


 2/15/18 04:29  1/17/18 09:52


25 ML


 


 Glucagon


  (Glucagon Inj)  1 mg  UD  PRN


 SQ  1/16/18 04:30


 2/15/18 04:29   


 


 


 Ondansetron HCl


  (Zofran Inj)  4 mg  Q4H  PRN


 IV  1/16/18 18:15


 2/15/18 18:14  2/1/18 12:37


4 MG


 


 Acetaminophen 650


 mg/Empty Bag  65 ml @ 


 260 mls/hr  Q6H  PRN


 IV  1/23/18 16:30


 2/22/18 16:29  2/9/18 20:59


260 MLS/HR


 


 Pantoprazole


 Sodium


  (Protonix Tab)  40 mg  QAM


 PO  1/26/18 09:00


 2/25/18 08:59  2/13/18 08:11


40 MG


 


 Levalbuterol


  (Xopenex 1.25MG/


 3ML Neb)  1.25 mg  Q6R


 INH  1/26/18 21:00


 2/25/18 20:59  2/13/18 07:00


1.25 MG


 


 Midodrine


  (Proamatine Tab)  5 mg  TID@08,12,17


 PO  1/28/18 17:00


 2/27/18 16:59  2/13/18 12:06


5 MG


 


 Insulin Aspart


  (novoLOG ASPART)  **SLIDING


 SCALE**


 **G...  ACHS


 SC  1/31/18 07:00


 3/2/18 06:59 Future hold 2/13/18 12:10


2 UNITS


 


 Vitamin B Complex/


 Vit C/Folic Acid


  (Nephrocaps)  1 cap  HS


 PO  1/31/18 21:00


 3/2/18 20:59  2/12/18 20:49


1 CAP


 


 Sevelamer HCl


  (Renagel Tab)  800 mg  TIDM


 PO  1/31/18 11:30


 3/2/18 11:29  2/13/18 12:05


800 MG


 


 Amiodarone HCl


  (Cordarone Tab)  200 mg  BIDM


 PO  2/1/18 16:45


 3/1/18 16:44  2/13/18 08:10


200 MG


 


 Salmeterol


 Xinafoate/


 Fluticasone


  (Advair Diskus


 250/50 Inh)  1 puff  BID


 INH  2/4/18 09:00


 3/6/18 08:59  2/13/18 08:10


1 PUFF


 


 Raspberry


  (Raspberry Syrup


 5ml Cup)  5 ml  QID


 PO  2/4/18 13:00


 2/18/18 11:59  2/13/18 13:46


5 ML


 


 Vancomycin HCl


  (Vancomycin Oral


 Soln)  125 mg  QID


 PO  2/4/18 13:00


 2/18/18 11:59  2/13/18 13:46


125 MG


 


 Benzonatate


  (Tessalon Perles


 Cap)  100 mg  TID  PRN


 PO  2/5/18 09:00


 3/7/18 08:59  2/11/18 07:59


100 MG


 


 Dicyclomine HCl


  (Bentyl Tab)  10 mg  TID


 PO  2/8/18 21:00


 3/10/18 20:59  2/13/18 13:47


10 MG


 


 Prednisone


  (PredniSONE TAB)  15 mg  DAILY


 PO  2/10/18 09:00


 3/12/18 08:59  2/13/18 08:11


15 MG


 


 Cholestyramine


 Resin


  (Questran Powder


 Light)  4 gm  BID@10,22


 PO  2/9/18 22:00


 3/9/18 21:59  2/12/18 22:29


4 GM


 


 Insulin Human NPH


  (novoLIN-N NPH)  30 units  QDB


 SC  2/14/18 07:30


 3/16/18 07:29

## 2018-02-13 NOTE — PHARMACY PROGRESS NOTE
Pharmacy Glycemic Short Note 2


Date of Service


Feb 13, 2018.


OUTPATIENT ANTIDIABETIC REGIMEN: 


* NPH 40 units SQ daily when taking prednisone (otherwise no insulin taken)





ASSESSMENT:


* 56yo female known to pharmacy from previous admissions/glycemic consults. Pt 

with steroid associated diabetes. Only has hyperglycemia while receiving 

steroids/prednisone taper. 


* Patient has been requiring ~40 units of insulin per day with near adequate 

control. 


 * AM fasting BSG on the low side of range at 91, 85, 84 on 2/10/18, 2/11/18, & 

2/12/18 respectively --> PM dose of Lantus stopped 2/12/18 PM. Now, AM fasting 

BSG in more conservative range at 109 mg/dl. Will simplify regimen and titrate 

Lantus off and incorporate this dosing into the NPH. 


 * Post-prandial BSGs all in range with NPH to cover steroid associated 

hyperglycemia from prednisone 15mg daily. NovoLog for additional stress 

hyperglycemia. BSGs yesterday:  84 (fasting) 102, 118, 149. No changes needed. 





PLAN FOR INPATIENT GLYCEMIC CONTROL: No changes needed today. Continue total 

daily dose of ~ 40 units/day. 





* 2/13/18:  NPH 20 units SQ qAM given with prednisone. HOLD if prednisone held. 


* 2/14/18: Increase to NPH 30 units SQ qAM given with prednisone. HOLD if 

prednisone held. 





* Basal Insulin: 


 * 2/13/18:  Lantus 10 units SQ daily in AM


 * 2/14/18:  Stop Lantus


 


* Bolus Insulin:


 * NovoLog per scale ACHS


 * Goal Range:  Low 120 mg/dL - High 150 mg/dL


 * Correction Factor: 25 mg/dL/unit


 * Nutritional / Prandial insulin:  1 units for every 9 grams of carb 








Thank you.

## 2018-02-13 NOTE — DISCHARGE INSTRUCTIONS
Discharge Instructions


Date of Service


Feb 13, 2018.





Admission


Reason for Admission:  hypotension


.





Discharge


Discharge Diagnosis / Problem:  hypotension, suspected sepsis secondary to UTI





Discharge Goals


Goal(s):  Decrease discomfort, Improve function, Increase independence, Improve 

disease control





Activity Recommendations


Activity Level:  Assistance Required


Therapies:  Physical Therapy, Occupational Therapy





.





Additional Information


Patient informed of condition:  Yes


Advance Directives:  No


DNR:  No


Level of Care:  Skilled


Communicable Disease:  No


Prognosis:  Improving


Meyer Catheter:  No





Instructions / Follow-Up


Instructions / Follow-Up





FOLLOW-UP APPOINTMENTS:





FAMILY MEDICINE


Dr. Gary





NEPHROLOGY


Dr. Geiger





CARDIOLOGY


To be arranged re: atrial flutter





GASTROENTEROLOGY


To be arranged re: cirrhosis











Thank you for receiving this patient in transfer.


Please call if you have any questions.


Blas Lockett


Cell:  [phone]


.





Current Hospital Diet


Patient's current hospital diet: Renal Diet, Diabetes Type 2 Diet





Discharge Diet


Recommended Diet:  AHA Diet (Heart Healthy), Diabetes Type 2 Diet, Renal Diet


Fluid Restriction:  1500 ml (6 cups)





Pending Studies


Studies pending at discharge:  no





Physician Orders On Transfer


Special Precautions:





fall precautions


.


Vital Signs:





routine


.


Weigh:





routine


.


Additional Orders:





Please check blood sugars AC & HS.





Warfarin monitoring per your institution's routine.


Initial warfarin dose 2 mg daily, starting 2/14/18.


INR goal 2-3


Indication atrial flutter.





Hemodialysis M-W-F


.





Laboratory Results





Hemoglobin A1c








Test


  1/17/18


05:34 Range/Units


 


 


Estimated Average Glucose 140   mg/dl


 


Hemoglobin A1c 6.5 H 4.5-5.6  %











Medical Emergencies








.


Who to Call and When:





Medical Emergencies:  If at any time you feel your situation is an emergency, 

please call 911 immediately.





.





Non-Emergent Contact


Non-Emergency issues call your:  Primary Care Provider, Cardiologist, Hospital 

Doctor





.


.








"Provider Documentation" section prepared by Blas Lockett.








.





Consultant Recommendations


Consultant Recommendations:


MAY WEIGHT BEAR AS TOLERATED


WEAR CAM BOOT WHEN AMBULATORY


ICE AND ELEVATION


FOLLOW UP WITH DR. PUENTE IN 1-2 WEEKS. [phone]





Core Measure Problem


Core Measures:  None





[place]


Search Results:  patient reviewed within database, no issues identified

## 2019-01-28 NOTE — PROGRESS NOTE
Medicine Progress Note


Date & Time of Visit:


Jan 1, 2018 at 18:34.


Subjective


reports some coughing but no fevers chills


not requiring oxygen any longer and appears to be improved overall


wheezing on exam and CXR shows some increased volume overload


denies any other symptoms at this time


clinically improved.





Objective





Last 8 Hrs








  Date Time  Temp Pulse Resp B/P (MAP) Pulse Ox O2 Delivery O2 Flow Rate FiO2


 


1/1/18 16:00      Room Air  


 


1/1/18 15:37 36.4 103 18 120/76 (91) 95 Room Air  


 


1/1/18 15:15  102 18  97 Room Air  


 


1/1/18 11:50  92 18  96 Room Air  








Physical Exam:


GEN: obese, in no acute distress, alert and appropriate


HEENT: NC/AT, pupils are round and equal bilaterally, normal sclerae, MMM


CARDIO: reg rate, S1/2 heard without m/g/r


LUNGS: Some wheezing present at the bases. good diaphragmatic excursion


ABD: soft, non-tender, non-distended, no rebound or guarding +BS


EXTREMITY: RP and DP palpable 2+ bilat, no LE swelling or edema, extremities 

are warm and well-perfused


NEURO: CN 2-12 grossly intact


MUSC: moves all extremities equally


SKIN:  warm and dry


Laboratory Results:


12/30/17 06:20








12/30/17 06:20

















Test


  12/27/17


20:16 12/29/17


07:02 12/30/17


06:20 12/30/17


09:27


 


Prothrombin Time


  10.0 SECONDS


(9.0-12.0) 


  


  


 


 


Prothromb Time International


Ratio 1.0 (0.9-1.1) 


  


  


  


 


 


Activated Partial


Thromboplast Time 28.5 SECONDS


(21.0-31.0) 


  


  


 


 


Partial Thromboplastin Ratio 1.1    


 


Total Bilirubin


  0.3 mg/dl


(0.2-1) 


  


  


 


 


Aspartate Amino Transf


(AST/SGOT) 12 U/L (15-37) 


  


  


  


 


 


Alanine Aminotransferase


(ALT/SGPT) 12 U/L (12-78) 


  


  


  


 


 


Alkaline Phosphatase


  93 U/L


() 


  


  


 


 


Total Protein


  7.5 gm/dl


(6.4-8.2) 


  


  


 


 


Albumin


  2.8 gm/dl


(3.4-5.0) 


  


  


 


 


Globulin


  4.7 gm/dl


(2.5-4.0) 


  


  


 


 


Albumin/Globulin Ratio 0.6 (0.9-2)    


 


Beta-Hydroxybutyric Acid


  3.60 mg/dL


(0.2-2.81) 


  


  


 


 


Thyroid Stimulating Hormone


(TSH) 2.160 uIu/ml


(0.300-4.500) 


  


  


 


 


Hepatitis C Antibody Screen NEG (NEG)    


 


Hepatitis C Antibody NEG (NEG)    


 


Estimated Average Glucose  134 mg/dl   


 


Hemoglobin A1c


  


  6.3 %


(4.5-5.6) 


  


 


 


Red Blood Count


  


  


  3.42 M/uL


(4.2-5.4) 


 


 


Mean Corpuscular Volume


  


  


  100.9 fL


() 


 


 


Mean Corpuscular Hemoglobin


  


  


  31.6 pg


(25-34) 


 


 


Mean Corpuscular Hemoglobin


Concent 


  


  31.3 g/dl


(32-36) 


 


 


RDW Standard Deviation


  


  


  60.5 fL


(36.4-46.3) 


 


 


RDW Coefficient of Variation


  


  


  16.5 %


(11.5-14.5) 


 


 


Mean Platelet Volume


  


  


  9.2 fL


(7.4-10.4) 


 


 


Anion Gap


  


  


  12.0 mmol/L


(3-11) 


 


 


Est Creatinine Clear Calc


Drug Dose 


  


  14.7 ml/min 


  


 


 


Estimated GFR (


American) 


  


  11.1 


  


 


 


Estimated GFR (Non-


American 


  


  9.5 


  


 


 


BUN/Creatinine Ratio   13.0 (10-20)  


 


Calcium Level


  


  


  9.2 mg/dl


(8.5-10.1) 


 


 


Phosphorus Level


  


  


  7.0 mg/dl


(2.5-4.9) 


 


 


Magnesium Level


  


  


  2.2 mg/dl


(1.8-2.4) 


 


 


Arterial Blood pH


  


  


  


  7.33


(7.35-7.45)


 


Arterial Blood Partial


Pressure CO2 


  


  


  48 mmHg


(35-46)


 


Arterial Blood Partial


Pressure O2 


  


  


  71 mm/Hg


(80-95)


 


Arterial Blood HCO3


  


  


  


  24 mmol/L


(19-24)


 


Arterial Blood Oxygen


Saturation 


  


  


  90.0 % (90-95) 


 


 


Arterial Blood Base Excess


  


  


  


  -1.9 mEq/L


(-9-1.8)


 


Arterial Blood Gas Delivery    2 LITERS 


 


Kiko Test    POS (POS) 


 


Test


  1/1/18


16:26 


  


  


 


 


Bedside Glucose


  263 mg/dl


(70-90) 


  


  


 








Last 24 Hours








Test


  12/31/17


20:13 1/1/18


00:11 1/1/18


05:56 1/1/18


07:40


 


Bedside Glucose 358 mg/dl  128 mg/dl  84 mg/dl  90 mg/dl 


 


Test


  1/1/18


11:43 1/1/18


16:26 


  


 


 


Bedside Glucose 143 mg/dl  263 mg/dl   











Assessment & Plan





1.  Syncope-likely 2/2 URI in setting of hemodialysis with no food on her 

stomach.  No underlying arrhythmia noted.  She does have some apparent worsened 

mitral stenosis that will need further evaluation with a WANDY which will be 

performed on Tues during this hospitalization.  Transferred to med/surg.


2.  Hypoxia-resolved. Thought 2/2 asthma exacerbation from a bronchitis vs 

hypercarbia from presumed GUILLERMO/obesity hypoventilation syndrome vs fluid 

overloaded state from missed dialysis session.  Cont steroids and 

bronchodilators.  Apprec pulm input.  BIPAP qHS.  She has more wheezing on exam 

today and CXR reveals poss infiltrates at the bases which is likely 2/2 fluid 

overload.  Cont fluid management with HD and watch for development of a 

pneumonia.  She clinically appears improved today overall. 


3.  UTI-UA dirty at prior facility, no culture results available.  Treated with 

ceftriaxone for 5 days. Asymptomatic 


4.  ESRD on HD-apprec Nephro recs and management.  Cont Sevelamer


5.  DMII-up and down in setting of steroids, cont ISS/Lantus and carb coverage.

  Pharm consult. 


6.  Status post bioprosthetic aortic valve replacement in March 2016 for severe 

aortic stenosis.  Repeat echo this admission was unable to fully visualize the 

aortic valve. Per Cards WANDY on Tues.





DVT proph-heparin


Full Code


Dispo-WANDY on tues





[facility]


Consultants:


Nephro, Pulm


Current Inpatient Medications:





Current Inpatient Medications








 Medications


  (Trade)  Dose


 Ordered  Sig/Daniel


 Route  Start Time


 Stop Time Status Last Admin


Dose Admin


 


 Heparin Sodium


  (Porcine)


  (Heparin Sq 5000


 Unit/0.5ml)  5,000 unit  Q8


 SQ  12/27/17 22:00


 1/26/18 21:59 Future Hold 1/1/18 14:30


5,000 UNIT


 


 Ondansetron HCl


  (Zofran Inj)  4 mg  Q6H  PRN


 IV  12/27/17 17:45


 1/26/18 17:44   


 


 


 Allopurinol


  (Zyloprim Tab)  100 mg  BID


 PO  12/27/17 21:00


 1/26/18 20:59  1/1/18 08:14


100 MG


 


 Aspirin


  (Ecotrin Tab)  81 mg  DAILY


 PO  12/28/17 09:00


 1/27/18 08:59  1/1/18 08:14


81 MG


 


 Atorvastatin


 Calcium


  (Lipitor Tab)  40 mg  DAILY


 PO  12/28/17 09:00


 1/27/18 08:59  1/1/18 08:16


40 MG


 


 Clopidogrel


 Bisulfate


  (plAVix TAB)  75 mg  DAILY


 PO  12/28/17 09:00


 1/27/18 08:59  1/1/18 08:14


75 MG


 


 Docusate Sodium


  (coLACE CAP)  100 mg  BID


 PO  12/27/17 21:00


 1/26/18 20:59  12/31/17 21:15


100 MG


 


 Pantoprazole


 Sodium


  (Protonix Tab)  40 mg  DAILY


 PO  12/28/17 09:00


 1/27/18 08:59  1/1/18 08:15


40 MG


 


 Pramipexole


 Dihydrochloride


  (miraPEX TAB)  0.25 mg  HS


 PO  12/27/17 21:00


 1/26/18 20:59  12/31/17 21:16


0.25 MG


 


 Sertraline HCl


  (Zoloft Tab)  75 mg  DAILY


 PO  12/28/17 09:00


 1/27/18 08:59  1/1/18 08:15


75 MG


 


 Thiamine HCl


  (Vitamin B-1 Tab)  50 mg  DAILY


 PO  12/28/17 09:00


 1/27/18 08:59  1/1/18 08:16


50 MG


 


 Tramadol HCl


  (Ultram Tab)  50 mg  Q4H  PRN


 PO  12/27/17 19:15


 1/26/18 19:14  1/1/18 13:13


50 MG


 


 Vitamin B Complex/


 Vit C/Folic Acid


  (Nephrocaps)  1 cap  DAILY


 PO  12/28/17 09:00


 1/27/18 08:59  1/1/18 08:15


1 CAP


 


 Miscellaneous


 Information


  (Order Awaiting


 Action)  1 ea  QS


 N/A  12/28/17 00:00


 1/27/18 00:00   


 


 


 Miscellaneous


 Information


  (Order Awaiting


 Action)  1 ea  QS


 N/A  12/28/17 00:00


 1/27/18 00:00   


 


 


 Miscellaneous


 Information


  (Order Awaiting


 Action)  1 ea  QS


 N/A  12/28/17 00:00


 1/27/18 00:00   


 


 


 Glucose


  (Glucose 40% Gel)  15-30


 GRAMS 15


 GRAMS...  UD  PRN


 PO  12/27/17 19:30


 1/26/18 19:29   


 


 


 Glucose


  (Glucose Chew


 Tab)  4-8


 Tablets 4


 Tabl...  UD  PRN


 PO  12/27/17 19:30


 1/26/18 19:29   


 


 


 Dextrose


  (Dextrose 50%


 50ML Syringe)  25-50ML OF


 50% DW IV


 FOR...  UD  PRN


 IV  12/27/17 19:30


 1/26/18 19:29   


 


 


 Glucagon


  (Glucagon Inj)  1 mg  UD  PRN


 SQ  12/27/17 19:30


 1/26/18 19:29   


 


 


 Miscellaneous


 Information


  (Consult


 Glycemic


 Management


 Pharmacy)  1 ea  UD  PRN


 N/A  12/28/17 12:14


 1/27/18 12:13   


 


 


 Prednisone


  (PredniSONE TAB)  40 mg  DAILY


 PO  12/29/17 09:00


 1/28/18 08:59  1/1/18 08:16


40 MG


 


 Sevelamer HCl


  (Renagel Tab)  2,400 mg  TIDM


 PO  12/30/17 16:45


 1/29/18 11:29  1/1/18 17:35


2,400 MG


 


 Acetaminophen


  (Tylenol Tab)  1,000 mg  TID


 PO  12/30/17 21:00


 1/29/18 20:59  1/1/18 08:17


1,000 MG


 


 Miconazole Nitrate


  (Desenex Powder)  1 appln  PRN  PRN


 EXT  12/30/17 21:15


 1/29/18 21:14  1/1/18 09:29


1 APPLN


 


 Insulin Aspart


  (novoLOG ASPART)  SLIDING


 SCALE  AC


 SC  1/1/18 11:00


 1/31/18 10:59  1/1/18 17:40


11 UNITS


 


 Insulin Aspart


  (novoLOG ASPART)  SLIDING


 SCALE  HS


 SC  1/1/18 21:00


 1/31/18 20:59   


 


 


 Albuterol/


 Ipratropium


  (Duoneb)  3 ml  QIDR


 INH  1/1/18 12:00


 1/31/18 11:59  1/1/18 15:15


3 ML [Alert] : alert [No Acute Distress] : no acute distress [Well Nourished] : well nourished [Well Developed] : well developed [Normal Sclera/Conjunctiva] : normal sclera/conjunctiva [EOMI] : extra ocular movement intact [No Proptosis] : no proptosis [Normal Oropharynx] : the oropharynx was normal [Thyroid Not Enlarged] : the thyroid was not enlarged [No Thyroid Nodules] : there were no palpable thyroid nodules [No Respiratory Distress] : no respiratory distress [No Accessory Muscle Use] : no accessory muscle use [Clear to Auscultation] : lungs were clear to auscultation bilaterally [Normal Rate] : heart rate was normal  [Normal S1, S2] : normal S1 and S2 [Regular Rhythm] : with a regular rhythm [Pedal Pulses Normal] : the pedal pulses are present [No Edema] : there was no peripheral edema [Normal Bowel Sounds] : normal bowel sounds [Not Tender] : non-tender [Soft] : abdomen soft [Not Distended] : not distended [Post Cervical Nodes] : posterior cervical nodes [Anterior Cervical Nodes] : anterior cervical nodes [No Spinal Tenderness] : no spinal tenderness [Spine Straight] : spine straight [No Stigmata of Cushings Syndrome] : no stigmata of cushings syndrome [Normal Gait] : normal gait [Normal Strength/Tone] : muscle strength and tone were normal [No Rash] : no rash [Acanthosis Nigricans] : no acanthosis nigricans [Normal] : normal [Diminished Throughout Both Feet] : normal tactile sensation with monofilament testing throughout both feet [Normal Reflexes] : deep tendon reflexes were 2+ and symmetric [No Tremors] : no tremors [Oriented x3] : oriented to person, place, and time [de-identified] : 01/28/2019  [FreeTextEntry2] : onychomycosis  [FreeTextEntry6] : ingrown toe nail

## 2022-06-14 NOTE — CARDIOLOGY FOLLOW-UP
Subjective


General


Date of Service:


Dec 31, 2017.


Chief Complaint:  follow up MS


Pt evaluation today including:  conversation w/ patient, physical exam





History of Present Illness


The patient is a 57 year old female seen in follow up.


Patient feels well. Cough improved. Tolerated HD. No recurrent syncope.





Allergies


Coded Allergies:  


     Hydrocodone (Verified  Allergy, Unknown, unspecified, 12/27/17)


     Morphine (Verified  Allergy, Unknown, unspecified , 12/27/17)





Social History


Smoking Status:  Never Smoker


Hx Tobacco Use In Past Year?:  No


Hx Alcohol Use - Type And Amou:  No


Hx Substance Use - Type And Am:  No





Physical Exam


Vital Signs





Last Vital Signs Documentation








  Date Time  Temp Pulse Resp B/P (MAP) Pulse Ox O2 Delivery O2 Flow Rate FiO2


 


12/31/17 14:47 36.5 94 18 109/72 (84) 97 Room Air  


 


12/31/17 08:00       2.0 


 


12/29/17 02:30        21











Physical Exam


Constitutional:  


   Level of Distress:  NAD


Neck:  supple


Lungs:  


   Auscultation:  no wheezing, no rales/crackles, no rhonchi


Cardiovascular:  


   Heart Auscultation:  RRR, no murmurs, no rubs


Extremities:  no edema


Neurologic:  


   Gait & Station:  pertinent finding (no focal deficits )





Assessment and Plan


Assessment and Plan


Impression:


57-year-old female


1.  History of bioprosthetic aortic valve replacement, echocardiogram findings 

concerning for possible calcific mitral valve stenosis.


2.  Underlying ESRD on hemodialysis


3.  bronchitis, improving





Plan:


Plan for WANDY with anesthesia consult on 1/2/18.





Laboratory Results





Last 24 Hours








Test


  12/30/17


16:30 12/30/17


20:19 12/31/17


00:09 12/31/17


00:35


 


Bedside Glucose 308 mg/dl  306 mg/dl  44 mg/dl  49 mg/dl 


 


Test


  12/31/17


00:58 12/31/17


01:58 12/31/17


03:53 12/31/17


07:32


 


Bedside Glucose 67 mg/dl  93 mg/dl  109 mg/dl  93 mg/dl 


 


Test


  12/31/17


10:51 12/31/17


11:57 


  


 


 


Bedside Glucose 117 mg/dl  112 mg/dl
Subjective


General


Date of Service:


Jan 1, 2018.


Chief Complaint:  follow up MS


Pt evaluation today including:  conversation w/ patient, physical exam





History of Present Illness


The patient is a 57 year old female seen in follow-up.  Overall patient feels 

well.  Lungs are clear on exam this evening.





Allergies


Coded Allergies:  


     Hydrocodone (Verified  Allergy, Unknown, unspecified, 12/27/17)


     Morphine (Verified  Allergy, Unknown, unspecified , 12/27/17)





Social History


Smoking Status:  Never Smoker


Hx Tobacco Use In Past Year?:  No


Hx Alcohol Use - Type And Amou:  No


Hx Substance Use - Type And Am:  No





Physical Exam


Vital Signs





Last Vital Signs Documentation








  Date Time  Temp Pulse Resp B/P (MAP) Pulse Ox O2 Delivery O2 Flow Rate FiO2


 


1/1/18 16:00      Room Air  


 


1/1/18 15:37 36.4 103 18 120/76 (91) 95   


 


12/31/17 08:00       2.0 


 


12/29/17 02:30        21











Physical Exam


Constitutional:  


   Level of Distress:  NAD


Neck:  supple


Lungs:  


   Auscultation:  no wheezing, no rales/crackles, no rhonchi


Cardiovascular:  


   Heart Auscultation:  RRR, no murmurs, no rubs


Extremities:  no edema


Neurologic:  


   Gait & Station:  pertinent finding (no focal deficits )





Assessment and Plan


Assessment and Plan


Impression:


57-year-old female


1.  History of bioprosthetic aortic valve replacement, echocardiogram findings 

concerning for possible calcific mitral valve stenosis.


2.  Underlying ESRD on hemodialysis


3.  bronchitis, improving





Plan:


Plan for WANDY with anesthesia consult on 1/2/18. 


Informed consent was obtained for transesophageal echocardiogram and was 

witnessed by the patient's registered nurse.  The form was placed on her chart.


Will reassess her volume status prior to the morning.  Plan for transesophageal 

echocardiogram in the cardiac catheterization laboratory.  If the patient is 

found to have severe mitral stenosis, will need to consider adding coumadin for 

stroke prophylaxis.





Laboratory Results





Last 24 Hours








Test


  12/31/17


20:13 1/1/18


00:11 1/1/18


05:56 1/1/18


07:40


 


Bedside Glucose 358 mg/dl  128 mg/dl  84 mg/dl  90 mg/dl 


 


Test


  1/1/18


11:43 1/1/18


16:26 


  


 


 


Bedside Glucose 143 mg/dl  263 mg/dl
Subjective


General


Date of Service:


Jan 2, 2018.


Chief Complaint:  follow up MS


Pt evaluation today including:  conversation w/ patient, physical exam





History of Present Illness


The patient is a 57 year old female seen prior to , during, and post WANDY.





Allergies


Coded Allergies:  


     Hydrocodone (Verified  Allergy, Unknown, unspecified, 12/27/17)


     Morphine (Verified  Allergy, Unknown, unspecified , 12/27/17)





Social History


Smoking Status:  Never Smoker


Hx Tobacco Use In Past Year?:  No


Hx Alcohol Use - Type And Amou:  No


Hx Substance Use - Type And Am:  No





Physical Exam


Vital Signs





Last Vital Signs Documentation








  Date Time  Temp Pulse Resp B/P (MAP) Pulse Ox O2 Delivery O2 Flow Rate FiO2


 


1/2/18 08:20  88 18 124/68 (86) 94 Room Air  


 


1/2/18 07:55       6 


 


1/2/18 04:00 36.4       


 


12/29/17 02:30        21











Physical Exam


Constitutional:  


   Level of Distress:  NAD


Neck:  supple


Lungs:  


   Auscultation:  no wheezing, no rales/crackles, no rhonchi


Cardiovascular:  


   Heart Auscultation:  RRR, no murmurs, no rubs


Extremities:  no edema


Neurologic:  


   Gait & Station:  pertinent finding (no focal deficits )





Assessment and Plan


Assessment and Plan


Impression:


57-year-old female


1.  History of bioprosthetic aortic valve replacement, rule out mitral valve 

stenosis.


Summary of WANDY performed 1/2/18:


Moderate mitral annular calcification was noted posteriorly with restriction of 

the posterior mitral valve leafet.


By Doppler assessment , there is a significant Diastolic transmitral valve 

gradient of 13 mm Hg.


Only mild to moderate mitral stenosis however, is present by 2D evaluation. 


The high gradient is likely due to high cardiac output state , and relatively 

high heart rates of 80-90 bpm.


There is mild to moderate mitral regurgitation.


There is a bioprosthetic aortic valve.


There is no significant prosthetic stenosis.


The leaflets were not well visualized, but appeared to have normal mobility on 

limited visual assessment.


The aortic valve gradients were normal as assess by recent transthoracic 

echocardiogram.


There is mild concentric left ventricular hypertrophy.


The LV Ejection Fraction = 65-70%.








2.  Underlying ESRD on hemodialysis


3.  bronchitis, improving





Plan:


The transmitral gradient is high suggestive of severe mitral stenosis, however, 

on 2D evaluation severe MS is absent.


Gradient likely due to high cardiac output state.


Ideally , would titrate beta blocker for resting HR goal of 50-60 in a patient 

with these echo findings, however, given h/o resent unresponsive episode on HD, 

and need for bronchodilators for her bronchitis, her HR of 90-99 bpm is 

physiologically appropriate and I think it is best to hold off on more 

aggressive HR treatment.





Continue low dose aspirin. Not indication for anticoagulation. 


Will need to re-establish with cardiologist locally post discharge with our 

practice or in [place] if that is more convenient for her.





Laboratory Results





Last 24 Hours








Test


  1/1/18


11:43 1/1/18


16:26 1/1/18


20:08 1/2/18


03:22


 


Bedside Glucose 143 mg/dl  263 mg/dl  301 mg/dl  122 mg/dl 


 


Test


  1/2/18


05:38 1/2/18


06:06 


  


 


 


White Blood Count 8.44 K/uL    


 


Red Blood Count 3.54 M/uL    


 


Hemoglobin 11.4 g/dL    


 


Hematocrit 37.1 %    


 


Mean Corpuscular Volume 104.8 fL    


 


Mean Corpuscular Hemoglobin 32.2 pg    


 


Mean Corpuscular Hemoglobin


Concent 30.7 g/dl 


  


  


  


 


 


RDW Standard Deviation 61.8 fL    


 


RDW Coefficient of Variation 16.4 %    


 


Platelet Count 206 K/uL    


 


Mean Platelet Volume 9.4 fL    


 


Sodium Level 142 mmol/L    


 


Potassium Level 4.1 mmol/L    


 


Chloride Level 108 mmol/L    


 


Carbon Dioxide Level 25 mmol/L    


 


Anion Gap 9.0 mmol/L    


 


Blood Urea Nitrogen 65 mg/dl    


 


Creatinine 5.45 mg/dl    


 


Est Creatinine Clear Calc


Drug Dose 12.6 ml/min 


  


  


  


 


 


Estimated GFR (


American) 9.2 


  


  


  


 


 


Estimated GFR (Non-


American 7.9 


  


  


  


 


 


BUN/Creatinine Ratio 12.0    


 


Random Glucose 104 mg/dl    


 


Calcium Level 9.4 mg/dl    


 


Phosphorus Level 6.7 mg/dl    


 


Magnesium Level 2.4 mg/dl    


 


Chemistry Specimen Hemolysis     


 


Bedside Glucose  101 mg/dl
H/O POLYPS

## 2023-02-22 NOTE — CONSULTANT RECOMMENDATIONS
Consultant Recommendations


Date of Service


Jan 31, 2018.





Consultant Recommendations


MAY WEIGHT BEAR AS TOLERATED


WEAR CAM BOOT WHEN AMBULATORY


ICE AND ELEVATION


FOLLOW UP WITH DR. PUENTE IN 1-2 WEEKS. [phone] Progress Note    SUBJECTIVE:    Patient seen for f/u of Bacteremia due to Escherichia coli. She sitting up in chair complains of fatigue but no other complaints. Ambulating in the null and tolerating well. Refuses SNF . Mouth feels better. ROS:   Constitutional: negative  for fevers, and negative for chills. Respiratory: negative for shortness of breath, negative for cough, and negative for wheezing  Cardiovascular: negative for chest pain, and negative for palpitations  Gastrointestinal: negative for abdominal pain, negative for nausea,negative for vomiting, negative for diarrhea, and negative for constipation     All other systems were reviewed with the patient and are negative unless otherwise stated in HPI      OBJECTIVE:      Vitals:   Vitals:    02/22/23 0921   BP:    Pulse:    Resp: 16   Temp:    SpO2:      Weight: 116 lb (52.6 kg) (bed was re-zeroed)   Height: 5' 3\" (160 cm)     Weight  Wt Readings from Last 3 Encounters:   02/22/23 116 lb (52.6 kg)   02/13/23 112 lb (50.8 kg)   02/02/23 113 lb (51.3 kg)     Body mass index is 20.55 kg/m². 24HR INTAKE/OUTPUT:      Intake/Output Summary (Last 24 hours) at 2/22/2023 0944  Last data filed at 2/22/2023 0419  Gross per 24 hour   Intake 240 ml   Output 2650 ml   Net -2410 ml     -----------------------------------------------------------------  Exam:    GEN:    Awake, alert and oriented x3. EYES:  EOMI, pupils equal   NECK: Supple. No lymphadenopathy. No carotid bruit  CVS:    regular rate and rhythm, no audible murmur  PULM:  CTA, no wheezes, rales or rhonchi, no acute respiratory distress  ABD:    Bowels sounds normal.  Abdomen is soft. No distention. no tenderness to palpation. EXT:   trace edema bilaterally . No calf tenderness. NEURO: Moves all extremities. Motor and sensory are grossly intact  SKIN:  No rashes.   No skin lesions.    -----------------------------------------------------------------    Diagnostic Data:      Complete Blood Count:   Recent Labs     02/20/23  0514 02/21/23  0515 02/22/23  0515   WBC 19.5* 18.2* 17.1*   RBC 2.33* 2.41* 2.54*   HGB 7.2* 7.4* 7.8*   HCT 21.5* 22.2* 23.3*   MCV 92.3 92.1 91.7   MCH 30.9 30.7 30.7   MCHC 33.5 33.3 33.5   RDW 14.7* 14.6* 14.8*    289 366   MPV 10.7 10.4 9.9        Last 3 Blood Glucose:   Recent Labs     02/20/23  0514 02/21/23  0515 02/22/23  0515   GLUCOSE 91 86 82        Comprehensive Metabolic Profile:   Recent Labs     02/20/23  0514 02/21/23  0515 02/22/23  0515   * 136 132*   K 4.6 4.6 4.6   CL 98 99 95*   CO2 31 29 30   BUN 13 12 13   CREATININE 0.86 0.79 1.09*   GLUCOSE 91 86 82   CALCIUM 8.2* 8.7 8.7   PROT 5.1* 5.4* 6.0*   LABALBU 2.1* 2.4* 2.5*   BILITOT 0.3 0.2* 0.2*   ALKPHOS 139* 136* 124*   AST 16 19 18   ALT 28 23 23        Urinalysis:   Lab Results   Component Value Date/Time    NITRU NEGATIVE 02/13/2023 05:23 PM    COLORU Yellow 02/13/2023 05:23 PM    PHUR 5.5 02/13/2023 05:23 PM    WBCUA 20 TO 50 02/13/2023 05:23 PM    RBCUA 2 TO 5 02/13/2023 05:23 PM    MUCUS TRACE 02/13/2023 05:23 PM    TRICHOMONAS NOT REPORTED 02/03/2021 02:30 PM    YEAST NOT REPORTED 02/03/2021 02:30 PM    BACTERIA 2+ 02/13/2023 05:23 PM    CLARITYU clear 05/18/2017 04:21 PM    SPECGRAV 1.025 02/13/2023 05:23 PM    LEUKOCYTESUR LARGE 02/13/2023 05:23 PM    UROBILINOGEN Normal 02/13/2023 05:23 PM    BILIRUBINUR NEGATIVE 02/13/2023 05:23 PM    BILIRUBINUR 0 05/18/2017 04:21 PM    BLOODU 1+ 05/18/2017 04:21 PM    GLUCOSEU NEGATIVE 02/13/2023 05:23 PM    KETUA TRACE 02/13/2023 05:23 PM    AMORPHOUS NOT REPORTED 02/03/2021 02:30 PM       HgBA1c:    Lab Results   Component Value Date/Time    LABA1C 5.9 10/04/2022 07:31 AM       Lactic Acid:   Lab Results   Component Value Date/Time    LACTA 1.8 02/14/2023 09:46 AM    LACTA 2.2 02/14/2023 07:45 AM    LACTA 2.8 02/14/2023 05:45 AM        Troponin: No results for input(s): TROPONINI in the last 72 hours.     CRP:    Recent Labs 02/20/23  0514   [facility] 120.6*         Radiology/Imaging:  CT ABDOMEN PELVIS WO CONTRAST Additional Contrast? None   Final Result   1. Interval placement of a double pigtail right ureteral stent. The   proximal end of the stent is in appropriate position. The distal end of the   stent is difficult to ascertain due to obscuring beam-hardening artifact. The distal end of the stent may be right at the UV junction partially within   the distal ureter. There is still persistent right hydronephrosis. 2.  Bilateral parenchymal nephroliths as above. 3.  No bowel obstruction. There is contrast enhancement of the lining of the   colon presumably from recent CT chest.      4.  Bilateral moderate pleural effusions. The patient has a small   pericardial effusion. RECOMMENDATIONS:   Advise plain film examination of the abdomen. XR CHEST PORTABLE   Final Result   No significant change from prior study. Small bilateral effusions in diffuse   interstitial opacities are again noted. CT CHEST PULMONARY EMBOLISM W CONTRAST   Final Result   1. Multifocal airspace disease throughout the lungs which can be seen with   atypical viral pneumonia. Correlate for any history of COVID. Moderate   bilateral pleural effusions. Compressive atelectasis in both lungs. Follow-up is recommended to document resolution. 2. Atrophic thyroid gland. 3. Atherosclerotic calcification and atheromatous plaque of the aorta. 4. Scarring and cortical thinning of the upper pole left kidney. Partially   visualized superior aspect of right ureteral stent in right renal collecting   system. 5. No clear evidence for central pulmonary embolus. 6. Moderate thoracic dextroscoliosis. XR CHEST PORTABLE   Final Result      Right PICC line distal tip is within the mid right atrium and needs to be   pulled back approximately 4 cm.       Diffusely increased interstitial markings throughout both lungs with   associated ground-glass densities. Findings can be suggestive of multifocal   pneumonia. Consolidative pulmonary edema is another possibility. Small bilateral pleural effusion. FLUORO FOR SURGICAL PROCEDURES   Final Result      XR CHEST PORTABLE   Final Result   Increased interstitial opacity. While much or all of this may be chronic,   please correlate with any clinical evidence of acute interstitial edema. A focal infiltrate is not detected               ASSESSMENT / PLAN:    MEDICAL DECISION MAKING:    Primary Problem(s):  Bacteremia due to Escherichia coli  Condition is improving however leukocytosis remains unchanged  Treatment plan:   Urology assistance appreciated  S/p cystoscopy with right ureteral stent insertion 2/13/23  ID consult appreciated - Dr. Michael High notes reviewed  Imaging:   Port CXR ordered 2/21= bilateral interstitial infiltrates remain unchanged, no significant change, moderate bilateral pleural effusions  Repeat CT abd/pelvis ordered 2/21 = stent in place but continued hydronephrosis, moderate bilateral pleural effusions  Medications:   Continue Levofloxacin with last dose 2/23  Weaned off Levophed  - BPs stable  Remains off IVF  Lasix 40 IV daily   Medication Monitoring / High Risk Medications: none     Multifocal pneumonia  Condition is stable  Treatment plan:   ID consult appreciated  Rapid Covid = negative  Viral respiratory panel = Rhino/enterovirus  Imaging: CTA chest reviewed  Medications: Continue IV levofloxacin - stop date 2/23/22     Acute kidney injury due to sepsis    Condition is resolved  Treatment plan:   Nephrology consult appreciated - Dr. Janice Alva notes reviewed  BP support  Monitor renal function, I/O's--stable today     Elevated LFT's due to shock liver    Condition is resolved  Treatment plan:   Monitor LFT's      Thrombocytopenia  Multifactorial and likely due to combination of sepsis, shock liver, possibly medications  Condition is resolved  Heme/Onc consult gracie - Dr. Ambrocio Silva note reviewed  Treatment plan: monitor CBC  Medications: heparin DC'd     Thrush  Condition is improving  Medications:   Nystatin S/S  Viscous lidocaine / oragel prn     Nutrition status:   at risk for malnutrition  Dietician consult initiated     Hospital Prophylaxis:   DVT: SCDs (heparin DCd due to thrombocytopenia)    Disposition:  Shared decision making: All test results, treatment options and disposition options were discussed with the patient today  Social determinants of health that may impact management: none  Code status: Full Code   Disposition: Discharge plan is home    [facility] documentation:  [x] I have confirmed that the patient's Advance Care Plan is present, Code Status is documented, or surrogate decision maker is listed in the patient's medical record  [If \"yes\", STOP HERE]     [] The patient's Advance Care Plan is NOT present because:    []  I confirmed today that the patient does not wish or was not able to name a   surrogate decision maker or provide and advance care plan.    [] Hospice care is currently being provided or has been provided within the   calendar year. []  I did NOT confirm today the presence of an [address] or surrogate   decision maker documented within the patient's medical record.    [DOES NOT SATISFY SAL Walters - CNP , SAL, NP-C  [facility]        2/22/2023, 9:44 AM

## 2023-06-13 NOTE — PHARMACY PROGRESS NOTE
Pharmacy Glycemic Short Note 2


Date of Service


Jan 23, 2018.


OUTPATIENT ANTIDIABETIC REGIMEN: 


* NPH 40 units SQ daily when taking prednisone (otherwise no insulin taken)








ASSESSMENT:


* Patient has not required any basal insulin since steroids were stopped a 

couple of days ago.


* BSGs have been stable with Novolog coverage only.  


* No changes required at this time.








PLAN FOR INPATIENT GLYCEMIC CONTROL:


* Correctional Insulin with NOVOLOG per scale ACHS


 * Goal Range:  Low 120 mg/dL - High 150 mg/dL


 * Correction Factor: 20 mg/dL/unit


 * Nutritional / Prandial insulin per carb ratio of 1 unit per 8 grams CHO 

consumed








PLAN FOR DISCHARGE:


* Expect that patient may be discharged on previous home regimen.





* Please note that the plan above was derived based on current level of insulin 

resistance and hospital stress. These recommendations are appropriate for 

inpatient admission only. Plan of care upon discharge will need to be 

reassessed to avoid potential outpatient hypo/hyperglycemia. 








Thank you. [id number]